# Patient Record
Sex: FEMALE | Race: WHITE | NOT HISPANIC OR LATINO | URBAN - METROPOLITAN AREA
[De-identification: names, ages, dates, MRNs, and addresses within clinical notes are randomized per-mention and may not be internally consistent; named-entity substitution may affect disease eponyms.]

---

## 2022-09-12 ENCOUNTER — HOSPITAL ENCOUNTER (EMERGENCY)
Facility: OTHER | Age: 47
Discharge: HOME | End: 2022-09-13
Attending: EMERGENCY MEDICINE
Payer: MEDICAID

## 2022-09-12 DIAGNOSIS — Z59.00 HOMELESSNESS: ICD-10-CM

## 2022-09-12 DIAGNOSIS — S83.92XA SPRAIN OF LEFT KNEE, UNSPECIFIED LIGAMENT, INITIAL ENCOUNTER: ICD-10-CM

## 2022-09-12 DIAGNOSIS — L03.90 CELLULITIS, UNSPECIFIED CELLULITIS SITE: Primary | ICD-10-CM

## 2022-09-12 PROCEDURE — 99284 EMERGENCY DEPT VISIT MOD MDM: CPT | Mod: 25

## 2022-09-12 SDOH — SOCIAL DETERMINANTS OF HEALTH (SDOH): HOMELESSNESS UNSPECIFIED: Z59.00

## 2022-09-13 VITALS
HEART RATE: 100 BPM | RESPIRATION RATE: 16 BRPM | HEIGHT: 62 IN | TEMPERATURE: 98 F | BODY MASS INDEX: 25.76 KG/M2 | SYSTOLIC BLOOD PRESSURE: 140 MMHG | OXYGEN SATURATION: 97 % | WEIGHT: 140 LBS | DIASTOLIC BLOOD PRESSURE: 97 MMHG

## 2022-09-13 PROCEDURE — 29505 APPLICATION LONG LEG SPLINT: CPT | Mod: LT

## 2022-09-13 PROCEDURE — 25000003 PHARM REV CODE 250: Performed by: EMERGENCY MEDICINE

## 2022-09-13 PROCEDURE — 99283 EMERGENCY DEPT VISIT LOW MDM: CPT | Mod: 25

## 2022-09-13 RX ORDER — ACETAMINOPHEN 500 MG
1000 TABLET ORAL
Status: COMPLETED | OUTPATIENT
Start: 2022-09-13 | End: 2022-09-13

## 2022-09-13 RX ORDER — IBUPROFEN 600 MG/1
600 TABLET ORAL
Status: COMPLETED | OUTPATIENT
Start: 2022-09-13 | End: 2022-09-13

## 2022-09-13 RX ORDER — IBUPROFEN 600 MG/1
600 TABLET ORAL EVERY 6 HOURS PRN
Qty: 20 TABLET | Refills: 0 | Status: SHIPPED | OUTPATIENT
Start: 2022-09-13

## 2022-09-13 RX ORDER — DOXYCYCLINE 100 MG/1
100 CAPSULE ORAL 2 TIMES DAILY
Qty: 20 CAPSULE | Refills: 0 | Status: SHIPPED | OUTPATIENT
Start: 2022-09-13 | End: 2022-09-23

## 2022-09-13 RX ORDER — DOXYCYCLINE HYCLATE 100 MG
100 TABLET ORAL
Status: COMPLETED | OUTPATIENT
Start: 2022-09-13 | End: 2022-09-13

## 2022-09-13 RX ADMIN — IBUPROFEN 600 MG: 600 TABLET ORAL at 12:09

## 2022-09-13 RX ADMIN — ACETAMINOPHEN 1000 MG: 500 TABLET, FILM COATED ORAL at 12:09

## 2022-09-13 RX ADMIN — DOXYCYCLINE HYCLATE 100 MG: 100 TABLET, COATED ORAL at 12:09

## 2022-09-13 NOTE — DISCHARGE INSTRUCTIONS
Mrs. Sloan,    Thank you for letting me care for you today! It was nice meeting you, and I hope you feel better soon.   If you would like access to your chart and what was done today please utilize the [facility].   Please come back to [facility] for all of your future medical needs.    Our goal in the emergency department is to always give you outstanding care and exceptional service. You may receive a survey by mail or e-mail in the next week regarding your experience in our ED. We would greatly appreciate you completing and returning the survey. Your feedback provides us with a way to recognize our staff who give very good care and it helps us learn how to improve when your experience was below our aspiration of excellence.     Sincerely,    Daniel Hurt MD  Board Certified Emergency Physician
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
## 2022-09-14 ENCOUNTER — HOSPITAL ENCOUNTER (EMERGENCY)
Facility: OTHER | Age: 47
Discharge: HOME | End: 2022-09-14
Attending: EMERGENCY MEDICINE
Payer: MEDICAID

## 2022-09-14 VITALS
OXYGEN SATURATION: 98 % | TEMPERATURE: 99 F | HEART RATE: 103 BPM | BODY MASS INDEX: 25.76 KG/M2 | WEIGHT: 140 LBS | RESPIRATION RATE: 16 BRPM | HEIGHT: 62 IN | SYSTOLIC BLOOD PRESSURE: 134 MMHG | DIASTOLIC BLOOD PRESSURE: 92 MMHG

## 2022-09-14 DIAGNOSIS — M25.569 KNEE PAIN: ICD-10-CM

## 2022-09-14 DIAGNOSIS — S82.112A CLOSED DISPLACED FRACTURE OF SPINE OF LEFT TIBIA, INITIAL ENCOUNTER: Primary | ICD-10-CM

## 2022-09-14 PROCEDURE — 29505 APPLICATION LONG LEG SPLINT: CPT | Mod: LT

## 2022-09-14 PROCEDURE — 25000003 PHARM REV CODE 250: Performed by: EMERGENCY MEDICINE

## 2022-09-14 PROCEDURE — 99283 EMERGENCY DEPT VISIT LOW MDM: CPT | Mod: 25

## 2022-09-14 RX ORDER — ACETAMINOPHEN 500 MG
1000 TABLET ORAL
Status: COMPLETED | OUTPATIENT
Start: 2022-09-14 | End: 2022-09-14

## 2022-09-14 RX ADMIN — ACETAMINOPHEN 1000 MG: 500 TABLET, FILM COATED ORAL at 12:09

## 2022-09-14 NOTE — ED TRIAGE NOTES
Pt arrived to ED with c/o left knee pain since yesterday and worsen today. Pt reports being seen in the ED yesterday. Pt AAOx4, NAD noted.
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
## 2022-09-16 ENCOUNTER — HOSPITAL ENCOUNTER (EMERGENCY)
Facility: OTHER | Age: 47
Discharge: HOME | End: 2022-09-16
Attending: EMERGENCY MEDICINE
Payer: MEDICAID

## 2022-09-16 VITALS
RESPIRATION RATE: 16 BRPM | OXYGEN SATURATION: 98 % | HEIGHT: 62 IN | BODY MASS INDEX: 25.76 KG/M2 | TEMPERATURE: 98 F | WEIGHT: 140 LBS | HEART RATE: 85 BPM | SYSTOLIC BLOOD PRESSURE: 113 MMHG | DIASTOLIC BLOOD PRESSURE: 76 MMHG

## 2022-09-16 DIAGNOSIS — Z51.89 VISIT FOR WOUND CHECK: Primary | ICD-10-CM

## 2022-09-16 DIAGNOSIS — L98.9 SKIN LESION: ICD-10-CM

## 2022-09-16 LAB
ANION GAP SERPL CALC-SCNC: 10 MMOL/L (ref 8–16)
BASOPHILS # BLD AUTO: 0.09 K/UL (ref 0–0.2)
BASOPHILS NFR BLD: 0.9 % (ref 0–1.9)
BUN SERPL-MCNC: 8 MG/DL (ref 6–20)
CALCIUM SERPL-MCNC: 9.4 MG/DL (ref 8.7–10.5)
CHLORIDE SERPL-SCNC: 103 MMOL/L (ref 95–110)
CO2 SERPL-SCNC: 27 MMOL/L (ref 23–29)
CREAT SERPL-MCNC: 1 MG/DL (ref 0.5–1.4)
DIFFERENTIAL METHOD: ABNORMAL
EOSINOPHIL # BLD AUTO: 0.1 K/UL (ref 0–0.5)
EOSINOPHIL NFR BLD: 1.1 % (ref 0–8)
ERYTHROCYTE [DISTWIDTH] IN BLOOD BY AUTOMATED COUNT: 14.2 % (ref 11.5–14.5)
EST. GFR  (NO RACE VARIABLE): >60 ML/MIN/1.73 M^2
GLUCOSE SERPL-MCNC: 94 MG/DL (ref 70–110)
HCT VFR BLD AUTO: 44.2 % (ref 37–48.5)
HGB BLD-MCNC: 14.3 G/DL (ref 12–16)
IMM GRANULOCYTES # BLD AUTO: 0.08 K/UL (ref 0–0.04)
IMM GRANULOCYTES NFR BLD AUTO: 0.8 % (ref 0–0.5)
LYMPHOCYTES # BLD AUTO: 3.1 K/UL (ref 1–4.8)
LYMPHOCYTES NFR BLD: 30.7 % (ref 18–48)
MCH RBC QN AUTO: 29.2 PG (ref 27–31)
MCHC RBC AUTO-ENTMCNC: 32.4 G/DL (ref 32–36)
MCV RBC AUTO: 90 FL (ref 82–98)
MONOCYTES # BLD AUTO: 0.5 K/UL (ref 0.3–1)
MONOCYTES NFR BLD: 4.8 % (ref 4–15)
NEUTROPHILS # BLD AUTO: 6.3 K/UL (ref 1.8–7.7)
NEUTROPHILS NFR BLD: 61.7 % (ref 38–73)
NRBC BLD-RTO: 0 /100 WBC
PLATELET # BLD AUTO: 395 K/UL (ref 150–450)
PMV BLD AUTO: 10.2 FL (ref 9.2–12.9)
POTASSIUM SERPL-SCNC: 4.1 MMOL/L (ref 3.5–5.1)
RBC # BLD AUTO: 4.9 M/UL (ref 4–5.4)
SODIUM SERPL-SCNC: 140 MMOL/L (ref 136–145)
WBC # BLD AUTO: 10.18 K/UL (ref 3.9–12.7)

## 2022-09-16 PROCEDURE — 63600175 PHARM REV CODE 636 W HCPCS: Performed by: PHYSICIAN ASSISTANT

## 2022-09-16 PROCEDURE — 96375 TX/PRO/DX INJ NEW DRUG ADDON: CPT

## 2022-09-16 PROCEDURE — 80048 BASIC METABOLIC PNL TOTAL CA: CPT | Performed by: PHYSICIAN ASSISTANT

## 2022-09-16 PROCEDURE — 99284 EMERGENCY DEPT VISIT MOD MDM: CPT | Mod: 25

## 2022-09-16 PROCEDURE — 87593 ORTHOPOXVIRUS AMP PRB EACH: CPT | Performed by: PHYSICIAN ASSISTANT

## 2022-09-16 PROCEDURE — 96374 THER/PROPH/DIAG INJ IV PUSH: CPT

## 2022-09-16 PROCEDURE — 85025 COMPLETE CBC W/AUTO DIFF WBC: CPT | Performed by: PHYSICIAN ASSISTANT

## 2022-09-16 PROCEDURE — 25000003 PHARM REV CODE 250: Performed by: PHYSICIAN ASSISTANT

## 2022-09-16 RX ORDER — CLINDAMYCIN PHOSPHATE 600 MG/50ML
600 INJECTION, SOLUTION INTRAVENOUS
Status: COMPLETED | OUTPATIENT
Start: 2022-09-16 | End: 2022-09-16

## 2022-09-16 RX ORDER — KETOROLAC TROMETHAMINE 30 MG/ML
15 INJECTION, SOLUTION INTRAMUSCULAR; INTRAVENOUS
Status: COMPLETED | OUTPATIENT
Start: 2022-09-16 | End: 2022-09-16

## 2022-09-16 RX ADMIN — KETOROLAC TROMETHAMINE 15 MG: 30 INJECTION, SOLUTION INTRAMUSCULAR; INTRAVENOUS at 03:09

## 2022-09-16 RX ADMIN — CLINDAMYCIN PHOSPHATE 600 MG: 600 INJECTION, SOLUTION INTRAVENOUS at 03:09

## 2022-09-16 NOTE — DISCHARGE INSTRUCTIONS
[facility] Health may have results sooner than us for monkeypox   [phone]    Call us in one week to get results

## 2022-09-16 NOTE — PLAN OF CARE
Call placed to [facility] scheduling in attempt to schedule appt for pt needing follow up with wound care. Per scheduling dept; unable to schedule appt with [facility] due to type of insurance, pt also has outside referral. Call placed to [facility] @ [phone].  clinic closed for the day. Unable to schedule appt. Shawn CALLES notified. Pt to call [facility] @ [phone] to schedule  appt for follow up.

## 2022-09-16 NOTE — ED PROVIDER NOTES
"     Source of History:  Patient     Chief complaint:  knee pain and rash (Pt c.o left knee pain onset 5 days ago.  Pt denies recent injury.  Pt also c.o abscess all over on arms and legs 1 week ago. Pt has multiple sores on arms and legs. Pt denies iv drug use.  AAO x 3 nadn skin w.vic )      HPI:  Mari Sloan is a 47 y.o. female who is presenting to emergency department with worsening skin lesions.  Patient has had painful lesions for the past week.  She was seen here in the ED on 09/12 and was prescribed doxycycline.  She states she is sleeping outside the [place] and wakes up each day with a new lesion.  She states that they spontaneously drain pus.  She states that she sometimes squeezes at these.  She reports subjective fever.  She states worse  has similar lesions.  She is also complaining of left knee pain is worse with bearing weight.  She has been evaluated for this twice this week and was given a knee immobilizer.  She is not wearing today because she states it was too bulky to walk  with.   This is the extent to the patients complaints today here in the emergency department.    ROS: As per HPI and below:  General: + subjective fever  Eyes: No visual changes.  ENT: No sore throat. No congestion.  Head: No headache.    Respiratory: No shortness of breath.  No cough.  Cardiovascular: No chest pain.  Abdomen: No abdominal pain.  No nausea or vomiting.  Genito-Urinary: No abnormal urination.  Neurologic: No focal weakness.  No numbness.  MSK: + left knee pain  Integument: + skin lesions  Allergy/immunology:  Not immunocompromised.     Review of patient's allergies indicates:  No Known Allergies    PMH:  As per HPI and below:  No past medical history on file.  No past surgical history on file.         Physical Exam:    /87 (BP Location: Left arm, Patient Position: Sitting)   Pulse 89   Temp 97.8 °F (36.6 °C) (Oral)   Resp 18   Ht 5' 2" (1.575 m)   Wt 63.5 kg (140 lb)   SpO2 98%   BMI " 25.61 kg/m²   Nursing note and vital signs reviewed.  Appearance: No acute distress.  Eyes: No conjunctival injection.  ENT: Oropharynx clear.    Chest/ Respiratory: Clear to auscultation bilaterally.  Good air movement.  No wheezes.  No rhonchi. No rales. No accessory muscle use.  Cardiovascular: Regular rate and rhythm.  No murmurs. No gallops. No rubs.  Intact distal pulses.  Abdomen: Soft.  Not distended.  Nontender.  No guarding.  No rebound. Non-peritoneal.  Musculoskeletal:  Left knee without obvious deformity.  Normal range of motion.  Skin:  Multiple, raised circumferential skin lesions to arms and legs with calamine lotion apply.  No vesicles.  Approximate 4 cm area of skin breakdown to right tib-fib area.  No significant surrounding induration or warmth.      Neurologic: Motor intact.  Sensation intact.   Mental Status:  Alert and oriented x 3.  Appropriate, conversant.   Labs that have been ordered have been independently reviewed and interpreted by myself.    I decided to obtain the patient's medical records.    MDM/ Differential Dx:    47 y.o. female presenting to the emergency department with skin lesions are not improving despite taking doxycycline.  Patient is afebrile, nontoxic appearing hemodynamically stable.  Patient has poor healing wound to right tib-fib area but no significant surrounding cellulitis.  I do not think at this time patient meets criteria for failed outpatient cellulitis but will obtain blood work and give IV antibiotics here in the ED and try to arrange wound care follow-up    ED Course as of 09/16/22 1742   Fri Sep 16, 2022   1739 Blood work grossly unremarkable.  Obtain monkey pox swab.  Patient given precautions.  Attempted to arrange wound care follow-up but office is closed for the day.  She may have access to a phone on Monday and will be able to call.  Advised to continue taking doxycycline, strict return precautions to the ED [AG]      ED Course User Index  [AG] Niraj  RICHARD Escobar PA-C           Diagnostic Impression:    1. Visit for wound check    2. Skin lesion                   Niraj Escobar PA-C  09/16/22 0953
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
## 2022-09-20 LAB — NONVAR ORTHPX DNA SPEC QL NAA+PROBE: NORMAL

## 2024-02-26 ENCOUNTER — HOSPITAL ENCOUNTER (EMERGENCY)
Facility: OTHER | Age: 49
Discharge: HOME | End: 2024-02-26
Attending: EMERGENCY MEDICINE
Payer: MEDICAID

## 2024-02-26 VITALS
OXYGEN SATURATION: 98 % | DIASTOLIC BLOOD PRESSURE: 90 MMHG | WEIGHT: 160 LBS | RESPIRATION RATE: 18 BRPM | HEART RATE: 86 BPM | SYSTOLIC BLOOD PRESSURE: 159 MMHG | HEIGHT: 62 IN | TEMPERATURE: 99 F | BODY MASS INDEX: 29.44 KG/M2

## 2024-02-26 DIAGNOSIS — I10 HYPERTENSION, UNSPECIFIED TYPE: ICD-10-CM

## 2024-02-26 DIAGNOSIS — R73.9 HYPERGLYCEMIA: ICD-10-CM

## 2024-02-26 DIAGNOSIS — N30.00 ACUTE CYSTITIS WITHOUT HEMATURIA: Primary | ICD-10-CM

## 2024-02-26 LAB
ALBUMIN SERPL BCP-MCNC: 3.5 G/DL (ref 3.5–5.2)
ALP SERPL-CCNC: 151 U/L (ref 55–135)
ALT SERPL W/O P-5'-P-CCNC: 54 U/L (ref 10–44)
ANION GAP SERPL CALC-SCNC: 19 MMOL/L (ref 8–16)
AST SERPL-CCNC: 71 U/L (ref 10–40)
B-OH-BUTYR BLD STRIP-SCNC: 0.3 MMOL/L (ref 0–0.5)
BACTERIA #/AREA URNS HPF: ABNORMAL /HPF
BASOPHILS # BLD AUTO: 0.06 K/UL (ref 0–0.2)
BASOPHILS NFR BLD: 0.6 % (ref 0–1.9)
BILIRUB SERPL-MCNC: 1.4 MG/DL (ref 0.1–1)
BILIRUB UR QL STRIP: NEGATIVE
BUN SERPL-MCNC: 3 MG/DL (ref 6–20)
CALCIUM SERPL-MCNC: 10.2 MG/DL (ref 8.7–10.5)
CHLORIDE SERPL-SCNC: 88 MMOL/L (ref 95–110)
CLARITY UR: CLEAR
CO2 SERPL-SCNC: 25 MMOL/L (ref 23–29)
COLOR UR: YELLOW
CREAT SERPL-MCNC: 0.9 MG/DL (ref 0.5–1.4)
DIFFERENTIAL METHOD BLD: ABNORMAL
EOSINOPHIL # BLD AUTO: 0 K/UL (ref 0–0.5)
EOSINOPHIL NFR BLD: 0.4 % (ref 0–8)
ERYTHROCYTE [DISTWIDTH] IN BLOOD BY AUTOMATED COUNT: 13.2 % (ref 11.5–14.5)
EST. GFR  (NO RACE VARIABLE): >60 ML/MIN/1.73 M^2
GLUCOSE SERPL-MCNC: 456 MG/DL (ref 70–110)
GLUCOSE UR QL STRIP: ABNORMAL
HCT VFR BLD AUTO: 43.3 % (ref 37–48.5)
HGB BLD-MCNC: 14.9 G/DL (ref 12–16)
HGB UR QL STRIP: NEGATIVE
IMM GRANULOCYTES # BLD AUTO: 0.04 K/UL (ref 0–0.04)
IMM GRANULOCYTES NFR BLD AUTO: 0.4 % (ref 0–0.5)
KETONES UR QL STRIP: ABNORMAL
LEUKOCYTE ESTERASE UR QL STRIP: ABNORMAL
LIPASE SERPL-CCNC: 5 U/L (ref 4–60)
LYMPHOCYTES # BLD AUTO: 1.7 K/UL (ref 1–4.8)
LYMPHOCYTES NFR BLD: 17.7 % (ref 18–48)
MCH RBC QN AUTO: 29.2 PG (ref 27–31)
MCHC RBC AUTO-ENTMCNC: 34.4 G/DL (ref 32–36)
MCV RBC AUTO: 85 FL (ref 82–98)
MICROSCOPIC COMMENT: ABNORMAL
MONOCYTES # BLD AUTO: 0.6 K/UL (ref 0.3–1)
MONOCYTES NFR BLD: 6.5 % (ref 4–15)
NEUTROPHILS # BLD AUTO: 7.3 K/UL (ref 1.8–7.7)
NEUTROPHILS NFR BLD: 74.4 % (ref 38–73)
NITRITE UR QL STRIP: NEGATIVE
NRBC BLD-RTO: 0 /100 WBC
PH UR STRIP: 8 [PH] (ref 5–8)
PLATELET # BLD AUTO: 235 K/UL (ref 150–450)
PLATELET BLD QL SMEAR: ABNORMAL
PMV BLD AUTO: 10.6 FL (ref 9.2–12.9)
POCT GLUCOSE: 302 MG/DL (ref 70–110)
POCT GLUCOSE: 367 MG/DL (ref 70–110)
POCT GLUCOSE: 425 MG/DL (ref 70–110)
POTASSIUM SERPL-SCNC: 4 MMOL/L (ref 3.5–5.1)
PROT SERPL-MCNC: 8.7 G/DL (ref 6–8.4)
PROT UR QL STRIP: NEGATIVE
RBC # BLD AUTO: 5.11 M/UL (ref 4–5.4)
RBC #/AREA URNS HPF: 34 /HPF (ref 0–4)
SODIUM SERPL-SCNC: 132 MMOL/L (ref 136–145)
SP GR UR STRIP: >1.03 (ref 1–1.03)
SQUAMOUS #/AREA URNS HPF: 2 /HPF
URN SPEC COLLECT METH UR: ABNORMAL
UROBILINOGEN UR STRIP-ACNC: ABNORMAL EU/DL
WBC # BLD AUTO: 9.75 K/UL (ref 3.9–12.7)
WBC #/AREA URNS HPF: 52 /HPF (ref 0–5)
WBC CLUMPS URNS QL MICRO: ABNORMAL
YEAST URNS QL MICRO: ABNORMAL

## 2024-02-26 PROCEDURE — 80053 COMPREHEN METABOLIC PANEL: CPT | Performed by: PHYSICIAN ASSISTANT

## 2024-02-26 PROCEDURE — 96365 THER/PROPH/DIAG IV INF INIT: CPT

## 2024-02-26 PROCEDURE — 25000003 PHARM REV CODE 250

## 2024-02-26 PROCEDURE — 81000 URINALYSIS NONAUTO W/SCOPE: CPT | Performed by: PHYSICIAN ASSISTANT

## 2024-02-26 PROCEDURE — 99284 EMERGENCY DEPT VISIT MOD MDM: CPT | Mod: 25

## 2024-02-26 PROCEDURE — 87186 SC STD MICRODIL/AGAR DIL: CPT | Performed by: PHYSICIAN ASSISTANT

## 2024-02-26 PROCEDURE — 96372 THER/PROPH/DIAG INJ SC/IM: CPT

## 2024-02-26 PROCEDURE — 96361 HYDRATE IV INFUSION ADD-ON: CPT

## 2024-02-26 PROCEDURE — 85025 COMPLETE CBC W/AUTO DIFF WBC: CPT | Performed by: PHYSICIAN ASSISTANT

## 2024-02-26 PROCEDURE — 63600175 PHARM REV CODE 636 W HCPCS

## 2024-02-26 PROCEDURE — 82962 GLUCOSE BLOOD TEST: CPT

## 2024-02-26 PROCEDURE — 82010 KETONE BODYS QUAN: CPT

## 2024-02-26 PROCEDURE — 87088 URINE BACTERIA CULTURE: CPT | Performed by: PHYSICIAN ASSISTANT

## 2024-02-26 PROCEDURE — 87086 URINE CULTURE/COLONY COUNT: CPT | Performed by: PHYSICIAN ASSISTANT

## 2024-02-26 PROCEDURE — 96375 TX/PRO/DX INJ NEW DRUG ADDON: CPT

## 2024-02-26 PROCEDURE — 87077 CULTURE AEROBIC IDENTIFY: CPT | Performed by: PHYSICIAN ASSISTANT

## 2024-02-26 PROCEDURE — 83690 ASSAY OF LIPASE: CPT | Performed by: PHYSICIAN ASSISTANT

## 2024-02-26 RX ORDER — FAMOTIDINE 10 MG/ML
20 INJECTION INTRAVENOUS
Status: COMPLETED | OUTPATIENT
Start: 2024-02-26 | End: 2024-02-26

## 2024-02-26 RX ORDER — ONDANSETRON 4 MG/1
4 TABLET, ORALLY DISINTEGRATING ORAL EVERY 6 HOURS PRN
Qty: 20 TABLET | Refills: 0 | Status: SHIPPED | OUTPATIENT
Start: 2024-02-26 | End: 2024-03-02

## 2024-02-26 RX ORDER — FAMOTIDINE 20 MG/1
20 TABLET, FILM COATED ORAL 2 TIMES DAILY PRN
Qty: 14 TABLET | Refills: 0 | Status: SHIPPED | OUTPATIENT
Start: 2024-02-26 | End: 2024-03-04

## 2024-02-26 RX ORDER — CLONIDINE HYDROCHLORIDE 0.1 MG/1
0.2 TABLET ORAL
Status: COMPLETED | OUTPATIENT
Start: 2024-02-26 | End: 2024-02-26

## 2024-02-26 RX ORDER — CLONIDINE HYDROCHLORIDE 0.1 MG/1
0.2 TABLET ORAL 3 TIMES DAILY
Qty: 180 TABLET | Refills: 0 | Status: SHIPPED | OUTPATIENT
Start: 2024-02-26 | End: 2024-03-27

## 2024-02-26 RX ORDER — ONDANSETRON HYDROCHLORIDE 2 MG/ML
8 INJECTION, SOLUTION INTRAVENOUS
Status: COMPLETED | OUTPATIENT
Start: 2024-02-26 | End: 2024-02-26

## 2024-02-26 RX ORDER — INSULIN ASPART 100 [IU]/ML
8 INJECTION, SOLUTION INTRAVENOUS; SUBCUTANEOUS
Status: COMPLETED | OUTPATIENT
Start: 2024-02-26 | End: 2024-02-26

## 2024-02-26 RX ORDER — CEFDINIR 300 MG/1
300 CAPSULE ORAL 2 TIMES DAILY
Qty: 14 CAPSULE | Refills: 0 | Status: SHIPPED | OUTPATIENT
Start: 2024-02-26 | End: 2024-03-04

## 2024-02-26 RX ADMIN — INSULIN ASPART 8 UNITS: 100 INJECTION, SOLUTION INTRAVENOUS; SUBCUTANEOUS at 09:02

## 2024-02-26 RX ADMIN — SODIUM CHLORIDE 500 ML: 9 INJECTION, SOLUTION INTRAVENOUS at 10:02

## 2024-02-26 RX ADMIN — CEFTRIAXONE SODIUM 1 G: 1 INJECTION, POWDER, FOR SOLUTION INTRAMUSCULAR; INTRAVENOUS at 08:02

## 2024-02-26 RX ADMIN — SODIUM CHLORIDE 1000 ML: 9 INJECTION, SOLUTION INTRAVENOUS at 08:02

## 2024-02-26 RX ADMIN — ONDANSETRON 8 MG: 2 INJECTION INTRAMUSCULAR; INTRAVENOUS at 08:02

## 2024-02-26 RX ADMIN — FAMOTIDINE 20 MG: 10 INJECTION, SOLUTION INTRAVENOUS at 09:02

## 2024-02-26 RX ADMIN — CLONIDINE HYDROCHLORIDE 0.2 MG: 0.1 TABLET ORAL at 09:02

## 2024-02-27 NOTE — FIRST PROVIDER EVALUATION
Emergency Department TeleTriage Encounter Note      CHIEF COMPLAINT    Chief Complaint   Patient presents with    Vomiting     Reports n/v onset last night. Hx of DM and HTN.       VITAL SIGNS   Initial Vitals   BP Pulse Resp Temp SpO2   02/26/24 1944 02/26/24 1944 02/26/24 1944 02/26/24 1946 02/26/24 1944   (!) 192/111 107 20 98.1 °F (36.7 °C) 100 %      MAP       --                   ALLERGIES    Review of patient's allergies indicates:  No Known Allergies    PROVIDER TRIAGE NOTE  Patient presents with complaint of  Nausea and vomiting for 2 days.  Reports no urinary symptoms.  Reports constipation.      Phy:   Constitutional: well nourished, well developed, appearing stated age, NAD        Initial orders will be placed and care will be transferred to an alternate provider when patient is roomed for a full evaluation. Any additional orders and the final disposition will be determined by that provider.        ORDERS  Labs Reviewed   POCT GLUCOSE - Abnormal; Notable for the following components:       Result Value    POCT Glucose 367 (*)     All other components within normal limits       ED Orders (720h ago, onward)      Start Ordered     Status Ordering Provider    02/26/24 1943 02/26/24 1943  POCT glucose  Once         Final result EMERGENCY, DEPT PHYSICIAN              Virtual Visit Note: The provider triage portion of this emergency department evaluation and documentation was performed via [employer], a HIPAA-compliant telemedicine application, in concert with a tele-presenter in the room. A face to face patient evaluation with one of my colleagues will occur once the patient is placed in an emergency department room.      DISCLAIMER: This note was prepared with [employer] voice recognition transcription software. Garbled syntax, mangled pronouns, and other bizarre constructions may be attributed to that software system.

## 2024-02-27 NOTE — DISCHARGE INSTRUCTIONS
Please take Clonidine for Hypertension as prescribed. Please take Glipizide and Metformin for Diabetes as prescribed. Follow up with your primary care doctor if you feel like your medications are not controlling your blood pressure or glucose. Limit sodium and sugar intake. Make sure you are staying hydrated with water. Take Zofran as needed for nausea and vomiting. Take Pepcid for burning abdominal pain.    Start and complete Cefdinir for UTI. Make sure to drink plenty of water. Do not consume a lot of sweets.

## 2024-02-27 NOTE — ED TRIAGE NOTES
"PT arrived with c/o n/v since midnight.  Pt reports mild abd discomfort only "from dry heaving."  Pt endorses constipation, states she was able to have a BM 3 days ago after taking a laxative, but feel like she needs to have another BM.    Pt reports hx of diabetes, blood sugar 367 in triage, pt states she has not been able to keep her diabetes pills down.  Pt hypertensive in triage, has been out of clonidine for 2 days.  Pt answering questions appropriately, speaking in complete sentences, respirations even and unlabored.  Aao x 4.    "

## 2024-02-27 NOTE — ED PROVIDER NOTES
Encounter Date: 2024       History     Chief Complaint   Patient presents with    Vomiting     Reports n/v onset last night. Hx of DM and HTN.     Mari Sloan is a 48 y.o. female with history of HTN and T2DM presenting to the emergency department for evaluation of nausea and multiple episodes of non bloody vomiting that began last night. Patient reports her upper abdomen feels sore from the dry heaving she has experienced. She reports constipation and states her last bowel movement was 3 days ago. Notes that she had to use an enema in order to have the bowel movement. Denies any blood in her stool. She denies fever, chills, cough, cold symptoms, diarrhea, urinary symptoms, vaginal bleeding or vaginal discharge. Patient is also requesting refill of clonidine which she takes for HTN. States that she has been out of it for the past two days. She is on glipizide and Metformin for DM but states that she does not take it regularly because she does not like to take it on an empty stomach. Notes that she and her  are both homeless and it is hard to get consistent meals.       The history is provided by the patient ( at bedside).     Review of patient's allergies indicates:  No Known Allergies  Past Medical History:   Diagnosis Date    Diabetes mellitus     Hypertension      Past Surgical History:   Procedure Laterality Date     SECTION       History reviewed. No pertinent family history.     Review of Systems   Constitutional:  Negative for chills and fever.   HENT:  Negative for congestion, rhinorrhea and sore throat.    Respiratory:  Negative for cough and shortness of breath.    Cardiovascular:  Negative for chest pain.   Gastrointestinal:  Positive for abdominal pain, constipation, nausea and vomiting. Negative for blood in stool and diarrhea.   Genitourinary:  Negative for dysuria, frequency, urgency, vaginal bleeding and vaginal discharge.   Musculoskeletal:  Negative for back pain.    Skin:  Negative for rash.   Neurological:  Negative for dizziness and headaches.   Psychiatric/Behavioral:  Negative for confusion.        Physical Exam     Initial Vitals   BP Pulse Resp Temp SpO2   02/26/24 1944 02/26/24 1944 02/26/24 1944 02/26/24 1946 02/26/24 1944   (!) 192/111 107 20 98.1 °F (36.7 °C) 100 %      MAP       --                Physical Exam    Nursing note and vitals reviewed.  Constitutional: She appears well-developed and well-nourished. No distress.   Disheveled appearance.    HENT:   Head: Normocephalic and atraumatic.   Nose: Nose normal.   Dry mucous membranes.    Eyes: Conjunctivae and EOM are normal.   Neck: Neck supple.   Normal range of motion.  Cardiovascular:  Normal rate, regular rhythm, normal heart sounds and intact distal pulses.           Pulmonary/Chest: Breath sounds normal. No respiratory distress. She has no wheezes. She has no rhonchi. She has no rales.   Abdominal: Abdomen is soft. Bowel sounds are normal. She exhibits no distension and no mass. There is no abdominal tenderness. There is no rebound and no guarding.   Musculoskeletal:         General: Normal range of motion.      Cervical back: Normal range of motion and neck supple.      Comments: No CVA ttp.      Neurological: She is alert and oriented to person, place, and time. She has normal strength.   Skin: Skin is warm and dry.   Psychiatric: She has a normal mood and affect. Her behavior is normal. Judgment and thought content normal.         ED Course   Procedures  Labs Reviewed   CBC W/ AUTO DIFFERENTIAL - Abnormal; Notable for the following components:       Result Value    Gran % 74.4 (*)     Lymph % 17.7 (*)     Platelet Estimate Clumped (*)     All other components within normal limits   COMPREHENSIVE METABOLIC PANEL - Abnormal; Notable for the following components:    Sodium 132 (*)     Chloride 88 (*)     Glucose 456 (*)     BUN 3 (*)     Total Protein 8.7 (*)     Total Bilirubin 1.4 (*)     Alkaline  Phosphatase 151 (*)     AST 71 (*)     ALT 54 (*)     Anion Gap 19 (*)     All other components within normal limits    Narrative:     Glucose critical result(s) called and verbal readback obtained from   Sisi Reed RN by JESSICA 02/26/2024 21:12   URINALYSIS, REFLEX TO URINE CULTURE - Abnormal; Notable for the following components:    Specific Gravity, UA >1.030 (*)     Glucose, UA 4+ (*)     Ketones, UA 1+ (*)     Urobilinogen, UA 4.0-6.0 (*)     Leukocytes, UA 3+ (*)     All other components within normal limits    Narrative:     Specimen Source->Urine   URINALYSIS MICROSCOPIC - Abnormal; Notable for the following components:    RBC, UA 34 (*)     WBC, UA 52 (*)     WBC Clumps, UA Occasional (*)     Bacteria Moderate (*)     Yeast, UA Occasional (*)     All other components within normal limits    Narrative:     Specimen Source->Urine   POCT GLUCOSE - Abnormal; Notable for the following components:    POCT Glucose 367 (*)     All other components within normal limits   POCT GLUCOSE - Abnormal; Notable for the following components:    POCT Glucose 425 (*)     All other components within normal limits   POCT GLUCOSE - Abnormal; Notable for the following components:    POCT Glucose 302 (*)     All other components within normal limits   CULTURE, URINE   LIPASE   BETA - HYDROXYBUTYRATE, SERUM          Imaging Results    None          Medications   sodium chloride 0.9% bolus 1,000 mL 1,000 mL (0 mLs Intravenous Stopped 2/26/24 2043)   ondansetron injection 8 mg (8 mg Intravenous Given 2/26/24 2043)   cefTRIAXone (Rocephin) 1 g in dextrose 5 % in water (D5W) 100 mL IVPB (MB+) (0 g Intravenous Stopped 2/26/24 2119)   cloNIDine tablet 0.2 mg (0.2 mg Oral Given 2/26/24 2130)   insulin aspart U-100 pen 8 Units (8 Units Subcutaneous Given 2/26/24 2137)   famotidine (PF) injection 20 mg (20 mg Intravenous Given 2/26/24 2144)   sodium chloride 0.9% bolus 500 mL 500 mL (0 mLs Intravenous Stopped 2/26/24 2306)     Medical  Decision Making  Risk  Prescription drug management.                          Medical Decision Making:   Initial Assessment:   Urgent evaluation of 47 yo female with history of HTN and T2DM presenting with nausea, multiple episode of non bloody emesis and epigastric soreness that began after episodes of emesis. Also experiencing constipation but notes her last bowel movement was 3 days ago. She has been out of her clonidine for HTN for the past 2 days. Does not consistently take her DM medications because she does not like taking them on an empty stomach. States that she is homeless and is difficult to consistently obtain meals. On exam, she is well appearing and non toxic. Significantly hypertensive but otherwise hemodynamically stable. Initial Accu Chek is 367. Afebrile in the ED. Dry mucous membranes. No focal abdominal or pelvic ttp. No CVA ttp. Plan for labs.   Clinical Tests:   Lab Tests: Ordered and Reviewed  ED Management:  On review of labs, no leukocytosis. H&H is stable. Mild hyponatremia at 132. Patient currently receiving IV fluids. Serum glucose 456. Total protein elevated at 8.7.  T bili is 1.4. ALP elevated at 151. Mild transaminitis. She does have history of trasaminitis. Does admit to consuming alcohol. Lipase within normal limits. Beta hydroxy is 0.1. Patient not in DKA. 3+ leukocytes and no nitrites on UA. There is also high specific gravity, 4+ glucose and 1+ ketones. 52 WBC and moderate amount of bacteria on microscopy consistent with UTI. Patient was given 1 g of Rocephin IV in the ED. After receiving a liter and half of fluids and 8 units of Novolog, Accu Chek is 302. Blood pressure improved to 159/90 after given home dose of clonidine. She denies any symptoms associated with hypertensive urgency or emergency at this time. Patient was given new prescription for clonidine. Discharged home with prescription for cefdinir for UTI, zofran for nausea and pepcid for epigastric burning. Advised her  to increase water intake. Patient verbalized understanding and agreement with this plan of care. Given specific return precautions. All questions and concerns addressed. Advised to follow up with PCP as needed. She is stable for discharge.              Clinical Impression:  Final diagnoses:  [R73.9] Hyperglycemia  [I10] Hypertension, unspecified type  [N30.00] Acute cystitis without hematuria (Primary)          ED Disposition Condition    Discharge           ED Prescriptions       Medication Sig Dispense Start Date End Date Auth. Provider    cloNIDine (CATAPRES) 0.1 MG tablet Take 2 tablets (0.2 mg total) by mouth 3 (three) times daily. 180 tablet 2/26/2024 3/27/2024 Brandon Maldonado PA-C    cefdinir (OMNICEF) 300 MG capsule Take 1 capsule (300 mg total) by mouth 2 (two) times daily. for 7 days 14 capsule 2/26/2024 3/4/2024 Brandon Maldonado PA-C    ondansetron (ZOFRAN-ODT) 4 MG TbDL Take 1 tablet (4 mg total) by mouth every 6 (six) hours as needed (for nausea). 20 tablet 2/26/2024 3/2/2024 Brandon Maldonado PA-C    famotidine (PEPCID) 20 MG tablet Take 1 tablet (20 mg total) by mouth 2 (two) times daily as needed for Heartburn. 14 tablet 2/26/2024 3/4/2024 Brandon Maldonado PA-C          Follow-up Information    None          Brandon Maldonado PA-C  02/27/24 [id number]

## 2024-02-29 LAB — BACTERIA UR CULT: ABNORMAL

## 2024-09-16 ENCOUNTER — HOSPITAL ENCOUNTER (INPATIENT)
Facility: HOSPITAL | Age: 49
LOS: 32 days | Discharge: OTHER INSTITUTION | DRG: 853 | End: 2024-10-18
Attending: EMERGENCY MEDICINE | Admitting: STUDENT IN AN ORGANIZED HEALTH CARE EDUCATION/TRAINING PROGRAM
Payer: MEDICAID

## 2024-09-16 DIAGNOSIS — E87.6 HYPOKALEMIA: ICD-10-CM

## 2024-09-16 DIAGNOSIS — B95.61 STAPHYLOCOCCUS AUREUS BACTEREMIA: ICD-10-CM

## 2024-09-16 DIAGNOSIS — Z45.2 ENCOUNTER FOR CENTRAL LINE PLACEMENT: ICD-10-CM

## 2024-09-16 DIAGNOSIS — R78.81 STAPHYLOCOCCUS AUREUS BACTEREMIA: ICD-10-CM

## 2024-09-16 DIAGNOSIS — M46.20 PARASPINAL ABSCESS: ICD-10-CM

## 2024-09-16 DIAGNOSIS — R78.81 BACTEREMIA: ICD-10-CM

## 2024-09-16 DIAGNOSIS — Z91.89 AT RISK FOR PROLONGED QT INTERVAL SYNDROME: ICD-10-CM

## 2024-09-16 DIAGNOSIS — A41.9 SEPSIS: ICD-10-CM

## 2024-09-16 DIAGNOSIS — I38 ENDOCARDITIS: ICD-10-CM

## 2024-09-16 DIAGNOSIS — G06.2 EPIDURAL ABSCESS: ICD-10-CM

## 2024-09-16 DIAGNOSIS — M86.9 OSTEOMYELITIS OF MULTIPLE SITES, UNSPECIFIED TYPE: Primary | ICD-10-CM

## 2024-09-16 DIAGNOSIS — R18.8 RETROPERITONEAL FLUID COLLECTION: ICD-10-CM

## 2024-09-16 DIAGNOSIS — R07.9 CHEST PAIN: ICD-10-CM

## 2024-09-16 DIAGNOSIS — R73.9 HYPERGLYCEMIA: ICD-10-CM

## 2024-09-16 LAB
ABO + RH BLD: NORMAL
ALBUMIN SERPL BCP-MCNC: 1.5 G/DL (ref 3.5–5.2)
ALP SERPL-CCNC: 184 U/L (ref 55–135)
ALT SERPL W/O P-5'-P-CCNC: 25 U/L (ref 10–44)
ANION GAP SERPL CALC-SCNC: 10 MMOL/L (ref 8–16)
ANION GAP SERPL CALC-SCNC: 16 MMOL/L (ref 8–16)
AST SERPL-CCNC: 24 U/L (ref 10–40)
B-OH-BUTYR BLD STRIP-SCNC: 0.3 MMOL/L (ref 0–0.5)
BACTERIA #/AREA URNS HPF: ABNORMAL /HPF
BASOPHILS # BLD AUTO: 0.04 K/UL (ref 0–0.2)
BASOPHILS # BLD AUTO: ABNORMAL K/UL (ref 0–0.2)
BASOPHILS NFR BLD: 0 % (ref 0–1.9)
BASOPHILS NFR BLD: 0 % (ref 0–1.9)
BASOPHILS NFR BLD: 0.3 % (ref 0–1.9)
BILIRUB SERPL-MCNC: 1.4 MG/DL (ref 0.1–1)
BILIRUB UR QL STRIP: NEGATIVE
BLD GP AB SCN CELLS X3 SERPL QL: NORMAL
BLD PROD TYP BPU: NORMAL
BLOOD UNIT EXPIRATION DATE: NORMAL
BLOOD UNIT TYPE CODE: 5100
BLOOD UNIT TYPE: NORMAL
BUN SERPL-MCNC: 5 MG/DL (ref 6–20)
BUN SERPL-MCNC: 7 MG/DL (ref 6–20)
CALCIUM SERPL-MCNC: 6.6 MG/DL (ref 8.7–10.5)
CALCIUM SERPL-MCNC: 7.7 MG/DL (ref 8.7–10.5)
CHLORIDE SERPL-SCNC: 91 MMOL/L (ref 95–110)
CHLORIDE SERPL-SCNC: 98 MMOL/L (ref 95–110)
CK SERPL-CCNC: 119 U/L (ref 20–180)
CLARITY UR: ABNORMAL
CO2 SERPL-SCNC: 27 MMOL/L (ref 23–29)
CO2 SERPL-SCNC: 29 MMOL/L (ref 23–29)
CODING SYSTEM: NORMAL
COLOR UR: YELLOW
CREAT SERPL-MCNC: 0.7 MG/DL (ref 0.5–1.4)
CREAT SERPL-MCNC: 0.8 MG/DL (ref 0.5–1.4)
CROSSMATCH INTERPRETATION: NORMAL
DIFFERENTIAL METHOD BLD: ABNORMAL
DISPENSE STATUS: NORMAL
EOSINOPHIL # BLD AUTO: 0 K/UL (ref 0–0.5)
EOSINOPHIL # BLD AUTO: ABNORMAL K/UL (ref 0–0.5)
EOSINOPHIL NFR BLD: 0 % (ref 0–8)
EOSINOPHIL NFR BLD: 0.2 % (ref 0–8)
EOSINOPHIL NFR BLD: 2 % (ref 0–8)
ERYTHROCYTE [DISTWIDTH] IN BLOOD BY AUTOMATED COUNT: 16.6 % (ref 11.5–14.5)
ERYTHROCYTE [DISTWIDTH] IN BLOOD BY AUTOMATED COUNT: 17.4 % (ref 11.5–14.5)
ERYTHROCYTE [DISTWIDTH] IN BLOOD BY AUTOMATED COUNT: 17.5 % (ref 11.5–14.5)
EST. GFR  (NO RACE VARIABLE): >60 ML/MIN/1.73 M^2
EST. GFR  (NO RACE VARIABLE): >60 ML/MIN/1.73 M^2
GLUCOSE SERPL-MCNC: 397 MG/DL (ref 70–110)
GLUCOSE SERPL-MCNC: 544 MG/DL (ref 70–110)
GLUCOSE UR QL STRIP: ABNORMAL
HCO3 UR-SCNC: 36 MMOL/L (ref 24–28)
HCT VFR BLD AUTO: 17.7 % (ref 37–48.5)
HCT VFR BLD AUTO: 18.2 % (ref 37–48.5)
HCT VFR BLD AUTO: 18.4 % (ref 37–48.5)
HCT VFR BLD CALC: 22 %PCV (ref 36–54)
HCV AB SERPL QL IA: POSITIVE
HGB BLD-MCNC: 5.2 G/DL (ref 12–16)
HGB BLD-MCNC: 5.7 G/DL (ref 12–16)
HGB BLD-MCNC: 5.7 G/DL (ref 12–16)
HGB BLD-MCNC: 8 G/DL
HGB UR QL STRIP: ABNORMAL
HIV 1+2 AB+HIV1 P24 AG SERPL QL IA: NEGATIVE
HYPOCHROMIA BLD QL SMEAR: ABNORMAL
HYPOCHROMIA BLD QL SMEAR: ABNORMAL
IMM GRANULOCYTES # BLD AUTO: 0.36 K/UL (ref 0–0.04)
IMM GRANULOCYTES # BLD AUTO: ABNORMAL K/UL (ref 0–0.04)
IMM GRANULOCYTES # BLD AUTO: ABNORMAL K/UL (ref 0–0.04)
IMM GRANULOCYTES NFR BLD AUTO: 3 % (ref 0–0.5)
IMM GRANULOCYTES NFR BLD AUTO: ABNORMAL % (ref 0–0.5)
IMM GRANULOCYTES NFR BLD AUTO: ABNORMAL % (ref 0–0.5)
KETONES UR QL STRIP: ABNORMAL
LACTATE SERPL-SCNC: 1.3 MMOL/L (ref 0.5–2.2)
LDH SERPL L TO P-CCNC: 1.38 MMOL/L (ref 0.5–2.2)
LEUKOCYTE ESTERASE UR QL STRIP: NEGATIVE
LYMPHOCYTES # BLD AUTO: 1.3 K/UL (ref 1–4.8)
LYMPHOCYTES # BLD AUTO: ABNORMAL K/UL (ref 1–4.8)
LYMPHOCYTES NFR BLD: 11.2 % (ref 18–48)
LYMPHOCYTES NFR BLD: 14 % (ref 18–48)
LYMPHOCYTES NFR BLD: 16 % (ref 18–48)
MAGNESIUM SERPL-MCNC: 1.2 MG/DL (ref 1.6–2.6)
MCH RBC QN AUTO: 22.7 PG (ref 27–31)
MCH RBC QN AUTO: 23.3 PG (ref 27–31)
MCH RBC QN AUTO: 24.2 PG (ref 27–31)
MCHC RBC AUTO-ENTMCNC: 29.4 G/DL (ref 32–36)
MCHC RBC AUTO-ENTMCNC: 31 G/DL (ref 32–36)
MCHC RBC AUTO-ENTMCNC: 31.3 G/DL (ref 32–36)
MCV RBC AUTO: 75 FL (ref 82–98)
MCV RBC AUTO: 77 FL (ref 82–98)
MCV RBC AUTO: 77 FL (ref 82–98)
METAMYELOCYTES NFR BLD MANUAL: 1 %
MICROSCOPIC COMMENT: ABNORMAL
MONOCYTES # BLD AUTO: 0.4 K/UL (ref 0.3–1)
MONOCYTES # BLD AUTO: ABNORMAL K/UL (ref 0.3–1)
MONOCYTES NFR BLD: 2 % (ref 4–15)
MONOCYTES NFR BLD: 3 % (ref 4–15)
MONOCYTES NFR BLD: 3.5 % (ref 4–15)
MYELOCYTES NFR BLD MANUAL: 1 %
NEUTROPHILS # BLD AUTO: 9.7 K/UL (ref 1.8–7.7)
NEUTROPHILS # BLD AUTO: ABNORMAL K/UL (ref 1.8–7.7)
NEUTROPHILS NFR BLD: 73 % (ref 38–73)
NEUTROPHILS NFR BLD: 75 % (ref 38–73)
NEUTROPHILS NFR BLD: 81.8 % (ref 38–73)
NEUTS BAND NFR BLD MANUAL: 6 %
NEUTS BAND NFR BLD MANUAL: 7 %
NITRITE UR QL STRIP: POSITIVE
NRBC BLD-RTO: 1 /100 WBC
NUM UNITS TRANS PACKED RBC: NORMAL
PCO2 BLDA: 31.5 MMHG (ref 35–45)
PH SMN: 7.67 [PH] (ref 7.35–7.45)
PH UR STRIP: 7 [PH] (ref 5–8)
PLATELET # BLD AUTO: 124 K/UL (ref 150–450)
PLATELET # BLD AUTO: 131 K/UL (ref 150–450)
PLATELET # BLD AUTO: 132 K/UL (ref 150–450)
PLATELET BLD QL SMEAR: ABNORMAL
PMV BLD AUTO: 10.3 FL (ref 9.2–12.9)
PMV BLD AUTO: 9.8 FL (ref 9.2–12.9)
PMV BLD AUTO: 9.9 FL (ref 9.2–12.9)
PO2 BLDA: 97 MMHG (ref 40–60)
POC BE: 15 MMOL/L
POC SATURATED O2: 99 % (ref 95–100)
POC TCO2: 37 MMOL/L (ref 24–29)
POCT GLUCOSE: 475 MG/DL (ref 70–110)
POCT GLUCOSE: >500 MG/DL (ref 70–110)
POTASSIUM SERPL-SCNC: 2.2 MMOL/L (ref 3.5–5.1)
POTASSIUM SERPL-SCNC: 2.5 MMOL/L (ref 3.5–5.1)
PROT SERPL-MCNC: 6.5 G/DL (ref 6–8.4)
PROT UR QL STRIP: NEGATIVE
RBC # BLD AUTO: 2.29 M/UL (ref 4–5.4)
RBC # BLD AUTO: 2.36 M/UL (ref 4–5.4)
RBC # BLD AUTO: 2.45 M/UL (ref 4–5.4)
RBC #/AREA URNS HPF: 7 /HPF (ref 0–4)
SAMPLE: ABNORMAL
SAMPLE: NORMAL
SODIUM SERPL-SCNC: 134 MMOL/L (ref 136–145)
SODIUM SERPL-SCNC: 137 MMOL/L (ref 136–145)
SP GR UR STRIP: 1.02 (ref 1–1.03)
SPECIMEN OUTDATE: NORMAL
SQUAMOUS #/AREA URNS HPF: 15 /HPF
TOXIC GRANULES BLD QL SMEAR: PRESENT
URN SPEC COLLECT METH UR: ABNORMAL
UROBILINOGEN UR STRIP-ACNC: ABNORMAL EU/DL
WBC # BLD AUTO: 10.84 K/UL (ref 3.9–12.7)
WBC # BLD AUTO: 11.88 K/UL (ref 3.9–12.7)
WBC # BLD AUTO: 9.81 K/UL (ref 3.9–12.7)
WBC #/AREA URNS HPF: 6 /HPF (ref 0–5)
WBC CLUMPS URNS QL MICRO: ABNORMAL
YEAST URNS QL MICRO: ABNORMAL

## 2024-09-16 PROCEDURE — 96376 TX/PRO/DX INJ SAME DRUG ADON: CPT

## 2024-09-16 PROCEDURE — 85027 COMPLETE CBC AUTOMATED: CPT | Performed by: EMERGENCY MEDICINE

## 2024-09-16 PROCEDURE — 96360 HYDRATION IV INFUSION INIT: CPT | Mod: 59

## 2024-09-16 PROCEDURE — 87389 HIV-1 AG W/HIV-1&-2 AB AG IA: CPT | Performed by: EMERGENCY MEDICINE

## 2024-09-16 PROCEDURE — 36556 INSERT NON-TUNNEL CV CATH: CPT

## 2024-09-16 PROCEDURE — 82010 KETONE BODYS QUAN: CPT | Performed by: EMERGENCY MEDICINE

## 2024-09-16 PROCEDURE — 86920 COMPATIBILITY TEST SPIN: CPT | Performed by: EMERGENCY MEDICINE

## 2024-09-16 PROCEDURE — 93010 ELECTROCARDIOGRAM REPORT: CPT | Mod: ,,, | Performed by: INTERNAL MEDICINE

## 2024-09-16 PROCEDURE — 86985 SPLIT BLOOD OR PRODUCTS: CPT | Performed by: EMERGENCY MEDICINE

## 2024-09-16 PROCEDURE — 87150 DNA/RNA AMPLIFIED PROBE: CPT | Mod: 59 | Performed by: EMERGENCY MEDICINE

## 2024-09-16 PROCEDURE — 80053 COMPREHEN METABOLIC PANEL: CPT | Performed by: EMERGENCY MEDICINE

## 2024-09-16 PROCEDURE — 87040 BLOOD CULTURE FOR BACTERIA: CPT | Mod: 59 | Performed by: EMERGENCY MEDICINE

## 2024-09-16 PROCEDURE — 96368 THER/DIAG CONCURRENT INF: CPT

## 2024-09-16 PROCEDURE — P9016 RBC LEUKOCYTES REDUCED: HCPCS | Performed by: EMERGENCY MEDICINE

## 2024-09-16 PROCEDURE — 30233N1 TRANSFUSION OF NONAUTOLOGOUS RED BLOOD CELLS INTO PERIPHERAL VEIN, PERCUTANEOUS APPROACH: ICD-10-PCS | Performed by: STUDENT IN AN ORGANIZED HEALTH CARE EDUCATION/TRAINING PROGRAM

## 2024-09-16 PROCEDURE — 25500020 PHARM REV CODE 255: Performed by: EMERGENCY MEDICINE

## 2024-09-16 PROCEDURE — 20000000 HC ICU ROOM

## 2024-09-16 PROCEDURE — 25000003 PHARM REV CODE 250: Performed by: EMERGENCY MEDICINE

## 2024-09-16 PROCEDURE — 87077 CULTURE AEROBIC IDENTIFY: CPT | Performed by: EMERGENCY MEDICINE

## 2024-09-16 PROCEDURE — 82803 BLOOD GASES ANY COMBINATION: CPT

## 2024-09-16 PROCEDURE — 83605 ASSAY OF LACTIC ACID: CPT | Performed by: EMERGENCY MEDICINE

## 2024-09-16 PROCEDURE — 86901 BLOOD TYPING SEROLOGIC RH(D): CPT | Performed by: EMERGENCY MEDICINE

## 2024-09-16 PROCEDURE — 36415 COLL VENOUS BLD VENIPUNCTURE: CPT | Performed by: EMERGENCY MEDICINE

## 2024-09-16 PROCEDURE — 86900 BLOOD TYPING SEROLOGIC ABO: CPT | Performed by: EMERGENCY MEDICINE

## 2024-09-16 PROCEDURE — 83605 ASSAY OF LACTIC ACID: CPT

## 2024-09-16 PROCEDURE — 63600175 PHARM REV CODE 636 W HCPCS: Performed by: EMERGENCY MEDICINE

## 2024-09-16 PROCEDURE — 82962 GLUCOSE BLOOD TEST: CPT

## 2024-09-16 PROCEDURE — 85007 BL SMEAR W/DIFF WBC COUNT: CPT | Mod: 91 | Performed by: EMERGENCY MEDICINE

## 2024-09-16 PROCEDURE — P9011 BLOOD SPLIT UNIT: HCPCS | Performed by: EMERGENCY MEDICINE

## 2024-09-16 PROCEDURE — 96375 TX/PRO/DX INJ NEW DRUG ADDON: CPT

## 2024-09-16 PROCEDURE — 86803 HEPATITIS C AB TEST: CPT | Performed by: EMERGENCY MEDICINE

## 2024-09-16 PROCEDURE — 87186 SC STD MICRODIL/AGAR DIL: CPT | Performed by: EMERGENCY MEDICINE

## 2024-09-16 PROCEDURE — 99285 EMERGENCY DEPT VISIT HI MDM: CPT | Mod: 25

## 2024-09-16 PROCEDURE — 85025 COMPLETE CBC W/AUTO DIFF WBC: CPT | Performed by: EMERGENCY MEDICINE

## 2024-09-16 PROCEDURE — 82550 ASSAY OF CK (CPK): CPT | Performed by: EMERGENCY MEDICINE

## 2024-09-16 PROCEDURE — 80048 BASIC METABOLIC PNL TOTAL CA: CPT | Mod: XB | Performed by: EMERGENCY MEDICINE

## 2024-09-16 PROCEDURE — 86850 RBC ANTIBODY SCREEN: CPT | Performed by: EMERGENCY MEDICINE

## 2024-09-16 PROCEDURE — 81000 URINALYSIS NONAUTO W/SCOPE: CPT | Performed by: EMERGENCY MEDICINE

## 2024-09-16 PROCEDURE — 83735 ASSAY OF MAGNESIUM: CPT | Performed by: EMERGENCY MEDICINE

## 2024-09-16 PROCEDURE — 96365 THER/PROPH/DIAG IV INF INIT: CPT | Mod: 59

## 2024-09-16 PROCEDURE — 96367 TX/PROPH/DG ADDL SEQ IV INF: CPT

## 2024-09-16 PROCEDURE — 96361 HYDRATE IV INFUSION ADD-ON: CPT

## 2024-09-16 PROCEDURE — 96366 THER/PROPH/DIAG IV INF ADDON: CPT

## 2024-09-16 PROCEDURE — 93005 ELECTROCARDIOGRAM TRACING: CPT

## 2024-09-16 PROCEDURE — 99900035 HC TECH TIME PER 15 MIN (STAT)

## 2024-09-16 RX ORDER — HYDROCODONE BITARTRATE AND ACETAMINOPHEN 500; 5 MG/1; MG/1
TABLET ORAL
Status: DISCONTINUED | OUTPATIENT
Start: 2024-09-16 | End: 2024-09-17

## 2024-09-16 RX ORDER — MAGNESIUM SULFATE HEPTAHYDRATE 40 MG/ML
2 INJECTION, SOLUTION INTRAVENOUS
Status: COMPLETED | OUTPATIENT
Start: 2024-09-16 | End: 2024-09-16

## 2024-09-16 RX ORDER — HYDROMORPHONE HYDROCHLORIDE 1 MG/ML
1 INJECTION, SOLUTION INTRAMUSCULAR; INTRAVENOUS; SUBCUTANEOUS EVERY 4 HOURS PRN
Status: DISCONTINUED | OUTPATIENT
Start: 2024-09-17 | End: 2024-09-17

## 2024-09-16 RX ORDER — ALUMINUM HYDROXIDE, MAGNESIUM HYDROXIDE, AND SIMETHICONE 1200; 120; 1200 MG/30ML; MG/30ML; MG/30ML
30 SUSPENSION ORAL 4 TIMES DAILY PRN
Status: DISCONTINUED | OUTPATIENT
Start: 2024-09-16 | End: 2024-09-17

## 2024-09-16 RX ORDER — POLYETHYLENE GLYCOL 3350 17 G/17G
17 POWDER, FOR SOLUTION ORAL 2 TIMES DAILY PRN
Status: DISCONTINUED | OUTPATIENT
Start: 2024-09-16 | End: 2024-09-21

## 2024-09-16 RX ORDER — HYDROMORPHONE HYDROCHLORIDE 1 MG/ML
0.5 INJECTION, SOLUTION INTRAMUSCULAR; INTRAVENOUS; SUBCUTANEOUS EVERY 4 HOURS PRN
Status: DISCONTINUED | OUTPATIENT
Start: 2024-09-17 | End: 2024-09-17

## 2024-09-16 RX ORDER — CALCIUM GLUCONATE 20 MG/ML
1 INJECTION, SOLUTION INTRAVENOUS
Status: COMPLETED | OUTPATIENT
Start: 2024-09-16 | End: 2024-09-16

## 2024-09-16 RX ORDER — LIDOCAINE HYDROCHLORIDE 10 MG/ML
5 INJECTION, SOLUTION INFILTRATION; PERINEURAL
Status: COMPLETED | OUTPATIENT
Start: 2024-09-16 | End: 2024-09-16

## 2024-09-16 RX ORDER — METFORMIN HYDROCHLORIDE 500 MG/1
500 TABLET ORAL 2 TIMES DAILY WITH MEALS
Status: ON HOLD | COMMUNITY
Start: 2024-07-01 | End: 2024-10-11 | Stop reason: HOSPADM

## 2024-09-16 RX ORDER — SODIUM CHLORIDE 9 MG/ML
1000 INJECTION, SOLUTION INTRAVENOUS
Status: COMPLETED | OUTPATIENT
Start: 2024-09-16 | End: 2024-09-16

## 2024-09-16 RX ORDER — MORPHINE SULFATE 4 MG/ML
4 INJECTION, SOLUTION INTRAMUSCULAR; INTRAVENOUS
Status: COMPLETED | OUTPATIENT
Start: 2024-09-16 | End: 2024-09-16

## 2024-09-16 RX ORDER — ONDANSETRON 4 MG/1
4 TABLET, ORALLY DISINTEGRATING ORAL
Status: COMPLETED | OUTPATIENT
Start: 2024-09-16 | End: 2024-09-16

## 2024-09-16 RX ORDER — HYDROXYZINE PAMOATE 25 MG/1
25 CAPSULE ORAL 3 TIMES DAILY
COMMUNITY

## 2024-09-16 RX ORDER — SODIUM CHLORIDE 0.9 % (FLUSH) 0.9 %
10 SYRINGE (ML) INJECTION EVERY 12 HOURS PRN
Status: DISCONTINUED | OUTPATIENT
Start: 2024-09-16 | End: 2024-10-18 | Stop reason: HOSPADM

## 2024-09-16 RX ORDER — MORPHINE SULFATE 2 MG/ML
6 INJECTION, SOLUTION INTRAMUSCULAR; INTRAVENOUS
Status: COMPLETED | OUTPATIENT
Start: 2024-09-16 | End: 2024-09-16

## 2024-09-16 RX ORDER — GABAPENTIN 300 MG/1
300 CAPSULE ORAL 3 TIMES DAILY
COMMUNITY

## 2024-09-16 RX ORDER — ONDANSETRON HYDROCHLORIDE 2 MG/ML
4 INJECTION, SOLUTION INTRAVENOUS EVERY 6 HOURS PRN
Status: DISCONTINUED | OUTPATIENT
Start: 2024-09-16 | End: 2024-09-22

## 2024-09-16 RX ORDER — ACETAMINOPHEN 325 MG/1
650 TABLET ORAL EVERY 4 HOURS PRN
Status: DISCONTINUED | OUTPATIENT
Start: 2024-09-16 | End: 2024-09-18

## 2024-09-16 RX ORDER — IPRATROPIUM BROMIDE AND ALBUTEROL SULFATE 2.5; .5 MG/3ML; MG/3ML
3 SOLUTION RESPIRATORY (INHALATION) EVERY 4 HOURS PRN
Status: DISCONTINUED | OUTPATIENT
Start: 2024-09-16 | End: 2024-10-18 | Stop reason: HOSPADM

## 2024-09-16 RX ORDER — HYDROMORPHONE HYDROCHLORIDE 1 MG/ML
1 INJECTION, SOLUTION INTRAMUSCULAR; INTRAVENOUS; SUBCUTANEOUS
Status: COMPLETED | OUTPATIENT
Start: 2024-09-16 | End: 2024-09-16

## 2024-09-16 RX ORDER — ONDANSETRON HYDROCHLORIDE 2 MG/ML
4 INJECTION, SOLUTION INTRAVENOUS
Status: COMPLETED | OUTPATIENT
Start: 2024-09-16 | End: 2024-09-16

## 2024-09-16 RX ORDER — GLUCAGON 1 MG
1 KIT INJECTION
Status: DISCONTINUED | OUTPATIENT
Start: 2024-09-16 | End: 2024-09-23

## 2024-09-16 RX ORDER — SIMETHICONE 80 MG
1 TABLET,CHEWABLE ORAL 4 TIMES DAILY PRN
Status: DISCONTINUED | OUTPATIENT
Start: 2024-09-16 | End: 2024-09-17

## 2024-09-16 RX ORDER — POTASSIUM CHLORIDE 7.45 MG/ML
10 INJECTION INTRAVENOUS
Status: COMPLETED | OUTPATIENT
Start: 2024-09-16 | End: 2024-09-16

## 2024-09-16 RX ORDER — METHADONE HYDROCHLORIDE 10 MG/1
70 TABLET ORAL DAILY
Status: ON HOLD | COMMUNITY
End: 2024-10-11

## 2024-09-16 RX ORDER — SODIUM CHLORIDE 9 MG/ML
INJECTION, SOLUTION INTRAVENOUS CONTINUOUS
Status: ACTIVE | OUTPATIENT
Start: 2024-09-17 | End: 2024-09-17

## 2024-09-16 RX ORDER — CLINDAMYCIN PHOSPHATE 600 MG/50ML
600 INJECTION, SOLUTION INTRAVENOUS
Status: DISCONTINUED | OUTPATIENT
Start: 2024-09-16 | End: 2024-09-17

## 2024-09-16 RX ORDER — TALC
6 POWDER (GRAM) TOPICAL NIGHTLY PRN
Status: DISCONTINUED | OUTPATIENT
Start: 2024-09-16 | End: 2024-10-18 | Stop reason: HOSPADM

## 2024-09-16 RX ORDER — NALOXONE HCL 0.4 MG/ML
0.4 VIAL (ML) INJECTION
Status: DISCONTINUED | OUTPATIENT
Start: 2024-09-16 | End: 2024-10-18 | Stop reason: HOSPADM

## 2024-09-16 RX ORDER — INSULIN ASPART 100 [IU]/ML
0-10 INJECTION, SOLUTION INTRAVENOUS; SUBCUTANEOUS
Status: DISCONTINUED | OUTPATIENT
Start: 2024-09-16 | End: 2024-09-17

## 2024-09-16 RX ADMIN — PIPERACILLIN SODIUM AND TAZOBACTAM SODIUM 4.5 G: 4; .5 INJECTION, POWDER, LYOPHILIZED, FOR SOLUTION INTRAVENOUS at 11:09

## 2024-09-16 RX ADMIN — CALCIUM GLUCONATE 1 G: 20 INJECTION, SOLUTION INTRAVENOUS at 10:09

## 2024-09-16 RX ADMIN — VANCOMYCIN HYDROCHLORIDE 1250 MG: 1.25 INJECTION, POWDER, LYOPHILIZED, FOR SOLUTION INTRAVENOUS at 12:09

## 2024-09-16 RX ADMIN — SODIUM CHLORIDE 1000 ML: 0.9 INJECTION, SOLUTION INTRAVENOUS at 09:09

## 2024-09-16 RX ADMIN — SODIUM CHLORIDE: 9 INJECTION, SOLUTION INTRAVENOUS at 11:09

## 2024-09-16 RX ADMIN — SODIUM CHLORIDE: 0.9 INJECTION, SOLUTION INTRAVENOUS at 05:09

## 2024-09-16 RX ADMIN — LIDOCAINE HYDROCHLORIDE 5 ML: 10 INJECTION, SOLUTION INFILTRATION; PERINEURAL at 01:09

## 2024-09-16 RX ADMIN — HYDROMORPHONE HYDROCHLORIDE 1 MG: 1 INJECTION, SOLUTION INTRAMUSCULAR; INTRAVENOUS; SUBCUTANEOUS at 08:09

## 2024-09-16 RX ADMIN — MORPHINE SULFATE 6 MG: 2 INJECTION, SOLUTION INTRAMUSCULAR; INTRAVENOUS at 02:09

## 2024-09-16 RX ADMIN — SODIUM CHLORIDE 1000 ML: 0.9 INJECTION, SOLUTION INTRAVENOUS at 05:09

## 2024-09-16 RX ADMIN — IOHEXOL 75 ML: 350 INJECTION, SOLUTION INTRAVENOUS at 04:09

## 2024-09-16 RX ADMIN — MAGNESIUM SULFATE HEPTAHYDRATE 2 G: 40 INJECTION, SOLUTION INTRAVENOUS at 03:09

## 2024-09-16 RX ADMIN — HYDROMORPHONE HYDROCHLORIDE 1 MG: 1 INJECTION, SOLUTION INTRAMUSCULAR; INTRAVENOUS; SUBCUTANEOUS at 04:09

## 2024-09-16 RX ADMIN — POTASSIUM BICARBONATE 50 MEQ: 978 TABLET, EFFERVESCENT ORAL at 11:09

## 2024-09-16 RX ADMIN — HYDROMORPHONE HYDROCHLORIDE 1 MG: 1 INJECTION, SOLUTION INTRAMUSCULAR; INTRAVENOUS; SUBCUTANEOUS at 07:09

## 2024-09-16 RX ADMIN — PIPERACILLIN SODIUM AND TAZOBACTAM SODIUM 4.5 G: 4; .5 INJECTION, POWDER, LYOPHILIZED, FOR SOLUTION INTRAVENOUS at 10:09

## 2024-09-16 RX ADMIN — HYDROMORPHONE HYDROCHLORIDE 1 MG: 1 INJECTION, SOLUTION INTRAMUSCULAR; INTRAVENOUS; SUBCUTANEOUS at 10:09

## 2024-09-16 RX ADMIN — ONDANSETRON 4 MG: 2 INJECTION INTRAMUSCULAR; INTRAVENOUS at 02:09

## 2024-09-16 RX ADMIN — POTASSIUM CHLORIDE 10 MEQ: 7.46 INJECTION, SOLUTION INTRAVENOUS at 10:09

## 2024-09-16 RX ADMIN — ONDANSETRON 4 MG: 4 TABLET, ORALLY DISINTEGRATING ORAL at 12:09

## 2024-09-16 RX ADMIN — POTASSIUM CHLORIDE 10 MEQ: 7.46 INJECTION, SOLUTION INTRAVENOUS at 01:09

## 2024-09-16 RX ADMIN — SODIUM CHLORIDE 1000 ML: 9 INJECTION, SOLUTION INTRAVENOUS at 09:09

## 2024-09-16 RX ADMIN — MORPHINE SULFATE 4 MG: 4 INJECTION, SOLUTION INTRAMUSCULAR; INTRAVENOUS at 12:09

## 2024-09-16 RX ADMIN — ONDANSETRON 4 MG: 2 INJECTION INTRAMUSCULAR; INTRAVENOUS at 10:09

## 2024-09-16 RX ADMIN — CLINDAMYCIN PHOSPHATE 600 MG: 600 INJECTION, SOLUTION INTRAVENOUS at 07:09

## 2024-09-16 NOTE — ED TRIAGE NOTES
Pt reporting N/V x 5 days with elevated CBG reading. Hx of DM. Pt also endorsing trip and fall x 1 week ago and reporting bilateral hip pain, worse on R side with ambulation difficulty. She reports vomiting after taking her daily medications as well, as her methadone. Pt has hx of drug abuse. Scabs and open wounds noted generalized to BUE.

## 2024-09-16 NOTE — PROVIDER TRANSFER
(Physician in Lead of Transfers)  Outside Transfer Acceptance Note / Regional Referral Center      Upon patient arrival, please contact Hospital Medicine on call.    Referring facility: [facility] [place] [facility] ([facility])   Referring provider: MADELYN ALEXANDRE  Accepting facility: [facility]  Accepting provider: LUNA DANIELS  Admitting provider: BEV CHATTERJEE  Reason for transfer:  Neurosurgery availability  Transfer diagnosis: anemia/SI joint and L5-S1 osteomelitis  Transfer specialty requested: Neurosurgery/IR/Hospital Medicine  Transfer specialty notified: Yes  Transfer level: NUMBER 1-5: 2  Bed type requested: ICU  Isolation status: No active isolations   Admission class or status: IP- Inpatient      Narrative     49-year-old female with a history of diabetes, hypertension, and substance use presented to the [facility] Emergency Department on September 16 with nausea and vomiting and a mechanical fall 5 days prior.  She reports pain worse in her back and on her sides radiating down both legs.  She noted muscle spasm in her back and legs.  She had no head trauma or loss of consciousness.  With the persistent vomiting she has been unable to keep oral intake down.  She had no fever chills and no cough or wheezing.  She had no focal motor deficits noted, and she has no alteration in mentation.  She reported no hematemesis and no melena or hematochezia.  Labs were concerning for hypokalemia, hypomagnesemia, severe anemia, metabolic alkalosis, and hyperglycemia.  Imaging studies of her lumbar spine and pelvis were concerning for osteomyelitis/septic arthritis of the left SI joint and L5-S1 along with an abnormal fluid collection extending from T11 through the left iliacus muscle concerning for abscess.  In the emergency department she is receiving IV fluid, Zofran, Zosyn, vancomycin, potassium, magnesium, pain medication, and nausea medication.  Case discussed with Neurosurgery and Interventional  Radiology.  Plan would be for transfer to Hospital Medicine at [facility] for IR and Neurosurgery evaluation.  She will need continued treatment of SHELBIE, anemia, and hyperglycemia.  ED repositioned the central line.  Repeat hemoglobin was 5.2.  Packed red blood cells are ordered for transfusion.  Subsequent imaging with contrasted CT showed extensive soft tissue edema and subcutaneous gas throughout the left flank. ED will contact General Surgery at [facility] for input on left flank subcutaneous gas. Clindamycin has been added to his regimen.    Sodium 134, potassium 2.5, chloride 91, CO2 27, BUN 7, creatinine 0.8, glucose 544, albumin 1.5, bilirubin 1.4, AST 24, ALT 25, magnesium 1.2, white blood cells 9.81, hemoglobin 5.7, hematocrit 18.4, platelets 132, beta hydroxybutyrate 0.3  -urinalysis with 4+ glucose, trace ketone, 2+ blood, positive nitrite, 7 RBC, 6 WBC, moderate bacteria, rare yeast, 15 squamous epithelial cells  -pH 7.666, pCO2 31.5, pO2 97 (noted to be venous)  -repeat glucose 475  -blood cultures collected    Repeat CBC with white blood cells 10.84, hemoglobin 5.2, hematocrit 17.7, platelets 131    CT chest, abdomen, and pelvis with IV contrast showed extensive soft tissue edema with subcutaneous gas throughout the left flank concerning for soft tissue infection.  No organized fluid collection in the soft tissues.  Small psoas abscesses and small fluid collections in the left flank likely indicating abscess formation in this region.  Evidence of septic arthritis in the left sacroiliac joint, L5-S1 disc space, and pubic symphysis with extension of the soft tissue infection into the left iliacus and left adductor musculature.  While the left retroperitoneal fluid collections do appear organized, percutaneous drainage is not advised given the extensive soft tissue infection superficial to these collections.  In addition, there is extensive evidence of soft tissue infection that does not appear  organized or percutaneously drainable.    CT lumbar spine and pelvis had findings most concerning for infectious process.  There were findings concerning for osteomyelitis and septic arthritis in the left SI joint.  Findings concerning for osteomyelitis/diskitis L5-S1.  Possible osteomyelitis and septic joint at the pubic symphysis.  Abnormal fluid collection on the left extending from T11 through the left iliacus muscle along and within the left iliopsoas muscle most concerning for abscess.  Multiple additional small abscesses suspected in the pelvis.  Multifocal collections of air in the fluid in the subcutaneous tissues of the left flank, incompletely imaged.    Chest x-ray noted a loop in the central venous catheter.  Tip currently at the level of the brachiocephalic vein.  Lungs are fairly clear.    Repeat chest x-ray after repositioning central line noted catheter tip in the right atrium.  Loop has been reduced.    CT chest, abdomen, and pelvis with IV contrast    Objective     Vitals: Temp: 99.4 °F (37.4 °C) (09/16/24 1732)  Pulse: 94 (09/16/24 1732)  Resp: 18 (09/16/24 1732)  BP: (!) 142/79 (09/16/24 1732)  SpO2: 99 % (09/16/24 1732)  Recent Labs: CBC:   Recent Labs   Lab 09/16/24  0934 09/16/24  0954 09/16/24  1539   WBC 9.81  --  10.84   HGB 5.7*  --  5.2*   HCT 18.4* 22* 17.7*   *  --  131*     CMP:   Recent Labs   Lab 09/16/24  0934   *   K 2.5*   CL 91*   CO2 27   *   BUN 7   CREATININE 0.8   CALCIUM 7.7*   PROT 6.5   ALBUMIN 1.5*   BILITOT 1.4*   ALKPHOS 184*   AST 24   ALT 25   ANIONGAP 16         Instructions    Admit to Hospital Medicine      RICHARD Graves MD  Hospital Medicine Staff  Cell: [phone]

## 2024-09-16 NOTE — PROVIDER PROGRESS NOTES - EMERGENCY DEPT.
Encounter Date: 9/16/2024    ED Physician Progress Notes          Assumed care of this patient from previous emergency provider Dr. Thomas    This 50 yo woman presented with pain and being down for a long time and right back and leg pain.    She has a critically low HH  She has sub q gas.  Discussed with Dr. Hill neurosurgery.  Discussed with Dr. Ely courtney physician.  Discussed with On call  physician from [facility].  Added blood transfusion and obtained consent.   Added clindamycin for GAS organism.    Will plan for admission to ICU at [facility] for surgical care, spine care and IR.      Critical Care    Date/Time: 9/16/2024 3:00 PM    Performed by: Daniel Hurt MD  Authorized by: Daniel Hurt MD  Direct patient critical care time: 25 minutes  Additional history critical care time: 10 minutes  Ordering / reviewing critical care time: 5 minutes  Documentation critical care time: 5 minutes  Consulting other physicians critical care time: 5 minutes  Total critical care time (exclusive of procedural time) : 50 minutes  Critical care time was exclusive of separately billable procedures and treating other patients and teaching time.  Critical care was necessary to treat or prevent imminent or life-threatening deterioration of the following conditions: sepsis and metabolic crisis.  Critical care was time spent personally by me on the following activities: blood draw for specimens, development of treatment plan with patient or surrogate, discussions with consultants, discussions with primary provider, interpretation of cardiac output measurements, evaluation of patient's response to treatment, examination of patient, obtaining history from patient or surrogate, ordering and performing treatments and interventions, ordering and review of laboratory studies, ordering and review of radiographic studies, pulse oximetry, re-evaluation of patient's condition and review of old charts.

## 2024-09-16 NOTE — ED PROVIDER NOTES
"Encounter Date: 2024    SCRIBE #1 NOTE: I, Bhavya Ross, am scribing for, and in the presence of,  Rubén Thomas MD. I have scribed the following portions of the note - Other sections scribed: HPI, ROS, PE.       History     Chief Complaint   Patient presents with    Emesis     Nausea and vomiting x5 days. CBG "HIGH" per EMS    Fall     Fall 1.5 wk ago, c/o BL hip pain. Pt also reporting she has not bene able to take her medications since falling besides her methadone.     Time seen by provider: 8:39 AM    This is a 49 y.o. female with PMHx of diabetes, hypertension, and substance abuse who presents with complaint of vomiting and recent mechanical fall 5 days ago. Patient reports that she tripped and fell 5 days ago, and was unable to get up after the fall. She states the pain is worse in her low back and her sides and radiates down into both her legs. She reports experiencing muscle spasms in her lower back and legs. She states she did not hit her head, or lose consciousness. She notes that she has chronic back pain, but this pain is much worse than her usual back pain. She states that shortly after the fall is experiencing vomiting and has not been able to keep any food or liquid down since. She reports vomiting after taking her daily medications as well as possibly her methadone. She denies any fever, chills, cough or wheezing.     This is the extent of the patient's complaints at this time.       The history is provided by the patient.     Review of patient's allergies indicates:  No Known Allergies  Past Medical History:   Diagnosis Date    Diabetes mellitus     Hypertension      Past Surgical History:   Procedure Laterality Date     SECTION       No family history on file.     Review of Systems   Constitutional:  Negative for fever.   HENT:  Negative for congestion.    Eyes:  Negative for redness.   Respiratory:  Negative for cough, shortness of breath and wheezing.    Cardiovascular:  " Negative for chest pain.   Gastrointestinal:  Positive for nausea and vomiting. Negative for abdominal pain.   Genitourinary:  Negative for dysuria.   Musculoskeletal:  Positive for back pain. Negative for neck pain and neck stiffness.   Skin:  Negative for rash.   Neurological:  Negative for headaches.   Psychiatric/Behavioral:  Negative for confusion.        Physical Exam     Initial Vitals   BP Pulse Resp Temp SpO2   09/16/24 0755 09/16/24 0755 09/16/24 0755 09/16/24 0757 09/16/24 0755   138/81 84 20 99.1 °F (37.3 °C) 100 %      MAP       --                Physical Exam    Constitutional: She is not diaphoretic. She appears distressed.   Disheveled.    HENT:   Head: Normocephalic and atraumatic.   Dry mucus membranes.   Eyes: Conjunctivae are normal.   Neck: Neck supple.   Cardiovascular:  Normal rate, regular rhythm, S1 normal, S2 normal, normal heart sounds and intact distal pulses.           No murmur heard.  Pulmonary/Chest: Breath sounds normal. No respiratory distress. She has no wheezes. She has no rhonchi. She has no rales.   Abdominal: Abdomen is soft. There is no abdominal tenderness. There is no rebound and no guarding.   Musculoskeletal:         General: No edema.      Cervical back: Neck supple.      Comments: Diffuse left lumnar spine tenderness with no focal tenderness to the legs or hips.  ROM intact but pain limited     Neurological: She is alert and oriented to person, place, and time. She has normal strength.   Skin: Skin is warm and dry.   Multiple scabs over extremities without surrounding erythema         ED Course   Central Line    Date/Time: 9/16/2024 3:19 PM    Performed by: Rubén Thomas MD  Authorized by: Rubén Thomas MD    Location procedure was performed:  [facility] EMERGENCY DEPARTMENT  Consent Done ?:  Yes  Time out complete?: Verified correct patient, procedure, equipment, staff, and site/side    Indications:  Med administration  Anesthesia:  Local infiltration  Local  anesthetic:  Lidocaine 1% without epinephrine  Anesthetic total (ml):  4  Preparation:  Skin prepped with ChloraPrep  Skin prep agent dried: Skin prep agent completely dried prior to procedure    Sterile barriers: All five maximal sterile barriers used - gloves, gown, cap, mask and large sterile sheet    Hand hygiene: Hand hygiene performed immediately prior to central venous catheter insertion    Location:  Right internal jugular  Catheter type:  Triple lumen  Catheter size:  7 Fr  Ultrasound guidance: Yes    Vessel Caliber:  Large   patent  Comprressibility:  Normal  Needle advanced into vessel with real time ultrasound guidance.    Guidewire confirmed in vessel.    Steril sheath on probe.    Sterile gel used.  Manometry: No    Number of attempts:  1  Securement:  Line sutured, chlorhexidine patch, sterile dressing applied and blood return through all ports  Complications: No    XRay:  Placement verified by x-ray  Adverse Events:  NoneTermination Site: brachiocephalic    Labs Reviewed   CBC W/ AUTO DIFFERENTIAL - Abnormal       Result Value    WBC 9.81      RBC 2.45 (*)     Hemoglobin 5.7 (*)     Hematocrit 18.4 (*)     MCV 75 (*)     MCH 23.3 (*)     MCHC 31.0 (*)     RDW 17.5 (*)     Platelets 132 (*)     MPV 10.3      Immature Granulocytes CANCELED      Immature Grans (Abs) CANCELED      nRBC 1 (*)     Gran % 75.0 (*)     Lymph % 14.0 (*)     Mono % 2.0 (*)     Eosinophil % 0.0      Basophil % 0.0      Bands 7.0      Metamyelocytes 1.0      Myelocytes 1.0      Platelet Estimate Decreased (*)     Hypo Moderate      Toxic Granulation Present      Differential Method Manual      Narrative:     Hgb, Hct critical result(s) called and verbal readback obtained from   Yesica Carrillo RN  by [facility] 09/16/2024 09:52   COMPREHENSIVE METABOLIC PANEL - Abnormal    Sodium 134 (*)     Potassium 2.5 (*)     Chloride 91 (*)     CO2 27      Glucose 544 (*)     BUN 7      Creatinine 0.8      Calcium 7.7 (*)     Total Protein 6.5       Albumin 1.5 (*)     Total Bilirubin 1.4 (*)     Alkaline Phosphatase 184 (*)     AST 24      ALT 25      eGFR >60      Anion Gap 16      Narrative:     Glucose and Potassium critical result(s) called and verbal readback   obtained from Niraj Chandra RN by [facility] 09/16/2024 10:47   MAGNESIUM - Abnormal    Magnesium 1.2 (*)    URINALYSIS, REFLEX TO URINE CULTURE - Abnormal    Specimen UA Urine, Clean Catch      Color, UA Yellow      Appearance, UA Hazy (*)     pH, UA 7.0      Specific Gravity, UA 1.020      Protein, UA Negative      Glucose, UA 4+ (*)     Ketones, UA Trace (*)     Bilirubin (UA) Negative      Occult Blood UA 2+ (*)     Nitrite, UA Positive (*)     Urobilinogen, UA 4.0-6.0 (*)     Leukocytes, UA Negative      Narrative:     Specimen Source->Urine   URINALYSIS MICROSCOPIC - Abnormal    RBC, UA 7 (*)     WBC, UA 6 (*)     WBC Clumps, UA Rare      Bacteria Moderate (*)     Yeast, UA Rare (*)     Squam Epithel, UA 15      Microscopic Comment SEE COMMENT      Narrative:     Specimen Source->Urine   POCT GLUCOSE - Abnormal    POCT Glucose >500 (*)    ISTAT PROCEDURE - Abnormal    POC PH 7.666 (*)     POC PCO2 31.5 (*)     POC PO2 97 (*)     POC HCO3 36.0 (*)     POC BE 15 (*)     POC SATURATED O2 99      POC TCO2 37 (*)     POC Hematocrit 22 (*)     POC HEMOGLOBIN 8      Sample VENOUS     POCT GLUCOSE - Abnormal    POCT Glucose 475 (*)    CULTURE, BLOOD   CULTURE, BLOOD   BETA - HYDROXYBUTYRATE, SERUM    Beta-Hydroxybutyrate 0.3     CK   CK         CBC W/ AUTO DIFFERENTIAL   TYPE & SCREEN    Group & Rh O POS      Indirect Sudeep NEG      Specimen Outdate 09/19/2024 23:59     POCT GLUCOSE MONITORING CONTINUOUS   PREPARE RBC SOFT    UNIT NUMBER [id number]      Product Code [id number]      DISPENSE STATUS CROSSMATCHED      CODING SYSTEM VJIA710      Unit Blood Type Code 5100      Unit Blood Type O POS      Unit Expiration [id number]      CROSSMATCH INTERPRETATION Compatible            Imaging Results                X-Ray Chest AP Portable (Final result)  Result time 09/16/24 13:46:02      Final result by Mariana Calderon MD (09/16/24 13:46:02)                   Impression:      Loop in the central venous catheter.  Recommend repositioning prior to use.  Tip current located at the level of the brachiocephalic vein.    Lungs are fairly clear when accounting for rotation of the patient.    This report was flagged in Epic as abnormal.      Electronically signed by: Mariana Calderon  Date:    09/16/2024  Time:    13:46               Narrative:    EXAMINATION:  XR CHEST AP PORTABLE    CLINICAL HISTORY:  Encounter for adjustment and management of vascular access device    TECHNIQUE:  Single frontal view of the chest was performed.    COMPARISON:  None    FINDINGS:  Lungs are well expanded.  No acute consolidation, pleural effusion, or pneumothorax.    Cardiac silhouette is normal in size.    Placement of a right central venous catheter with the tip along the brachiocephalic vein.  Catheter is looped approximately 5.5 cm proximal to the tip.                                        CT Pelvis Without Contrast (Final result)  Result time 09/16/24 09:35:58      Final result by Mariana Calderon MD (09/16/24 09:35:58)                   Impression:      Overall, findings most concerning for an infectious process.    Findings most concerning for osteomyelitis and septic arthritis left SI joint.  Findings concerning for osteomyelitis/discitis L5-S1.  Possible osteomyelitis and septic joint at the pubic symphysis.    Abnormal fluid collection on the left extending from T11 through the left iliacus muscle, along and within the left ileo psoas muscle, most concerning for abscess.  Multiple additional small abscesses suspected in the pelvis as above.  Multifocal collections of air in fluid in the subcutaneous tissues of the left flank, incompletely imaged.  Abscesses favored.    This report was flagged in Epic as  abnormal.      Electronically signed by: Mariana Yumiko  Date:    09/16/2024  Time:    09:35               Narrative:    EXAMINATION:  CT LUMBAR SPINE WITHOUT CONTRAST; CT PELVIS WITHOUT CONTRAST    CLINICAL HISTORY:  Low back pain, trauma;; Pelvic trauma;    TECHNIQUE:  Low-dose axial, sagittal and coronal reformations are obtained through the lumbar spine.  Contrast was not administered.    By separate acquisition, thin-section multislice axial CT images obtained through the pelvis without the use of intravenous contrast.  Coronal and sagittal reformats obtained.    COMPARISON:  None.    FINDINGS:  Alignment: Normal.    Vertebrae: No fracture.    Discs: Disc space narrowing L5-S1.  Patchy osteolytic change slight widening of the disc space on the left.    Sacroiliac joints: Abnormal osteolytic change and asymmetric widening of the left sacroiliac joint as compared to the right.  Osteolytic change involves the iliac side of the joint more so than the sacral side.    Degenerative findings:    T12-L1: No spinal canal stenosis or neural foraminal narrowing.    L1-L2: No spinal canal stenosis or neural foraminal narrowing.    L2-L3: No spinal canal stenosis or neural foraminal narrowing.    L3-L4: Posterior circumferential disc bulge contributes to mild canal and moderate right neural foraminal narrowing..    L4-L5: Posterior circumferential disc bulge and facet arthropathy resulting in mild-to-moderate canal narrowing.    L5-S1: Posterior circumferential disc bulge contributing to mild canal narrowing.  Moderate right and mild left neural foraminal narrowing.    Paraspinal muscles & soft tissues: Abnormal collection of fluid and scattered air bubbles in the retroperitoneal soft tissues on the left beginning at T11 and extending through the left iliacus muscle.  Collection located within and along the left iliopsoas muscle.  Largest transaxial dimension estimated at 6.3 x 3.7 at the level of L3.    Small collection of  fluid and subcutaneous emphysema intimately associated with the pubic symphysis estimated to measure 2.5 x 2.2 cm.  Osteolytic change at the pubic symphysis.  Small collections of fluid and subcutaneous emphysema along the right and left abductor musculature.    Within the subcutaneous tissues of the left flank, scattered collections of fluid and subcutaneous emphysema, incompletely imaged.    High attenuation material within the colon has the appearance of oral contrast.  Bowel loops are nondilated.  Urinary bladder is unremarkable.  No significant free fluid in the pelvis.                                        CT Lumbar Spine Without Contrast (Final result)  Result time 09/16/24 09:35:58      Final result by Mariana Calderon MD (09/16/24 09:35:58)                   Impression:      Overall, findings most concerning for an infectious process.    Findings most concerning for osteomyelitis and septic arthritis left SI joint.  Findings concerning for osteomyelitis/discitis L5-S1.  Possible osteomyelitis and septic joint at the pubic symphysis.    Abnormal fluid collection on the left extending from T11 through the left iliacus muscle, along and within the left ileo psoas muscle, most concerning for abscess.  Multiple additional small abscesses suspected in the pelvis as above.  Multifocal collections of air in fluid in the subcutaneous tissues of the left flank, incompletely imaged.  Abscesses favored.    This report was flagged in Epic as abnormal.      Electronically signed by: Mariana Calderon  Date:    09/16/2024  Time:    09:35               Narrative:    EXAMINATION:  CT LUMBAR SPINE WITHOUT CONTRAST; CT PELVIS WITHOUT CONTRAST    CLINICAL HISTORY:  Low back pain, trauma;; Pelvic trauma;    TECHNIQUE:  Low-dose axial, sagittal and coronal reformations are obtained through the lumbar spine.  Contrast was not administered.    By separate acquisition, thin-section multislice axial CT images obtained through the  pelvis without the use of intravenous contrast.  Coronal and sagittal reformats obtained.    COMPARISON:  None.    FINDINGS:  Alignment: Normal.    Vertebrae: No fracture.    Discs: Disc space narrowing L5-S1.  Patchy osteolytic change slight widening of the disc space on the left.    Sacroiliac joints: Abnormal osteolytic change and asymmetric widening of the left sacroiliac joint as compared to the right.  Osteolytic change involves the iliac side of the joint more so than the sacral side.    Degenerative findings:    T12-L1: No spinal canal stenosis or neural foraminal narrowing.    L1-L2: No spinal canal stenosis or neural foraminal narrowing.    L2-L3: No spinal canal stenosis or neural foraminal narrowing.    L3-L4: Posterior circumferential disc bulge contributes to mild canal and moderate right neural foraminal narrowing..    L4-L5: Posterior circumferential disc bulge and facet arthropathy resulting in mild-to-moderate canal narrowing.    L5-S1: Posterior circumferential disc bulge contributing to mild canal narrowing.  Moderate right and mild left neural foraminal narrowing.    Paraspinal muscles & soft tissues: Abnormal collection of fluid and scattered air bubbles in the retroperitoneal soft tissues on the left beginning at T11 and extending through the left iliacus muscle.  Collection located within and along the left iliopsoas muscle.  Largest transaxial dimension estimated at 6.3 x 3.7 at the level of L3.    Small collection of fluid and subcutaneous emphysema intimately associated with the pubic symphysis estimated to measure 2.5 x 2.2 cm.  Osteolytic change at the pubic symphysis.  Small collections of fluid and subcutaneous emphysema along the right and left abductor musculature.    Within the subcutaneous tissues of the left flank, scattered collections of fluid and subcutaneous emphysema, incompletely imaged.    High attenuation material within the colon has the appearance of oral contrast.   Bowel loops are nondilated.  Urinary bladder is unremarkable.  No significant free fluid in the pelvis.                                       Medications   0.9%  NaCl infusion (for blood administration) (has no administration in time range)   magnesium sulfate 2g in water 50mL IVPB (premix) (has no administration in time range)   sodium chloride 0.9% bolus 1,000 mL 1,000 mL (0 mLs Intravenous Stopped 9/16/24 1058)   ondansetron injection 4 mg (4 mg Intravenous Given 9/16/24 1026)   vancomycin 1,250 mg in D5W 250 mL IVPB (admixture device) (0 mg Intravenous Stopped 9/16/24 1420)   piperacillin-tazobactam (ZOSYN) 4.5 g in D5W 100 mL IVPB (MB+) (0 g Intravenous Stopped 9/16/24 1240)   potassium chloride 10 mEq in 100 mL IVPB ( Intravenous Restarted 9/16/24 1435)   potassium bicarbonate disintegrating tablet 50 mEq (50 mEq Oral Given 9/16/24 1159)   morphine injection 4 mg (4 mg Intravenous Given 9/16/24 1249)   LIDOcaine HCL 10 mg/ml (1%) injection 5 mL (5 mLs Infiltration Given 9/16/24 1345)   ondansetron disintegrating tablet 4 mg (4 mg Oral Given 9/16/24 1257)   ondansetron injection 4 mg (4 mg Intravenous Given 9/16/24 1416)   morphine injection 6 mg (6 mg Intravenous Given 9/16/24 1442)     Medical Decision Making      49-year-old female with history of DM, HTN, substance abuse brought by EMS for evaluation after fall about a week ago with hyperglycemia.  Patient is somewhat poor historian, states that she had a trip and fall 1 week ago and has been unable to get up since then due to low back pain.  She states that she has been vomiting all attempted p.o. intake since then, including her daily medications.  She also states that she was on methadone and thinks that she has been able to hold this down in the past few days.  She does have history of chronic low back pain this pain is more severe, radiating down her legs, with associated leg cramps.  She denies any head trauma or LOC, no recent illness or other new  complaints.  On arrival patient is disheveled and mild distress due to pain, with diffuse L-spine tenderness but no focal hip or leg tenderness.  She was able to move both legs with normal sensation, no focal neuro deficits to suggest spinal cord compression.  No sign of head injury.  She also has some multiple superficial scabs over extremities with no sign of superimposed cellulitis.  Fingerstick on arrival greater than 500.  Differential diagnosis includes lumbar compression fracture, exacerbation of chronic low back pain, hyperglycemia, DKA, SHELBIE.   I extensively reviewed patient's chart but her LA  does not include methadone, only Suboxone last year and gabapentin now.      Initial labs concerning for hemoglobin 5.7, with previous normal baseline.  Patient denies any blood in stool or heavy vaginal bleeding.  CMP with glucose 544 with no DKA or SHELBIE, but severe hypokalemia with potassium 2.5, and Mag low as well 1.2.  CT lumbar and pelvis done to evaluate for any traumatic injuries, but it shows concern for osteomyelitis and septic arthritis of left SI joint and possibly pubic symphysis, and associated osteomyelitis and diskitis of L5-S1.  CT also concerning for fluid collection from left T11 to iliacus muscle, along the left psoas muscle concerning for abscess, with possible multiple small additional pelvic abscesses.  Patient denies any recent IVDU, states that the last intravenous use was years ago.   Patient treated with broad-spectrum antibiotics IV vanc and Zosyn and blood cultures sent.  After IVF alone, glucose improved to 475, will hold any insulin until potassium supplementation and Mag completed.  Patient is still complaining of persistent low back and leg pain, treated with morphine.  She had poor IV access and needs transfusion, IV antibiotics, and fluids.  She was agreeable to central line but unable to position her leg adequately, so right IJ done without complication.  Initial x-ray shows  catheter looping in IJ, so it was repositioned without complication.  Since patient needs spine specialist as well as IR for possible abscess drainage and biopsies, long-term IV antibiotics, transfer center contacted and discussed with Dr. Hill, neurosurgery, who states that patient does not need any immediate surgery but he recommends sending patient to [facility] [place] where he can follow up patient while IV antibiotics and IR procedures done.    [facility] accepting providers requesting CT chest/abdomen/pelvis with contrast for further evaluation, and surgery evaluation due to concern for extensive infection or early necrotizing fasciitis.  Patient signed out to Dr. Hurt pending this and eventual transfer.      Amount and/or Complexity of Data Reviewed  External Data Reviewed: notes.  Labs: ordered. Decision-making details documented in ED Course.  Radiology: ordered.    Risk  Prescription drug management.            Scribe Attestation:   Scribe #1: I performed the above scribed service and the documentation accurately describes the services I performed. I attest to the accuracy of the note.    Attending Attestation:         Attending Critical Care:   Critical Care Times:   Direct Patient Care (initial evaluation, reassessments, and time considering the case)................................................................21 minutes.   Additional History from reviewing old medical records or taking additional history from the family, EMS, PCP, etc.......................6 minutes.   Ordering, Reviewing, and Interpreting Diagnostic Studies...............................................................................................................5 minutes.   Documentation..................................................................................................................................................................................5 minutes.   Consultation with other Physicians.  .................................................................................................................................................4 minutes.   ==============================================================  Total Critical Care Time - exclusive of procedural time: 41 minutes.  ==============================================================  Critical Care Condition: life-threatening   Critical Care Comments: Osteomyelitis, severe hyperglycemia, emergent IV antibiotics                 I, Dr. Rubén Thomas, personally performed the services described in this documentation. All medical record entries made by the scribe were at my direction and in my presence.  I have reviewed the chart and agree that the record reflects my personal performance and is accurate and complete. Rubén Thomas MD.                    Clinical Impression:  Final diagnoses:  [Z45.2] Encounter for central line placement  [M86.9] Osteomyelitis of multiple sites, unspecified type (Primary)  [R73.9] Hyperglycemia          ED Disposition Condition    Transfer to Another Facility Rubén Canada MD  09/16/24 [id number]

## 2024-09-17 PROBLEM — E11.65 UNCONTROLLED TYPE 2 DIABETES MELLITUS WITH HYPERGLYCEMIA: Status: ACTIVE | Noted: 2024-09-17

## 2024-09-17 PROBLEM — R73.9 HYPERGLYCEMIA: Status: ACTIVE | Noted: 2024-09-17

## 2024-09-17 PROBLEM — B95.61 STAPHYLOCOCCUS AUREUS BACTEREMIA: Status: ACTIVE | Noted: 2024-09-17

## 2024-09-17 PROBLEM — F19.91 HISTORY OF DRUG USE: Status: ACTIVE | Noted: 2024-09-17

## 2024-09-17 PROBLEM — A41.9 SEPSIS: Status: ACTIVE | Noted: 2024-09-17

## 2024-09-17 PROBLEM — M46.20 PARASPINAL ABSCESS: Status: ACTIVE | Noted: 2024-09-17

## 2024-09-17 PROBLEM — M86.8X0 OTHER OSTEOMYELITIS, MULTIPLE SITES: Status: ACTIVE | Noted: 2024-09-17

## 2024-09-17 PROBLEM — B19.20 HEPATITIS C: Status: ACTIVE | Noted: 2024-09-17

## 2024-09-17 PROBLEM — R78.81 STAPHYLOCOCCUS AUREUS BACTEREMIA: Status: ACTIVE | Noted: 2024-09-17

## 2024-09-17 PROBLEM — F17.200 SMOKER: Status: ACTIVE | Noted: 2024-09-17

## 2024-09-17 PROBLEM — D69.6 THROMBOCYTOPENIA: Status: ACTIVE | Noted: 2024-09-17

## 2024-09-17 PROBLEM — K68.12 PSOAS MUSCLE ABSCESS: Status: ACTIVE | Noted: 2024-09-17

## 2024-09-17 PROBLEM — A41.9 SEVERE SEPSIS: Status: ACTIVE | Noted: 2024-09-17

## 2024-09-17 PROBLEM — D64.89 OTHER SPECIFIED ANEMIAS: Status: ACTIVE | Noted: 2024-09-17

## 2024-09-17 PROBLEM — E87.6 HYPOKALEMIA: Status: ACTIVE | Noted: 2024-09-17

## 2024-09-17 PROBLEM — R65.20 SEVERE SEPSIS: Status: ACTIVE | Noted: 2024-09-17

## 2024-09-17 PROBLEM — D64.9 ANEMIA, UNSPECIFIED: Status: ACTIVE | Noted: 2024-09-17

## 2024-09-17 LAB
ABO + RH BLD: NORMAL
ABO + RH BLD: NORMAL
ALBUMIN SERPL BCP-MCNC: 1.3 G/DL (ref 3.5–5.2)
ALBUMIN SERPL BCP-MCNC: 1.3 G/DL (ref 3.5–5.2)
ALP SERPL-CCNC: 135 U/L (ref 55–135)
ALP SERPL-CCNC: 156 U/L (ref 55–135)
ALT SERPL W/O P-5'-P-CCNC: 19 U/L (ref 10–44)
ALT SERPL W/O P-5'-P-CCNC: 21 U/L (ref 10–44)
AMPHET+METHAMPHET UR QL: NEGATIVE
ANION GAP SERPL CALC-SCNC: 10 MMOL/L (ref 8–16)
ANION GAP SERPL CALC-SCNC: 12 MMOL/L (ref 8–16)
ANION GAP SERPL CALC-SCNC: 8 MMOL/L (ref 8–16)
ANION GAP SERPL CALC-SCNC: 8 MMOL/L (ref 8–16)
ANISOCYTOSIS BLD QL SMEAR: SLIGHT
APICAL FOUR CHAMBER EJECTION FRACTION: 67 %
APICAL TWO CHAMBER EJECTION FRACTION: 72 %
APTT PPP: 35.6 SEC (ref 21–32)
ASCENDING AORTA: 3.09 CM
AST SERPL-CCNC: 11 U/L (ref 10–40)
AST SERPL-CCNC: 16 U/L (ref 10–40)
AV INDEX (PROSTH): 0.75
AV MEAN GRADIENT: 6 MMHG
AV PEAK GRADIENT: 11 MMHG
AV VALVE AREA BY VELOCITY RATIO: 2.77 CM²
AV VALVE AREA: 2.46 CM²
AV VELOCITY RATIO: 0.85
BARBITURATES UR QL SCN>200 NG/ML: NEGATIVE
BASO STIPL BLD QL SMEAR: ABNORMAL
BASOPHILS # BLD AUTO: 0.07 K/UL (ref 0–0.2)
BASOPHILS # BLD AUTO: ABNORMAL K/UL (ref 0–0.2)
BASOPHILS NFR BLD: 0 % (ref 0–1.9)
BASOPHILS NFR BLD: 0.5 % (ref 0–1.9)
BENZODIAZ UR QL SCN>200 NG/ML: NEGATIVE
BILIRUB SERPL-MCNC: 1.3 MG/DL (ref 0.1–1)
BILIRUB SERPL-MCNC: 1.6 MG/DL (ref 0.1–1)
BLD GP AB SCN CELLS X3 SERPL QL: NORMAL
BLD GP AB SCN CELLS X3 SERPL QL: NORMAL
BLD PROD TYP BPU: NORMAL
BLOOD UNIT EXPIRATION DATE: NORMAL
BLOOD UNIT TYPE CODE: 5100
BLOOD UNIT TYPE: NORMAL
BSA FOR ECHO PROCEDURE: 1.69 M2
BUN SERPL-MCNC: 4 MG/DL (ref 6–20)
BUN SERPL-MCNC: 5 MG/DL (ref 6–20)
BZE UR QL SCN: NEGATIVE
CALCIUM SERPL-MCNC: 6.3 MG/DL (ref 8.7–10.5)
CALCIUM SERPL-MCNC: 6.6 MG/DL (ref 8.7–10.5)
CALCIUM SERPL-MCNC: 6.7 MG/DL (ref 8.7–10.5)
CALCIUM SERPL-MCNC: 6.8 MG/DL (ref 8.7–10.5)
CANNABINOIDS UR QL SCN: NEGATIVE
CHLORIDE SERPL-SCNC: 100 MMOL/L (ref 95–110)
CHLORIDE SERPL-SCNC: 100 MMOL/L (ref 95–110)
CHLORIDE SERPL-SCNC: 101 MMOL/L (ref 95–110)
CHLORIDE SERPL-SCNC: 103 MMOL/L (ref 95–110)
CO2 SERPL-SCNC: 26 MMOL/L (ref 23–29)
CO2 SERPL-SCNC: 27 MMOL/L (ref 23–29)
CO2 SERPL-SCNC: 27 MMOL/L (ref 23–29)
CO2 SERPL-SCNC: 28 MMOL/L (ref 23–29)
CODING SYSTEM: NORMAL
CREAT SERPL-MCNC: 0.6 MG/DL (ref 0.5–1.4)
CREAT SERPL-MCNC: 0.6 MG/DL (ref 0.5–1.4)
CREAT SERPL-MCNC: 0.7 MG/DL (ref 0.5–1.4)
CREAT SERPL-MCNC: 0.7 MG/DL (ref 0.5–1.4)
CREAT UR-MCNC: 36.4 MG/DL (ref 15–325)
CROSSMATCH INTERPRETATION: NORMAL
CRP SERPL-MCNC: 179.6 MG/L (ref 0–8.2)
CV ECHO LV RWT: 0.42 CM
DACRYOCYTES BLD QL SMEAR: ABNORMAL
DIFFERENTIAL METHOD BLD: ABNORMAL
DISPENSE STATUS: NORMAL
DOP CALC AO PEAK VEL: 1.63 M/S
DOP CALC AO VTI: 30.5 CM
DOP CALC LVOT AREA: 3.3 CM2
DOP CALC LVOT DIAMETER: 2.04 CM
DOP CALC LVOT PEAK VEL: 1.38 M/S
DOP CALC LVOT STROKE VOLUME: 75.14 CM3
DOP CALCLVOT PEAK VEL VTI: 23 CM
E WAVE DECELERATION TIME: 126.45 MSEC
E/A RATIO: 1.42
E/E' RATIO: 9.25 M/S
ECHO LV POSTERIOR WALL: 0.99 CM (ref 0.6–1.1)
EOSINOPHIL # BLD AUTO: 0.1 K/UL (ref 0–0.5)
EOSINOPHIL # BLD AUTO: ABNORMAL K/UL (ref 0–0.5)
EOSINOPHIL NFR BLD: 0 % (ref 0–8)
EOSINOPHIL NFR BLD: 1 % (ref 0–8)
ERYTHROCYTE [DISTWIDTH] IN BLOOD BY AUTOMATED COUNT: 15.7 % (ref 11.5–14.5)
ERYTHROCYTE [DISTWIDTH] IN BLOOD BY AUTOMATED COUNT: 15.9 % (ref 11.5–14.5)
ERYTHROCYTE [DISTWIDTH] IN BLOOD BY AUTOMATED COUNT: 15.9 % (ref 11.5–14.5)
ERYTHROCYTE [DISTWIDTH] IN BLOOD BY AUTOMATED COUNT: 16.2 % (ref 11.5–14.5)
ERYTHROCYTE [SEDIMENTATION RATE] IN BLOOD BY PHOTOMETRIC METHOD: 26 MM/HR (ref 0–36)
EST. GFR  (NO RACE VARIABLE): >60 ML/MIN/1.73 M^2
ESTIMATED AVG GLUCOSE: 180 MG/DL (ref 68–131)
FERRITIN SERPL-MCNC: 2077 NG/ML (ref 20–300)
FRACTIONAL SHORTENING: 36 % (ref 28–44)
GIANT PLATELETS BLD QL SMEAR: PRESENT
GLUCOSE SERPL-MCNC: 240 MG/DL (ref 70–110)
GLUCOSE SERPL-MCNC: 335 MG/DL (ref 70–110)
GLUCOSE SERPL-MCNC: 401 MG/DL (ref 70–110)
GLUCOSE SERPL-MCNC: 420 MG/DL (ref 70–110)
HAV IGM SERPL QL IA: ABNORMAL
HBA1C MFR BLD: 7.9 % (ref 4–5.6)
HBV CORE IGM SERPL QL IA: ABNORMAL
HBV SURFACE AG SERPL QL IA: ABNORMAL
HCT VFR BLD AUTO: 21.7 % (ref 37–48.5)
HCT VFR BLD AUTO: 21.7 % (ref 37–48.5)
HCT VFR BLD AUTO: 22.2 % (ref 37–48.5)
HCT VFR BLD AUTO: 22.3 % (ref 37–48.5)
HCV AB SERPL QL IA: REACTIVE
HGB BLD-MCNC: 6.9 G/DL (ref 12–16)
HGB BLD-MCNC: 7.1 G/DL (ref 12–16)
HGB BLD-MCNC: 7.1 G/DL (ref 12–16)
HGB BLD-MCNC: 7.2 G/DL (ref 12–16)
HIV 1+2 AB+HIV1 P24 AG SERPL QL IA: NORMAL
HYPOCHROMIA BLD QL SMEAR: ABNORMAL
IMM GRANULOCYTES # BLD AUTO: 0.57 K/UL (ref 0–0.04)
IMM GRANULOCYTES # BLD AUTO: ABNORMAL K/UL (ref 0–0.04)
IMM GRANULOCYTES NFR BLD AUTO: 3.9 % (ref 0–0.5)
IMM GRANULOCYTES NFR BLD AUTO: ABNORMAL % (ref 0–0.5)
INR PPP: 1.9 (ref 0.8–1.2)
INTERVENTRICULAR SEPTUM: 1.08 CM (ref 0.6–1.1)
IRON SERPL-MCNC: 68 UG/DL (ref 30–160)
IVC DIAMETER: 1.29 CM
LA MAJOR: 4.07 CM
LA MINOR: 4.51 CM
LA WIDTH: 4 CM
LACTATE SERPL-SCNC: 1.4 MMOL/L (ref 0.5–2.2)
LACTATE SERPL-SCNC: 1.7 MMOL/L (ref 0.5–2.2)
LDH SERPL L TO P-CCNC: 474 U/L (ref 110–260)
LEFT ATRIUM SIZE: 4.2 CM
LEFT ATRIUM VOLUME INDEX: 36.8 ML/M2
LEFT ATRIUM VOLUME: 61.1 CM3
LEFT INTERNAL DIMENSION IN SYSTOLE: 3.04 CM (ref 2.1–4)
LEFT VENTRICLE DIASTOLIC VOLUME INDEX: 62.8 ML/M2
LEFT VENTRICLE DIASTOLIC VOLUME: 104.25 ML
LEFT VENTRICLE END DIASTOLIC VOLUME APICAL 2 CHAMBER: 111.29 ML
LEFT VENTRICLE END DIASTOLIC VOLUME APICAL 4 CHAMBER: 93.39 ML
LEFT VENTRICLE MASS INDEX: 105 G/M2
LEFT VENTRICLE SYSTOLIC VOLUME INDEX: 21.8 ML/M2
LEFT VENTRICLE SYSTOLIC VOLUME: 36.23 ML
LEFT VENTRICULAR INTERNAL DIMENSION IN DIASTOLE: 4.74 CM (ref 3.5–6)
LEFT VENTRICULAR MASS: 174.76 G
LV LATERAL E/E' RATIO: 7.93 M/S
LV SEPTAL E/E' RATIO: 11.1 M/S
LVED V (TEICH): 104.25 ML
LVES V (TEICH): 36.23 ML
LVOT MG: 3.65 MMHG
LVOT MV: 0.9 CM/S
LYMPHOCYTES # BLD AUTO: 2.1 K/UL (ref 1–4.8)
LYMPHOCYTES # BLD AUTO: ABNORMAL K/UL (ref 1–4.8)
LYMPHOCYTES NFR BLD: 12 % (ref 18–48)
LYMPHOCYTES NFR BLD: 13 % (ref 18–48)
LYMPHOCYTES NFR BLD: 14 % (ref 18–48)
LYMPHOCYTES NFR BLD: 15 % (ref 18–48)
MAGNESIUM SERPL-MCNC: 1.5 MG/DL (ref 1.6–2.6)
MAGNESIUM SERPL-MCNC: 1.7 MG/DL (ref 1.6–2.6)
MAGNESIUM SERPL-MCNC: 1.8 MG/DL (ref 1.6–2.6)
MAGNESIUM SERPL-MCNC: 1.9 MG/DL (ref 1.6–2.6)
MCH RBC QN AUTO: 24.6 PG (ref 27–31)
MCH RBC QN AUTO: 25 PG (ref 27–31)
MCH RBC QN AUTO: 25.4 PG (ref 27–31)
MCH RBC QN AUTO: 25.9 PG (ref 27–31)
MCHC RBC AUTO-ENTMCNC: 30.9 G/DL (ref 32–36)
MCHC RBC AUTO-ENTMCNC: 32 G/DL (ref 32–36)
MCHC RBC AUTO-ENTMCNC: 32.7 G/DL (ref 32–36)
MCHC RBC AUTO-ENTMCNC: 33.2 G/DL (ref 32–36)
MCV RBC AUTO: 78 FL (ref 82–98)
MCV RBC AUTO: 79 FL (ref 82–98)
METHADONE UR QL SCN>300 NG/ML: ABNORMAL
MONOCYTES # BLD AUTO: 0.4 K/UL (ref 0.3–1)
MONOCYTES # BLD AUTO: ABNORMAL K/UL (ref 0.3–1)
MONOCYTES NFR BLD: 2 % (ref 4–15)
MONOCYTES NFR BLD: 2.5 % (ref 4–15)
MONOCYTES NFR BLD: 3 % (ref 4–15)
MONOCYTES NFR BLD: 3 % (ref 4–15)
MRSA ID BY PCR: NEGATIVE
MV PEAK A VEL: 0.78 M/S
MV PEAK E VEL: 1.11 M/S
MV STENOSIS PRESSURE HALF TIME: 36.67 MS
MV VALVE AREA P 1/2 METHOD: 6 CM2
MYELOCYTES NFR BLD MANUAL: 1 %
NEUTROPHILS # BLD AUTO: 11.5 K/UL (ref 1.8–7.7)
NEUTROPHILS # BLD AUTO: ABNORMAL K/UL (ref 1.8–7.7)
NEUTROPHILS NFR BLD: 59 % (ref 38–73)
NEUTROPHILS NFR BLD: 62 % (ref 38–73)
NEUTROPHILS NFR BLD: 69 % (ref 38–73)
NEUTROPHILS NFR BLD: 78.1 % (ref 38–73)
NEUTS BAND NFR BLD MANUAL: 14 %
NEUTS BAND NFR BLD MANUAL: 23 %
NEUTS BAND NFR BLD MANUAL: 24 %
NRBC BLD-RTO: 0 /100 WBC
NRBC BLD-RTO: 1 /100 WBC
NUM UNITS TRANS PACKED RBC: NORMAL
NUM UNITS TRANS PACKED RBC: NORMAL
OHS CV RV/LV RATIO: 0.75 CM
OHS LV EJECTION FRACTION SIMPSONS BIPLANE MOD: 69 %
OHS QRS DURATION: 96 MS
OHS QTC CALCULATION: 438 MS
OPIATES UR QL SCN: ABNORMAL
OVALOCYTES BLD QL SMEAR: ABNORMAL
PATH REV BLD -IMP: NORMAL
PCP UR QL SCN>25 NG/ML: NEGATIVE
PHOSPHATE SERPL-MCNC: 1.1 MG/DL (ref 2.7–4.5)
PHOSPHATE SERPL-MCNC: 1.7 MG/DL (ref 2.7–4.5)
PHOSPHATE SERPL-MCNC: 2.2 MG/DL (ref 2.7–4.5)
PISA TR MAX VEL: 2.98 M/S
PLATELET # BLD AUTO: 115 K/UL (ref 150–450)
PLATELET # BLD AUTO: 116 K/UL (ref 150–450)
PLATELET # BLD AUTO: 119 K/UL (ref 150–450)
PLATELET # BLD AUTO: 143 K/UL (ref 150–450)
PLATELET BLD QL SMEAR: ABNORMAL
PMV BLD AUTO: 10.4 FL (ref 9.2–12.9)
PMV BLD AUTO: 9.3 FL (ref 9.2–12.9)
PMV BLD AUTO: 9.4 FL (ref 9.2–12.9)
PMV BLD AUTO: 9.9 FL (ref 9.2–12.9)
POCT GLUCOSE: 263 MG/DL (ref 70–110)
POCT GLUCOSE: 339 MG/DL (ref 70–110)
POCT GLUCOSE: 360 MG/DL (ref 70–110)
POCT GLUCOSE: 483 MG/DL (ref 70–110)
POCT GLUCOSE: >500 MG/DL (ref 70–110)
POIKILOCYTOSIS BLD QL SMEAR: SLIGHT
POLYCHROMASIA BLD QL SMEAR: ABNORMAL
POLYCHROMASIA BLD QL SMEAR: ABNORMAL
POTASSIUM SERPL-SCNC: 2.2 MMOL/L (ref 3.5–5.1)
POTASSIUM SERPL-SCNC: 2.7 MMOL/L (ref 3.5–5.1)
POTASSIUM SERPL-SCNC: 3.2 MMOL/L (ref 3.5–5.1)
POTASSIUM SERPL-SCNC: 3.2 MMOL/L (ref 3.5–5.1)
PROCALCITONIN SERPL IA-MCNC: 0.48 NG/ML
PROCALCITONIN SERPL IA-MCNC: 0.48 NG/ML
PROT SERPL-MCNC: 5.2 G/DL (ref 6–8.4)
PROT SERPL-MCNC: 5.2 G/DL (ref 6–8.4)
PROTHROMBIN TIME: 20.2 SEC (ref 9–12.5)
PV PEAK GRADIENT: 5 MMHG
PV PEAK VELOCITY: 1.15 M/S
RA MAJOR: 3.22 CM
RA PRESSURE ESTIMATED: 3 MMHG
RA WIDTH: 2.7 CM
RBC # BLD AUTO: 2.78 M/UL (ref 4–5.4)
RBC # BLD AUTO: 2.8 M/UL (ref 4–5.4)
RBC # BLD AUTO: 2.81 M/UL (ref 4–5.4)
RBC # BLD AUTO: 2.84 M/UL (ref 4–5.4)
RETICS/RBC NFR AUTO: 2 % (ref 0.5–2.5)
RIGHT VENTRICLE DIASTOLIC BASEL DIMENSION: 3.6 CM
RIGHT VENTRICULAR END-DIASTOLIC DIMENSION: 3.56 CM
RV TB RVSP: 6 MMHG
RV TISSUE DOPPLER FREE WALL SYSTOLIC VELOCITY 1 (APICAL 4 CHAMBER VIEW): 19.6 CM/S
SATURATED IRON: 45 % (ref 20–50)
SINUS: 3.4 CM
SODIUM SERPL-SCNC: 136 MMOL/L (ref 136–145)
SODIUM SERPL-SCNC: 137 MMOL/L (ref 136–145)
SODIUM SERPL-SCNC: 138 MMOL/L (ref 136–145)
SODIUM SERPL-SCNC: 139 MMOL/L (ref 136–145)
SPECIMEN OUTDATE: NORMAL
SPECIMEN OUTDATE: NORMAL
SPHEROCYTES BLD QL SMEAR: ABNORMAL
STAPH AUREUS ID BY PCR: POSITIVE
STJ: 2.79 CM
TDI LATERAL: 0.14 M/S
TDI SEPTAL: 0.1 M/S
TDI: 0.12 M/S
TOTAL IRON BINDING CAPACITY: 152 UG/DL (ref 250–450)
TOXIC GRANULES BLD QL SMEAR: PRESENT
TOXIC GRANULES BLD QL SMEAR: PRESENT
TOXICOLOGY INFORMATION: ABNORMAL
TR MAX PG: 36 MMHG
TRANS ERYTHROCYTES VOL PATIENT: NORMAL ML
TRANSFERRIN SERPL-MCNC: 103 MG/DL (ref 200–375)
TRICUSPID ANNULAR PLANE SYSTOLIC EXCURSION: 2.05 CM
TV REST PULMONARY ARTERY PRESSURE: 39 MMHG
WBC # BLD AUTO: 13.51 K/UL (ref 3.9–12.7)
WBC # BLD AUTO: 14.09 K/UL (ref 3.9–12.7)
WBC # BLD AUTO: 14.4 K/UL (ref 3.9–12.7)
WBC # BLD AUTO: 14.69 K/UL (ref 3.9–12.7)
Z-SCORE OF LEFT VENTRICULAR DIMENSION IN END DIASTOLE: 0.19
Z-SCORE OF LEFT VENTRICULAR DIMENSION IN END SYSTOLE: 0.42

## 2024-09-17 PROCEDURE — 63600175 PHARM REV CODE 636 W HCPCS: Performed by: INTERNAL MEDICINE

## 2024-09-17 PROCEDURE — 80074 ACUTE HEPATITIS PANEL: CPT | Performed by: INTERNAL MEDICINE

## 2024-09-17 PROCEDURE — 25000003 PHARM REV CODE 250: Performed by: STUDENT IN AN ORGANIZED HEALTH CARE EDUCATION/TRAINING PROGRAM

## 2024-09-17 PROCEDURE — 99900035 HC TECH TIME PER 15 MIN (STAT)

## 2024-09-17 PROCEDURE — 63600175 PHARM REV CODE 636 W HCPCS: Performed by: STUDENT IN AN ORGANIZED HEALTH CARE EDUCATION/TRAINING PROGRAM

## 2024-09-17 PROCEDURE — 85060 BLOOD SMEAR INTERPRETATION: CPT | Mod: ,,, | Performed by: PATHOLOGY

## 2024-09-17 PROCEDURE — 83735 ASSAY OF MAGNESIUM: CPT | Mod: 91 | Performed by: STUDENT IN AN ORGANIZED HEALTH CARE EDUCATION/TRAINING PROGRAM

## 2024-09-17 PROCEDURE — 82607 VITAMIN B-12: CPT | Performed by: STUDENT IN AN ORGANIZED HEALTH CARE EDUCATION/TRAINING PROGRAM

## 2024-09-17 PROCEDURE — 85027 COMPLETE CBC AUTOMATED: CPT | Performed by: INTERNAL MEDICINE

## 2024-09-17 PROCEDURE — 85007 BL SMEAR W/DIFF WBC COUNT: CPT | Mod: 91 | Performed by: INTERNAL MEDICINE

## 2024-09-17 PROCEDURE — 99223 1ST HOSP IP/OBS HIGH 75: CPT | Mod: ,,, | Performed by: PHYSICIAN ASSISTANT

## 2024-09-17 PROCEDURE — 85730 THROMBOPLASTIN TIME PARTIAL: CPT | Performed by: INTERNAL MEDICINE

## 2024-09-17 PROCEDURE — 87521 HEPATITIS C PROBE&RVRS TRNSC: CPT | Performed by: STUDENT IN AN ORGANIZED HEALTH CARE EDUCATION/TRAINING PROGRAM

## 2024-09-17 PROCEDURE — 86920 COMPATIBILITY TEST SPIN: CPT | Performed by: STUDENT IN AN ORGANIZED HEALTH CARE EDUCATION/TRAINING PROGRAM

## 2024-09-17 PROCEDURE — 86901 BLOOD TYPING SEROLOGIC RH(D): CPT | Mod: 91 | Performed by: STUDENT IN AN ORGANIZED HEALTH CARE EDUCATION/TRAINING PROGRAM

## 2024-09-17 PROCEDURE — 27000221 HC OXYGEN, UP TO 24 HOURS

## 2024-09-17 PROCEDURE — 25000003 PHARM REV CODE 250: Performed by: INTERNAL MEDICINE

## 2024-09-17 PROCEDURE — 83010 ASSAY OF HAPTOGLOBIN QUANT: CPT | Performed by: STUDENT IN AN ORGANIZED HEALTH CARE EDUCATION/TRAINING PROGRAM

## 2024-09-17 PROCEDURE — 85025 COMPLETE CBC W/AUTO DIFF WBC: CPT | Performed by: STUDENT IN AN ORGANIZED HEALTH CARE EDUCATION/TRAINING PROGRAM

## 2024-09-17 PROCEDURE — 84466 ASSAY OF TRANSFERRIN: CPT | Performed by: STUDENT IN AN ORGANIZED HEALTH CARE EDUCATION/TRAINING PROGRAM

## 2024-09-17 PROCEDURE — 80048 BASIC METABOLIC PNL TOTAL CA: CPT | Mod: 91,XB | Performed by: STUDENT IN AN ORGANIZED HEALTH CARE EDUCATION/TRAINING PROGRAM

## 2024-09-17 PROCEDURE — 83615 LACTATE (LD) (LDH) ENZYME: CPT | Performed by: STUDENT IN AN ORGANIZED HEALTH CARE EDUCATION/TRAINING PROGRAM

## 2024-09-17 PROCEDURE — 82728 ASSAY OF FERRITIN: CPT | Performed by: STUDENT IN AN ORGANIZED HEALTH CARE EDUCATION/TRAINING PROGRAM

## 2024-09-17 PROCEDURE — 80053 COMPREHEN METABOLIC PANEL: CPT | Mod: 91 | Performed by: STUDENT IN AN ORGANIZED HEALTH CARE EDUCATION/TRAINING PROGRAM

## 2024-09-17 PROCEDURE — 99291 CRITICAL CARE FIRST HOUR: CPT | Mod: ,,, | Performed by: INTERNAL MEDICINE

## 2024-09-17 PROCEDURE — 83735 ASSAY OF MAGNESIUM: CPT | Mod: 91 | Performed by: INTERNAL MEDICINE

## 2024-09-17 PROCEDURE — A9585 GADOBUTROL INJECTION: HCPCS | Performed by: STUDENT IN AN ORGANIZED HEALTH CARE EDUCATION/TRAINING PROGRAM

## 2024-09-17 PROCEDURE — 20000000 HC ICU ROOM

## 2024-09-17 PROCEDURE — 85045 AUTOMATED RETICULOCYTE COUNT: CPT | Performed by: STUDENT IN AN ORGANIZED HEALTH CARE EDUCATION/TRAINING PROGRAM

## 2024-09-17 PROCEDURE — 86850 RBC ANTIBODY SCREEN: CPT | Mod: 91 | Performed by: STUDENT IN AN ORGANIZED HEALTH CARE EDUCATION/TRAINING PROGRAM

## 2024-09-17 PROCEDURE — 94761 N-INVAS EAR/PLS OXIMETRY MLT: CPT

## 2024-09-17 PROCEDURE — 85007 BL SMEAR W/DIFF WBC COUNT: CPT | Mod: 91 | Performed by: STUDENT IN AN ORGANIZED HEALTH CARE EDUCATION/TRAINING PROGRAM

## 2024-09-17 PROCEDURE — 84100 ASSAY OF PHOSPHORUS: CPT | Mod: 91 | Performed by: INTERNAL MEDICINE

## 2024-09-17 PROCEDURE — 25500020 PHARM REV CODE 255: Performed by: STUDENT IN AN ORGANIZED HEALTH CARE EDUCATION/TRAINING PROGRAM

## 2024-09-17 PROCEDURE — 83540 ASSAY OF IRON: CPT | Performed by: STUDENT IN AN ORGANIZED HEALTH CARE EDUCATION/TRAINING PROGRAM

## 2024-09-17 PROCEDURE — 84100 ASSAY OF PHOSPHORUS: CPT | Performed by: INTERNAL MEDICINE

## 2024-09-17 PROCEDURE — 80048 BASIC METABOLIC PNL TOTAL CA: CPT | Performed by: INTERNAL MEDICINE

## 2024-09-17 PROCEDURE — 83605 ASSAY OF LACTIC ACID: CPT | Performed by: INTERNAL MEDICINE

## 2024-09-17 PROCEDURE — 87389 HIV-1 AG W/HIV-1&-2 AB AG IA: CPT | Performed by: INTERNAL MEDICINE

## 2024-09-17 PROCEDURE — 84145 PROCALCITONIN (PCT): CPT | Performed by: INTERNAL MEDICINE

## 2024-09-17 PROCEDURE — 80053 COMPREHEN METABOLIC PANEL: CPT | Performed by: INTERNAL MEDICINE

## 2024-09-17 PROCEDURE — 83036 HEMOGLOBIN GLYCOSYLATED A1C: CPT | Performed by: INTERNAL MEDICINE

## 2024-09-17 PROCEDURE — 63600175 PHARM REV CODE 636 W HCPCS: Mod: JZ,JG | Performed by: STUDENT IN AN ORGANIZED HEALTH CARE EDUCATION/TRAINING PROGRAM

## 2024-09-17 PROCEDURE — 83735 ASSAY OF MAGNESIUM: CPT | Performed by: INTERNAL MEDICINE

## 2024-09-17 PROCEDURE — 80307 DRUG TEST PRSMV CHEM ANLYZR: CPT | Performed by: STUDENT IN AN ORGANIZED HEALTH CARE EDUCATION/TRAINING PROGRAM

## 2024-09-17 PROCEDURE — 85652 RBC SED RATE AUTOMATED: CPT | Performed by: INTERNAL MEDICINE

## 2024-09-17 PROCEDURE — 85610 PROTHROMBIN TIME: CPT | Performed by: INTERNAL MEDICINE

## 2024-09-17 PROCEDURE — 86140 C-REACTIVE PROTEIN: CPT | Performed by: INTERNAL MEDICINE

## 2024-09-17 PROCEDURE — 99223 1ST HOSP IP/OBS HIGH 75: CPT | Mod: ,,, | Performed by: SURGERY

## 2024-09-17 PROCEDURE — 83605 ASSAY OF LACTIC ACID: CPT | Mod: 91 | Performed by: INTERNAL MEDICINE

## 2024-09-17 PROCEDURE — 84100 ASSAY OF PHOSPHORUS: CPT | Mod: 91 | Performed by: STUDENT IN AN ORGANIZED HEALTH CARE EDUCATION/TRAINING PROGRAM

## 2024-09-17 PROCEDURE — 63600175 PHARM REV CODE 636 W HCPCS: Mod: JZ,JG | Performed by: EMERGENCY MEDICINE

## 2024-09-17 PROCEDURE — 82746 ASSAY OF FOLIC ACID SERUM: CPT | Performed by: STUDENT IN AN ORGANIZED HEALTH CARE EDUCATION/TRAINING PROGRAM

## 2024-09-17 PROCEDURE — 84145 PROCALCITONIN (PCT): CPT | Mod: 91 | Performed by: INTERNAL MEDICINE

## 2024-09-17 PROCEDURE — 86900 BLOOD TYPING SEROLOGIC ABO: CPT | Performed by: STUDENT IN AN ORGANIZED HEALTH CARE EDUCATION/TRAINING PROGRAM

## 2024-09-17 PROCEDURE — 85027 COMPLETE CBC AUTOMATED: CPT | Mod: 91 | Performed by: STUDENT IN AN ORGANIZED HEALTH CARE EDUCATION/TRAINING PROGRAM

## 2024-09-17 RX ORDER — INSULIN GLARGINE 100 [IU]/ML
15 INJECTION, SOLUTION SUBCUTANEOUS 2 TIMES DAILY
Status: DISCONTINUED | OUTPATIENT
Start: 2024-09-18 | End: 2024-09-20

## 2024-09-17 RX ORDER — POTASSIUM CHLORIDE 29.8 MG/ML
40 INJECTION INTRAVENOUS ONCE
Status: COMPLETED | OUTPATIENT
Start: 2024-09-17 | End: 2024-09-17

## 2024-09-17 RX ORDER — INSULIN GLARGINE 100 [IU]/ML
15 INJECTION, SOLUTION SUBCUTANEOUS DAILY
Status: DISCONTINUED | OUTPATIENT
Start: 2024-09-17 | End: 2024-09-17

## 2024-09-17 RX ORDER — OXYCODONE HYDROCHLORIDE 10 MG/1
10 TABLET ORAL EVERY 4 HOURS PRN
Status: DISCONTINUED | OUTPATIENT
Start: 2024-09-17 | End: 2024-09-21

## 2024-09-17 RX ORDER — CLINDAMYCIN PHOSPHATE 600 MG/50ML
600 INJECTION, SOLUTION INTRAVENOUS
Status: DISCONTINUED | OUTPATIENT
Start: 2024-09-17 | End: 2024-09-20

## 2024-09-17 RX ORDER — MAGNESIUM SULFATE HEPTAHYDRATE 40 MG/ML
2 INJECTION, SOLUTION INTRAVENOUS ONCE
Status: COMPLETED | OUTPATIENT
Start: 2024-09-17 | End: 2024-09-17

## 2024-09-17 RX ORDER — HYDROCODONE BITARTRATE AND ACETAMINOPHEN 500; 5 MG/1; MG/1
TABLET ORAL
Status: DISCONTINUED | OUTPATIENT
Start: 2024-09-17 | End: 2024-09-17

## 2024-09-17 RX ORDER — CLINDAMYCIN PHOSPHATE 150 MG/ML
600 INJECTION, SOLUTION INTRAVENOUS
Status: DISCONTINUED | OUTPATIENT
Start: 2024-09-17 | End: 2024-09-17

## 2024-09-17 RX ORDER — GLUCAGON 1 MG
1 KIT INJECTION
Status: DISCONTINUED | OUTPATIENT
Start: 2024-09-17 | End: 2024-09-17

## 2024-09-17 RX ORDER — HYDROMORPHONE HYDROCHLORIDE 1 MG/ML
1 INJECTION, SOLUTION INTRAMUSCULAR; INTRAVENOUS; SUBCUTANEOUS EVERY 4 HOURS PRN
Status: DISCONTINUED | OUTPATIENT
Start: 2024-09-17 | End: 2024-09-17

## 2024-09-17 RX ORDER — MUPIROCIN 20 MG/G
OINTMENT TOPICAL 2 TIMES DAILY
Status: DISCONTINUED | OUTPATIENT
Start: 2024-09-17 | End: 2024-09-17

## 2024-09-17 RX ORDER — INSULIN GLARGINE 100 [IU]/ML
15 INJECTION, SOLUTION SUBCUTANEOUS 2 TIMES DAILY
Status: DISCONTINUED | OUTPATIENT
Start: 2024-09-17 | End: 2024-09-17

## 2024-09-17 RX ORDER — HYDROMORPHONE HYDROCHLORIDE 1 MG/ML
2 INJECTION, SOLUTION INTRAMUSCULAR; INTRAVENOUS; SUBCUTANEOUS EVERY 6 HOURS PRN
Status: DISCONTINUED | OUTPATIENT
Start: 2024-09-17 | End: 2024-09-17

## 2024-09-17 RX ORDER — MORPHINE SULFATE 4 MG/ML
2 INJECTION, SOLUTION INTRAMUSCULAR; INTRAVENOUS ONCE
Status: COMPLETED | OUTPATIENT
Start: 2024-09-17 | End: 2024-09-17

## 2024-09-17 RX ORDER — LOPERAMIDE HYDROCHLORIDE 2 MG/1
2 CAPSULE ORAL 4 TIMES DAILY PRN
Status: DISCONTINUED | OUTPATIENT
Start: 2024-09-17 | End: 2024-09-23

## 2024-09-17 RX ORDER — LORAZEPAM 2 MG/ML
2 INJECTION INTRAMUSCULAR
Status: DISCONTINUED | OUTPATIENT
Start: 2024-09-17 | End: 2024-09-17

## 2024-09-17 RX ORDER — PANTOPRAZOLE SODIUM 40 MG/1
40 TABLET, DELAYED RELEASE ORAL DAILY
Status: DISCONTINUED | OUTPATIENT
Start: 2024-09-18 | End: 2024-09-20

## 2024-09-17 RX ORDER — PANTOPRAZOLE SODIUM 40 MG/10ML
40 INJECTION, POWDER, LYOPHILIZED, FOR SOLUTION INTRAVENOUS ONCE
Status: COMPLETED | OUTPATIENT
Start: 2024-09-17 | End: 2024-09-17

## 2024-09-17 RX ORDER — LIDOCAINE 50 MG/G
1 PATCH TOPICAL
Status: DISCONTINUED | OUTPATIENT
Start: 2024-09-17 | End: 2024-10-02

## 2024-09-17 RX ORDER — DIPHENHYDRAMINE HYDROCHLORIDE 50 MG/ML
25 INJECTION INTRAMUSCULAR; INTRAVENOUS ONCE
Status: COMPLETED | OUTPATIENT
Start: 2024-09-17 | End: 2024-09-17

## 2024-09-17 RX ORDER — DICYCLOMINE HYDROCHLORIDE 10 MG/ML
10 INJECTION INTRAMUSCULAR EVERY 6 HOURS PRN
Status: DISCONTINUED | OUTPATIENT
Start: 2024-09-17 | End: 2024-09-17

## 2024-09-17 RX ORDER — HYDROXYZINE PAMOATE 25 MG/1
50 CAPSULE ORAL EVERY 6 HOURS PRN
Status: DISCONTINUED | OUTPATIENT
Start: 2024-09-17 | End: 2024-10-18 | Stop reason: HOSPADM

## 2024-09-17 RX ORDER — POTASSIUM CHLORIDE 20 MEQ/1
40 TABLET, EXTENDED RELEASE ORAL ONCE
Status: COMPLETED | OUTPATIENT
Start: 2024-09-17 | End: 2024-09-17

## 2024-09-17 RX ORDER — GADOBUTROL 604.72 MG/ML
6.5 INJECTION INTRAVENOUS
Status: COMPLETED | OUTPATIENT
Start: 2024-09-17 | End: 2024-09-17

## 2024-09-17 RX ORDER — HYDROMORPHONE HYDROCHLORIDE 1 MG/ML
1 INJECTION, SOLUTION INTRAMUSCULAR; INTRAVENOUS; SUBCUTANEOUS EVERY 4 HOURS PRN
Status: DISCONTINUED | OUTPATIENT
Start: 2024-09-17 | End: 2024-09-18

## 2024-09-17 RX ORDER — POTASSIUM CHLORIDE 14.9 MG/ML
20 INJECTION INTRAVENOUS
Status: COMPLETED | OUTPATIENT
Start: 2024-09-17 | End: 2024-09-17

## 2024-09-17 RX ORDER — INSULIN ASPART 100 [IU]/ML
0-10 INJECTION, SOLUTION INTRAVENOUS; SUBCUTANEOUS EVERY 6 HOURS PRN
Status: DISCONTINUED | OUTPATIENT
Start: 2024-09-17 | End: 2024-09-23

## 2024-09-17 RX ADMIN — HYDROMORPHONE HYDROCHLORIDE 2 MG: 1 INJECTION, SOLUTION INTRAMUSCULAR; INTRAVENOUS; SUBCUTANEOUS at 05:09

## 2024-09-17 RX ADMIN — POTASSIUM CHLORIDE 20 MEQ: 14.9 INJECTION, SOLUTION INTRAVENOUS at 02:09

## 2024-09-17 RX ADMIN — DOXYCYCLINE 100 MG: 100 INJECTION, POWDER, LYOPHILIZED, FOR SOLUTION INTRAVENOUS at 11:09

## 2024-09-17 RX ADMIN — PANTOPRAZOLE SODIUM 40 MG: 40 INJECTION, POWDER, FOR SOLUTION INTRAVENOUS at 03:09

## 2024-09-17 RX ADMIN — MELATONIN TAB 3 MG 6 MG: 3 TAB at 01:09

## 2024-09-17 RX ADMIN — GADOBUTROL 6.5 ML: 604.72 INJECTION INTRAVENOUS at 02:09

## 2024-09-17 RX ADMIN — HYDROMORPHONE HYDROCHLORIDE 1 MG: 1 INJECTION, SOLUTION INTRAMUSCULAR; INTRAVENOUS; SUBCUTANEOUS at 01:09

## 2024-09-17 RX ADMIN — POTASSIUM CHLORIDE 40 MEQ: 29.8 INJECTION, SOLUTION INTRAVENOUS at 11:09

## 2024-09-17 RX ADMIN — MUPIROCIN: 20 OINTMENT TOPICAL at 11:09

## 2024-09-17 RX ADMIN — MAGNESIUM SULFATE HEPTAHYDRATE 2 G: 40 INJECTION, SOLUTION INTRAVENOUS at 03:09

## 2024-09-17 RX ADMIN — POTASSIUM CHLORIDE 20 MEQ: 14.9 INJECTION, SOLUTION INTRAVENOUS at 04:09

## 2024-09-17 RX ADMIN — SODIUM CHLORIDE 500 ML: 9 INJECTION, SOLUTION INTRAVENOUS at 01:09

## 2024-09-17 RX ADMIN — INSULIN GLARGINE 15 UNITS: 100 INJECTION, SOLUTION SUBCUTANEOUS at 08:09

## 2024-09-17 RX ADMIN — CLINDAMYCIN PHOSPHATE 600 MG: 600 INJECTION, SOLUTION INTRAVENOUS at 12:09

## 2024-09-17 RX ADMIN — POTASSIUM PHOSPHATE, MONOBASIC AND POTASSIUM PHOSPHATE, DIBASIC 20 MMOL: 224; 236 INJECTION, SOLUTION, CONCENTRATE INTRAVENOUS at 03:09

## 2024-09-17 RX ADMIN — HYDROXYZINE PAMOATE 50 MG: 25 CAPSULE ORAL at 01:09

## 2024-09-17 RX ADMIN — LIDOCAINE 1 PATCH: 700 PATCH TOPICAL at 01:09

## 2024-09-17 RX ADMIN — SODIUM CHLORIDE: 9 INJECTION, SOLUTION INTRAVENOUS at 05:09

## 2024-09-17 RX ADMIN — INSULIN ASPART 8 UNITS: 100 INJECTION, SOLUTION INTRAVENOUS; SUBCUTANEOUS at 05:09

## 2024-09-17 RX ADMIN — INSULIN ASPART 10 UNITS: 100 INJECTION, SOLUTION INTRAVENOUS; SUBCUTANEOUS at 11:09

## 2024-09-17 RX ADMIN — CLINDAMYCIN IN 5 PERCENT DEXTROSE 600 MG: 12 INJECTION, SOLUTION INTRAVENOUS at 01:09

## 2024-09-17 RX ADMIN — PROMETHAZINE HYDROCHLORIDE 12.5 MG: 25 INJECTION INTRAMUSCULAR; INTRAVENOUS at 02:09

## 2024-09-17 RX ADMIN — MAGNESIUM SULFATE HEPTAHYDRATE 2 G: 40 INJECTION, SOLUTION INTRAVENOUS at 11:09

## 2024-09-17 RX ADMIN — ONDANSETRON 4 MG: 2 INJECTION INTRAMUSCULAR; INTRAVENOUS at 01:09

## 2024-09-17 RX ADMIN — POTASSIUM BICARBONATE 50 MEQ: 977.5 TABLET, EFFERVESCENT ORAL at 02:09

## 2024-09-17 RX ADMIN — VANCOMYCIN HYDROCHLORIDE 1000 MG: 1 INJECTION, POWDER, LYOPHILIZED, FOR SOLUTION INTRAVENOUS at 01:09

## 2024-09-17 RX ADMIN — HYDROMORPHONE HYDROCHLORIDE 1 MG: 1 INJECTION, SOLUTION INTRAMUSCULAR; INTRAVENOUS; SUBCUTANEOUS at 12:09

## 2024-09-17 RX ADMIN — MORPHINE SULFATE 2 MG: 4 INJECTION, SOLUTION INTRAMUSCULAR; INTRAVENOUS at 01:09

## 2024-09-17 RX ADMIN — ONDANSETRON 4 MG: 2 INJECTION INTRAMUSCULAR; INTRAVENOUS at 11:09

## 2024-09-17 RX ADMIN — DIPHENHYDRAMINE HYDROCHLORIDE 25 MG: 50 INJECTION INTRAMUSCULAR; INTRAVENOUS at 01:09

## 2024-09-17 RX ADMIN — ONDANSETRON 4 MG: 2 INJECTION INTRAMUSCULAR; INTRAVENOUS at 05:09

## 2024-09-17 RX ADMIN — HYDROMORPHONE HYDROCHLORIDE 2 MG: 1 INJECTION, SOLUTION INTRAMUSCULAR; INTRAVENOUS; SUBCUTANEOUS at 12:09

## 2024-09-17 RX ADMIN — SODIUM CHLORIDE: 9 INJECTION, SOLUTION INTRAVENOUS at 06:09

## 2024-09-17 RX ADMIN — OXYCODONE 10 MG: 5 TABLET ORAL at 07:09

## 2024-09-17 RX ADMIN — MEROPENEM 2 G: 1 INJECTION INTRAVENOUS at 12:09

## 2024-09-17 RX ADMIN — OXYCODONE 10 MG: 5 TABLET ORAL at 11:09

## 2024-09-17 RX ADMIN — POTASSIUM CHLORIDE 40 MEQ: 1500 TABLET, EXTENDED RELEASE ORAL at 11:09

## 2024-09-17 RX ADMIN — INSULIN ASPART 10 UNITS: 100 INJECTION, SOLUTION INTRAVENOUS; SUBCUTANEOUS at 06:09

## 2024-09-17 RX ADMIN — VANCOMYCIN HYDROCHLORIDE 1000 MG: 1 INJECTION, POWDER, LYOPHILIZED, FOR SOLUTION INTRAVENOUS at 02:09

## 2024-09-17 RX ADMIN — DOXYCYCLINE 100 MG: 100 INJECTION, POWDER, LYOPHILIZED, FOR SOLUTION INTRAVENOUS at 01:09

## 2024-09-17 RX ADMIN — INSULIN GLARGINE 15 UNITS: 100 INJECTION, SOLUTION SUBCUTANEOUS at 06:09

## 2024-09-17 RX ADMIN — MEROPENEM 2 G: 1 INJECTION INTRAVENOUS at 07:09

## 2024-09-17 NOTE — NURSING
[facility], [facility]  Nurses Note -- 4 Eyes      9/17/2024       Skin assessed on: Q Shift      [x] No Pressure Injuries Present    [x]Prevention Measures Documented    [] Yes LDA  for Pressure Injury Previously documented     [] Yes New Pressure Injury Discovered   [] LDA for New Pressure Injury Added      Attending RN:  Olamide Emerson RN     Second RN: FATIMAH Gunn

## 2024-09-17 NOTE — PLAN OF CARE
Pt remains in ICU alert and oriented. Pt in RA, NSR in the monitor. NS @100ml/hr infusing as a continuous infusion. Pt complained of pain, PRN med's administered. Potassium, Magnesium replaced. Peurick in place with fair output. POC reviewed with patient, verbalizes understanding. No falls, injures and skin breakdown during this shift.  Planned to get transferred to [facility] [place], awaiting bed. Transfer center called with an update of unavailability of bed for today.  Problem: Skin Injury Risk Increased  Goal: Skin Health and Integrity  Outcome: Progressing     Problem: Adult Inpatient Plan of Care  Goal: Plan of Care Review  Outcome: Progressing  Goal: Patient-Specific Goal (Individualized)  Outcome: Progressing  Goal: Absence of Hospital-Acquired Illness or Injury  Outcome: Progressing  Goal: Optimal Comfort and Wellbeing  Outcome: Progressing  Goal: Readiness for Transition of Care  Outcome: Progressing     Problem: Infection  Goal: Absence of Infection Signs and Symptoms  Outcome: Progressing     Problem: Diabetes Comorbidity  Goal: Blood Glucose Level Within Targeted Range  Outcome: Progressing     Problem: Sepsis/Septic Shock  Goal: Optimal Coping  Outcome: Progressing  Goal: Absence of Bleeding  Outcome: Progressing  Goal: Blood Glucose Level Within Targeted Range  Outcome: Progressing  Goal: Absence of Infection Signs and Symptoms  Outcome: Progressing  Goal: Optimal Nutrition Intake  Outcome: Progressing

## 2024-09-17 NOTE — NURSING
[facility] [place]  Nurses Note -- 4 Eyes      9/17/2024       Skin assessed on: Admit      [x] No Pressure Injuries Present    [x]Prevention Measures Documented    [] Yes LDA  for Pressure Injury Previously documented     [] Yes New Pressure Injury Discovered   [] LDA for New Pressure Injury Added      Attending RN:  Velia Vazquez RN     Second RN:  FATIMAH Mar

## 2024-09-17 NOTE — PLAN OF CARE
Problem: Skin Injury Risk Increased  Goal: Skin Health and Integrity  Outcome: Progressing     Problem: Adult Inpatient Plan of Care  Goal: Plan of Care Review  Outcome: Progressing  Goal: Patient-Specific Goal (Individualized)  Outcome: Progressing  Goal: Absence of Hospital-Acquired Illness or Injury  Outcome: Progressing  Goal: Optimal Comfort and Wellbeing  Outcome: Progressing  Goal: Readiness for Transition of Care  Outcome: Progressing     Problem: Infection  Goal: Absence of Infection Signs and Symptoms  Outcome: Progressing     Problem: Diabetes Comorbidity  Goal: Blood Glucose Level Within Targeted Range  Outcome: Progressing      Hourly Rounding

## 2024-09-17 NOTE — SUBJECTIVE & OBJECTIVE
Interval History: Patient reports pain in back and BLE    Review of Systems   Constitutional: Negative.    Respiratory: Negative.     Cardiovascular: Negative.    Gastrointestinal: Negative.    Genitourinary: Negative.    Musculoskeletal:  Positive for arthralgias, back pain and myalgias.   Skin:  Positive for wound.   Neurological: Negative.      Objective:     Vital Signs (Most Recent):  Temp: 98.6 °F (37 °C) (09/17/24 1101)  Pulse: 91 (09/17/24 1101)  Resp: 20 (09/17/24 1204)  BP: (!) 150/99 (09/17/24 1101)  SpO2: 99 % (09/17/24 1101) Vital Signs (24h Range):  Temp:  [98.5 °F (36.9 °C)-99.6 °F (37.6 °C)] 98.6 °F (37 °C)  Pulse:  [] 91  Resp:  [16-29] 20  SpO2:  [91 %-100 %] 99 %  BP: (140-171)/() 150/99     Weight: 65.2 kg (143 lb 11.8 oz)  Body mass index is 26.29 kg/m².    Intake/Output Summary (Last 24 hours) at 9/17/2024 1228  Last data filed at 9/17/2024 1101  Gross per 24 hour   Intake 6854.83 ml   Output 500 ml   Net 6354.83 ml         Physical Exam  Constitutional:       General: She is not in acute distress.     Appearance: She is normal weight.   Cardiovascular:      Rate and Rhythm: Tachycardia present.      Pulses: Normal pulses.   Pulmonary:      Effort: No respiratory distress.      Breath sounds: Normal breath sounds. No wheezing.   Abdominal:      General: Bowel sounds are normal. There is no distension.      Palpations: Abdomen is soft.      Tenderness: There is abdominal tenderness.   Musculoskeletal:         General: Tenderness (L groin) present.      Right lower leg: No edema.      Left lower leg: No edema (Palpable BLE DP and PT pulses).   Skin:     General: Skin is warm.      Findings: Lesion (multiple scattered areas of 0.5cm wounds on BLE with scab no drainage) present.   Neurological:      Mental Status: She is alert and oriented to person, place, and time. Mental status is at baseline.   Psychiatric:         Mood and Affect: Mood normal.             Significant Labs: All  pertinent labs within the past 24 hours have been reviewed.    Significant Imaging: I have reviewed all pertinent imaging results/findings within the past 24 hours.

## 2024-09-17 NOTE — PROGRESS NOTES
Pharmacokinetic Initial Assessment: IV Vancomycin    Assessment/Plan:    Initiate intravenous vancomycin with loading dose of 1250 mg once followed by a maintenance dose of vancomycin 1000 mg IV every 12 hours  Desired empiric serum trough concentration is 15 to 20 mcg/mL  Draw vancomycin trough level 60 min prior to fourth dose on 9/18/24 at approximately 0000  Pharmacy will continue to follow and monitor vancomycin.      Please contact pharmacy at extension [phone] with any questions regarding this assessment.     Thank you for the consult,   Roscoe Araujo       Patient brief summary:  Mari Sloan is a 49 y.o. female initiated on antimicrobial therapy with IV Vancomycin for treatment of suspected skin & soft tissue infection    Drug Allergies:   Review of patient's allergies indicates:  No Known Allergies    Actual Body Weight:   65.2 kg    Renal Function:   Estimated Creatinine Clearance: 84.2 mL/min (based on SCr of 0.7 mg/dL).,     Dialysis Method (if applicable):  N/A    CBC (last 72 hours):  Recent Labs   Lab Result Units 09/16/24  0934 09/16/24  1539 09/16/24  2105   WBC K/uL 9.81 10.84 11.88   Hemoglobin g/dL 5.7* 5.2* 5.7*   Hematocrit % 18.4* 17.7* 18.2*   Platelets K/uL 132* 131* 124*   Gran % % 75.0* 73.0 81.8*   Lymph % % 14.0* 16.0* 11.2*   Mono % % 2.0* 3.0* 3.5*   Eosinophil % % 0.0 2.0 0.2   Basophil % % 0.0 0.0 0.3   Differential Method  Manual Manual Automated       Metabolic Panel (last 72 hours):  Recent Labs   Lab Result Units 09/16/24  0934 09/16/24  1010 09/16/24  2105   Sodium mmol/L 134*  --  137   Potassium mmol/L 2.5*  --  2.2*   Chloride mmol/L 91*  --  98   CO2 mmol/L 27  --  29   Glucose mg/dL 544*  --  397*   Glucose, UA   --  4+*  --    BUN mg/dL 7  --  5*   Creatinine mg/dL 0.8  --  0.7   Albumin g/dL 1.5*  --   --    Total Bilirubin mg/dL 1.4*  --   --    Alkaline Phosphatase U/L 184*  --   --    AST U/L 24  --   --    ALT U/L 25  --   --    Magnesium mg/dL 1.2*  --   --   "      Drug levels (last 3 results):  No results for input(s): "VANCOMYCINRA", "VANCORANDOM", "VANCOMYCINPE", "VANCOPEAK", "VANCOMYCINTR", "VANCOTROUGH" in the last 72 hours.    Microbiologic Results:  Microbiology Results (last 7 days)       Procedure Component Value Units Date/Time    Blood culture #1 **CANNOT BE ORDERED STAT** [[id number]] Collected: 09/16/24 1054    Order Status: Completed Specimen: Blood from Peripheral, Antecubital, Right Updated: 09/16/24 2145     Blood Culture, Routine No Growth to date    Blood Culture #2 **CANNOT BE ORDERED STAT** [[id number]] Collected: 09/16/24 1145    Order Status: Sent Specimen: Blood from Peripheral, Wrist, Right Updated: 09/16/24 1805    Blood culture #2 **CANNOT BE ORDERED STAT** [[id number]]     Order Status: Canceled Specimen: Blood             "

## 2024-09-17 NOTE — ASSESSMENT & PLAN NOTE
The likely etiology of thrombocytopenia is sepsis. The patients 3 most recent labs are listed below.  Recent Labs     09/16/24  2105 09/17/24  0017 09/17/24  0800   * 116* 115*     Plan  - Will transfuse if platelet count is <50k (if undergoing surgical procedure or have active bleeding).

## 2024-09-17 NOTE — CONSULTS
Patient transferred from OSH for multiple issues, including L spine/pelvis osteomyelitis/septic arthritis with concern for left iliacus muscle abscess and left flank/retroperitoneal necrosis.     Interventional Radiology consulted for assistance. Imaging reviewed by Dr. Montenegro and case discussed with Dr. Jaeger (General Surgery).     IR could potentially attempt aspiration vs drain placement of left retroperitoneal fluid collections, however this would not provide sufficient source control.     In speaking with General Surgery, patient likely needs multidisciplinary surgical approach for source control, which cannot be provided here at .     Patient is awaiting transfer to [facility]. If transfer is delayed, and potential aspiration vs drain placement is desired, please reach out to IR. Otherwise, will defer best approach for source control to ACS at [facility].      Thank you for this consult. Please contact via Epic secure chat with questions.     Promise Wadsworth NP  Interventional Radiology

## 2024-09-17 NOTE — ASSESSMENT & PLAN NOTE
D/w Ortho and read the transfer discussion. Will re-consult IR here for further assistance and at least sampling of the fluid.

## 2024-09-17 NOTE — SUBJECTIVE & OBJECTIVE
"Past Medical History:   Diagnosis Date    Diabetes mellitus     Hypertension     Unspecified viral hepatitis C without hepatic coma        Past Surgical History:   Procedure Laterality Date     SECTION         Review of patient's allergies indicates:  No Known Allergies    Medications:  Medications Prior to Admission   Medication Sig    CLONIDINE HCL ORAL Take by mouth.    gabapentin (NEURONTIN) 300 MG capsule Take 300 mg by mouth 3 (three) times daily.    GLIPIZIDE ORAL Take by mouth.    hydrOXYzine pamoate (VISTARIL) 25 MG Cap Take 25 mg by mouth 3 (three) times daily.    metFORMIN (GLUCOPHAGE) 500 MG tablet Take 500 mg by mouth 2 (two) times daily with meals.    famotidine (PEPCID) 20 MG tablet Take 1 tablet (20 mg total) by mouth 2 (two) times daily as needed for Heartburn.    ibuprofen (ADVIL,MOTRIN) 600 MG tablet Take 1 tablet (600 mg total) by mouth every 6 (six) hours as needed for Pain.    methadone (DOLOPHINE) 10 MG tablet Take 70 mg by mouth every 6 (six) hours as needed for Pain.     Antibiotics (From admission, onward)      Start     Stop Route Frequency Ordered    24 1400  clindamycin in D5W 600 mg/50 mL IVPB 600 mg         -- IV Every 8 hours (non-standard times) 24 1319    24 1045  mupirocin 2 % ointment         24 0859 Nasl 2 times daily 24 0931    24 0100  vancomycin (VANCOCIN) 1,000 mg in D5W 250 mL IVPB (admixture device)         -- IV Every 12 hours (non-standard times) 24 0010    24 2347  vancomycin - pharmacy to dose  (vancomycin IVPB (PEDS and ADULTS))        Placed in "And" Linked Group    -- IV pharmacy to manage frequency 24 2348          Antifungals (From admission, onward)      None          Antivirals (From admission, onward)      None               There is no immunization history on file for this patient.    Family History    None       Social History     Socioeconomic History    Marital status:    Tobacco Use    " Smoking status: Every Day     Current packs/day: 0.50     Types: Cigarettes    Smokeless tobacco: Never   Substance and Sexual Activity    Alcohol use: Not Currently    Drug use: Not Currently     Review of Systems   Constitutional:  Positive for chills, fatigue and fever.   HENT:  Negative for dental problem.    Respiratory:  Negative for cough and choking.    Cardiovascular:  Negative for chest pain and leg swelling.   Gastrointestinal:  Positive for abdominal pain, nausea and vomiting.   Musculoskeletal:  Positive for arthralgias, back pain, gait problem and myalgias.   Skin:  Positive for wound.   All other systems reviewed and are negative.    Objective:     Vital Signs (Most Recent):  Temp: 98.4 °F (36.9 °C) (09/17/24 1501)  Pulse: 93 (09/17/24 1600)  Resp: 20 (09/17/24 1600)  BP: (!) 153/97 (09/17/24 1501)  SpO2: 95 % (09/17/24 1600) Vital Signs (24h Range):  Temp:  [98.4 °F (36.9 °C)-99.6 °F (37.6 °C)] 98.4 °F (36.9 °C)  Pulse:  [] 93  Resp:  [16-29] 20  SpO2:  [91 %-100 %] 95 %  BP: (140-171)/() 153/97     Weight: 65.2 kg (143 lb 11.8 oz)  Body mass index is 26.29 kg/m².    Estimated Creatinine Clearance: 84.2 mL/min (based on SCr of 0.7 mg/dL).     Physical Exam  Vitals reviewed.   Constitutional:       General: She is not in acute distress.     Appearance: She is well-developed. She is ill-appearing.   HENT:      Head: Normocephalic and atraumatic.   Eyes:      Conjunctiva/sclera: Conjunctivae normal.      Pupils: Pupils are equal, round, and reactive to light.   Cardiovascular:      Rate and Rhythm: Normal rate and regular rhythm.      Heart sounds: Normal heart sounds.   Pulmonary:      Effort: Pulmonary effort is normal. No respiratory distress.      Breath sounds: Normal breath sounds.   Abdominal:      General: Bowel sounds are normal. There is no distension.      Palpations: Abdomen is soft.   Musculoskeletal:         General: Normal range of motion.      Cervical back: Normal range of  motion and neck supple.      Comments: Ttp on spine   Skin:     General: Skin is warm and dry.      Comments: Numerous circular skin ulcerations on arms and legs without purulence.   Neurological:      General: No focal deficit present.      Mental Status: She is alert and oriented to person, place, and time.      Cranial Nerves: No cranial nerve deficit.   Psychiatric:         Mood and Affect: Mood normal.         Behavior: Behavior normal.          Significant Labs: Blood Culture:   Recent Labs   Lab 09/16/24  1054 09/16/24  1145   LABBLOO Gram stain aer bottle: Gram positive cocci in clusters resembling Staph  Gram stain felix bottle: Gram positive cocci in clusters resembling Staph  Results called to and read back by: FATIMAH Mcnulty. 09/17/2024  03:43 Gram stain felix bottle: Gram positive cocci in clusters resembling Staph  Results called to and read back by: FATIMAH Mcnulty. 09/17/2024  03:48  Gram stain aer bottle: Gram positive cocci in clusters resembling Staph  Positive results previously called 09/17/2024  06:05     All pertinent labs within the past 24 hours have been reviewed.    Significant Imaging: I have reviewed all pertinent imaging results/findings within the past 24 hours.

## 2024-09-17 NOTE — SUBJECTIVE & OBJECTIVE
Past Medical History:   Diagnosis Date    Diabetes mellitus     Hypertension     Unspecified viral hepatitis C without hepatic coma        Past Surgical History:   Procedure Laterality Date     SECTION         Review of patient's allergies indicates:  No Known Allergies    No current facility-administered medications on file prior to encounter.     Current Outpatient Medications on File Prior to Encounter   Medication Sig    CLONIDINE HCL ORAL Take by mouth.    gabapentin (NEURONTIN) 300 MG capsule Take 300 mg by mouth 3 (three) times daily.    GLIPIZIDE ORAL Take by mouth.    hydrOXYzine pamoate (VISTARIL) 25 MG Cap Take 25 mg by mouth 3 (three) times daily.    metFORMIN (GLUCOPHAGE) 500 MG tablet Take 500 mg by mouth 2 (two) times daily with meals.    famotidine (PEPCID) 20 MG tablet Take 1 tablet (20 mg total) by mouth 2 (two) times daily as needed for Heartburn.    ibuprofen (ADVIL,MOTRIN) 600 MG tablet Take 1 tablet (600 mg total) by mouth every 6 (six) hours as needed for Pain.    methadone (DOLOPHINE) 10 MG tablet Take 70 mg by mouth every 6 (six) hours as needed for Pain.     Family History    None       Tobacco Use    Smoking status: Every Day     Current packs/day: 0.50     Types: Cigarettes    Smokeless tobacco: Never   Substance and Sexual Activity    Alcohol use: Not Currently    Drug use: Not Currently    Sexual activity: Not on file     Review of Systems   Constitutional:  Positive for activity change (due to back pain/hip pain). Negative for appetite change, chills, diaphoresis, fatigue and fever.   HENT:  Negative for congestion, rhinorrhea and sore throat.    Eyes:  Negative for visual disturbance.   Respiratory:  Negative for cough, chest tightness, shortness of breath, wheezing and stridor.    Cardiovascular:  Negative for chest pain, palpitations and leg swelling.   Gastrointestinal:  Negative for blood in stool, constipation, diarrhea, nausea and vomiting.   Endocrine: Negative for  polyuria.   Genitourinary:  Negative for dysuria.   Musculoskeletal:  Positive for arthralgias, back pain, gait problem and myalgias. Negative for joint swelling.   Skin:  Positive for wound.        Multiple open sores in different stages of healing on skin. (Pt. Denies skin popping or IVDU)    Neurological:  Negative for dizziness, tremors, seizures, syncope, light-headedness and headaches.   Psychiatric/Behavioral:  The patient is nervous/anxious.      Objective:     Vital Signs (Most Recent):  Temp: 98.5 °F (36.9 °C) (09/16/24 2345)  Pulse: 97 (09/17/24 0245)  Resp: (!) 23 (09/17/24 0245)  BP: (!) 156/95 (09/17/24 0245)  SpO2: (!) 94 % (09/17/24 0245) Vital Signs (24h Range):  Temp:  [98.5 °F (36.9 °C)-99.6 °F (37.6 °C)] 98.5 °F (36.9 °C)  Pulse:  [] 97  Resp:  [16-29] 23  SpO2:  [92 %-100 %] 94 %  BP: (138-171)/(64-99) 156/95     Weight: 65.2 kg (143 lb 11.8 oz)  Body mass index is 26.29 kg/m².     Physical Exam  Vitals and nursing note reviewed.   Constitutional:       General: She is not in acute distress.     Appearance: She is not ill-appearing, toxic-appearing or diaphoretic.   HENT:      Head: Normocephalic and atraumatic.      Nose: Nose normal. No congestion or rhinorrhea.      Mouth/Throat:      Mouth: Mucous membranes are dry.      Pharynx: Oropharynx is clear. No oropharyngeal exudate or posterior oropharyngeal erythema.   Eyes:      General: No scleral icterus.     Extraocular Movements: Extraocular movements intact.      Conjunctiva/sclera: Conjunctivae normal.      Pupils: Pupils are equal, round, and reactive to light.   Neck:      Vascular: No carotid bruit.   Cardiovascular:      Rate and Rhythm: Normal rate and regular rhythm.      Pulses: Normal pulses.      Heart sounds: No murmur heard.     No friction rub. No gallop.   Pulmonary:      Effort: Pulmonary effort is normal. No respiratory distress.      Breath sounds: Normal breath sounds. No wheezing, rhonchi or rales.   Abdominal:       General: Bowel sounds are normal. There is no distension.      Palpations: Abdomen is soft.      Tenderness: There is no abdominal tenderness. There is no guarding or rebound.   Musculoskeletal:         General: No tenderness or deformity.      Cervical back: Normal range of motion and neck supple.      Right lower leg: No edema.      Left lower leg: No edema.      Comments: Decreased ROM lower extremities due to pain in the back/hip    Skin:     General: Skin is dry.      Capillary Refill: Capillary refill takes 2 to 3 seconds.      Coloration: Skin is pale.      Findings: Lesion (muliple open wounds in various stages of healing, pt. denies skin popping or IVDU) present.   Neurological:      Mental Status: She is alert and oriented to person, place, and time.      Cranial Nerves: No cranial nerve deficit.      Motor: Weakness (due to pain in her back) present.      Coordination: Coordination normal.   Psychiatric:         Mood and Affect: Mood normal.         Behavior: Behavior normal.              CRANIAL NERVES     CN III, IV, VI   Pupils are equal, round, and reactive to light.       Recent Results (from the past 24 hour(s))   POCT glucose    Collection Time: 09/16/24  8:15 AM   Result Value Ref Range    POCT Glucose >500 (HH) 70 - 110 mg/dL   CBC auto differential    Collection Time: 09/16/24  9:34 AM   Result Value Ref Range    WBC 9.81 3.90 - 12.70 K/uL    RBC 2.45 (L) 4.00 - 5.40 M/uL    Hemoglobin 5.7 (LL) 12.0 - 16.0 g/dL    Hematocrit 18.4 (LL) 37.0 - 48.5 %    MCV 75 (L) 82 - 98 fL    MCH 23.3 (L) 27.0 - 31.0 pg    MCHC 31.0 (L) 32.0 - 36.0 g/dL    RDW 17.5 (H) 11.5 - 14.5 %    Platelets 132 (L) 150 - 450 K/uL    MPV 10.3 9.2 - 12.9 fL    Immature Granulocytes CANCELED 0.0 - 0.5 %    Immature Grans (Abs) CANCELED 0.00 - 0.04 K/uL    nRBC 1 (A) 0 /100 WBC    Gran % 75.0 (H) 38.0 - 73.0 %    Lymph % 14.0 (L) 18.0 - 48.0 %    Mono % 2.0 (L) 4.0 - 15.0 %    Eosinophil % 0.0 0.0 - 8.0 %    Basophil % 0.0 0.0  - 1.9 %    Bands 7.0 %    Metamyelocytes 1.0 %    Myelocytes 1.0 %    Platelet Estimate Decreased (A)     Hypo Moderate     Toxic Granulation Present     Differential Method Manual    Comprehensive metabolic panel    Collection Time: 09/16/24  9:34 AM   Result Value Ref Range    Sodium 134 (L) 136 - 145 mmol/L    Potassium 2.5 (LL) 3.5 - 5.1 mmol/L    Chloride 91 (L) 95 - 110 mmol/L    CO2 27 23 - 29 mmol/L    Glucose 544 (HH) 70 - 110 mg/dL    BUN 7 6 - 20 mg/dL    Creatinine 0.8 0.5 - 1.4 mg/dL    Calcium 7.7 (L) 8.7 - 10.5 mg/dL    Total Protein 6.5 6.0 - 8.4 g/dL    Albumin 1.5 (L) 3.5 - 5.2 g/dL    Total Bilirubin 1.4 (H) 0.1 - 1.0 mg/dL    Alkaline Phosphatase 184 (H) 55 - 135 U/L    AST 24 10 - 40 U/L    ALT 25 10 - 44 U/L    eGFR >60 >60 mL/min/1.73 m^2    Anion Gap 16 8 - 16 mmol/L   Magnesium    Collection Time: 09/16/24  9:34 AM   Result Value Ref Range    Magnesium 1.2 (L) 1.6 - 2.6 mg/dL   Beta-Hydroxybutyrate, Serum    Collection Time: 09/16/24  9:34 AM   Result Value Ref Range    Beta-Hydroxybutyrate 0.3 0.0 - 0.5 mmol/L   CK    Collection Time: 09/16/24  9:34 AM   Result Value Ref Range     20 - 180 U/L   ISTAT PROCEDURE    Collection Time: 09/16/24  9:54 AM   Result Value Ref Range    POC PH 7.666 (HH) 7.35 - 7.45    POC PCO2 31.5 (L) 35 - 45 mmHg    POC PO2 97 (HH) 40 - 60 mmHg    POC HCO3 36.0 (H) 24 - 28 mmol/L    POC BE 15 (H) -2 to 2 mmol/L    POC SATURATED O2 99 95 - 100 %    POC TCO2 37 (H) 24 - 29 mmol/L    POC Hematocrit 22 (L) 36 - 54 %PCV    POC HEMOGLOBIN 8 g/dL    Sample VENOUS    Urinalysis, Reflex to Urine Culture Urine, Clean Catch    Collection Time: 09/16/24 10:10 AM    Specimen: Urine, Clean Catch   Result Value Ref Range    Specimen UA Urine, Clean Catch     Color, UA Yellow Yellow, Straw, Arabella    Appearance, UA Hazy (A) Clear    pH, UA 7.0 5.0 - 8.0    Specific Gravity, UA 1.020 1.005 - 1.030    Protein, UA Negative Negative    Glucose, UA 4+ (A) Negative    Ketones, UA  Trace (A) Negative    Bilirubin (UA) Negative Negative    Occult Blood UA 2+ (A) Negative    Nitrite, UA Positive (A) Negative    Urobilinogen, UA 4.0-6.0 (A) <2.0 EU/dL    Leukocytes, UA Negative Negative   Urinalysis Microscopic    Collection Time: 09/16/24 10:10 AM   Result Value Ref Range    RBC, UA 7 (H) 0 - 4 /hpf    WBC, UA 6 (H) 0 - 5 /hpf    WBC Clumps, UA Rare None-Rare    Bacteria Moderate (A) None-Occ /hpf    Yeast, UA Rare (A) None    Squam Epithel, UA 15 /hpf    Microscopic Comment SEE COMMENT    Blood culture #1 **CANNOT BE ORDERED STAT**    Collection Time: 09/16/24 10:54 AM    Specimen: Peripheral, Antecubital, Right; Blood   Result Value Ref Range    Blood Culture, Routine No Growth to date    Blood Culture #2 **CANNOT BE ORDERED STAT**    Collection Time: 09/16/24 11:45 AM    Specimen: Peripheral, Wrist, Right; Blood   Result Value Ref Range    Blood Culture, Routine No Growth to date    Type & Screen    Collection Time: 09/16/24 11:56 AM   Result Value Ref Range    Group & Rh O POS     Indirect Sudeep NEG     Specimen Outdate 09/19/2024 23:59    Prepare RBC 1 Unit    Collection Time: 09/16/24 11:56 AM   Result Value Ref Range    UNIT NUMBER [id number]     Product Code [id number]     DISPENSE STATUS TRANSFUSED     CODING SYSTEM [id number]     Unit Blood Type Code 5100     Unit Blood Type O POS     Unit Expiration [id number]     CROSSMATCH INTERPRETATION Compatible    Prepare RBC 2 Units; anemia    Collection Time: 09/16/24 11:56 AM   Result Value Ref Range    UNIT NUMBER [id number]     Product Code I5655D18     DISPENSE STATUS TRANSFUSED     CODING SYSTEM DSGP519     Unit Blood Type Code 5100     Unit Blood Type O POS     Unit Expiration [id number]     CROSSMATCH INTERPRETATION Compatible     UNIT NUMBER [id number]     Product Code T2918B16     DISPENSE STATUS CROSSMATCHED     CODING SYSTEM [id number]     Unit Blood Type Code 5100     Unit Blood Type O POS     Unit Expiration [id number]      CROSSMATCH INTERPRETATION Compatible    POCT glucose    Collection Time: 09/16/24  2:45 PM   Result Value Ref Range    POCT Glucose 475 (HH) 70 - 110 mg/dL   CBC auto differential    Collection Time: 09/16/24  3:39 PM   Result Value Ref Range    WBC 10.84 3.90 - 12.70 K/uL    RBC 2.29 (L) 4.00 - 5.40 M/uL    Hemoglobin 5.2 (LL) 12.0 - 16.0 g/dL    Hematocrit 17.7 (LL) 37.0 - 48.5 %    MCV 77 (L) 82 - 98 fL    MCH 22.7 (L) 27.0 - 31.0 pg    MCHC 29.4 (L) 32.0 - 36.0 g/dL    RDW 17.4 (H) 11.5 - 14.5 %    Platelets 131 (L) 150 - 450 K/uL    MPV 9.8 9.2 - 12.9 fL    Immature Granulocytes Test Not Performed 0.0 - 0.5 %    Gran # (ANC) Test Not Performed 1.8 - 7.7 K/uL    Immature Grans (Abs) Test Not Performed 0.00 - 0.04 K/uL    Lymph # Test Not Performed 1.0 - 4.8 K/uL    Mono # Test Not Performed 0.3 - 1.0 K/uL    Eos # Test Not Performed 0.0 - 0.5 K/uL    Baso # Test Not Performed 0.00 - 0.20 K/uL    nRBC 1 (A) 0 /100 WBC    Gran % 73.0 38.0 - 73.0 %    Lymph % 16.0 (L) 18.0 - 48.0 %    Mono % 3.0 (L) 4.0 - 15.0 %    Eosinophil % 2.0 0.0 - 8.0 %    Basophil % 0.0 0.0 - 1.9 %    Bands 6.0 %    Platelet Estimate Decreased (A)     Differential Method Manual    ISTAT Lactate    Collection Time: 09/16/24  7:12 PM   Result Value Ref Range    POC Lactate 1.38 0.5 - 2.2 mmol/L    Sample VENOUS    Lactic acid, plasma #2    Collection Time: 09/16/24  8:42 PM   Result Value Ref Range    Lactate (Lactic Acid) 1.3 0.5 - 2.2 mmol/L   CBC auto differential    Collection Time: 09/16/24  9:05 PM   Result Value Ref Range    WBC 11.88 3.90 - 12.70 K/uL    RBC 2.36 (L) 4.00 - 5.40 M/uL    Hemoglobin 5.7 (LL) 12.0 - 16.0 g/dL    Hematocrit 18.2 (LL) 37.0 - 48.5 %    MCV 77 (L) 82 - 98 fL    MCH 24.2 (L) 27.0 - 31.0 pg    MCHC 31.3 (L) 32.0 - 36.0 g/dL    RDW 16.6 (H) 11.5 - 14.5 %    Platelets 124 (L) 150 - 450 K/uL    MPV 9.9 9.2 - 12.9 fL    Immature Granulocytes 3.0 (H) 0.0 - 0.5 %    Gran # (ANC) 9.7 (H) 1.8 - 7.7 K/uL    Immature  Grans (Abs) 0.36 (H) 0.00 - 0.04 K/uL    Lymph # 1.3 1.0 - 4.8 K/uL    Mono # 0.4 0.3 - 1.0 K/uL    Eos # 0.0 0.0 - 0.5 K/uL    Baso # 0.04 0.00 - 0.20 K/uL    nRBC 1 (A) 0 /100 WBC    Gran % 81.8 (H) 38.0 - 73.0 %    Lymph % 11.2 (L) 18.0 - 48.0 %    Mono % 3.5 (L) 4.0 - 15.0 %    Eosinophil % 0.2 0.0 - 8.0 %    Basophil % 0.3 0.0 - 1.9 %    Platelet Estimate Decreased (A)     Hypo Moderate     Differential Method Automated    Basic metabolic panel    Collection Time: 09/16/24  9:05 PM   Result Value Ref Range    Sodium 137 136 - 145 mmol/L    Potassium 2.2 (LL) 3.5 - 5.1 mmol/L    Chloride 98 95 - 110 mmol/L    CO2 29 23 - 29 mmol/L    Glucose 397 (H) 70 - 110 mg/dL    BUN 5 (L) 6 - 20 mg/dL    Creatinine 0.7 0.5 - 1.4 mg/dL    Calcium 6.6 (LL) 8.7 - 10.5 mg/dL    Anion Gap 10 8 - 16 mmol/L    eGFR >60 >60 mL/min/1.73 m^2   HIV 1/2 Ag/Ab (4th Gen)    Collection Time: 09/16/24  9:05 PM   Result Value Ref Range    HIV 1/2 Ag/Ab Negative Negative   Hepatitis C Antibody    Collection Time: 09/16/24  9:05 PM   Result Value Ref Range    Hepatitis C Ab Positive (A) Negative   Magnesium    Collection Time: 09/17/24 12:17 AM   Result Value Ref Range    Magnesium 1.5 (L) 1.6 - 2.6 mg/dL   Phosphorus    Collection Time: 09/17/24 12:17 AM   Result Value Ref Range    Phosphorus 1.7 (L) 2.7 - 4.5 mg/dL   Protime-INR    Collection Time: 09/17/24 12:17 AM   Result Value Ref Range    Prothrombin Time 20.2 (H) 9.0 - 12.5 sec    INR 1.9 (H) 0.8 - 1.2   APTT    Collection Time: 09/17/24 12:17 AM   Result Value Ref Range    aPTT 35.6 (H) 21.0 - 32.0 sec   Lactic acid, plasma    Collection Time: 09/17/24 12:17 AM   Result Value Ref Range    Lactate (Lactic Acid) 1.4 0.5 - 2.2 mmol/L   Procalcitonin    Collection Time: 09/17/24 12:17 AM   Result Value Ref Range    Procalcitonin 0.48 (H) <0.25 ng/mL   Sedimentation rate    Collection Time: 09/17/24 12:17 AM   Result Value Ref Range    Sed Rate 26 0 - 36 mm/Hr   C-reactive protein     Collection Time: 09/17/24 12:17 AM   Result Value Ref Range    .6 (H) 0.0 - 8.2 mg/L   CBC auto differential    Collection Time: 09/17/24 12:17 AM   Result Value Ref Range    WBC 14.09 (H) 3.90 - 12.70 K/uL    RBC 2.78 (L) 4.00 - 5.40 M/uL    Hemoglobin 7.2 (L) 12.0 - 16.0 g/dL    Hematocrit 21.7 (L) 37.0 - 48.5 %    MCV 78 (L) 82 - 98 fL    MCH 25.9 (L) 27.0 - 31.0 pg    MCHC 33.2 32.0 - 36.0 g/dL    RDW 15.7 (H) 11.5 - 14.5 %    Platelets 116 (L) 150 - 450 K/uL    MPV 9.4 9.2 - 12.9 fL    Immature Granulocytes CANCELED 0.0 - 0.5 %    Immature Grans (Abs) CANCELED 0.00 - 0.04 K/uL    Lymph # CANCELED 1.0 - 4.8 K/uL    Mono # CANCELED 0.3 - 1.0 K/uL    Eos # CANCELED 0.0 - 0.5 K/uL    Baso # CANCELED 0.00 - 0.20 K/uL    nRBC 1 (A) 0 /100 WBC    Gran % 59.0 38.0 - 73.0 %    Lymph % 15.0 (L) 18.0 - 48.0 %    Mono % 2.0 (L) 4.0 - 15.0 %    Eosinophil % 0.0 0.0 - 8.0 %    Basophil % 0.0 0.0 - 1.9 %    Bands 24.0 %    Platelet Estimate Decreased (A)     Aniso Slight     Hypo Moderate     Toxic Granulation Present     Differential Method Manual    Basic metabolic panel    Collection Time: 09/17/24 12:17 AM   Result Value Ref Range    Sodium 139 136 - 145 mmol/L    Potassium 2.2 (LL) 3.5 - 5.1 mmol/L    Chloride 100 95 - 110 mmol/L    CO2 27 23 - 29 mmol/L    Glucose 401 (H) 70 - 110 mg/dL    BUN 5 (L) 6 - 20 mg/dL    Creatinine 0.7 0.5 - 1.4 mg/dL    Calcium 6.8 (LL) 8.7 - 10.5 mg/dL    Anion Gap 12 8 - 16 mmol/L    eGFR >60 >60 mL/min/1.73 m^2   POCT glucose    Collection Time: 09/17/24  1:00 AM   Result Value Ref Range    POCT Glucose >500 () 70 - 110 mg/dL       Microbiology Results (last 7 days)       Procedure Component Value Units Date/Time    Blood Culture #2 **CANNOT BE ORDERED STAT** [[id number]] Collected: 09/16/24 1145    Order Status: Completed Specimen: Blood from Peripheral, Wrist, Right Updated: 09/17/24 0115     Blood Culture, Routine No Growth to date    Blood culture #1 **CANNOT BE ORDERED  STAT** [[id number]] Collected: 09/16/24 1054    Order Status: Completed Specimen: Blood from Peripheral, Antecubital, Right Updated: 09/16/24 9406     Blood Culture, Routine No Growth to date    Blood culture #2 **CANNOT BE ORDERED STAT** [[id number]]     Order Status: Canceled Specimen: Blood             Imaging Results              CT Chest Abdomen Pelvis With IV Contrast (XPD) NO Oral Contrast (Final result)  Result time 09/16/24 17:02:12      Final result by Hamzah Acosta MD (09/16/24 17:02:12)                   Impression:      Extensive soft tissue edema with subcutaneous gas throughout the left flank which is concerning for soft tissue infection in this region.  There is no organized fluid collection in the soft tissues.  There are small psoas abscesses and small fluid collections in the left flank likely indicating abscess formations in this region.  In addition, there is evidence of septic arthritis of the left sacroiliac joint, L5-S1 disc space, and pubic symphysis with extension of the soft tissue infection into the left iliacus and left adductors musculature.    While the left retroperitoneal fluid collections do appear organized, percutaneous drainage is not advised given the extensive soft tissue infection superficial to these collections.  In addition, there is extensive evidence of soft tissue infection that does not appear organized or percutaneously drainable.      Electronically signed by: Hamzah Acosta MD  Date:    09/16/2024  Time:    17:02               Narrative:    EXAMINATION:  CT CHEST ABDOMEN PELVIS WITH IV CONTRAST (XPD)    CLINICAL HISTORY:  Sepsis;    TECHNIQUE:  Low dose axial images, sagittal and coronal reformations were obtained from the thoracic inlet to the pubic symphysis following the IV administration of 75 mL of Omnipaque 350 .  No oral contrast was administered.    COMPARISON:  None    FINDINGS:  Limited evaluation of structures at the base of the neck show no  concerning masses or lymph nodes.    The heart is normal in size.  No pericardial effusion.  No mediastinal or hilar adenopathy.    Lungs show bilateral airspace consolidation predominantly in the lower lobes, but present throughout all lobes of the lung.  No solid mass.  No effusion or pneumothorax.  The airways are patent without endoluminal lesion.    The liver is slightly enlarged.  Spleen is enlarged as well.  Small ill-defined hypodensity in the posterior aspect of the right hepatic lobe measuring 1.1 cm.  This is of uncertain significance.  No biliary ductal dilatation.  Gallbladder is without significant wall thickening.    Pancreas and adrenal glands show no focal abnormality.    Bilateral kidneys are normal in size and position.  No hydronephrosis or nephrolithiasis.  Urinary bladder is well distended.  No focal wall thickening.  The uterus is grossly unremarkable.    Gastrointestinal tract shows no wall thickening or obstruction.  There is hyperdense material in the colon which may be due to a prior exam.  No free gas in the abdomen.  No pathologic lymphadenopathy.    Within the soft tissues of the left flank, there is large region of ill-defined edema with scattered foci of gas there is fluid collection in the anterior aspect of the left psoas measuring roughly 3.1 x 2.1 cm in axial diameter.  Additional fluid posterolateral to the left psoas tracks from the distal thoracic spine to the pelvis.  A small amount of fluid within the left iliacus musculature as well.  Destruction of portions of the left iliac bone an erosion of the left sacroiliac joint is again noted.  Edema and sclerosis at L5-S1.  Similar appearance of the pubic symphysis as well with scattered ill-defined fluid and soft tissue and gas in the adductus musculature in the left pelvis.                                       X-Ray Chest AP Portable (Final result)  Result time 09/16/24 15:39:53      Final result by Mariana Calderon MD  (09/16/24 15:39:53)                   Impression:      Please see above.      Electronically signed by: Mariana Calderon  Date:    09/16/2024  Time:    15:39               Narrative:    EXAMINATION:  XR CHEST AP PORTABLE    CLINICAL HISTORY:  Encounter for adjustment and management of vascular access device    TECHNIQUE:  Single frontal view of the chest was performed.    COMPARISON:  09/16/2024    FINDINGS:  Since the prior study, loop in the central venous catheter has been reduced.  Catheter tip in the right atrium and may be retracted for better positioning.    Streaky right basilar opacities may reflect subsegmental atelectasis.                                        X-Ray Chest AP Portable (Final result)  Result time 09/16/24 13:46:02      Final result by Mariana Calderon MD (09/16/24 13:46:02)                   Impression:      Loop in the central venous catheter.  Recommend repositioning prior to use.  Tip current located at the level of the brachiocephalic vein.    Lungs are fairly clear when accounting for rotation of the patient.    This report was flagged in [employer] as abnormal.      Electronically signed by: Mariana Calderon  Date:    09/16/2024  Time:    13:46               Narrative:    EXAMINATION:  XR CHEST AP PORTABLE    CLINICAL HISTORY:  Encounter for adjustment and management of vascular access device    TECHNIQUE:  Single frontal view of the chest was performed.    COMPARISON:  None    FINDINGS:  Lungs are well expanded.  No acute consolidation, pleural effusion, or pneumothorax.    Cardiac silhouette is normal in size.    Placement of a right central venous catheter with the tip along the brachiocephalic vein.  Catheter is looped approximately 5.5 cm proximal to the tip.                                        CT Pelvis Without Contrast (Final result)  Result time 09/16/24 09:35:58      Final result by Mariana Calderon MD (09/16/24 09:35:58)                   Impression:      Overall, findings  most concerning for an infectious process.    Findings most concerning for osteomyelitis and septic arthritis left SI joint.  Findings concerning for osteomyelitis/discitis L5-S1.  Possible osteomyelitis and septic joint at the pubic symphysis.    Abnormal fluid collection on the left extending from T11 through the left iliacus muscle, along and within the left ileo psoas muscle, most concerning for abscess.  Multiple additional small abscesses suspected in the pelvis as above.  Multifocal collections of air in fluid in the subcutaneous tissues of the left flank, incompletely imaged.  Abscesses favored.    This report was flagged in [employer] as abnormal.      Electronically signed by: Mariana Calderon  Date:    09/16/2024  Time:    09:35               Narrative:    EXAMINATION:  CT LUMBAR SPINE WITHOUT CONTRAST; CT PELVIS WITHOUT CONTRAST    CLINICAL HISTORY:  Low back pain, trauma;; Pelvic trauma;    TECHNIQUE:  Low-dose axial, sagittal and coronal reformations are obtained through the lumbar spine.  Contrast was not administered.    By separate acquisition, thin-section multislice axial CT images obtained through the pelvis without the use of intravenous contrast.  Coronal and sagittal reformats obtained.    COMPARISON:  None.    FINDINGS:  Alignment: Normal.    Vertebrae: No fracture.    Discs: Disc space narrowing L5-S1.  Patchy osteolytic change slight widening of the disc space on the left.    Sacroiliac joints: Abnormal osteolytic change and asymmetric widening of the left sacroiliac joint as compared to the right.  Osteolytic change involves the iliac side of the joint more so than the sacral side.    Degenerative findings:    T12-L1: No spinal canal stenosis or neural foraminal narrowing.    L1-L2: No spinal canal stenosis or neural foraminal narrowing.    L2-L3: No spinal canal stenosis or neural foraminal narrowing.    L3-L4: Posterior circumferential disc bulge contributes to mild canal and moderate right  neural foraminal narrowing..    L4-L5: Posterior circumferential disc bulge and facet arthropathy resulting in mild-to-moderate canal narrowing.    L5-S1: Posterior circumferential disc bulge contributing to mild canal narrowing.  Moderate right and mild left neural foraminal narrowing.    Paraspinal muscles & soft tissues: Abnormal collection of fluid and scattered air bubbles in the retroperitoneal soft tissues on the left beginning at T11 and extending through the left iliacus muscle.  Collection located within and along the left iliopsoas muscle.  Largest transaxial dimension estimated at 6.3 x 3.7 at the level of L3.    Small collection of fluid and subcutaneous emphysema intimately associated with the pubic symphysis estimated to measure 2.5 x 2.2 cm.  Osteolytic change at the pubic symphysis.  Small collections of fluid and subcutaneous emphysema along the right and left abductor musculature.    Within the subcutaneous tissues of the left flank, scattered collections of fluid and subcutaneous emphysema, incompletely imaged.    High attenuation material within the colon has the appearance of oral contrast.  Bowel loops are nondilated.  Urinary bladder is unremarkable.  No significant free fluid in the pelvis.                                        CT Lumbar Spine Without Contrast (Final result)  Result time 09/16/24 09:35:58      Final result by Mariana Calderon MD (09/16/24 09:35:58)                   Impression:      Overall, findings most concerning for an infectious process.    Findings most concerning for osteomyelitis and septic arthritis left SI joint.  Findings concerning for osteomyelitis/discitis L5-S1.  Possible osteomyelitis and septic joint at the pubic symphysis.    Abnormal fluid collection on the left extending from T11 through the left iliacus muscle, along and within the left ileo psoas muscle, most concerning for abscess.  Multiple additional small abscesses suspected in the pelvis as  above.  Multifocal collections of air in fluid in the subcutaneous tissues of the left flank, incompletely imaged.  Abscesses favored.    This report was flagged in Epic as abnormal.      Electronically signed by: Mariana Calderon  Date:    09/16/2024  Time:    09:35               Narrative:    EXAMINATION:  CT LUMBAR SPINE WITHOUT CONTRAST; CT PELVIS WITHOUT CONTRAST    CLINICAL HISTORY:  Low back pain, trauma;; Pelvic trauma;    TECHNIQUE:  Low-dose axial, sagittal and coronal reformations are obtained through the lumbar spine.  Contrast was not administered.    By separate acquisition, thin-section multislice axial CT images obtained through the pelvis without the use of intravenous contrast.  Coronal and sagittal reformats obtained.    COMPARISON:  None.    FINDINGS:  Alignment: Normal.    Vertebrae: No fracture.    Discs: Disc space narrowing L5-S1.  Patchy osteolytic change slight widening of the disc space on the left.    Sacroiliac joints: Abnormal osteolytic change and asymmetric widening of the left sacroiliac joint as compared to the right.  Osteolytic change involves the iliac side of the joint more so than the sacral side.    Degenerative findings:    T12-L1: No spinal canal stenosis or neural foraminal narrowing.    L1-L2: No spinal canal stenosis or neural foraminal narrowing.    L2-L3: No spinal canal stenosis or neural foraminal narrowing.    L3-L4: Posterior circumferential disc bulge contributes to mild canal and moderate right neural foraminal narrowing..    L4-L5: Posterior circumferential disc bulge and facet arthropathy resulting in mild-to-moderate canal narrowing.    L5-S1: Posterior circumferential disc bulge contributing to mild canal narrowing.  Moderate right and mild left neural foraminal narrowing.    Paraspinal muscles & soft tissues: Abnormal collection of fluid and scattered air bubbles in the retroperitoneal soft tissues on the left beginning at T11 and extending through the left  iliacus muscle.  Collection located within and along the left iliopsoas muscle.  Largest transaxial dimension estimated at 6.3 x 3.7 at the level of L3.    Small collection of fluid and subcutaneous emphysema intimately associated with the pubic symphysis estimated to measure 2.5 x 2.2 cm.  Osteolytic change at the pubic symphysis.  Small collections of fluid and subcutaneous emphysema along the right and left abductor musculature.    Within the subcutaneous tissues of the left flank, scattered collections of fluid and subcutaneous emphysema, incompletely imaged.    High attenuation material within the colon has the appearance of oral contrast.  Bowel loops are nondilated.  Urinary bladder is unremarkable.  No significant free fluid in the pelvis.

## 2024-09-17 NOTE — CONSULTS
Neurosurgery consult received for lumbar osteomyelitis. She is now pending transfer to [facility] for a higher level of care.     Recommend a lumbar MRI w/wo contrast to complete lumbar osteomyelitis workup. If transfer is likely to occur soon, please schedule at [facility]. If transfer is delayed, please complete here at [facility].     Neurosurgical intervention is not likely indicated based on current imaging. Further neurosurgical recommendations pending MRI and full consult by [facility] team.     We will continue to monitor transfer status and see the patient if it is delayed.     Please call with any questions, concerns, or changes in the patient's neurologic status.       Lea Fernandez PA-C  [facility]  Department of Neurosurgery  [phone]

## 2024-09-17 NOTE — EICU
Brief Note     49 yr old female   diabetes, hypertension, and substance use     Spinal Abscess   Anemia   Hypokalemia      Recently transferred from other hospital   STAT labs send by bedside   May need prbc and kcl   Has double lumen IJ central line   HD stable   Complaining of pain     Patient critically ill   AV equipment used, discussed with bedside

## 2024-09-17 NOTE — HPI
By Dr. Alberta Reese MD    49 y.o.  woman with h/o homelessness (recently was able to obtain living arrangements but states [place] was living [place] the bridge), NIDDM type 2, Essential hypertension, and substance use (denies any IVDU in he past or any illicit drug use recenly, denies ETOH abuse/overuse) presents to [facility] for further evaluation of her back pain.  She apparently presented to the [facility] Emergency Department on September 16 with nausea and vomiting and a mechanical fall 5 days prior. She reported pain worse in her back and on her sides radiating down both legs. She noted muscle spasm in her back and legs. She had no head trauma or loss of consciousness.  W/u in the [facility] ED noted significant hypokalemia, hypomagnesemia, severe anemia, metabolic alkalosis, and hyperglycemia. Vitals signs stable with no sepsis at this time noted. SBP>90, HR normal,afebrile.     Imaging studies of her lumbar spine and pelvis were concerning for osteomyelitis/septic arthritis of the left SI joint and L5-S1 along with an abnormal fluid collection extending from T11 through the left iliacus muscle concerning for abscess. Blood cultures sent.  In the emergency department she is receiving IV fluid, Zofran, Zosyn, vancomycin, potassium, magnesium, pain medication, and nausea medication.   She also received 2 units of PRBC for an H/H of 5.7/18.4,     Case discussed with Neurosurgery and Interventional Radiology. Plan would be for transfer to Hospital Medicine at [facility] for IR evaluation of the fluid collection and potential sampling of the joint osteomyelitis.  I reviewed the discussions made with the multiple sub specialists regarding transfer of this patient to [facility]: IR/General surgery/Neurosurgery/ER/Internal Med.     Neurosurgery contacted by the  did not feel surgical intervention was needed at this time but would follow along and requested [facility] for  "further management and IR backup. IR on call apparently read the CT and at the time felt "while the left retroperitoneal fluid collections do appear organized, percutaneous drainage is not advised given the extensive soft tissue infection superficial to these collections.  In addition, there is extensive evidence of soft tissue infection that does not appear organized or percutaneously drainable."     General surgery was consulted to review the case and felt that there was no immediate surgical intervention at this time to be had. I spoke with the on surgeon contacted regarding the location of the fluid collections and inquired if perhaps orthopedic surgery should be consulted to review given the involvement of bony pelvis.      I spoke orthopedic surgery who will see the patient but recommended re-consulting IR here and Neurosurgery given the diskitis/OM L5-S1. (Please see his consult note in the chart)     Repeat labs here again noted for persistently low mag, K, phos.  H/H stable with improved H/H. Pain control improved with Dilaudid.  I consulted ID for further assistance with ABX adjustments and changed her to Meropenem and doxy as well as continued Vanc. Echo ordred for am.     CT lumbar spine and pelvis had findings most concerning for infectious process. There were findings concerning for osteomyelitis and septic arthritis in the left SI joint. Findings concerning for osteomyelitis/diskitis L5-S1. Possible osteomyelitis and septic joint at the pubic symphysis. Abnormal fluid collection on the left extending from T11 through the left iliacus muscle along and within the left iliopsoas muscle most concerning for abscess. Multiple additional small abscesses suspected in the pelvis. Multifocal collections of air in the fluid in the subcutaneous tissues of the left flank, incompletely imaged.    Chest x-ray noted a loop in the central venous catheter. Tip currently at the level of the brachiocephalic vein. Lungs " are fairly clear.

## 2024-09-17 NOTE — HOSPITAL COURSE
Ms. Sloan was transferred from [facility] ED to [facility] ICU on 09/16/2024.  She presented to [facility] ED  for back pain, nausea/vomiting.  CT L-spine revealed osteomyelitis/diskitis L5-S1 along with abnormal fluid collection on left extending from T11 through left iliacus muscle and left iliopsoas muscle concerning for abscess.  Multifocal collections of air in fluid in subcutaneous tissues of left flank.  Patient was initiated on empiric vancomycin and meropenem.  Blood cultures with staph aureus.  Obtain echo.  Unable to repeat blood cultures given shortage of cx.  Neurosurgery recommended to obtain MRI L-spine, pending.  General surgery evaluated the patient and recommended urgent transfer to [facility] for surgical evaluation/source control given extent of necrosis.     Ms. Sloan was transferred to [facility] and admitted to the MICU 9/18 with concern for paraspinal abscess. Infectious workup was ordered. Blood cultures were positive for MSSA bacteremia. Neurosurgery, General Surgery and IR were consulted to evaluate the patient's paraspinal abscess. Neurosurgery performed a L3-L4 laminectomy for epidural abscess evacuation on 9/19/24 and the cultures grew MSSA. TTE showed normal EF of 60-65% with diastolic dysfunction, could not exclude mitral vegetation vs redundant chordae. ID was consulted and recommended Oxacillin and repeat blood cultures. With her electrolyte derangement, and a background of appetitive loss in the past 2 months, there was concern for refeeding syndrome. On 9/21, IR took the patient for abscess drain placement and aspiration of SI joint. SI joint couldn't be aspirated but retroperitoneal abscess was drained of 100cc of purulent fluid. She was successfully extubated 9/22 and was stepped down to Hospital Medicine on 9/24/24. Her drain was removed on 09/27.  Repeat CT abdomen/pelvis showed an ill-defined subcutaneous edema and soft tissue inflammatory change in the surgical bed and  subcutaneous soft tissues without discrete organized fluid collection, stable size of 3.6 cm fluid collection along the left posterior pararenal space which appears to communicate with the with the aforementioned collection and fascial thickening.  IR placed drain for retroperitoneal fluid collection on 9/30.  Cultures returned with Staph aureus.  IR suggested to monitor drain output, and consider repeat imaging when output decreases to less than 10 cc in 24 hours to evaluate for possible drainage catheter removal. Repeat CT was ordered on 10/3 as she endorsed worsening pain on her left flank, and minimal drain output which could be removed.  It returned with multiple new and enlargening abscesses.  Discussed with ID, who said to continue Oxacillin.  NSGY, Gen Surg, Ortho and IR were consulted for further recommendations.  MRI complete spine and pelvis were ordered to help guide management.  Gen Surg took her to the OR for an I&D of a left hip abscess on 10/7, where 300cc of purulent material within the subcutaneous space along the left hip was drained and sent for culture.  ID reconsulted.  NeuroIR considering spinal abscess drainage.    10/10-  lumbar abscess aspiration done per IR 10/9. Pt on oxacillin and vanc. ID following. Need f/I on IR  aspiration. Lumbar abscess aspiration completed today.    SNF auth has been approved and the auth expires at midnight 10/11/24.   10/11- need final ID recs. New cultures form 10/10 still in progress. On vanc and oxacillin. oxacillin 2g q4h, duration 8 weeks, tentative end date 11/18.   Continue vancomycin, goal trough 10-15, pharmacy to dose, tentative end date 11/4/24. Pt has capacity. She suggest that she might leave AMA.   10/12- SNF unable to do both antibiotics. Pt here until completion or we find another place. Yesterday she was declining therapies.  VSS.  Lab tomorrow, q 72 hours. K low- will replete. Last mag 1.9.   10/15- pt is refusing therapies. Eating. Reports  getting up with her .  On vanc until 11/4 and oxacillin until 11/18.  Bmp, mag to f/u on hypokalemia. Repleting.   10/16- on isolated increase in BP. Reporting pain. Alb 1.4, K 3.8. She is being discharged from therapy for lack of cooperation.  Reporting abd pain, exam is unchanged. Need to monitor for new symptoms. She also was accepted to LTAC. She is med ready for LTAC.   10/17- having significant nausea, providing additional PRNs. Is now amenable to LTAC and tentatively planning for DC tomorrow  10/18- nausea much improved with SL ondansetron. Discussed with pharmacy, plan for vanco 750mg daily until at least Monday at which time another vanco trough can be checked at LTAC. Wound care orders updated. Patient ready to leave. Does not need follow up per general surgery regarding left hip, just dressing changed. Placed referral to ID for follow up in two weeks. Of note she remains on her baseline 70mg methadone daily in addition to PRN oxycodone and is doing well

## 2024-09-17 NOTE — ASSESSMENT & PLAN NOTE
ID/NS on board. On Vanc and Merem  Prelim Blood cx with staph   Unable to obtain repeat blood cultures given blood cultures shortage  Follow up on echocardiogram   Ordered MRI L-spine per in NS recommendations.

## 2024-09-17 NOTE — ASSESSMENT & PLAN NOTE
50y/o F with hx of being un-housed, T2DM, HTN and drug use transferred for spinal osteomyelitis, septic arthritis and Lt flank/ retroperitoneal abscess.    Imaging studies of her lumbar spine and pelvis were concerning for osteomyelitis/septic arthritis of the left SI joint and L5-S1 along with an abnormal fluid collection extending from T11 through the left iliacus muscle concerning for abscess as well as at psoas. She was started on empiric vanc and zosyn. Case was discussed with neurosurgery who did not feel surgical intervention was warranted and recommended IR drainage. IR recommended against drainage given extensive superficial infection. MRI today with  complex fluid collection. left SI joint to the left iliac fossa.     BCx growing GPC in clusters    Recommendations:  Continue vancomycin. Pharm to dose for goal trough 15-20.  Follow-up susceptibilities of GPC in blood   Would discontinue meropenem  Agree with clindamycin given visualized gas/ necrotizing infection  Awaiting transfer to [facility]  Appreciate input from numerous consultants

## 2024-09-17 NOTE — ASSESSMENT & PLAN NOTE
F/u on echo to r/o vegetations. Pt. States no longer using drugs but will place PRN medications for any withdrawal symptoms.

## 2024-09-17 NOTE — PROVIDER TRANSFER
(Physician in Lead of Transfers)  Outside Transfer Acceptance Note / Regional Referral Center      Upon patient arrival, please contact Critical Care Medicine on call.    Referring facility: [facility]   Referring provider: ELMO HEARN  Accepting facility: [facility]  Accepting provider: LUNA DANIELS  Admitting provider: JOSE MANUEL CARRILLO  Reason for transfer:  Higher level of care  Transfer diagnosis: staph bacteremia  Transfer specialty requested: Critical Care Medicine  Transfer specialty notified: Yes  Transfer level: NUMBER 1-5: 2  Bed type requested: ICU  Isolation status: No active isolations   Admission class or status: IP- Inpatient    Narrative     49-year-old female with a history of diabetes, hypertension, and substance use presented to the [facility] Emergency Department on September 16 with nausea and vomiting and a mechanical fall 5 days prior. She reports pain worse in her back and on her sides radiating down both legs. She noted muscle spasm in her back and legs. She had no head trauma or loss of consciousness. With the persistent vomiting she has been unable to keep oral intake down. She had no fever or chills and no cough or wheezing. She had no focal motor deficits noted, and she had no alteration in mentation. She reported no hematemesis and no melena or hematochezia. Labs were concerning for hypokalemia, hypomagnesemia, severe anemia, metabolic alkalosis, and hyperglycemia. Imaging studies of her lumbar spine and pelvis were concerning for osteomyelitis/septic arthritis of the left SI joint and L5-S1 along with an abnormal fluid collection extending from T11 through the left iliacus muscle concerning for abscess. In the emergency department she received IV fluid, Zofran, Zosyn, vancomycin, potassium, magnesium, pain medication, and nausea medication. Case discussed with Neurosurgery and Interventional Radiology. Plan would be for transfer to  Hospital Medicine at [facility] for IR and Neurosurgery evaluation. She will need continued treatment of SHELBIE, anemia, and hyperglycemia. ED repositioned the central line. Repeat hemoglobin was 5.2. Packed red blood cells were ordered for transfusion. Subsequent imaging with contrasted CT showed extensive soft tissue edema and subcutaneous gas throughout the left flank. Clindamycin was added to her regimen.  Patient was initially transferred to Hospital Medicine at [facility] in ICU status for Neurosurgery and Interventional Radiology along with General Surgery availability.  She was seen by Orthopedic Surgery at [facility], and they felt there was not an orthopedic intervention indicated at present.  She was evaluated by General Surgery at [facility], and they felt patient needed multispecialty care for treatment of apparent necrosis of the left flank.  General Surgery at [facility] spoke with General Surgery at [facility].  They will evaluate her once transferred.  Hospital Medicine at [facility] noted patient remains hemodynamically stable in the ICU.  She is awake and alert. She was noted to have multiple skin wounds on her extremities in various stages of healing. She had no obvious wounds/skin breakdown over her back/flank. She has persistent significant pain over her abdomen and hips.  She is still having frequent neurologic checks.  She is currently on meropenem, doxycycline, and vancomycin.  She continues with electrolyte replacement.  Blood cultures from September 16 are growing Gram-positive cocci (rapid ID positive for staph aureus but negative for MRSA).    September 17:  Sodium 136, potassium 2.7, chloride 100, CO2 28, BUN 5, creatinine 0.7, glucose 420, calcium 6.3, albumin 1.3, total bilirubin 1.6, AST 11, ALT 19, calcium 6.3, magnesium 1.7, phosphorus 2.2, procalcitonin 0.48, white blood cells 14.4, hemoglobin 7.1, hematocrit 21.7, platelets 115, lactic  acid 1.7 INR 1, .6     September 16: Sodium 134, potassium 2.5, chloride 91, CO2 27, BUN 7, creatinine 0.8, glucose 544, albumin 1.5, bilirubin 1.4, AST 24, ALT 25, magnesium 1.2, white blood cells 9.81, hemoglobin 5.7, hematocrit 18.4, platelets 132, beta hydroxybutyrate 0.3, HCV antibody positive, HIV negative  -urinalysis with 4+ glucose, trace ketone, 2+ blood, positive nitrite, 7 RBC, 6 WBC, moderate bacteria, rare yeast, 15 squamous epithelial cells  -pH 7.666, pCO2 31.5, pO2 97 (noted to be venous)  -repeat glucose 475  -blood cultures are growing Gram-positive cocci in clusters with rapid ID positive for staph aureus PCR/negative for methicillin resistance by PCR  -Repeat CBC with white blood cells 10.84, hemoglobin 5.2, hematocrit 17.7, platelets 131  -CT chest, abdomen, and pelvis with IV contrast showed extensive soft tissue edema with subcutaneous gas throughout the left flank concerning for soft tissue infection. No organized fluid collection in the soft tissues. Small psoas abscesses and small fluid collections in the left flank likely indicating abscess formation in this region. Evidence of septic arthritis in the left sacroiliac joint, L5-S1 disc space, and pubic symphysis with extension of the soft tissue infection into the left iliacus and left adductor musculature. While the left retroperitoneal fluid collections do appear organized, percutaneous drainage is not advised given the extensive soft tissue infection superficial to these collections. In addition, there is extensive evidence of soft tissue infection that does not appear organized or percutaneously drainable.    -CT lumbar spine and pelvis had findings most concerning for infectious process. There were findings concerning for osteomyelitis and septic arthritis in the left SI joint. Findings concerning for osteomyelitis/diskitis L5-S1. Possible osteomyelitis and septic joint at the pubic symphysis. Abnormal fluid collection on the  left extending from T11 through the left iliacus muscle along and within the left iliopsoas muscle most concerning for abscess. Multiple additional small abscesses suspected in the pelvis. Multifocal collections of air in the fluid in the subcutaneous tissues of the left flank, incompletely imaged.  -Chest x-ray noted a loop in the central venous catheter. Tip currently at the level of the brachiocephalic vein. Lungs are fairly clear.  -Repeat chest x-ray after repositioning central line noted catheter tip in the right atrium. Loop has been reduced.    Objective     Vitals: Temp: 98.5 °F (36.9 °C) (09/17/24 0701)  Pulse: 93 (09/17/24 0900)  Resp: 18 (09/17/24 0900)  BP: (!) 156/98 (09/17/24 0900)  SpO2: 99 % (09/17/24 0900)  Recent Labs: ABGs:   Recent Labs   Lab 09/16/24 0954   PH 7.666*   PCO2 31.5*   HCO3 36.0*   POCSATURATED 99   BE 15*   PO2 97*     CBC:   Recent Labs   Lab 09/16/24 2105 09/17/24  0017 09/17/24  0800   WBC 11.88 14.09* 14.40*   HGB 5.7* 7.2* 7.1*   HCT 18.2* 21.7* 21.7*   * 116* 115*     CMP:   Recent Labs   Lab 09/16/24 0934 09/16/24 2105 09/17/24  0017 09/17/24  0800   * 137 139 136   K 2.5* 2.2* 2.2* 2.7*   CL 91* 98 100 100   CO2 27 29 27 28   * 397* 401* 420*   BUN 7 5* 5* 5*   CREATININE 0.8 0.7 0.7 0.7   CALCIUM 7.7* 6.6* 6.8* 6.3*   PROT 6.5  --   --  5.2*   ALBUMIN 1.5*  --   --  1.3*   BILITOT 1.4*  --   --  1.6*   ALKPHOS 184*  --   --  135   AST 24  --   --  11   ALT 25  --   --  19   ANIONGAP 16 10 12 8     Lactic Acid:   Recent Labs   Lab 09/16/24 2042 09/17/24  0017 09/17/24  0800   LACTATE 1.3 1.4 1.7         Instructions    Admit to Critical Care Medicine  Consult General Surgery      RICHARD Graves MD  Hospital Medicine Staff  Cell: [phone]

## 2024-09-17 NOTE — ASSESSMENT & PLAN NOTE
F/u on echo to r/o vegetations. Pt. States no longer using drugs. HETAL prn   UDS pending  On methadone at home, hold for now on opiates for pain control

## 2024-09-17 NOTE — PROGRESS NOTES
[facility] Intensive Care  [facility] Medicine  Progress Note    Patient Name: Mari Sloan  MRN: [id number]  Patient Class: IP- Inpatient   Admission Date: 9/16/2024  Length of Stay: 1 days  Attending Physician: Hina Thompson,*  Primary Care Provider: Liliana, Primary Doctor        Subjective:     Principal Problem:Paraspinal abscess        HPI:  49 y.o.  woman with h/o homelessness (recently was able to obtain living arrangements but states she was living [place] the bridge), NIDDM type 2, Essential hypertension, and substance use (denies any IVDU in he past or any illicit drug use recenly, denies ETOH abuse/overuse) presents to [facility] for further evaluation of her back pain.  She apparently presented to the [facility] Emergency Department on September 16 with nausea and vomiting and a mechanical fall 5 days prior. She reported pain worse in her back and on her sides radiating down both legs. She noted muscle spasm in her back and legs. She had no head trauma or loss of consciousness.  W/u in the [facility] ED noted significant hypokalemia, hypomagnesemia, severe anemia, metabolic alkalosis, and hyperglycemia. Vitals signs stable with no sepsis at this time noted. SBP>90, HR normal,afebrile.     Imaging studies of her lumbar spine and pelvis were concerning for osteomyelitis/septic arthritis of the left SI joint and L5-S1 along with an abnormal fluid collection extending from T11 through the left iliacus muscle concerning for abscess. Blood cultures sent.  In the emergency department she is receiving IV fluid, Zofran, Zosyn, vancomycin, potassium, magnesium, pain medication, and nausea medication.   She also received 2 units of PRBC for an H/H of 5.7/18.4,     Case discussed with Neurosurgery and Interventional Radiology. Plan would be for transfer to Hospital Medicine at [facility] for IR evaluation of the fluid collection and potential sampling of the joint osteomyelitis.  I  "reviewed the discussions made with the multiple sub specialists regarding transfer of this patient to [facility]: IR/General surgery/Neurosurgery/ER/Internal Med.     Neurosurgery contacted by the  did not feel surgical intervention was needed at this time but would follow along and requested [facility] for further management and IR backup. IR on call apparently read the CT and at the time felt "while the left retroperitoneal fluid collections do appear organized, percutaneous drainage is not advised given the extensive soft tissue infection superficial to these collections.  In addition, there is extensive evidence of soft tissue infection that does not appear organized or percutaneously drainable."     General surgery was consulted to review the case and felt that there was no immediate surgical intervention at this time to be had. I spoke with the on surgeon contacted regarding the location of the fluid collections and inquired if perhaps orthopedic surgery should be consulted to review given the involvement of bony pelvis.      I spoke orthopedic surgery who will see the patient but recommended re-consulting IR here and Neurosurgery given the diskitis/OM L5-S1. (Please see his consult note in the chart)     Repeat labs here again noted for persistently low mag, K, phos.  H/H stable with improved H/H. Pain control improved with Dilaudid.  I consulted ID for further assistance with ABX adjustments and changed her to Meropenem and doxy as well as continued Vanc. Echo ordred for am.     CT lumbar spine and pelvis had findings most concerning for infectious process. There were findings concerning for osteomyelitis and septic arthritis in the left SI joint. Findings concerning for osteomyelitis/diskitis L5-S1. Possible osteomyelitis and septic joint at the pubic symphysis. Abnormal fluid collection on the left extending from T11 through the left iliacus muscle along and within the left iliopsoas " muscle most concerning for abscess. Multiple additional small abscesses suspected in the pelvis. Multifocal collections of air in the fluid in the subcutaneous tissues of the left flank, incompletely imaged.    Chest x-ray noted a loop in the central venous catheter. Tip currently at the level of the brachiocephalic vein. Lungs are fairly clear.        Overview/Hospital Course:  49 y.o. female, with history of homelessness (recently was able to obtain living arrangements but states she was living [place] bridge), NIDDM type 2, Essential hypertension, and substance use (denies IVDU) who was transferred from [facility] ED to [facility] ICU on 09/16/2024.  She presented to [facility] ED  for back pain, nausea/vomiting.  CT L-spine revealed osteomyelitis/diskitis L5-S1 along with abnormal fluid collection on left extending from T11 through left iliacus muscle and left iliopsoas muscle concerning for abscess.  Multifocal collections of air in fluid in subcutaneous tissues of left flank.  Patient was initiated on empiric vancomycin and meropenem and doxycycline. Switched to Vanc and clindamycin per ID recs.  Blood cultures with staph aureus.  Obtain echo.  Unable to repeat blood cultures given shortage of cx.  Neurosurgery recommended to obtain MRI L-spine, pending.  General surgery evaluated the patient and recommended urgent transfer to [facility] for surgical evaluation/source control given extent of necrosis.  Transfer process initiated.  Pending bed assignment at [facility] ICU    Interval History: Patient reports pain in back and BLE    Review of Systems   Constitutional: Negative.    Respiratory: Negative.     Cardiovascular: Negative.    Gastrointestinal: Negative.    Genitourinary: Negative.    Musculoskeletal:  Positive for arthralgias, back pain and myalgias.   Skin:  Positive for wound.   Neurological: Negative.      Objective:     Vital Signs (Most Recent):  Temp: 98.6 °F (37 °C) (09/17/24  1101)  Pulse: 91 (09/17/24 1101)  Resp: 20 (09/17/24 1204)  BP: (!) 150/99 (09/17/24 1101)  SpO2: 99 % (09/17/24 1101) Vital Signs (24h Range):  Temp:  [98.5 °F (36.9 °C)-99.6 °F (37.6 °C)] 98.6 °F (37 °C)  Pulse:  [] 91  Resp:  [16-29] 20  SpO2:  [91 %-100 %] 99 %  BP: (140-171)/() 150/99     Weight: 65.2 kg (143 lb 11.8 oz)  Body mass index is 26.29 kg/m².    Intake/Output Summary (Last 24 hours) at 9/17/2024 1228  Last data filed at 9/17/2024 1101  Gross per 24 hour   Intake 6854.83 ml   Output 500 ml   Net 6354.83 ml         Physical Exam  Constitutional:       General: She is not in acute distress.     Appearance: She is normal weight. R IJ CVC in place  Cardiovascular:      Rate and Rhythm: Tachycardia present.      Pulses: Normal pulses.   Pulmonary:      Effort: No respiratory distress.      Breath sounds: Normal breath sounds. No wheezing.   Abdominal:      General: Bowel sounds are normal. There is no distension.      Palpations: Abdomen is soft.      Tenderness: There is abdominal tenderness.   Musculoskeletal:         General: Tenderness (L groin) present.      Right lower leg: No edema.      Left lower leg: No edema (Palpable BLE DP and PT pulses).   Skin:     General: Skin is warm.      Findings: Lesion (multiple scattered areas of 0.5cm wounds on BLE with scab no drainage) present.   Neurological:      Mental Status: She is alert and oriented to person, place, and time. Mental status is at baseline.   Psychiatric:         Mood and Affect: Mood normal.             Significant Labs: All pertinent labs within the past 24 hours have been reviewed.    Significant Imaging: I have reviewed all pertinent imaging results/findings within the past 24 hours.      Assessment/Plan:      * Paraspinal abscess    Noted to have extensive fluid collection on left extending from T11 through left iliacus muscle and within the left iliopsoas muscle.  Multifocal collections of air including subcutaneous tissues  "on the left flank noted.  On empiric vancomycin/clindamycin.  ID/neurosurgery consulted.  Pending MRI L-spine.  Pending transfer to [facility] for surgical evaluation  Denied IVDU  UDS pending       Psoas muscle abscess/soft tissue necrosis  left flank extensive soft tissue necrosis noted.  General surgery consulted  Recommended urgent transfer to [facility] for further management.  Transfer process initiated.  Pending bed assignment at [facility]      Other osteomyelitis, multiple sites  ID/NS on board. On Vanc and Merem  Prelim Blood cx with staph   Unable to obtain repeat blood cultures given blood cultures shortage  Follow up on echocardiogram   Ordered MRI L-spine per in NS recommendations.      Thrombocytopenia  The likely etiology of thrombocytopenia is sepsis. The patients 3 most recent labs are listed below.  Recent Labs     09/16/24  2105 09/17/24  0017 09/17/24  0800   * 116* 115*     Plan  - Will transfuse if platelet count is <50k (if undergoing surgical procedure or have active bleeding).        Severe sepsis  This patient does have evidence of infective focus  My overall impression is sepsis.  Source: Skin and Soft Tissue (location paraspinal)  Antibiotics given-   Antibiotics (72h ago, onward)      Start     Stop Route Frequency Ordered    09/17/24 1045  mupirocin 2 % ointment         09/22/24 0859 Nasl 2 times daily 09/17/24 0931    09/17/24 0100  vancomycin (VANCOCIN) 1,000 mg in D5W 250 mL IVPB (admixture device)         -- IV Every 12 hours (non-standard times) 09/17/24 0010    09/17/24 0000  meropenem (MERREM) 2 g in 0.9% NaCl 100 mL IVPB         -- IV Every 8 hours (non-standard times) 09/16/24 2348    09/17/24 0000  doxycycline 100 mg in D5W 100 mL IVPB (MB+)         -- IV Every 12 hours (non-standard times) 09/16/24 2348    09/16/24 2347  vancomycin - pharmacy to dose  (vancomycin IVPB (PEDS and ADULTS))        Placed in "And" Linked Group    -- IV pharmacy to " manage frequency 09/16/24 2348          Latest lactate reviewed-  Recent Labs   Lab 09/16/24  1912 09/16/24  2042 09/17/24  0800   LACTATE  --    < > 1.7   POCLAC 1.38  --   --     < > = values in this interval not displayed.     Organ dysfunction indicated by Thrombocytopenia     Fluid challenge Not needed - patient is not hypotensive      Post- resuscitation assessment No - Post resuscitation assessment not needed       Will Not start Pressors- Levophed for MAP of 65  Source control achieved by: Abx    Hepatitis C    Hepatitis C antibody positive.  Obtain HCV RNA    Anemia, unspecified  S/p 2u PRBC transfusion on admit . NO signs of acute GI bleed on exam at this time. Pt h/o nausea and will start PPI daily. Denies any hematemesis or hemoptysis.   Obtain iron B12/folate/peripheral smear and LDH/hapto/retic  No evidence of active bleed Stool occult blood pending  No hematemesis/hematochezia./melena    Uncontrolled type 2 diabetes mellitus with hyperglycemia  Patient's FSGs are uncontrolled due to hyperglycemia on current medication regimen.  Last A1c reviewed-   Lab Results   Component Value Date    HGBA1C 7.9 (H) 09/17/2024     Most recent fingerstick glucose reviewed-   Recent Labs   Lab 09/16/24  1445 09/17/24  0100 09/17/24  0600 09/17/24  1122   POCTGLUCOSE 475* >500* 483* 360*       Current correctional scale  Medium  Maintain anti-hyperglycemic dose as follows-   Antihyperglycemics (From admission, onward)      Start     Stop Route Frequency Ordered    09/17/24 2100  insulin glargine U-100 (Lantus) pen 15 Units         -- SubQ 2 times daily 09/17/24 0930    09/17/24 1330  insulin regular injection 5 Units 0.05 mL         -- IV Once 09/17/24 1222    09/17/24 0209  insulin aspart U-100 pen 0-10 Units         -- SubQ Every 6 hours PRN 09/17/24 0109          Hold Oral hypoglycemics while patient is in the hospital.  On basal bolus regimen.    Smoker  PRN nicotine patch    History of drug use  F/u on echo to r/o  vegetations. Pt. States no longer using drugs. CIWA prn   UDS pending  On methadone at home, hold for now on opiates for pain control    Hypokalemia  Monitor on tele. Replace mg, phos, and K. F/u on repeat labs.               VTE Risk Mitigation (From admission, onward)           Ordered     IP VTE LOW RISK PATIENT  Once         09/16/24 2347     Place sequential compression device  Until discontinued         09/16/24 2347     Place CEFERINO hose  Until discontinued         09/16/24 2347                    Discharge Planning   LUH: 9/19/2024     Code Status: Full Code   Is the patient medically ready for discharge?:     Reason for patient still in hospital (select all that apply): Patient trending condition and Treatment               Critical care time spent on the evaluation and treatment of severe organ dysfunction, review of pertinent labs and imaging studies, discussions with consulting providers and discussions with patient/family: 50 minutes.      Hina Thompson MD  Department of Hospital Medicine   [facility] - Intensive Care

## 2024-09-17 NOTE — CONSULTS
[facility] - Intensive Care  General Surgery  Consult Note    Inpatient consult to General Surgery  Consult performed by: Presley Jaeger MD  Consult ordered by: Hina Thompson MD        Subjective:     Reason for consult: flank necrosis and infection    History of Present Illness: 49 yr old homeless woman w DM2 HTN who came in with back pain to [facility], was transferred to [facility] for NSGY and IR availability.  She apparently had a fall and was down in the recent days, now has concern for multiple issues (Imaging studies of her lumbar spine and pelvis were concerning for osteomyelitis/septic arthritis of the left SI joint and L5-S1 along with an abnormal fluid collection extending from T11 through the left iliacus muscle concerning for abscess. ) and on my assessment, massive amount of necrosis of muscle on left flank all the way to the retroperitoneum.  On my assessment she has severe hip pain, bilaterally, very painful to move. And also has left flank pain and back pain.  D/w IR intially who was consulted and suggested drainage/aspiration would be insufficient for her overall care and felt that she would need further debridement.    On review, overall she is critically ill and I anticipate will need multiple extensive operations from a multitude of medical and surgical services (ortho/nsgy/IR/surgery) and would be better served at a higher level of care.    She denies fever.      No current facility-administered medications on file prior to encounter.     Current Outpatient Medications on File Prior to Encounter   Medication Sig    CLONIDINE HCL ORAL Take by mouth.    gabapentin (NEURONTIN) 300 MG capsule Take 300 mg by mouth 3 (three) times daily.    GLIPIZIDE ORAL Take by mouth.    hydrOXYzine pamoate (VISTARIL) 25 MG Cap Take 25 mg by mouth 3 (three) times daily.    metFORMIN (GLUCOPHAGE) 500 MG tablet Take 500 mg by mouth 2 (two) times daily with meals.    famotidine (PEPCID) 20 MG tablet Take 1 tablet  (20 mg total) by mouth 2 (two) times daily as needed for Heartburn.    ibuprofen (ADVIL,MOTRIN) 600 MG tablet Take 1 tablet (600 mg total) by mouth every 6 (six) hours as needed for Pain.    methadone (DOLOPHINE) 10 MG tablet Take 70 mg by mouth every 6 (six) hours as needed for Pain.       Review of patient's allergies indicates:  No Known Allergies    Past Medical History:   Diagnosis Date    Diabetes mellitus     Hypertension     Unspecified viral hepatitis C without hepatic coma      Past Surgical History:   Procedure Laterality Date     SECTION       Family History    None       Tobacco Use    Smoking status: Every Day     Current packs/day: 0.50     Types: Cigarettes    Smokeless tobacco: Never   Substance and Sexual Activity    Alcohol use: Not Currently    Drug use: Not Currently    Sexual activity: Not on file     Review of Systems   Constitutional:  Positive for fatigue.   HENT: Negative.     Eyes: Negative.    Respiratory:  Negative for cough, chest tightness and shortness of breath.    Cardiovascular: Negative.    Gastrointestinal:  Positive for abdominal pain, nausea and vomiting.   Endocrine: Negative for cold intolerance and heat intolerance.   Genitourinary: Negative.    Musculoskeletal:  Positive for arthralgias, back pain and myalgias.   Skin: Negative.    Neurological:  Negative for dizziness, syncope and light-headedness.   Psychiatric/Behavioral:  Negative for agitation, confusion and hallucinations.      Objective:     Vital Signs (Most Recent):  Temp: 98.5 °F (36.9 °C) (24 0701)  Pulse: 93 (24 0900)  Resp: 18 (24 0900)  BP: (!) 156/98 (24 0900)  SpO2: 99 % (24 0900) Vital Signs (24h Range):  Temp:  [98.5 °F (36.9 °C)-99.6 °F (37.6 °C)] 98.5 °F (36.9 °C)  Pulse:  [] 93  Resp:  [16-29] 18  SpO2:  [91 %-100 %] 99 %  BP: (140-171)/() 156/98     Weight: 65.2 kg (143 lb 11.8 oz)  Body mass index is 26.29 kg/m².      Intake/Output Summary (Last 24  hours) at 9/17/2024 1010  Last data filed at 9/17/2024 0900  Gross per 24 hour   Intake 6653.1 ml   Output 500 ml   Net 6153.1 ml       Physical Exam  Constitutional:       Appearance: She is well-developed. She is ill-appearing. She is not diaphoretic.   HENT:      Head: Normocephalic and atraumatic.   Eyes:      Conjunctiva/sclera: Conjunctivae normal.      Pupils: Pupils are equal, round, and reactive to light.   Cardiovascular:      Rate and Rhythm: Normal rate and regular rhythm.      Pulses: Normal pulses.      Heart sounds: Normal heart sounds.   Pulmonary:      Effort: Pulmonary effort is normal.      Breath sounds: Normal breath sounds.   Abdominal:      General: Bowel sounds are normal.      Palpations: Abdomen is soft.      Tenderness: There is abdominal tenderness.   Musculoskeletal:      Cervical back: Normal range of motion and neck supple.      Comments: Painful hip flexion/rotation     Skin:     General: Skin is warm and dry.      Findings: No rash.   Neurological:      Mental Status: She is alert and oriented to person, place, and time.      Cranial Nerves: No cranial nerve deficit.   Psychiatric:         Behavior: Behavior normal.         Significant Labs:  CBC:   Recent Labs   Lab 09/17/24  0800   WBC 14.40*   RBC 2.80*   HGB 7.1*   HCT 21.7*   *   MCV 78*   MCH 25.4*   MCHC 32.7     CMP:   Recent Labs   Lab 09/17/24  0800   *   CALCIUM 6.3*   ALBUMIN 1.3*   PROT 5.2*      K 2.7*   CO2 28      BUN 5*   CREATININE 0.7   ALKPHOS 135   ALT 19   AST 11   BILITOT 1.6*     Lactic Acid:   Recent Labs   Lab 09/17/24  0800   LACTATE 1.7       Significant Diagnostics:  CT: I have reviewed all pertinent results/findings within the past 24 hours. impression  Addenda     Mentioned in the body of the report, but not specifically stated in the impression, there are bilateral pulmonary airspace opacities which is concerning for multifocal pneumonia.        Electronically signed  by:Hamzah Acosta MD  Date:                                            09/17/2024  Time:                                           09:01  Signed by Hamzah Acosta MD on 9/17/2024 09:03  Narrative & Impression  EXAMINATION:  CT CHEST ABDOMEN PELVIS WITH IV CONTRAST (XPD)     CLINICAL HISTORY:  Sepsis;     TECHNIQUE:  Low dose axial images, sagittal and coronal reformations were obtained from the thoracic inlet to the pubic symphysis following the IV administration of 75 mL of Omnipaque 350 .  No oral contrast was administered.     COMPARISON:  None     FINDINGS:  Limited evaluation of structures at the base of the neck show no concerning masses or lymph nodes.     The heart is normal in size.  No pericardial effusion.  No mediastinal or hilar adenopathy.     Lungs show bilateral airspace consolidation predominantly in the lower lobes, but present throughout all lobes of the lung.  No solid mass.  No effusion or pneumothorax.  The airways are patent without endoluminal lesion.     The liver is slightly enlarged.  Spleen is enlarged as well.  Small ill-defined hypodensity in the posterior aspect of the right hepatic lobe measuring 1.1 cm.  This is of uncertain significance.  No biliary ductal dilatation.  Gallbladder is without significant wall thickening.     Pancreas and adrenal glands show no focal abnormality.     Bilateral kidneys are normal in size and position.  No hydronephrosis or nephrolithiasis.  Urinary bladder is well distended.  No focal wall thickening.  The uterus is grossly unremarkable.     Gastrointestinal tract shows no wall thickening or obstruction.  There is hyperdense material in the colon which may be due to a prior exam.  No free gas in the abdomen.  No pathologic lymphadenopathy.     Within the soft tissues of the left flank, there is large region of ill-defined edema with scattered foci of gas there is fluid collection in the anterior aspect of the left psoas measuring roughly 3.1 x  2.1 cm in axial diameter.  Additional fluid posterolateral to the left psoas tracks from the distal thoracic spine to the pelvis.  A small amount of fluid within the left iliacus musculature as well.  Destruction of portions of the left iliac bone an erosion of the left sacroiliac joint is again noted.  Edema and sclerosis at L5-S1.  Similar appearance of the pubic symphysis as well with scattered ill-defined fluid and soft tissue and gas in the adductus musculature in the left pelvis.     Impression:     Extensive soft tissue edema with subcutaneous gas throughout the left flank which is concerning for soft tissue infection in this region.  There is no organized fluid collection in the soft tissues.  There are small psoas abscesses and small fluid collections in the left flank likely indicating abscess formations in this region.  In addition, there is evidence of septic arthritis of the left sacroiliac joint, L5-S1 disc space, and pubic symphysis with extension of the soft tissue infection into the left iliacus and left adductors musculature.     While the left retroperitoneal fluid collections do appear organized, percutaneous drainage is not advised given the extensive soft tissue infection superficial to these collections.  In addition, there is extensive evidence of soft tissue infection that does not appear organized or percutaneously drainable.      Assessment/Plan:     Active Diagnoses:    Diagnosis Date Noted POA    Other osteomyelitis, multiple sites [M86.8X0] 09/17/2024 Yes    Psoas muscle abscess [K68.12] 09/17/2024 Yes    Hypokalemia [E87.6] 09/17/2024 Yes    History of drug use [F19.91] 09/17/2024 Yes    Smoker [F17.200] 09/17/2024 Yes    Uncontrolled type 2 diabetes mellitus with hyperglycemia [E11.65] 09/17/2024 Yes    Anemia, unspecified [D64.9] 09/17/2024 Yes      Problems Resolved During this Admission:     49 yr old w multiple issues, psoas abscess, osteo?, retroperitoneal abscess, left flank  extensive soft tissue necrosis related to fall/being found down.  With severe lab abnormalities and distortions including anemia, hypokalemia    Recommend urgent transfer to academic center have discussed w Dr Lee at [facility] and [facility] is facilitating transfer, will likely need multiple team approach and surgery for infection control    Thank you for your consult.     Presley Jaeger MD  General Surgery  [facility] - Intensive Care

## 2024-09-17 NOTE — PROGRESS NOTES
Neurosurgery consult.    Subjective:  She reports back pain that became severe about six days ago. She strongly requests that I not move her during the exam due to excruciating pain in her back. The pain does travel into her groin.    Objective:  Patient evaluated bedside this afternoon. She has full strength in her upper and lower extremities. No Hoffmans or clonus. Sensation intact to touch throughout upper and lower extremities.    Plan:  MRI lumbar spine w/wo contrast reviewed today. Though radiology makes no mention of epidural abscess, there is an asymmetric collection in the upper lumbar spine, as well as nerve clumping in the lower lumbar spine, Both are suspicious for epidural abscess in the setting of known diffuse staph infection.   -I will ask our neuroradiology team to review the MRI and make an addendum if indicated.     At the present time, patient is intact on exam and would be a high risk surgical candidate due to uncontrolled diabetes. Infection throughout her abdomen appears to be the more pressing issue.     Neurosurgery will continue to follow and monitor the epidural abscess, if confirmed by radiology.     Case discussed with Dr. Miles

## 2024-09-17 NOTE — ASSESSMENT & PLAN NOTE
S/p 2u PRBC transfusion on admit . NO signs of acute GI bleed on exam at this time. Pt h/o nausea and will start PPI daily. Denies any hematemesis or hemoptysis.   Obtain iron B12/folate/peripheral smear and LDH/hapto/retic  No evidence of active bleed Stool occult blood pending  No hematemesis/hematochezia./melena

## 2024-09-17 NOTE — ASSESSMENT & PLAN NOTE
"This patient does have evidence of infective focus  My overall impression is sepsis.  Source: Skin and Soft Tissue (location paraspinal)  Antibiotics given-   Antibiotics (72h ago, onward)      Start     Stop Route Frequency Ordered    09/17/24 1045  mupirocin 2 % ointment         09/22/24 0859 Nasl 2 times daily 09/17/24 0931    09/17/24 0100  vancomycin (VANCOCIN) 1,000 mg in D5W 250 mL IVPB (admixture device)         -- IV Every 12 hours (non-standard times) 09/17/24 0010    09/17/24 0000  meropenem (MERREM) 2 g in 0.9% NaCl 100 mL IVPB         -- IV Every 8 hours (non-standard times) 09/16/24 2348    09/17/24 0000  doxycycline 100 mg in D5W 100 mL IVPB (MB+)         -- IV Every 12 hours (non-standard times) 09/16/24 2348    09/16/24 2347  vancomycin - pharmacy to dose  (vancomycin IVPB (PEDS and ADULTS))        Placed in "And" Linked Group    -- IV pharmacy to manage frequency 09/16/24 2348          Latest lactate reviewed-  Recent Labs   Lab 09/16/24  1912 09/16/24  2042 09/17/24  0800   LACTATE  --    < > 1.7   POCLAC 1.38  --   --     < > = values in this interval not displayed.     Organ dysfunction indicated by Thrombocytopenia     Fluid challenge Not needed - patient is not hypotensive      Post- resuscitation assessment No - Post resuscitation assessment not needed       Will Not start Pressors- Levophed for MAP of 65  Source control achieved by: Abx  "

## 2024-09-17 NOTE — ASSESSMENT & PLAN NOTE
Pt. Got 2 units PRBC at [facility]. Unable to check Iron studies at this time. F/u on repeat labs. Type and screen done. NO signs of acute GI bleed on exam at this time. Pt h/o nausea and will start PPI daily. Denies any hematemesis or hemoptysis.

## 2024-09-17 NOTE — H&P
"[facility]  [facility]  History & Physical    Patient Name: Mari Sloan  MRN: [id number]  Patient Class: IP- Inpatient  Admission Date: 9/16/2024  Attending Physician: Dr. Elaine Reese   Primary Care Provider: Liliana, Primary Doctor         Patient information was obtained from patient, past medical records, ER records, and [facility]/ program  .     Subjective:     Principal Problem: N/V and bilateral Hip Pain.     Chief Complaint:   Chief Complaint   Patient presents with    Emesis     Nausea and vomiting x5 days. CBG "HIGH" per EMS    Fall     Fall 1.5 wk ago, c/o BL hip pain. Pt also reporting she has not bene able to take her medications since falling besides her methadone.        HPI: 49 y.o.  woman with h/o homelessness (recently was able to obtain living arrangements but states [place]), NIDDM type 2, Essential hypertension, and substance use (denies any IVDU in he past or any illicit drug use recenly, denies ETOH abuse/overuse) presents to [facility] for further evaluation of her back pain.  She apparently presented to the [facility] Department on September 16 with nausea and vomiting and a mechanical fall 5 days prior. She reported pain worse in her back and on her sides radiating down both legs. She noted muscle spasm in her back and legs. She had no head trauma or loss of consciousness.  W/u in the [facility] ED noted significant hypokalemia, hypomagnesemia, severe anemia, metabolic alkalosis, and hyperglycemia. Vitals signs stable with no sepsis at this time noted. SBP>90, HR normal,afebrile.     Imaging studies of her lumbar spine and pelvis were concerning for osteomyelitis/septic arthritis of the left SI joint and L5-S1 along with an abnormal fluid collection extending from T11 through the left iliacus muscle concerning for abscess. Blood cultures sent.  In the emergency department she is receiving IV " "fluid, Zofran, Zosyn, vancomycin, potassium, magnesium, pain medication, and nausea medication.   She also received 2 units of PRBC for an H/H of 5.7/18.4,     Case discussed with Neurosurgery and Interventional Radiology. Plan would be for transfer to Hospital Medicine at [facility] for IR evaluation of the fluid collection and potential sampling of the joint osteomyelitis.  I reviewed the discussions made with the multiple sub specialists regarding transfer of this patient to [facility]: IR/General surgery/Neurosurgery/ER/Internal Med.     Neurosurgery contacted by the  did not feel surgical intervention was needed at this time but would follow along and requested [facility] for further management and IR backup. IR on call apparently read the CT and at the time felt "while the left retroperitoneal fluid collections do appear organized, percutaneous drainage is not advised given the extensive soft tissue infection superficial to these collections.  In addition, there is extensive evidence of soft tissue infection that does not appear organized or percutaneously drainable."     General surgery was consulted to review the case and felt that there was no immediate surgical intervention at this time to be had. I spoke with the on surgeon contacted regarding the location of the fluid collections and inquired if perhaps orthopedic surgery should be consulted to review given the involvement of bony pelvis.      I spoke orthopedic surgery who will see the patient but recommended re-consulting IR here and Neurosurgery given the diskitis/OM L5-S1. (Please see his consult note in the chart)     Repeat labs here again noted for persistently low mag, K, phos.  H/H stable with improved H/H. Pain control improved with Dilaudid.  I consulted ID for further assistance with ABX adjustments and changed her to Meropenem and doxy as well as continued Vanc. Echo ordred for am.     CT lumbar spine and pelvis had " findings most concerning for infectious process. There were findings concerning for osteomyelitis and septic arthritis in the left SI joint. Findings concerning for osteomyelitis/diskitis L5-S1. Possible osteomyelitis and septic joint at the pubic symphysis. Abnormal fluid collection on the left extending from T11 through the left iliacus muscle along and within the left iliopsoas muscle most concerning for abscess. Multiple additional small abscesses suspected in the pelvis. Multifocal collections of air in the fluid in the subcutaneous tissues of the left flank, incompletely imaged.    Chest x-ray noted a loop in the central venous catheter. Tip currently at the level of the brachiocephalic vein. Lungs are fairly clear.        Past Medical History:   Diagnosis Date    Diabetes mellitus     Hypertension     Unspecified viral hepatitis C without hepatic coma        Past Surgical History:   Procedure Laterality Date     SECTION         Review of patient's allergies indicates:  No Known Allergies    No current facility-administered medications on file prior to encounter.     Current Outpatient Medications on File Prior to Encounter   Medication Sig    CLONIDINE HCL ORAL Take by mouth.    gabapentin (NEURONTIN) 300 MG capsule Take 300 mg by mouth 3 (three) times daily.    GLIPIZIDE ORAL Take by mouth.    hydrOXYzine pamoate (VISTARIL) 25 MG Cap Take 25 mg by mouth 3 (three) times daily.    metFORMIN (GLUCOPHAGE) 500 MG tablet Take 500 mg by mouth 2 (two) times daily with meals.    famotidine (PEPCID) 20 MG tablet Take 1 tablet (20 mg total) by mouth 2 (two) times daily as needed for Heartburn.    ibuprofen (ADVIL,MOTRIN) 600 MG tablet Take 1 tablet (600 mg total) by mouth every 6 (six) hours as needed for Pain.    methadone (DOLOPHINE) 10 MG tablet Take 70 mg by mouth every 6 (six) hours as needed for Pain.     Family History    None       Tobacco Use    Smoking status: Every Day     Current packs/day: 0.50      Types: Cigarettes    Smokeless tobacco: Never   Substance and Sexual Activity    Alcohol use: Not Currently    Drug use: Not Currently    Sexual activity: Not on file     Review of Systems   Constitutional:  Positive for activity change (due to back pain/hip pain). Negative for appetite change, chills, diaphoresis, fatigue and fever.   HENT:  Negative for congestion, rhinorrhea and sore throat.    Eyes:  Negative for visual disturbance.   Respiratory:  Negative for cough, chest tightness, shortness of breath, wheezing and stridor.    Cardiovascular:  Negative for chest pain, palpitations and leg swelling.   Gastrointestinal:  Negative for blood in stool, constipation, diarrhea, nausea and vomiting.   Endocrine: Negative for polyuria.   Genitourinary:  Negative for dysuria.   Musculoskeletal:  Positive for arthralgias, back pain, gait problem and myalgias. Negative for joint swelling.   Skin:  Positive for wound.        Multiple open sores in different stages of healing on skin. (Pt. Denies skin popping or IVDU)    Neurological:  Negative for dizziness, tremors, seizures, syncope, light-headedness and headaches.   Psychiatric/Behavioral:  The patient is nervous/anxious.      Objective:     Vital Signs (Most Recent):  Temp: 98.5 °F (36.9 °C) (09/16/24 2345)  Pulse: 97 (09/17/24 0245)  Resp: (!) 23 (09/17/24 0245)  BP: (!) 156/95 (09/17/24 0245)  SpO2: (!) 94 % (09/17/24 0245) Vital Signs (24h Range):  Temp:  [98.5 °F (36.9 °C)-99.6 °F (37.6 °C)] 98.5 °F (36.9 °C)  Pulse:  [] 97  Resp:  [16-29] 23  SpO2:  [92 %-100 %] 94 %  BP: (138-171)/(64-99) 156/95     Weight: 65.2 kg (143 lb 11.8 oz)  Body mass index is 26.29 kg/m².     Physical Exam  Vitals and nursing note reviewed.   Constitutional:       General: She is not in acute distress.     Appearance: She is not ill-appearing, toxic-appearing or diaphoretic.   HENT:      Head: Normocephalic and atraumatic.      Nose: Nose normal. No congestion or rhinorrhea.       Mouth/Throat:      Mouth: Mucous membranes are dry.      Pharynx: Oropharynx is clear. No oropharyngeal exudate or posterior oropharyngeal erythema.   Eyes:      General: No scleral icterus.     Extraocular Movements: Extraocular movements intact.      Conjunctiva/sclera: Conjunctivae normal.      Pupils: Pupils are equal, round, and reactive to light.   Neck:      Vascular: No carotid bruit.   Cardiovascular:      Rate and Rhythm: Normal rate and regular rhythm.      Pulses: Normal pulses.      Heart sounds: No murmur heard.     No friction rub. No gallop.   Pulmonary:      Effort: Pulmonary effort is normal. No respiratory distress.      Breath sounds: Normal breath sounds. No wheezing, rhonchi or rales.   Abdominal:      General: Bowel sounds are normal. There is no distension.      Palpations: Abdomen is soft.      Tenderness: There is no abdominal tenderness. There is no guarding or rebound.   Musculoskeletal:         General: No tenderness or deformity.      Cervical back: Normal range of motion and neck supple.      Right lower leg: No edema.      Left lower leg: No edema.      Comments: Decreased ROM lower extremities due to pain in the back/hip    Skin:     General: Skin is dry.      Capillary Refill: Capillary refill takes 2 to 3 seconds.      Coloration: Skin is pale.      Findings: Lesion (muliple open wounds in various stages of healing, pt. denies skin popping or IVDU) present.   Neurological:      Mental Status: She is alert and oriented to person, place, and time.      Cranial Nerves: No cranial nerve deficit.      Motor: Weakness (due to pain in her back) present.      Coordination: Coordination normal.   Psychiatric:         Mood and Affect: Mood normal.         Behavior: Behavior normal.              CRANIAL NERVES     CN III, IV, VI   Pupils are equal, round, and reactive to light.       Recent Results (from the past 24 hour(s))   POCT glucose    Collection Time: 09/16/24  8:15 AM   Result Value  Ref Range    POCT Glucose >500 (HH) 70 - 110 mg/dL   CBC auto differential    Collection Time: 09/16/24  9:34 AM   Result Value Ref Range    WBC 9.81 3.90 - 12.70 K/uL    RBC 2.45 (L) 4.00 - 5.40 M/uL    Hemoglobin 5.7 (LL) 12.0 - 16.0 g/dL    Hematocrit 18.4 (LL) 37.0 - 48.5 %    MCV 75 (L) 82 - 98 fL    MCH 23.3 (L) 27.0 - 31.0 pg    MCHC 31.0 (L) 32.0 - 36.0 g/dL    RDW 17.5 (H) 11.5 - 14.5 %    Platelets 132 (L) 150 - 450 K/uL    MPV 10.3 9.2 - 12.9 fL    Immature Granulocytes CANCELED 0.0 - 0.5 %    Immature Grans (Abs) CANCELED 0.00 - 0.04 K/uL    nRBC 1 (A) 0 /100 WBC    Gran % 75.0 (H) 38.0 - 73.0 %    Lymph % 14.0 (L) 18.0 - 48.0 %    Mono % 2.0 (L) 4.0 - 15.0 %    Eosinophil % 0.0 0.0 - 8.0 %    Basophil % 0.0 0.0 - 1.9 %    Bands 7.0 %    Metamyelocytes 1.0 %    Myelocytes 1.0 %    Platelet Estimate Decreased (A)     Hypo Moderate     Toxic Granulation Present     Differential Method Manual    Comprehensive metabolic panel    Collection Time: 09/16/24  9:34 AM   Result Value Ref Range    Sodium 134 (L) 136 - 145 mmol/L    Potassium 2.5 (LL) 3.5 - 5.1 mmol/L    Chloride 91 (L) 95 - 110 mmol/L    CO2 27 23 - 29 mmol/L    Glucose 544 (HH) 70 - 110 mg/dL    BUN 7 6 - 20 mg/dL    Creatinine 0.8 0.5 - 1.4 mg/dL    Calcium 7.7 (L) 8.7 - 10.5 mg/dL    Total Protein 6.5 6.0 - 8.4 g/dL    Albumin 1.5 (L) 3.5 - 5.2 g/dL    Total Bilirubin 1.4 (H) 0.1 - 1.0 mg/dL    Alkaline Phosphatase 184 (H) 55 - 135 U/L    AST 24 10 - 40 U/L    ALT 25 10 - 44 U/L    eGFR >60 >60 mL/min/1.73 m^2    Anion Gap 16 8 - 16 mmol/L   Magnesium    Collection Time: 09/16/24  9:34 AM   Result Value Ref Range    Magnesium 1.2 (L) 1.6 - 2.6 mg/dL   Beta-Hydroxybutyrate, Serum    Collection Time: 09/16/24  9:34 AM   Result Value Ref Range    Beta-Hydroxybutyrate 0.3 0.0 - 0.5 mmol/L   CK    Collection Time: 09/16/24  9:34 AM   Result Value Ref Range     20 - 180 U/L   ISTAT PROCEDURE    Collection Time: 09/16/24  9:54 AM   Result Value  Ref Range    POC PH 7.666 (HH) 7.35 - 7.45    POC PCO2 31.5 (L) 35 - 45 mmHg    POC PO2 97 (HH) 40 - 60 mmHg    POC HCO3 36.0 (H) 24 - 28 mmol/L    POC BE 15 (H) -2 to 2 mmol/L    POC SATURATED O2 99 95 - 100 %    POC TCO2 37 (H) 24 - 29 mmol/L    POC Hematocrit 22 (L) 36 - 54 %PCV    POC HEMOGLOBIN 8 g/dL    Sample VENOUS    Urinalysis, Reflex to Urine Culture Urine, Clean Catch    Collection Time: 09/16/24 10:10 AM    Specimen: Urine, Clean Catch   Result Value Ref Range    Specimen UA Urine, Clean Catch     Color, UA Yellow Yellow, Straw, Arabella    Appearance, UA Hazy (A) Clear    pH, UA 7.0 5.0 - 8.0    Specific Gravity, UA 1.020 1.005 - 1.030    Protein, UA Negative Negative    Glucose, UA 4+ (A) Negative    Ketones, UA Trace (A) Negative    Bilirubin (UA) Negative Negative    Occult Blood UA 2+ (A) Negative    Nitrite, UA Positive (A) Negative    Urobilinogen, UA 4.0-6.0 (A) <2.0 EU/dL    Leukocytes, UA Negative Negative   Urinalysis Microscopic    Collection Time: 09/16/24 10:10 AM   Result Value Ref Range    RBC, UA 7 (H) 0 - 4 /hpf    WBC, UA 6 (H) 0 - 5 /hpf    WBC Clumps, UA Rare None-Rare    Bacteria Moderate (A) None-Occ /hpf    Yeast, UA Rare (A) None    Squam Epithel, UA 15 /hpf    Microscopic Comment SEE COMMENT    Blood culture #1 **CANNOT BE ORDERED STAT**    Collection Time: 09/16/24 10:54 AM    Specimen: Peripheral, Antecubital, Right; Blood   Result Value Ref Range    Blood Culture, Routine No Growth to date    Blood Culture #2 **CANNOT BE ORDERED STAT**    Collection Time: 09/16/24 11:45 AM    Specimen: Peripheral, Wrist, Right; Blood   Result Value Ref Range    Blood Culture, Routine No Growth to date    Type & Screen    Collection Time: 09/16/24 11:56 AM   Result Value Ref Range    Group & Rh O POS     Indirect Sudeep NEG     Specimen Outdate 09/19/2024 23:59    Prepare RBC 1 Unit    Collection Time: 09/16/24 11:56 AM   Result Value Ref Range    UNIT NUMBER [id number]     Product Code  [id number]     DISPENSE STATUS TRANSFUSED     CODING SYSTEM [id number]     Unit Blood Type Code 5100     Unit Blood Type O POS     Unit Expiration [id number]     CROSSMATCH INTERPRETATION Compatible    Prepare RBC 2 Units; anemia    Collection Time: 09/16/24 11:56 AM   Result Value Ref Range    UNIT NUMBER [id number]     Product Code [id number]     DISPENSE STATUS TRANSFUSED     CODING SYSTEM [id number]     Unit Blood Type Code 5100     Unit Blood Type O POS     Unit Expiration [id number]     CROSSMATCH INTERPRETATION Compatible     UNIT NUMBER [id number]     Product Code [id number]     DISPENSE STATUS CROSSMATCHED     CODING SYSTEM [id number]     Unit Blood Type Code 5100     Unit Blood Type O POS     Unit Expiration [id number]     CROSSMATCH INTERPRETATION Compatible    POCT glucose    Collection Time: 09/16/24  2:45 PM   Result Value Ref Range    POCT Glucose 475 (HH) 70 - 110 mg/dL   CBC auto differential    Collection Time: 09/16/24  3:39 PM   Result Value Ref Range    WBC 10.84 3.90 - 12.70 K/uL    RBC 2.29 (L) 4.00 - 5.40 M/uL    Hemoglobin 5.2 (LL) 12.0 - 16.0 g/dL    Hematocrit 17.7 (LL) 37.0 - 48.5 %    MCV 77 (L) 82 - 98 fL    MCH 22.7 (L) 27.0 - 31.0 pg    MCHC 29.4 (L) 32.0 - 36.0 g/dL    RDW 17.4 (H) 11.5 - 14.5 %    Platelets 131 (L) 150 - 450 K/uL    MPV 9.8 9.2 - 12.9 fL    Immature Granulocytes Test Not Performed 0.0 - 0.5 %    Gran # (ANC) Test Not Performed 1.8 - 7.7 K/uL    Immature Grans (Abs) Test Not Performed 0.00 - 0.04 K/uL    Lymph # Test Not Performed 1.0 - 4.8 K/uL    Mono # Test Not Performed 0.3 - 1.0 K/uL    Eos # Test Not Performed 0.0 - 0.5 K/uL    Baso # Test Not Performed 0.00 - 0.20 K/uL    nRBC 1 (A) 0 /100 WBC    Gran % 73.0 38.0 - 73.0 %    Lymph % 16.0 (L) 18.0 - 48.0 %    Mono % 3.0 (L) 4.0 - 15.0 %    Eosinophil % 2.0 0.0 - 8.0 %    Basophil % 0.0 0.0 - 1.9 %    Bands 6.0 %    Platelet Estimate Decreased (A)     Differential Method Manual    ISTAT Lactate    Collection Time:  09/16/24  7:12 PM   Result Value Ref Range    POC Lactate 1.38 0.5 - 2.2 mmol/L    Sample VENOUS    Lactic acid, plasma #2    Collection Time: 09/16/24  8:42 PM   Result Value Ref Range    Lactate (Lactic Acid) 1.3 0.5 - 2.2 mmol/L   CBC auto differential    Collection Time: 09/16/24  9:05 PM   Result Value Ref Range    WBC 11.88 3.90 - 12.70 K/uL    RBC 2.36 (L) 4.00 - 5.40 M/uL    Hemoglobin 5.7 (LL) 12.0 - 16.0 g/dL    Hematocrit 18.2 (LL) 37.0 - 48.5 %    MCV 77 (L) 82 - 98 fL    MCH 24.2 (L) 27.0 - 31.0 pg    MCHC 31.3 (L) 32.0 - 36.0 g/dL    RDW 16.6 (H) 11.5 - 14.5 %    Platelets 124 (L) 150 - 450 K/uL    MPV 9.9 9.2 - 12.9 fL    Immature Granulocytes 3.0 (H) 0.0 - 0.5 %    Gran # (ANC) 9.7 (H) 1.8 - 7.7 K/uL    Immature Grans (Abs) 0.36 (H) 0.00 - 0.04 K/uL    Lymph # 1.3 1.0 - 4.8 K/uL    Mono # 0.4 0.3 - 1.0 K/uL    Eos # 0.0 0.0 - 0.5 K/uL    Baso # 0.04 0.00 - 0.20 K/uL    nRBC 1 (A) 0 /100 WBC    Gran % 81.8 (H) 38.0 - 73.0 %    Lymph % 11.2 (L) 18.0 - 48.0 %    Mono % 3.5 (L) 4.0 - 15.0 %    Eosinophil % 0.2 0.0 - 8.0 %    Basophil % 0.3 0.0 - 1.9 %    Platelet Estimate Decreased (A)     Hypo Moderate     Differential Method Automated    Basic metabolic panel    Collection Time: 09/16/24  9:05 PM   Result Value Ref Range    Sodium 137 136 - 145 mmol/L    Potassium 2.2 (LL) 3.5 - 5.1 mmol/L    Chloride 98 95 - 110 mmol/L    CO2 29 23 - 29 mmol/L    Glucose 397 (H) 70 - 110 mg/dL    BUN 5 (L) 6 - 20 mg/dL    Creatinine 0.7 0.5 - 1.4 mg/dL    Calcium 6.6 (LL) 8.7 - 10.5 mg/dL    Anion Gap 10 8 - 16 mmol/L    eGFR >60 >60 mL/min/1.73 m^2   HIV 1/2 Ag/Ab (4th Gen)    Collection Time: 09/16/24  9:05 PM   Result Value Ref Range    HIV 1/2 Ag/Ab Negative Negative   Hepatitis C Antibody    Collection Time: 09/16/24  9:05 PM   Result Value Ref Range    Hepatitis C Ab Positive (A) Negative   Magnesium    Collection Time: 09/17/24 12:17 AM   Result Value Ref Range    Magnesium 1.5 (L) 1.6 - 2.6 mg/dL   Phosphorus     Collection Time: 09/17/24 12:17 AM   Result Value Ref Range    Phosphorus 1.7 (L) 2.7 - 4.5 mg/dL   Protime-INR    Collection Time: 09/17/24 12:17 AM   Result Value Ref Range    Prothrombin Time 20.2 (H) 9.0 - 12.5 sec    INR 1.9 (H) 0.8 - 1.2   APTT    Collection Time: 09/17/24 12:17 AM   Result Value Ref Range    aPTT 35.6 (H) 21.0 - 32.0 sec   Lactic acid, plasma    Collection Time: 09/17/24 12:17 AM   Result Value Ref Range    Lactate (Lactic Acid) 1.4 0.5 - 2.2 mmol/L   Procalcitonin    Collection Time: 09/17/24 12:17 AM   Result Value Ref Range    Procalcitonin 0.48 (H) <0.25 ng/mL   Sedimentation rate    Collection Time: 09/17/24 12:17 AM   Result Value Ref Range    Sed Rate 26 0 - 36 mm/Hr   C-reactive protein    Collection Time: 09/17/24 12:17 AM   Result Value Ref Range    .6 (H) 0.0 - 8.2 mg/L   CBC auto differential    Collection Time: 09/17/24 12:17 AM   Result Value Ref Range    WBC 14.09 (H) 3.90 - 12.70 K/uL    RBC 2.78 (L) 4.00 - 5.40 M/uL    Hemoglobin 7.2 (L) 12.0 - 16.0 g/dL    Hematocrit 21.7 (L) 37.0 - 48.5 %    MCV 78 (L) 82 - 98 fL    MCH 25.9 (L) 27.0 - 31.0 pg    MCHC 33.2 32.0 - 36.0 g/dL    RDW 15.7 (H) 11.5 - 14.5 %    Platelets 116 (L) 150 - 450 K/uL    MPV 9.4 9.2 - 12.9 fL    Immature Granulocytes CANCELED 0.0 - 0.5 %    Immature Grans (Abs) CANCELED 0.00 - 0.04 K/uL    Lymph # CANCELED 1.0 - 4.8 K/uL    Mono # CANCELED 0.3 - 1.0 K/uL    Eos # CANCELED 0.0 - 0.5 K/uL    Baso # CANCELED 0.00 - 0.20 K/uL    nRBC 1 (A) 0 /100 WBC    Gran % 59.0 38.0 - 73.0 %    Lymph % 15.0 (L) 18.0 - 48.0 %    Mono % 2.0 (L) 4.0 - 15.0 %    Eosinophil % 0.0 0.0 - 8.0 %    Basophil % 0.0 0.0 - 1.9 %    Bands 24.0 %    Platelet Estimate Decreased (A)     Aniso Slight     Hypo Moderate     Toxic Granulation Present     Differential Method Manual    Basic metabolic panel    Collection Time: 09/17/24 12:17 AM   Result Value Ref Range    Sodium 139 136 - 145 mmol/L    Potassium 2.2 (LL) 3.5 - 5.1  mmol/L    Chloride 100 95 - 110 mmol/L    CO2 27 23 - 29 mmol/L    Glucose 401 (H) 70 - 110 mg/dL    BUN 5 (L) 6 - 20 mg/dL    Creatinine 0.7 0.5 - 1.4 mg/dL    Calcium 6.8 (LL) 8.7 - 10.5 mg/dL    Anion Gap 12 8 - 16 mmol/L    eGFR >60 >60 mL/min/1.73 m^2   POCT glucose    Collection Time: 09/17/24  1:00 AM   Result Value Ref Range    POCT Glucose >500 (HH) 70 - 110 mg/dL       Microbiology Results (last 7 days)       Procedure Component Value Units Date/Time    Blood Culture #2 **CANNOT BE ORDERED STAT** [[id number]] Collected: 09/16/24 1145    Order Status: Completed Specimen: Blood from Peripheral, Wrist, Right Updated: 09/17/24 0115     Blood Culture, Routine No Growth to date    Blood culture #1 **CANNOT BE ORDERED STAT** [[id number]] Collected: 09/16/24 1054    Order Status: Completed Specimen: Blood from Peripheral, Antecubital, Right Updated: 09/16/24 2145     Blood Culture, Routine No Growth to date    Blood culture #2 **CANNOT BE ORDERED STAT** [[id number]]     Order Status: Canceled Specimen: Blood             Imaging Results              CT Chest Abdomen Pelvis With IV Contrast (XPD) NO Oral Contrast (Final result)  Result time 09/16/24 17:02:12      Final result by Hamzah Acosta MD (09/16/24 17:02:12)                   Impression:      Extensive soft tissue edema with subcutaneous gas throughout the left flank which is concerning for soft tissue infection in this region.  There is no organized fluid collection in the soft tissues.  There are small psoas abscesses and small fluid collections in the left flank likely indicating abscess formations in this region.  In addition, there is evidence of septic arthritis of the left sacroiliac joint, L5-S1 disc space, and pubic symphysis with extension of the soft tissue infection into the left iliacus and left adductors musculature.    While the left retroperitoneal fluid collections do appear organized, percutaneous drainage is not advised given the  extensive soft tissue infection superficial to these collections.  In addition, there is extensive evidence of soft tissue infection that does not appear organized or percutaneously drainable.      Electronically signed by: Hamzah Acosta MD  Date:    09/16/2024  Time:    17:02               Narrative:    EXAMINATION:  CT CHEST ABDOMEN PELVIS WITH IV CONTRAST (XPD)    CLINICAL HISTORY:  Sepsis;    TECHNIQUE:  Low dose axial images, sagittal and coronal reformations were obtained from the thoracic inlet to the pubic symphysis following the IV administration of 75 mL of Omnipaque 350 .  No oral contrast was administered.    COMPARISON:  None    FINDINGS:  Limited evaluation of structures at the base of the neck show no concerning masses or lymph nodes.    The heart is normal in size.  No pericardial effusion.  No mediastinal or hilar adenopathy.    Lungs show bilateral airspace consolidation predominantly in the lower lobes, but present throughout all lobes of the lung.  No solid mass.  No effusion or pneumothorax.  The airways are patent without endoluminal lesion.    The liver is slightly enlarged.  Spleen is enlarged as well.  Small ill-defined hypodensity in the posterior aspect of the right hepatic lobe measuring 1.1 cm.  This is of uncertain significance.  No biliary ductal dilatation.  Gallbladder is without significant wall thickening.    Pancreas and adrenal glands show no focal abnormality.    Bilateral kidneys are normal in size and position.  No hydronephrosis or nephrolithiasis.  Urinary bladder is well distended.  No focal wall thickening.  The uterus is grossly unremarkable.    Gastrointestinal tract shows no wall thickening or obstruction.  There is hyperdense material in the colon which may be due to a prior exam.  No free gas in the abdomen.  No pathologic lymphadenopathy.    Within the soft tissues of the left flank, there is large region of ill-defined edema with scattered foci of gas there is  fluid collection in the anterior aspect of the left psoas measuring roughly 3.1 x 2.1 cm in axial diameter.  Additional fluid posterolateral to the left psoas tracks from the distal thoracic spine to the pelvis.  A small amount of fluid within the left iliacus musculature as well.  Destruction of portions of the left iliac bone an erosion of the left sacroiliac joint is again noted.  Edema and sclerosis at L5-S1.  Similar appearance of the pubic symphysis as well with scattered ill-defined fluid and soft tissue and gas in the adductus musculature in the left pelvis.                                       X-Ray Chest AP Portable (Final result)  Result time 09/16/24 15:39:53      Final result by Mariana Calderon MD (09/16/24 15:39:53)                   Impression:      Please see above.      Electronically signed by: Mariana Calderon  Date:    09/16/2024  Time:    15:39               Narrative:    EXAMINATION:  XR CHEST AP PORTABLE    CLINICAL HISTORY:  Encounter for adjustment and management of vascular access device    TECHNIQUE:  Single frontal view of the chest was performed.    COMPARISON:  09/16/2024    FINDINGS:  Since the prior study, loop in the central venous catheter has been reduced.  Catheter tip in the right atrium and may be retracted for better positioning.    Streaky right basilar opacities may reflect subsegmental atelectasis.                                        X-Ray Chest AP Portable (Final result)  Result time 09/16/24 13:46:02      Final result by Mariana Calderon MD (09/16/24 13:46:02)                   Impression:      Loop in the central venous catheter.  Recommend repositioning prior to use.  Tip current located at the level of the brachiocephalic vein.    Lungs are fairly clear when accounting for rotation of the patient.    This report was flagged in Epic as abnormal.      Electronically signed by: Mariana Calderon  Date:    09/16/2024  Time:    13:46               Narrative:     EXAMINATION:  XR CHEST AP PORTABLE    CLINICAL HISTORY:  Encounter for adjustment and management of vascular access device    TECHNIQUE:  Single frontal view of the chest was performed.    COMPARISON:  None    FINDINGS:  Lungs are well expanded.  No acute consolidation, pleural effusion, or pneumothorax.    Cardiac silhouette is normal in size.    Placement of a right central venous catheter with the tip along the brachiocephalic vein.  Catheter is looped approximately 5.5 cm proximal to the tip.                                        CT Pelvis Without Contrast (Final result)  Result time 09/16/24 09:35:58      Final result by Mariana Calderon MD (09/16/24 09:35:58)                   Impression:      Overall, findings most concerning for an infectious process.    Findings most concerning for osteomyelitis and septic arthritis left SI joint.  Findings concerning for osteomyelitis/discitis L5-S1.  Possible osteomyelitis and septic joint at the pubic symphysis.    Abnormal fluid collection on the left extending from T11 through the left iliacus muscle, along and within the left ileo psoas muscle, most concerning for abscess.  Multiple additional small abscesses suspected in the pelvis as above.  Multifocal collections of air in fluid in the subcutaneous tissues of the left flank, incompletely imaged.  Abscesses favored.    This report was flagged in Epic as abnormal.      Electronically signed by: Mariana Calderon  Date:    09/16/2024  Time:    09:35               Narrative:    EXAMINATION:  CT LUMBAR SPINE WITHOUT CONTRAST; CT PELVIS WITHOUT CONTRAST    CLINICAL HISTORY:  Low back pain, trauma;; Pelvic trauma;    TECHNIQUE:  Low-dose axial, sagittal and coronal reformations are obtained through the lumbar spine.  Contrast was not administered.    By separate acquisition, thin-section multislice axial CT images obtained through the pelvis without the use of intravenous contrast.  Coronal and sagittal reformats  obtained.    COMPARISON:  None.    FINDINGS:  Alignment: Normal.    Vertebrae: No fracture.    Discs: Disc space narrowing L5-S1.  Patchy osteolytic change slight widening of the disc space on the left.    Sacroiliac joints: Abnormal osteolytic change and asymmetric widening of the left sacroiliac joint as compared to the right.  Osteolytic change involves the iliac side of the joint more so than the sacral side.    Degenerative findings:    T12-L1: No spinal canal stenosis or neural foraminal narrowing.    L1-L2: No spinal canal stenosis or neural foraminal narrowing.    L2-L3: No spinal canal stenosis or neural foraminal narrowing.    L3-L4: Posterior circumferential disc bulge contributes to mild canal and moderate right neural foraminal narrowing..    L4-L5: Posterior circumferential disc bulge and facet arthropathy resulting in mild-to-moderate canal narrowing.    L5-S1: Posterior circumferential disc bulge contributing to mild canal narrowing.  Moderate right and mild left neural foraminal narrowing.    Paraspinal muscles & soft tissues: Abnormal collection of fluid and scattered air bubbles in the retroperitoneal soft tissues on the left beginning at T11 and extending through the left iliacus muscle.  Collection located within and along the left iliopsoas muscle.  Largest transaxial dimension estimated at 6.3 x 3.7 at the level of L3.    Small collection of fluid and subcutaneous emphysema intimately associated with the pubic symphysis estimated to measure 2.5 x 2.2 cm.  Osteolytic change at the pubic symphysis.  Small collections of fluid and subcutaneous emphysema along the right and left abductor musculature.    Within the subcutaneous tissues of the left flank, scattered collections of fluid and subcutaneous emphysema, incompletely imaged.    High attenuation material within the colon has the appearance of oral contrast.  Bowel loops are nondilated.  Urinary bladder is unremarkable.  No significant free  fluid in the pelvis.                                        CT Lumbar Spine Without Contrast (Final result)  Result time 09/16/24 09:35:58      Final result by Mariana Calderon MD (09/16/24 09:35:58)                   Impression:      Overall, findings most concerning for an infectious process.    Findings most concerning for osteomyelitis and septic arthritis left SI joint.  Findings concerning for osteomyelitis/discitis L5-S1.  Possible osteomyelitis and septic joint at the pubic symphysis.    Abnormal fluid collection on the left extending from T11 through the left iliacus muscle, along and within the left ileo psoas muscle, most concerning for abscess.  Multiple additional small abscesses suspected in the pelvis as above.  Multifocal collections of air in fluid in the subcutaneous tissues of the left flank, incompletely imaged.  Abscesses favored.    This report was flagged in Epic as abnormal.      Electronically signed by: Mariana Calderon  Date:    09/16/2024  Time:    09:35               Narrative:    EXAMINATION:  CT LUMBAR SPINE WITHOUT CONTRAST; CT PELVIS WITHOUT CONTRAST    CLINICAL HISTORY:  Low back pain, trauma;; Pelvic trauma;    TECHNIQUE:  Low-dose axial, sagittal and coronal reformations are obtained through the lumbar spine.  Contrast was not administered.    By separate acquisition, thin-section multislice axial CT images obtained through the pelvis without the use of intravenous contrast.  Coronal and sagittal reformats obtained.    COMPARISON:  None.    FINDINGS:  Alignment: Normal.    Vertebrae: No fracture.    Discs: Disc space narrowing L5-S1.  Patchy osteolytic change slight widening of the disc space on the left.    Sacroiliac joints: Abnormal osteolytic change and asymmetric widening of the left sacroiliac joint as compared to the right.  Osteolytic change involves the iliac side of the joint more so than the sacral side.    Degenerative findings:    T12-L1: No spinal canal stenosis or  neural foraminal narrowing.    L1-L2: No spinal canal stenosis or neural foraminal narrowing.    L2-L3: No spinal canal stenosis or neural foraminal narrowing.    L3-L4: Posterior circumferential disc bulge contributes to mild canal and moderate right neural foraminal narrowing..    L4-L5: Posterior circumferential disc bulge and facet arthropathy resulting in mild-to-moderate canal narrowing.    L5-S1: Posterior circumferential disc bulge contributing to mild canal narrowing.  Moderate right and mild left neural foraminal narrowing.    Paraspinal muscles & soft tissues: Abnormal collection of fluid and scattered air bubbles in the retroperitoneal soft tissues on the left beginning at T11 and extending through the left iliacus muscle.  Collection located within and along the left iliopsoas muscle.  Largest transaxial dimension estimated at 6.3 x 3.7 at the level of L3.    Small collection of fluid and subcutaneous emphysema intimately associated with the pubic symphysis estimated to measure 2.5 x 2.2 cm.  Osteolytic change at the pubic symphysis.  Small collections of fluid and subcutaneous emphysema along the right and left abductor musculature.    Within the subcutaneous tissues of the left flank, scattered collections of fluid and subcutaneous emphysema, incompletely imaged.    High attenuation material within the colon has the appearance of oral contrast.  Bowel loops are nondilated.  Urinary bladder is unremarkable.  No significant free fluid in the pelvis.                                        Assessment/Plan:     Other osteomyelitis, multiple sites  IV abx adjusted and consulted ID. F/u with their recs and NS consult.       Psoas muscle abscess  D/w Ortho and read the transfer discussion. Will re-consult IR here for further assistance and at least sampling of the fluid.       Uncontrolled type 2 diabetes mellitus with hyperglycemia  Patient's FSGs are uncontrolled due to hyperglycemia on current medication  regimen.  Last A1c reviewed-   Lab Results   Component Value Date    HGBA1C 12.6 (H) 06/02/2023     Most recent fingerstick glucose reviewed-   Recent Labs   Lab 09/16/24  0815 09/16/24  1445 09/17/24  0100   POCTGLUCOSE >500* 475* >500*     Current correctional scale  Medium  Maintain anti-hyperglycemic dose as follows-   Antihyperglycemics (From admission, onward)      Start     Stop Route Frequency Ordered    09/17/24 0209  insulin aspart U-100 pen 0-10 Units         -- SubQ Every 6 hours PRN 09/17/24 0109    09/16/24 2347  insulin aspart U-100 pen 0-10 Units         -- SubQ Before meals & nightly PRN 09/16/24 2347          Hold Oral hypoglycemics while patient is in the hospital.    History of drug use  F/u on echo to r/o vegetations. Pt. States no longer using drugs but will place PRN medications for any withdrawal symptoms.       Hypokalemia  Monitor on tele. Replace mg, phos, and K again. F/u on repeat labs.     Anemia, unspecified  Pt. Got 2 units PRBC at [facility]. Unable to check Iron studies at this time. F/u on repeat labs. Type and screen done. NO signs of acute GI bleed on exam at this time. Pt h/o nausea and will start PPI daily. Denies any hematemesis or hemoptysis.     Smoker  PRN nicotine patich ordered and vistaril per pt. Request for anxiety         VTE Risk Mitigation (From admission, onward)           Ordered     IP VTE LOW RISK PATIENT  Once         09/16/24 2347     Place sequential compression device  Until discontinued         09/16/24 2347     Place CEFERINO hose  Until discontinued         09/16/24 2347                  Critical care time spent on the evaluation and treatment of severe organ dysfunction, review of pertinent labs and imaging studies, discussions with consulting providers and discussions with patient/family: 72 minutes.       CODE STATUS: FULL CODE       Pharmacokinetic Initial Assessment: IV Vancomycin    Assessment/Plan:    Initiate intravenous vancomycin with loading dose of  "1250 mg once followed by a maintenance dose of vancomycin 1000 mg IV every 12 hours  Desired empiric serum trough concentration is 15 to 20 mcg/mL  Draw vancomycin trough level 60 min prior to fourth dose on 9/18/24 at approximately 0000  Pharmacy will continue to follow and monitor vancomycin.      Please contact pharmacy at extension [phone] with any questions regarding this assessment.     Thank you for the consult,   Roscoe Araujo       Patient brief summary:  Mari Sloan is a 49 y.o. female initiated on antimicrobial therapy with IV Vancomycin for treatment of suspected skin & soft tissue infection    Drug Allergies:   Review of patient's allergies indicates:  No Known Allergies    Actual Body Weight:   65.2 kg    Renal Function:   Estimated Creatinine Clearance: 84.2 mL/min (based on SCr of 0.7 mg/dL).,     Dialysis Method (if applicable):  N/A    CBC (last 72 hours):  Recent Labs   Lab Result Units 09/16/24  0934 09/16/24  1539 09/16/24  2105   WBC K/uL 9.81 10.84 11.88   Hemoglobin g/dL 5.7* 5.2* 5.7*   Hematocrit % 18.4* 17.7* 18.2*   Platelets K/uL 132* 131* 124*   Gran % % 75.0* 73.0 81.8*   Lymph % % 14.0* 16.0* 11.2*   Mono % % 2.0* 3.0* 3.5*   Eosinophil % % 0.0 2.0 0.2   Basophil % % 0.0 0.0 0.3   Differential Method  Manual Manual Automated       Metabolic Panel (last 72 hours):  Recent Labs   Lab Result Units 09/16/24  0934 09/16/24  1010 09/16/24  2105   Sodium mmol/L 134*  --  137   Potassium mmol/L 2.5*  --  2.2*   Chloride mmol/L 91*  --  98   CO2 mmol/L 27  --  29   Glucose mg/dL 544*  --  397*   Glucose, UA   --  4+*  --    BUN mg/dL 7  --  5*   Creatinine mg/dL 0.8  --  0.7   Albumin g/dL 1.5*  --   --    Total Bilirubin mg/dL 1.4*  --   --    Alkaline Phosphatase U/L 184*  --   --    AST U/L 24  --   --    ALT U/L 25  --   --    Magnesium mg/dL 1.2*  --   --        Drug levels (last 3 results):  No results for input(s): "VANCOMYCINRA", "VANCORANDOM", "VANCOMYCINPE", "VANCOPEAK", " ""VANCOMYCINTR", "VANCOTROUGH" in the last 72 hours.    Microbiologic Results:  Microbiology Results (last 7 days)       Procedure Component Value Units Date/Time    Blood culture #1 **CANNOT BE ORDERED STAT** [[id number]] Collected: 09/16/24 1054    Order Status: Completed Specimen: Blood from Peripheral, Antecubital, Right Updated: 09/16/24 2145     Blood Culture, Routine No Growth to date    Blood Culture #2 **CANNOT BE ORDERED STAT** [[id number]] Collected: 09/16/24 1145    Order Status: Sent Specimen: Blood from Peripheral, Wrist, Right Updated: 09/16/24 1805    Blood culture #2 **CANNOT BE ORDERED STAT** [[id number]]     Order Status: Canceled Specimen: Blood               Elaine Reese MD  Department of [facility] Medicine  [facility] - Intensive Care          "

## 2024-09-17 NOTE — ASSESSMENT & PLAN NOTE
left flank extensive soft tissue necrosis noted.  General surgery consulted  Recommended urgent transfer to [facility] for further management.  Transfer process initiated.  Pending bed assignment at [facility]

## 2024-09-17 NOTE — ASSESSMENT & PLAN NOTE
Noted to have extensive fluid collection on left extending from T11 through left iliacus muscle and within the left iliopsoas muscle.  Multifocal collections of air including subcutaneous tissues on the left flank noted.  On empiric vancomycin/meropenem.  ID/neurosurgery consulted.  Pending MRI L-spine.  Pending transfer to [facility] for surgical evaluation  Denied IVDU  UDS pending

## 2024-09-17 NOTE — CONSULTS
C.C. left flank pain and pelvic pain    HPI: Mari Cabral49 y.o. complaining of left flank pain and pelvic pain.  She reports worsening pain over the last few weeks.  She does report a fall.  What I can gather from the history the patient was transferred from the [facility] [place] operated by [facility] after a telemedicine evaluation for possible interventional radiology and Neurosurgery consult here at [facility].  I am not sure if she has been evaluated by any other surgeons so far.  She does have swelling to left flank area and some mild erythema.  It appears on the imaging she has some abscess formation which may be arising from the spine as well as from the SI joint.  It does appear she has some erosions over the left SI joint.  Does not appear she has any involvement to the left hip joint.  I do feel that she would likely benefit from intervention Radiology to try to drain the abscesses and treating this with antibiotics.  I am not sure if any specific debridement would be appropriate however again it does appear that she was transferred for a neurosurgery evaluation, and from what I can tell from the secure chat thread that general surgery was also involved in the transfer.      ROS:   Pertinent positives:  Left flank pain, pelvic pain       PMH:   Past Medical History:   Diagnosis Date    Diabetes mellitus     Hypertension     Unspecified viral hepatitis C without hepatic coma        PSH:   Past Surgical History:   Procedure Laterality Date     SECTION         Social Hx:   Social History     Occupational History    Not on file   Tobacco Use    Smoking status: Every Day     Current packs/day: 0.50     Types: Cigarettes    Smokeless tobacco: Never   Substance and Sexual Activity    Alcohol use: Not Currently    Drug use: Not Currently    Sexual activity: Not on file       Medications:    No current facility-administered medications on file prior to encounter.     Current Outpatient Medications on File Prior  "to Encounter   Medication Sig Dispense Refill    CLONIDINE HCL ORAL Take by mouth.      gabapentin (NEURONTIN) 300 MG capsule Take 300 mg by mouth 3 (three) times daily.      GLIPIZIDE ORAL Take by mouth.      hydrOXYzine pamoate (VISTARIL) 25 MG Cap Take 25 mg by mouth 3 (three) times daily.      metFORMIN (GLUCOPHAGE) 500 MG tablet Take 500 mg by mouth 2 (two) times daily with meals.      famotidine (PEPCID) 20 MG tablet Take 1 tablet (20 mg total) by mouth 2 (two) times daily as needed for Heartburn. 14 tablet 0    ibuprofen (ADVIL,MOTRIN) 600 MG tablet Take 1 tablet (600 mg total) by mouth every 6 (six) hours as needed for Pain. 20 tablet 0    methadone (DOLOPHINE) 10 MG tablet Take 70 mg by mouth every 6 (six) hours as needed for Pain.           PE:         Vitals:    09/17/24 0030   BP: (!) 166/95   Pulse: 95   Resp: 18   Temp:        Estimated body mass index is 26.29 kg/m² as calculated from the following:    Height as of this encounter: 5' 2" (1.575 m).    Weight as of this encounter: 65.2 kg (143 lb 11.8 oz).     General WDWN, NAD     Extremity:  Examination of the left flank and pelvis, there is some mild erythema around the left flank area superior to the hip and buttocks.  There is some swelling in this location and possible some fluctuance.  She does not have any significant severe pain with range of motion of the actual hip joint.  There is some effusion to left knee however it is nonpainful with range of motion and she states this is somewhat chronic with arthritis.  She does have pain on palpation over the left hip at the SI joint.    Labs:    Lab Results   Component Value Date    WBC 14.09 (H) 09/17/2024    HGB 7.2 (L) 09/17/2024    HCT 21.7 (L) 09/17/2024    MCV 78 (L) 09/17/2024     (L) 09/17/2024           BMP  Lab Results   Component Value Date     09/16/2024    K 2.2 (LL) 09/16/2024    CL 98 09/16/2024    CO2 29 09/16/2024    BUN 5 (L) 09/16/2024    CREATININE 0.7 09/16/2024    " CALCIUM 6.6 (LL) 09/16/2024    ANIONGAP 10 09/16/2024    EGFRNORACEVR >60 09/16/2024       Lab Results   Component Value Date    INR 1.9 (H) 09/17/2024    INR 0.9 06/02/2023       Lab Results   Component Value Date    SEDRATE 26 09/17/2024       Lab Results   Component Value Date    CRP 0.5 10/05/2022       Radiography:  Film    Interpretation    It appears on the CT scan she likely has some iliopsoas abscess the may be arising from the spine as well as the SI joint on the left side.    A/P  49 y.o.female with left-sided flank pain with iliopsoas abscess likely arising from the SI joint as well as the spine.    I would recommend evaluation by Interventional Radiology for an aspiration and possible drain placement of the iliopsoas abscess as well as continue treatment with antibiotics.  Again it appears that the patient was transferred from [facility] to [facility] for evaluation by Neurosurgery as well as General surgery and Interventional Radiology.  I do not foresee any orthopedic surgical intervention at this point.      John Castro MD

## 2024-09-17 NOTE — ASSESSMENT & PLAN NOTE
Patient's FSGs are uncontrolled due to hyperglycemia on current medication regimen.  Last A1c reviewed-   Lab Results   Component Value Date    HGBA1C 7.9 (H) 09/17/2024     Most recent fingerstick glucose reviewed-   Recent Labs   Lab 09/16/24  1445 09/17/24  0100 09/17/24  0600 09/17/24  1122   POCTGLUCOSE 475* >500* 483* 360*       Current correctional scale  Medium  Maintain anti-hyperglycemic dose as follows-   Antihyperglycemics (From admission, onward)      Start     Stop Route Frequency Ordered    09/17/24 2100  insulin glargine U-100 (Lantus) pen 15 Units         -- SubQ 2 times daily 09/17/24 0930    09/17/24 1330  insulin regular injection 5 Units 0.05 mL         -- IV Once 09/17/24 1222    09/17/24 0209  insulin aspart U-100 pen 0-10 Units         -- SubQ Every 6 hours PRN 09/17/24 0109          Hold Oral hypoglycemics while patient is in the hospital.  On basal bolus regimen.

## 2024-09-17 NOTE — HPI
"Ms. 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 Patient underwent IR drain placement to a left retroperitoneal fluid collection on 9/30/24 with positive cultures for MSSA, and I&D of L hip abscess with general surgery on 10/7 with rare GPCs on gram stain. Infectious disease now re-consulted for "Endocarditis and bacteremia - Recent OR I&D, abx recs".   "

## 2024-09-17 NOTE — CONSULTS
[facility] - Intensive Care  Infectious Disease  Consult Note    Patient Name: Mari Sloan  MRN: [id number]  Admission Date: 9/16/2024  Hospital Length of Stay: 1 days  Attending Physician: Hina Thompson,*  Primary Care Provider: No, Primary Doctor     Isolation Status: No active isolations    Patient information was obtained from patient, past medical records, and ER records.      Inpatient consult to Infectious Diseases  Consult performed by: Dunia Summers MD  Consult ordered by: Elaine Reese MD        Assessment/Plan:     ID  Other osteomyelitis, multiple sites  50y/o F with hx of being un-housed, T2DM, HTN and drug use transferred for spinal osteomyelitis, septic arthritis and Lt flank/ retroperitoneal abscess.    Imaging studies of her lumbar spine and pelvis were concerning for osteomyelitis/septic arthritis of the left SI joint and L5-S1 along with an abnormal fluid collection extending from T11 through the left iliacus muscle concerning for abscess as well as at psoas. She was started on empiric vanc and zosyn. Case was discussed with neurosurgery who did not feel surgical intervention was warranted and recommended IR drainage. IR recommended against drainage given extensive superficial infection. MRI today with  complex fluid collection. left SI joint to the left iliac fossa.     BCx growing GPC in clusters. 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     Recommendations:  -Continue vancomycin. Pharm to dose for goal trough 15-20.  -Follow-up ID and susceptibilities of GPC in blood   -Would discontinue meropenem given blood cx now with gpc  -Agree with clindamycin given visualized gas/ necrotizing infection  [place] [facility] [place]reciate input from numerous consultants  -if no plan for transfer rec'd IR aspiration of abscess with cultures    GI  Hepatitis C  Order Hep C quant    Psoas muscle abscess  See above    MV endocarditis   Plan as above      A/p discussed with ICU  "team.   Thank you for your consult. I will follow-up with patient. Please contact us if you have any additional questions.    Dunia Summers MD  Infectious Disease  [facility] [place]    Subjective:     Principal Problem: Paraspinal abscess    HPI: Ms. Sloan is a 50y/o F with hx of being un-housed, T2DM, HTN and substance abuse who presented with worsening back pain and was admitted for spinal osteomyelitis, septic arthritis and L psoas/iliacus muscle abscess, transferred to [facility] 9/16 for IR and neurosurgery eval.    Per chart, she experienced a mechanical fall 5 days ago with subsequent muscle spasms to legs and back. Denies current drug use.   Imaging studies of her lumbar spine and pelvis were concerning for osteomyelitis/septic arthritis of the left SI joint and L5-S1 along with an abnormal fluid collection extending from T11 through the left iliacus muscle concerning for abscess as well as at psoas. She was started on empiric vanc and zosyn. Case was discussed with neurosurgery who did not feel surgical intervention was warranted and recommended IR drainage. IR recommended against drainage given extensive superficial infection.    On arrival antibiotics were transitioned to doxy, vanc and meropenem. BCx grew GPC. MRI revealed "Complex fluid collection possibly extending from the left SI joint to the left iliac fossa displacing the left psoas muscle medially. This is similar to the study 1 day prior could represent infectious left sacroiliitis/osteomyelitis and adjacent abscess. The collection extends superiorly to approximately T12 level at the left posterior retroperitoneum, posterior to the left kidney. Enhancement noted to much of the sacrum including right of midline again could represent osteomyelitis."     ID consulted for: multiple fluidcollections concerning for abcess as well as multiple sites concerning for OM.     Past Medical History:   Diagnosis Date    Diabetes mellitus  " "   Hypertension     Unspecified viral hepatitis C without hepatic coma        Past Surgical History:   Procedure Laterality Date     SECTION         Review of patient's allergies indicates:  No Known Allergies    Medications:  Medications Prior to Admission   Medication Sig    CLONIDINE HCL ORAL Take by mouth.    gabapentin (NEURONTIN) 300 MG capsule Take 300 mg by mouth 3 (three) times daily.    GLIPIZIDE ORAL Take by mouth.    hydrOXYzine pamoate (VISTARIL) 25 MG Cap Take 25 mg by mouth 3 (three) times daily.    metFORMIN (GLUCOPHAGE) 500 MG tablet Take 500 mg by mouth 2 (two) times daily with meals.    famotidine (PEPCID) 20 MG tablet Take 1 tablet (20 mg total) by mouth 2 (two) times daily as needed for Heartburn.    ibuprofen (ADVIL,MOTRIN) 600 MG tablet Take 1 tablet (600 mg total) by mouth every 6 (six) hours as needed for Pain.    methadone (DOLOPHINE) 10 MG tablet Take 70 mg by mouth every 6 (six) hours as needed for Pain.     Antibiotics (From admission, onward)      Start     Stop Route Frequency Ordered    24 1400  clindamycin in D5W 600 mg/50 mL IVPB 600 mg         -- IV Every 8 hours (non-standard times) 24 1319    24 1045  mupirocin 2 % ointment         24 0859 Nasl 2 times daily 24 0931    24 0100  vancomycin (VANCOCIN) 1,000 mg in D5W 250 mL IVPB (admixture device)         -- IV Every 12 hours (non-standard times) 24 0010    24 2347  vancomycin - pharmacy to dose  (vancomycin IVPB (PEDS and ADULTS))        Placed in "And" Linked Group    -- IV pharmacy to manage frequency 24 2348          Antifungals (From admission, onward)      None          Antivirals (From admission, onward)      None               There is no immunization history on file for this patient.    Family History    None       Social History     Socioeconomic History    Marital status:    Tobacco Use    Smoking status: Every Day     Current packs/day: 0.50     Types: " Cigarettes    Smokeless tobacco: Never   Substance and Sexual Activity    Alcohol use: Not Currently    Drug use: Not Currently     Review of Systems   Constitutional:  Positive for chills, fatigue and fever.   HENT:  Negative for dental problem.    Respiratory:  Negative for cough and choking.    Cardiovascular:  Negative for chest pain and leg swelling.   Gastrointestinal:  Positive for abdominal pain, nausea and vomiting.   Musculoskeletal:  Positive for arthralgias, back pain, gait problem and myalgias.   Skin:  Positive for wound.   All other systems reviewed and are negative.    Objective:     Vital Signs (Most Recent):  Temp: 98.4 °F (36.9 °C) (09/17/24 1501)  Pulse: 93 (09/17/24 1600)  Resp: 20 (09/17/24 1600)  BP: (!) 153/97 (09/17/24 1501)  SpO2: 95 % (09/17/24 1600) Vital Signs (24h Range):  Temp:  [98.4 °F (36.9 °C)-99.6 °F (37.6 °C)] 98.4 °F (36.9 °C)  Pulse:  [] 93  Resp:  [16-29] 20  SpO2:  [91 %-100 %] 95 %  BP: (140-171)/() 153/97     Weight: 65.2 kg (143 lb 11.8 oz)  Body mass index is 26.29 kg/m².    Estimated Creatinine Clearance: 84.2 mL/min (based on SCr of 0.7 mg/dL).     Physical Exam  Vitals reviewed.   Constitutional:       General: She is not in acute distress.     Appearance: She is well-developed. She is ill-appearing.   HENT:      Head: Normocephalic and atraumatic.   Eyes:      Conjunctiva/sclera: Conjunctivae normal.      Pupils: Pupils are equal, round, and reactive to light.   Cardiovascular:      Rate and Rhythm: Normal rate and regular rhythm.      Heart sounds: Normal heart sounds.   Pulmonary:      Effort: Pulmonary effort is normal. No respiratory distress.      Breath sounds: Normal breath sounds.   Abdominal:      General: Bowel sounds are normal. There is no distension.      Palpations: Abdomen is soft.   Musculoskeletal:         General: Normal range of motion.      Cervical back: Normal range of motion and neck supple.      Comments: Ttp on spine   Skin:      General: Skin is warm and dry.      Comments: Numerous circular skin ulcerations on arms and legs without purulence.   Neurological:      General: No focal deficit present.      Mental Status: She is alert and oriented to person, place, and time.      Cranial Nerves: No cranial nerve deficit.   Psychiatric:         Mood and Affect: Mood normal.         Behavior: Behavior normal.          Significant Labs: Blood Culture:   Recent Labs   Lab 09/16/24  1054 09/16/24  1145   LABBLOO Gram stain aer bottle: Gram positive cocci in clusters resembling Staph  Gram stain felix bottle: Gram positive cocci in clusters resembling Staph  Results called to and read back by: FATIMAH Mcnulty. 09/17/2024  03:43 Gram stain felix bottle: Gram positive cocci in clusters resembling Staph  Results called to and read back by: FATIMAH Mcnulty. 09/17/2024  03:48  Gram stain aer bottle: Gram positive cocci in clusters resembling Staph  Positive results previously called 09/17/2024  06:05     All pertinent labs within the past 24 hours have been reviewed.    Significant Imaging: I have reviewed all pertinent imaging results/findings within the past 24 hours.      Critical care time spent on the evaluation and treatment of severe organ dysfunction, review of pertinent labs and imaging studies, discussions with consulting providers and discussions with patient/family: 40 minutes.

## 2024-09-17 NOTE — ASSESSMENT & PLAN NOTE
Patient's FSGs are uncontrolled due to hyperglycemia on current medication regimen.  Last A1c reviewed-   Lab Results   Component Value Date    HGBA1C 12.6 (H) 06/02/2023     Most recent fingerstick glucose reviewed-   Recent Labs   Lab 09/16/24  0815 09/16/24  1445 09/17/24  0100   POCTGLUCOSE >500* 475* >500*     Current correctional scale  Medium  Maintain anti-hyperglycemic dose as follows-   Antihyperglycemics (From admission, onward)      Start     Stop Route Frequency Ordered    09/17/24 0209  insulin aspart U-100 pen 0-10 Units         -- SubQ Every 6 hours PRN 09/17/24 0109    09/16/24 2347  insulin aspart U-100 pen 0-10 Units         -- SubQ Before meals & nightly PRN 09/16/24 2347          Hold Oral hypoglycemics while patient is in the hospital.

## 2024-09-18 ENCOUNTER — ANESTHESIA (OUTPATIENT)
Dept: ENDOSCOPY | Facility: HOSPITAL | Age: 49
End: 2024-09-18
Payer: MEDICAID

## 2024-09-18 ENCOUNTER — ANESTHESIA EVENT (OUTPATIENT)
Dept: ENDOSCOPY | Facility: HOSPITAL | Age: 49
End: 2024-09-18
Payer: MEDICAID

## 2024-09-18 PROBLEM — E87.8 REFEEDING SYNDROME: Status: ACTIVE | Noted: 2024-09-18

## 2024-09-18 PROBLEM — I38 ENDOCARDITIS: Status: ACTIVE | Noted: 2024-09-18

## 2024-09-18 LAB
ALBUMIN SERPL BCP-MCNC: 1.2 G/DL (ref 3.5–5.2)
ALLENS TEST: ABNORMAL
ALP SERPL-CCNC: 146 U/L (ref 55–135)
ALT SERPL W/O P-5'-P-CCNC: 23 U/L (ref 10–44)
ANION GAP SERPL CALC-SCNC: 10 MMOL/L (ref 8–16)
ANION GAP SERPL CALC-SCNC: 7 MMOL/L (ref 8–16)
ANION GAP SERPL CALC-SCNC: 8 MMOL/L (ref 8–16)
ANISOCYTOSIS BLD QL SMEAR: SLIGHT
APTT PPP: 29.2 SEC (ref 21–32)
AST SERPL-CCNC: 13 U/L (ref 10–40)
BASO STIPL BLD QL SMEAR: ABNORMAL
BASOPHILS # BLD AUTO: 0.09 K/UL (ref 0–0.2)
BASOPHILS # BLD AUTO: 0.11 K/UL (ref 0–0.2)
BASOPHILS NFR BLD: 0.6 % (ref 0–1.9)
BASOPHILS NFR BLD: 0.8 % (ref 0–1.9)
BILIRUB SERPL-MCNC: 1.2 MG/DL (ref 0.1–1)
BLD PROD TYP BPU: NORMAL
BLOOD UNIT EXPIRATION DATE: NORMAL
BLOOD UNIT TYPE CODE: 5100
BLOOD UNIT TYPE: NORMAL
BUN SERPL-MCNC: 4 MG/DL (ref 6–20)
BUN SERPL-MCNC: 4 MG/DL (ref 6–20)
BUN SERPL-MCNC: 6 MG/DL (ref 6–20)
CA-I BLDV-SCNC: 0.98 MMOL/L (ref 1.06–1.42)
CALCIUM SERPL-MCNC: 6.4 MG/DL (ref 8.7–10.5)
CALCIUM SERPL-MCNC: 6.8 MG/DL (ref 8.7–10.5)
CALCIUM SERPL-MCNC: 6.8 MG/DL (ref 8.7–10.5)
CHLORIDE SERPL-SCNC: 100 MMOL/L (ref 95–110)
CHLORIDE SERPL-SCNC: 101 MMOL/L (ref 95–110)
CHLORIDE SERPL-SCNC: 101 MMOL/L (ref 95–110)
CK SERPL-CCNC: 31 U/L (ref 20–180)
CO2 SERPL-SCNC: 24 MMOL/L (ref 23–29)
CO2 SERPL-SCNC: 25 MMOL/L (ref 23–29)
CO2 SERPL-SCNC: 26 MMOL/L (ref 23–29)
CODING SYSTEM: NORMAL
CREAT SERPL-MCNC: 0.5 MG/DL (ref 0.5–1.4)
CREAT SERPL-MCNC: 0.5 MG/DL (ref 0.5–1.4)
CREAT SERPL-MCNC: 0.6 MG/DL (ref 0.5–1.4)
CROSSMATCH INTERPRETATION: NORMAL
D DIMER PPP IA.FEU-MCNC: 3.81 MG/L FEU
DACRYOCYTES BLD QL SMEAR: ABNORMAL
DELSYS: ABNORMAL
DIFFERENTIAL METHOD BLD: ABNORMAL
DIFFERENTIAL METHOD BLD: ABNORMAL
DISPENSE STATUS: NORMAL
EOSINOPHIL # BLD AUTO: 0.2 K/UL (ref 0–0.5)
EOSINOPHIL # BLD AUTO: 0.2 K/UL (ref 0–0.5)
EOSINOPHIL NFR BLD: 1.2 % (ref 0–8)
EOSINOPHIL NFR BLD: 1.2 % (ref 0–8)
ERYTHROCYTE [DISTWIDTH] IN BLOOD BY AUTOMATED COUNT: 16.4 % (ref 11.5–14.5)
ERYTHROCYTE [DISTWIDTH] IN BLOOD BY AUTOMATED COUNT: 17.2 % (ref 11.5–14.5)
ERYTHROCYTE [SEDIMENTATION RATE] IN BLOOD BY WESTERGREN METHOD: 18 MM/H
EST. GFR  (NO RACE VARIABLE): >60 ML/MIN/1.73 M^2
FIBRINOGEN PPP-MCNC: 299 MG/DL (ref 182–400)
FIBRINOGEN PPP-MCNC: 305 MG/DL (ref 182–400)
FIO2: 45
FOLATE SERPL-MCNC: <2.2 NG/ML (ref 4–24)
FOLATE SERPL-MCNC: <2.2 NG/ML (ref 4–24)
GLUCOSE SERPL-MCNC: 146 MG/DL (ref 70–110)
GLUCOSE SERPL-MCNC: 159 MG/DL (ref 70–110)
GLUCOSE SERPL-MCNC: 173 MG/DL (ref 70–110)
HAPTOGLOB SERPL-MCNC: 233 MG/DL (ref 30–250)
HAPTOGLOB SERPL-MCNC: 314 MG/DL (ref 30–250)
HCO3 UR-SCNC: 29.2 MMOL/L (ref 24–28)
HCT VFR BLD AUTO: 21.4 % (ref 37–48.5)
HCT VFR BLD AUTO: 23.1 % (ref 37–48.5)
HCT VFR BLD CALC: 18 %PCV (ref 36–54)
HCV RNA SERPL QL NAA+PROBE: NOT DETECTED
HCV RNA SPEC NAA+PROBE-ACNC: NOT DETECTED IU/ML
HGB BLD-MCNC: 6.7 G/DL (ref 12–16)
HGB BLD-MCNC: 7.5 G/DL (ref 12–16)
HYPOCHROMIA BLD QL SMEAR: ABNORMAL
HYPOCHROMIA BLD QL SMEAR: ABNORMAL
IMM GRANULOCYTES # BLD AUTO: 0.41 K/UL (ref 0–0.04)
IMM GRANULOCYTES # BLD AUTO: 0.44 K/UL (ref 0–0.04)
IMM GRANULOCYTES NFR BLD AUTO: 2.7 % (ref 0–0.5)
IMM GRANULOCYTES NFR BLD AUTO: 3.1 % (ref 0–0.5)
INR PPP: 1.6 (ref 0.8–1.2)
LDH SERPL L TO P-CCNC: 538 U/L (ref 110–260)
LYMPHOCYTES # BLD AUTO: 2.5 K/UL (ref 1–4.8)
LYMPHOCYTES # BLD AUTO: 2.5 K/UL (ref 1–4.8)
LYMPHOCYTES NFR BLD: 16.4 % (ref 18–48)
LYMPHOCYTES NFR BLD: 17.6 % (ref 18–48)
MAGNESIUM SERPL-MCNC: 1.5 MG/DL (ref 1.6–2.6)
MCH RBC QN AUTO: 24.4 PG (ref 27–31)
MCH RBC QN AUTO: 25.6 PG (ref 27–31)
MCHC RBC AUTO-ENTMCNC: 31.3 G/DL (ref 32–36)
MCHC RBC AUTO-ENTMCNC: 32.5 G/DL (ref 32–36)
MCV RBC AUTO: 78 FL (ref 82–98)
MCV RBC AUTO: 79 FL (ref 82–98)
MODE: ABNORMAL
MONOCYTES # BLD AUTO: 0.4 K/UL (ref 0.3–1)
MONOCYTES # BLD AUTO: 0.4 K/UL (ref 0.3–1)
MONOCYTES NFR BLD: 2.5 % (ref 4–15)
MONOCYTES NFR BLD: 2.6 % (ref 4–15)
NEUTROPHILS # BLD AUTO: 10.6 K/UL (ref 1.8–7.7)
NEUTROPHILS # BLD AUTO: 11.4 K/UL (ref 1.8–7.7)
NEUTROPHILS NFR BLD: 74.8 % (ref 38–73)
NEUTROPHILS NFR BLD: 76.5 % (ref 38–73)
NRBC BLD-RTO: 1 /100 WBC
NRBC BLD-RTO: 1 /100 WBC
OVALOCYTES BLD QL SMEAR: ABNORMAL
OVALOCYTES BLD QL SMEAR: ABNORMAL
PATH REV BLD -IMP: NORMAL
PCO2 BLDA: 42.5 MMHG (ref 35–45)
PEEP: 5
PH SMN: 7.45 [PH] (ref 7.35–7.45)
PHOSPHATE SERPL-MCNC: 1.1 MG/DL (ref 2.7–4.5)
PHOSPHATE SERPL-MCNC: 2.5 MG/DL (ref 2.7–4.5)
PLATELET # BLD AUTO: 112 K/UL (ref 150–450)
PLATELET # BLD AUTO: 128 K/UL (ref 150–450)
PLATELET BLD QL SMEAR: ABNORMAL
PMV BLD AUTO: 10 FL (ref 9.2–12.9)
PMV BLD AUTO: 9.5 FL (ref 9.2–12.9)
PO2 BLDA: 29 MMHG (ref 40–60)
POC BE: 5 MMOL/L
POC IONIZED CALCIUM: 0.97 MMOL/L (ref 1.06–1.42)
POC SATURATED O2: 57 % (ref 95–100)
POC TCO2: 30 MMOL/L (ref 24–29)
POCT GLUCOSE: 170 MG/DL (ref 70–110)
POCT GLUCOSE: 181 MG/DL (ref 70–110)
POCT GLUCOSE: 189 MG/DL (ref 70–110)
POCT GLUCOSE: 202 MG/DL (ref 70–110)
POCT GLUCOSE: 207 MG/DL (ref 70–110)
POIKILOCYTOSIS BLD QL SMEAR: SLIGHT
POIKILOCYTOSIS BLD QL SMEAR: SLIGHT
POLYCHROMASIA BLD QL SMEAR: ABNORMAL
POLYCHROMASIA BLD QL SMEAR: ABNORMAL
POTASSIUM BLD-SCNC: 4 MMOL/L (ref 3.5–5.1)
POTASSIUM SERPL-SCNC: 2.6 MMOL/L (ref 3.5–5.1)
POTASSIUM SERPL-SCNC: 3.3 MMOL/L (ref 3.5–5.1)
POTASSIUM SERPL-SCNC: 3.5 MMOL/L (ref 3.5–5.1)
PROT SERPL-MCNC: 4.8 G/DL (ref 6–8.4)
PROTHROMBIN TIME: 16.9 SEC (ref 9–12.5)
RBC # BLD AUTO: 2.75 M/UL (ref 4–5.4)
RBC # BLD AUTO: 2.93 M/UL (ref 4–5.4)
SAMPLE: ABNORMAL
SITE: ABNORMAL
SODIUM BLD-SCNC: 135 MMOL/L (ref 136–145)
SODIUM SERPL-SCNC: 134 MMOL/L (ref 136–145)
SPHEROCYTES BLD QL SMEAR: ABNORMAL
SPHEROCYTES BLD QL SMEAR: ABNORMAL
TOXIC GRANULES BLD QL SMEAR: PRESENT
TRANS ERYTHROCYTES VOL PATIENT: NORMAL ML
VANCOMYCIN TROUGH SERPL-MCNC: 14.4 UG/ML (ref 10–22)
VIT B12 SERPL-MCNC: 1716 PG/ML (ref 210–950)
VIT B12 SERPL-MCNC: 1976 PG/ML (ref 210–950)
VT: 450
WBC # BLD AUTO: 14.1 K/UL (ref 3.9–12.7)
WBC # BLD AUTO: 14.96 K/UL (ref 3.9–12.7)

## 2024-09-18 PROCEDURE — 84100 ASSAY OF PHOSPHORUS: CPT | Performed by: STUDENT IN AN ORGANIZED HEALTH CARE EDUCATION/TRAINING PROGRAM

## 2024-09-18 PROCEDURE — 99900035 HC TECH TIME PER 15 MIN (STAT)

## 2024-09-18 PROCEDURE — 85025 COMPLETE CBC W/AUTO DIFF WBC: CPT | Mod: 91

## 2024-09-18 PROCEDURE — 25000003 PHARM REV CODE 250: Performed by: STUDENT IN AN ORGANIZED HEALTH CARE EDUCATION/TRAINING PROGRAM

## 2024-09-18 PROCEDURE — 25000003 PHARM REV CODE 250

## 2024-09-18 PROCEDURE — 25000003 PHARM REV CODE 250: Performed by: NURSE ANESTHETIST, CERTIFIED REGISTERED

## 2024-09-18 PROCEDURE — 82746 ASSAY OF FOLIC ACID SERUM: CPT

## 2024-09-18 PROCEDURE — 86920 COMPATIBILITY TEST SPIN: CPT

## 2024-09-18 PROCEDURE — 84100 ASSAY OF PHOSPHORUS: CPT | Mod: 91

## 2024-09-18 PROCEDURE — 85610 PROTHROMBIN TIME: CPT

## 2024-09-18 PROCEDURE — 99223 1ST HOSP IP/OBS HIGH 75: CPT | Mod: ,,, | Performed by: INTERNAL MEDICINE

## 2024-09-18 PROCEDURE — 20000000 HC ICU ROOM

## 2024-09-18 PROCEDURE — 94761 N-INVAS EAR/PLS OXIMETRY MLT: CPT

## 2024-09-18 PROCEDURE — A9585 GADOBUTROL INJECTION: HCPCS | Performed by: STUDENT IN AN ORGANIZED HEALTH CARE EDUCATION/TRAINING PROGRAM

## 2024-09-18 PROCEDURE — 63600175 PHARM REV CODE 636 W HCPCS: Performed by: STUDENT IN AN ORGANIZED HEALTH CARE EDUCATION/TRAINING PROGRAM

## 2024-09-18 PROCEDURE — 36430 TRANSFUSION BLD/BLD COMPNT: CPT

## 2024-09-18 PROCEDURE — 37000009 HC ANESTHESIA EA ADD 15 MINS

## 2024-09-18 PROCEDURE — 85379 FIBRIN DEGRADATION QUANT: CPT

## 2024-09-18 PROCEDURE — 63600175 PHARM REV CODE 636 W HCPCS

## 2024-09-18 PROCEDURE — 99291 CRITICAL CARE FIRST HOUR: CPT | Mod: ,,, | Performed by: STUDENT IN AN ORGANIZED HEALTH CARE EDUCATION/TRAINING PROGRAM

## 2024-09-18 PROCEDURE — 87040 BLOOD CULTURE FOR BACTERIA: CPT | Mod: 59 | Performed by: STUDENT IN AN ORGANIZED HEALTH CARE EDUCATION/TRAINING PROGRAM

## 2024-09-18 PROCEDURE — 27100171 HC OXYGEN HIGH FLOW UP TO 24 HOURS

## 2024-09-18 PROCEDURE — 25500020 PHARM REV CODE 255: Performed by: STUDENT IN AN ORGANIZED HEALTH CARE EDUCATION/TRAINING PROGRAM

## 2024-09-18 PROCEDURE — 37000008 HC ANESTHESIA 1ST 15 MINUTES

## 2024-09-18 PROCEDURE — 80202 ASSAY OF VANCOMYCIN: CPT | Performed by: STUDENT IN AN ORGANIZED HEALTH CARE EDUCATION/TRAINING PROGRAM

## 2024-09-18 PROCEDURE — P9021 RED BLOOD CELLS UNIT: HCPCS

## 2024-09-18 PROCEDURE — 83735 ASSAY OF MAGNESIUM: CPT | Performed by: STUDENT IN AN ORGANIZED HEALTH CARE EDUCATION/TRAINING PROGRAM

## 2024-09-18 PROCEDURE — 94002 VENT MGMT INPAT INIT DAY: CPT

## 2024-09-18 PROCEDURE — 85384 FIBRINOGEN ACTIVITY: CPT | Mod: 91

## 2024-09-18 PROCEDURE — 85025 COMPLETE CBC W/AUTO DIFF WBC: CPT | Performed by: STUDENT IN AN ORGANIZED HEALTH CARE EDUCATION/TRAINING PROGRAM

## 2024-09-18 PROCEDURE — 87522 HEPATITIS C REVRS TRNSCRPJ: CPT

## 2024-09-18 PROCEDURE — 85730 THROMBOPLASTIN TIME PARTIAL: CPT

## 2024-09-18 PROCEDURE — 84425 ASSAY OF VITAMIN B-1: CPT

## 2024-09-18 PROCEDURE — 80053 COMPREHEN METABOLIC PANEL: CPT | Performed by: STUDENT IN AN ORGANIZED HEALTH CARE EDUCATION/TRAINING PROGRAM

## 2024-09-18 PROCEDURE — 80048 BASIC METABOLIC PNL TOTAL CA: CPT | Mod: 91,XB | Performed by: STUDENT IN AN ORGANIZED HEALTH CARE EDUCATION/TRAINING PROGRAM

## 2024-09-18 PROCEDURE — 63600175 PHARM REV CODE 636 W HCPCS: Mod: JZ,JG | Performed by: STUDENT IN AN ORGANIZED HEALTH CARE EDUCATION/TRAINING PROGRAM

## 2024-09-18 PROCEDURE — 82607 VITAMIN B-12: CPT

## 2024-09-18 PROCEDURE — 83615 LACTATE (LD) (LDH) ENZYME: CPT

## 2024-09-18 PROCEDURE — 82550 ASSAY OF CK (CPK): CPT | Performed by: STUDENT IN AN ORGANIZED HEALTH CARE EDUCATION/TRAINING PROGRAM

## 2024-09-18 PROCEDURE — 63600175 PHARM REV CODE 636 W HCPCS: Performed by: NURSE ANESTHETIST, CERTIFIED REGISTERED

## 2024-09-18 PROCEDURE — 5A1955Z RESPIRATORY VENTILATION, GREATER THAN 96 CONSECUTIVE HOURS: ICD-10-PCS | Performed by: STUDENT IN AN ORGANIZED HEALTH CARE EDUCATION/TRAINING PROGRAM

## 2024-09-18 PROCEDURE — 85384 FIBRINOGEN ACTIVITY: CPT

## 2024-09-18 PROCEDURE — 82330 ASSAY OF CALCIUM: CPT | Performed by: STUDENT IN AN ORGANIZED HEALTH CARE EDUCATION/TRAINING PROGRAM

## 2024-09-18 PROCEDURE — 83010 ASSAY OF HAPTOGLOBIN QUANT: CPT

## 2024-09-18 PROCEDURE — 80048 BASIC METABOLIC PNL TOTAL CA: CPT | Mod: XB | Performed by: STUDENT IN AN ORGANIZED HEALTH CARE EDUCATION/TRAINING PROGRAM

## 2024-09-18 RX ORDER — POTASSIUM CHLORIDE 7.45 MG/ML
10 INJECTION INTRAVENOUS
Status: DISCONTINUED | OUTPATIENT
Start: 2024-09-18 | End: 2024-09-18

## 2024-09-18 RX ORDER — GLIPIZIDE 5 MG/1
5 TABLET ORAL 2 TIMES DAILY
Status: ON HOLD | COMMUNITY
End: 2024-10-11 | Stop reason: HOSPADM

## 2024-09-18 RX ORDER — THIAMINE HCL 100 MG
100 TABLET ORAL DAILY
Status: DISCONTINUED | OUTPATIENT
Start: 2024-09-18 | End: 2024-09-19

## 2024-09-18 RX ORDER — GABAPENTIN 300 MG/1
300 CAPSULE ORAL 3 TIMES DAILY
Status: DISCONTINUED | OUTPATIENT
Start: 2024-09-18 | End: 2024-09-19

## 2024-09-18 RX ORDER — POTASSIUM CHLORIDE 29.8 MG/ML
40 INJECTION INTRAVENOUS ONCE
Status: COMPLETED | OUTPATIENT
Start: 2024-09-18 | End: 2024-09-18

## 2024-09-18 RX ORDER — IBUPROFEN 200 MG
16 TABLET ORAL
Status: CANCELLED | OUTPATIENT
Start: 2024-09-18

## 2024-09-18 RX ORDER — CLONIDINE HYDROCHLORIDE 0.1 MG/1
0.2 TABLET ORAL 3 TIMES DAILY
Status: DISCONTINUED | OUTPATIENT
Start: 2024-09-18 | End: 2024-09-19

## 2024-09-18 RX ORDER — LIDOCAINE HYDROCHLORIDE 20 MG/ML
INJECTION INTRAVENOUS
Status: DISCONTINUED | OUTPATIENT
Start: 2024-09-18 | End: 2024-09-18

## 2024-09-18 RX ORDER — HYDROCODONE BITARTRATE AND ACETAMINOPHEN 500; 5 MG/1; MG/1
TABLET ORAL
Status: DISCONTINUED | OUTPATIENT
Start: 2024-09-18 | End: 2024-09-21

## 2024-09-18 RX ORDER — MIDAZOLAM HYDROCHLORIDE 1 MG/ML
INJECTION INTRAMUSCULAR; INTRAVENOUS
Status: DISCONTINUED | OUTPATIENT
Start: 2024-09-18 | End: 2024-09-18

## 2024-09-18 RX ORDER — GADOBUTROL 604.72 MG/ML
6 INJECTION INTRAVENOUS
Status: COMPLETED | OUTPATIENT
Start: 2024-09-18 | End: 2024-09-18

## 2024-09-18 RX ORDER — PHENYLEPHRINE HYDROCHLORIDE 10 MG/ML
INJECTION INTRAVENOUS
Status: DISCONTINUED | OUTPATIENT
Start: 2024-09-18 | End: 2024-09-18

## 2024-09-18 RX ORDER — ACETAMINOPHEN 500 MG
1000 TABLET ORAL EVERY 6 HOURS PRN
Status: DISCONTINUED | OUTPATIENT
Start: 2024-09-18 | End: 2024-09-21

## 2024-09-18 RX ORDER — IBUPROFEN 200 MG
24 TABLET ORAL
Status: CANCELLED | OUTPATIENT
Start: 2024-09-18

## 2024-09-18 RX ORDER — METHADONE HYDROCHLORIDE 10 MG/1
70 TABLET ORAL DAILY
Status: DISCONTINUED | OUTPATIENT
Start: 2024-09-18 | End: 2024-09-19

## 2024-09-18 RX ORDER — CLONIDINE HYDROCHLORIDE 0.2 MG/1
0.2 TABLET ORAL 3 TIMES DAILY
Status: ON HOLD | COMMUNITY
End: 2024-10-11 | Stop reason: HOSPADM

## 2024-09-18 RX ORDER — SUCCINYLCHOLINE CHLORIDE 20 MG/ML
INJECTION INTRAMUSCULAR; INTRAVENOUS
Status: DISCONTINUED | OUTPATIENT
Start: 2024-09-18 | End: 2024-09-18

## 2024-09-18 RX ORDER — PROPOFOL 10 MG/ML
VIAL (ML) INTRAVENOUS
Status: DISCONTINUED | OUTPATIENT
Start: 2024-09-18 | End: 2024-09-18

## 2024-09-18 RX ORDER — MAGNESIUM SULFATE HEPTAHYDRATE 40 MG/ML
2 INJECTION, SOLUTION INTRAVENOUS ONCE
Status: COMPLETED | OUTPATIENT
Start: 2024-09-18 | End: 2024-09-18

## 2024-09-18 RX ORDER — ROCURONIUM BROMIDE 10 MG/ML
INJECTION, SOLUTION INTRAVENOUS
Status: DISCONTINUED | OUTPATIENT
Start: 2024-09-18 | End: 2024-09-18

## 2024-09-18 RX ORDER — FOLIC ACID 1 MG/1
1 TABLET ORAL DAILY
Status: DISCONTINUED | OUTPATIENT
Start: 2024-09-18 | End: 2024-09-19

## 2024-09-18 RX ORDER — PROPOFOL 10 MG/ML
0-50 INJECTION, EMULSION INTRAVENOUS CONTINUOUS
Status: DISCONTINUED | OUTPATIENT
Start: 2024-09-18 | End: 2024-09-23

## 2024-09-18 RX ORDER — CALCIUM GLUCONATE 20 MG/ML
1 INJECTION, SOLUTION INTRAVENOUS ONCE
Status: COMPLETED | OUTPATIENT
Start: 2024-09-18 | End: 2024-09-18

## 2024-09-18 RX ORDER — HYDROXYZINE PAMOATE 25 MG/1
25 CAPSULE ORAL NIGHTLY
Status: DISCONTINUED | OUTPATIENT
Start: 2024-09-18 | End: 2024-09-19

## 2024-09-18 RX ORDER — OXYCODONE HYDROCHLORIDE 10 MG/1
10 TABLET ORAL ONCE
Status: COMPLETED | OUTPATIENT
Start: 2024-09-18 | End: 2024-09-18

## 2024-09-18 RX ORDER — FENTANYL CITRATE 50 UG/ML
INJECTION, SOLUTION INTRAMUSCULAR; INTRAVENOUS
Status: DISCONTINUED | OUTPATIENT
Start: 2024-09-18 | End: 2024-09-18

## 2024-09-18 RX ADMIN — VANCOMYCIN HYDROCHLORIDE 1000 MG: 1 INJECTION, POWDER, LYOPHILIZED, FOR SOLUTION INTRAVENOUS at 02:09

## 2024-09-18 RX ADMIN — INSULIN GLARGINE 15 UNITS: 100 INJECTION, SOLUTION SUBCUTANEOUS at 09:09

## 2024-09-18 RX ADMIN — LIDOCAINE HYDROCHLORIDE 80 MG: 20 INJECTION INTRAVENOUS at 08:09

## 2024-09-18 RX ADMIN — INSULIN ASPART 2 UNITS: 100 INJECTION, SOLUTION INTRAVENOUS; SUBCUTANEOUS at 09:09

## 2024-09-18 RX ADMIN — CLONIDINE HYDROCHLORIDE 0.2 MG: 0.1 TABLET ORAL at 08:09

## 2024-09-18 RX ADMIN — POTASSIUM CHLORIDE 40 MEQ: 29.8 INJECTION, SOLUTION INTRAVENOUS at 08:09

## 2024-09-18 RX ADMIN — GADOBUTROL 6 ML: 604.72 INJECTION INTRAVENOUS at 09:09

## 2024-09-18 RX ADMIN — PHENYLEPHRINE HYDROCHLORIDE 100 MCG: 10 INJECTION INTRAVENOUS at 08:09

## 2024-09-18 RX ADMIN — MAGNESIUM SULFATE HEPTAHYDRATE 2 G: 40 INJECTION, SOLUTION INTRAVENOUS at 05:09

## 2024-09-18 RX ADMIN — OXYCODONE 10 MG: 5 TABLET ORAL at 04:09

## 2024-09-18 RX ADMIN — HYDROXYZINE PAMOATE 25 MG: 25 CAPSULE ORAL at 02:09

## 2024-09-18 RX ADMIN — FOLIC ACID 1 MG: 1 TABLET ORAL at 10:09

## 2024-09-18 RX ADMIN — PIPERACILLIN SODIUM AND TAZOBACTAM SODIUM 4.5 G: 4; .5 INJECTION, POWDER, FOR SOLUTION INTRAVENOUS at 08:09

## 2024-09-18 RX ADMIN — POTASSIUM BICARBONATE 50 MEQ: 978 TABLET, EFFERVESCENT ORAL at 05:09

## 2024-09-18 RX ADMIN — CLINDAMYCIN IN 5 PERCENT DEXTROSE 600 MG: 12 INJECTION, SOLUTION INTRAVENOUS at 10:09

## 2024-09-18 RX ADMIN — POTASSIUM CHLORIDE 10 MEQ: 7.46 INJECTION, SOLUTION INTRAVENOUS at 07:09

## 2024-09-18 RX ADMIN — SUCCINYLCHOLINE 140 MG: 20 INJECTION, SOLUTION INTRAMUSCULAR; INTRAVENOUS at 08:09

## 2024-09-18 RX ADMIN — SODIUM PHOSPHATE, MONOBASIC, MONOHYDRATE AND SODIUM PHOSPHATE, DIBASIC, ANHYDROUS 30 MMOL: 142; 276 INJECTION, SOLUTION INTRAVENOUS at 06:09

## 2024-09-18 RX ADMIN — POTASSIUM CHLORIDE 10 MEQ: 7.46 INJECTION, SOLUTION INTRAVENOUS at 06:09

## 2024-09-18 RX ADMIN — INSULIN ASPART 2 UNITS: 100 INJECTION, SOLUTION INTRAVENOUS; SUBCUTANEOUS at 12:09

## 2024-09-18 RX ADMIN — PHENYLEPHRINE HYDROCHLORIDE 100 MCG: 10 INJECTION INTRAVENOUS at 09:09

## 2024-09-18 RX ADMIN — CALCIUM GLUCONATE 1 G: 20 INJECTION, SOLUTION INTRAVENOUS at 06:09

## 2024-09-18 RX ADMIN — POTASSIUM PHOSPHATE, MONOBASIC AND POTASSIUM PHOSPHATE, DIBASIC 30 MMOL: 224; 236 INJECTION, SOLUTION, CONCENTRATE INTRAVENOUS at 05:09

## 2024-09-18 RX ADMIN — PROPOFOL 20 MCG/KG/MIN: 10 INJECTION, EMULSION INTRAVENOUS at 09:09

## 2024-09-18 RX ADMIN — LIDOCAINE 1 PATCH: 700 PATCH TOPICAL at 02:09

## 2024-09-18 RX ADMIN — PROPOFOL 120 MG: 10 INJECTION, EMULSION INTRAVENOUS at 08:09

## 2024-09-18 RX ADMIN — INSULIN ASPART 2 UNITS: 100 INJECTION, SOLUTION INTRAVENOUS; SUBCUTANEOUS at 04:09

## 2024-09-18 RX ADMIN — Medication 100 MG: at 10:09

## 2024-09-18 RX ADMIN — OXYCODONE HYDROCHLORIDE 10 MG: 10 TABLET ORAL at 07:09

## 2024-09-18 RX ADMIN — PIPERACILLIN SODIUM AND TAZOBACTAM SODIUM 4.5 G: 4; .5 INJECTION, POWDER, FOR SOLUTION INTRAVENOUS at 01:09

## 2024-09-18 RX ADMIN — CLINDAMYCIN IN 5 PERCENT DEXTROSE 600 MG: 12 INJECTION, SOLUTION INTRAVENOUS at 12:09

## 2024-09-18 RX ADMIN — SODIUM CHLORIDE: 0.9 INJECTION, SOLUTION INTRAVENOUS at 08:09

## 2024-09-18 RX ADMIN — GABAPENTIN 300 MG: 300 CAPSULE ORAL at 02:09

## 2024-09-18 RX ADMIN — PANTOPRAZOLE SODIUM 40 MG: 40 TABLET, DELAYED RELEASE ORAL at 08:09

## 2024-09-18 RX ADMIN — INSULIN GLARGINE 15 UNITS: 100 INJECTION, SOLUTION SUBCUTANEOUS at 11:09

## 2024-09-18 RX ADMIN — ROCURONIUM BROMIDE 5 MG: 10 INJECTION, SOLUTION INTRAVENOUS at 08:09

## 2024-09-18 RX ADMIN — GABAPENTIN 300 MG: 300 CAPSULE ORAL at 08:09

## 2024-09-18 RX ADMIN — CLINDAMYCIN IN 5 PERCENT DEXTROSE 600 MG: 12 INJECTION, SOLUTION INTRAVENOUS at 06:09

## 2024-09-18 RX ADMIN — METHADONE HYDROCHLORIDE 70 MG: 10 TABLET ORAL at 09:09

## 2024-09-18 RX ADMIN — MIDAZOLAM HYDROCHLORIDE 2 MG: 2 INJECTION, SOLUTION INTRAMUSCULAR; INTRAVENOUS at 08:09

## 2024-09-18 RX ADMIN — POTASSIUM CHLORIDE 10 MEQ: 7.46 INJECTION, SOLUTION INTRAVENOUS at 05:09

## 2024-09-18 RX ADMIN — ROCURONIUM BROMIDE 45 MG: 10 INJECTION, SOLUTION INTRAVENOUS at 08:09

## 2024-09-18 RX ADMIN — CLONIDINE HYDROCHLORIDE 0.2 MG: 0.1 TABLET ORAL at 02:09

## 2024-09-18 RX ADMIN — FENTANYL CITRATE 100 MCG: 50 INJECTION, SOLUTION INTRAMUSCULAR; INTRAVENOUS at 08:09

## 2024-09-18 RX ADMIN — OXACILLIN 12 G: 2 INJECTION, POWDER, FOR SOLUTION INTRAMUSCULAR; INTRAVENOUS at 02:09

## 2024-09-18 NOTE — ASSESSMENT & PLAN NOTE
Patient has had reduced appetite for the past several months.  Labs today show low-sodium, no potassium, phosphorus, magnesium, and calcium (uncorrected) with an elevated LDH and Ferritin. Being managed as a potential Refeeding syndrome:     PLAN:  - IV Thiamine,   - Monitor daily weights  - Monitor I/O  - Continuous cardiac monitoring  - Diet and Nutrition consult  - CBC BID; trend and replete  - CMP BID; trend and replete.

## 2024-09-18 NOTE — PROGRESS NOTES
[facility] - Medical ICU  Critical Care Medicine  Progress Note    Patient Name: Mari Sloan  MRN: [id number]  Admission Date: 9/16/2024  Hospital Length of Stay: 2 days  Code Status: Full Code  Attending Provider: Bentley Connell MD  Primary Care Provider: Liliana, Primary Doctor   Principal Problem: Paraspinal abscess    Subjective:     HPI:  Miss Sloan is a pleasant 49-year-old female with a medical history of diabetes, hypertension, and substance use disorder  transferred from OS for specialized evaluation following imaging studies concerning for osteomyelitis or septic arthritis of the left sacroiliac joint, as well as an abnormal fluid collection extending from T11 through the iliacus muscle, raising suspicion for an abscess. The patient reports a mechanical fall six days ago.     Laboratory results from the OSH revealed significant findings, including hypokalemia, hypomagnesemia, severe anemia, metabolic alkalosis, and hyperglycemia.     Labs today CBC leukocytosis (WBC 13.5), hgb 6.9 following transfusion with one unit of packed red blood cells,  platelets at 119. Iron studies revealed a total iron-binding capacity (TIBC) of 152, transferrin of 103, and elevated ferritin at 2000, suggestive of anemia of chronic disease or inflammation. CMP potassium 3.2.  Liver function  unremarkable. Her CRP was markedly elevated at 179.6, indicating significant inflammation or infection. Lactate dehydrogenase was 474, while CPK was within normal limits. Her hemoglobin A1c was elevated at 7.9, reflecting poor glycemic control.    A urine drug screen was positive for presumptive methadone (follows in methano clinic) and opioids. The infectious workup thus far positive staph aureus by PCR and blood cultures growing Gram-positive cocci resembling staph.  MRSA PCR testing returning negative.  Additionally, the patients urinalysis was notable for a hazy appearance, with trace ketones, glucose, positive nitrites, and moderate  bacteriuria, raising suspicion for a urinary tract infection.     Hospital/ICU Course:  Patient was admitted to the MICU with a diagnosis of Paraspinal abscess. Had  Hb of 6.7 and was transfused with 1 unit of blood with a post-transfusion Hb of 7.5. The LDH was high, Phosphorus, Potassium, Sodium, Mg were low. General Surgery requested that Neurosurgery and IR review patient. Neurosurgery wants the IR team to review and drain the abscess and do not think that a neurosurgical intervention is indicated at this time. They do not agree that there is an Epidural abscess. Patient is being closely followed up with DIC labs, CMP for electrolytes. We will be repleting accordingly. RUQ USS was requested. The MARICHUY that was ordered as a result of Staff bacteremia was declined by the Cardiology team. With her electrolyte derangement, and a background of appetitive loss in the past 2 months,  a strong suspicion for Refeeding syndrome is appropriate.    No current facility-administered medications on file prior to encounter.     Current Outpatient Medications on File Prior to Encounter   Medication Sig    gabapentin (NEURONTIN) 300 MG capsule Take 300 mg by mouth 3 (three) times daily.    hydrOXYzine pamoate (VISTARIL) 25 MG Cap Take 25 mg by mouth 3 (three) times daily.    metFORMIN (GLUCOPHAGE) 500 MG tablet Take 500 mg by mouth 2 (two) times daily with meals.    methadone (DOLOPHINE) 10 MG tablet Take 70 mg by mouth Daily.    [DISCONTINUED] CLONIDINE HCL ORAL Take by mouth.    [DISCONTINUED] GLIPIZIDE ORAL Take by mouth.    cloNIDine (CATAPRES) 0.2 MG tablet Take 0.2 mg by mouth 3 (three) times daily.    glipiZIDE (GLUCOTROL) 5 MG tablet Take 5 mg by mouth 2 (two) times daily.    [DISCONTINUED] famotidine (PEPCID) 20 MG tablet Take 1 tablet (20 mg total) by mouth 2 (two) times daily as needed for Heartburn.    [DISCONTINUED] ibuprofen (ADVIL,MOTRIN) 600 MG tablet Take 1 tablet (600 mg total) by mouth every 6 (six) hours as  needed for Pain.       Review of patient's allergies indicates:  No Known Allergies    Past Medical History:   Diagnosis Date    Diabetes mellitus     Hypertension     Unspecified viral hepatitis C without hepatic coma      Past Surgical History:   Procedure Laterality Date     SECTION       Family History    None       Tobacco Use    Smoking status: Every Day     Current packs/day: 0.50     Types: Cigarettes    Smokeless tobacco: Never   Substance and Sexual Activity    Alcohol use: Not Currently    Drug use: Not Currently    Sexual activity: Not on file     Review of Systems   Constitutional:  Positive for activity change, appetite change and fatigue. Negative for fever.   HENT:  Negative for dental problem and trouble swallowing.    Respiratory:  Negative for apnea, chest tightness, shortness of breath and wheezing.    Cardiovascular:  Positive for leg swelling (worse on te  left LE). Negative for chest pain.   Gastrointestinal:  Positive for abdominal distention. Negative for abdominal pain, nausea and vomiting.   Genitourinary:  Positive for flank pain (left flank pain). Negative for hematuria and pelvic pain.   Musculoskeletal:  Positive for arthralgias and back pain. Negative for neck pain and neck stiffness.   Skin:  Positive for color change.   Neurological:  Positive for weakness. Negative for speech difficulty, numbness and headaches.   Psychiatric/Behavioral:  Negative for agitation and behavioral problems.      Objective:     Vital Signs (Most Recent):  Temp: 99.1 °F (37.3 °C) (24 0701)  Pulse: 98 (24 1700)  Resp: (!) 23 (24 1700)  BP: (!) 140/95 (24 1700)  SpO2: (!) 92 % (24 1700) Vital Signs (24h Range):  Temp:  [98.3 °F (36.8 °C)-99.6 °F (37.6 °C)] 99.1 °F (37.3 °C)  Pulse:  [] 98  Resp:  [18-41] 23  SpO2:  [89 %-99 %] 92 %  BP: (122-180)/() 140/95     Weight: 65.2 kg (143 lb 11.8 oz)  Body mass index is 26.29 kg/m².     Physical Exam  Constitutional:        Appearance: She is ill-appearing. She is not toxic-appearing.      Comments: In pain, specifically right hip   HENT:      Head: Normocephalic.      Right Ear: External ear normal.      Left Ear: External ear normal.   Cardiovascular:      Rate and Rhythm: Normal rate and regular rhythm.   Pulmonary:      Effort: Pulmonary effort is normal. No respiratory distress.      Breath sounds: Rales present.   Chest:      Chest wall: No tenderness.   Abdominal:      General: There is distension.      Palpations: There is no mass.      Tenderness: There is no abdominal tenderness.      Comments: Left flank indurated, no fluctuation, minimally tender  Minimally tender abdominal exam   Musculoskeletal:      Cervical back: No rigidity.      Comments: Pain with flexion of left hip   Skin:     Coloration: Skin is pale. Skin is not jaundiced.      Findings: Bruising (left arm) present.      Comments: Multiple acute and chronic , circular ulcers in upper and lower extremities   Neurological:      General: No focal deficit present.      Mental Status: She is alert. Mental status is at baseline.      Sensory: No sensory deficit.   Psychiatric:         Mood and Affect: Mood normal.         Behavior: Behavior normal.         Thought Content: Thought content normal.            I have reviewed all pertinent lab results within the past 24 hours.  CBC:   Recent Labs   Lab 09/18/24  1217   WBC 14.96*   RBC 2.93*   HGB 7.5*   HCT 23.1*   *   MCV 79*   MCH 25.6*   MCHC 32.5     CMP:   Recent Labs   Lab 09/18/24  0305 09/18/24  0910 09/18/24  1428   *   < > 146*   CALCIUM 6.4*   < > 6.8*   ALBUMIN 1.2*  --   --    PROT 4.8*  --   --    *   < > 134*   K 2.6*   < > 3.3*   CO2 24   < > 26      < > 101   BUN 4*   < > 6   CREATININE 0.6   < > 0.5   ALKPHOS 146*  --   --    ALT 23  --   --    AST 13  --   --    BILITOT 1.2*  --   --     < > = values in this interval not displayed.       ABG  Recent Labs   Lab  09/16/24  0954   PH 7.666*   PO2 97*   PCO2 31.5*   HCO3 36.0*   BE 15*     Assessment/Plan:     Psychiatric  History of drug use  Patient with history of drug misuse disorder.   Reports following with methadone clinic although leans her pain is not well controlled, has not gone clinic since last Friday 9/13  -urine drug screen positive for presumptive methadone and opiates  -patient reports snorting methamphetamine and fentanyl regularly  -we will resume methadone while inpatient  -consider addiction psych consult    Patient reports being on 70 mg methadone q.6, American society of addiction Medicine recommends continuation of methadone maintenance therapy without interruption and use of short-acting opioids like oxycodone for acute pain.  -consider higher doses of oral oxycodone given patient's acute pain needs and known high opioid tolerance i.e. 20-40 mg of oxycodone q.4 to q.6  9/18:  Methadone 70 mg Daily ordered    Cardiac/Vascular  Endocarditis  9/18:  Probable endocarditis- possible vegetation vs normal variant noted on mitral valve noted on TTE. Will treat as presumptive endocarditis.  Stop vancomycin and zosyn  Start oxacillin (concern for epidural abscess)  Continue clindamycin for now (report of gas in L flank)  Follow up IR / neurosurgery / general surgery recs   Obtain MARICHUY- Cardiology recommends to treat as recommended by ID (as presumptive IE, considering the risk of MARICHUY to the patient)            Not a candidate for home IV antibiotics due to active substance abuse.    Renal/  Hypokalemia  Replace PRN    ID  * Paraspinal abscess  Case was discussed with neurosurgery who did not feel surgical intervention was warranted and recommended IR drainage. IR recommended against drainage given extensive superficial infection. MRI today with  complex fluid collection.     See Osteomyelitis multiple sites    Sepsis  This patient does have evidence of infective focus  My overall impression is sepsis.  Source:  "Skin and Soft Tissue (location lefts psoas, SI joint, paraspinal abcess)  Antibiotics given-   Antibiotics (72h ago, onward)      Start     Stop Route Frequency Ordered    09/18/24 0200  vancomycin (VANCOCIN) 1,000 mg in D5W 250 mL IVPB (admixture device)         -- IV Every 12 hours (non-standard times) 09/17/24 2248    09/18/24 0130  piperacillin-tazobactam (ZOSYN) 4.5 g in D5W 100 mL IVPB (MB+)         -- IV Every 8 hours (non-standard times) 09/18/24 0005    09/17/24 1400  clindamycin in D5W 600 mg/50 mL IVPB 600 mg         -- IV Every 8 hours (non-standard times) 09/17/24 1319    09/16/24 2347  vancomycin - pharmacy to dose  (vancomycin IVPB (PEDS and ADULTS))        Placed in "And" Linked Group    -- IV pharmacy to manage frequency 09/16/24 2348          Latest lactate reviewed-  Recent Labs   Lab 09/16/24  1912 09/16/24  2042 09/17/24  0800   LACTATE  --    < > 1.7   POCLAC 1.38  --   --     < > = values in this interval not displayed.     Organ dysfunction indicated by Thrombocytopenia     Fluid challenge Not needed - patient is not hypotensive      Post- resuscitation assessment No - Post resuscitation assessment not needed       Will Not start Pressors- Levophed for MAP of 65      Other osteomyelitis, multiple sites  Per ID in OSH   48y/o F with hx of being un-housed, T2DM, HTN and drug use transferred for spinal osteomyelitis, septic arthritis and Lt flank/ retroperitoneal abscess.     Imaging studies of her lumbar spine and pelvis were concerning for osteomyelitis/septic arthritis of the left SI joint and L5-S1 along with an abnormal fluid collection extending from T11 through the left iliacus muscle concerning for abscess as well as at psoas.     Started on empiric vanc and zosyn. Case was discussed with neurosurgery who did not feel surgical intervention was warranted and recommended IR drainage. IR recommended against drainage given extensive superficial infection. MRI today with  complex fluid " collection. left SI joint to the left iliac fossa.      BCx growing GPC in clusters. 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      Recommendations:  -Continue vancomycin. Pharm to dose for goal trough 15-20.  -Follow-up ID and susceptibilities of GPC in blood   -Would discontinue meropenem given blood cx now with gpc  -Agree with clindamycin given visualized gas/ necrotizing infection       - rec'd IR aspiration of abscess with cultures      -consulting ID while at [facility]    On admission antibiotic regimen of vancomycin, clindamycin, piperacillin tazobactam.    9/18:  ID discontinued Zosyna and Vanc.  Continue Clindamycin  Start Oxacillin  Neurosurgery consulted: Appreciate recommendations  Orthopedic surgery consult sent; Appreciate recommendations                  Oncology  Anemia, unspecified  Patient with anemia upon lab review with a hemoglobin 6.9 post transfusion; likely multifactorial in the setting of chronic disease, deficiency and acute blood loss (RP bleed?)  -CBC showing microcytic anemia given MCV of 79 with high ferritin of 2000 suggesting anemia of chronic disease can not rule out acute blood loss in the setting of recent fall and RP abscess    Plan:  Monitor CBC and transfuse if hemoglobin less than 7  -consider additional iron studies  -monitor for signs of bleeding    9/18:  Hb of 6.7=> Received 1 unit of blood=> 7.5 post-transfusion  DIC Labs: (d-dimer, Fibrinogen)  RUQ USS    Endocrine  Refeeding syndrome  Patient has had reduced appetite for the past several months.  Labs today show low-sodium, no potassium, phosphorus, magnesium, and calcium (uncorrected) with an elevated LDH and Ferritin. Being managed as a potential Refeeding syndrome:     PLAN:  - IV Thiamine,   - Monitor daily weights  - Monitor I/O  - Continuous cardiac monitoring  - Diet and Nutrition consult sent; Appreciate recommendation  - CBC BID; trend and replete  - CMP BID; trend and replete.    Hyperglycemia  Patient with a history of  diabetes ,most recent A1c 7.9.  Reports taking metformin 500 mg b.i.d. and glipizide b.i.d.    -we will manage with moderate insulin sliding scale while inpatient    Uncontrolled type 2 diabetes mellitus with hyperglycemia  Holding oral home medications while inpatient  -low dose insulin sliding scale    GI  Hepatitis C  Patient with a normal hep C antibody    -per ID recs hep C quant order (pending) 9/18: HepC Not detected.      Psoas muscle abscess  -Consulting Gen surgery         Other  Smoker  -Nicotine patch if patient amenable       Critical Care Daily Checklist:    A: Awake: RASS Goal/Actual Goal: RASS Goal: 0-->alert and calm  Actual:     B: Spontaneous Breathing Trial Performed?     C: SAT & SBT Coordinated?  N/A                   D: Delirium: CAM-ICU Overall CAM-ICU: Negative   E: Early Mobility Performed? No   F: Feeding Goal:    Status:     Current Diet Order   Procedures    Diet NPO Except for: Medication, Sips with Medication     Order Specific Question:   Except for:     Answer:   Medication     Order Specific Question:   Except for:     Answer:   Sips with Medication      AS: Analgesia/Sedation Oxycodone and Lidocaine patch   T: Thromboembolic Prophylaxis None   H: HOB > 300 Yes   U: Stress Ulcer Prophylaxis (if needed) Protonix   G: Glucose Control Sliding scale   B: Bowel Function     I: Indwelling Catheter (Lines & López) Necessity PIV and Trialysis Catheter   D: De-escalation of Antimicrobials/Pharmacotherapies Oxacillin and Clindamycin    Plan for the day/ETD Monitor CBC, CMP, and replete as needed    Code Status:  Family/Goals of Care: Full Code         Critical secondary to Patient has a condition that poses threat to life and bodily function: Bacteremia, Osteomyelitis and Refeeding syndrome     Critical care was time spent personally by me on the following activities: development of treatment plan with patient or surrogate and bedside caregivers, discussions with consultants, evaluation of  patient's response to treatment, examination of patient, ordering and performing treatments and interventions, ordering and review of laboratory studies, ordering and review of radiographic studies, pulse oximetry, re-evaluation of patient's condition. This critical care time did not overlap with that of any other provider or involve time for any procedures.     Adelita Barnes MD  Critical Care Medicine  [facility] - [facility]

## 2024-09-18 NOTE — ASSESSMENT & PLAN NOTE
Case was discussed with neurosurgery who did not feel surgical intervention was warranted and recommended IR drainage. IR recommended against drainage given extensive superficial infection. MRI today with  complex fluid collection.     See Osteomyelitis multiple sites

## 2024-09-18 NOTE — SUBJECTIVE & OBJECTIVE
Past Medical History:   Diagnosis Date    Diabetes mellitus     Hypertension     Unspecified viral hepatitis C without hepatic coma        Past Surgical History:   Procedure Laterality Date     SECTION         Review of patient's allergies indicates:  No Known Allergies    Medications:  Medications Prior to Admission   Medication Sig    gabapentin (NEURONTIN) 300 MG capsule Take 300 mg by mouth 3 (three) times daily.    hydrOXYzine pamoate (VISTARIL) 25 MG Cap Take 25 mg by mouth 3 (three) times daily.    metFORMIN (GLUCOPHAGE) 500 MG tablet Take 500 mg by mouth 2 (two) times daily with meals.    methadone (DOLOPHINE) 10 MG tablet Take 70 mg by mouth Daily.    cloNIDine (CATAPRES) 0.2 MG tablet Take 0.2 mg by mouth 3 (three) times daily.    glipiZIDE (GLUCOTROL) 5 MG tablet Take 5 mg by mouth 2 (two) times daily.     Antibiotics (From admission, onward)      Start     Stop Route Frequency Ordered    24 0130  piperacillin-tazobactam (ZOSYN) 4.5 g in D5W 100 mL IVPB (MB+)         -- IV Every 8 hours (non-standard times) 24 0005    24 1400  clindamycin in D5W 600 mg/50 mL IVPB 600 mg         -- IV Every 8 hours (non-standard times) 24 1319          Antifungals (From admission, onward)      None          Antivirals (From admission, onward)      None               There is no immunization history on file for this patient.    Family History    None       Social History     Socioeconomic History    Marital status:    Tobacco Use    Smoking status: Every Day     Current packs/day: 0.50     Types: Cigarettes    Smokeless tobacco: Never   Substance and Sexual Activity    Alcohol use: Not Currently    Drug use: Not Currently     Social Determinants of Health     Financial Resource Strain: Low Risk  (2024)    Overall Financial Resource Strain (CARDIA)     Difficulty of Paying Living Expenses: Not very hard   Food Insecurity: No Food Insecurity (2024)    Hunger Vital Sign      Worried About Running Out of Food in the Last Year: Never true     Ran Out of Food in the Last Year: Never true   Transportation Needs: Unmet Transportation Needs (9/18/2024)    TRANSPORTATION NEEDS     Transportation : Yes, it has kept me from medical appointments or from getting my medications.   Physical Activity: Patient Declined (9/18/2024)    Exercise Vital Sign     Days of Exercise per Week: Patient declined     Minutes of Exercise per Session: Patient declined   Stress: Stress Concern Present (9/18/2024)    [employer] - Occupational Stress Questionnaire     Feeling of Stress : Rather much   Housing Stability: Low Risk  (9/18/2024)    Housing Stability Vital Sign     Unable to Pay for Housing in the Last Year: No     Homeless in the Last Year: No     Review of Systems   Constitutional:  Negative for chills and fever.   Respiratory:  Negative for cough and shortness of breath.    Cardiovascular:  Negative for chest pain.   Gastrointestinal:  Positive for abdominal pain. Negative for constipation, diarrhea, nausea and vomiting.   Genitourinary:  Positive for flank pain.   Musculoskeletal:  Positive for arthralgias. Negative for myalgias.   Skin:  Positive for wound. Negative for rash.   Neurological:  Positive for weakness. Negative for headaches.     Objective:     Vital Signs (Most Recent):  Temp: 99.1 °F (37.3 °C) (09/18/24 0701)  Pulse: 99 (09/18/24 0900)  Resp: (!) 24 (09/18/24 0916)  BP: (!) 154/78 (09/18/24 0900)  SpO2: 96 % (09/18/24 0900) Vital Signs (24h Range):  Temp:  [98.3 °F (36.8 °C)-99.6 °F (37.6 °C)] 99.1 °F (37.3 °C)  Pulse:  [] 99  Resp:  [17-41] 24  SpO2:  [89 %-99 %] 96 %  BP: (124-180)/() 154/78     Weight: 65.2 kg (143 lb 11.8 oz)  Body mass index is 26.29 kg/m².    Estimated Creatinine Clearance: 117.9 mL/min (based on SCr of 0.5 mg/dL).     Physical Exam  Vitals reviewed.   Constitutional:       General: She is not in acute distress.      Appearance: Normal appearance. She is not ill-appearing.   HENT:      Head: Normocephalic and atraumatic.   Eyes:      Extraocular Movements: Extraocular movements intact.      Conjunctiva/sclera: Conjunctivae normal.   Cardiovascular:      Rate and Rhythm: Normal rate and regular rhythm.      Heart sounds: No murmur heard.  Pulmonary:      Effort: Pulmonary effort is normal. No respiratory distress.      Breath sounds: Normal breath sounds. No wheezing.   Abdominal:      General: Abdomen is flat. Bowel sounds are normal.      Palpations: Abdomen is soft.      Tenderness: There is no abdominal tenderness.   Musculoskeletal:      Cervical back: Normal range of motion.      Comments: L flank erythematous with 1 lesion  Multiple scabbed blisters on JESSICA UE and LE   Skin:     General: Skin is warm and dry.   Neurological:      General: No focal deficit present.      Mental Status: She is alert and oriented to person, place, and time.   Psychiatric:         Mood and Affect: Mood normal.         Behavior: Behavior normal.         Thought Content: Thought content normal.          Significant Labs: All pertinent labs within the past 24 hours have been reviewed.  Recent Lab Results  (Last 5 results in the past 24 hours)        09/18/24  1212   09/18/24  1052   09/18/24  0910   09/18/24  0859   09/18/24  0305        Albumin         1.2       ALP         146       ALT         23       Anion Gap     8     10       Aniso         Slight       PTT   29.2  Comment: Refer to local heparin nomogram for intensity/dose specific   therapeutic   range.               AST         13       Baso #         0.11       Basophilic Stippling         Occasional       Basophil %         0.8       BILIRUBIN TOTAL         1.2  Comment: For infants and newborns, interpretation of results should be based  on gestational age, weight and in agreement with clinical  observations.    Premature Infant recommended reference ranges:  Up to 24  hours.............<8.0 mg/dL  Up to 48 hours............<12.0 mg/dL  3-5 days..................<15.0 mg/dL  6-29 days.................<15.0 mg/dL         BUN     4     4       Calcium     6.8  Comment: *Critical value notification by LAF with confirmation of receipt to   MALICK POSADA RN at  Date 9/18/24 Time 10:06AM       6.4  Comment: *Critical value notification by pod with confirmation of receipt to   Maddie Lomeli RN at  Date 9/18/24 Time 4:19am         Chloride     101     100       CO2     25     24       CPK         31       Creatinine     0.5     0.6       Differential Method         Automated       eGFR     >60.0     >60.0       Eos #         0.2       Eos %         1.2       Fibrinogen   305             Folate   <2.2             Glucose     159     173       Gran # (ANC)         10.6       Gran %         74.8       Haptoglobin   233             Hematocrit         21.4       Hemoglobin         6.7       Hypo         Occasional       Immature Grans (Abs)         0.44  Comment: Mild elevation in immature granulocytes is non specific and   can be seen in a variety of conditions including stress response,   acute inflammation, trauma and pregnancy. Correlation with other   laboratory and clinical findings is essential.         Immature Granulocytes         3.1       INR   1.6  Comment: Coumadin Therapy:  2.0 - 3.0 for INR for all indicators except mechanical heart valves  and antiphospholipid syndromes which should use 2.5 - 3.5.               Lactate Dehydrogenase   538  Comment: Results are increased in hemolyzed samples.             Lymph #         2.5       Lymph %         17.6       Magnesium          1.5       MCH         24.4       MCHC         31.3       MCV         78       Mono #         0.4       Mono %         2.5       MPV         10.0       nRBC         1       Ovalocytes         Occasional       Phosphorus Level         1.1       Platelet Estimate         Decreased       Platelet Count          128       POCT Glucose 181       189         Poikilocytosis         Slight       Poly         Occasional       Potassium     3.5     2.6  Comment: *Critical value notification by pod with confirmation of receipt to   Maddie Lomeli RN at  Date 9/18/24 Time 4:19am         PROTEIN TOTAL         4.8       PT   16.9             RBC         2.75       RDW         16.4       Sodium     134     134       Spherocytes         Occasional       Teardrop Cells         Occasional       Toxic Granulation         Present       Vitamin B12   1716             WBC         14.10                              Significant Imaging: I have reviewed all pertinent imaging results/findings within the past 24 hours.

## 2024-09-18 NOTE — CONSULTS
Inpatient Radiology Pre-procedure H&P    History of Present Illness:  Mari Sloan is a 49 y.o. female with history of substance abuse who presents for multifocal staph aureus infection. IR consulted for drainage of retroperitoneal fluid collection & aspiration of L SI joint.   Admission H&P reviewed.  Past Medical History:   Diagnosis Date    Diabetes mellitus     Hypertension     Unspecified viral hepatitis C without hepatic coma      Past Surgical History:   Procedure Laterality Date     SECTION         Review of Systems:   As documented in primary team H&P    Home Meds:   Prior to Admission medications    Medication Sig Start Date End Date Taking? Authorizing Provider   gabapentin (NEURONTIN) 300 MG capsule Take 300 mg by mouth 3 (three) times daily.   Yes Provider, Historical   hydrOXYzine pamoate (VISTARIL) 25 MG Cap Take 25 mg by mouth 3 (three) times daily.   Yes Provider, Historical   metFORMIN (GLUCOPHAGE) 500 MG tablet Take 500 mg by mouth 2 (two) times daily with meals. 24 Yes Provider, Historical   methadone (DOLOPHINE) 10 MG tablet Take 70 mg by mouth Daily.   Yes Provider, Historical   CLONIDINE HCL ORAL Take by mouth.  24 Yes Provider, Historical   GLIPIZIDE ORAL Take by mouth.  24 Yes Provider, Historical   cloNIDine (CATAPRES) 0.2 MG tablet Take 0.2 mg by mouth 3 (three) times daily.    Provider, Historical   glipiZIDE (GLUCOTROL) 5 MG tablet Take 5 mg by mouth 2 (two) times daily.    Provider, Historical   famotidine (PEPCID) 20 MG tablet Take 1 tablet (20 mg total) by mouth 2 (two) times daily as needed for Heartburn. 24  Brandon Maldonado PA-C   ibuprofen (ADVIL,MOTRIN) 600 MG tablet Take 1 tablet (600 mg total) by mouth every 6 (six) hours as needed for Pain. 22  Daniel Hurt MD     Scheduled Meds:    clindamycin IV (PEDS and ADULTS)  600 mg Intravenous Q8H    cloNIDine  0.2 mg Oral TID    folic acid  1 mg Oral Daily    gabapentin   300 mg Oral TID    hydrOXYzine pamoate  25 mg Oral QHS    insulin glargine U-100  15 Units Subcutaneous BID    LIDOcaine  1 patch Transdermal Q24H    methadone  70 mg Oral Daily    oxacillin 12 g in  mL CONTINUOUS INFUSION  12 g Intravenous Q24H    pantoprazole  40 mg Oral Daily    thiamine  100 mg Oral Daily     Continuous Infusions:   PRN Meds:  Current Facility-Administered Medications:     0.9%  NaCl infusion (for blood administration), , Intravenous, Q24H PRN    acetaminophen, 1,000 mg, Oral, Q6H PRN    albuterol-ipratropium, 3 mL, Nebulization, Q4H PRN    dextrose 10%, 12.5 g, Intravenous, PRN    dextrose 10%, 12.5 g, Intravenous, PRN    glucagon (human recombinant), 1 mg, Intramuscular, PRN    hydrOXYzine pamoate, 50 mg, Oral, Q6H PRN    insulin aspart U-100, 0-10 Units, Subcutaneous, Q6H PRN    loperamide, 2 mg, Oral, QID PRN    melatonin, 6 mg, Oral, Nightly PRN    naloxone, 0.4 mg, Intravenous, PRN    ondansetron, 4 mg, Intravenous, Q6H PRN    oxyCODONE, 10 mg, Oral, Q4H PRN    polyethylene glycol, 17 g, Oral, BID PRN    sodium chloride 0.9%, 10 mL, Intravenous, Q12H PRN  Anticoagulants/Antiplatelets: no anticoagulation    Allergies: Review of patient's allergies indicates:  No Known Allergies  Sedation Hx: have not been any systemic reactions    Labs:  Recent Labs   Lab 09/18/24  1052   INR 1.6*       Recent Labs   Lab 09/18/24  1217   WBC 14.96*   HGB 7.5*   HCT 23.1*   MCV 79*   *      Recent Labs   Lab 09/18/24  0305 09/18/24  0910   * 159*   * 134*   K 2.6* 3.5    101   CO2 24 25   BUN 4* 4*   CREATININE 0.6 0.5   CALCIUM 6.4* 6.8*   MG 1.5*  --    ALT 23  --    AST 13  --    ALBUMIN 1.2*  --    BILITOT 1.2*  --          Vitals:  Temp: 99.1 °F (37.3 °C) (09/18/24 0701)  Pulse: 98 (09/18/24 1300)  Resp: (!) 33 (09/18/24 1300)  BP: (!) 122/94 (09/18/24 1300)  SpO2: (!) 92 % (09/18/24 1300)     Physical Exam:  ASA: 3  Mallampati: 3    General: no acute distress  Mental  Status: alert and oriented to person, place and time  HEENT: normocephalic, atraumatic  Chest: unlabored breathing  Heart: regular heart rate  Abdomen: nondistended  Extremity: moves all extremities. Innumerable scabs all over extremities.     Plan: Drainage of retroperitoneal abscess & aspiration of L SI joint  Sedation Plan: moderate      Nacho Kim MD MPH  Radiology PGY-3

## 2024-09-18 NOTE — ASSESSMENT & PLAN NOTE
49F PMH DM, HTN, hep c, opioid abuse, presenting after down for unknown period of time and inability to ambulate secondary to pain with imaging demonstrating L SI osteo with associated abscesses extending to retroperitoneum without involvement of thecal sac. Denies true weakness, states pain is limiting her ability to move. Pain in hips and low back. On clinda/zosyn, blood culture 9/16 with staph aureus. Denies bowel or bladder incontinence, complaining of constipation about 6 days. Denies numbness    Imaging:  CT Lsp / pelvis 9/16: fluid collection T11 on L through left psoas, felt abscess  CT CAP 9/16: extensive edema with subq gas through left flank, small psoas abscess and fluid collections left flank, septic arthritis of L SI joint  MRI Lsp w/wo 9/17: L SI complex fluid collection felt to represent osteo with adjacent abscess, extends to approximately T12 level    Plan:  Admitted medicine; q4h nc/vs  Labs / diagnostics reviewed  Preponderance of care per primary  Clinda/Zosyn for Staph aureus blood culture 9/16 per primary  No acute neurosurgical intervention  Recommend MRI C/Tsp w/wo to assess for any further infection  Trend inflammatory markers per primary   Recommend IR consult for aspiration fluid collections / biopsy joint  Please notify for questions/concerns/neurologic decline

## 2024-09-18 NOTE — ASSESSMENT & PLAN NOTE
"This patient does have evidence of infective focus  My overall impression is sepsis.  Source: Skin and Soft Tissue (location lefts psoas, SI joint, paraspinal abcess)  Antibiotics given-   Antibiotics (72h ago, onward)      Start     Stop Route Frequency Ordered    09/18/24 0200  vancomycin (VANCOCIN) 1,000 mg in D5W 250 mL IVPB (admixture device)         -- IV Every 12 hours (non-standard times) 09/17/24 2248    09/18/24 0130  piperacillin-tazobactam (ZOSYN) 4.5 g in D5W 100 mL IVPB (MB+)         -- IV Every 8 hours (non-standard times) 09/18/24 0005    09/17/24 1400  clindamycin in D5W 600 mg/50 mL IVPB 600 mg         -- IV Every 8 hours (non-standard times) 09/17/24 1319    09/16/24 2347  vancomycin - pharmacy to dose  (vancomycin IVPB (PEDS and ADULTS))        Placed in "And" Linked Group    -- IV pharmacy to manage frequency 09/16/24 2348          Latest lactate reviewed-  Recent Labs   Lab 09/16/24  1912 09/16/24  2042 09/17/24  0800   LACTATE  --    < > 1.7   POCLAC 1.38  --   --     < > = values in this interval not displayed.     Organ dysfunction indicated by Thrombocytopenia     Fluid challenge Not needed - patient is not hypotensive      Post- resuscitation assessment No - Post resuscitation assessment not needed       Will Not start Pressors- Levophed for MAP of 65    "

## 2024-09-18 NOTE — ASSESSMENT & PLAN NOTE
Patient with anemia upon lab review with a hemoglobin 6.9 post transfusion; likely multifactorial in the setting of chronic disease, deficiency and acute blood loss (RP bleed?)  -CBC showing microcytic anemia given MCV of 79 with high ferritin of 2000 suggesting anemia of chronic disease can not rule out acute blood loss in the setting of recent fall and RP abscess    Plan:  Monitor CBC and transfuse if hemoglobin less than 7  -consider additional iron studies  -monitor for signs of bleeding

## 2024-09-18 NOTE — HPI
49F PMH DM, HTN, hep c, opioid abuse, homelessness presenting after down for unknown period of time and inability to ambulate secondary to pain with imaging demonstrating L SI osteo with associated abscesses extending to retroperitoneum without involvement of thecal sac. Denies true weakness, states pain is limiting her ability to move. Pain in hips and low back. On clinda/zosyn, blood culture 9/16 with staph aureus. Denies bowel or bladder incontinence, complaining of constipation about 6 days. Denies numbness. S/p L3-5 laminectomy for evacuation of epidural abscess. Prolonged hospital course involving IR left retroperitoneal abscess drain placement 9/21 with removal 9/27. Now s/p left retroperitoneal abscess drain placement by IR 9/30. Repeat CT abdomen/pelvis yesterday showed multiple new and enlargening abscesses. ID recommending continue oxacillin and reconsult surgical services for further source control. NSGY consulted for evaluation.

## 2024-09-18 NOTE — SUBJECTIVE & OBJECTIVE
Past Medical History:   Diagnosis Date    Diabetes mellitus     Hypertension     Unspecified viral hepatitis C without hepatic coma        Past Surgical History:   Procedure Laterality Date     SECTION         Review of patient's allergies indicates:  No Known Allergies    Family History    None       Tobacco Use    Smoking status: Every Day     Current packs/day: 0.50     Types: Cigarettes    Smokeless tobacco: Never   Substance and Sexual Activity    Alcohol use: Not Currently    Drug use: Not Currently    Sexual activity: Not on file      Review of Systems   Constitutional:  Positive for appetite change and fatigue. Negative for diaphoresis and fever.   HENT:  Positive for dental problem. Negative for trouble swallowing.    Respiratory:  Negative for shortness of breath and wheezing.    Cardiovascular:  Negative for chest pain and leg swelling.   Gastrointestinal:  Positive for nausea and vomiting. Negative for abdominal distention and abdominal pain.   Genitourinary:  Positive for flank pain (left flank pain). Negative for pelvic pain.   Musculoskeletal:  Positive for arthralgias (right hip pain). Negative for neck pain and neck stiffness.   Skin:  Positive for color change.   Neurological:  Negative for speech difficulty and weakness.   Psychiatric/Behavioral:  Negative for agitation, behavioral problems and confusion.      Objective:     Vital Signs (Most Recent):  Temp: 98.3 °F (36.8 °C) (24)  Pulse: 102 (24)  Resp: (!) 29 (24)  BP: (!) 180/108 (24)  SpO2: 99 % (24) Vital Signs (24h Range):  Temp:  [98.3 °F (36.8 °C)-98.6 °F (37 °C)] 98.3 °F (36.8 °C)  Pulse:  [] 102  Resp:  [16-29] 29  SpO2:  [91 %-100 %] 99 %  BP: (150-180)/() 180/108   Weight: 65.2 kg (143 lb 11.8 oz)  Body mass index is 26.29 kg/m².      Intake/Output Summary (Last 24 hours) at 2024  Last data filed at 2024 1901  Gross per 24 hour   Intake 7776.08 ml    Output 500 ml   Net 7276.08 ml          Physical Exam  Constitutional:       Appearance: She is not ill-appearing or toxic-appearing.      Comments: In pain, specifically right hip   HENT:      Right Ear: External ear normal.      Left Ear: External ear normal.      Nose: No congestion or rhinorrhea.   Eyes:      General: No scleral icterus.  Cardiovascular:      Rate and Rhythm: Regular rhythm. Tachycardia present.      Heart sounds: No murmur heard.     No gallop.   Pulmonary:      Breath sounds: No wheezing or rales.   Abdominal:      General: There is distension.      Palpations: There is no mass.      Tenderness: There is no abdominal tenderness. There is guarding. There is no rebound.      Hernia: No hernia is present.      Comments: Left flank indurated to palpation, no fluctuation, no warmth, equivocal erythema vrs normal changes of bruising given recent fall, non tender.   Musculoskeletal:         General: No signs of injury.      Right lower leg: No edema.      Left lower leg: Edema present.   Skin:     Coloration: Skin is not jaundiced.      Findings: Bruising (left arm) and lesion present. No rash.      Comments: Multiple acute and chronic , circular ulcers in upper and lower extremities   Neurological:      General: No focal deficit present.      Mental Status: She is alert.      Sensory: No sensory deficit.   Psychiatric:         Mood and Affect: Mood normal.         Behavior: Behavior normal.            Vents:     Lines/Drains/Airways       Central Venous Catheter Line  Duration             Percutaneous Central Line - Triple Lumen  09/16/24 1700 Internal Jugular Right 1 day              Drain  Duration             Female External Urinary Catheter w/ Suction 09/16/24 2345 <1 day                  Significant Labs:    CBC/Anemia Profile:  Recent Labs   Lab 09/17/24  0800 09/17/24  1326 09/17/24  2227   WBC 14.40* 13.51* 14.69*   HGB 7.1* 6.9* 7.1*   HCT 21.7* 22.3* 22.2*   * 119* 143*   MCV 78*  79* 78*   RDW 15.9* 15.9* 16.2*   IRON  --  68  --    FERRITIN  --  2,077*  --    RETIC  --  2.0  --         Chemistries:  Recent Labs   Lab 09/16/24  0934 09/16/24  2105 09/17/24  0017 09/17/24  0800 09/17/24  1605   *   < > 139 136 138   K 2.5*   < > 2.2* 2.7* 3.2*   CL 91*   < > 100 100 101   CO2 27   < > 27 28 27   BUN 7   < > 5* 5* 5*   CREATININE 0.8   < > 0.7 0.7 0.6   CALCIUM 7.7*   < > 6.8* 6.3* 6.6*   ALBUMIN 1.5*  --   --  1.3*  --    PROT 6.5  --   --  5.2*  --    BILITOT 1.4*  --   --  1.6*  --    ALKPHOS 184*  --   --  135  --    ALT 25  --   --  19  --    AST 24  --   --  11  --    MG 1.2*  --  1.5* 1.7 1.9   PHOS  --   --  1.7* 2.2*  --     < > = values in this interval not displayed.       Recent Lab Results  (Last 5 results in the past 24 hours)        09/18/24  0019   09/17/24  2227   09/17/24  2054   09/17/24  1819   09/17/24  1726        Unit Blood Type Code       5100  [P]         Unit Expiration       [id number]  [P]         Unit Blood Type       O POS  [P]         Albumin   1.3             ALP   156             ALT   21             Anion Gap   8             Aniso   Slight             AST   16             Baso #   0.07             Basophilic Stippling   Occasional             Basophil %   0.5             BILIRUBIN TOTAL   1.3  Comment: For infants and newborns, interpretation of results should be based  on gestational age, weight and in agreement with clinical  observations.    Premature Infant recommended reference ranges:  Up to 24 hours.............<8.0 mg/dL  Up to 48 hours............<12.0 mg/dL  3-5 days..................<15.0 mg/dL  6-29 days.................<15.0 mg/dL               BUN   4             Calcium   6.7  Comment: *Critical value notification by MICHAEL with confirmation of receipt to   PADMA COLON RN at  Date 9/17/24 Time 11:17PM               Chloride   103             CO2   26             CODING SYSTEM       [id number]  [P]         Creatinine   0.6              Crossmatch Interpretation       Compatible  [P]         Differential Method   Automated             DISPENSE STATUS       CROSSMATCHED  [P]         eGFR   >60.0             Eos #   0.1             Eos %   1.0             Giant Platelets   Present             Glucose   240             Gran # (ANC)   11.5             Gran %   78.1             Group & Rh   O POS     O POS         Hematocrit   22.2             Hemoglobin   7.1             Hypo   Occasional             Immature Grans (Abs)   0.57  Comment: Mild elevation in immature granulocytes is non specific and   can be seen in a variety of conditions including stress response,   acute inflammation, trauma and pregnancy. Correlation with other   laboratory and clinical findings is essential.               Immature Granulocytes   3.9             INDIRECT BUCK   NEG     NEG         Lymph #   2.1             Lymph %   14.0             Magnesium    1.8             MCH   25.0             MCHC   32.0             MCV   78             Mono #   0.4             Mono %   2.5             MPV   10.4             nRBC   1             Ovalocytes   Occasional             Phosphorus Level   1.1             Platelet Estimate   Decreased             Platelet Count   143             POCT Glucose 207     263     339       Poikilocytosis   Slight             Poly   Occasional             Potassium   3.2             Product Code       [id number]  [P]         PROTEIN TOTAL   5.2             RBC   2.84             RDW   16.2             Sodium   137             Specimen Outdate   09/20/2024 23:59     09/20/2024 23:59         Spherocytes   Occasional             Teardrop Cells   Occasional             Toxic Granulation   Present             UNIT NUMBER       [id number]  [P]         WBC   14.69                                     [P] - Preliminary Result               Significant Imaging: I have reviewed all pertinent imaging results/findings within the past 24 hours.

## 2024-09-18 NOTE — ASSESSMENT & PLAN NOTE
Patient with a normal hep C antibody    -per ID recs hep C quant order (pending) 9/18: HepC Not detected.

## 2024-09-18 NOTE — NURSING
[facility] [place]  Nurses Note -- 4 Eyes      9/17/2024       Skin assessed on: Q Shift      [x] No Pressure Injuries Present    [x]Prevention Measures Documented    [] Yes LDA  for Pressure Injury Previously documented     [] Yes New Pressure Injury Discovered   [] LDA for New Pressure Injury Added      Attending RN:  Velia Vazquez RN     Second RN:  FATIMAH Quiñonez

## 2024-09-18 NOTE — PLAN OF CARE
MICU DAILY GOALS     Family/Goals of care/Code Status   Code Status: Full Code    24H Vital Sign Range  Temp:  [98.3 °F (36.8 °C)-99.6 °F (37.6 °C)]   Pulse:  []   Resp:  [17-41]   BP: (124-180)/()   SpO2:  [90 %-100 %]      Shift Events (include procedures and significant events)   Pt admitted to MICU via transfer from [facility]. VSS. Oxy PRN given x1 for generalized/back pain. Blood cx obtained and sent. Type & Screen and consents obtained for blood administration. 1 unit PRBC given per team orders. Electrolytes replenished per team orders.     AWAKE RASS: Goal - RASS Goal: 0-->alert and calm  Actual - RASS (Zavala Agitation-Sedation Scale): alert and calm    Restraint necessity: Not necessary   BREATHE SBT: Not intubated    Coordinate A & B, analgesics/sedatives Pain: managed   SAT: Not intubated   Delirium CAM-ICU: Overall CAM-ICU: Negative   Early(intubated/ Progressive (non-intubated) Mobility MOVE Screen (INTUBATED ONLY): Not intubated    Activity: Activity Management: Arm raise - L1   Feeding/Nutrition Diet order: Diet/Nutrition Received: NPO, sips of water,     Thrombus DVT prophylaxis: VTE Required Core Measure: Pharmacological prophylaxis initiated/maintained   HOB Elevation Head of Bed (HOB) Positioning: HOB at 30 degrees   Ulcer Prophylaxis GI: yes   Glucose control managed Glycemic Management: blood glucose monitored   Skin Skin assessed during: Admit    Sacrum intact/not altered? Yes  Heels intact/not altered? No  Surgical wound? No    CHECK ONE!   (no altered skin or altered skin) and sub boxes:  [] No Altered Skin Integrity Present    []Prevention Measures Documented    [x] Altered Skin Integrity Present or Discovered   [x] LDA present in EPIC, daily doc completed              [] LDA added if not in EPIC (describe wound).                    When describing wound, do not stage, use descriptive words only.    [] Wound Image Taken (required on admit,                    transfer/discharge and every Tuesday)    Wound Care Consulted? No    4 EYES:  Attending Nurse (1st set of eyes): FATIMAH Perkins    Second RN/Staff Member (2nd set of eyes): FATIMAH Mckeon   Bowel Function no issues    Indwelling Catheter Necessity      Percutaneous Central Line - Triple Lumen  09/16/24 1700 Internal Jugular Right-Line Necessity Review: Frequent Blood Draws, Hemodynamic instability, Poor venous access     De-escalation Antibiotics Yes        VS and assessment per flow sheet, patient progressing towards goals as tolerated, plan of care reviewed with family, all concerns addressed, will continue to monitor.

## 2024-09-18 NOTE — NURSING
Patient picked up by Jose.Transported with cardiac monitor, pulse ox and on room air with brief in place with GTT of NS. Patient in no visible distress.  allowed to accompany patient in transport to [facility].

## 2024-09-18 NOTE — ASSESSMENT & PLAN NOTE
Mari Sloan is a 49 y.o. lady with multiple fluid collections and concern for osteomyelitis at her SI joint    - recommend IR consult for draining these fluid collections  - now that MRI is completed, would discuss with Neurosurgery here per note from the other hospital  - Abx per primary team  - Would not recommend general surgery intervention for her psoas abscess

## 2024-09-18 NOTE — HPI
Miss Sloan is a pleasant 49-year-old female with a medical history of diabetes, hypertension, and substance use disorder  transferred from [facility] for specialized evaluation following imaging studies concerning for osteomyelitis or septic arthritis of the left sacroiliac joint, as well as an abnormal fluid collection extending from T11 through the iliacus muscle, raising suspicion for an abscess. The patient reports a mechanical fall six days ago.     Laboratory results from the OSH revealed significant findings, including hypokalemia, hypomagnesemia, severe anemia, metabolic alkalosis, and hyperglycemia.     Labs today CBC leukocytosis (WBC 13.5), hgb 6.9 following transfusion with one unit of packed red blood cells,  platelets at 119. Iron studies revealed a total iron-binding capacity (TIBC) of 152, transferrin of 103, and elevated ferritin at 2000, suggestive of anemia of chronic disease or inflammation. CMP potassium 3.2.  Liver function  unremarkable. Her CRP was markedly elevated at 179.6, indicating significant inflammation or infection. Lactate dehydrogenase was 474, while CPK was within normal limits. Her hemoglobin A1c was elevated at 7.9, reflecting poor glycemic control.    A urine drug screen was positive for presumptive methadone (follows in methano clinic) and opioids. The infectious workup thus far positive staph aureus by PCR and blood cultures growing Gram-positive cocci resembling staph.  MRSA PCR testing returning negative.  Additionally, the patients urinalysis was notable for a hazy appearance, with trace ketones, glucose, positive nitrites, and moderate bacteriuria, raising suspicion for a urinary tract infection.

## 2024-09-18 NOTE — SUBJECTIVE & OBJECTIVE
No current facility-administered medications on file prior to encounter.     Current Outpatient Medications on File Prior to Encounter   Medication Sig    CLONIDINE HCL ORAL Take by mouth.    gabapentin (NEURONTIN) 300 MG capsule Take 300 mg by mouth 3 (three) times daily.    GLIPIZIDE ORAL Take by mouth.    hydrOXYzine pamoate (VISTARIL) 25 MG Cap Take 25 mg by mouth 3 (three) times daily.    metFORMIN (GLUCOPHAGE) 500 MG tablet Take 500 mg by mouth 2 (two) times daily with meals.    methadone (DOLOPHINE) 10 MG tablet Take 70 mg by mouth every 6 (six) hours as needed for Pain.    famotidine (PEPCID) 20 MG tablet Take 1 tablet (20 mg total) by mouth 2 (two) times daily as needed for Heartburn.    ibuprofen (ADVIL,MOTRIN) 600 MG tablet Take 1 tablet (600 mg total) by mouth every 6 (six) hours as needed for Pain.       Review of patient's allergies indicates:  No Known Allergies    Past Medical History:   Diagnosis Date    Diabetes mellitus     Hypertension     Unspecified viral hepatitis C without hepatic coma      Past Surgical History:   Procedure Laterality Date     SECTION       Family History    None       Tobacco Use    Smoking status: Every Day     Current packs/day: 0.50     Types: Cigarettes    Smokeless tobacco: Never   Substance and Sexual Activity    Alcohol use: Not Currently    Drug use: Not Currently    Sexual activity: Not on file     Review of Systems   Constitutional:  Positive for fatigue. Negative for fever.   HENT:  Negative for dental problem and trouble swallowing.    Respiratory:  Negative for shortness of breath and wheezing.    Cardiovascular:  Negative for chest pain and leg swelling.   Gastrointestinal:  Negative for abdominal distention and abdominal pain.   Genitourinary:  Positive for flank pain (left flank pain). Negative for pelvic pain.   Musculoskeletal:  Positive for arthralgias (right hip pain). Negative for neck stiffness.   Skin:  Positive for color change.    Neurological:  Negative for speech difficulty and weakness.   Psychiatric/Behavioral:  Negative for agitation and behavioral problems.      Objective:     Vital Signs (Most Recent):  Temp: 98.6 °F (37 °C) (09/18/24 0556)  Pulse: 107 (09/18/24 0600)  Resp: (!) 25 (09/18/24 0600)  BP: (!) 146/83 (09/18/24 0600)  SpO2: (!) 94 % (09/18/24 0600) Vital Signs (24h Range):  Temp:  [98.3 °F (36.8 °C)-99.6 °F (37.6 °C)] 98.6 °F (37 °C)  Pulse:  [] 107  Resp:  [17-41] 25  SpO2:  [90 %-100 %] 94 %  BP: (124-180)/() 146/83     Weight: 65.2 kg (143 lb 11.8 oz)  Body mass index is 26.29 kg/m².     Physical Exam  Constitutional:       Appearance: She is ill-appearing. She is not toxic-appearing.      Comments: In pain, specifically right hip   Cardiovascular:      Rate and Rhythm: Normal rate and regular rhythm.   Pulmonary:      Effort: Pulmonary effort is normal. No respiratory distress.   Abdominal:      General: There is no distension.      Tenderness: There is no abdominal tenderness.      Comments: Left flank indurated, no fluctuation, minimally tender  Minimally tender abdominal exam   Musculoskeletal:      Comments: Pain with flexion of left hip   Skin:     Coloration: Skin is not jaundiced.      Findings: Bruising (left arm) present.      Comments: Multiple acute and chronic , circular ulcers in upper and lower extremities   Neurological:      General: No focal deficit present.      Mental Status: She is alert.      Sensory: No sensory deficit.   Psychiatric:         Mood and Affect: Mood normal.         Behavior: Behavior normal.            I have reviewed all pertinent lab results within the past 24 hours.  CBC:   Recent Labs   Lab 09/18/24  0305   WBC 14.10*   RBC 2.75*   HGB 6.7*   HCT 21.4*   *   MCV 78*   MCH 24.4*   MCHC 31.3*     CMP:   Recent Labs   Lab 09/18/24  0305   *   CALCIUM 6.4*   ALBUMIN 1.2*   PROT 4.8*   *   K 2.6*   CO2 24      BUN 4*   CREATININE 0.6   ALKPHOS  146*   ALT 23   AST 13   BILITOT 1.2*       Significant Diagnostics:  I have reviewed all pertinent imaging results/findings within the past 24 hours.

## 2024-09-18 NOTE — CLINICAL REVIEW
IP Sepsis Screen (most recent)       Sepsis Screen (IP) - 09/17/24 1934       Is the patient's history or complaint suggestive of a possible infection? Yes  -LW    Are there at least two of the following signs and symptoms present? Yes  -LW    Sepsis signs/symptoms - Tachycardia Tachycardia     >90  -LW    Sepsis signs/symptoms - Tachypnea Tachypnea     >20  -LW    Sepsis signs/symptoms - WBC WBC < 4,000 or WBC > 12,000  -LW    Are any of the following organ dysfunction criteria present and not considered to be due to a chronic condition? Yes  -LW    Organ Dysfunction Criteria INR > 1.5 or aPTT > 60  -LW    Initiate Sepsis Protocol No  -LW    Reason sepsis not considered Pt. receiving appropriate management  -LW              User Key  (r) = Recorded By, (t) = Taken By, (c) = Cosigned By      Initials Name    Wendy Tony, RN                    Her/She

## 2024-09-18 NOTE — ASSESSMENT & PLAN NOTE
Per ID in OSH   50y/o F with hx of being un-housed, T2DM, HTN and drug use transferred for spinal osteomyelitis, septic arthritis and Lt flank/ retroperitoneal abscess.     Imaging studies of her lumbar spine and pelvis were concerning for osteomyelitis/septic arthritis of the left SI joint and L5-S1 along with an abnormal fluid collection extending from T11 through the left iliacus muscle concerning for abscess as well as at psoas.     Started on empiric vanc and zosyn. Case was discussed with neurosurgery who did not feel surgical intervention was warranted and recommended IR drainage. IR recommended against drainage given extensive superficial infection. MRI today with  complex fluid collection. left SI joint to the left iliac fossa.      BCx growing GPC in clusters. 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      Recommendations:  -Continue vancomycin. Pharm to dose for goal trough 15-20.  -Follow-up ID and susceptibilities of GPC in blood   -Would discontinue meropenem given blood cx now with gpc  -Agree with clindamycin given visualized gas/ necrotizing infection       - rec'd IR aspiration of abscess with cultures      -consulting ID while at [facility]    On admission antibiotic regimen of vancomycin, clindamycin, piperacillin tazobactam.

## 2024-09-18 NOTE — CONSULTS
[facility] - Medical ICU  Neurosurgery  Consult Note    Inpatient consult to Neurosurgery  Consult performed by: Humble Toure MD  Consult ordered by: Adelita Barnes MD        Subjective:     Chief Complaint/Reason for Admission: found down    History of Present Illness: 49F PMH DM, HTN, hep c, opioid abuse, presenting after down for unknown period of time and inability to ambulate secondary to pain with imaging demonstrating L SI osteo with associated abscesses extending to retroperitoneum without involvement of thecal sac. Denies true weakness, states pain is limiting her ability to move. Pain in hips and low back. On clinda/zosyn, blood culture  with staph aureus. Denies bowel or bladder incontinence, complaining of constipation about 6 days. Denies numbness    Medications Prior to Admission   Medication Sig Dispense Refill Last Dose    gabapentin (NEURONTIN) 300 MG capsule Take 300 mg by mouth 3 (three) times daily.   2024    hydrOXYzine pamoate (VISTARIL) 25 MG Cap Take 25 mg by mouth 3 (three) times daily.   2024    metFORMIN (GLUCOPHAGE) 500 MG tablet Take 500 mg by mouth 2 (two) times daily with meals.   2024    methadone (DOLOPHINE) 10 MG tablet Take 70 mg by mouth Daily.   Past Week    cloNIDine (CATAPRES) 0.2 MG tablet Take 0.2 mg by mouth 3 (three) times daily.       glipiZIDE (GLUCOTROL) 5 MG tablet Take 5 mg by mouth 2 (two) times daily.          Review of patient's allergies indicates:  No Known Allergies    Past Medical History:   Diagnosis Date    Diabetes mellitus     Hypertension     Unspecified viral hepatitis C without hepatic coma      Past Surgical History:   Procedure Laterality Date     SECTION       Family History    None       Tobacco Use    Smoking status: Every Day     Current packs/day: 0.50     Types: Cigarettes    Smokeless tobacco: Never   Substance and Sexual Activity    Alcohol use: Not Currently    Drug use: Not Currently    Sexual activity: Not on  file     Review of Systems  Constitutional:  Positive for appetite change and fatigue. Negative for diaphoresis and fever.   HENT:  Positive for dental problem. Negative for trouble swallowing.    Respiratory:  Negative for shortness of breath and wheezing.    Cardiovascular:  Negative for chest pain and leg swelling.   Gastrointestinal:  Positive for nausea and vomiting. Negative for abdominal distention and abdominal pain.   Genitourinary:  Positive for flank pain (left flank pain). Negative for pelvic pain.   Musculoskeletal:  Positive for arthralgias (right hip pain). Negative for neck pain and neck stiffness.   Skin:  Positive for color change.   Neurological:  Negative for speech difficulty and weakness.   Psychiatric/Behavioral:  Negative for agitation, behavioral problems and confusion.    Objective:     Weight: 65.2 kg (143 lb 11.8 oz)  Body mass index is 26.29 kg/m².  Vital Signs (Most Recent):  Temp: 99.1 °F (37.3 °C) (09/18/24 0701)  Pulse: 99 (09/18/24 0900)  Resp: (!) 24 (09/18/24 0916)  BP: (!) 154/78 (09/18/24 0900)  SpO2: 96 % (09/18/24 0900) Vital Signs (24h Range):  Temp:  [98.3 °F (36.8 °C)-99.6 °F (37.6 °C)] 99.1 °F (37.3 °C)  Pulse:  [] 99  Resp:  [17-41] 24  SpO2:  [89 %-99 %] 96 %  BP: (124-180)/() 154/78     Date 09/18/24 0700 - 09/19/24 0659   Shift 2319-4443 0788-4120 9452-8918 24 Hour Total   INTAKE   I.V.(mL/kg) 37.9(0.6)   37.9(0.6)   IV Piggyback 252   252   Shift Total(mL/kg) 289.9(4.4)   289.9(4.4)   OUTPUT   Urine(mL/kg/hr) 400   400   Shift Total(mL/kg) 400(6.1)   400(6.1)   Weight (kg) 65.2 65.2 65.2 65.2                       Female External Urinary Catheter w/ Suction 09/16/24 5201 (Active)   Skin no redness;no breakdown 09/18/24 0701   Tolerance no signs/symptoms of discomfort 09/18/24 0701   Suction Continuous suction at 60 mmHg 09/18/24 0701   Date of last wick change 09/17/24 09/17/24 2300   Time of last wick change 2328 09/17/24 2300   Output (mL) 400 mL  09/18/24 0701          Physical Exam     GENERAL: resting comfortably  HEENT: NCAT, PERRL, mucous membranes moist  NECK: supple, trachea midline  CV: normal capillary refill  PULM: aerating well, symmetric expansion, no distress  ABD: soft, NT, ND  EXT: no c/c/e     NEURO:     GCS 15 E4V5M6  AAO x 3  CN II-XII grossly intact  Fc x 4 antigravity  SILT    Strength  BUE FS  LLE 4+/5 with passive assistance to 30 degrees (pain limited), KE 4/5.   RLE 4+/5 with same level of assistance, KE 4+/5.   5/5 DF/PF/EHL bilateral       Neurosurgery Physical Exam    Significant Labs:  Recent Labs   Lab 09/17/24  1605 09/17/24  2227 09/18/24  0305 09/18/24  0910   * 240* 173* 159*    137 134* 134*   K 3.2* 3.2* 2.6* 3.5    103 100 101   CO2 27 26 24 25   BUN 5* 4* 4* 4*   CREATININE 0.6 0.6 0.6 0.5   CALCIUM 6.6* 6.7* 6.4* 6.8*   MG 1.9 1.8 1.5*  --      Recent Labs   Lab 09/17/24  1326 09/17/24 2227 09/18/24  0305   WBC 13.51* 14.69* 14.10*   HGB 6.9* 7.1* 6.7*   HCT 22.3* 22.2* 21.4*   * 143* 128*     Recent Labs   Lab 09/17/24  0017   INR 1.9*   APTT 35.6*     Microbiology Results (last 7 days)       Procedure Component Value Units Date/Time    Blood culture [[id number]] Collected: 09/18/24 0045    Order Status: Completed Specimen: Blood from Peripheral, Antecubital, Left Updated: 09/18/24 0745     Blood Culture, Routine No Growth to date    Blood culture [[id number]] Collected: 09/18/24 0045    Order Status: Completed Specimen: Blood from Peripheral, Antecubital, Left Updated: 09/18/24 0745     Blood Culture, Routine No Growth to date    Blood Culture #2 **CANNOT BE ORDERED STAT** [[id number]]  (Abnormal) Collected: 09/16/24 1145    Order Status: Completed Specimen: Blood from Peripheral, Wrist, Right Updated: 09/18/24 0743     Blood Culture, Routine Gram stain felix bottle: Gram positive cocci in clusters resembling Staph      Results called to and read back by: FATIMAH Mcnulty. 09/17/2024  03:48      Gram  stain aer bottle: Gram positive cocci in clusters resembling Staph      Positive results previously called 09/17/2024  06:05      STAPHYLOCOCCUS AUREUS  ID consult required at [facility].  For susceptibility see order #[id number]      Blood culture #1 **CANNOT BE ORDERED STAT** [[id number]]  (Abnormal) Collected: 09/16/24 1054    Order Status: Completed Specimen: Blood from Peripheral, Antecubital, Right Updated: 09/18/24 0741     Blood Culture, Routine Gram stain aer bottle: Gram positive cocci in clusters resembling Staph      Gram stain felix bottle: Gram positive cocci in clusters resembling Staph      Results called to and read back by: FATIMAH Mcnulty. 09/17/2024  03:43      STAPHYLOCOCCUS AUREUS  Susceptibility pending  ID consult required at [facility].      MRSA/SA Rapid ID by PCR from Blood culture [[id number]]  (Abnormal) Collected: 09/16/24 1054    Order Status: Completed Updated: 09/17/24 0455     Staph aureus ID by PCR Positive     Methicillin Resistant ID by PCR Negative    Blood culture #2 **CANNOT BE ORDERED STAT** [[id number]]     Order Status: Canceled Specimen: Blood           All pertinent labs from the last 24 hours have been reviewed.    Significant Diagnostics:  I have reviewed all pertinent imaging results/findings within the past 24 hours.  I have reviewed and interpreted all pertinent imaging results/findings within the past 24 hours.  MRI Lumbar Spine W WO Cont    Result Date: 9/17/2024  1. Complex fluid collection possibly extending from the left SI joint to the left iliac fossa displacing the left psoas muscle medially. This is similar to the study 1 day prior could represent infectious left sacroiliitis/osteomyelitis and adjacent abscess. The collection extends superiorly to approximately T12 level at the left posterior retroperitoneum, posterior to the left kidney. Enhancement noted to much of the sacrum including right of  midline again could represent osteomyelitis. Follow-up recommended. Recommend surgical consultation and follow-up. 2. Multilevel degenerative changes most prominent at L5-S1.  See above comments. 3. This report was flagged in Epic as abnormal. Electronically signed by: Edgard Martinez Date:    09/17/2024 Time:    15:53     Assessment/Plan:     Other osteomyelitis, multiple sites  49F PMH DM, HTN, hep c, opioid abuse, presenting after down for unknown period of time and inability to ambulate secondary to pain with imaging demonstrating L SI osteo with associated abscesses extending to retroperitoneum without involvement of thecal sac. Denies true weakness, states pain is limiting her ability to move. Pain in hips and low back. On clinda/zosyn, blood culture 9/16 with staph aureus. Denies bowel or bladder incontinence, complaining of constipation about 6 days. Denies numbness    Imaging:  CT Lsp / pelvis 9/16: fluid collection T11 on L through left psoas, felt abscess  CT CAP 9/16: extensive edema with subq gas through left flank, small psoas abscess and fluid collections left flank, septic arthritis of L SI joint  MRI Lsp w/wo 9/17: L SI complex fluid collection felt to represent osteo with adjacent abscess, extends to approximately T12 level    Plan:  Admitted medicine; q4h nc/vs  Labs / diagnostics reviewed  Preponderance of care per primary  Clinda/Zosyn for Staph aureus blood culture 9/16 per primary  No acute neurosurgical intervention  Recommend MRI C/Tsp w/wo to assess for any further infection  Trend inflammatory markers per primary   Recommend IR consult for aspiration fluid collections / biopsy joint  Recommend orthopedic consultation for SI joint findings  Please notify for questions/concerns/neurologic decline          Thank you for your consult. I will follow-up with patient. Please contact us if you have any additional questions.    Humble Toure MD  Neurosurgery  [facility] - Medical ICU

## 2024-09-18 NOTE — SUBJECTIVE & OBJECTIVE
No current facility-administered medications on file prior to encounter.     Current Outpatient Medications on File Prior to Encounter   Medication Sig    gabapentin (NEURONTIN) 300 MG capsule Take 300 mg by mouth 3 (three) times daily.    hydrOXYzine pamoate (VISTARIL) 25 MG Cap Take 25 mg by mouth 3 (three) times daily.    metFORMIN (GLUCOPHAGE) 500 MG tablet Take 500 mg by mouth 2 (two) times daily with meals.    methadone (DOLOPHINE) 10 MG tablet Take 70 mg by mouth Daily.    [DISCONTINUED] CLONIDINE HCL ORAL Take by mouth.    [DISCONTINUED] GLIPIZIDE ORAL Take by mouth.    cloNIDine (CATAPRES) 0.2 MG tablet Take 0.2 mg by mouth 3 (three) times daily.    glipiZIDE (GLUCOTROL) 5 MG tablet Take 5 mg by mouth 2 (two) times daily.    [DISCONTINUED] famotidine (PEPCID) 20 MG tablet Take 1 tablet (20 mg total) by mouth 2 (two) times daily as needed for Heartburn.    [DISCONTINUED] ibuprofen (ADVIL,MOTRIN) 600 MG tablet Take 1 tablet (600 mg total) by mouth every 6 (six) hours as needed for Pain.       Review of patient's allergies indicates:  No Known Allergies    Past Medical History:   Diagnosis Date    Diabetes mellitus     Hypertension     Unspecified viral hepatitis C without hepatic coma      Past Surgical History:   Procedure Laterality Date     SECTION       Family History    None       Tobacco Use    Smoking status: Every Day     Current packs/day: 0.50     Types: Cigarettes    Smokeless tobacco: Never   Substance and Sexual Activity    Alcohol use: Not Currently    Drug use: Not Currently    Sexual activity: Not on file     Review of Systems   Constitutional:  Positive for activity change, appetite change and fatigue. Negative for fever.   HENT:  Negative for dental problem and trouble swallowing.    Respiratory:  Negative for apnea, chest tightness, shortness of breath and wheezing.    Cardiovascular:  Positive for leg swelling (worse on te  left LE). Negative for chest pain.   Gastrointestinal:   Positive for abdominal distention. Negative for abdominal pain, nausea and vomiting.   Genitourinary:  Positive for flank pain (left flank pain). Negative for hematuria and pelvic pain.   Musculoskeletal:  Positive for arthralgias and back pain. Negative for neck pain and neck stiffness.   Skin:  Positive for color change.   Neurological:  Positive for weakness. Negative for speech difficulty, numbness and headaches.   Psychiatric/Behavioral:  Negative for agitation and behavioral problems.      Objective:     Vital Signs (Most Recent):  Temp: 99.1 °F (37.3 °C) (09/18/24 0701)  Pulse: 98 (09/18/24 1700)  Resp: (!) 23 (09/18/24 1700)  BP: (!) 140/95 (09/18/24 1700)  SpO2: (!) 92 % (09/18/24 1700) Vital Signs (24h Range):  Temp:  [98.3 °F (36.8 °C)-99.6 °F (37.6 °C)] 99.1 °F (37.3 °C)  Pulse:  [] 98  Resp:  [18-41] 23  SpO2:  [89 %-99 %] 92 %  BP: (122-180)/() 140/95     Weight: 65.2 kg (143 lb 11.8 oz)  Body mass index is 26.29 kg/m².     Physical Exam  Constitutional:       Appearance: She is ill-appearing. She is not toxic-appearing.      Comments: In pain, specifically right hip   HENT:      Head: Normocephalic.      Right Ear: External ear normal.      Left Ear: External ear normal.   Cardiovascular:      Rate and Rhythm: Normal rate and regular rhythm.   Pulmonary:      Effort: Pulmonary effort is normal. No respiratory distress.      Breath sounds: Rales present.   Chest:      Chest wall: No tenderness.   Abdominal:      General: There is distension.      Palpations: There is no mass.      Tenderness: There is no abdominal tenderness.      Comments: Left flank indurated, no fluctuation, minimally tender  Minimally tender abdominal exam   Musculoskeletal:      Cervical back: No rigidity.      Comments: Pain with flexion of left hip   Skin:     Coloration: Skin is pale. Skin is not jaundiced.      Findings: Bruising (left arm) present.      Comments: Multiple acute and chronic , circular ulcers in  upper and lower extremities   Neurological:      General: No focal deficit present.      Mental Status: She is alert. Mental status is at baseline.      Sensory: No sensory deficit.   Psychiatric:         Mood and Affect: Mood normal.         Behavior: Behavior normal.         Thought Content: Thought content normal.            I have reviewed all pertinent lab results within the past 24 hours.  CBC:   Recent Labs   Lab 09/18/24  1217   WBC 14.96*   RBC 2.93*   HGB 7.5*   HCT 23.1*   *   MCV 79*   MCH 25.6*   MCHC 32.5     CMP:   Recent Labs   Lab 09/18/24  0305 09/18/24  0910 09/18/24  1428   *   < > 146*   CALCIUM 6.4*   < > 6.8*   ALBUMIN 1.2*  --   --    PROT 4.8*  --   --    *   < > 134*   K 2.6*   < > 3.3*   CO2 24   < > 26      < > 101   BUN 4*   < > 6   CREATININE 0.6   < > 0.5   ALKPHOS 146*  --   --    ALT 23  --   --    AST 13  --   --    BILITOT 1.2*  --   --     < > = values in this interval not displayed.       Significant Diagnostics:  I have reviewed all pertinent imaging results/findings within the past 24 hours.    Physical Exam:    Constitutional:   In pain, specifically right hip     Cardiovascular: Normal rate and regular rhythm.     Abdominal:   Left flank indurated, no fluctuation, minimally tender  Minimally tender abdominal exam     Skin:   Multiple acute and chronic , circular ulcers in upper and lower extremities     Psych/Behavior: She is alert.     Musculoskeletal:      Comments: Pain with flexion of left hip

## 2024-09-18 NOTE — SUBJECTIVE & OBJECTIVE
Medications Prior to Admission   Medication Sig Dispense Refill Last Dose    gabapentin (NEURONTIN) 300 MG capsule Take 300 mg by mouth 3 (three) times daily.   2024    hydrOXYzine pamoate (VISTARIL) 25 MG Cap Take 25 mg by mouth 3 (three) times daily.   2024    metFORMIN (GLUCOPHAGE) 500 MG tablet Take 500 mg by mouth 2 (two) times daily with meals.   2024    methadone (DOLOPHINE) 10 MG tablet Take 70 mg by mouth Daily.   Past Week    cloNIDine (CATAPRES) 0.2 MG tablet Take 0.2 mg by mouth 3 (three) times daily.       glipiZIDE (GLUCOTROL) 5 MG tablet Take 5 mg by mouth 2 (two) times daily.          Review of patient's allergies indicates:  No Known Allergies    Past Medical History:   Diagnosis Date    Diabetes mellitus     Hypertension     Unspecified viral hepatitis C without hepatic coma      Past Surgical History:   Procedure Laterality Date     SECTION       Family History    None       Tobacco Use    Smoking status: Every Day     Current packs/day: 0.50     Types: Cigarettes    Smokeless tobacco: Never   Substance and Sexual Activity    Alcohol use: Not Currently    Drug use: Not Currently    Sexual activity: Not on file     Review of Systems  Constitutional:  Positive for appetite change and fatigue. Negative for diaphoresis and fever.   HENT:  Positive for dental problem. Negative for trouble swallowing.    Respiratory:  Negative for shortness of breath and wheezing.    Cardiovascular:  Negative for chest pain and leg swelling.   Gastrointestinal:  Positive for nausea and vomiting. Negative for abdominal distention and abdominal pain.   Genitourinary:  Positive for flank pain (left flank pain). Negative for pelvic pain.   Musculoskeletal:  Positive for arthralgias (right hip pain). Negative for neck pain and neck stiffness.   Skin:  Positive for color change.   Neurological:  Negative for speech difficulty and weakness.   Psychiatric/Behavioral:  Negative for agitation, behavioral  problems and confusion.    Objective:     Weight: 65.2 kg (143 lb 11.8 oz)  Body mass index is 26.29 kg/m².  Vital Signs (Most Recent):  Temp: 99.1 °F (37.3 °C) (09/18/24 0701)  Pulse: 99 (09/18/24 0900)  Resp: (!) 24 (09/18/24 0916)  BP: (!) 154/78 (09/18/24 0900)  SpO2: 96 % (09/18/24 0900) Vital Signs (24h Range):  Temp:  [98.3 °F (36.8 °C)-99.6 °F (37.6 °C)] 99.1 °F (37.3 °C)  Pulse:  [] 99  Resp:  [17-41] 24  SpO2:  [89 %-99 %] 96 %  BP: (124-180)/() 154/78     Date 09/18/24 0700 - 09/19/24 0659   Shift 6458-8331 2320-1773 3794-4491 24 Hour Total   INTAKE   I.V.(mL/kg) 37.9(0.6)   37.9(0.6)   IV Piggyback 252   252   Shift Total(mL/kg) 289.9(4.4)   289.9(4.4)   OUTPUT   Urine(mL/kg/hr) 400   400   Shift Total(mL/kg) 400(6.1)   400(6.1)   Weight (kg) 65.2 65.2 65.2 65.2                       Female External Urinary Catheter w/ Suction 09/16/24 2345 (Active)   Skin no redness;no breakdown 09/18/24 0701   Tolerance no signs/symptoms of discomfort 09/18/24 0701   Suction Continuous suction at 60 mmHg 09/18/24 0701   Date of last wick change 09/17/24 09/17/24 2300   Time of last wick change 2328 09/17/24 2300   Output (mL) 400 mL 09/18/24 0701          Physical Exam     GENERAL: resting comfortably  HEENT: NCAT, PERRL, mucous membranes moist  NECK: supple, trachea midline  CV: normal capillary refill  PULM: aerating well, symmetric expansion, no distress  ABD: soft, NT, ND  EXT: no c/c/e     NEURO:     GCS 15 E4V5M6  AAO x 3  CN II-XII grossly intact  Fc x 4 antigravity  SILT    Strength  BUE FS  LLE 4+/5 with passive assistance to 30 degrees (pain limited), KE 4/5.   RLE 4+/5 with same level of assistance, KE 4+/5.   5/5 DF/PF/EHL bilateral       Neurosurgery Physical Exam    Significant Labs:  Recent Labs   Lab 09/17/24  1605 09/17/24  2227 09/18/24  0305 09/18/24  0910   * 240* 173* 159*    137 134* 134*   K 3.2* 3.2* 2.6* 3.5    103 100 101   CO2 27 26 24 25   BUN 5* 4* 4* 4*    CREATININE 0.6 0.6 0.6 0.5   CALCIUM 6.6* 6.7* 6.4* 6.8*   MG 1.9 1.8 1.5*  --      Recent Labs   Lab 09/17/24  1326 09/17/24  2227 09/18/24  0305   WBC 13.51* 14.69* 14.10*   HGB 6.9* 7.1* 6.7*   HCT 22.3* 22.2* 21.4*   * 143* 128*     Recent Labs   Lab 09/17/24  0017   INR 1.9*   APTT 35.6*     Microbiology Results (last 7 days)       Procedure Component Value Units Date/Time    Blood culture [[id number]] Collected: 09/18/24 0045    Order Status: Completed Specimen: Blood from Peripheral, Antecubital, Left Updated: 09/18/24 0745     Blood Culture, Routine No Growth to date    Blood culture [[id number]] Collected: 09/18/24 0045    Order Status: Completed Specimen: Blood from Peripheral, Antecubital, Left Updated: 09/18/24 0745     Blood Culture, Routine No Growth to date    Blood Culture #2 **CANNOT BE ORDERED STAT** [[id number]]  (Abnormal) Collected: 09/16/24 1145    Order Status: Completed Specimen: Blood from Peripheral, Wrist, Right Updated: 09/18/24 0743     Blood Culture, Routine Gram stain felix bottle: Gram positive cocci in clusters resembling Staph      Results called to and read back by: FATIMAH Mcnulty. 09/17/2024  03:48      Gram stain aer bottle: Gram positive cocci in clusters resembling Staph      Positive results previously called 09/17/2024  06:05      STAPHYLOCOCCUS AUREUS  ID consult required at [facility],[place] and [facility].  For susceptibility see order #[id number]      Blood culture #1 **CANNOT BE ORDERED STAT** [[id number]]  (Abnormal) Collected: 09/16/24 1054    Order Status: Completed Specimen: Blood from Peripheral, Antecubital, Right Updated: 09/18/24 0741     Blood Culture, Routine Gram stain aer bottle: Gram positive cocci in clusters resembling Staph      Gram stain felix bottle: Gram positive cocci in clusters resembling Staph      Results called to and read back by: FATIMAH Mcnulty. 09/17/2024  03:43      STAPHYLOCOCCUS AUREUS  Susceptibility pending  ID consult required  at [facility],[place] and [facility] locations.      MRSA/SA Rapid ID by PCR from Blood culture [[id number]]  (Abnormal) Collected: 09/16/24 1054    Order Status: Completed Updated: 09/17/24 0455     Staph aureus ID by PCR Positive     Methicillin Resistant ID by PCR Negative    Blood culture #2 **CANNOT BE ORDERED STAT** [[id number]]     Order Status: Canceled Specimen: Blood           All pertinent labs from the last 24 hours have been reviewed.    Significant Diagnostics:  I have reviewed all pertinent imaging results/findings within the past 24 hours.  I have reviewed and interpreted all pertinent imaging results/findings within the past 24 hours.  MRI Lumbar Spine W WO Cont    Result Date: 9/17/2024  1. Complex fluid collection possibly extending from the left SI joint to the left iliac fossa displacing the left psoas muscle medially. This is similar to the study 1 day prior could represent infectious left sacroiliitis/osteomyelitis and adjacent abscess. The collection extends superiorly to approximately T12 level at the left posterior retroperitoneum, posterior to the left kidney. Enhancement noted to much of the sacrum including right of midline again could represent osteomyelitis. Follow-up recommended. Recommend surgical consultation and follow-up. 2. Multilevel degenerative changes most prominent at L5-S1.  See above comments. 3. This report was flagged in Epic as abnormal. Electronically signed by: Edgard Martinez Date:    09/17/2024 Time:    15:53

## 2024-09-18 NOTE — ASSESSMENT & PLAN NOTE
Per ID in OSH   50y/o F with hx of being un-housed, T2DM, HTN and drug use transferred for spinal osteomyelitis, septic arthritis and Lt flank/ retroperitoneal abscess.     Imaging studies of her lumbar spine and pelvis were concerning for osteomyelitis/septic arthritis of the left SI joint and L5-S1 along with an abnormal fluid collection extending from T11 through the left iliacus muscle concerning for abscess as well as at psoas.     Started on empiric vanc and zosyn. Case was discussed with neurosurgery who did not feel surgical intervention was warranted and recommended IR drainage. IR recommended against drainage given extensive superficial infection. MRI today with  complex fluid collection. left SI joint to the left iliac fossa.      BCx growing GPC in clusters. 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      Recommendations:  -Continue vancomycin. Pharm to dose for goal trough 15-20.  -Follow-up ID and susceptibilities of GPC in blood   -Would discontinue meropenem given blood cx now with gpc  -Agree with clindamycin given visualized gas/ necrotizing infection       - rec'd IR aspiration of abscess with cultures      -consulting ID while at [facility]    On admission antibiotic regimen of vancomycin, clindamycin, piperacillin tazobactam.    9/18:  ID discontinued Zosyna and Vanc.  Continue Clindamycin  Start Oxacillin

## 2024-09-18 NOTE — ASSESSMENT & PLAN NOTE
Patient with anemia upon lab review with a hemoglobin 6.9 post transfusion; likely multifactorial in the setting of chronic disease, deficiency and acute blood loss (RP bleed?)  -CBC showing microcytic anemia given MCV of 79 with high ferritin of 2000 suggesting anemia of chronic disease can not rule out acute blood loss in the setting of recent fall and RP abscess    Plan:  Monitor CBC and transfuse if hemoglobin less than 7  -consider additional iron studies  -monitor for signs of bleeding    9/18:  Hb of 6.7=> Received 1 unit of blood=> 7.5 post-transfusion  DIC Labs: (d-dimer, Fibrinogen)

## 2024-09-18 NOTE — ASSESSMENT & PLAN NOTE
49F initially presented to [facility] with back pain, found to have spinal OM, L SI joint septic arthritis, and L psoas/iliacus muscle abscess, possible epidural abscess, possible MV endocarditis, transferred to [facility] on 9/16 for IR and neurosurgery eval, [place] [facility]. MSSA on 9/16 BCX on BCID, repeat sent today 9/18.     No hardware/devices in place. Does admit to snorting fentanyl. Does have multiple skin blisters on JESSICA UE and LE that she admits to picking at. L flank is erythematous with 1 small lesion.     Recommendations  Stop vancomycin and zosyn  Start oxacillin (concern for epidural abscess)  Continue clindamycin for now (report of gas in L flank)  Follow up IR / neurosurgery / general surgery recs   Obtain MARICHUY  Follow up 9/18 BCX

## 2024-09-18 NOTE — ASSESSMENT & PLAN NOTE
9/18:  Probable endocarditis- possible vegetation vs normal variant noted on mitral valve noted on TTE. Will treat as presumptive endocarditis.  Stop vancomycin and zosyn  Start oxacillin (concern for epidural abscess)  Continue clindamycin for now (report of gas in L flank)  Follow up IR / neurosurgery / general surgery recs   Obtain MARICHUY- Cardiology recommends to treat as recommended by ID (as presumptive IE, considering the risk of MARICHUY to the patient)            Not a candidate for home IV antibiotics due to active substance abuse.

## 2024-09-18 NOTE — CONSULTS
[facility] ICU  Infectious Disease  Consult Note    Patient Name: Mari Sloan  MRN: [id number]  Admission Date: 9/16/2024  Hospital Length of Stay: 2 days  Attending Physician: Bentley Connell MD  Primary Care Provider: No, Primary Doctor     Isolation Status: No active isolations    Patient information was obtained from patient, spouse/SO, and ER records.      Inpatient consult to Infectious Diseases  Consult performed by: Lashawn Barbosa DO  Consult ordered by: Brendan Chiang MD        Assessment/Plan:     Cardiac/Vascular  Endocarditis  See below    ID  Staphylococcus aureus bacteremia  49F initially presented to [facility] with back pain, found to have spinal OM, L SI joint septic arthritis, and L psoas/iliacus muscle abscess, possible epidural abscess, possible MV endocarditis, transferred to [facility] on 9/16 for IR and neurosurgery eval, now transferred to [facility] [place] for multidisciplinary / general surgery. MSSA on 9/16 BCX on BCID, repeat sent today 9/18.     No hardware/devices in place. Does admit to snorting fentanyl. Does have multiple skin blisters on JESSICA UE and LE that she admits to picking at. L flank is erythematous with 1 small lesion.     Recommendations  Stop vancomycin and zosyn  Start oxacillin (concern for epidural abscess)  Continue clindamycin for now (report of gas in L flank)  Follow up IR / neurosurgery / general surgery recs   Obtain MARICHUY  Follow up 9/18 BCX        Other osteomyelitis, multiple sites  See above    GI  Hepatitis C  Hep C ab positive. Quant pending. Will need outpatient hepatology follow up.     Psoas muscle abscess  See above        Thank you for your consult. I will follow-up with patient. Please contact us if you have any additional questions.    Lashawn Barbosa DO  Infectious Disease  [facility]    Subjective:     Principal Problem: Paraspinal abscess    HPI: Ms. Sloan is a 49F with PMH of DM2, HTN, and substance abuse homelessness,  "initially presented to OSH with back pain, found to have spinal OM, L SI joint septic arthritis, and L psoas/iliacus muscle abscess, transferred to [facility] on  for IR and neurosurgery eval, now transferred to [facility] [place] for multidisciplinary / general surgery for necrosis of L flank.     Imaging of L spine and pelvis were concerning for OM/septic arthritis of the left SI joint and L5-S1 along with an abnormal fluid collection extending from T11 through the L iliacus muscle concerning for abscess as well as at psoas. She was started on empiric vanc and zosyn. Case was discussed with neurosurgery who did not feel surgical intervention was warranted and recommended IR drainage. IR recommended against drainage given extensive superficial infection.    On arrival to  antibiotics were transitioned to doxy, vanc and meropenem. BCx grew GPC in clusters. TTE concerning for MV endocarditis. MRI revealed "Complex fluid collection possibly extending from the left SI joint to the left iliac fossa displacing the left psoas muscle medially. This is similar to the study 1 day prior could represent infectious left sacroiliitis/osteomyelitis and adjacent abscess. The collection extends superiorly to approximately T12 level at the left posterior retroperitoneum, posterior to the left kidney. Enhancement noted to much of the sacrum including right of midline again could represent osteomyelitis."     Infectious disease consulted for "Patient being folled by ID  is osh with gram positive cocci resempblig stap and positive pcr for stap, concerns for si joint osteo and retroperitoneal abscess".     Past Medical History:   Diagnosis Date    Diabetes mellitus     Hypertension     Unspecified viral hepatitis C without hepatic coma        Past Surgical History:   Procedure Laterality Date     SECTION         Review of patient's allergies indicates:  No Known Allergies    Medications:  Medications Prior to Admission   Medication Sig "    gabapentin (NEURONTIN) 300 MG capsule Take 300 mg by mouth 3 (three) times daily.    hydrOXYzine pamoate (VISTARIL) 25 MG Cap Take 25 mg by mouth 3 (three) times daily.    metFORMIN (GLUCOPHAGE) 500 MG tablet Take 500 mg by mouth 2 (two) times daily with meals.    methadone (DOLOPHINE) 10 MG tablet Take 70 mg by mouth Daily.    cloNIDine (CATAPRES) 0.2 MG tablet Take 0.2 mg by mouth 3 (three) times daily.    glipiZIDE (GLUCOTROL) 5 MG tablet Take 5 mg by mouth 2 (two) times daily.     Antibiotics (From admission, onward)      Start     Stop Route Frequency Ordered    09/18/24 0130  piperacillin-tazobactam (ZOSYN) 4.5 g in D5W 100 mL IVPB (MB+)         -- IV Every 8 hours (non-standard times) 09/18/24 0005    09/17/24 1400  clindamycin in D5W 600 mg/50 mL IVPB 600 mg         -- IV Every 8 hours (non-standard times) 09/17/24 1319          Antifungals (From admission, onward)      None          Antivirals (From admission, onward)      None               There is no immunization history on file for this patient.    Family History    None       Social History     Socioeconomic History    Marital status:    Tobacco Use    Smoking status: Every Day     Current packs/day: 0.50     Types: Cigarettes    Smokeless tobacco: Never   Substance and Sexual Activity    Alcohol use: Not Currently    Drug use: Not Currently     Social Determinants of Health     Financial Resource Strain: Low Risk  (9/18/2024)    Overall Financial Resource Strain (CARDIA)     Difficulty of Paying Living Expenses: Not very hard   Food Insecurity: No Food Insecurity (9/18/2024)    Hunger Vital Sign     Worried About Running Out of Food in the Last Year: Never true     Ran Out of Food in the Last Year: Never true   Transportation Needs: Unmet Transportation Needs (9/18/2024)    TRANSPORTATION NEEDS     Transportation : Yes, it has kept me from medical appointments or from getting my medications.   Physical Activity: Patient Declined (9/18/2024)     Exercise Vital Sign     Days of Exercise per Week: Patient declined     Minutes of Exercise per Session: Patient declined   Stress: Stress Concern Present (9/18/2024)    [employer] - Occupational Stress Questionnaire     Feeling of Stress : Rather much   Housing Stability: Low Risk  (9/18/2024)    Housing Stability Vital Sign     Unable to Pay for Housing in the Last Year: No     Homeless in the Last Year: No     Review of Systems   Constitutional:  Negative for chills and fever.   Respiratory:  Negative for cough and shortness of breath.    Cardiovascular:  Negative for chest pain.   Gastrointestinal:  Positive for abdominal pain. Negative for constipation, diarrhea, nausea and vomiting.   Genitourinary:  Positive for flank pain.   Musculoskeletal:  Positive for arthralgias. Negative for myalgias.   Skin:  Positive for wound. Negative for rash.   Neurological:  Positive for weakness. Negative for headaches.     Objective:     Vital Signs (Most Recent):  Temp: 99.1 °F (37.3 °C) (09/18/24 0701)  Pulse: 99 (09/18/24 0900)  Resp: (!) 24 (09/18/24 0916)  BP: (!) 154/78 (09/18/24 0900)  SpO2: 96 % (09/18/24 0900) Vital Signs (24h Range):  Temp:  [98.3 °F (36.8 °C)-99.6 °F (37.6 °C)] 99.1 °F (37.3 °C)  Pulse:  [] 99  Resp:  [17-41] 24  SpO2:  [89 %-99 %] 96 %  BP: (124-180)/() 154/78     Weight: 65.2 kg (143 lb 11.8 oz)  Body mass index is 26.29 kg/m².    Estimated Creatinine Clearance: 117.9 mL/min (based on SCr of 0.5 mg/dL).     Physical Exam  Vitals reviewed.   Constitutional:       General: She is not in acute distress.     Appearance: Normal appearance. She is not ill-appearing.   HENT:      Head: Normocephalic and atraumatic.   Eyes:      Extraocular Movements: Extraocular movements intact.      Conjunctiva/sclera: Conjunctivae normal.   Cardiovascular:      Rate and Rhythm: Normal rate and regular rhythm.      Heart sounds: No murmur heard.  Pulmonary:      Effort:  Pulmonary effort is normal. No respiratory distress.      Breath sounds: Normal breath sounds. No wheezing.   Abdominal:      General: Abdomen is flat. Bowel sounds are normal.      Palpations: Abdomen is soft.      Tenderness: There is no abdominal tenderness.   Musculoskeletal:      Cervical back: Normal range of motion.      Comments: L flank erythematous with 1 lesion  Multiple scabbed blisters on JESSICA UE and LE   Skin:     General: Skin is warm and dry.   Neurological:      General: No focal deficit present.      Mental Status: She is alert and oriented to person, place, and time.   Psychiatric:         Mood and Affect: Mood normal.         Behavior: Behavior normal.         Thought Content: Thought content normal.          Significant Labs: All pertinent labs within the past 24 hours have been reviewed.  Recent Lab Results  (Last 5 results in the past 24 hours)        09/18/24  1212   09/18/24  1052   09/18/24  0910   09/18/24  0859   09/18/24  0305        Albumin         1.2       ALP         146       ALT         23       Anion Gap     8     10       Aniso         Slight       PTT   29.2  Comment: Refer to local heparin nomogram for intensity/dose specific   therapeutic   range.               AST         13       Baso #         0.11       Basophilic Stippling         Occasional       Basophil %         0.8       BILIRUBIN TOTAL         1.2  Comment: For infants and newborns, interpretation of results should be based  on gestational age, weight and in agreement with clinical  observations.    Premature Infant recommended reference ranges:  Up to 24 hours.............<8.0 mg/dL  Up to 48 hours............<12.0 mg/dL  3-5 days..................<15.0 mg/dL  6-29 days.................<15.0 mg/dL         BUN     4     4       Calcium     6.8  Comment: *Critical value notification by [facility] with confirmation of receipt to   MALICK POSADA RN at  Date 9/18/24 Time 10:06AM       6.4  Comment: *Critical value  notification by pod with confirmation of receipt to   Maddie Lomeli RN at  Date 9/18/24 Time 4:19am         Chloride     101     100       CO2     25     24       CPK         31       Creatinine     0.5     0.6       Differential Method         Automated       eGFR     >60.0     >60.0       Eos #         0.2       Eos %         1.2       Fibrinogen   305             Folate   <2.2             Glucose     159     173       Gran # (ANC)         10.6       Gran %         74.8       Haptoglobin   233             Hematocrit         21.4       Hemoglobin         6.7       Hypo         Occasional       Immature Grans (Abs)         0.44  Comment: Mild elevation in immature granulocytes is non specific and   can be seen in a variety of conditions including stress response,   acute inflammation, trauma and pregnancy. Correlation with other   laboratory and clinical findings is essential.         Immature Granulocytes         3.1       INR   1.6  Comment: Coumadin Therapy:  2.0 - 3.0 for INR for all indicators except mechanical heart valves  and antiphospholipid syndromes which should use 2.5 - 3.5.               Lactate Dehydrogenase   538  Comment: Results are increased in hemolyzed samples.             Lymph #         2.5       Lymph %         17.6       Magnesium          1.5       MCH         24.4       MCHC         31.3       MCV         78       Mono #         0.4       Mono %         2.5       MPV         10.0       nRBC         1       Ovalocytes         Occasional       Phosphorus Level         1.1       Platelet Estimate         Decreased       Platelet Count         128       POCT Glucose 181       189         Poikilocytosis         Slight       Poly         Occasional       Potassium     3.5     2.6  Comment: *Critical value notification by pod with confirmation of receipt to   Maddie Lomeli RN at  Date 9/18/24 Time 4:19am         PROTEIN TOTAL         4.8       PT   16.9             RBC         2.75       RDW          16.4       Sodium     134     134       Spherocytes         Occasional       Teardrop Cells         Occasional       Toxic Granulation         Present       Vitamin B12   1716             WBC         14.10                              Significant Imaging: I have reviewed all pertinent imaging results/findings within the past 24 hours.

## 2024-09-18 NOTE — PROGRESS NOTES
Pharmacokinetic Initial Assessment: IV Vancomycin    Assessment/Plan:    Patient transferred from H where she was on vancomycin 1000 mg q12h.   VT resulted as 14.4 mcg/mL before 4th dose.   Will continue same regimen.   Desired empiric serum trough concentration is 15 to 20 mcg/mL  Draw vancomycin trough level 60 min prior to fourth dose on 9/19 at approximately 1300    Pharmacy will continue to follow and monitor vancomycin.      Please contact pharmacy at extension [phone] with any questions regarding this assessment.     Thank you for the consult,   Liliana Dumont       Patient brief summary:  Mari Sloan is a 49 y.o. female initiated on antimicrobial therapy with IV Vancomycin for treatment of suspected bacteremia    Drug Allergies:   Review of patient's allergies indicates:  No Known Allergies    Actual Body Weight:   65.2 kg    Renal Function:   Estimated Creatinine Clearance: 98.2 mL/min (based on SCr of 0.6 mg/dL).,     Dialysis Method (if applicable):  N/A    CBC (last 72 hours):  Recent Labs   Lab Result Units 09/16/24  0934 09/16/24  1539 09/16/24  2105 09/17/24  0017 09/17/24  0800 09/17/24  1326   WBC K/uL 9.81 10.84 11.88 14.09* 14.40* 13.51*   Hemoglobin g/dL 5.7* 5.2* 5.7* 7.2* 7.1* 6.9*   Hemoglobin A1C %  --   --   --   --  7.9*  --    Hematocrit % 18.4* 17.7* 18.2* 21.7* 21.7* 22.3*   Platelets K/uL 132* 131* 124* 116* 115* 119*   Gran % % 75.0* 73.0 81.8* 59.0 62.0 69.0   Lymph % % 14.0* 16.0* 11.2* 15.0* 12.0* 13.0*   Mono % % 2.0* 3.0* 3.5* 2.0* 3.0* 3.0*   Eosinophil % % 0.0 2.0 0.2 0.0 0.0 0.0   Basophil % % 0.0 0.0 0.3 0.0 0.0 0.0   Differential Method  Manual Manual Automated Manual Manual Manual       Metabolic Panel (last 72 hours):  Recent Labs   Lab Result Units 09/16/24  0934 09/16/24  1010 09/16/24  2105 09/17/24  0017 09/17/24  0800 09/17/24  1240 09/17/24  1605   Sodium mmol/L 134*  --  137 139 136  --  138   Potassium mmol/L 2.5*  --  2.2* 2.2* 2.7*  --  3.2*   Chloride  "mmol/L 91*  --  98 100 100  --  101   CO2 mmol/L 27  --  29 27 28  --  27   Glucose mg/dL 544*  --  397* 401* 420*  --  335*   Glucose, UA   --  4+*  --   --   --   --   --    BUN mg/dL 7  --  5* 5* 5*  --  5*   Creatinine mg/dL 0.8  --  0.7 0.7 0.7  --  0.6   Creatinine, Urine mg/dL  --   --   --   --   --  36.4  --    Albumin g/dL 1.5*  --   --   --  1.3*  --   --    Total Bilirubin mg/dL 1.4*  --   --   --  1.6*  --   --    Alkaline Phosphatase U/L 184*  --   --   --  135  --   --    AST U/L 24  --   --   --  11  --   --    ALT U/L 25  --   --   --  19  --   --    Magnesium mg/dL 1.2*  --   --  1.5* 1.7  --  1.9   Phosphorus mg/dL  --   --   --  1.7* 2.2*  --   --        Drug levels (last 3 results):  No results for input(s): "VANCOMYCINRA", "VANCORANDOM", "VANCOMYCINPE", "VANCOPEAK", "VANCOMYCINTR", "VANCOTROUGH" in the last 72 hours.    Microbiologic Results:  Microbiology Results (last 7 days)       Procedure Component Value Units Date/Time    Blood culture [[id number]]     Order Status: No result Specimen: Blood     Blood culture [[id number]]     Order Status: No result Specimen: Blood     Blood Culture #2 **CANNOT BE ORDERED STAT** [[id number]] Collected: 09/16/24 1145    Order Status: Completed Specimen: Blood from Peripheral, Wrist, Right Updated: 09/17/24 0605     Blood Culture, Routine Gram stain felix bottle: Gram positive cocci in clusters resembling Staph      Results called to and read back by: FATIMAH Mcnulty. 09/17/2024  03:48      Gram stain aer bottle: Gram positive cocci in clusters resembling Staph      Positive results previously called 09/17/2024  06:05    MRSA/SA Rapid ID by PCR from Blood culture [[id number]]  (Abnormal) Collected: 09/16/24 1054    Order Status: Completed Updated: 09/17/24 0455     Staph aureus ID by PCR Positive     Methicillin Resistant ID by PCR Negative    Blood culture #1 **CANNOT BE ORDERED STAT** [[id number]] Collected: 09/16/24 1054    Order Status: Completed Specimen: " Blood from Peripheral, Antecubital, Right Updated: 09/17/24 0346     Blood Culture, Routine Gram stain aer bottle: Gram positive cocci in clusters resembling Staph      Gram stain felix bottle: Gram positive cocci in clusters resembling Staph      Results called to and read back by: FATIMAH Mcnulty. 09/17/2024  03:43    Blood culture #2 **CANNOT BE ORDERED STAT** [[id number]]     Order Status: Canceled Specimen: Blood

## 2024-09-18 NOTE — ASSESSMENT & PLAN NOTE
Patient with history of drug misuse disorder.   Reports following with methadone clinic although leans her pain is not well controlled, has not gone clinic since last Friday 9/13  -urine drug screen positive for presumptive methadone and opiates  -patient reports snorting methamphetamine and fentanyl regularly  -we will resume methadone while inpatient  -consider addiction psych consult    Patient reports being on 70 mg methadone q.6, American society of addiction Medicine recommends continuation of methadone maintenance therapy without interruption and use of short-acting opioids like oxycodone for acute pain.  -consider higher doses of oral oxycodone given patient's acute pain needs and known high opioid tolerance i.e. 20-40 mg of oxycodone q.4 to q.6  9/18:  Methadone 70 mg Daily ordered

## 2024-09-18 NOTE — H&P
[facility] - Medical ICU  Critical Care Medicine  History & Physical    Patient Name: Mari Sloan  MRN: [id number]  Admission Date: 9/16/2024  Hospital Length of Stay: 2 days  Code Status: Full Code  Attending Physician: Bentley Connell MD   Primary Care Provider: Liliana, Primary Doctor   Principal Problem: Paraspinal abscess    Subjective:     HPI:  Miss Sloan is a pleasant 49-year-old female with a medical history of diabetes, hypertension, and substance use disorder  transferred from [facility] for specialized evaluation following imaging studies concerning for osteomyelitis or septic arthritis of the left sacroiliac joint, as well as an abnormal fluid collection extending from T11 through the iliacus muscle, raising suspicion for an abscess. The patient reports a mechanical fall six days ago.     Laboratory results from the OSH revealed significant findings, including hypokalemia, hypomagnesemia, severe anemia, metabolic alkalosis, and hyperglycemia.     Labs today CBC leukocytosis (WBC 13.5), hgb 6.9 following transfusion with one unit of packed red blood cells,  platelets at 119. Iron studies revealed a total iron-binding capacity (TIBC) of 152, transferrin of 103, and elevated ferritin at 2000, suggestive of anemia of chronic disease or inflammation. CMP potassium 3.2.  Liver function  unremarkable. Her CRP was markedly elevated at 179.6, indicating significant inflammation or infection. Lactate dehydrogenase was 474, while CPK was within normal limits. Her hemoglobin A1c was elevated at 7.9, reflecting poor glycemic control.    A urine drug screen was positive for presumptive methadone (follows in [facility]ano clinic) and opioids. The infectious workup thus far positive staph aureus by PCR and blood cultures growing Gram-positive cocci resembling staph.  MRSA PCR testing returning negative.  Additionally, the patients urinalysis was notable for a hazy appearance, with trace ketones, glucose, positive nitrites, and  moderate bacteriuria, raising suspicion for a urinary tract infection.     Past Medical History:   Diagnosis Date    Diabetes mellitus      Hypertension      Unspecified viral hepatitis C without hepatic coma                 Past Surgical History:   Procedure Laterality Date     SECTION             Review of patient's allergies indicates:  No Known Allergies     Family History    None               Tobacco Use    Smoking status: Every Day       Current packs/day: 0.50       Types: Cigarettes    Smokeless tobacco: Never   Substance and Sexual Activity    Alcohol use: Not Currently    Drug use: Not Currently    Sexual activity: Not on file      Review of Systems   Constitutional:  Positive for appetite change and fatigue. Negative for diaphoresis and fever.   HENT:  Positive for dental problem. Negative for trouble swallowing.    Respiratory:  Negative for shortness of breath and wheezing.    Cardiovascular:  Negative for chest pain and leg swelling.   Gastrointestinal:  Positive for nausea and vomiting. Negative for abdominal distention and abdominal pain.   Genitourinary:  Positive for flank pain (left flank pain). Negative for pelvic pain.   Musculoskeletal:  Positive for arthralgias (right hip pain). Negative for neck pain and neck stiffness.   Skin:  Positive for color change.   Neurological:  Negative for speech difficulty and weakness.   Psychiatric/Behavioral:  Negative for agitation, behavioral problems and confusion.       Objective:      Vital Signs (Most Recent):  Temp: 98.3 °F (36.8 °C) (24)  Pulse: 102 (24)  Resp: (!) 29 (24)  BP: (!) 180/108 (24)  SpO2: 99 % (24) Vital Signs (24h Range):  Temp:  [98.3 °F (36.8 °C)-98.6 °F (37 °C)] 98.3 °F (36.8 °C)  Pulse:  [] 102  Resp:  [16-29] 29  SpO2:  [91 %-100 %] 99 %  BP: (150-180)/() 180/108   Weight: 65.2 kg (143 lb 11.8 oz)  Body mass index is 26.29 kg/m².        Intake/Output Summary  (Last 24 hours) at 9/17/2024 2305  Last data filed at 9/17/2024 1901      Gross per 24 hour   Intake 7776.08 ml   Output 500 ml   Net 7276.08 ml            Physical Exam  Constitutional:       Appearance: She is not ill-appearing or toxic-appearing.      Comments: In pain, specifically right hip   HENT:      Right Ear: External ear normal.      Left Ear: External ear normal.      Nose: No congestion or rhinorrhea.   Eyes:      General: No scleral icterus.  Cardiovascular:      Rate and Rhythm: Regular rhythm. Tachycardia present.      Heart sounds: No murmur heard.     No gallop.   Pulmonary:      Breath sounds: No wheezing or rales.   Abdominal:      General: There is distension.      Palpations: There is no mass.      Tenderness: There is no abdominal tenderness. There is guarding. There is no rebound.      Hernia: No hernia is present.      Comments: Left flank indurated to palpation, no fluctuation, no warmth, equivocal erythema vrs normal changes of bruising given recent fall, non tender.   Musculoskeletal:         General: No signs of injury.      Right lower leg: No edema.      Left lower leg: Edema present.   Skin:     Coloration: Skin is not jaundiced.      Findings: Bruising (left arm) and lesion present. No rash.      Comments: Multiple acute and chronic , circular ulcers in upper and lower extremities   Neurological:      General: No focal deficit present.      Mental Status: She is alert.      Sensory: No sensory deficit.   Psychiatric:         Mood and Affect: Mood normal.         Behavior: Behavior normal.              Vents:  Lines/Drains/Airways         Central Venous Catheter Line  Duration                Percutaneous Central Line - Triple Lumen  09/16/24 1700 Internal Jugular Right 1 day                  Drain  Duration                Female External Urinary Catheter w/ Suction 09/16/24 2345 <1 day                       Significant Labs:     CBC/Anemia Profile:        Recent Labs   Lab  09/17/24  0800 09/17/24  1326 09/17/24 2227   WBC 14.40* 13.51* 14.69*   HGB 7.1* 6.9* 7.1*   HCT 21.7* 22.3* 22.2*   * 119* 143*   MCV 78* 79* 78*   RDW 15.9* 15.9* 16.2*   IRON  --  68  --    FERRITIN  --  2,077*  --    RETIC  --  2.0  --          Chemistries:          Recent Labs   Lab 09/16/24  0934 09/16/24  2105 09/17/24  0017 09/17/24  0800 09/17/24  1605   *   < > 139 136 138   K 2.5*   < > 2.2* 2.7* 3.2*   CL 91*   < > 100 100 101   CO2 27   < > 27 28 27   BUN 7   < > 5* 5* 5*   CREATININE 0.8   < > 0.7 0.7 0.6   CALCIUM 7.7*   < > 6.8* 6.3* 6.6*   ALBUMIN 1.5*  --   --  1.3*  --    PROT 6.5  --   --  5.2*  --    BILITOT 1.4*  --   --  1.6*  --    ALKPHOS 184*  --   --  135  --    ALT 25  --   --  19  --    AST 24  --   --  11  --    MG 1.2*  --  1.5* 1.7 1.9   PHOS  --   --  1.7* 2.2*  --     < > = values in this interval not displayed.         Recent Lab Results  (Last 5 results in the past 24 hours)          09/18/24  0019   09/17/24 2227 09/17/24 2054 09/17/24  1819 09/17/24  1726         Unit Blood Type Code             5100  [P]              Unit Expiration             [id number]  [P]              Unit Blood Type             O POS  [P]              Albumin     1.3                      ALP     156                      ALT     21                      Anion Gap     8                      Aniso     Slight                      AST     16                      Baso #     0.07                      Basophilic Stippling     Occasional                      Basophil %     0.5                      BILIRUBIN TOTAL     1.3  Comment: For infants and newborns, interpretation of results should be based  on gestational age, weight and in agreement with clinical  observations.     Premature Infant recommended reference ranges:  Up to 24 hours.............<8.0 mg/dL  Up to 48 hours............<12.0 mg/dL  3-5 days..................<15.0 mg/dL  6-29 days.................<15.0 mg/dL                          BUN     4                      Calcium     6.7  Comment: *Critical value notification by [facility] with confirmation of receipt to   PADMA COLON RN at  Date 9/17/24 Time 11:17PM                         Chloride     103                      CO2     26                      CODING SYSTEM             [id number]  [P]              Creatinine     0.6                      Crossmatch Interpretation             Compatible  [P]              Differential Method     Automated                      DISPENSE STATUS             CROSSMATCHED  [P]              eGFR     >60.0                      Eos #     0.1                      Eos %     1.0                      Giant Platelets     Present                      Glucose     240                      Gran # (ANC)     11.5                      Gran %     78.1                      Group & Rh     O POS        O POS              Hematocrit     22.2                      Hemoglobin     7.1                      Hypo     Occasional                      Immature Grans (Abs)     0.57  Comment: Mild elevation in immature granulocytes is non specific and   can be seen in a variety of conditions including stress response,   acute inflammation, trauma and pregnancy. Correlation with other   laboratory and clinical findings is essential.                         Immature Granulocytes     3.9                      INDIRECT BUCK     NEG        NEG              Lymph #     2.1                      Lymph %     14.0                      Magnesium      1.8                      MCH     25.0                      MCHC     32.0                      MCV     78                      Mono #     0.4                      Mono %     2.5                      MPV     10.4                      nRBC     1                      Ovalocytes     Occasional                      Phosphorus Level     1.1                      Platelet Estimate     Decreased                      Platelet Count     143                      POCT  Glucose 207        263        339          Poikilocytosis     Slight                      Poly     Occasional                      Potassium     3.2                      Product Code             [id number]  [P]              PROTEIN TOTAL     5.2                      RBC     2.84                      RDW     16.2                      Sodium     137                      Specimen Outdate     09/20/2024 23:59        09/20/2024 23:59              Spherocytes     Occasional                      Teardrop Cells     Occasional                      Toxic Granulation     Present                      UNIT NUMBER             [id number]  [P]              WBC     14.69                                                                 [P] - Preliminary Result                      Significant Imaging: I have reviewed all pertinent imaging results/findings within the past 24 hours.     No new subjective & objective note has been filed under this hospital service since the last note was generated.    Assessment/Plan:     Psychiatric  History of drug use  Patient with history of drug misuse disorder.   Reports following with methadone clinic although leans her pain is not well controlled, has not gone clinic since last Friday 9/13  -urine drug screen positive for presumptive methadone and opiates  -patient reports snorting methamphetamine and fentanyl regularly  -we will resume methadone while inpatient  -consider addiction psych consult    Patient reports being on 70 mg methadone q.6, American society of addiction Medicine recommends continuation of methadone maintenance therapy without interruption and use of short-acting opioids like oxycodone for acute pain.  -consider higher doses of oral oxycodone given patient's acute pain needs and known high opioid tolerance i.e. 20-40 mg of oxycodone q.4 to q.6    Renal/  Hypokalemia  Replace PRN    ID  * Paraspinal abscess  Case was discussed with neurosurgery who did not feel surgical  "intervention was warranted and recommended IR drainage. IR recommended against drainage given extensive superficial infection. MRI today with  complex fluid collection.     See Osteomyelitis multiple sites    Sepsis  This patient does have evidence of infective focus  My overall impression is sepsis.  Source: Skin and Soft Tissue (location lefts psoas, SI joint, paraspinal abcess)  Antibiotics given-   Antibiotics (72h ago, onward)      Start     Stop Route Frequency Ordered    09/18/24 0200  vancomycin (VANCOCIN) 1,000 mg in D5W 250 mL IVPB (admixture device)         -- IV Every 12 hours (non-standard times) 09/17/24 2248    09/18/24 0130  piperacillin-tazobactam (ZOSYN) 4.5 g in D5W 100 mL IVPB (MB+)         -- IV Every 8 hours (non-standard times) 09/18/24 0005    09/17/24 1400  clindamycin in D5W 600 mg/50 mL IVPB 600 mg         -- IV Every 8 hours (non-standard times) 09/17/24 1319    09/16/24 2347  vancomycin - pharmacy to dose  (vancomycin IVPB (PEDS and ADULTS))        Placed in "And" Linked Group    -- IV pharmacy to manage frequency 09/16/24 2348          Latest lactate reviewed-  Recent Labs   Lab 09/16/24  1912 09/16/24  2042 09/17/24  0800   LACTATE  --    < > 1.7   POCLAC 1.38  --   --     < > = values in this interval not displayed.     Organ dysfunction indicated by Thrombocytopenia     Fluid challenge Not needed - patient is not hypotensive      Post- resuscitation assessment No - Post resuscitation assessment not needed       Will Not start Pressors- Levophed for MAP of 65      Other osteomyelitis, multiple sites  Per ID in OSH   50y/o F with hx of being un-housed, T2DM, HTN and drug use transferred for spinal osteomyelitis, septic arthritis and Lt flank/ retroperitoneal abscess.     Imaging studies of her lumbar spine and pelvis were concerning for osteomyelitis/septic arthritis of the left SI joint and L5-S1 along with an abnormal fluid collection extending from T11 through the left iliacus " muscle concerning for abscess as well as at psoas.     Started on empiric vanc and zosyn. Case was discussed with neurosurgery who did not feel surgical intervention was warranted and recommended IR drainage. IR recommended against drainage given extensive superficial infection. MRI today with  complex fluid collection. left SI joint to the left iliac fossa.      BCx growing GPC in clusters. 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      Recommendations:  -Continue vancomycin. Pharm to dose for goal trough 15-20.  -Follow-up ID and susceptibilities of GPC in blood   -Would discontinue meropenem given blood cx now with gpc  -Agree with clindamycin given visualized gas/ necrotizing infection       - rec'd IR aspiration of abscess with cultures      -consulting ID while at C    On admission antibiotic regimen of vancomycin, clindamycin, piperacillin tazobactam.                  Oncology  Anemia, unspecified  Patient with anemia upon lab review with a hemoglobin 6.9 post transfusion; likely multifactorial in the setting of chronic disease, deficiency and acute blood loss (RP bleed?)  -CBC showing microcytic anemia given MCV of 79 with high ferritin of 2000 suggesting anemia of chronic disease can not rule out acute blood loss in the setting of recent fall and RP abscess    Plan:  Monitor CBC and transfuse if hemoglobin less than 7  -consider additional iron studies  -monitor for signs of bleeding    Endocrine  Hyperglycemia  Patient with a history of diabetes ,most recent A1c 7.9.  Reports taking metformin 500 mg b.i.d. and glipizide b.i.d.    -we will manage with moderate insulin sliding scale while inpatient    Uncontrolled type 2 diabetes mellitus with hyperglycemia  Holding oral home medications while inpatient  -low dose insulin sliding scale    GI  Hepatitis C  Patient with a normal hep C antibody    -per ID recs hep C quant order (pending)      Psoas muscle abscess  -Consulting Gen surgery          Other  Smoker  -Nicotine patch if patient amenable        Critical Care Daily Checklist:    A: Awake: RASS Goal/Actual Goal: RASS Goal: 0-->alert and calm  Actual:     B: Spontaneous Breathing Trial Performed?     C: SAT & SBT Coordinated?  na                      D: Delirium: CAM-ICU Overall CAM-ICU: Negative   E: Early Mobility Performed? Yes   F: Feeding Goal:    Status:     Current Diet Order   Procedures    Diet NPO Except for: Medication, Sips with Medication     Order Specific Question:   Except for:     Answer:   Medication     Order Specific Question:   Except for:     Answer:   Sips with Medication      AS: Analgesia/Sedation Oral oxycodone, we will resume methadone after confirming with the clinic in a.m.   T: Thromboembolic Prophylaxis Thrombocytopenic   H: HOB > 300 Yes   U: Stress Ulcer Prophylaxis (if needed) No   G: Glucose Control Low-dose sliding scale to moderate sliding scale   B: Bowel Function  No bowel movements in the past few days in the setting of poor appetite   I: Indwelling Catheter (Lines & López) Necessity Right IJ, external urinary catheter   D: De-escalation of Antimicrobials/Pharmacotherapies On vancomycin, clindamycin, tryptase so    Plan for the day/ETD Consulting general surgery and Interventional Radiology    Code Status:  Family/Goals of Care: Full Code         Critical secondary to Patient has a condition that poses threat to life and bodily function: Sepsis    Critical care was time spent personally by me on the following activities: development of treatment plan with patient or surrogate and bedside caregivers, discussions with consultants, evaluation of patient's response to treatment, examination of patient, ordering and performing treatments and interventions, ordering and review of laboratory studies, ordering and review of radiographic studies, pulse oximetry, re-evaluation of patient's condition. This critical care time did not overlap with that of any other  provider or involve time for any procedures.     Brendan Chiang MD  Critical Care Medicine  [facility] - Medical ICU

## 2024-09-18 NOTE — ASSESSMENT & PLAN NOTE
Patient with a history of diabetes ,most recent A1c 7.9.  Reports taking metformin 500 mg b.i.d. and glipizide b.i.d.    -we will manage with moderate insulin sliding scale while inpatient

## 2024-09-18 NOTE — ASSESSMENT & PLAN NOTE
Patient with history of drug misuse disorder.   Reports following with methadone clinic although leans her pain is not well controlled, has not gone clinic since last Friday 9/13  -urine drug screen positive for presumptive methadone and opiates  -patient reports snorting methamphetamine and fentanyl regularly  -we will resume methadone while inpatient  -consider addiction psych consult

## 2024-09-18 NOTE — ASSESSMENT & PLAN NOTE
Patient with a history of diabetes ,most recent A1c 7.9.  Reports taking metformin 500 mg b.i.d. and glipizide b.i.d.

## 2024-09-18 NOTE — CONSULTS
[facility] - Medical ICU  General Surgery  Consult Note    Patient Name: Mari Sloan  MRN: [id number]  Code Status: Full Code  Admission Date: 9/16/2024  Hospital Length of Stay: 2 days  Attending Physician: Bentley Connell MD  Primary Care Provider: Liliana Primary Doctor    Patient information was obtained from patient and past medical records.     Inpatient consult to General Surgery  Consult performed by: Low Scruggs MD  Consult ordered by: Sisi Green MD        Subjective:     Principal Problem: Paraspinal abscess    History of Present Illness: Mari Sloan is a 49 y.o. lady with DM, HTN, hep C, and prior fentanyl use (now on methadone) who is admitted to the MICU with concern for osteomyelitis associated with multiple abscesses. She had a fall and as found down for an unknown period of days. She was being treated at an OSH, and imaging was performed which showed concerns for osteomyelitis/septic arthritis of the left SI joint and L5-S1 along with an abnormal fluid collection extending from T11 through the left iliacus muscle concerning for abscess. She denies fevers and chills. Has bilateral hip pain and pain with flexion of her left hip. Labs significant for WBC 13.5, Hgb 6.9. Micro workup suggesting staph. General surgery consulted for evaluation of her psoas abscess    No current facility-administered medications on file prior to encounter.     Current Outpatient Medications on File Prior to Encounter   Medication Sig    CLONIDINE HCL ORAL Take by mouth.    gabapentin (NEURONTIN) 300 MG capsule Take 300 mg by mouth 3 (three) times daily.    GLIPIZIDE ORAL Take by mouth.    hydrOXYzine pamoate (VISTARIL) 25 MG Cap Take 25 mg by mouth 3 (three) times daily.    metFORMIN (GLUCOPHAGE) 500 MG tablet Take 500 mg by mouth 2 (two) times daily with meals.    methadone (DOLOPHINE) 10 MG tablet Take 70 mg by mouth every 6 (six) hours as needed for Pain.    famotidine (PEPCID) 20 MG tablet Take 1 tablet (20 mg  total) by mouth 2 (two) times daily as needed for Heartburn.    ibuprofen (ADVIL,MOTRIN) 600 MG tablet Take 1 tablet (600 mg total) by mouth every 6 (six) hours as needed for Pain.       Review of patient's allergies indicates:  No Known Allergies    Past Medical History:   Diagnosis Date    Diabetes mellitus     Hypertension     Unspecified viral hepatitis C without hepatic coma      Past Surgical History:   Procedure Laterality Date     SECTION       Family History    None       Tobacco Use    Smoking status: Every Day     Current packs/day: 0.50     Types: Cigarettes    Smokeless tobacco: Never   Substance and Sexual Activity    Alcohol use: Not Currently    Drug use: Not Currently    Sexual activity: Not on file     Review of Systems   Constitutional:  Positive for fatigue. Negative for fever.   HENT:  Negative for dental problem and trouble swallowing.    Respiratory:  Negative for shortness of breath and wheezing.    Cardiovascular:  Negative for chest pain and leg swelling.   Gastrointestinal:  Negative for abdominal distention and abdominal pain.   Genitourinary:  Positive for flank pain (left flank pain). Negative for pelvic pain.   Musculoskeletal:  Positive for arthralgias (right hip pain). Negative for neck stiffness.   Skin:  Positive for color change.   Neurological:  Negative for speech difficulty and weakness.   Psychiatric/Behavioral:  Negative for agitation and behavioral problems.      Objective:     Vital Signs (Most Recent):  Temp: 98.6 °F (37 °C) (24 0556)  Pulse: 107 (24 0600)  Resp: (!) 25 (24 0600)  BP: (!) 146/83 (24 0600)  SpO2: (!) 94 % (24 06) Vital Signs (24h Range):  Temp:  [98.3 °F (36.8 °C)-99.6 °F (37.6 °C)] 98.6 °F (37 °C)  Pulse:  [] 107  Resp:  [17-41] 25  SpO2:  [90 %-100 %] 94 %  BP: (124-180)/() 146/83     Weight: 65.2 kg (143 lb 11.8 oz)  Body mass index is 26.29 kg/m².     Physical Exam  Constitutional:       Appearance:  She is ill-appearing. She is not toxic-appearing.      Comments: In pain, specifically right hip   Cardiovascular:      Rate and Rhythm: Normal rate and regular rhythm.   Pulmonary:      Effort: Pulmonary effort is normal. No respiratory distress.   Abdominal:      General: There is no distension.      Tenderness: There is no abdominal tenderness.      Comments: Left flank indurated, no fluctuation, minimally tender  Minimally tender abdominal exam   Musculoskeletal:      Comments: Pain with flexion of left hip   Skin:     Coloration: Skin is not jaundiced.      Findings: Bruising (left arm) present.      Comments: Multiple acute and chronic , circular ulcers in upper and lower extremities   Neurological:      General: No focal deficit present.      Mental Status: She is alert.      Sensory: No sensory deficit.   Psychiatric:         Mood and Affect: Mood normal.         Behavior: Behavior normal.            I have reviewed all pertinent lab results within the past 24 hours.  CBC:   Recent Labs   Lab 09/18/24  0305   WBC 14.10*   RBC 2.75*   HGB 6.7*   HCT 21.4*   *   MCV 78*   MCH 24.4*   MCHC 31.3*     CMP:   Recent Labs   Lab 09/18/24  0305   *   CALCIUM 6.4*   ALBUMIN 1.2*   PROT 4.8*   *   K 2.6*   CO2 24      BUN 4*   CREATININE 0.6   ALKPHOS 146*   ALT 23   AST 13   BILITOT 1.2*       Significant Diagnostics:  I have reviewed all pertinent imaging results/findings within the past 24 hours.    Assessment/Plan:     Other osteomyelitis, multiple sites  Mari Sloan is a 49 y.o. lady with multiple fluid collections and concern for osteomyelitis at her SI joint    - recommend IR consult for draining these fluid collections  - now that MRI is completed, would discuss with Neurosurgery here per note from the other hospital  - Abx per primary team  - Would not recommend general surgery intervention for her psoas abscess      VTE Risk Mitigation (From admission, onward)           Ordered      IP VTE LOW RISK PATIENT  Once         09/16/24 0961                    Thank you for your consult. I will follow-up with patient. Please contact us if you have any additional questions.    Low Scruggs MD  General Surgery  [facility] - Medical ICU

## 2024-09-18 NOTE — HPI
Mari Sloan is a 49 y.o. 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 She has been admitted for 3 weeks and has been on broad spectrum antibiotics, undergone multiple IR drains, and a L3-L4 laminectomy for an epidural abscess. She has been found to have infective endocarditis. Echo on 9/17 does not mention endocarditis, but she is presumed positive and being treated for it. She has been bacteremic with MSSA, now on oxacillin. Her abscess cultures and blood cultures have all been MSSA. She recently developed left flank pain and had a CT that showed pelvic abscesses and a left flank fluid collection. General surgery was consulted to evaluate her left flank fluid collection.

## 2024-09-18 NOTE — HOSPITAL COURSE
Patient was admitted to the MICU 9/18 with concern for paraspinal abscess. Infectious workup was ordered. Blood cultures were positive for MSSA bacteremia. Neurosurgery, general surgery and IR were consulted to evaluate the patient's paraspinal abscess. Neurosurgery performed a L3-L4 laminectomy for epidural abscess evacuation on 9/19/24 and the cultures grew MSSA. MARICHUY showed normal EF of 60-65% with diastolic dysfunction, could not exclude mitral vegetation vs redundant chordae. ID was consulted and recommended oxacillin and repeat blood cultures. With her electrolyte derangement, and a background of appetitive loss in the past 2 months, there was concern for refeeding syndrome. On 9/21, IR took the patient for abscess drain placement and aspiration of SI joint. Pending culture results. SI joint couldn't be aspirated but retroperitoneal  abscess was drained of 100cc of purulent fluid. Successfully extubated 9/22. Blood culture due tomorrow (9/23) at 15:00. Plan to SD to hospital medicine, no longer requiring ICU level care.

## 2024-09-18 NOTE — ASSESSMENT & PLAN NOTE
49F PMH DM, HTN, hep c, opioid abuse, presenting after down for unknown period of time and inability to ambulate secondary to pain with imaging demonstrating L SI osteo with associated abscesses extending to retroperitoneum without involvement of thecal sac. Denies true weakness, states pain is limiting her ability to move. Pain in hips and low back. On clinda/zosyn, blood culture 9/16 with staph aureus. Denies bowel or bladder incontinence, complaining of constipation about 6 days. Denies numbness    Imaging:  CT Lsp / pelvis 9/16: fluid collection T11 on L through left psoas, felt abscess  CT CAP 9/16: extensive edema with subq gas through left flank, small psoas abscess and fluid collections left flank, septic arthritis of L SI joint  MRI Lsp w/wo 9/17: L SI complex fluid collection felt to represent osteo with adjacent abscess, extends to approximately T12 level  MRI Lsp w/wo repeat 9/18: epidural collections T12-L5     Plan:  Admitted MICU; q1h nc/vs  Labs / diagnostics reviewed  Preponderance of care per primary  Clinda/Zosyn for Staph aureus blood culture 9/16 per primary  Plan for L3-L5 laminectomy for epidural abscess evac/washout  Recommend MRI C/Tsp w/wo to assess for any further infection  Trend inflammatory markers per primary   Recommend IR consult for aspiration fluid collections / biopsy joint; Okay for IR procedure this AM, will plan for laminectomy in afternoon  Recommend orthopedic consultation for SI joint findings  Please notify for questions/concerns/neurologic decline

## 2024-09-19 ENCOUNTER — ANESTHESIA EVENT (OUTPATIENT)
Dept: SURGERY | Facility: HOSPITAL | Age: 49
End: 2024-09-19
Payer: MEDICAID

## 2024-09-19 ENCOUNTER — ANESTHESIA (OUTPATIENT)
Dept: SURGERY | Facility: HOSPITAL | Age: 49
End: 2024-09-19
Payer: MEDICAID

## 2024-09-19 PROBLEM — E44.1 MILD MALNUTRITION: Status: ACTIVE | Noted: 2024-09-19

## 2024-09-19 PROBLEM — G06.2 EPIDURAL ABSCESS: Status: ACTIVE | Noted: 2024-09-19

## 2024-09-19 LAB
ALBUMIN SERPL BCP-MCNC: 1 G/DL (ref 3.5–5.2)
ALBUMIN SERPL BCP-MCNC: 1.3 G/DL (ref 3.5–5.2)
ALLENS TEST: ABNORMAL
ALP SERPL-CCNC: 112 U/L (ref 55–135)
ALP SERPL-CCNC: 114 U/L (ref 55–135)
ALT SERPL W/O P-5'-P-CCNC: 18 U/L (ref 10–44)
ALT SERPL W/O P-5'-P-CCNC: 18 U/L (ref 10–44)
ANION GAP SERPL CALC-SCNC: 6 MMOL/L (ref 8–16)
ANION GAP SERPL CALC-SCNC: 6 MMOL/L (ref 8–16)
ANION GAP SERPL CALC-SCNC: 7 MMOL/L (ref 8–16)
ANION GAP SERPL CALC-SCNC: 8 MMOL/L (ref 8–16)
ANISOCYTOSIS BLD QL SMEAR: SLIGHT
AST SERPL-CCNC: 15 U/L (ref 10–40)
AST SERPL-CCNC: 17 U/L (ref 10–40)
B-HCG UR QL: NEGATIVE
BACTERIA BLD CULT: ABNORMAL
BASOPHILS # BLD AUTO: 0.03 K/UL (ref 0–0.2)
BASOPHILS NFR BLD: 0.3 % (ref 0–1.9)
BILIRUB SERPL-MCNC: 1.5 MG/DL (ref 0.1–1)
BILIRUB SERPL-MCNC: 5.1 MG/DL (ref 0.1–1)
BLD PROD TYP BPU: NORMAL
BLOOD UNIT EXPIRATION DATE: NORMAL
BLOOD UNIT TYPE CODE: 5100
BLOOD UNIT TYPE: NORMAL
BUN SERPL-MCNC: 10 MG/DL (ref 6–20)
BUN SERPL-MCNC: 6 MG/DL (ref 6–20)
CALCIUM SERPL-MCNC: 5.5 MG/DL (ref 8.7–10.5)
CALCIUM SERPL-MCNC: 6.3 MG/DL (ref 8.7–10.5)
CALCIUM SERPL-MCNC: 6.3 MG/DL (ref 8.7–10.5)
CALCIUM SERPL-MCNC: 7.4 MG/DL (ref 8.7–10.5)
CHLORIDE SERPL-SCNC: 100 MMOL/L (ref 95–110)
CHLORIDE SERPL-SCNC: 101 MMOL/L (ref 95–110)
CHLORIDE SERPL-SCNC: 102 MMOL/L (ref 95–110)
CHLORIDE SERPL-SCNC: 110 MMOL/L (ref 95–110)
CO2 SERPL-SCNC: 22 MMOL/L (ref 23–29)
CO2 SERPL-SCNC: 26 MMOL/L (ref 23–29)
CO2 SERPL-SCNC: 27 MMOL/L (ref 23–29)
CO2 SERPL-SCNC: 27 MMOL/L (ref 23–29)
CODING SYSTEM: NORMAL
CREAT SERPL-MCNC: 0.4 MG/DL (ref 0.5–1.4)
CREAT SERPL-MCNC: 0.6 MG/DL (ref 0.5–1.4)
CROSSMATCH INTERPRETATION: NORMAL
CTP QC/QA: YES
DELSYS: ABNORMAL
DIFFERENTIAL METHOD BLD: ABNORMAL
DISPENSE STATUS: NORMAL
EOSINOPHIL # BLD AUTO: 0.2 K/UL (ref 0–0.5)
EOSINOPHIL NFR BLD: 1.4 % (ref 0–8)
ERYTHROCYTE [DISTWIDTH] IN BLOOD BY AUTOMATED COUNT: 17.3 % (ref 11.5–14.5)
ERYTHROCYTE [SEDIMENTATION RATE] IN BLOOD BY WESTERGREN METHOD: 18 MM/H
EST. GFR  (NO RACE VARIABLE): >60 ML/MIN/1.73 M^2
FIO2: 45
GLUCOSE SERPL-MCNC: 111 MG/DL (ref 70–110)
GLUCOSE SERPL-MCNC: 114 MG/DL (ref 70–110)
GLUCOSE SERPL-MCNC: 166 MG/DL (ref 70–110)
GLUCOSE SERPL-MCNC: 183 MG/DL (ref 70–110)
GLUCOSE SERPL-MCNC: 99 MG/DL (ref 70–110)
GRAM STN SPEC: NORMAL
HCO3 UR-SCNC: 27.8 MMOL/L (ref 24–28)
HCO3 UR-SCNC: 28.9 MMOL/L (ref 24–28)
HCT VFR BLD AUTO: 19.5 % (ref 37–48.5)
HCT VFR BLD CALC: 27 %PCV (ref 36–54)
HGB BLD-MCNC: 6.4 G/DL (ref 12–16)
IMM GRANULOCYTES # BLD AUTO: 0.2 K/UL (ref 0–0.04)
IMM GRANULOCYTES NFR BLD AUTO: 1.9 % (ref 0–0.5)
INR PPP: 1.4 (ref 0.8–1.2)
LYMPHOCYTES # BLD AUTO: 2 K/UL (ref 1–4.8)
LYMPHOCYTES NFR BLD: 18.7 % (ref 18–48)
MAGNESIUM SERPL-MCNC: 1.5 MG/DL (ref 1.6–2.6)
MAGNESIUM SERPL-MCNC: 1.9 MG/DL (ref 1.6–2.6)
MCH RBC QN AUTO: 26.1 PG (ref 27–31)
MCHC RBC AUTO-ENTMCNC: 32.8 G/DL (ref 32–36)
MCV RBC AUTO: 80 FL (ref 82–98)
MODE: ABNORMAL
MONOCYTES # BLD AUTO: 0.3 K/UL (ref 0.3–1)
MONOCYTES NFR BLD: 2.4 % (ref 4–15)
NEUTROPHILS # BLD AUTO: 7.9 K/UL (ref 1.8–7.7)
NEUTROPHILS NFR BLD: 75.3 % (ref 38–73)
NRBC BLD-RTO: 0 /100 WBC
PCO2 BLDA: 41.7 MMHG (ref 35–45)
PCO2 BLDA: 43.3 MMHG (ref 35–45)
PEEP: 5
PH SMN: 7.42 [PH] (ref 7.35–7.45)
PH SMN: 7.45 [PH] (ref 7.35–7.45)
PHOSPHATE SERPL-MCNC: 3.5 MG/DL (ref 2.7–4.5)
PLATELET # BLD AUTO: 96 K/UL (ref 150–450)
PMV BLD AUTO: 10.1 FL (ref 9.2–12.9)
PO2 BLDA: 33 MMHG (ref 40–60)
PO2 BLDA: 36 MMHG (ref 40–60)
POC BE: 3 MMOL/L
POC BE: 5 MMOL/L
POC IONIZED CALCIUM: 1 MMOL/L (ref 1.06–1.42)
POC SATURATED O2: 65 % (ref 95–100)
POC SATURATED O2: 72 % (ref 95–100)
POC TCO2: 29 MMOL/L (ref 24–29)
POC TCO2: 30 MMOL/L (ref 24–29)
POCT GLUCOSE: 132 MG/DL (ref 70–110)
POCT GLUCOSE: 148 MG/DL (ref 70–110)
POTASSIUM BLD-SCNC: 4.6 MMOL/L (ref 3.5–5.1)
POTASSIUM SERPL-SCNC: 3 MMOL/L (ref 3.5–5.1)
POTASSIUM SERPL-SCNC: 3.7 MMOL/L (ref 3.5–5.1)
POTASSIUM SERPL-SCNC: 3.9 MMOL/L (ref 3.5–5.1)
POTASSIUM SERPL-SCNC: 4.5 MMOL/L (ref 3.5–5.1)
PROT SERPL-MCNC: 4 G/DL (ref 6–8.4)
PROT SERPL-MCNC: 4.7 G/DL (ref 6–8.4)
PROTHROMBIN TIME: 15.4 SEC (ref 9–12.5)
RBC # BLD AUTO: 2.45 M/UL (ref 4–5.4)
SAMPLE: ABNORMAL
SAMPLE: ABNORMAL
SITE: ABNORMAL
SODIUM BLD-SCNC: 135 MMOL/L (ref 136–145)
SODIUM SERPL-SCNC: 134 MMOL/L (ref 136–145)
SODIUM SERPL-SCNC: 140 MMOL/L (ref 136–145)
TOXIC GRANULES BLD QL SMEAR: PRESENT
TRANS ERYTHROCYTES VOL PATIENT: NORMAL ML
TRIGL SERPL-MCNC: 137 MG/DL (ref 30–150)
UNIT NUMBER: NORMAL
VT: 390
WBC # BLD AUTO: 10.44 K/UL (ref 3.9–12.7)

## 2024-09-19 PROCEDURE — 84478 ASSAY OF TRIGLYCERIDES: CPT | Performed by: STUDENT IN AN ORGANIZED HEALTH CARE EDUCATION/TRAINING PROGRAM

## 2024-09-19 PROCEDURE — 84100 ASSAY OF PHOSPHORUS: CPT | Performed by: STUDENT IN AN ORGANIZED HEALTH CARE EDUCATION/TRAINING PROGRAM

## 2024-09-19 PROCEDURE — 63600175 PHARM REV CODE 636 W HCPCS

## 2024-09-19 PROCEDURE — 25000003 PHARM REV CODE 250: Performed by: STUDENT IN AN ORGANIZED HEALTH CARE EDUCATION/TRAINING PROGRAM

## 2024-09-19 PROCEDURE — 85610 PROTHROMBIN TIME: CPT | Performed by: STUDENT IN AN ORGANIZED HEALTH CARE EDUCATION/TRAINING PROGRAM

## 2024-09-19 PROCEDURE — P9021 RED BLOOD CELLS UNIT: HCPCS

## 2024-09-19 PROCEDURE — 63600175 PHARM REV CODE 636 W HCPCS: Performed by: STUDENT IN AN ORGANIZED HEALTH CARE EDUCATION/TRAINING PROGRAM

## 2024-09-19 PROCEDURE — 36000710: Performed by: STUDENT IN AN ORGANIZED HEALTH CARE EDUCATION/TRAINING PROGRAM

## 2024-09-19 PROCEDURE — 27100171 HC OXYGEN HIGH FLOW UP TO 24 HOURS

## 2024-09-19 PROCEDURE — 83735 ASSAY OF MAGNESIUM: CPT | Mod: 91 | Performed by: STUDENT IN AN ORGANIZED HEALTH CARE EDUCATION/TRAINING PROGRAM

## 2024-09-19 PROCEDURE — 009300Z DRAINAGE OF INTRACRANIAL EPIDURAL SPACE WITH DRAINAGE DEVICE, OPEN APPROACH: ICD-10-PCS | Performed by: STUDENT IN AN ORGANIZED HEALTH CARE EDUCATION/TRAINING PROGRAM

## 2024-09-19 PROCEDURE — 37000009 HC ANESTHESIA EA ADD 15 MINS: Performed by: STUDENT IN AN ORGANIZED HEALTH CARE EDUCATION/TRAINING PROGRAM

## 2024-09-19 PROCEDURE — 86920 COMPATIBILITY TEST SPIN: CPT

## 2024-09-19 PROCEDURE — 81025 URINE PREGNANCY TEST: CPT

## 2024-09-19 PROCEDURE — 25000003 PHARM REV CODE 250

## 2024-09-19 PROCEDURE — 87077 CULTURE AEROBIC IDENTIFY: CPT | Mod: 59 | Performed by: STUDENT IN AN ORGANIZED HEALTH CARE EDUCATION/TRAINING PROGRAM

## 2024-09-19 PROCEDURE — 99233 SBSQ HOSP IP/OBS HIGH 50: CPT | Mod: ,,, | Performed by: INTERNAL MEDICINE

## 2024-09-19 PROCEDURE — 80048 BASIC METABOLIC PNL TOTAL CA: CPT | Mod: 91,XB | Performed by: STUDENT IN AN ORGANIZED HEALTH CARE EDUCATION/TRAINING PROGRAM

## 2024-09-19 PROCEDURE — 87206 SMEAR FLUORESCENT/ACID STAI: CPT | Mod: 91 | Performed by: STUDENT IN AN ORGANIZED HEALTH CARE EDUCATION/TRAINING PROGRAM

## 2024-09-19 PROCEDURE — 36430 TRANSFUSION BLD/BLD COMPNT: CPT

## 2024-09-19 PROCEDURE — 01NB0ZZ RELEASE LUMBAR NERVE, OPEN APPROACH: ICD-10-PCS | Performed by: STUDENT IN AN ORGANIZED HEALTH CARE EDUCATION/TRAINING PROGRAM

## 2024-09-19 PROCEDURE — 36000711: Performed by: STUDENT IN AN ORGANIZED HEALTH CARE EDUCATION/TRAINING PROGRAM

## 2024-09-19 PROCEDURE — 87040 BLOOD CULTURE FOR BACTERIA: CPT | Performed by: STUDENT IN AN ORGANIZED HEALTH CARE EDUCATION/TRAINING PROGRAM

## 2024-09-19 PROCEDURE — 87077 CULTURE AEROBIC IDENTIFY: CPT | Performed by: STUDENT IN AN ORGANIZED HEALTH CARE EDUCATION/TRAINING PROGRAM

## 2024-09-19 PROCEDURE — 27201423 OPTIME MED/SURG SUP & DEVICES STERILE SUPPLY: Performed by: STUDENT IN AN ORGANIZED HEALTH CARE EDUCATION/TRAINING PROGRAM

## 2024-09-19 PROCEDURE — 87102 FUNGUS ISOLATION CULTURE: CPT | Performed by: STUDENT IN AN ORGANIZED HEALTH CARE EDUCATION/TRAINING PROGRAM

## 2024-09-19 PROCEDURE — P9035 PLATELET PHERES LEUKOREDUCED: HCPCS

## 2024-09-19 PROCEDURE — 80048 BASIC METABOLIC PNL TOTAL CA: CPT | Mod: XB | Performed by: STUDENT IN AN ORGANIZED HEALTH CARE EDUCATION/TRAINING PROGRAM

## 2024-09-19 PROCEDURE — 99291 CRITICAL CARE FIRST HOUR: CPT | Mod: ,,, | Performed by: STUDENT IN AN ORGANIZED HEALTH CARE EDUCATION/TRAINING PROGRAM

## 2024-09-19 PROCEDURE — C1729 CATH, DRAINAGE: HCPCS | Performed by: STUDENT IN AN ORGANIZED HEALTH CARE EDUCATION/TRAINING PROGRAM

## 2024-09-19 PROCEDURE — 37000008 HC ANESTHESIA 1ST 15 MINUTES: Performed by: STUDENT IN AN ORGANIZED HEALTH CARE EDUCATION/TRAINING PROGRAM

## 2024-09-19 PROCEDURE — 87075 CULTR BACTERIA EXCEPT BLOOD: CPT | Performed by: STUDENT IN AN ORGANIZED HEALTH CARE EDUCATION/TRAINING PROGRAM

## 2024-09-19 PROCEDURE — 87015 SPECIMEN INFECT AGNT CONCNTJ: CPT | Performed by: STUDENT IN AN ORGANIZED HEALTH CARE EDUCATION/TRAINING PROGRAM

## 2024-09-19 PROCEDURE — 94761 N-INVAS EAR/PLS OXIMETRY MLT: CPT | Mod: XB

## 2024-09-19 PROCEDURE — 20000000 HC ICU ROOM

## 2024-09-19 PROCEDURE — 63267 EXCISE INTRSPINL LESION LMBR: CPT | Mod: ,,, | Performed by: STUDENT IN AN ORGANIZED HEALTH CARE EDUCATION/TRAINING PROGRAM

## 2024-09-19 PROCEDURE — 51702 INSERT TEMP BLADDER CATH: CPT

## 2024-09-19 PROCEDURE — 87186 SC STD MICRODIL/AGAR DIL: CPT | Mod: 59 | Performed by: STUDENT IN AN ORGANIZED HEALTH CARE EDUCATION/TRAINING PROGRAM

## 2024-09-19 PROCEDURE — 27200966 HC CLOSED SUCTION SYSTEM

## 2024-09-19 PROCEDURE — 87205 SMEAR GRAM STAIN: CPT | Performed by: STUDENT IN AN ORGANIZED HEALTH CARE EDUCATION/TRAINING PROGRAM

## 2024-09-19 PROCEDURE — 87070 CULTURE OTHR SPECIMN AEROBIC: CPT | Performed by: STUDENT IN AN ORGANIZED HEALTH CARE EDUCATION/TRAINING PROGRAM

## 2024-09-19 PROCEDURE — 00NY0ZZ RELEASE LUMBAR SPINAL CORD, OPEN APPROACH: ICD-10-PCS | Performed by: STUDENT IN AN ORGANIZED HEALTH CARE EDUCATION/TRAINING PROGRAM

## 2024-09-19 PROCEDURE — 87116 MYCOBACTERIA CULTURE: CPT | Performed by: STUDENT IN AN ORGANIZED HEALTH CARE EDUCATION/TRAINING PROGRAM

## 2024-09-19 PROCEDURE — 80053 COMPREHEN METABOLIC PANEL: CPT | Mod: 91 | Performed by: STUDENT IN AN ORGANIZED HEALTH CARE EDUCATION/TRAINING PROGRAM

## 2024-09-19 PROCEDURE — 99900035 HC TECH TIME PER 15 MIN (STAT)

## 2024-09-19 PROCEDURE — 82803 BLOOD GASES ANY COMBINATION: CPT

## 2024-09-19 PROCEDURE — 63600175 PHARM REV CODE 636 W HCPCS: Mod: JZ,JG | Performed by: STUDENT IN AN ORGANIZED HEALTH CARE EDUCATION/TRAINING PROGRAM

## 2024-09-19 PROCEDURE — 94003 VENT MGMT INPAT SUBQ DAY: CPT

## 2024-09-19 PROCEDURE — 99900026 HC AIRWAY MAINTENANCE (STAT)

## 2024-09-19 PROCEDURE — 85025 COMPLETE CBC W/AUTO DIFF WBC: CPT | Performed by: STUDENT IN AN ORGANIZED HEALTH CARE EDUCATION/TRAINING PROGRAM

## 2024-09-19 RX ORDER — HYDROCODONE BITARTRATE AND ACETAMINOPHEN 500; 5 MG/1; MG/1
TABLET ORAL
Status: DISCONTINUED | OUTPATIENT
Start: 2024-09-19 | End: 2024-09-21

## 2024-09-19 RX ORDER — MIDAZOLAM HYDROCHLORIDE 1 MG/ML
INJECTION INTRAMUSCULAR; INTRAVENOUS
Status: DISCONTINUED | OUTPATIENT
Start: 2024-09-19 | End: 2024-09-19

## 2024-09-19 RX ORDER — FENTANYL CITRATE-0.9 % NACL/PF 10 MCG/ML
0-300 PLASTIC BAG, INJECTION (ML) INTRAVENOUS CONTINUOUS
Status: DISCONTINUED | OUTPATIENT
Start: 2024-09-19 | End: 2024-09-23

## 2024-09-19 RX ORDER — POTASSIUM CHLORIDE 7.45 MG/ML
10 INJECTION INTRAVENOUS
Status: DISCONTINUED | OUTPATIENT
Start: 2024-09-19 | End: 2024-09-19

## 2024-09-19 RX ORDER — THIAMINE HCL 100 MG
100 TABLET ORAL DAILY
Status: DISCONTINUED | OUTPATIENT
Start: 2024-09-20 | End: 2024-09-23

## 2024-09-19 RX ORDER — KETAMINE HCL IN 0.9 % NACL 50 MG/5 ML
SYRINGE (ML) INTRAVENOUS
Status: DISCONTINUED | OUTPATIENT
Start: 2024-09-19 | End: 2024-09-19

## 2024-09-19 RX ORDER — ONDANSETRON 2 MG/ML
INJECTION INTRAMUSCULAR; INTRAVENOUS
Status: DISCONTINUED | OUTPATIENT
Start: 2024-09-19 | End: 2024-09-19

## 2024-09-19 RX ORDER — FENTANYL CITRATE 50 UG/ML
50 INJECTION, SOLUTION INTRAMUSCULAR; INTRAVENOUS
Status: ACTIVE | OUTPATIENT
Start: 2024-09-19 | End: 2024-09-19

## 2024-09-19 RX ORDER — MAGNESIUM SULFATE HEPTAHYDRATE 40 MG/ML
2 INJECTION, SOLUTION INTRAVENOUS ONCE
Status: COMPLETED | OUTPATIENT
Start: 2024-09-19 | End: 2024-09-19

## 2024-09-19 RX ORDER — VANCOMYCIN HYDROCHLORIDE 1 G/20ML
INJECTION, POWDER, LYOPHILIZED, FOR SOLUTION INTRAVENOUS
Status: DISCONTINUED | OUTPATIENT
Start: 2024-09-19 | End: 2024-09-19 | Stop reason: HOSPADM

## 2024-09-19 RX ORDER — NOREPINEPHRINE BITARTRATE/D5W 4MG/250ML
0-3 PLASTIC BAG, INJECTION (ML) INTRAVENOUS CONTINUOUS
Status: DISCONTINUED | OUTPATIENT
Start: 2024-09-19 | End: 2024-09-20

## 2024-09-19 RX ORDER — CALCIUM GLUCONATE 20 MG/ML
1 INJECTION, SOLUTION INTRAVENOUS ONCE
Status: COMPLETED | OUTPATIENT
Start: 2024-09-19 | End: 2024-09-19

## 2024-09-19 RX ORDER — FENTANYL CITRATE 50 UG/ML
INJECTION, SOLUTION INTRAMUSCULAR; INTRAVENOUS
Status: DISCONTINUED | OUTPATIENT
Start: 2024-09-19 | End: 2024-09-19

## 2024-09-19 RX ORDER — HYDROMORPHONE HYDROCHLORIDE 1 MG/ML
1 INJECTION, SOLUTION INTRAMUSCULAR; INTRAVENOUS; SUBCUTANEOUS EVERY 6 HOURS PRN
Status: DISCONTINUED | OUTPATIENT
Start: 2024-09-19 | End: 2024-09-19

## 2024-09-19 RX ORDER — POTASSIUM CHLORIDE 7.45 MG/ML
10 INJECTION INTRAVENOUS
Status: DISPENSED | OUTPATIENT
Start: 2024-09-19 | End: 2024-09-19

## 2024-09-19 RX ORDER — SODIUM CHLORIDE 9 MG/ML
INJECTION, SOLUTION INTRAVENOUS CONTINUOUS
Status: ACTIVE | OUTPATIENT
Start: 2024-09-19 | End: 2024-09-20

## 2024-09-19 RX ORDER — FENTANYL CITRATE 50 UG/ML
50 INJECTION, SOLUTION INTRAMUSCULAR; INTRAVENOUS
Status: DISCONTINUED | OUTPATIENT
Start: 2024-09-19 | End: 2024-09-23

## 2024-09-19 RX ORDER — HYDROMORPHONE HYDROCHLORIDE 1 MG/ML
2 INJECTION, SOLUTION INTRAMUSCULAR; INTRAVENOUS; SUBCUTANEOUS EVERY 6 HOURS PRN
Status: DISPENSED | OUTPATIENT
Start: 2024-09-19 | End: 2024-09-20

## 2024-09-19 RX ORDER — CLONIDINE HYDROCHLORIDE 0.1 MG/1
0.2 TABLET ORAL 3 TIMES DAILY
Status: DISCONTINUED | OUTPATIENT
Start: 2024-09-19 | End: 2024-09-23

## 2024-09-19 RX ORDER — FOLIC ACID 1 MG/1
1 TABLET ORAL DAILY
Status: DISCONTINUED | OUTPATIENT
Start: 2024-09-20 | End: 2024-09-23

## 2024-09-19 RX ORDER — GABAPENTIN 300 MG/1
300 CAPSULE ORAL 3 TIMES DAILY
Status: DISCONTINUED | OUTPATIENT
Start: 2024-09-19 | End: 2024-09-23

## 2024-09-19 RX ORDER — METHADONE HYDROCHLORIDE 10 MG/1
70 TABLET ORAL DAILY
Status: DISCONTINUED | OUTPATIENT
Start: 2024-09-20 | End: 2024-09-23

## 2024-09-19 RX ORDER — HYDROXYZINE PAMOATE 25 MG/1
25 CAPSULE ORAL NIGHTLY
Status: DISCONTINUED | OUTPATIENT
Start: 2024-09-19 | End: 2024-09-23

## 2024-09-19 RX ORDER — ROCURONIUM BROMIDE 10 MG/ML
INJECTION, SOLUTION INTRAVENOUS
Status: DISCONTINUED | OUTPATIENT
Start: 2024-09-19 | End: 2024-09-19

## 2024-09-19 RX ADMIN — HYDROMORPHONE HYDROCHLORIDE 2 MG: 1 INJECTION, SOLUTION INTRAMUSCULAR; INTRAVENOUS; SUBCUTANEOUS at 05:09

## 2024-09-19 RX ADMIN — FENTANYL CITRATE 100 MCG: 50 INJECTION, SOLUTION INTRAMUSCULAR; INTRAVENOUS at 04:09

## 2024-09-19 RX ADMIN — PROPOFOL 25 MCG/KG/MIN: 10 INJECTION, EMULSION INTRAVENOUS at 06:09

## 2024-09-19 RX ADMIN — FOLIC ACID 1 MG: 1 TABLET ORAL at 11:09

## 2024-09-19 RX ADMIN — FENTANYL CITRATE 50 MCG: 50 INJECTION, SOLUTION INTRAMUSCULAR; INTRAVENOUS at 10:09

## 2024-09-19 RX ADMIN — PROPOFOL 25 MCG/KG/MIN: 10 INJECTION, EMULSION INTRAVENOUS at 01:09

## 2024-09-19 RX ADMIN — METHADONE HYDROCHLORIDE 70 MG: 10 TABLET ORAL at 11:09

## 2024-09-19 RX ADMIN — INSULIN GLARGINE 15 UNITS: 100 INJECTION, SOLUTION SUBCUTANEOUS at 09:09

## 2024-09-19 RX ADMIN — ROCURONIUM BROMIDE 50 MG: 10 INJECTION, SOLUTION INTRAVENOUS at 02:09

## 2024-09-19 RX ADMIN — FENTANYL CITRATE 100 MCG: 50 INJECTION, SOLUTION INTRAMUSCULAR; INTRAVENOUS at 03:09

## 2024-09-19 RX ADMIN — POTASSIUM CHLORIDE 10 MEQ: 7.46 INJECTION, SOLUTION INTRAVENOUS at 11:09

## 2024-09-19 RX ADMIN — HYDROXYZINE PAMOATE 25 MG: 25 CAPSULE ORAL at 09:09

## 2024-09-19 RX ADMIN — PHYTONADIONE 10 MG: 10 INJECTION, EMULSION INTRAMUSCULAR; INTRAVENOUS; SUBCUTANEOUS at 07:09

## 2024-09-19 RX ADMIN — PROPOFOL 50 MCG/KG/MIN: 10 INJECTION, EMULSION INTRAVENOUS at 08:09

## 2024-09-19 RX ADMIN — SODIUM CHLORIDE, SODIUM GLUCONATE, SODIUM ACETATE, POTASSIUM CHLORIDE, MAGNESIUM CHLORIDE, SODIUM PHOSPHATE, DIBASIC, AND POTASSIUM PHOSPHATE: .53; .5; .37; .037; .03; .012; .00082 INJECTION, SOLUTION INTRAVENOUS at 02:09

## 2024-09-19 RX ADMIN — CALCIUM GLUCONATE 1 G: 20 INJECTION, SOLUTION INTRAVENOUS at 04:09

## 2024-09-19 RX ADMIN — SUGAMMADEX 200 MG: 100 INJECTION, SOLUTION INTRAVENOUS at 04:09

## 2024-09-19 RX ADMIN — CLINDAMYCIN IN 5 PERCENT DEXTROSE 600 MG: 12 INJECTION, SOLUTION INTRAVENOUS at 09:09

## 2024-09-19 RX ADMIN — CLONIDINE HYDROCHLORIDE 0.2 MG: 0.1 TABLET ORAL at 11:09

## 2024-09-19 RX ADMIN — Medication 20 MG: at 02:09

## 2024-09-19 RX ADMIN — FENTANYL CITRATE 50 MCG: 50 INJECTION INTRAMUSCULAR; INTRAVENOUS at 12:09

## 2024-09-19 RX ADMIN — INSULIN GLARGINE 15 UNITS: 100 INJECTION, SOLUTION SUBCUTANEOUS at 11:09

## 2024-09-19 RX ADMIN — Medication 50 MCG/HR: at 02:09

## 2024-09-19 RX ADMIN — Medication 10 MG: at 03:09

## 2024-09-19 RX ADMIN — ONDANSETRON 0.5 G: 2 INJECTION INTRAMUSCULAR; INTRAVENOUS at 03:09

## 2024-09-19 RX ADMIN — GABAPENTIN 300 MG: 300 CAPSULE ORAL at 11:09

## 2024-09-19 RX ADMIN — CLINDAMYCIN IN 5 PERCENT DEXTROSE 600 MG: 12 INJECTION, SOLUTION INTRAVENOUS at 05:09

## 2024-09-19 RX ADMIN — Medication 300 MCG/HR: at 05:09

## 2024-09-19 RX ADMIN — MIDAZOLAM HYDROCHLORIDE 1 MG: 2 INJECTION, SOLUTION INTRAMUSCULAR; INTRAVENOUS at 02:09

## 2024-09-19 RX ADMIN — POTASSIUM CHLORIDE 10 MEQ: 7.46 INJECTION, SOLUTION INTRAVENOUS at 01:09

## 2024-09-19 RX ADMIN — CLINDAMYCIN IN 5 PERCENT DEXTROSE 600 MG: 12 INJECTION, SOLUTION INTRAVENOUS at 02:09

## 2024-09-19 RX ADMIN — GABAPENTIN 300 MG: 300 CAPSULE ORAL at 09:09

## 2024-09-19 RX ADMIN — SODIUM CHLORIDE: 9 INJECTION, SOLUTION INTRAVENOUS at 11:09

## 2024-09-19 RX ADMIN — Medication 100 MG: at 11:09

## 2024-09-19 RX ADMIN — SODIUM CHLORIDE: 9 INJECTION, SOLUTION INTRAVENOUS at 06:09

## 2024-09-19 RX ADMIN — FENTANYL CITRATE 50 MCG: 50 INJECTION, SOLUTION INTRAMUSCULAR; INTRAVENOUS at 05:09

## 2024-09-19 RX ADMIN — ROCURONIUM BROMIDE 20 MG: 10 INJECTION, SOLUTION INTRAVENOUS at 03:09

## 2024-09-19 RX ADMIN — OXACILLIN 12 G: 2 INJECTION, POWDER, FOR SOLUTION INTRAMUSCULAR; INTRAVENOUS at 01:09

## 2024-09-19 RX ADMIN — FENTANYL CITRATE 50 MCG: 50 INJECTION, SOLUTION INTRAMUSCULAR; INTRAVENOUS at 08:09

## 2024-09-19 RX ADMIN — ROCURONIUM BROMIDE 10 MG: 10 INJECTION, SOLUTION INTRAVENOUS at 04:09

## 2024-09-19 RX ADMIN — LIDOCAINE 1 PATCH: 700 PATCH TOPICAL at 02:09

## 2024-09-19 RX ADMIN — MAGNESIUM SULFATE HEPTAHYDRATE 2 G: 40 INJECTION, SOLUTION INTRAVENOUS at 11:09

## 2024-09-19 NOTE — ANESTHESIA PREPROCEDURE EVALUATION
[facility]  Anesthesia Pre-Operative Evaluation     Patient Name: Mari Sloan  YOB: 1975  MRN: [id number]  [facility]: [id number]      Code Status: Full Code   Date of Procedure: 9/19/2024  Anesthesia: General Procedure: Procedure(s) (LRB):  L3-L5 laminectomy with epidural abscess evac / washout (N/A)  Pre-Operative Diagnosis: Osteomyelitis of multiple sites, unspecified type [M86.9]  Proceduralist: Surgeons and Role:     * Sourav Jim, DO - Primary          SUBJECTIVE:     Pre-operative evaluation for Procedure(s) (LRB):  L3-L5 laminectomy with epidural abscess evac / washout (N/A)     09/19/2024    Mari Sloan is a 49 y.o. female with PMH significant for HTN, DM, substance use disorder (heroin, fentanyl) presenting with septic arthritis of the left SI joint. She received 2 units of PRBC for an H/H of 5.7/18.4. She had no focal motor deficits noted, and she has no alteration in mentation.  Advanced imaging was obtained which showed osteomyelitis/septic arthritis of the left SI joint and L5-S1 along with an abnormal fluid collection extending from T11 through the left iliacus muscle concerning for abscess. IR intially was consulted and suggested drainage/aspiration would be insufficient for her overall care and felt that she would need further debridement.  At the time of transfer here, she was being treated with oxacillin, vancomycin, and clindamycin for staph aureus bacteremia as well as her multiple localize infarctions throughout the left flank, spine, and retroperitoneum.    Patient now presents for the above procedure(s).       Anticoagulants   Medication Route Frequency        ________________________________________  Results for orders placed during the hospital encounter of 09/16/24    Echo Saline Bubble? No    Interpretation Summary    Left Ventricle: The left ventricle is normal in size. Normal wall thickness. There  is concentric remodeling. There is normal systolic function with a visually estimated ejection fraction of 60 - 65%. There is normal diastolic function.    Right Ventricle: Normal right ventricular cavity size. Systolic function is normal.    Right Atrium: Catheter present in the right atrium.    Mitral Valve: The mitral valve is structurally normal. Redundant chordal structures noted. There is no stenosis. There is mild regurgitation.    Pulmonary Artery: The estimated pulmonary artery systolic pressure is 39 mmHg.    Cannot entirely exclude mitral vegetation vs redundant chordae.  If high clinical suspicion for endocarditis, would rx as such (not clear MARICHUY would be able to exclude vegetation based on TTE findings).    ________________________________________    Prev airway:   Vent Mode: A/C  Oxygen Concentration (%):  [45] 45  Resp Rate Total:  [18 br/min-27 br/min] 21 br/min  Vt Set:  [390 mL-450 mL] 390 mL  PEEP/CPAP:  [5 cmH20] 5 cmH20  Mean Airway Pressure:  [8.8 tcJ84-44 cmH20] 11 cmH20         LDA:   Percutaneous Central Line - Triple Lumen  09/16/24 1700 Internal Jugular Right (Active)   Line Necessity Review Frequent Blood Draws;Hemodynamic instability;Poor venous access 09/17/24 1901   Verification by X-ray Yes 09/17/24 1901   Site Assessment No redness;No swelling;No warmth 09/19/24 0301   Line Securement Device Secured with sutures 09/19/24 0301   Dressing Type CHG impregnated dressing/sponge;Central line dressing 09/19/24 0301   Dressing Status Clean;Dry;Intact 09/19/24 0301   Dressing Intervention Integrity maintained 09/19/24 0301   Date on Dressing 09/17/24 09/19/24 0301   Dressing Due to be Changed 09/24/24 09/19/24 0301   Distal Patency/Care infusing 09/19/24 0301   Medial 1 Patency/Care infusing 09/19/24 0301   Proximal 1 Patency/Care infusing 09/19/24 0301   Waveform Not being transduced 09/17/24 1901   Number of days: 2            Peripheral IV - Single Lumen 09/19/24 1110 18 G 1 3/4 in No  Anterior;Right Upper Arm (Active)   Number of days: 0            NG/OG Tube 09/19/24 0515 orogastric 14 Fr. Center mouth (Active)   Intake (mL) 0 mL 09/19/24 0901   Water Bolus (mL) 0 mL 09/19/24 0901   Number of days: 0       Female External Urinary Catheter w/ Suction 09/16/24 2345 (Active)   Skin no redness;no breakdown 09/19/24 0301   Tolerance no signs/symptoms of discomfort 09/19/24 0301   Suction Continuous suction at 70 mmHg 09/19/24 0301   Date of last wick change 09/18/24 09/18/24 2101   Time of last wick change 2101 09/18/24 2101   Output (mL) 300 mL 09/19/24 0945   Number of days: 2       Drips:    fentanyl  0-250 mcg/hr Intravenous Continuous 12.5 mL/hr at 09/19/24 0601 125 mcg/hr at 09/19/24 0601    NORepinephrine bitartrate-D5W  0-3 mcg/kg/min Intravenous Continuous   Held at 09/19/24 0145    propofoL  0-50 mcg/kg/min Intravenous Continuous 9.8 mL/hr at 09/19/24 0611 25 mcg/kg/min at 09/19/24 0611       Patient Active Problem List   Diagnosis    Other osteomyelitis, multiple sites    Psoas muscle abscess    Hypokalemia    History of drug use    Smoker    Uncontrolled type 2 diabetes mellitus with hyperglycemia    Anemia, unspecified    Paraspinal abscess    Hepatitis C    Severe sepsis    Thrombocytopenia    Hyperglycemia    Sepsis    Staphylococcus aureus bacteremia    Endocarditis    Refeeding syndrome    Epidural abscess    Mild malnutrition       Review of patient's allergies indicates:  No Known Allergies    Current Inpatient Medications:    clindamycin IV (PEDS and ADULTS)  600 mg Intravenous Q8H    cloNIDine  0.2 mg Per OG tube TID    [START ON 9/20/2024] folic acid  1 mg Per OG tube Daily    gabapentin  300 mg Per OG tube TID    hydrOXYzine pamoate  25 mg Per OG tube QHS    insulin glargine U-100  15 Units Subcutaneous BID    LIDOcaine  1 patch Transdermal Q24H    [START ON 9/20/2024] methadone  70 mg Per OG tube Daily    oxacillin 12 g in  mL CONTINUOUS INFUSION  12 g Intravenous Q24H     pantoprazole  40 mg Oral Daily    potassium chloride  10 mEq Intravenous Q1H    [START ON 2024] thiamine  100 mg Per OG tube Daily       No current facility-administered medications on file prior to encounter.     Current Outpatient Medications on File Prior to Encounter   Medication Sig Dispense Refill    gabapentin (NEURONTIN) 300 MG capsule Take 300 mg by mouth 3 (three) times daily.      hydrOXYzine pamoate (VISTARIL) 25 MG Cap Take 25 mg by mouth 3 (three) times daily.      metFORMIN (GLUCOPHAGE) 500 MG tablet Take 500 mg by mouth 2 (two) times daily with meals.      methadone (DOLOPHINE) 10 MG tablet Take 70 mg by mouth Daily.      cloNIDine (CATAPRES) 0.2 MG tablet Take 0.2 mg by mouth 3 (three) times daily.      glipiZIDE (GLUCOTROL) 5 MG tablet Take 5 mg by mouth 2 (two) times daily.         Past Surgical History:   Procedure Laterality Date     SECTION      MAGNETIC RESONANCE IMAGING N/A 2024    Procedure: MRI (Magnetic Resonance Imagine);  Surgeon: Dolores Storey;  Location: [facility];  Service: Anesthesiology;  Laterality: N/A;  conscious sedaation MRI lumbar spine w/ and without contrast       Social History:  Tobacco Use: High Risk (2024)    Patient History     Smoking Tobacco Use: Every Day     Smokeless Tobacco Use: Never     Passive Exposure: Not on file       Alcohol Use: Patient Declined (2024)    AUDIT-C     Frequency of Alcohol Consumption: Patient declined     Average Number of Drinks: Patient declined     Frequency of Binge Drinking: Patient declined       OBJECTIVE:     Vital Signs Range:  BMI Readings from Last 1 Encounters:   24 26.29 kg/m²       Temp:  [36.7 °C (98 °F)-36.9 °C (98.5 °F)]   Pulse:  [83-98]   Resp:  [13-30]   BP: ()/(55-79)   SpO2:  [90 %-100 %]        Significant Labs:        Component Value Date/Time    WBC 10.44 2024    HGB 6.4 (L) 2024    HCT 19.5 (LL) 2024    HCT 18 (LL) 2024 2318    PLT 96  (L) 09/19/2024 0319     09/19/2024 0900    K 3.0 (L) 09/19/2024 0900     09/19/2024 0900    CO2 22 (L) 09/19/2024 0900     (H) 09/19/2024 0900    BUN 6 09/19/2024 0900    CREATININE 0.4 (L) 09/19/2024 0900    MG 1.5 (L) 09/19/2024 0319    PHOS 3.5 09/19/2024 0319    CALCIUM 5.5 (LL) 09/19/2024 0900    ALBUMIN 1.0 (L) 09/19/2024 0319    PROT 4.0 (L) 09/19/2024 0319    ALKPHOS 114 09/19/2024 0319    BILITOT 1.5 (H) 09/19/2024 0319    AST 15 09/19/2024 0319    ALT 18 09/19/2024 0319    INR 1.4 (H) 09/19/2024 0319    HGBA1C 7.9 (H) 09/17/2024 0800        Please see Results Review for additional labs.     Diagnostic Studies: No relevant studies.    EKG:   Results for orders placed or performed during the hospital encounter of 09/16/24   EKG 12-lead    Collection Time: 09/16/24  4:42 PM   Result Value Ref Range    QRS Duration 96 ms    OHS QTC Calculation 438 ms    Narrative    Test Reason : E87.6,    Vent. Rate : 100 BPM     Atrial Rate : 100 BPM     P-R Int : 138 ms          QRS Dur : 096 ms      QT Int : 340 ms       P-R-T Axes : 060 062 095 degrees     QTc Int : 438 ms    Normal sinus rhythm  Nonspecific ST and T wave abnormality  Abnormal ECG    Confirmed by Ching Vines MD ([id number]) on 9/17/2024 7:20:39 AM    Referred By: [email or web address]   SELF           Confirmed By:Ching Vines MD       ECHO:  See subjective, if available.      ASSESSMENT/PLAN:           Pre-op Assessment    I have reviewed the Patient Summary Reports.     I have reviewed the Nursing Notes. I have reviewed the NPO Status.   I have reviewed the Medications.     Review of Systems  Anesthesia Hx:  No problems with previous Anesthesia   History of prior surgery of interest to airway management or planning:  Previous anesthesia: General        Denies Family Hx of Anesthesia complications.    Denies Personal Hx of Anesthesia complications.                    Cardiovascular:     Hypertension                                  Hypertension          Hepatic/GI:      Liver Disease, Hepatitis        Liver Disease, Hepatitis        Neurological:    Neuromuscular Disease,                                 Neuromuscular Disease   Endocrine:  Diabetes    Diabetes                          Physical Exam  General: Well nourished, Cooperative, Alert and Oriented    Airway:  Mallampati: unable to assess   Mouth Opening: Normal  TM Distance: Normal  Tongue: Normal  Pre-Existing Airway: Oral Endotracheal tube    Dental:  Intact    Chest/Lungs:  Clear to auscultation, Normal Respiratory Rate    Heart:  Rate: Normal  Rhythm: Regular Rhythm  Sounds: Normal        Anesthesia Plan  Type of Anesthesia, risks & benefits discussed:    Anesthesia Type: Gen ETT  Intra-op Monitoring Plan: Standard ASA Monitors  Post Op Pain Control Plan: multimodal analgesia and IV/PO Opioids PRN  Induction:  IV  Airway Plan: Direct and Video, Post-Induction  Informed Consent: Informed consent signed with the Patient and all parties understand the risks and agree with anesthesia plan.  All questions answered.   ASA Score: 4  Day of Surgery Review of History & Physical: H&P Update referred to the surgeon/provider.I have interviewed and examined the patient. I have reviewed the patient's H&P dated:     Ready For Surgery From Anesthesia Perspective.     .

## 2024-09-19 NOTE — TRANSFER OF CARE
"Anesthesia Transfer of Care Note    Patient: Mari Sloan    Procedure(s) Performed: Procedure(s) (LRB):  L3-L5 laminectomy with epidural abscess evac (N/A)  WASHOUT, WOUND, SPINE    Patient location: ICU    Anesthesia Type: general    Transport from OR: Transported from OR intubated on 100% O2 by AMBU with adequate controlled ventilation. Continuous ECG monitoring in transport. Continuous SpO2 monitoring in transport    Post pain: adequate analgesia    Post assessment: no apparent anesthetic complications and tolerated procedure well    Post vital signs: stable    Level of consciousness: sedated    Nausea/Vomiting: no nausea/vomiting    Complications: none    Transfer of care protocol was followed      Last vitals: Visit Vitals  BP 90/60   Pulse 78   Temp 37 °C (98.6 °F) (Axillary)   Resp (!) 21   Ht 5' 2" (1.575 m)   Wt 65.2 kg (143 lb 11.8 oz)   LMP  (LMP Unknown)   SpO2 100%   Breastfeeding No   BMI 26.29 kg/m²     "

## 2024-09-19 NOTE — SUBJECTIVE & OBJECTIVE
Past Medical History:   Diagnosis Date    Diabetes mellitus     Hypertension     Unspecified viral hepatitis C without hepatic coma        Past Surgical History:   Procedure Laterality Date     SECTION      MAGNETIC RESONANCE IMAGING N/A 2024    Procedure: MRI (Magnetic Resonance Imagine);  Surgeon: SurgeonDolores;  Location: [facility];  Service: Anesthesiology;  Laterality: N/A;  conscious sedaation MRI lumbar spine w/ and without contrast       Review of patient's allergies indicates:  No Known Allergies    Current Facility-Administered Medications   Medication    0.9%  NaCl infusion (for blood administration)    0.9%  NaCl infusion (for blood administration)    0.9%  NaCl infusion (for blood administration)    0.9%  NaCl infusion (for blood administration)    acetaminophen tablet 1,000 mg    albuterol-ipratropium 2.5 mg-0.5 mg/3 mL nebulizer solution 3 mL    clindamycin in D5W 600 mg/50 mL IVPB 600 mg    cloNIDine tablet 0.2 mg    dextrose 10% bolus 125 mL 125 mL    dextrose 10% bolus 125 mL 125 mL    fentaNYL 2500 mcg in 0.9% sodium chloride 250 mL infusion premix    fentaNYL 50 mcg/mL injection 50 mcg    folic acid tablet 1 mg    gabapentin capsule 300 mg    glucagon (human recombinant) injection 1 mg    hydrOXYzine pamoate capsule 25 mg    hydrOXYzine pamoate capsule 50 mg    insulin aspart U-100 pen 0-10 Units    insulin glargine U-100 (Lantus) pen 15 Units    LIDOcaine 5 % patch 1 patch    loperamide capsule 2 mg    melatonin tablet 6 mg    methadone tablet 70 mg    naloxone 0.4 mg/mL injection 0.4 mg    NORepinephrine 4 mg in dextrose 5% 250 mL infusion (premix)    ondansetron injection 4 mg    oxacillin 12 g in  mL CONTINUOUS INFUSION    oxyCODONE immediate release tablet Tab 10 mg    pantoprazole EC tablet 40 mg    phytonadione vitamin k (AQUA-MEPHYTON) 10 mg in D5W 50 mL IVPB    polyethylene glycol packet 17 g    propofol (DIPRIVAN) 10 mg/mL infusion    sodium chloride 0.9% flush 10 mL  "   thiamine tablet 100 mg     Family History    None       Tobacco Use    Smoking status: Every Day     Current packs/day: 0.50     Types: Cigarettes    Smokeless tobacco: Never   Substance and Sexual Activity    Alcohol use: Not Currently    Drug use: Not Currently    Sexual activity: Not on file     ROS  I was unable to obtain a full 10 point review of systems as the patient was intubated and sedated on my initial assessment.    Objective:     Vital Signs (Most Recent):  Temp: 98.2 °F (36.8 °C) (09/19/24 0754)  Pulse: 98 (09/19/24 0754)  Resp: (!) 22 (09/19/24 0754)  BP: 108/65 (09/19/24 0745)  SpO2: (!) 93 % (09/19/24 0754) Vital Signs (24h Range):  Temp:  [98 °F (36.7 °C)-98.9 °F (37.2 °C)] 98.2 °F (36.8 °C)  Pulse:  [] 98  Resp:  [13-33] 22  SpO2:  [90 %-100 %] 93 %  BP: ()/(50-96) 108/65     Weight: 65.2 kg (143 lb 11.8 oz)  Height: 5' 2" (157.5 cm)  Body mass index is 26.29 kg/m².      Intake/Output Summary (Last 24 hours) at 9/19/2024 0825  Last data filed at 9/19/2024 0601  Gross per 24 hour   Intake 2015.28 ml   Output 800 ml   Net 1215.28 ml        Ortho/SPM Exam  General:  Ill-appearing  Neuro:  Intubated and sedated on propofol  Head: normocephalic, atraumatic.  Mouth:  ET tube in place  CV: extremities warm and well perfused  Pulm: breathing comfortably, equal chest rise bilat on ventilator  Skin: clean, dry, intact (any exceptions noted in below musculoskeletal exam)    MSK:    RUE:  - No ecchymosis, erythema, or signs of cellulitis  - she does not wince or withdrawal with palpation of the shoulder, arm, elbow, or forearm  - AROM and PROM of the shoulder, elbow, wrist, and hand intact, unable to gauge pain as the patient is intubated and sedated  - Withdrawals her hand to pinch  - Squeezes my hand to command  - Compartments soft  - Radial artery palpated   - Capillary Refill <3s    LUE:  - Ecchymosis in the antecubital fossa  - she does not wince or withdrawal with palpation of the " shoulder, arm, elbow, or forearm  - AROM and PROM of the shoulder, elbow, wrist, and hand intact, unable to gauge pain as the patient is intubated and sedated  - Withdrawals her hand to pinch  - Squeezes my hand to command  - Compartments soft  - Radial artery palpated   - Capillary Refill <3s    RLE:  - Cracks to the plantar surface of the feet  - 1+ pitting edema distal to the knee  - Winces and withdraws with palpation throughout the lower extremity  - AROM and PROM of the hip, knee, ankle, and foot intact, unable to gauge pain as the patient was intubated and sedated  - negative Babinski  - Compartments soft and compressible with pitting edema  - DP palpated    LLE:  - Severe erythema and induration that begins laterally over the hip at the level of the greater trochanter and wrapped posteriorly over her gluteal muscles and into the lower part of the left flank.  This region is significantly tender to palpation without open wounds, skin breakdown, or weeping/drainage.  - 1+ pitting edema distal to the knee  - Winces and withdraws with palpation throughout the lower extremity  - AROM and PROM of the hip, knee, ankle, and foot intact, unable to gauge pain as the patient was intubated and sedated  - negative Babinski  - Compartments soft and compressible with pitting edema  - DP palpated    Spine/pelvis/axial body:  No tenderness to palpation of cervical, thoracic spine  Mild-to-moderate TTP of the lumbar spine     Significant Labs:   Recent Lab Results  (Last 5 results in the past 24 hours)        09/19/24  0319   09/19/24  0026   09/18/24  2318   09/18/24  2302   09/18/24  1641        Albumin 1.0                              Allens Test     N/A           ALT 18               Anion Gap 7   6             Aniso Slight               AST 15               Baso # 0.03               Basophil % 0.3               BILIRUBIN TOTAL 1.5  Comment: For infants and newborns, interpretation of results should be based  on  gestational age, weight and in agreement with clinical  observations.    Premature Infant recommended reference ranges:  Up to 24 hours.............<8.0 mg/dL  Up to 48 hours............<12.0 mg/dL  3-5 days..................<15.0 mg/dL  6-29 days.................<15.0 mg/dL                 Site     Other           BUN 6   6             Calcium 6.3  Comment: *Critical value notification by GAS with confirmation of receipt to   MAGALY GUTHRIE RN at  Date 09/19/24 Time 4:19 AM     6.3  Comment: *Critical value notification by adr with confirmation of receipt to   MAGALY GUTHRIE RN at  Date 9/19/24 Time 1:42AM               Chloride 100   101             CO2 27   27             Creatinine 0.6   0.6             DelSys     Adult Vent           Differential Method Automated               eGFR >60.0   >60.0             Eos # 0.2               Eos % 1.4               FiO2     45           Glucose 166   183             Gran # (ANC) 7.9               Gran % 75.3               Hematocrit 19.5  Comment: JCT  critical result(s) called and verbal readback obtained from    LULI GUTHRIE RN by [facility] 09/19/2024 03:52                 Hemoglobin 6.4               Immature Grans (Abs) 0.20  Comment: Mild elevation in immature granulocytes is non specific and   can be seen in a variety of conditions including stress response,   acute inflammation, trauma and pregnancy. Correlation with other   laboratory and clinical findings is essential.                 Immature Granulocytes 1.9               INR 1.4  Comment: Coumadin Therapy:  2.0 - 3.0 for INR for all indicators except mechanical heart valves  and antiphospholipid syndromes which should use 2.5 - 3.5.                 Lymph # 2.0               Lymph % 18.7               Magnesium  1.5               MCH 26.1               MCHC 32.8               MCV 80               Mode     AC/PRVC           Mono # 0.3               Mono % 2.4               MPV 10.1               nRBC 0                PEEP     5           Phosphorus Level 3.5               Platelet Count 96               POC BE     5           POC HCO3     29.2           POC Hematocrit     18           POC Ionized Calcium     0.97           POC PCO2     42.5           POC PH     7.445           POC PO2     29           POC Potassium     4.0           POC SATURATED O2     57           POC Sodium     135           POC TCO2     30           POCT Glucose       202   170       Potassium 3.7   3.9             PROTEIN TOTAL 4.0               PT 15.4               Rate     18           RBC 2.45               RDW 17.3               Sample     VENOUS           Sodium 134   134             Toxic Granulation Present               Triglycerides 137  Comment: The National Cholesterol Education Program (NCEP) has set the  following guidelines (reference values) for triglycerides:  Normal......................<150 mg/dL  Borderline High.............150-199 mg/dL  High........................200-499 mg/dL                 Vt     450           WBC 10.44                                    All pertinent labs within the past 24 hours have been reviewed.    Significant Imaging: MRI: I have reviewed all pertinent results/findings and my personal findings are:  MRI of the lumbar spine demonstrates what appears to be osteomyelitis and septic arthritis of the left SI joint.  There is a epidural abscess throughout the lumbar spine that appears to terminate around the T11 level with adjacent fluid collection within the iliacus and psoas.

## 2024-09-19 NOTE — SUBJECTIVE & OBJECTIVE
Interval History: intubated yesterday for MRI with sedation and remains intubated today since she will be going for IR procedure and neurosurgery    Review of Systems   Unable to perform ROS: Intubated     Objective:     Vital Signs (Most Recent):  Temp: 98.2 °F (36.8 °C) (09/19/24 1109)  Pulse: 92 (09/19/24 1109)  Resp: (!) 21 (09/19/24 1123)  BP: 98/66 (09/19/24 1109)  SpO2: 98 % (09/19/24 1109) Vital Signs (24h Range):  Temp:  [98 °F (36.7 °C)-98.9 °F (37.2 °C)] 98.2 °F (36.8 °C)  Pulse:  [] 92  Resp:  [13-33] 21  SpO2:  [90 %-100 %] 98 %  BP: ()/(50-96) 98/66     Weight: 65.2 kg (143 lb 11.8 oz)  Body mass index is 26.29 kg/m².    Estimated Creatinine Clearance: 147.4 mL/min (A) (based on SCr of 0.4 mg/dL (L)).     Physical Exam  Vitals reviewed.   Constitutional:       General: She is not in acute distress.     Appearance: Normal appearance.   HENT:      Head: Normocephalic and atraumatic.   Cardiovascular:      Rate and Rhythm: Normal rate and regular rhythm.      Heart sounds: No murmur heard.  Pulmonary:      Effort: No respiratory distress.      Breath sounds: Normal breath sounds.   Abdominal:      General: Abdomen is flat. Bowel sounds are normal.      Palpations: Abdomen is soft.   Skin:     General: Skin is warm and dry.          Significant Labs: All pertinent labs within the past 24 hours have been reviewed.  Recent Lab Results  (Last 5 results in the past 24 hours)        09/19/24  1125   09/19/24  0944   09/19/24  0900   09/19/24  0319   09/19/24  0026        Albumin       1.0         ALP       114         ALT       18         Anion Gap     8   7   6       Aniso       Slight         AST       15         Baso #       0.03         Basophil %       0.3         BILIRUBIN TOTAL       1.5  Comment: For infants and newborns, interpretation of results should be based  on gestational age, weight and in agreement with clinical  observations.    Premature Infant recommended reference ranges:  Up to  24 hours.............<8.0 mg/dL  Up to 48 hours............<12.0 mg/dL  3-5 days..................<15.0 mg/dL  6-29 days.................<15.0 mg/dL           BUN     6   6   6       Calcium     5.5  Comment: *Critical value notification by _fb_ with confirmation of receipt to  _jojo burgerrn__ at  Date_9/19___Time_10:11___     6.3  Comment: *Critical value notification by GAS with confirmation of receipt to   MAGALY GUTHRIE RN at  Date 09/19/24 Time 4:19 AM     6.3  Comment: *Critical value notification by adr with confirmation of receipt to   MAGALY GUTHRIE RN at  Date 9/19/24 Time 1:42AM         Chloride     110   100   101       CO2     22   27   27       Creatinine     0.4   0.6   0.6       Differential Method       Automated         eGFR     >60.0   >60.0   >60.0       Eos #       0.2         Eos %       1.4         Glucose     111   166   183       Gran # (ANC)       7.9         Gran %       75.3         Hematocrit       19.5  Comment: JCT  critical result(s) called and verbal readback obtained from    LULI GUTHRIE RN by [facility] 09/19/2024 03:52           Hemoglobin       6.4         Immature Grans (Abs)       0.20  Comment: Mild elevation in immature granulocytes is non specific and   can be seen in a variety of conditions including stress response,   acute inflammation, trauma and pregnancy. Correlation with other   laboratory and clinical findings is essential.           Immature Granulocytes       1.9         INR       1.4  Comment: Coumadin Therapy:  2.0 - 3.0 for INR for all indicators except mechanical heart valves  and antiphospholipid syndromes which should use 2.5 - 3.5.           Lymph #       2.0         Lymph %       18.7         Magnesium        1.5         MCH       26.1         MCHC       32.8         MCV       80         Mono #       0.3         Mono %       2.4         MPV       10.1         nRBC       0         Phosphorus Level       3.5         Platelet Count       96         POCT Glucose  148               Potassium     3.0   3.7   3.9       hCG Qualitative, Urine   Negative             PROTEIN TOTAL       4.0         PT       15.4          Acceptable   Yes             RBC       2.45         RDW       17.3         Sodium     140   134   134       Toxic Granulation       Present         Triglycerides       137  Comment: The National Cholesterol Education Program (NCEP) has set the  following guidelines (reference values) for triglycerides:  Normal......................<150 mg/dL  Borderline High.............150-199 mg/dL  High........................200-499 mg/dL           WBC       10.44                                Significant Imaging: I have reviewed all pertinent imaging results/findings within the past 24 hours.

## 2024-09-19 NOTE — CONSULTS
"[facility] - Medical ICU  Orthopedics  Consult Note    Patient Name: Mari Sloan  MRN: [id number]  Admission Date: 9/16/2024  Hospital Length of Stay: 3 days  Attending Provider: Bentley Connell MD  Primary Care Provider: Liliana, Primary Doctor    Inpatient consult to Orthopedics  Consult performed by: KAYLYN Wallace MD  Consult ordered by: Sisi Green MD        Subjective:     Principal Problem:Paraspinal abscess    Chief Complaint:   Chief Complaint   Patient presents with    Emesis     Nausea and vomiting x5 days. CBG "HIGH" per EMS    Fall     Fall 1.5 wk ago, c/o BL hip pain. Pt also reporting she has not bene able to take her medications since falling besides her methadone.        HPI: Mari Sloan is a 49 y.o. female with PMH significant for HTN, DM, substance use disorder (heroin, fentanyl) presenting with septic arthritis of the left SI joint.  Patient originally presented to [facility] on September 16 with nausea and vomiting and a mechanical fall 5 days prior.  At that time she was complaining of bilateral hip pain, flank pain, and muscle spasm in her back and legs.  She also had been unable to keep oral intake down.  While in the ED at [facility], patient was noted significant hypokalemia, hypomagnesemia, severe anemia, metabolic alkalosis, and hyperglycemia.  She also received 2 units of PRBC for an H/H of 5.7/18.4. She had no focal motor deficits noted, and she has no alteration in mentation.  Advanced imaging was obtained which showed osteomyelitis/septic arthritis of the left SI joint and L5-S1 along with an abnormal fluid collection extending from T11 through the left iliacus muscle concerning for abscess.  Apparently, the and multiple distal aortic decision was made to transfer the patient to [facility] for further evaluation by Orthopedics, General surgery, and Neurosurgery. IR intially was consulted and suggested drainage/aspiration would be insufficient " for her overall care and felt that she would need further debridement.  It appears that after her transferred to [facility], multiple services did not feel that urgent or emergent surgical intervention was in the best interest of the patient given her status at that time and multiple comorbidities.  As such, the patient was eventually transferred to [facility] for further evaluation by a multitude of services.  At the time of transfer here, she was being treated with oxacillin, vancomycin, and clindamycin for staph aureus bacteremia as well as her multiple localize infarctions throughout the left flank, spine, and retroperitoneum.    Ambulates without assistance@ baseline   Tobacco Use: pack a day for atleast 15 years   Denies IVDU but endorses intranasal fentanyl and heroin   Lives with  in [place]   Anticoagulation:  none  Immunosuppressive medications: none  Occupation: unemployed   No hx of MI, CVA, DVT/PE  No hx of cancer, chemotherapy, or radiation      Past Medical History:   Diagnosis Date    Diabetes mellitus     Hypertension     Unspecified viral hepatitis C without hepatic coma        Past Surgical History:   Procedure Laterality Date     SECTION      MAGNETIC RESONANCE IMAGING N/A 2024    Procedure: MRI (Magnetic Resonance Imagine);  Surgeon: Dolores Storey;  Location: [facility];  Service: Anesthesiology;  Laterality: N/A;  conscious sedaation MRI lumbar spine w/ and without contrast       Review of patient's allergies indicates:  No Known Allergies    Current Facility-Administered Medications   Medication    0.9%  NaCl infusion (for blood administration)    0.9%  NaCl infusion (for blood administration)    0.9%  NaCl infusion (for blood administration)    0.9%  NaCl infusion (for blood administration)    acetaminophen tablet 1,000 mg    albuterol-ipratropium 2.5 mg-0.5 mg/3 mL nebulizer solution 3 mL    clindamycin in D5W 600 mg/50 mL IVPB 600 mg    cloNIDine tablet 0.2 mg     "dextrose 10% bolus 125 mL 125 mL    dextrose 10% bolus 125 mL 125 mL    fentaNYL 2500 mcg in 0.9% sodium chloride 250 mL infusion premix    fentaNYL 50 mcg/mL injection 50 mcg    folic acid tablet 1 mg    gabapentin capsule 300 mg    glucagon (human recombinant) injection 1 mg    hydrOXYzine pamoate capsule 25 mg    hydrOXYzine pamoate capsule 50 mg    insulin aspart U-100 pen 0-10 Units    insulin glargine U-100 (Lantus) pen 15 Units    LIDOcaine 5 % patch 1 patch    loperamide capsule 2 mg    melatonin tablet 6 mg    methadone tablet 70 mg    naloxone 0.4 mg/mL injection 0.4 mg    NORepinephrine 4 mg in dextrose 5% 250 mL infusion (premix)    ondansetron injection 4 mg    oxacillin 12 g in  mL CONTINUOUS INFUSION    oxyCODONE immediate release tablet Tab 10 mg    pantoprazole EC tablet 40 mg    phytonadione vitamin k (AQUA-MEPHYTON) 10 mg in D5W 50 mL IVPB    polyethylene glycol packet 17 g    propofol (DIPRIVAN) 10 mg/mL infusion    sodium chloride 0.9% flush 10 mL    thiamine tablet 100 mg     Family History    None       Tobacco Use    Smoking status: Every Day     Current packs/day: 0.50     Types: Cigarettes    Smokeless tobacco: Never   Substance and Sexual Activity    Alcohol use: Not Currently    Drug use: Not Currently    Sexual activity: Not on file     ROS  I was unable to obtain a full 10 point review of systems as the patient was intubated and sedated on my initial assessment.    Objective:     Vital Signs (Most Recent):  Temp: 98.2 °F (36.8 °C) (09/19/24 0754)  Pulse: 98 (09/19/24 0754)  Resp: (!) 22 (09/19/24 0754)  BP: 108/65 (09/19/24 0745)  SpO2: (!) 93 % (09/19/24 0754) Vital Signs (24h Range):  Temp:  [98 °F (36.7 °C)-98.9 °F (37.2 °C)] 98.2 °F (36.8 °C)  Pulse:  [] 98  Resp:  [13-33] 22  SpO2:  [90 %-100 %] 93 %  BP: ()/(50-96) 108/65     Weight: 65.2 kg (143 lb 11.8 oz)  Height: 5' 2" (157.5 cm)  Body mass index is 26.29 kg/m².      Intake/Output Summary (Last 24 hours) at " 9/19/2024 0825  Last data filed at 9/19/2024 0601  Gross per 24 hour   Intake 2015.28 ml   Output 800 ml   Net 1215.28 ml        Ortho/SPM Exam  General:  Ill-appearing  Neuro:  Intubated and sedated on propofol  Head: normocephalic, atraumatic.  Mouth:  ET tube in place  CV: extremities warm and well perfused  Pulm: breathing comfortably, equal chest rise bilat on ventilator  Skin: clean, dry, intact (any exceptions noted in below musculoskeletal exam)    MSK:    RUE:  - No ecchymosis, erythema, or signs of cellulitis  - she does not wince or withdrawal with palpation of the shoulder, arm, elbow, or forearm  - AROM and PROM of the shoulder, elbow, wrist, and hand intact, unable to gauge pain as the patient is intubated and sedated  - Withdrawals her hand to pinch  - Squeezes my hand to command  - Compartments soft  - Radial artery palpated   - Capillary Refill <3s    LUE:  - Ecchymosis in the antecubital fossa  - she does not wince or withdrawal with palpation of the shoulder, arm, elbow, or forearm  - AROM and PROM of the shoulder, elbow, wrist, and hand intact, unable to gauge pain as the patient is intubated and sedated  - Withdrawals her hand to pinch  - Squeezes my hand to command  - Compartments soft  - Radial artery palpated   - Capillary Refill <3s    RLE:  - Cracks to the plantar surface of the feet  - 1+ pitting edema distal to the knee  - Winces and withdraws with palpation throughout the lower extremity  - AROM and PROM of the hip, knee, ankle, and foot intact, unable to gauge pain as the patient was intubated and sedated  - negative Babinski  - Compartments soft and compressible with pitting edema  - DP palpated    LLE:  - Severe erythema and induration that begins laterally over the hip at the level of the greater trochanter and wrapped posteriorly over her gluteal muscles and into the lower part of the left flank.  This region is significantly tender to palpation without open wounds, skin breakdown,  or weeping/drainage.  - 1+ pitting edema distal to the knee  - Winces and withdraws with palpation throughout the lower extremity  - AROM and PROM of the hip, knee, ankle, and foot intact, unable to gauge pain as the patient was intubated and sedated  - negative Babinski  - Compartments soft and compressible with pitting edema  - DP palpated    Spine/pelvis/axial body:  No tenderness to palpation of cervical, thoracic spine  Mild-to-moderate TTP of the lumbar spine     Significant Labs:   Recent Lab Results  (Last 5 results in the past 24 hours)        09/19/24  0319   09/19/24  0026   09/18/24  2318   09/18/24  2302   09/18/24  1641        Albumin 1.0                              Allens Test     N/A           ALT 18               Anion Gap 7   6             Aniso Slight               AST 15               Baso # 0.03               Basophil % 0.3               BILIRUBIN TOTAL 1.5  Comment: For infants and newborns, interpretation of results should be based  on gestational age, weight and in agreement with clinical  observations.    Premature Infant recommended reference ranges:  Up to 24 hours.............<8.0 mg/dL  Up to 48 hours............<12.0 mg/dL  3-5 days..................<15.0 mg/dL  6-29 days.................<15.0 mg/dL                 Site     Other           BUN 6   6             Calcium 6.3  Comment: *Critical value notification by GAS with confirmation of receipt to   MAGALY GUTHRIE RN at  Date 09/19/24 Time 4:19 AM     6.3  Comment: *Critical value notification by adr with confirmation of receipt to   MAGALY GUTHRIE RN at  Date 9/19/24 Time 1:42AM               Chloride 100   101             CO2 27   27             Creatinine 0.6   0.6             DelSys     Adult Vent           Differential Method Automated               eGFR >60.0   >60.0             Eos # 0.2               Eos % 1.4               FiO2     45           Glucose 166   183             Gran # (ANC) 7.9               Gran %  75.3               Hematocrit 19.5  Comment: [facility]  critical result(s) called and verbal readback obtained from    LULI GUTHRIE RN by [facility] 09/19/2024 03:52                 Hemoglobin 6.4               Immature Grans (Abs) 0.20  Comment: Mild elevation in immature granulocytes is non specific and   can be seen in a variety of conditions including stress response,   acute inflammation, trauma and pregnancy. Correlation with other   laboratory and clinical findings is essential.                 Immature Granulocytes 1.9               INR 1.4  Comment: Coumadin Therapy:  2.0 - 3.0 for INR for all indicators except mechanical heart valves  and antiphospholipid syndromes which should use 2.5 - 3.5.                 Lymph # 2.0               Lymph % 18.7               Magnesium  1.5               MCH 26.1               MCHC 32.8               MCV 80               Mode     AC/PRVC           Mono # 0.3               Mono % 2.4               MPV 10.1               nRBC 0               PEEP     5           Phosphorus Level 3.5               Platelet Count 96               POC BE     5           POC HCO3     29.2           POC Hematocrit     18           POC Ionized Calcium     0.97           POC PCO2     42.5           POC PH     7.445           POC PO2     29           POC Potassium     4.0           POC SATURATED O2     57           POC Sodium     135           POC TCO2     30           POCT Glucose       202   170       Potassium 3.7   3.9             PROTEIN TOTAL 4.0               PT 15.4               Rate     18           RBC 2.45               RDW 17.3               Sample     VENOUS           Sodium 134   134             Toxic Granulation Present               Triglycerides 137  Comment: The National Cholesterol Education Program (NCEP) has set the  following guidelines (reference values) for triglycerides:  Normal......................<150 mg/dL  Borderline High.............150-199  mg/dL  High........................200-499 mg/dL                 Vt     450           WBC 10.44                                    All pertinent labs within the past 24 hours have been reviewed.    Significant Imaging: MRI: I have reviewed all pertinent results/findings and my personal findings are:  MRI of the lumbar spine demonstrates what appears to be osteomyelitis and septic arthritis of the left SI joint.  There is a epidural abscess throughout the lumbar spine that appears to terminate around the T11 level with adjacent fluid collection within the iliacus and psoas.  Assessment/Plan:     Other osteomyelitis, multiple sites  Mari Sloan is a 49 y.o. female with PMH significant for HTN, DM, substance use disorder (heroin, fentanyl) presenting with sepsis in the setting of epidural and paraspinal abscesses as well as potential for left SI joint septic arthritis.  Repeat MRI 09/18/2024 performed with sedation of the assistance of anesthesia and demonstrates an epidural abscess throughout the lumbar spine with the involvement of the adjacent iliopsoas musculature as well as a hyperintense fluid collection coming from left SI joint. On my initial exam, the patient is intubated and sedated on propofol.  She has been afebrile over the past 24 hours.  She is going with Interventional Radiology today for drain placement to the paraspinal abscesses and aspiration of the left SI joint.  She was also been consented by Neurosurgery for L3-5 laminectomy which they plan to do this afternoon following IR aspiration of the left SI joint and drain placement.       Recommend obtaining fluid sample with the assistance of Interventional Radiology in order to direct cultures.  Continue IV antibiotics      RHINA Wallace MD  Orthopedics  [facility] - Medical ICU

## 2024-09-19 NOTE — PLAN OF CARE
Recommendations     If/when initiated - TF recs: Impact Peptide 1.5 @ 10 ml/hr. Provides 720 kcals, 45 g PRO, and 370 ml fluid - 50% of estimated needs.    As labs stabilize, advance to 40 ml/hr. Provides 1440 kcals, 90 g PRO, and 739 ml fluid.     Advance to diabetic diet when medically appropriate.    RD to monitor and follow up.     Goals: Meet % EEN/EPN by RD follow up.  Nutrition Goal Status: new  Communication of RD Recs: other (comment) (POC

## 2024-09-19 NOTE — PROGRESS NOTES
[facility] - Medical ICU  Neurosurgery  Progress Note    Subjective:     History of Present Illness: 49F PMH DM, HTN, hep c, opioid abuse, presenting after down for unknown period of time and inability to ambulate secondary to pain with imaging demonstrating L SI osteo with associated abscesses extending to retroperitoneum without involvement of thecal sac. Denies true weakness, states pain is limiting her ability to move. Pain in hips and low back. On clinda/zosyn, blood culture 9/16 with staph aureus. Denies bowel or bladder incontinence, complaining of constipation about 6 days. Denies numbness    Post-Op Info:  Procedure(s) (LRB):  MRI (Magnetic Resonance Imagine) (N/A)   1 Day Post-Op   9/19: MRI obtained overnight with anesthesia, remained intubated for OR today for L3-L5 laminectomy with washout. Original MRI unable to fully appreciate extent of epidural collection, readily apparent on repeat MRI. pRBC ordered for Hgb <7, plt ordered for plt <100k, vitamin K ordered for INR 1.4. Will consent family today, amenable to intervention when briefly discussed yesterday morning    Medications Prior to Admission   Medication Sig Dispense Refill Last Dose    gabapentin (NEURONTIN) 300 MG capsule Take 300 mg by mouth 3 (three) times daily.   9/17/2024    hydrOXYzine pamoate (VISTARIL) 25 MG Cap Take 25 mg by mouth 3 (three) times daily.   9/17/2024    metFORMIN (GLUCOPHAGE) 500 MG tablet Take 500 mg by mouth 2 (two) times daily with meals.   9/17/2024    methadone (DOLOPHINE) 10 MG tablet Take 70 mg by mouth Daily.   Past Week    cloNIDine (CATAPRES) 0.2 MG tablet Take 0.2 mg by mouth 3 (three) times daily.       glipiZIDE (GLUCOTROL) 5 MG tablet Take 5 mg by mouth 2 (two) times daily.          Review of patient's allergies indicates:  No Known Allergies    Past Medical History:   Diagnosis Date    Diabetes mellitus     Hypertension     Unspecified viral hepatitis C without hepatic coma      Past Surgical History:    Procedure Laterality Date     SECTION      MAGNETIC RESONANCE IMAGING N/A 2024    Procedure: MRI (Magnetic Resonance Imagine);  Surgeon: SurgeonDolores;  Location: [facility];  Service: Anesthesiology;  Laterality: N/A;  conscious sedaation MRI lumbar spine w/ and without contrast     Family History    None       Tobacco Use    Smoking status: Every Day     Current packs/day: 0.50     Types: Cigarettes    Smokeless tobacco: Never   Substance and Sexual Activity    Alcohol use: Not Currently    Drug use: Not Currently    Sexual activity: Not on file     Review of Systems    Objective:     Weight: 65.2 kg (143 lb 11.8 oz)  Body mass index is 26.29 kg/m².  Vital Signs (Most Recent):  Temp: 98.5 °F (36.9 °C) (24 0455)  Pulse: 83 (24)  Resp: 20 (24)  BP: 100/70 (24)  SpO2: 99 % (24) Vital Signs (24h Range):  Temp:  [98 °F (36.7 °C)-98.9 °F (37.2 °C)] 98.5 °F (36.9 °C)  Pulse:  [] 83  Resp:  [13-33] 20  SpO2:  [89 %-100 %] 99 %  BP: ()/(50-96) 100/70                  Vent Mode: A/C  Oxygen Concentration (%):  [45] 45  Resp Rate Total:  [18 br/min-21 br/min] 19 br/min  Vt Set:  [450 mL] 450 mL  PEEP/CPAP:  [5 cmH20] 5 cmH20  Mean Airway Pressure:  [8.8 wyC96-36 cmH20] 11 cmH20        Female External Urinary Catheter w/ Suction 24 (Active)   Skin no redness;no breakdown 24   Tolerance no signs/symptoms of discomfort 24   Suction Continuous suction at 60 mmHg 24   Date of last wick change 24   Time of last wick change 24   Output (mL) 400 mL 24          Physical Exam    NEURO:     E2VTM5  Intubated, sedated 125 fent 25 prop  Bsi, PERRL  Spont/Loc x4 AG      Neurosurgery Physical Exam    Significant Labs:  Recent Labs   Lab 24  2227 24  0305 24  0910 24  1428 24  0026 24  0319   * 173*   < > 146* 183* 166*    134*    < > 134* 134* 134*   K 3.2* 2.6*   < > 3.3* 3.9 3.7    100   < > 101 101 100   CO2 26 24   < > 26 27 27   BUN 4* 4*   < > 6 6 6   CREATININE 0.6 0.6   < > 0.5 0.6 0.6   CALCIUM 6.7* 6.4*   < > 6.8* 6.3* 6.3*   MG 1.8 1.5*  --   --   --  1.5*    < > = values in this interval not displayed.     Recent Labs   Lab 09/18/24  0305 09/18/24  1217 09/18/24  2318 09/19/24  0319   WBC 14.10* 14.96*  --  10.44   HGB 6.7* 7.5*  --  6.4*   HCT 21.4* 23.1* 18* 19.5*   * 112*  --  96*     Recent Labs   Lab 09/18/24  1052 09/19/24  0319   INR 1.6* 1.4*   APTT 29.2  --      Microbiology Results (last 7 days)       Procedure Component Value Units Date/Time    Blood culture [[id number]] Collected: 09/18/24 0045    Order Status: Completed Specimen: Blood from Peripheral, Antecubital, Left Updated: 09/19/24 0141     Blood Culture, Routine Gram stain aer bottle: Gram positive cocci in clusters resembling Staph      Results called to and read back by: Chema 09/19/2024  01:39    Blood culture [[id number]] Collected: 09/18/24 0045    Order Status: Completed Specimen: Blood from Peripheral, Antecubital, Left Updated: 09/18/24 2018     Blood Culture, Routine Gram stain aer bottle: Gram positive cocci in clusters resembling Staph      Results called to and read back by:Chema Oconnor RN 09/18/2024  20:18    Blood Culture #2 **CANNOT BE ORDERED STAT** [[id number]]  (Abnormal) Collected: 09/16/24 1145    Order Status: Completed Specimen: Blood from Peripheral, Wrist, Right Updated: 09/18/24 0743     Blood Culture, Routine Gram stain felix bottle: Gram positive cocci in clusters resembling Staph      Results called to and read back by: FATIMAH Mcnulty. 09/17/2024  03:48      Gram stain aer bottle: Gram positive cocci in clusters resembling Staph      Positive results previously called 09/17/2024  06:05      STAPHYLOCOCCUS AUREUS  ID consult required at [facility].[place] and [place].  For susceptibility see order #[id number]       Blood culture #1 **CANNOT BE ORDERED STAT** [[id number]]  (Abnormal) Collected: 09/16/24 1054    Order Status: Completed Specimen: Blood from Peripheral, Antecubital, Right Updated: 09/18/24 0741     Blood Culture, Routine Gram stain aer bottle: Gram positive cocci in clusters resembling Staph      Gram stain felix bottle: Gram positive cocci in clusters resembling Staph      Results called to and read back by: FATIMAH Mcnulty. 09/17/2024  03:43      STAPHYLOCOCCUS AUREUS  Susceptibility pending  ID consult required at [facility] [facility] [place] locations.      MRSA/SA Rapid ID by PCR from Blood culture [[id number]]  (Abnormal) Collected: 09/16/24 1054    Order Status: Completed Updated: 09/17/24 0455     Staph aureus ID by PCR Positive     Methicillin Resistant ID by PCR Negative    Blood culture #2 **CANNOT BE ORDERED STAT** [[id number]]     Order Status: Canceled Specimen: Blood           All pertinent labs from the last 24 hours have been reviewed.    Significant Diagnostics:  I have reviewed all pertinent imaging results/findings within the past 24 hours.  I have reviewed and interpreted all pertinent imaging results/findings within the past 24 hours.  MRI Lumbar Spine W WO Cont    Result Date: 9/17/2024  1. Complex fluid collection possibly extending from the left SI joint to the left iliac fossa displacing the left psoas muscle medially. This is similar to the study 1 day prior could represent infectious left sacroiliitis/osteomyelitis and adjacent abscess. The collection extends superiorly to approximately T12 level at the left posterior retroperitoneum, posterior to the left kidney. Enhancement noted to much of the sacrum including right of midline again could represent osteomyelitis. Follow-up recommended. Recommend surgical consultation and follow-up. 2. Multilevel degenerative changes most prominent at L5-S1.  See above comments. 3. This report was flagged in Epic as abnormal. Electronically signed  by: Edgard Martinez Date:    09/17/2024 Time:    15:53   X-Ray Chest AP Portable    Result Date: 9/19/2024  ET tube placed with tip 1.8 cm above the laura. Right IJ catheter tip remains overlying the right atrium, similar to prior. Bilateral perihilar infiltrates/edema. Electronically signed by: Tod Moran MD Date:    09/19/2024 Time:    01:28    MRI Lumbar Spine W WO Cont    Result Date: 9/18/2024  1. Complex multi lobular fluid collection extending from the left SI joint to the left iliac fossa and paravertebral region displacing the left psoas muscle medially.  This measures approximately 8.4 x 8.5 cm x 22.5 cm in craniocaudad dimension.  This is similar to the prior studies could represent infectious left sacroiliitis/osteomyelitis and adjacent abscess. The collection extends superiorly to approximately T12 level at the left posterior retroperitoneum, posterior to the left kidney. Enhancement noted to much of the sacrum including right of midline again could represent osteomyelitis. Follow-up is recommended.  Edema and complex collection extends posteriorly at the lateral margin of the field of view incompletely visualized to the subcutaneous tissues posteriorly at approximately L2-3 level adjacent to the left posterosuperior gluteal musculature. There is mild displacement and mass effect on the posterolateral margin of the paraspinal musculature best seen on axial 22 of series 20 and series 19. Recommend surgical consultation and follow-up. 2.  Small epidural collections are noted adjacent to the thecal sac from approximately T12 to L5 suggesting epidural abscess.  Follow-up recommended.  See above comments. 2. Multilevel degenerative changes most prominent at L5-S1. See above comments. 3. This report was flagged in Epic as abnormal. Electronically signed by: Edgard Martinez Date:    09/18/2024 Time:    23:09    US Abdomen Complete    Result Date: 9/18/2024  Biliary sludge.  No evidence of cholecystitis or  biliary obstruction. Hepatosplenomegaly. Electronically signed by: Ravi Trujillo Date:    09/18/2024 Time:    15:17     Assessment/Plan:     Other osteomyelitis, multiple sites  49F PMH DM, HTN, hep c, opioid abuse, presenting after down for unknown period of time and inability to ambulate secondary to pain with imaging demonstrating L SI osteo with associated abscesses extending to retroperitoneum without involvement of thecal sac. Denies true weakness, states pain is limiting her ability to move. Pain in hips and low back. On clinda/zosyn, blood culture 9/16 with staph aureus. Denies bowel or bladder incontinence, complaining of constipation about 6 days. Denies numbness    Imaging:  CT Lsp / pelvis 9/16: fluid collection T11 on L through left psoas, felt abscess  CT CAP 9/16: extensive edema with subq gas through left flank, small psoas abscess and fluid collections left flank, septic arthritis of L SI joint  MRI Lsp w/wo 9/17: L SI complex fluid collection felt to represent osteo with adjacent abscess, extends to approximately T12 level  MRI Lsp w/wo repeat 9/18: epidural collections T12-L5     Plan:  Admitted MICU; q1h nc/vs  Labs / diagnostics reviewed  Preponderance of care per primary  Clinda/Zosyn for Staph aureus blood culture 9/16 per primary  Plan for L3-L5 laminectomy for epidural abscess evac/washout  Recommend MRI C/Tsp w/wo to assess for any further infection  Trend inflammatory markers per primary   Recommend IR consult for aspiration fluid collections / biopsy joint; Okay for IR procedure this AM, will plan for laminectomy in afternoon  Recommend orthopedic consultation for SI joint findings  Please notify for questions/concerns/neurologic decline          Humble Toure MD  Neurosurgery  [facility] - Medical ICU

## 2024-09-19 NOTE — BRIEF OP NOTE
[facility] - Surgery ([facility])  Brief Operative Note    SUMMARY     Surgery Date: 9/19/2024     Surgeons and Role:     * Sourav Jim,  - Primary     * Santy Pal MD - Resident - Assisting        Pre-op Diagnosis:  Osteomyelitis of multiple sites, unspecified type [M86.9]    Post-op Diagnosis:  Post-Op Diagnosis Codes:     * Osteomyelitis of multiple sites, unspecified type [M86.9]    Procedure(s) (LRB):  L3-L5 laminectomy with epidural abscess evac (N/A)  WASHOUT, WOUND, SPINE    Anesthesia: General    Implants:  * No implants in log *    Operative Findings: L3 and L4 laminectomy for evacuation of epidural abscess and decompression. Eliu pus was encountered in the muscular layer and also the epidural layer, cultures sent.     Estimated Blood Loss: * No values recorded between 9/19/2024  2:59 PM and 9/19/2024  4:21 PM *    Estimated Blood Loss has been documented.         Specimens:   Specimen (24h ago, onward)      None            [id number]

## 2024-09-19 NOTE — ASSESSMENT & PLAN NOTE
Patient with anemia upon lab review with a hemoglobin 6.9 post transfusion; likely multifactorial in the setting of chronic disease, deficiency and acute blood loss (RP bleed?)  -CBC showing microcytic anemia given MCV of 79 with high ferritin of 2000 suggesting anemia of chronic disease can not rule out acute blood loss in the setting of recent fall and RP abscess    Plan:  Monitor CBC and transfuse if hemoglobin less than 7  -consider additional iron studies  -monitor for signs of bleeding    9/18:  Hb of 6.7=> Received 1 unit of blood=> 7.5 post-transfusion  DIC Labs: (d-dimer, Fibrinogen)    9/19:  Hb= 6.4, Given I unit of Packed cells and 1 unit of Platelets

## 2024-09-19 NOTE — NURSING
Transferred to the operating room by operating room nurse and anesthesia team. Patient transported with monitor and oxygenated ambu-bag.

## 2024-09-19 NOTE — ANESTHESIA PROCEDURE NOTES
Intubation    Date/Time: 9/18/2024 8:49 PM    Performed by: Jermaine Walker  Authorized by: Génesis Clarke MD    Intubation:     Induction:  Intravenous    Intubated:  Postinduction    Mask Ventilation:  Not attempted    Attempts:  1    Attempted By:  Student    Method of Intubation:  Video laryngoscopy    Blade:  Umaña 3    Laryngeal View Grade: Grade I - full view of cords      Difficult Airway Encountered?: No      Complications:  None    Airway Device:  Oral endotracheal tube    Airway Device Size:  7.0    Style/Cuff Inflation:  Cuffed (inflated to minimal occlusive pressure)    Tube secured:  22    Secured at:  The lips    Placement Verified By:  Capnometry    Complicating Factors:  None    Findings Post-Intubation:  BS equal bilateral and atraumatic/condition of teeth unchanged

## 2024-09-19 NOTE — CONSULTS
[facility] - Medical ICU  Adult Nutrition  Consult Note    SUMMARY     Recommendations     If/when initiated - TF recs: Impact Peptide 1.5 @ 10 ml/hr. Provides 720 kcals, 45 g PRO, and 370 ml fluid - 50% of estimated needs.    As labs stabilize, advance to 40 ml/hr. Provides 1440 kcals, 90 g PRO, and 739 ml fluid.     Advance to diabetic diet when medically appropriate.    RD to monitor and follow up.     Goals: Meet % EEN/EPN by RD follow up.  Nutrition Goal Status: new  Communication of RD Recs: other (comment) (POC)    Assessment and Plan    Endocrine  Mild malnutrition  Malnutrition Type:  Context: acute illness or injury  Level: mild    Related to (etiology):   Not consuming sufficient nutrients    Signs and Symptoms (as evidenced by):   Vomiting, decreased PO intake     Malnutrition Characteristic Summary:  Energy Intake (Malnutrition): less than or equal to 50% for greater than or equal to 5 days      Interventions/Recommendations (treatment strategy):  Collab of nutrition care w/ other providers  EN    Nutrition Diagnosis Status:   New         Malnutrition Assessment  Malnutrition Context: acute illness or injury  Malnutrition Level: mild          Energy Intake (Malnutrition): less than or equal to 50% for greater than or equal to 5 days                         Reason for Assessment    Reason For Assessment: consult  Diagnosis: other (see comments) (Paraspinal abscess)  Relevant Medical History: DM, HTN  Interdisciplinary Rounds: did not attend  General Information Comments: Consulted due to pt with possible refeeding syndrome. Pt currently intubated/sedated. TARUN nutrition hx at this time. Per chart review pt experiencing nausea/vomiting for the past 1.5 weeks, unable to keep food and liquids down. Noted pt with 17 lb wt loss since 2/24. NFPE to be performed at f/u if warranted. Propofol providing 517 kcals.   Nutrition Discharge Planning: Diabetic diet    Nutrition Risk Screen    Nutrition Risk  "Screen: tube feeding or parenteral nutrition    Nutrition/Diet History    Factors Affecting Nutritional Intake: NPO, on mechanical ventilation, nausea/vomiting, constipation    Anthropometrics    Temp: 98.2 °F (36.8 °C)  Height Method: Stated  Height: 5' 2" (157.5 cm)  Height (inches): 62 in  Weight Method: Bed Scale  Weight: 65.2 kg (143 lb 11.8 oz)  Weight (lb): 143.74 lb  Ideal Body Weight (IBW), Female: 110 lb  % Ideal Body Weight, Female (lb): 130.67 %  BMI (Calculated): 26.3  BMI Grade: 25 - 29.9 - overweight       Lab/Procedures/Meds    Pertinent Labs Reviewed: reviewed  Pertinent Labs Comments: Potassium 3.3, Creatinine 0.4, Glucose 111, Calcium 5.5, Magnesium 1.5, Albumin 1.0, A1C 7.9  Pertinent Medications Reviewed: reviewed  Pertinent Medications Comments: Folic acid, insulin, magnesium sulfate, pantoprazole, Vit K, potassium chloride, thiamine, fentanyl, propofol        Estimated/Assessed Needs    Weight Used For Calorie Calculations: 65.2 kg (143 lb 11.8 oz)  Energy Calorie Requirements (kcal): 1458  Energy Need Method: [facility]  Protein Requirements:  (1.5-1.7 g/kg ABW)  Weight Used For Protein Calculations: 65.2 kg (143 lb 11.8 oz)        RDA Method (mL): 1458  CHO Requirement: 182      Nutrition Prescription Ordered    Current Diet Order: NPO    Evaluation of Received Nutrient/Fluid Intake    I/O: + 9,049.9 net I/O  Comments: LBM 9/3  % Intake of Estimated Energy Needs: 0 - 25 %  % Meal Intake: NPO    Nutrition Risk    Level of Risk/Frequency of Follow-up:  (1-2x/week)       Monitor and Evaluation    Food and Nutrient Intake: enteral nutrition intake  Food and Nutrient Adminstration: enteral and parenteral nutrition administration  Anthropometric Measurements: body mass index, weight change, weight  Biochemical Data, Medical Tests and Procedures: electrolyte and renal panel, gastrointestinal profile, inflammatory profile, glucose/endocrine profile, lipid profile  Nutrition-Focused Physical " Findings: overall appearance, skin       Nutrition Follow-Up    RD Follow-up?: Yes    Forrest Feliciano, Registration Eligible, Provisional LDN'

## 2024-09-19 NOTE — ASSESSMENT & PLAN NOTE
Per ID in OSH   48y/o F with hx of being un-housed, T2DM, HTN and drug use transferred for spinal osteomyelitis, septic arthritis and Lt flank/ retroperitoneal abscess.     Imaging studies of her lumbar spine and pelvis were concerning for osteomyelitis/septic arthritis of the left SI joint and L5-S1 along with an abnormal fluid collection extending from T11 through the left iliacus muscle concerning for abscess as well as at psoas.     Result Date: 9/17/2024  1. Complex fluid collection possibly extending from the left SI joint to the left iliac fossa displacing the left psoas muscle medially. This is similar to the study 1 day prior could represent infectious left sacroiliitis/osteomyelitis and adjacent abscess. The collection extends superiorly to approximately T12 level at the left posterior retroperitoneum, posterior to the left kidney. Enhancement noted to much of the sacrum including right of midline again could represent osteomyelitis. Follow-up recommended. Recommend surgical consultation and follow-up. 2. Multilevel degenerative changes most prominent at L5-S1.  See above comments. 3. This report was flagged in Epic as abnormal. Electronically signed by:  Edgard Martinez Date:                                               09/17/2024 Time:                                            15:53   X-Ray Chest AP Portable     Result Date: 9/19/2024  ET tube placed with tip 1.8 cm above the laura. Right IJ catheter tip remains overlying the right atrium, similar to prior. Bilateral perihilar infiltrates/edema. Electronically signed by:       Tod Moran MD Date:                                          09/19/2024 Time:                                            01:28     MRI Lumbar Spine W WO Cont     Result Date: 9/18/2024  1. Complex multi lobular fluid collection extending from the left SI joint to the left iliac fossa and paravertebral region displacing the left psoas muscle medially.  This measures  approximately 8.4 x 8.5 cm x 22.5 cm in craniocaudad dimension.  This is similar to the prior studies could represent infectious left sacroiliitis/osteomyelitis and adjacent abscess. The collection extends superiorly to approximately T12 level at the left posterior retroperitoneum, posterior to the left kidney. Enhancement noted to much of the sacrum including right of midline again could represent osteomyelitis. Follow-up is recommended.  Edema and complex collection extends posteriorly at the lateral margin of the field of view incompletely visualized to the subcutaneous tissues posteriorly at approximately L2-3 level adjacent to the left posterosuperior gluteal musculature. There is mild displacement and mass effect on the posterolateral margin of the paraspinal musculature best seen on axial 22 of series 20 and series 19. Recommend surgical consultation and follow-up. 2.  Small epidural collections are noted adjacent to the thecal sac from approximately T12 to L5 suggesting epidural abscess.  Follow-up recommended.  See above comments. 2. Multilevel degenerative changes most prominent at L5-S1. See above comments. 3. This report was flagged in Epic as abnormal.     Started on empiric vanc and zosyn. Case was discussed with neurosurgery who did not feel surgical intervention was warranted and recommended IR drainage. IR recommended against drainage given extensive superficial infection. MRI today with  complex fluid collection. left SI joint to the left iliac fossa.      BCx growing GPC in clusters. 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      Recommendations:  -Continue vancomycin. Pharm to dose for goal trough 15-20.  -Follow-up ID and susceptibilities of GPC in blood   -Would discontinue meropenem given blood cx now with gpc  -Agree with clindamycin given visualized gas/ necrotizing infection       - rec'd IR aspiration of abscess with cultures      -consulting ID while at [facility]    On admission antibiotic regimen of  vancomycin, clindamycin, piperacillin tazobactam.    9/18:  ID discontinued Zosyn and Vanc.  Continue Clindamycin  Start Oxacillin    9/19:    Orthopedic surgery:Recommend obtaining fluid sample with the assistance of Interventional Radiology in order to direct cultures.     Neurosurgery: Patient had a L3-L5 Laminectomy this afternoon.                 No

## 2024-09-19 NOTE — ANESTHESIA POSTPROCEDURE EVALUATION
Anesthesia Post Evaluation    Patient: Mari Sloan    Procedure(s) Performed: Procedure(s) (LRB):  MRI (Magnetic Resonance Imagine) (N/A)    Final Anesthesia Type: general      Patient location during evaluation: ICU  Patient participation: No - Unable to Participate, Sedation  Level of consciousness: sedated  Post-procedure vital signs: reviewed and stable  Pain management: adequate  Airway patency: patent    PONV status at discharge: No PONV  Anesthetic complications: no      Cardiovascular status: hemodynamically stable  Respiratory status: intubated and ventilator                Vitals Value Taken Time   /73 09/18/24 2216   Temp 36.8 °C (98.3 °F) 09/18/24 1500   Pulse 92 09/18/24 2222   Resp 18 09/18/24 2222   SpO2 100 % 09/18/24 2222   Vitals shown include unfiled device data.      No case tracking events are documented in the log.      Pain/Jesus Score: Pain Rating Prior to Med Admin: 8 (9/18/2024  7:57 PM)  Pain Rating Post Med Admin: 7 (9/17/2024  8:49 PM)

## 2024-09-19 NOTE — CARE UPDATE
Patient received back from procedure and placed on ventilator with settings documented. O2 increased to 60% and PEEP increased to 7 due to sats 88-89%. Dr. Connell is aware, will continue to monitor.

## 2024-09-19 NOTE — TRANSFER OF CARE
"Anesthesia Transfer of Care Note    Patient: Mari Sloan    Procedure(s) Performed: Procedure(s) (LRB):  MRI (Magnetic Resonance Imagine) (N/A)    Patient location: ICU    Anesthesia Type: general    Transport from OR: Transported from OR intubated on 100% O2 by AMBU with adequate controlled ventilation    Post pain: adequate analgesia    Post assessment: no apparent anesthetic complications    Post vital signs: stable    Level of consciousness: awake    Nausea/Vomiting: no nausea/vomiting    Complications: none    Transfer of care protocol was followed      Last vitals: Visit Vitals  BP 92/54 (BP Location: Left arm)   Pulse 80   Temp 36.8 °C (98.3 °F) (Oral)   Resp (!) 22   Ht 5' 2" (1.575 m)   Wt 65.2 kg (143 lb 11.8 oz)   LMP  (LMP Unknown)   SpO2 100   Breastfeeding No   BMI 26.29 kg/m²     "

## 2024-09-19 NOTE — OP NOTE
DATE OF SURGERY: 9/19/2024      PREOPERATIVE DIAGNOSIS:  1. Lumbar epidural abscess L3-5  2. Severe lumbar stenosis L3-5  3. Septic left SI joint  4. MSSA bacteremia  5. Substance abuse    POSTOPERATIVE DIAGNOSIS:  Same.    PROCEDURE PERFORMED:  1. Decompression of thecal sac and neuroforamina via laminectomy and bilateral foraminotomies, L3-5 for evacuation of epidural abscess  2. Use of intraoperative fluoroscopy  3. Use of intraoperative neuromonitoring with free-run EMG and SSEPs    SURGEON: Sourav Jim DO    ASSISTANT: Santy Pal MD    ANESTHESIA: GETA    ESTIMATED BLOOD LOSS: 200mL    COMPLICATIONS: None    DRAINS: 1 deep HV, 1 superficial HV    SPECIMENS SENT: Subfascial pus, epidural pus    INDICATIONS:    49F PMH DM, HTN, hep c, opioid abuse, presenting after down for unknown period of time and inability to ambulate secondary to pain with imaging demonstrating L SI osteo with associated abscesses extending to retroperitoneum without involvement of thecal sac. Denies true weakness, states pain is limiting her ability to move. Pain in hips and low back. On clinda/zosyn, blood culture 9/16 with staph aureus. Denies bowel or bladder incontinence, complaining of constipation about 6 days. Denies numbness     Imaging showed diffuse infection including a large left psoas abscess, left SI joint osteomyelitis, and lumbar epidural abscess which was most compressive from L3-5.  She was offered a L3-5 decompression for evacuation of the epidural abscess, decompression of the neural elements and for source control.     Risks, benefits, alternatives, indications, and methods were reviewed in detail and the patient wished to proceed. All questions were answered. No guarantees about the results of the procedure were made.    OPERATIVE NOTE:    The patient was brought into the operating room, where she was intubated and placed under general anesthesia without difficulty. All lines were placed. The patient was  placed prone onto a Antwan 4 post with appropriate padding of all pressure points. AP and lateral xrays were used to localize to the region of interest. The lumbar spine was marked, prepped and draped in the usual sterile fashion. Ten ml's of Lidocaine with Epinephrine were injected into the skin. A timeout was performed prior to the procedure.    A midline linear incision was made with the 10 blade. A Wietlaner retractor was used to widen the incision. Supra- and subfascial dissection was carried out in the midline with the Bovie electrocautery.  We encountered copious pus adjacent to the right L2/3 facet which was sent for culture.  Subperiosteal dissection was carried out with the Bovie and Zuniga elevators to expose the posterior elements from L3 to L5. AP xrays confirmed levels. A laminectomy was performed at these levels with the combination of the Le[place]ell rongeur, high speed drill, Kerrison punches, and curettes.  Once the ligamentum was opened copious pus emerged under pressure.  We swabbed this and sent this for culture.  Bilateral foraminotomies were performed from L3/4 to L4/5 with Kerrison punches and curettes. A [place] probe was used to confirm adequate central and neuroforaminal decompression at each level from lateral recess to lateral recess and from rostral most to caudal most pedicles.  We then inserted a red rubber catheter rostrally under the L2 lamina and caudally under the L5 lamina and irrigated the epidural space with betadine and vanc infused saline until the irrigation ran clear.  Surgiflo was then used for hemostasis.    The wound was then copiously irrigated with a dilute Betadyne solution and normal saline.  Hemostasis was achieved with bipolar electrocautery. Two grams of Vancomycin powder was placed into the wound. A subfascial Hemovac drain was placed and tunneled through a separate incision, where it was secured with a Vicryl suture. A watertight fascial closure was achieved with  interrupted 0 Vicryl sutures and a running Stratafix.  The suprafascial layer was then undermined with bovie to create a tension free closure and to assist with wound healing.  A superficial HV drain was left in this space and tunneled out of the wound where it was secured with vicryl suture. The soft tissues were closed in layers. The skin was closed with 4/0 running monocryl.  Preneo tape covered with dermabond was then placed over the incision.    The patient appeared to tolerate the procedure well from a hemodynamic and neuromonitoring standpoint. I was present for all critical portions of the case, and at the end of the case all counts were correct. She was repositioned supine onto the hospital bed, where she was extubated and allowed to emerge from anesthesia without difficulty. She was sent to the ICU in stable condition for recovery.

## 2024-09-19 NOTE — HPI
Mari Sloan is a 49 y.o. female with PMH significant for HTN, DM, substance use disorder (heroin, fentanyl) presenting with septic arthritis of the left SI joint.  Patient originally presented to [facility] on September 16 with nausea and vomiting and a mechanical fall 5 days prior.  At that time she was complaining of bilateral hip pain, flank pain, and muscle spasm in her back and legs.  She also had been unable to keep oral intake down.  While in the ED at [facility], patient was noted significant hypokalemia, hypomagnesemia, severe anemia, metabolic alkalosis, and hyperglycemia.  She also received 2 units of PRBC for an H/H of 5.7/18.4. She had no focal motor deficits noted, and she has no alteration in mentation.  Advanced imaging was obtained which showed osteomyelitis/septic arthritis of the left SI joint and L5-S1 along with an abnormal fluid collection extending from T11 through the left iliacus muscle concerning for abscess.  Apparently, the and multiple distal aortic decision was made to transfer the patient to [facility] for further evaluation by Orthopedics, General surgery, and Neurosurgery. IR intially was consulted and suggested drainage/aspiration would be insufficient for her overall care and felt that she would need further debridement.  It appears that after her transferred to [facility], multiple services did not feel that urgent or emergent surgical intervention was in the best interest of the patient given her status at that time and multiple comorbidities.  As such, the patient was eventually transferred to [facility] for further evaluation by a multitude of services.  At the time of transfer here, she was being treated with oxacillin, vancomycin, and clindamycin for staph aureus bacteremia as well as her multiple localize infarctions throughout the left flank, spine, and retroperitoneum.    Ambulates without assistance@ baseline   Tobacco Use: pack a  day for atleast 15 years   Denies IVDU but endorses intranasal fentanyl and heroin   Lives with  in [place]   Anticoagulation:  none  Immunosuppressive medications: none  Occupation: unemployed   No hx of MI, CVA, DVT/PE  No hx of cancer, chemotherapy, or radiation

## 2024-09-19 NOTE — ASSESSMENT & PLAN NOTE
Mari Sloan is a 49 y.o. female with PMH significant for HTN, DM, substance use disorder (heroin, fentanyl) presenting with sepsis in the setting of epidural and paraspinal abscesses as well as potential for left SI joint septic arthritis.  Repeat MRI 09/18/2024 performed with sedation of the assistance of anesthesia and demonstrates an epidural abscess throughout the lumbar spine with the involvement of the adjacent iliopsoas musculature as well as a hyperintense fluid collection coming from left SI joint. On my initial exam, the patient is intubated and sedated on propofol.  She has been afebrile over the past 24 hours.  She is going with Interventional Radiology today for drain placement to the paraspinal abscesses and aspiration of the left SI joint.  She was also been consented by Neurosurgery for L3-5 laminectomy which they plan to do this afternoon following IR aspiration of the left SI joint and drain placement.       Recommend obtaining fluid sample with the assistance of Interventional Radiology in order to direct cultures.  Continue IV antibiotics

## 2024-09-19 NOTE — SUBJECTIVE & OBJECTIVE
No current facility-administered medications on file prior to encounter.     Current Outpatient Medications on File Prior to Encounter   Medication Sig    gabapentin (NEURONTIN) 300 MG capsule Take 300 mg by mouth 3 (three) times daily.    hydrOXYzine pamoate (VISTARIL) 25 MG Cap Take 25 mg by mouth 3 (three) times daily.    metFORMIN (GLUCOPHAGE) 500 MG tablet Take 500 mg by mouth 2 (two) times daily with meals.    methadone (DOLOPHINE) 10 MG tablet Take 70 mg by mouth Daily.    cloNIDine (CATAPRES) 0.2 MG tablet Take 0.2 mg by mouth 3 (three) times daily.    glipiZIDE (GLUCOTROL) 5 MG tablet Take 5 mg by mouth 2 (two) times daily.       Review of patient's allergies indicates:  No Known Allergies    Past Medical History:   Diagnosis Date    Diabetes mellitus     Hypertension     Unspecified viral hepatitis C without hepatic coma      Past Surgical History:   Procedure Laterality Date     SECTION      MAGNETIC RESONANCE IMAGING N/A 2024    Procedure: MRI (Magnetic Resonance Imagine);  Surgeon: SurgeonDolores;  Location: [facility];  Service: Anesthesiology;  Laterality: N/A;  conscious sedaation MRI lumbar spine w/ and without contrast     Family History    None       Tobacco Use    Smoking status: Every Day     Current packs/day: 0.50     Types: Cigarettes    Smokeless tobacco: Never   Substance and Sexual Activity    Alcohol use: Not Currently    Drug use: Not Currently    Sexual activity: Not on file     Review of Systems   Constitutional:  Positive for activity change, appetite change and fatigue. Negative for fever.   HENT:  Negative for dental problem and trouble swallowing.    Respiratory:  Negative for apnea, chest tightness, shortness of breath and wheezing.    Cardiovascular:  Positive for leg swelling (worse on te  left LE). Negative for chest pain.   Gastrointestinal:  Positive for abdominal distention. Negative for abdominal pain, nausea and vomiting.   Genitourinary:  Positive for flank  pain (left flank pain). Negative for hematuria and pelvic pain.   Musculoskeletal:  Positive for arthralgias and back pain. Negative for neck pain and neck stiffness.   Skin:  Positive for color change.   Neurological:  Positive for weakness. Negative for speech difficulty, numbness and headaches.   Psychiatric/Behavioral:  Negative for agitation and behavioral problems.      Objective:     Vital Signs (Most Recent):  Temp: 98.6 °F (37 °C) (09/19/24 1401)  Pulse: 78 (09/19/24 1401)  Resp: (!) 21 (09/19/24 1401)  BP: 90/60 (09/19/24 1401)  SpO2: 100 % (09/19/24 1401) Vital Signs (24h Range):  Temp:  [98 °F (36.7 °C)-98.9 °F (37.2 °C)] 98.6 °F (37 °C)  Pulse:  [] 78  Resp:  [13-30] 21  SpO2:  [90 %-100 %] 100 %  BP: ()/(50-95) 90/60     Weight: 65.2 kg (143 lb 11.8 oz)  Body mass index is 26.29 kg/m².     Physical Exam  Constitutional:       Appearance: She is ill-appearing. She is not toxic-appearing.      Comments: In pain, specifically right hip   HENT:      Head: Normocephalic.      Right Ear: External ear normal.      Left Ear: External ear normal.   Cardiovascular:      Rate and Rhythm: Normal rate and regular rhythm.   Pulmonary:      Effort: Pulmonary effort is normal. No respiratory distress.      Breath sounds: Rales present.   Chest:      Chest wall: No tenderness.   Abdominal:      General: There is distension.      Palpations: There is no mass.      Tenderness: There is no abdominal tenderness.      Comments: Left flank indurated, no fluctuation, minimally tender  Minimally tender abdominal exam   Musculoskeletal:      Cervical back: No rigidity.      Comments: Pain with flexion of left hip   Skin:     Coloration: Skin is pale. Skin is not jaundiced.      Findings: Bruising (left arm) present.      Comments: Multiple acute and chronic , circular ulcers in upper and lower extremities   Neurological:      General: No focal deficit present.      Mental Status: She is alert. Mental status is at  baseline.      Sensory: No sensory deficit.   Psychiatric:         Mood and Affect: Mood normal.         Behavior: Behavior normal.         Thought Content: Thought content normal.            I have reviewed all pertinent lab results within the past 24 hours.  CBC:   Recent Labs   Lab 09/19/24  0319   WBC 10.44   RBC 2.45*   HGB 6.4*   HCT 19.5*   PLT 96*   MCV 80*   MCH 26.1*   MCHC 32.8     CMP:   Recent Labs   Lab 09/19/24 0319 09/19/24  0900   * 111*   CALCIUM 6.3* 5.5*   ALBUMIN 1.0*  --    PROT 4.0*  --    * 140   K 3.7 3.0*   CO2 27 22*    110   BUN 6 6   CREATININE 0.6 0.4*   ALKPHOS 114  --    ALT 18  --    AST 15  --    BILITOT 1.5*  --        Significant Diagnostics:  I have reviewed all pertinent imaging results/findings within the past 24 hours.    Physical Exam:    Constitutional:   In pain, specifically right hip     Cardiovascular: Normal rate and regular rhythm.     Abdominal:   Left flank indurated, no fluctuation, minimally tender  Minimally tender abdominal exam     Skin:   Multiple acute and chronic , circular ulcers in upper and lower extremities     Psych/Behavior: She is alert.     Musculoskeletal:      Comments: Pain with flexion of left hip

## 2024-09-19 NOTE — PROGRESS NOTES
[facility]  Critical Care Medicine  Progress Note    Patient Name: Mari Sloan  MRN: [id number]  Admission Date: 9/16/2024  Hospital Length of Stay: 3 days  Code Status: Full Code  Attending Provider: Bentley Connell MD  Primary Care Provider: Liliana, Primary Doctor   Principal Problem: Paraspinal abscess    Subjective:     HPI:  Miss Sloan is a pleasant 49-year-old female with a medical history of diabetes, hypertension, and substance use disorder  transferred from OS for specialized evaluation following imaging studies concerning for osteomyelitis or septic arthritis of the left sacroiliac joint, as well as an abnormal fluid collection extending from T11 through the iliacus muscle, raising suspicion for an abscess. The patient reports a mechanical fall six days ago.     Laboratory results from the OSH revealed significant findings, including hypokalemia, hypomagnesemia, severe anemia, metabolic alkalosis, and hyperglycemia.     Labs today CBC leukocytosis (WBC 13.5), hgb 6.9 following transfusion with one unit of packed red blood cells,  platelets at 119. Iron studies revealed a total iron-binding capacity (TIBC) of 152, transferrin of 103, and elevated ferritin at 2000, suggestive of anemia of chronic disease or inflammation. CMP potassium 3.2.  Liver function  unremarkable. Her CRP was markedly elevated at 179.6, indicating significant inflammation or infection. Lactate dehydrogenase was 474, while CPK was within normal limits. Her hemoglobin A1c was elevated at 7.9, reflecting poor glycemic control.    A urine drug screen was positive for presumptive methadone (follows in [facility]ano clinic) and opioids. The infectious workup thus far positive staph aureus by PCR and blood cultures growing Gram-positive cocci resembling staph.  MRSA PCR testing returning negative.  Additionally, the patients urinalysis was notable for a hazy appearance, with trace ketones, glucose, positive nitrites, and moderate  bacteriuria, raising suspicion for a urinary tract infection.     Hospital/ICU Course:  Patient was admitted to the MICU with a diagnosis of Paraspinal abscess. Had  Hb of 6.7 and was transfused with 1 unit of blood with a post-transfusion Hb of 7.5. The LDH was high, Phosphorus, Potassium, Sodium, Mg were low. General Surgery requested that Neurosurgery and IR review patient. Neurosurgery wants the IR team to review and drain the abscess and do not think that a neurosurgical intervention is indicated at this time. They do not agree that there is an Epidural abscess. Patient is being closely followed up with DIC labs, CMP for electrolytes. We will be repleting accordingly. RUQ USS was requested. The MARICHUY that was ordered as a result of Staff bacteremia was declined by the Cardiology team. With her electrolyte derangement, and a background of appetitive loss in the past 2 months,  a strong suspicion for Refeeding syndrome is appropriate. Patient was seen by Neurosurgery and is scheduled for surgery (L3-L5 Laminectomy) on 9/19.     No current facility-administered medications on file prior to encounter.     Current Outpatient Medications on File Prior to Encounter   Medication Sig    gabapentin (NEURONTIN) 300 MG capsule Take 300 mg by mouth 3 (three) times daily.    hydrOXYzine pamoate (VISTARIL) 25 MG Cap Take 25 mg by mouth 3 (three) times daily.    metFORMIN (GLUCOPHAGE) 500 MG tablet Take 500 mg by mouth 2 (two) times daily with meals.    methadone (DOLOPHINE) 10 MG tablet Take 70 mg by mouth Daily.    cloNIDine (CATAPRES) 0.2 MG tablet Take 0.2 mg by mouth 3 (three) times daily.    glipiZIDE (GLUCOTROL) 5 MG tablet Take 5 mg by mouth 2 (two) times daily.       Review of patient's allergies indicates:  No Known Allergies    Past Medical History:   Diagnosis Date    Diabetes mellitus     Hypertension     Unspecified viral hepatitis C without hepatic coma      Past Surgical History:   Procedure Laterality Date      SECTION      MAGNETIC RESONANCE IMAGING N/A 2024    Procedure: MRI (Magnetic Resonance Imagine);  Surgeon: SurgeonDolores;  Location: [facility];  Service: Anesthesiology;  Laterality: N/A;  conscious sedaation MRI lumbar spine w/ and without contrast     Family History    None       Tobacco Use    Smoking status: Every Day     Current packs/day: 0.50     Types: Cigarettes    Smokeless tobacco: Never   Substance and Sexual Activity    Alcohol use: Not Currently    Drug use: Not Currently    Sexual activity: Not on file     Review of Systems   Constitutional:  Positive for activity change, appetite change and fatigue. Negative for fever.   HENT:  Negative for dental problem and trouble swallowing.    Respiratory:  Negative for apnea, chest tightness, shortness of breath and wheezing.    Cardiovascular:  Positive for leg swelling (worse on te  left LE). Negative for chest pain.   Gastrointestinal:  Positive for abdominal distention. Negative for abdominal pain, nausea and vomiting.   Genitourinary:  Positive for flank pain (left flank pain). Negative for hematuria and pelvic pain.   Musculoskeletal:  Positive for arthralgias and back pain. Negative for neck pain and neck stiffness.   Skin:  Positive for color change.   Neurological:  Positive for weakness. Negative for speech difficulty, numbness and headaches.   Psychiatric/Behavioral:  Negative for agitation and behavioral problems.      Objective:     Vital Signs (Most Recent):  Temp: 98.6 °F (37 °C) (24 1401)  Pulse: 78 (24 1401)  Resp: (!) 21 (24 1401)  BP: 90/60 (24 1401)  SpO2: 100 % (24 1401) Vital Signs (24h Range):  Temp:  [98 °F (36.7 °C)-98.9 °F (37.2 °C)] 98.6 °F (37 °C)  Pulse:  [] 78  Resp:  [13-30] 21  SpO2:  [90 %-100 %] 100 %  BP: ()/(50-95) 90/60     Weight: 65.2 kg (143 lb 11.8 oz)  Body mass index is 26.29 kg/m².     Physical Exam  Constitutional:       Appearance: She is ill-appearing. She is  not toxic-appearing.      Comments: In pain, specifically right hip   HENT:      Head: Normocephalic.      Right Ear: External ear normal.      Left Ear: External ear normal.   Cardiovascular:      Rate and Rhythm: Normal rate and regular rhythm.   Pulmonary:      Effort: Pulmonary effort is normal. No respiratory distress.      Breath sounds: Rales present.   Chest:      Chest wall: No tenderness.   Abdominal:      General: There is distension.      Palpations: There is no mass.      Tenderness: There is no abdominal tenderness.      Comments: Left flank indurated, no fluctuation, minimally tender  Minimally tender abdominal exam   Musculoskeletal:      Cervical back: No rigidity.      Comments: Pain with flexion of left hip   Skin:     Coloration: Skin is pale. Skin is not jaundiced.      Findings: Bruising (left arm) present.      Comments: Multiple acute and chronic , circular ulcers in upper and lower extremities   Neurological:      General: No focal deficit present.      Mental Status: She is alert. Mental status is at baseline.      Sensory: No sensory deficit.   Psychiatric:         Mood and Affect: Mood normal.         Behavior: Behavior normal.         Thought Content: Thought content normal.            I have reviewed all pertinent lab results within the past 24 hours.  CBC:   Recent Labs   Lab 09/19/24  0319   WBC 10.44   RBC 2.45*   HGB 6.4*   HCT 19.5*   PLT 96*   MCV 80*   MCH 26.1*   MCHC 32.8     CMP:   Recent Labs   Lab 09/19/24  0319 09/19/24  0900   * 111*   CALCIUM 6.3* 5.5*   ALBUMIN 1.0*  --    PROT 4.0*  --    * 140   K 3.7 3.0*   CO2 27 22*    110   BUN 6 6   CREATININE 0.6 0.4*   ALKPHOS 114  --    ALT 18  --    AST 15  --    BILITOT 1.5*  --        Significant Diagnostics:  I have reviewed all pertinent imaging results/findings within the past 24 hours.    Physical Exam:    Constitutional:   In pain, specifically right hip     Cardiovascular: Normal rate and regular  rhythm.     Abdominal:   Left flank indurated, no fluctuation, minimally tender  Minimally tender abdominal exam     Skin:   Multiple acute and chronic , circular ulcers in upper and lower extremities     Psych/Behavior: She is alert.     Musculoskeletal:      Comments: Pain with flexion of left hip       ABG  Recent Labs   Lab 09/19/24  1509   PH 7.416   PO2 33*   PCO2 43.3   HCO3 27.8   BE 3*     Assessment/Plan:     Psychiatric  History of drug use  Patient with history of drug misuse disorder.   Reports following with methadone clinic although leans her pain is not well controlled, has not gone clinic since last Friday 9/13  -urine drug screen positive for presumptive methadone and opiates  -patient reports snorting methamphetamine and fentanyl regularly  -we will resume methadone while inpatient  -consider addiction psych consult    Patient reports being on 70 mg methadone q.6, American society of addiction Medicine recommends continuation of methadone maintenance therapy without interruption and use of short-acting opioids like oxycodone for acute pain.  -consider higher doses of oral oxycodone given patient's acute pain needs and known high opioid tolerance i.e. 20-40 mg of oxycodone q.4 to q.6  9/18:  Methadone 70 mg Daily ordered    Cardiac/Vascular  Endocarditis  9/18:  Probable endocarditis- possible vegetation vs normal variant noted on mitral valve noted on TTE. Will treat as presumptive endocarditis.  Stop vancomycin and zosyn  Start oxacillin (concern for epidural abscess)  Continue clindamycin for now (report of gas in L flank)  Follow up IR / neurosurgery / general surgery recs   Obtain MARICHUY- Cardiology recommends to treat as recommended by ID (as presumptive IE, considering the risk of MARICHUY to the patient)            Not a candidate for home IV antibiotics due to active substance abuse.    Renal/  Hypokalemia  Replace PRN    ID  * Paraspinal abscess  Case was discussed with neurosurgery who did not  "feel surgical intervention was warranted and recommended IR drainage. IR recommended against drainage given extensive superficial infection. MRI today with  complex fluid collection.     See Osteomyelitis multiple sites    Sepsis  This patient does have evidence of infective focus  My overall impression is sepsis.  Source: Skin and Soft Tissue (location lefts psoas, SI joint, paraspinal abcess)  Antibiotics given-   Antibiotics (72h ago, onward)      Start     Stop Route Frequency Ordered    09/18/24 0200  vancomycin (VANCOCIN) 1,000 mg in D5W 250 mL IVPB (admixture device)         -- IV Every 12 hours (non-standard times) 09/17/24 2248    09/18/24 0130  piperacillin-tazobactam (ZOSYN) 4.5 g in D5W 100 mL IVPB (MB+)         -- IV Every 8 hours (non-standard times) 09/18/24 0005    09/17/24 1400  clindamycin in D5W 600 mg/50 mL IVPB 600 mg         -- IV Every 8 hours (non-standard times) 09/17/24 1319    09/16/24 2347  vancomycin - pharmacy to dose  (vancomycin IVPB (PEDS and ADULTS))        Placed in "And" Linked Group    -- IV pharmacy to manage frequency 09/16/24 2348          Latest lactate reviewed-  Recent Labs   Lab 09/16/24  1912 09/16/24  2042 09/17/24  0800   LACTATE  --    < > 1.7   POCLAC 1.38  --   --     < > = values in this interval not displayed.     Organ dysfunction indicated by Thrombocytopenia     Fluid challenge Not needed - patient is not hypotensive      Post- resuscitation assessment No - Post resuscitation assessment not needed       Will Not start Pressors- Levophed for MAP of 65      Other osteomyelitis, multiple sites  Per ID in OSH   48y/o F with hx of being un-housed, T2DM, HTN and drug use transferred for spinal osteomyelitis, septic arthritis and Lt flank/ retroperitoneal abscess.     Imaging studies of her lumbar spine and pelvis were concerning for osteomyelitis/septic arthritis of the left SI joint and L5-S1 along with an abnormal fluid collection extending from T11 through the left " iliacus muscle concerning for abscess as well as at psoas.     Result Date: 9/17/2024  1. Complex fluid collection possibly extending from the left SI joint to the left iliac fossa displacing the left psoas muscle medially. This is similar to the study 1 day prior could represent infectious left sacroiliitis/osteomyelitis and adjacent abscess. The collection extends superiorly to approximately T12 level at the left posterior retroperitoneum, posterior to the left kidney. Enhancement noted to much of the sacrum including right of midline again could represent osteomyelitis. Follow-up recommended. Recommend surgical consultation and follow-up. 2. Multilevel degenerative changes most prominent at L5-S1.  See above comments. 3. This report was flagged in Epic as abnormal. Electronically signed by:  Edgard Martinez Date:                                               09/17/2024 Time:                                            15:53   X-Ray Chest AP Portable     Result Date: 9/19/2024  ET tube placed with tip 1.8 cm above the laura. Right IJ catheter tip remains overlying the right atrium, similar to prior. Bilateral perihilar infiltrates/edema. Electronically signed by:       Tod Moran MD Date:                                          09/19/2024 Time:                                            01:28     MRI Lumbar Spine W WO Cont     Result Date: 9/18/2024  1. Complex multi lobular fluid collection extending from the left SI joint to the left iliac fossa and paravertebral region displacing the left psoas muscle medially.  This measures approximately 8.4 x 8.5 cm x 22.5 cm in craniocaudad dimension.  This is similar to the prior studies could represent infectious left sacroiliitis/osteomyelitis and adjacent abscess. The collection extends superiorly to approximately T12 level at the left posterior retroperitoneum, posterior to the left kidney. Enhancement noted to much of the sacrum including right of midline again  could represent osteomyelitis. Follow-up is recommended.  Edema and complex collection extends posteriorly at the lateral margin of the field of view incompletely visualized to the subcutaneous tissues posteriorly at approximately L2-3 level adjacent to the left posterosuperior gluteal musculature. There is mild displacement and mass effect on the posterolateral margin of the paraspinal musculature best seen on axial 22 of series 20 and series 19. Recommend surgical consultation and follow-up. 2.  Small epidural collections are noted adjacent to the thecal sac from approximately T12 to L5 suggesting epidural abscess.  Follow-up recommended.  See above comments. 2. Multilevel degenerative changes most prominent at L5-S1. See above comments. 3. This report was flagged in Epic as abnormal.     Started on empiric vanc and zosyn. Case was discussed with neurosurgery who did not feel surgical intervention was warranted and recommended IR drainage. IR recommended against drainage given extensive superficial infection. MRI today with  complex fluid collection. left SI joint to the left iliac fossa.      BCx growing GPC in clusters. 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      Recommendations:  -Continue vancomycin. Pharm to dose for goal trough 15-20.  -Follow-up ID and susceptibilities of GPC in blood   -Would discontinue meropenem given blood cx now with gpc  -Agree with clindamycin given visualized gas/ necrotizing infection       - rec'd IR aspiration of abscess with cultures      -consulting ID while at [facility]    On admission antibiotic regimen of vancomycin, clindamycin, piperacillin tazobactam.    9/18:  ID discontinued Zosyn and Vanc.  Continue Clindamycin  Start Oxacillin    9/19:    Orthopedic surgery:Recommend obtaining fluid sample with the assistance of Interventional Radiology in order to direct cultures.     Neurosurgery: Patient had a L3-L5 Laminectomy this afternoon.                  Oncology  Anemia,  unspecified  Patient with anemia upon lab review with a hemoglobin 6.9 post transfusion; likely multifactorial in the setting of chronic disease, deficiency and acute blood loss (RP bleed?)  -CBC showing microcytic anemia given MCV of 79 with high ferritin of 2000 suggesting anemia of chronic disease can not rule out acute blood loss in the setting of recent fall and RP abscess    Plan:  Monitor CBC and transfuse if hemoglobin less than 7  -consider additional iron studies  -monitor for signs of bleeding    9/18:  Hb of 6.7=> Received 1 unit of blood=> 7.5 post-transfusion  DIC Labs: (d-dimer, Fibrinogen)    9/19:  Hb= 6.4, Given I unit of Packed cells and 1 unit of Platelets    Endocrine  Refeeding syndrome  Patient has had reduced appetite for the past several months.  Labs today show low-sodium, no potassium, phosphorus, magnesium, and calcium (uncorrected) with an elevated LDH and Ferritin. Being managed as a potential Refeeding syndrome:     PLAN:  - IV Thiamine,   - Monitor daily weights  - Monitor I/O  - Continuous cardiac monitoring  - Diet and Nutrition consult: Appreciate recommendations: Recommended to start diabetic diet when medically appropriate  - CBC BID; trend and replete  - CMP BID; trend and replete.    Hyperglycemia  Patient with a history of diabetes ,most recent A1c 7.9.  Reports taking metformin 500 mg b.i.d. and glipizide b.i.d.    -we will manage with moderate insulin sliding scale while inpatient    Uncontrolled type 2 diabetes mellitus with hyperglycemia  Holding oral home medications while inpatient  -low dose insulin sliding scale    GI  Hepatitis C  Patient with a normal hep C antibody    -per ID recs hep C quant order (pending) 9/18: HepC Not detected.      Psoas muscle abscess  -Consulting Gen surgery         Other  Smoker  -Nicotine patch if patient amenable       Critical Care Daily Checklist:    A: Awake: RASS Goal/Actual Goal: RASS Goal: -1-->drowsy  Actual:     B: Spontaneous  Breathing Trial Performed?     C: SAT & SBT Coordinated?                 D: Delirium: CAM-ICU Overall CAM-ICU: Negative   E: Early Mobility Performed? No   F: Feeding Goal: Goals: Meet % EEN/EPN by RD follow up.  Status: Nutrition Goal Status: new   Current Diet Order   Procedures    Diet NPO Except for: Medication, Sips with Medication     Order Specific Question:   Except for:     Answer:   Medication     Order Specific Question:   Except for:     Answer:   Sips with Medication      AS: Analgesia/Sedation Hydoxymorphone, Fentanyl, Lidocaine patch   T: Thromboembolic Prophylaxis    H: HOB > 300 Yes   U: Stress Ulcer Prophylaxis (if needed) PPI   G: Glucose Control    B: Bowel Function     I: Indwelling Catheter (Lines & López) Necessity Trialysis, López, NG/OG tube, Closed/Suction drain x2, Wound/Incision Lumbar spine drain   D: De-escalation of Antimicrobials/Pharmacotherapies Oxacillin, Clindamyciin    Plan for the day/ETD Monitor Vital signs and F/U CBC and BMP    Code Status:  Family/Goals of Care: Full Code         Critical secondary to Patient has a condition that poses threat to life and bodily function: Epidural abscess     Critical care was time spent personally by me on the following activities: development of treatment plan with patient or surrogate and bedside caregivers, discussions with consultants, evaluation of patient's response to treatment, examination of patient, ordering and performing treatments and interventions, ordering and review of laboratory studies, ordering and review of radiographic studies, pulse oximetry, re-evaluation of patient's condition. This critical care time did not overlap with that of any other provider or involve time for any procedures.     Adelita Barnes MD  Critical Care Medicine  [facility]

## 2024-09-19 NOTE — ANESTHESIA PREPROCEDURE EVALUATION
[facility]  Anesthesia Pre-Operative Evaluation   09/18/2024        Mari Sloan, 1975  [id number]  Procedure(s) (LRB):  MRI (Magnetic Resonance Imagine) (N/A)    Subjective    Mari Sloan is a 49 y.o. 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    Patient now presents for above procedure(s).     Level of Care: ICU  Hemodynamics: No vasopressor or inotropic support.  Respiratory Status: Room air  IV Access: PIV 20G R AC, RIJ TLC   NPO: Last ate over a week ago, drank water 7pm      Prev Airway: None documented.    LDA:   Percutaneous Central Line - Triple Lumen  09/16/24 1700 Internal Jugular Right (Active)   Line Necessity Review Frequent Blood Draws;Hemodynamic instability;Poor venous access 09/17/24 1901   Verification by X-ray Yes 09/17/24 1901   Site Assessment No drainage;No redness;No swelling;No warmth 09/18/24 1100   Line Securement Device Secured with sutures 09/18/24 1100   Dressing Type CHG impregnated dressing/sponge 09/18/24 1100   Dressing Status Clean;Dry;Intact 09/18/24 1100   Dressing Intervention Integrity maintained 09/18/24 1100   Date on Dressing 09/17/24 09/18/24 1100   Dressing Due to be Changed 09/24/24 09/18/24 1100   Distal Patency/Care flushed w/o difficulty;blood return present;infusing 09/18/24 1100   Medial 1 Patency/Care flushed w/o difficulty;blood return present;infusing 09/18/24 1100   Proximal 1 Patency/Care flushed w/o difficulty;blood return present;normal saline locked 09/18/24 1100   Waveform Not being transduced 09/17/24 1901   Number of days: 2            Peripheral IV - Single Lumen 09/18/24 0600 20 G Right Antecubital (Active)   Site Assessment Clean;Dry;Intact 09/18/24 1100   Extremity Assessment Distal to IV No abnormal discoloration;No redness;No swelling;No warmth 09/18/24 1100   Line Status Infusing  09/18/24 1100   Dressing Status Clean;Dry;Intact 09/18/24 1100   Dressing Intervention Integrity maintained 09/18/24 1100   Dressing Change Due 09/22/24 09/18/24 1100   Site Change Due 09/22/24 09/18/24 1100   Reason Not Rotated Not due 09/18/24 1100   Number of days: 0       Female External Urinary Catheter w/ Suction 09/16/24 2345 (Active)   Skin no redness;no breakdown 09/18/24 1100   Tolerance no signs/symptoms of discomfort 09/18/24 1100   Suction Continuous suction at 60 mmHg 09/18/24 1100   Date of last wick change 09/18/24 09/18/24 1100   Time of last wick change 1100 09/18/24 1100   Output (mL) 300 mL 09/18/24 1800   Number of days: 1       Drips: None documented.\      Patient Active Problem List   Diagnosis    Other osteomyelitis, multiple sites    Psoas muscle abscess    Hypokalemia    History of drug use    Smoker    Uncontrolled type 2 diabetes mellitus with hyperglycemia    Anemia, unspecified    Paraspinal abscess    Hepatitis C    Severe sepsis    Thrombocytopenia    Hyperglycemia    Sepsis    Staphylococcus aureus bacteremia    Endocarditis    Refeeding syndrome       Review of patient's allergies indicates:  No Known Allergies    Current Inpatient Medications:    clindamycin IV (PEDS and ADULTS)  600 mg Intravenous Q8H    cloNIDine  0.2 mg Oral TID    folic acid  1 mg Oral Daily    gabapentin  300 mg Oral TID    hydrOXYzine pamoate  25 mg Oral QHS    insulin glargine U-100  15 Units Subcutaneous BID    LIDOcaine  1 patch Transdermal Q24H    methadone  70 mg Oral Daily    oxacillin 12 g in  mL CONTINUOUS INFUSION  12 g Intravenous Q24H    pantoprazole  40 mg Oral Daily    potassium phosphate IVPB  30 mmol Intravenous Once    thiamine  100 mg Oral Daily       No current facility-administered medications on file prior to encounter.     Current Outpatient Medications on File Prior to Encounter   Medication Sig Dispense Refill    gabapentin (NEURONTIN) 300 MG capsule Take 300 mg by mouth 3  (three) times daily.      hydrOXYzine pamoate (VISTARIL) 25 MG Cap Take 25 mg by mouth 3 (three) times daily.      metFORMIN (GLUCOPHAGE) 500 MG tablet Take 500 mg by mouth 2 (two) times daily with meals.      methadone (DOLOPHINE) 10 MG tablet Take 70 mg by mouth Daily.      cloNIDine (CATAPRES) 0.2 MG tablet Take 0.2 mg by mouth 3 (three) times daily.      glipiZIDE (GLUCOTROL) 5 MG tablet Take 5 mg by mouth 2 (two) times daily.         Past Surgical History:   Procedure Laterality Date     SECTION         Social History:  Tobacco Use: High Risk (2024)    Patient History     Smoking Tobacco Use: Every Day     Smokeless Tobacco Use: Never     Passive Exposure: Not on file       Alcohol Use: Patient Declined (2024)    AUDIT-C     Frequency of Alcohol Consumption: Patient declined     Average Number of Drinks: Patient declined     Frequency of Binge Drinking: Patient declined       Objective    Vital Signs Range:  BMI Readings from Last 1 Encounters:   24 26.29 kg/m²       Temp:  [36.8 °C (98.3 °F)]   Pulse:  []   Resp:  [18-33]   BP: ()/(71-96)   SpO2:  [89 %-97 %]        Significant Labs:        Component Value Date/Time    WBC 14.96 (H) 2024 1217    HGB 7.5 (L) 2024 1217    HCT 23.1 (L) 2024 1217    HCT 22 (L) 2024 0954     (L) 2024 1217     (L) 2024 1428    K 3.3 (L) 2024 1428     2024 1428    CO2 26 2024 1428     (H) 2024 1428    BUN 6 2024 1428    CREATININE 0.5 2024 1428    MG 1.5 (L) 2024 0305    PHOS 2.5 (L) 2024 1428    CALCIUM 6.8 (LL) 2024 1428    ALBUMIN 1.2 (L) 2024 0305    PROT 4.8 (L) 2024 0305    ALKPHOS 146 (H) 2024 0305    BILITOT 1.2 (H) 2024 0305    AST 13 2024 0305    ALT 23 2024 0305    INR 1.6 (H) 2024 1052    HGBA1C 7.9 (H) 2024 0800        Please see Results Review for additional labs.      Diagnostic Studies: All relevant studies, reviewed.      EKG:   Results for orders placed or performed during the hospital encounter of 09/16/24   EKG 12-lead    Collection Time: 09/16/24  4:42 PM   Result Value Ref Range    QRS Duration 96 ms    [facility] QTC Calculation 438 ms    Narrative    Test Reason : E87.6,    Vent. Rate : 100 BPM     Atrial Rate : 100 BPM     P-R Int : 138 ms          QRS Dur : 096 ms      QT Int : 340 ms       P-R-T Axes : 060 062 095 degrees     QTc Int : 438 ms    Normal sinus rhythm  Nonspecific ST and T wave abnormality  Abnormal ECG    Confirmed by Ching Vines MD ([id number]) on 9/17/2024 7:20:39 AM    Referred By: [email or web address]   SELF           Confirmed By:Ching Vines MD       ECHO:  Results for orders placed during the hospital encounter of 09/16/24    Echo Saline Bubble? No    Interpretation Summary    Left Ventricle: The left ventricle is normal in size. Normal wall thickness. There is concentric remodeling. There is normal systolic function with a visually estimated ejection fraction of 60 - 65%. There is normal diastolic function.    Right Ventricle: Normal right ventricular cavity size. Systolic function is normal.    Right Atrium: Catheter present in the right atrium.    Mitral Valve: The mitral valve is structurally normal. Redundant chordal structures noted. There is no stenosis. There is mild regurgitation.    Pulmonary Artery: The estimated pulmonary artery systolic pressure is 39 mmHg.    Cannot entirely exclude mitral vegetation vs redundant chordae.  If high clinical suspicion for endocarditis, would rx as such (not clear MARICHUY would be able to exclude vegetation based on TTE findings).            Pre-op Assessment    I have reviewed the Patient Summary Reports.     I have reviewed the Nursing Notes. I have reviewed the NPO Status.   I have reviewed the Medications.     Review of Systems  Cardiovascular:     Hypertension                                  Hypertension          Hepatic/GI:      Liver Disease, Hepatitis        Liver Disease, Hepatitis        Neurological:    Neuromuscular Disease,                                 Neuromuscular Disease   Endocrine:  Diabetes    Diabetes                          Physical Exam  General: Well nourished, Cooperative, Alert and Oriented    Airway:  Mallampati: III / II  Mouth Opening: Small, but > 3cm  TM Distance: Normal  Tongue: Normal  Neck ROM: Normal ROM    Dental:  Periodontal disease, Edentulous  Missing all upper teeth, poor dentition with multiple missing teeth on bottom teeth      Anesthesia Plan  Type of Anesthesia, risks & benefits discussed:    Anesthesia Type: Gen ETT  Intra-op Monitoring Plan: Standard ASA Monitors  Post Op Pain Control Plan: multimodal analgesia and IV/PO Opioids PRN  Induction:  rapid sequence  Airway Plan: Video, Post-Induction  Informed Consent: Informed consent signed with the Patient and all parties understand the risks and agree with anesthesia plan.  All questions answered.   ASA Score: 3  Day of Surgery Review of History & Physical: H&P Update referred to the surgeon/provider.    Ready For Surgery From Anesthesia Perspective.     .

## 2024-09-19 NOTE — PLAN OF CARE
MICU DAILY GOALS     Family/Goals of care/Code Status   Code Status: Full Code    24H Vital Sign Range  Temp:  [98 °F (36.7 °C)-98.9 °F (37.2 °C)]   Pulse:  []   Resp:  [13-33]   BP: ()/(50-96)   SpO2:  [89 %-100 %]      Shift Events (include procedures and significant events)   At the beginning of the shift, anesthesia took her down for MRI and intubated her. She remains intubated on prop and fent; levo never started. It took some time to balance out BP and sedation but our goal was reached and the Levo was never started. One unit rbc and calcium gluc given this morning. Ortho came by and wanted to see how IR goes and will continue to follow. Neurosurgery also rounded at the end of the shift with plans for intervention at some point today. Orders placed to further correct her hematology labs.    AWAKE RASS: Goal - RASS Goal: 0-->alert and calm  Actual - RASS (Zavala Agitation-Sedation Scale): drowsy    Restraint necessity: Treatment interference   BREATHE SBT: NA    Coordinate A & B, analgesics/sedatives Pain: managed   SAT: NA   Delirium CAM-ICU: Overall CAM-ICU: Negative   Early(intubated/ Progressive (non-intubated) Mobility MOVE Screen (INTUBATED ONLY): Not attempted    Activity: Activity Management: Rolling - L1   Feeding/Nutrition Diet order: Diet/Nutrition Received: NPO, sips of water,     Thrombus DVT prophylaxis: VTE Required Core Measure: Pharmacological prophylaxis initiated/maintained   HOB Elevation Head of Bed (HOB) Positioning: HOB at 30 degrees   Ulcer Prophylaxis GI: yes   Glucose control managed Glycemic Management: blood glucose monitored   Skin Skin assessed during: Q Shift Change    Sacrum intact/not altered? Yes  Heels intact/not altered? Yes  Surgical wound? No    CHECK ONE!   (no altered skin or altered skin) and sub boxes:  [] No Altered Skin Integrity Present    []Prevention Measures Documented    [x] Altered Skin Integrity Present or Discovered   [x] LDA present in EPIC,  daily doc completed              [] LDA added if not in EPIC (describe wound).                    When describing wound, do not stage, use descriptive words only.    [] Wound Image Taken (required on admit,                   transfer/discharge and every Tuesday)    Wound Care Consulted? Yes    4 EYES:  Attending Nurse (1st set of eyes): FATIMAH Bear    Second RN/Staff Member (2nd set of eyes): FATIMAH Villalobos   Bowel Function no issues    Indwelling Catheter Necessity      Percutaneous Central Line - Triple Lumen  09/16/24 1700 Internal Jugular Right-Line Necessity Review: Frequent Blood Draws, Hemodynamic instability, Poor venous access     De-escalation Antibiotics No        VS and assessment per flow sheet, patient progressing towards goals as tolerated, plan of care reviewed with  patient and  , all concerns addressed, will continue to monitor.

## 2024-09-19 NOTE — PLAN OF CARE
MICU DAILY GOALS     Family/Goals of care/Code Status   Code Status: Full Code    24H Vital Sign Range  Temp:  [98.3 °F (36.8 °C)-99.6 °F (37.6 °C)]   Pulse:  []   Resp:  [18-41]   BP: ()/()   SpO2:  [89 %-99 %]      Shift Events (include procedures and significant events)   No acute events throughout shift; Electrolytes replaced throughout day; U/S of abdomen and LLE completed today; I.R.consult also completed; see progress notes; pain controlled; pt NPO; sips of water.Cont' with plan of care.    AWAKE RASS: Goal - RASS Goal: 0-->alert and calm  Actual - RASS (Zavala Agitation-Sedation Scale): alert and calm    Restraint necessity: Not necessary   BREATHE SBT: Not intubated    Coordinate A & B, analgesics/sedatives Pain: managed   SAT: Not intubated   Delirium CAM-ICU: Overall CAM-ICU: Negative   Early(intubated/ Progressive (non-intubated) Mobility MOVE Screen (INTUBATED ONLY): Not intubated    Activity: Activity Management: Arm raise - L1   Feeding/Nutrition Diet order: Diet/Nutrition Received: NPO, sips of water,     Thrombus DVT prophylaxis: VTE Required Core Measure: Pharmacological prophylaxis initiated/maintained   HOB Elevation Head of Bed (HOB) Positioning: HOB elevated   Ulcer Prophylaxis GI: yes   Glucose control managed Glycemic Management: blood glucose monitored   Skin Skin assessed during: Daily Assessment    Sacrum intact/not altered? Yes  Heels intact/not altered? Yes  Surgical wound? No    CHECK ONE!   (no altered skin or altered skin) and sub boxes:  [] No Altered Skin Integrity Present    []Prevention Measures Documented    [x] Altered Skin Integrity Present or Discovered   [x] LDA present in EPIC, daily doc completed              [] LDA added if not in EPIC (describe wound).                    When describing wound, do not stage, use descriptive words only.    [] Wound Image Taken (required on admit,                   transfer/discharge and every Tuesday)    Wound Care  Consulted? Yes    4 EYES:  Attending Nurse (1st set of eyes):     Second RN/Staff Member (2nd set of eyes):    Bowel Function no issues    Indwelling Catheter Necessity      Percutaneous Central Line - Triple Lumen  09/16/24 1700 Internal Jugular Right-Line Necessity Review: Frequent Blood Draws, Hemodynamic instability, Poor venous access  Purewick   De-escalation Antibiotics Yes        VS and assessment per flow sheet, patient progressing towards goals as tolerated, plan of care reviewed with Mari Sloan/, all concerns addressed, will continue to monitor.

## 2024-09-19 NOTE — PROGRESS NOTES
[facility]  Infectious Disease  Progress Note    Patient Name: Mrai Sloan  MRN: [id number]  Admission Date: 9/16/2024  Length of Stay: 3 days  Attending Physician: Bentley Connell MD  Primary Care Provider: No, Primary Doctor    Isolation Status: No active isolations  Assessment/Plan:      ID  Epidural abscess  See above    Staphylococcus aureus bacteremia  49F initially presented to [facility] with back pain, found to have spinal OM, L SI joint septic arthritis, and L psoas/iliacus muscle abscess, T12 - L5 epidural abscess, possible MV endocarditis, transferred to [facility] on 9/16 for IR and neurosurgery eval, now [place] [facility]  No hardware/devices in place. Does admit to snorting fentanyl. Does have multiple skin blisters on JESSICA UE and LE that she admits to picking at.     MSSA on BCX, remains positive. Next BCX should be done 48 hours postop.     Recommendations  Continue oxacillin via continuous infusion  Continue clindamycin for now (report of gas in L flank)  Follow up IR / neurosurgery op reports and cultures  Obtain MARICHUY        Other osteomyelitis, multiple sites  See above    GI  Psoas muscle abscess  See above        Anticipated Disposition: TBD    Thank you for your consult. I will follow-up with patient. Please contact us if you have any additional questions.    Lashawn Barbosa DO  Infectious Disease  [facility]    Subjective:     Principal Problem:Paraspinal abscess    HPI: Ms. Sloan is a 49F with PMH of DM2, HTN, and substance abuse homelessness, initially presented to [facility] with back pain, found to have spinal OM, L SI joint septic arthritis, and L psoas/iliacus muscle abscess, transferred to [facility] on 9/16 for IR and neurosurgery eval, now [place] [facility] [place] / general surgery for necrosis of L flank.     Imaging of L spine and pelvis were concerning for OM/septic arthritis of the left SI joint and L5-S1 along with an abnormal fluid  "collection extending from T11 through the L iliacus muscle concerning for abscess as well as at psoas. She was started on empiric vanc and zosyn. Case was discussed with neurosurgery who did not feel surgical intervention was warranted and recommended IR drainage. IR recommended against drainage given extensive superficial infection.    On arrival to  antibiotics were transitioned to doxy, vanc and meropenem. BCx grew GPC in clusters. TTE concerning for MV endocarditis. MRI revealed "Complex fluid collection possibly extending from the left SI joint to the left iliac fossa displacing the left psoas muscle medially. This is similar to the study 1 day prior could represent infectious left sacroiliitis/osteomyelitis and adjacent abscess. The collection extends superiorly to approximately T12 level at the left posterior retroperitoneum, posterior to the left kidney. Enhancement noted to much of the sacrum including right of midline again could represent osteomyelitis."     Infectious disease consulted for "Patient being folled by ID  is osh with gram positive cocci resempblig stap and positive pcr for stap, concerns for si joint osteo and retroperitoneal abscess".   Interval History: intubated yesterday for MRI with sedation and remains intubated today since she will be going for IR procedure and neurosurgery    Review of Systems   Unable to perform ROS: Intubated     Objective:     Vital Signs (Most Recent):  Temp: 98.2 °F (36.8 °C) (09/19/24 1109)  Pulse: 92 (09/19/24 1109)  Resp: (!) 21 (09/19/24 1123)  BP: 98/66 (09/19/24 1109)  SpO2: 98 % (09/19/24 1109) Vital Signs (24h Range):  Temp:  [98 °F (36.7 °C)-98.9 °F (37.2 °C)] 98.2 °F (36.8 °C)  Pulse:  [] 92  Resp:  [13-33] 21  SpO2:  [90 %-100 %] 98 %  BP: ()/(50-96) 98/66     Weight: 65.2 kg (143 lb 11.8 oz)  Body mass index is 26.29 kg/m².    Estimated Creatinine Clearance: 147.4 mL/min (A) (based on SCr of 0.4 mg/dL (L)).     Physical Exam  Vitals " reviewed.   Constitutional:       General: She is not in acute distress.     Appearance: Normal appearance.   HENT:      Head: Normocephalic and atraumatic.   Cardiovascular:      Rate and Rhythm: Normal rate and regular rhythm.      Heart sounds: No murmur heard.  Pulmonary:      Effort: No respiratory distress.      Breath sounds: Normal breath sounds.   Abdominal:      General: Abdomen is flat. Bowel sounds are normal.      Palpations: Abdomen is soft.   Skin:     General: Skin is warm and dry.          Significant Labs: All pertinent labs within the past 24 hours have been reviewed.  Recent Lab Results  (Last 5 results in the past 24 hours)        09/19/24  1125   09/19/24  0944   09/19/24  0900   09/19/24  0319   09/19/24  0026        Albumin       1.0         ALP       114         ALT       18         Anion Gap     8   7   6       Aniso       Slight         AST       15         Baso #       0.03         Basophil %       0.3         BILIRUBIN TOTAL       1.5  Comment: For infants and newborns, interpretation of results should be based  on gestational age, weight and in agreement with clinical  observations.    Premature Infant recommended reference ranges:  Up to 24 hours.............<8.0 mg/dL  Up to 48 hours............<12.0 mg/dL  3-5 days..................<15.0 mg/dL  6-29 days.................<15.0 mg/dL           BUN     6   6   6       Calcium     5.5  Comment: *Critical value notification by _fb_ with confirmation of receipt to  _jojo burger rn__ at  Date_9/19___Time_10:11___     6.3  Comment: *Critical value notification by GAS with confirmation of receipt to   MAGALY GUTHRIE RN at  Date 09/19/24 Time 4:19 AM     6.3  Comment: *Critical value notification by adr with confirmation of receipt to   MAGALY GUTHRIE RN at  Date 9/19/24 Time 1:42AM         Chloride     110   100   101       CO2     22   27   27       Creatinine     0.4   0.6   0.6       Differential Method       Automated         eGFR      >60.0   >60.0   >60.0       Eos #       0.2         Eos %       1.4         Glucose     111   166   183       Gran # (ANC)       7.9         Gran %       75.3         Hematocrit       19.5  Comment: T  critical result(s) called and verbal readback obtained from    LULI GUTHRIE RN by [facility] 09/19/2024 03:52           Hemoglobin       6.4         Immature Grans (Abs)       0.20  Comment: Mild elevation in immature granulocytes is non specific and   can be seen in a variety of conditions including stress response,   acute inflammation, trauma and pregnancy. Correlation with other   laboratory and clinical findings is essential.           Immature Granulocytes       1.9         INR       1.4  Comment: Coumadin Therapy:  2.0 - 3.0 for INR for all indicators except mechanical heart valves  and antiphospholipid syndromes which should use 2.5 - 3.5.           Lymph #       2.0         Lymph %       18.7         Magnesium        1.5         MCH       26.1         MCHC       32.8         MCV       80         Mono #       0.3         Mono %       2.4         MPV       10.1         nRBC       0         Phosphorus Level       3.5         Platelet Count       96         POCT Glucose 148               Potassium     3.0   3.7   3.9       hCG Qualitative, Urine   Negative             PROTEIN TOTAL       4.0         PT       15.4          Acceptable   Yes             RBC       2.45         RDW       17.3         Sodium     140   134   134       Toxic Granulation       Present         Triglycerides       137  Comment: The National Cholesterol Education Program (NCEP) has set the  following guidelines (reference values) for triglycerides:  Normal......................<150 mg/dL  Borderline High.............150-199 mg/dL  High........................200-499 mg/dL           WBC       10.44                                Significant Imaging: I have reviewed all pertinent imaging results/findings within the past 24 hours.

## 2024-09-19 NOTE — ASSESSMENT & PLAN NOTE
Malnutrition Type:  Context: acute illness or injury  Level: mild    Related to (etiology):   Not consuming sufficient nutrients    Signs and Symptoms (as evidenced by):   Vomiting, decreased PO intake     Malnutrition Characteristic Summary:  Energy Intake (Malnutrition): less than or equal to 50% for greater than or equal to 5 days      Interventions/Recommendations (treatment strategy):  Collab of nutrition care w/ other providers  EN    Nutrition Diagnosis Status:   New

## 2024-09-19 NOTE — SUBJECTIVE & OBJECTIVE
: MRI obtained overnight with anesthesia, remained intubated for OR today for L3-L5 laminectomy with washout. Original MRI unable to fully appreciate extent of epidural collection, readily apparent on repeat MRI. pRBC ordered for Hgb <7, plt ordered for plt <100k, vitamin K ordered for INR 1.4. Will consent family today, amenable to intervention when briefly discussed yesterday morning    Medications Prior to Admission   Medication Sig Dispense Refill Last Dose    gabapentin (NEURONTIN) 300 MG capsule Take 300 mg by mouth 3 (three) times daily.   2024    hydrOXYzine pamoate (VISTARIL) 25 MG Cap Take 25 mg by mouth 3 (three) times daily.   2024    metFORMIN (GLUCOPHAGE) 500 MG tablet Take 500 mg by mouth 2 (two) times daily with meals.   2024    methadone (DOLOPHINE) 10 MG tablet Take 70 mg by mouth Daily.   Past Week    cloNIDine (CATAPRES) 0.2 MG tablet Take 0.2 mg by mouth 3 (three) times daily.       glipiZIDE (GLUCOTROL) 5 MG tablet Take 5 mg by mouth 2 (two) times daily.          Review of patient's allergies indicates:  No Known Allergies    Past Medical History:   Diagnosis Date    Diabetes mellitus     Hypertension     Unspecified viral hepatitis C without hepatic coma      Past Surgical History:   Procedure Laterality Date     SECTION      MAGNETIC RESONANCE IMAGING N/A 2024    Procedure: MRI (Magnetic Resonance Imagine);  Surgeon: Dolores Storey;  Location: [facility];  Service: Anesthesiology;  Laterality: N/A;  conscious sedaation MRI lumbar spine w/ and without contrast     Family History    None       Tobacco Use    Smoking status: Every Day     Current packs/day: 0.50     Types: Cigarettes    Smokeless tobacco: Never   Substance and Sexual Activity    Alcohol use: Not Currently    Drug use: Not Currently    Sexual activity: Not on file     Review of Systems    Objective:     Weight: 65.2 kg (143 lb 11.8 oz)  Body mass index is 26.29 kg/m².  Vital Signs (Most  Recent):  Temp: 98.5 °F (36.9 °C) (09/19/24 0455)  Pulse: 83 (09/19/24 0601)  Resp: 20 (09/19/24 0601)  BP: 100/70 (09/19/24 0601)  SpO2: 99 % (09/19/24 0601) Vital Signs (24h Range):  Temp:  [98 °F (36.7 °C)-98.9 °F (37.2 °C)] 98.5 °F (36.9 °C)  Pulse:  [] 83  Resp:  [13-33] 20  SpO2:  [89 %-100 %] 99 %  BP: ()/(50-96) 100/70                  Vent Mode: A/C  Oxygen Concentration (%):  [45] 45  Resp Rate Total:  [18 br/min-21 br/min] 19 br/min  Vt Set:  [450 mL] 450 mL  PEEP/CPAP:  [5 cmH20] 5 cmH20  Mean Airway Pressure:  [8.8 hfZ01-05 cmH20] 11 cmH20        Female External Urinary Catheter w/ Suction 09/16/24 2345 (Active)   Skin no redness;no breakdown 09/18/24 0701   Tolerance no signs/symptoms of discomfort 09/18/24 0701   Suction Continuous suction at 60 mmHg 09/18/24 0701   Date of last wick change 09/17/24 09/17/24 2300   Time of last wick change 2328 09/17/24 2300   Output (mL) 400 mL 09/18/24 0701          Physical Exam    NEURO:     [id number]  Intubated, sedated 125 fent 25 prop  Bsi, PERRL  Spont/Loc x4 AG      Neurosurgery Physical Exam    Significant Labs:  Recent Labs   Lab 09/17/24  2227 09/18/24  0305 09/18/24  0910 09/18/24  1428 09/19/24  0026 09/19/24  0319   * 173*   < > 146* 183* 166*    134*   < > 134* 134* 134*   K 3.2* 2.6*   < > 3.3* 3.9 3.7    100   < > 101 101 100   CO2 26 24   < > 26 27 27   BUN 4* 4*   < > 6 6 6   CREATININE 0.6 0.6   < > 0.5 0.6 0.6   CALCIUM 6.7* 6.4*   < > 6.8* 6.3* 6.3*   MG 1.8 1.5*  --   --   --  1.5*    < > = values in this interval not displayed.     Recent Labs   Lab 09/18/24  0305 09/18/24  1217 09/18/24  2318 09/19/24  0319   WBC 14.10* 14.96*  --  10.44   HGB 6.7* 7.5*  --  6.4*   HCT 21.4* 23.1* 18* 19.5*   * 112*  --  96*     Recent Labs   Lab 09/18/24  1052 09/19/24  0319   INR 1.6* 1.4*   APTT 29.2  --      Microbiology Results (last 7 days)       Procedure Component Value Units Date/Time    Blood culture [[id number]]  Collected: 09/18/24 0045    Order Status: Completed Specimen: Blood from Peripheral, Antecubital, Left Updated: 09/19/24 0141     Blood Culture, Routine Gram stain aer bottle: Gram positive cocci in clusters resembling Staph      Results called to and read back by: Chema 09/19/2024  01:39    Blood culture [[id number]] Collected: 09/18/24 0045    Order Status: Completed Specimen: Blood from Peripheral, Antecubital, Left Updated: 09/18/24 2018     Blood Culture, Routine Gram stain aer bottle: Gram positive cocci in clusters resembling Staph      Results called to and read back by:Chema Oconnor RN 09/18/2024  20:18    Blood Culture #2 **CANNOT BE ORDERED STAT** [[id number]]  (Abnormal) Collected: 09/16/24 1145    Order Status: Completed Specimen: Blood from Peripheral, Wrist, Right Updated: 09/18/24 0743     Blood Culture, Routine Gram stain felix bottle: Gram positive cocci in clusters resembling Staph      Results called to and read back by: FATIMAH Mcnulty. 09/17/2024  03:48      Gram stain aer bottle: Gram positive cocci in clusters resembling Staph      Positive results previously called 09/17/2024  06:05      STAPHYLOCOCCUS AUREUS  ID consult required at [facility].  For susceptibility see order #[id number]      Blood culture #1 **CANNOT BE ORDERED STAT** [[id number]]  (Abnormal) Collected: 09/16/24 1054    Order Status: Completed Specimen: Blood from Peripheral, Antecubital, Right Updated: 09/18/24 0741     Blood Culture, Routine Gram stain aer bottle: Gram positive cocci in clusters resembling Staph      Gram stain felix bottle: Gram positive cocci in clusters resembling Staph      Results called to and read back by: FATIMAH Mcnulty. 09/17/2024  03:43      STAPHYLOCOCCUS AUREUS  Susceptibility pending  ID consult required at [facility].      MRSA/SA Rapid ID by PCR from Blood culture [[id number]]  (Abnormal) Collected: 09/16/24 1054    Order Status: Completed  Updated: 09/17/24 0455     Staph aureus ID by PCR Positive     Methicillin Resistant ID by PCR Negative    Blood culture #2 **CANNOT BE ORDERED STAT** [[id number]]     Order Status: Canceled Specimen: Blood           All pertinent labs from the last 24 hours have been reviewed.    Significant Diagnostics:  I have reviewed all pertinent imaging results/findings within the past 24 hours.  I have reviewed and interpreted all pertinent imaging results/findings within the past 24 hours.  MRI Lumbar Spine W WO Cont    Result Date: 9/17/2024  1. Complex fluid collection possibly extending from the left SI joint to the left iliac fossa displacing the left psoas muscle medially. This is similar to the study 1 day prior could represent infectious left sacroiliitis/osteomyelitis and adjacent abscess. The collection extends superiorly to approximately T12 level at the left posterior retroperitoneum, posterior to the left kidney. Enhancement noted to much of the sacrum including right of midline again could represent osteomyelitis. Follow-up recommended. Recommend surgical consultation and follow-up. 2. Multilevel degenerative changes most prominent at L5-S1.  See above comments. 3. This report was flagged in Epic as abnormal. Electronically signed by: Edgard Martinez Date:    09/17/2024 Time:    15:53   X-Ray Chest AP Portable    Result Date: 9/19/2024  ET tube placed with tip 1.8 cm above the laura. Right IJ catheter tip remains overlying the right atrium, similar to prior. Bilateral perihilar infiltrates/edema. Electronically signed by: Tod Moran MD Date:    09/19/2024 Time:    01:28    MRI Lumbar Spine W WO Cont    Result Date: 9/18/2024  1. Complex multi lobular fluid collection extending from the left SI joint to the left iliac fossa and paravertebral region displacing the left psoas muscle medially.  This measures approximately 8.4 x 8.5 cm x 22.5 cm in craniocaudad dimension.  This is similar to the prior studies  could represent infectious left sacroiliitis/osteomyelitis and adjacent abscess. The collection extends superiorly to approximately T12 level at the left posterior retroperitoneum, posterior to the left kidney. Enhancement noted to much of the sacrum including right of midline again could represent osteomyelitis. Follow-up is recommended.  Edema and complex collection extends posteriorly at the lateral margin of the field of view incompletely visualized to the subcutaneous tissues posteriorly at approximately L2-3 level adjacent to the left posterosuperior gluteal musculature. There is mild displacement and mass effect on the posterolateral margin of the paraspinal musculature best seen on axial 22 of series 20 and series 19. Recommend surgical consultation and follow-up. 2.  Small epidural collections are noted adjacent to the thecal sac from approximately T12 to L5 suggesting epidural abscess.  Follow-up recommended.  See above comments. 2. Multilevel degenerative changes most prominent at L5-S1. See above comments. 3. This report was flagged in Epic as abnormal. Electronically signed by: Edgrad Martinez Date:    09/18/2024 Time:    23:09    US Abdomen Complete    Result Date: 9/18/2024  Biliary sludge.  No evidence of cholecystitis or biliary obstruction. Hepatosplenomegaly. Electronically signed by: Ravi Trujillo Date:    09/18/2024 Time:    15:17

## 2024-09-19 NOTE — HOSPITAL COURSE
9/19: MRI obtained overnight with anesthesia, remained intubated for OR today for L3-L5 laminectomy with washout. Original MRI unable to fully appreciate extent of epidural collection, readily apparent on repeat MRI. pRBC ordered for Hgb <7, plt ordered for plt <100k, vitamin K ordered for INR 1.4. Will consent family today, amenable to intervention when briefly discussed yesterday morning  9/20: post op lumbar laminectomy. Still intubated and on sedation. Pending IR aspiration of psoas abscess today. Pending MRI C and T spine as well for workup of other infection  9/21: OR cultures growing staph aureus. Patient still intubated. Pending IR aspiration of psoas abscess still. MRI C and T spine completed and did not show any further infection  9/22: remains intubated. On abx. Surgical drains slowing down, will continue for today.   9/23: NAEON. Will remove HV drains today. IR drain to remain s/p psoas abscess. Rec Ortho eval for treatment/drainage of SI joint injection.

## 2024-09-19 NOTE — ASSESSMENT & PLAN NOTE
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  No hardware/devices in place. Does admit to snorting fentanyl. Does have multiple skin blisters on JESSICA UE and LE that she admits to picking at.     MSSA on BCX, remains positive. Next BCX should be done 48 hours postop.     Recommendations  Continue oxacillin via continuous infusion  Continue clindamycin for now (report of gas in L flank)  Follow up IR / neurosurgery op reports and cultures  Obtain MARICHUY

## 2024-09-20 LAB
ACID FAST MOD KINY STN SPEC: NORMAL
ACID FAST MOD KINY STN SPEC: NORMAL
ALBUMIN SERPL BCP-MCNC: 1.2 G/DL (ref 3.5–5.2)
ALP SERPL-CCNC: 98 U/L (ref 55–135)
ALT SERPL W/O P-5'-P-CCNC: 14 U/L (ref 10–44)
ANION GAP SERPL CALC-SCNC: 6 MMOL/L (ref 8–16)
ANION GAP SERPL CALC-SCNC: 7 MMOL/L (ref 8–16)
ANION GAP SERPL CALC-SCNC: 8 MMOL/L (ref 8–16)
ANISOCYTOSIS BLD QL SMEAR: SLIGHT
AST SERPL-CCNC: 15 U/L (ref 10–40)
BACTERIA BLD CULT: ABNORMAL
BASO STIPL BLD QL SMEAR: ABNORMAL
BASOPHILS # BLD AUTO: 0.07 K/UL (ref 0–0.2)
BASOPHILS NFR BLD: 0.7 % (ref 0–1.9)
BILIRUB SERPL-MCNC: 2.6 MG/DL (ref 0.1–1)
BUN SERPL-MCNC: 10 MG/DL (ref 6–20)
BUN SERPL-MCNC: 8 MG/DL (ref 6–20)
BUN SERPL-MCNC: 9 MG/DL (ref 6–20)
CALCIUM SERPL-MCNC: 7 MG/DL (ref 8.7–10.5)
CALCIUM SERPL-MCNC: 7.1 MG/DL (ref 8.7–10.5)
CALCIUM SERPL-MCNC: 7.4 MG/DL (ref 8.7–10.5)
CHLORIDE SERPL-SCNC: 104 MMOL/L (ref 95–110)
CHLORIDE SERPL-SCNC: 105 MMOL/L (ref 95–110)
CHLORIDE SERPL-SCNC: 106 MMOL/L (ref 95–110)
CO2 SERPL-SCNC: 25 MMOL/L (ref 23–29)
CO2 SERPL-SCNC: 26 MMOL/L (ref 23–29)
CO2 SERPL-SCNC: 27 MMOL/L (ref 23–29)
CREAT SERPL-MCNC: 0.6 MG/DL (ref 0.5–1.4)
DACRYOCYTES BLD QL SMEAR: ABNORMAL
DIFFERENTIAL METHOD BLD: ABNORMAL
EOSINOPHIL # BLD AUTO: 0.2 K/UL (ref 0–0.5)
EOSINOPHIL NFR BLD: 2.2 % (ref 0–8)
ERYTHROCYTE [DISTWIDTH] IN BLOOD BY AUTOMATED COUNT: 17.5 % (ref 11.5–14.5)
EST. GFR  (NO RACE VARIABLE): >60 ML/MIN/1.73 M^2
GLUCOSE SERPL-MCNC: 71 MG/DL (ref 70–110)
GLUCOSE SERPL-MCNC: 75 MG/DL (ref 70–110)
GLUCOSE SERPL-MCNC: 84 MG/DL (ref 70–110)
HCT VFR BLD AUTO: 25.8 % (ref 37–48.5)
HCV RNA SERPL QL NAA+PROBE: NOT DETECTED
HGB BLD-MCNC: 8.3 G/DL (ref 12–16)
HYPOCHROMIA BLD QL SMEAR: ABNORMAL
IMM GRANULOCYTES # BLD AUTO: 0.23 K/UL (ref 0–0.04)
IMM GRANULOCYTES NFR BLD AUTO: 2.2 % (ref 0–0.5)
LYMPHOCYTES # BLD AUTO: 2 K/UL (ref 1–4.8)
LYMPHOCYTES NFR BLD: 18.7 % (ref 18–48)
MAGNESIUM SERPL-MCNC: 1.7 MG/DL (ref 1.6–2.6)
MCH RBC QN AUTO: 26.3 PG (ref 27–31)
MCHC RBC AUTO-ENTMCNC: 32.2 G/DL (ref 32–36)
MCV RBC AUTO: 82 FL (ref 82–98)
MONOCYTES # BLD AUTO: 0.3 K/UL (ref 0.3–1)
MONOCYTES NFR BLD: 2.5 % (ref 4–15)
MYCOBACTERIUM SPEC QL CULT: NORMAL
MYCOBACTERIUM SPEC QL CULT: NORMAL
NEUTROPHILS # BLD AUTO: 7.9 K/UL (ref 1.8–7.7)
NEUTROPHILS NFR BLD: 73.7 % (ref 38–73)
NRBC BLD-RTO: 0 /100 WBC
PHOSPHATE SERPL-MCNC: 4 MG/DL (ref 2.7–4.5)
PLATELET # BLD AUTO: 118 K/UL (ref 150–450)
PLATELET BLD QL SMEAR: ABNORMAL
PMV BLD AUTO: 10.8 FL (ref 9.2–12.9)
POCT GLUCOSE: 115 MG/DL (ref 70–110)
POCT GLUCOSE: 71 MG/DL (ref 70–110)
POCT GLUCOSE: 71 MG/DL (ref 70–110)
POCT GLUCOSE: 82 MG/DL (ref 70–110)
POCT GLUCOSE: 85 MG/DL (ref 70–110)
POIKILOCYTOSIS BLD QL SMEAR: SLIGHT
POLYCHROMASIA BLD QL SMEAR: ABNORMAL
POTASSIUM SERPL-SCNC: 3.5 MMOL/L (ref 3.5–5.1)
POTASSIUM SERPL-SCNC: 3.8 MMOL/L (ref 3.5–5.1)
POTASSIUM SERPL-SCNC: 4.2 MMOL/L (ref 3.5–5.1)
PROT SERPL-MCNC: 4.2 G/DL (ref 6–8.4)
RBC # BLD AUTO: 3.15 M/UL (ref 4–5.4)
SODIUM SERPL-SCNC: 136 MMOL/L (ref 136–145)
SODIUM SERPL-SCNC: 138 MMOL/L (ref 136–145)
SODIUM SERPL-SCNC: 140 MMOL/L (ref 136–145)
SPHEROCYTES BLD QL SMEAR: ABNORMAL
WBC # BLD AUTO: 10.69 K/UL (ref 3.9–12.7)

## 2024-09-20 PROCEDURE — 85025 COMPLETE CBC W/AUTO DIFF WBC: CPT | Performed by: STUDENT IN AN ORGANIZED HEALTH CARE EDUCATION/TRAINING PROGRAM

## 2024-09-20 PROCEDURE — 25500020 PHARM REV CODE 255: Performed by: STUDENT IN AN ORGANIZED HEALTH CARE EDUCATION/TRAINING PROGRAM

## 2024-09-20 PROCEDURE — 63600175 PHARM REV CODE 636 W HCPCS: Performed by: STUDENT IN AN ORGANIZED HEALTH CARE EDUCATION/TRAINING PROGRAM

## 2024-09-20 PROCEDURE — 94761 N-INVAS EAR/PLS OXIMETRY MLT: CPT

## 2024-09-20 PROCEDURE — 27100171 HC OXYGEN HIGH FLOW UP TO 24 HOURS

## 2024-09-20 PROCEDURE — 25000003 PHARM REV CODE 250: Performed by: NURSE PRACTITIONER

## 2024-09-20 PROCEDURE — 99291 CRITICAL CARE FIRST HOUR: CPT | Mod: ,,, | Performed by: STUDENT IN AN ORGANIZED HEALTH CARE EDUCATION/TRAINING PROGRAM

## 2024-09-20 PROCEDURE — 84100 ASSAY OF PHOSPHORUS: CPT

## 2024-09-20 PROCEDURE — 94003 VENT MGMT INPAT SUBQ DAY: CPT

## 2024-09-20 PROCEDURE — 80048 BASIC METABOLIC PNL TOTAL CA: CPT | Mod: XB

## 2024-09-20 PROCEDURE — 25000003 PHARM REV CODE 250

## 2024-09-20 PROCEDURE — 63600175 PHARM REV CODE 636 W HCPCS

## 2024-09-20 PROCEDURE — A9585 GADOBUTROL INJECTION: HCPCS | Performed by: STUDENT IN AN ORGANIZED HEALTH CARE EDUCATION/TRAINING PROGRAM

## 2024-09-20 PROCEDURE — 99223 1ST HOSP IP/OBS HIGH 75: CPT | Mod: ,,, | Performed by: ORTHOPAEDIC SURGERY

## 2024-09-20 PROCEDURE — 25000003 PHARM REV CODE 250: Performed by: STUDENT IN AN ORGANIZED HEALTH CARE EDUCATION/TRAINING PROGRAM

## 2024-09-20 PROCEDURE — 83735 ASSAY OF MAGNESIUM: CPT

## 2024-09-20 PROCEDURE — 63600175 PHARM REV CODE 636 W HCPCS: Performed by: NURSE PRACTITIONER

## 2024-09-20 PROCEDURE — 27200966 HC CLOSED SUCTION SYSTEM

## 2024-09-20 PROCEDURE — 80053 COMPREHEN METABOLIC PANEL: CPT | Performed by: STUDENT IN AN ORGANIZED HEALTH CARE EDUCATION/TRAINING PROGRAM

## 2024-09-20 PROCEDURE — 86920 COMPATIBILITY TEST SPIN: CPT | Performed by: STUDENT IN AN ORGANIZED HEALTH CARE EDUCATION/TRAINING PROGRAM

## 2024-09-20 PROCEDURE — 63600175 PHARM REV CODE 636 W HCPCS: Mod: JZ,JG | Performed by: STUDENT IN AN ORGANIZED HEALTH CARE EDUCATION/TRAINING PROGRAM

## 2024-09-20 PROCEDURE — 99900026 HC AIRWAY MAINTENANCE (STAT)

## 2024-09-20 PROCEDURE — 20000000 HC ICU ROOM

## 2024-09-20 PROCEDURE — 99900035 HC TECH TIME PER 15 MIN (STAT)

## 2024-09-20 PROCEDURE — 99233 SBSQ HOSP IP/OBS HIGH 50: CPT | Mod: ,,, | Performed by: INTERNAL MEDICINE

## 2024-09-20 RX ORDER — LORAZEPAM 2 MG/ML
2 INJECTION INTRAMUSCULAR ONCE AS NEEDED
Status: COMPLETED | OUTPATIENT
Start: 2024-09-20 | End: 2024-09-20

## 2024-09-20 RX ORDER — GADOBUTROL 604.72 MG/ML
7 INJECTION INTRAVENOUS
Status: COMPLETED | OUTPATIENT
Start: 2024-09-20 | End: 2024-09-20

## 2024-09-20 RX ORDER — PANTOPRAZOLE SODIUM 40 MG/10ML
40 INJECTION, POWDER, LYOPHILIZED, FOR SOLUTION INTRAVENOUS DAILY
Status: DISCONTINUED | OUTPATIENT
Start: 2024-09-20 | End: 2024-09-23

## 2024-09-20 RX ORDER — MAGNESIUM SULFATE HEPTAHYDRATE 40 MG/ML
2 INJECTION, SOLUTION INTRAVENOUS ONCE
Status: COMPLETED | OUTPATIENT
Start: 2024-09-20 | End: 2024-09-20

## 2024-09-20 RX ORDER — FUROSEMIDE 10 MG/ML
40 INJECTION INTRAMUSCULAR; INTRAVENOUS EVERY 12 HOURS
Status: DISCONTINUED | OUTPATIENT
Start: 2024-09-20 | End: 2024-09-25

## 2024-09-20 RX ORDER — INSULIN GLARGINE 100 [IU]/ML
20 INJECTION, SOLUTION SUBCUTANEOUS NIGHTLY
Status: DISCONTINUED | OUTPATIENT
Start: 2024-09-21 | End: 2024-09-23

## 2024-09-20 RX ADMIN — FUROSEMIDE 40 MG: 10 INJECTION, SOLUTION INTRAVENOUS at 09:09

## 2024-09-20 RX ADMIN — GABAPENTIN 300 MG: 300 CAPSULE ORAL at 08:09

## 2024-09-20 RX ADMIN — FUROSEMIDE 40 MG: 10 INJECTION, SOLUTION INTRAVENOUS at 10:09

## 2024-09-20 RX ADMIN — Medication 225 MCG/HR: at 02:09

## 2024-09-20 RX ADMIN — HYDROXYZINE PAMOATE 25 MG: 25 CAPSULE ORAL at 09:09

## 2024-09-20 RX ADMIN — PANTOPRAZOLE SODIUM 40 MG: 40 INJECTION, POWDER, FOR SOLUTION INTRAVENOUS at 10:09

## 2024-09-20 RX ADMIN — GABAPENTIN 300 MG: 300 CAPSULE ORAL at 02:09

## 2024-09-20 RX ADMIN — PROPOFOL 45 MCG/KG/MIN: 10 INJECTION, EMULSION INTRAVENOUS at 01:09

## 2024-09-20 RX ADMIN — CLINDAMYCIN IN 5 PERCENT DEXTROSE 600 MG: 12 INJECTION, SOLUTION INTRAVENOUS at 09:09

## 2024-09-20 RX ADMIN — DEXTROSE MONOHYDRATE 125 ML: 100 INJECTION, SOLUTION INTRAVENOUS at 06:09

## 2024-09-20 RX ADMIN — POTASSIUM BICARBONATE 50 MEQ: 978 TABLET, EFFERVESCENT ORAL at 06:09

## 2024-09-20 RX ADMIN — FOLIC ACID 1 MG: 1 TABLET ORAL at 08:09

## 2024-09-20 RX ADMIN — PROPOFOL 40 MCG/KG/MIN: 10 INJECTION, EMULSION INTRAVENOUS at 07:09

## 2024-09-20 RX ADMIN — GADOBUTROL 7 ML: 604.72 INJECTION INTRAVENOUS at 12:09

## 2024-09-20 RX ADMIN — Medication 250 MCG/HR: at 01:09

## 2024-09-20 RX ADMIN — PROPOFOL 50 MCG/KG/MIN: 10 INJECTION, EMULSION INTRAVENOUS at 02:09

## 2024-09-20 RX ADMIN — PROPOFOL 50 MCG/KG/MIN: 10 INJECTION, EMULSION INTRAVENOUS at 10:09

## 2024-09-20 RX ADMIN — GABAPENTIN 300 MG: 300 CAPSULE ORAL at 09:09

## 2024-09-20 RX ADMIN — SODIUM CHLORIDE: 9 INJECTION, SOLUTION INTRAVENOUS at 01:09

## 2024-09-20 RX ADMIN — METHADONE HYDROCHLORIDE 70 MG: 10 TABLET ORAL at 08:09

## 2024-09-20 RX ADMIN — LORAZEPAM 2 MG: 2 INJECTION INTRAMUSCULAR; INTRAVENOUS at 11:09

## 2024-09-20 RX ADMIN — MAGNESIUM SULFATE HEPTAHYDRATE 2 G: 40 INJECTION, SOLUTION INTRAVENOUS at 06:09

## 2024-09-20 RX ADMIN — CLINDAMYCIN IN 5 PERCENT DEXTROSE 600 MG: 12 INJECTION, SOLUTION INTRAVENOUS at 02:09

## 2024-09-20 RX ADMIN — Medication 100 MG: at 08:09

## 2024-09-20 RX ADMIN — CLONIDINE HYDROCHLORIDE 0.2 MG: 0.1 TABLET ORAL at 09:09

## 2024-09-20 RX ADMIN — OXACILLIN 12 G: 2 INJECTION, POWDER, FOR SOLUTION INTRAMUSCULAR; INTRAVENOUS at 01:09

## 2024-09-20 NOTE — PROGRESS NOTES
[facility] - Medical ICU  Neurosurgery  Progress Note    Subjective:     History of Present Illness: 49F PMH DM, HTN, hep c, opioid abuse, presenting after down for unknown period of time and inability to ambulate secondary to pain with imaging demonstrating L SI osteo with associated abscesses extending to retroperitoneum without involvement of thecal sac. Denies true weakness, states pain is limiting her ability to move. Pain in hips and low back. On clinda/zosyn, blood culture  with staph aureus. Denies bowel or bladder incontinence, complaining of constipation about 6 days. Denies numbness    Post-Op Info:  Procedure(s) (LRB):  L3-L5 laminectomy with epidural abscess evac (N/A)  WASHOUT, WOUND, SPINE   1 Day Post-Op   Interval history: : post op lumbar laminectomy. Still intubated and on sedation. Pending IR aspiration of psoas abscess today. Pending MRI C and T spine as well for workup of other infection      Medications Prior to Admission   Medication Sig Dispense Refill Last Dose    gabapentin (NEURONTIN) 300 MG capsule Take 300 mg by mouth 3 (three) times daily.   2024    hydrOXYzine pamoate (VISTARIL) 25 MG Cap Take 25 mg by mouth 3 (three) times daily.   2024    metFORMIN (GLUCOPHAGE) 500 MG tablet Take 500 mg by mouth 2 (two) times daily with meals.   2024    methadone (DOLOPHINE) 10 MG tablet Take 70 mg by mouth Daily.   Past Week    cloNIDine (CATAPRES) 0.2 MG tablet Take 0.2 mg by mouth 3 (three) times daily.       glipiZIDE (GLUCOTROL) 5 MG tablet Take 5 mg by mouth 2 (two) times daily.          Review of patient's allergies indicates:  No Known Allergies    Past Medical History:   Diagnosis Date    Diabetes mellitus     Hypertension     Unspecified viral hepatitis C without hepatic coma      Past Surgical History:   Procedure Laterality Date     SECTION      LAMINECTOMY N/A 2024    Procedure: L3-L5 laminectomy with epidural abscess evac;  Surgeon: Sourav Jim  DO KAYLYN;  Location: [facility] OR [facility] FLR;  Service: Neurosurgery;  Laterality: N/A;    MAGNETIC RESONANCE IMAGING N/A 9/18/2024    Procedure: MRI (Magnetic Resonance Imagine);  Surgeon: Kamran Storey;  Location: [facility];  Service: Anesthesiology;  Laterality: N/A;  conscious sedaation MRI lumbar spine w/ and without contrast    WASHOUT, WOUND, SPINE  9/19/2024    Procedure: WASHOUT, WOUND, SPINE;  Surgeon: Sourav Jim DO;  [facility];  Service: Neurosurgery;;     Family History    None       Tobacco Use    Smoking status: Every Day     Current packs/day: 0.50     Types: Cigarettes    Smokeless tobacco: Never   Substance and Sexual Activity    Alcohol use: Not Currently    Drug use: Not Currently    Sexual activity: Not on file     Review of Systems    Objective:     Weight: 65.2 kg (143 lb 11.8 oz)  Body mass index is 26.29 kg/m².  Vital Signs (Most Recent):  Temp: 97.5 °F (36.4 °C) (09/20/24 0300)  Pulse: 93 (09/20/24 0700)  Resp: (!) 28 (09/20/24 0855)  BP: 99/68 (09/20/24 0500)  SpO2: 100 % (09/20/24 0700) Vital Signs (24h Range):  Temp:  [97.5 °F (36.4 °C)-98.6 °F (37 °C)] 97.5 °F (36.4 °C)  Pulse:  [78-96] 93  Resp:  [18-30] 28  SpO2:  [93 %-100 %] 100 %  BP: ()/(58-88) 99/68                  Vent Mode: A/C  Oxygen Concentration (%):  [40-60] 40  Resp Rate Total:  [19 br/min-27 br/min] 23 br/min  Vt Set:  [390 mL] 390 mL  PEEP/CPAP:  [5 cmH20-7 cmH20] 7 cmH20  Mean Airway Pressure:  [11 csG38-55 cmH20] 13 cmH20        Female External Urinary Catheter w/ Suction 09/16/24 7465 (Active)   Skin no redness;no breakdown 09/18/24 0701   Tolerance no signs/symptoms of discomfort 09/18/24 0701   Suction Continuous suction at 60 mmHg 09/18/24 0701   Date of last wick change 09/17/24 09/17/24 2300   Time of last wick change 2328 09/17/24 2300   Output (mL) 400 mL 09/18/24 0701          Physical Exam    NEURO:     E2VTM5  Intubated, sedated on fentanyl and prop  Bsi, PERRL  Spont/Loc x4  AG      Neurosurgery Physical Exam    Significant Labs:  Recent Labs   Lab 09/19/24  0319 09/19/24  0900 09/19/24  1808 09/20/24  0256   * 111* 114* 84   * 140 134* 138   K 3.7 3.0* 4.5 3.5    110 102 106   CO2 27 22* 26 26   BUN 6 6 10 10   CREATININE 0.6 0.4* 0.6 0.6   CALCIUM 6.3* 5.5* 7.4* 7.1*   MG 1.5*  --  1.9 1.7     Recent Labs   Lab 09/18/24  1217 09/18/24  2318 09/19/24  0319 09/19/24  1509 09/20/24  0256   WBC 14.96*  --  10.44  --  10.69   HGB 7.5*  --  6.4*  --  8.3*   HCT 23.1*   < > 19.5* 27* 25.8*   *  --  96*  --  118*    < > = values in this interval not displayed.     Recent Labs   Lab 09/18/24  1052 09/19/24  0319   INR 1.6* 1.4*   APTT 29.2  --      Microbiology Results (last 7 days)       Procedure Component Value Units Date/Time    Blood culture [[id number]]  (Abnormal) Collected: 09/18/24 0045    Order Status: Completed Specimen: Blood from Peripheral, Antecubital, Left Updated: 09/20/24 0835     Blood Culture, Routine Gram stain aer bottle: Gram positive cocci in clusters resembling Staph      Results called to and read back by: Chema 09/19/2024  01:39      STAPHYLOCOCCUS AUREUS  ID consult required at [facility] and [facility] locations.  For susceptibility see order #[id number]      Culture, Anaerobe [[id number]] Collected: 09/19/24 1527    Order Status: Completed Specimen: Abscess from Back Updated: 09/20/24 0637     Anaerobic Culture Culture in progress    Narrative:      Epidural abscess    Gram stain [[id number]] Collected: 09/19/24 1515    Order Status: Completed Specimen: Abscess from Back Updated: 09/19/24 1908     Gram Stain Result Rare WBC's      Few Gram positive cocci    Narrative:      Subfascial abscess    Gram stain [[id number]] Collected: 09/19/24 1527    Order Status: Completed Specimen: Abscess from Back Updated: 09/19/24 0586     Gram Stain Result Rare WBC's      Few Gram positive cocci      Rare Gram positive rods    Narrative:       Epidural abscess    Blood culture [[id number]] Collected: 09/19/24 1033    Order Status: Completed Specimen: Blood from Peripheral, Lower Arm, Left Updated: 09/19/24 1745     Blood Culture, Routine No Growth to date    Aerobic culture [[id number]] Collected: 09/19/24 1527    Order Status: Sent Specimen: Abscess from Back Updated: 09/19/24 1538    AFB Culture & Smear [[id number]] Collected: 09/19/24 1527    Order Status: Sent Specimen: Abscess from Back Updated: 09/19/24 1537    Fungus culture [[id number]] Collected: 09/19/24 1527    Order Status: Sent Specimen: Abscess from Back Updated: 09/19/24 1536    AFB Culture & Smear [[id number]] Collected: 09/19/24 1515    Order Status: Sent Specimen: Abscess from Back Updated: 09/19/24 1529    Fungus culture [[id number]] Collected: 09/19/24 1515    Order Status: Sent Specimen: Abscess from Back Updated: 09/19/24 1525    Culture, Anaerobe [[id number]] Collected: 09/19/24 1515    Order Status: Sent Specimen: Abscess from Back Updated: 09/19/24 1524    Aerobic culture [[id number]] Collected: 09/19/24 1515    Order Status: Sent Specimen: Abscess from Back Updated: 09/19/24 1524    Blood Culture #2 **CANNOT BE ORDERED STAT** [[id number]]  (Abnormal) Collected: 09/16/24 1145    Order Status: Completed Specimen: Blood from Peripheral, Wrist, Right Updated: 09/19/24 1002     Blood Culture, Routine Gram stain felix bottle: Gram positive cocci in clusters resembling Staph      Results called to and read back by: FATIMAH Mcnulty. 09/17/2024  03:48      Gram stain aer bottle: Gram positive cocci in clusters resembling Staph      Positive results previously called 09/17/2024  06:05      STAPHYLOCOCCUS AUREUS  [facility]  For susceptibility see order #[id number]      Blood culture #1 **CANNOT BE ORDERED STAT** [[id number]]  (Abnormal)  (Susceptibility) Collected: 09/16/24 1054    Order Status: Completed Specimen: Blood from Peripheral,  Antecubital, Right Updated: 09/19/24 1001     Blood Culture, Routine Gram stain aer bottle: Gram positive cocci in clusters resembling Staph      Gram stain felix bottle: Gram positive cocci in clusters resembling Staph      Results called to and read back by: FATIMAH Mcnulty. 09/17/2024  03:43      STAPHYLOCOCCUS AUREUS  ID consult required at [facility].      Blood culture [[id number]]     Order Status: Canceled Specimen: Blood     Blood culture [[id number]]  (Abnormal) Collected: 09/18/24 0045    Order Status: Completed Specimen: Blood from Peripheral, Antecubital, Left Updated: 09/19/24 0753     Blood Culture, Routine Gram stain aer bottle: Gram positive cocci in clusters resembling Staph      Results called to and read back by:Chema Oconnor RN 09/18/2024  20:18      STAPHYLOCOCCUS AUREUS  ID consult required at [facility] and [facility] locations.  For susceptibility see order #[id number]      MRSA/SA Rapid ID by PCR from Blood culture [[id number]]  (Abnormal) Collected: 09/16/24 1054    Order Status: Completed Updated: 09/17/24 0455     Staph aureus ID by PCR Positive     Methicillin Resistant ID by PCR Negative    Blood culture #2 **CANNOT BE ORDERED STAT** [[id number]]     Order Status: Canceled Specimen: Blood           All pertinent labs from the last 24 hours have been reviewed.    Significant Diagnostics:  I have reviewed all pertinent imaging results/findings within the past 24 hours.  I have reviewed and interpreted all pertinent imaging results/findings within the past 24 hours.  MRI Lumbar Spine W WO Cont    Result Date: 9/17/2024  1. Complex fluid collection possibly extending from the left SI joint to the left iliac fossa displacing the left psoas muscle medially. This is similar to the study 1 day prior could represent infectious left sacroiliitis/osteomyelitis and adjacent abscess. The collection extends superiorly to approximately T12 level at the left posterior  retroperitoneum, posterior to the left kidney. Enhancement noted to much of the sacrum including right of midline again could represent osteomyelitis. Follow-up recommended. Recommend surgical consultation and follow-up. 2. Multilevel degenerative changes most prominent at L5-S1.  See above comments. 3. This report was flagged in Epic as abnormal. Electronically signed by: Edgard Martinez Date:    09/17/2024 Time:    15:53   X-Ray Chest AP Portable    Result Date: 9/19/2024  ET tube placed with tip 1.8 cm above the laura. Right IJ catheter tip remains overlying the right atrium, similar to prior. Bilateral perihilar infiltrates/edema. Electronically signed by: Tod Moran MD Date:    09/19/2024 Time:    01:28    MRI Lumbar Spine W WO Cont    Result Date: 9/18/2024  1. Complex multi lobular fluid collection extending from the left SI joint to the left iliac fossa and paravertebral region displacing the left psoas muscle medially.  This measures approximately 8.4 x 8.5 cm x 22.5 cm in craniocaudad dimension.  This is similar to the prior studies could represent infectious left sacroiliitis/osteomyelitis and adjacent abscess. The collection extends superiorly to approximately T12 level at the left posterior retroperitoneum, posterior to the left kidney. Enhancement noted to much of the sacrum including right of midline again could represent osteomyelitis. Follow-up is recommended.  Edema and complex collection extends posteriorly at the lateral margin of the field of view incompletely visualized to the subcutaneous tissues posteriorly at approximately L2-3 level adjacent to the left posterosuperior gluteal musculature. There is mild displacement and mass effect on the posterolateral margin of the paraspinal musculature best seen on axial 22 of series 20 and series 19. Recommend surgical consultation and follow-up. 2.  Small epidural collections are noted adjacent to the thecal sac from approximately T12 to L5  suggesting epidural abscess.  Follow-up recommended.  See above comments. 2. Multilevel degenerative changes most prominent at L5-S1. See above comments. 3. This report was flagged in Epic as abnormal. Electronically signed by: Edgard Martinez Date:    09/18/2024 Time:    23:09    US Abdomen Complete    Result Date: 9/18/2024  Biliary sludge.  No evidence of cholecystitis or biliary obstruction. Hepatosplenomegaly. Electronically signed by: Raiv Trujillo Date:    09/18/2024 Time:    15:17     Assessment/Plan:     Other osteomyelitis, multiple sites  49F PMH DM, HTN, hep c, opioid abuse, presenting after down for unknown period of time and inability to ambulate secondary to pain with imaging demonstrating L SI osteo with associated abscesses extending to retroperitoneum without involvement of thecal sac. Denies true weakness, states pain is limiting her ability to move. Pain in hips and low back. On clinda/zosyn, blood culture 9/16 with staph aureus. Denies bowel or bladder incontinence, complaining of constipation about 6 days. Denies numbness    She is now s/p L3-L4 laminectomy for epidural abscess evacuation on 9/19/24 with neurosurgery    Imaging:  CT Lsp / pelvis 9/16: fluid collection T11 on L through left psoas, felt abscess  CT CAP 9/16: extensive edema with subq gas through left flank, small psoas abscess and fluid collections left flank, septic arthritis of L SI joint  MRI Lsp w/wo 9/17: L SI complex fluid collection felt to represent osteo with adjacent abscess, extends to approximately T12 level  MRI Lsp w/wo repeat 9/18: epidural collections T12-L5   MRI C and T spine pending    Plan:  - Admitted MICU; q1h nc/vs  - infectious workup  - Abx for Staph aureus blood culture 9/16 per primary  - OR cultures pending, GPCs on gram stain  - Recommend MRI C/Tsp w/wo to assess for any further infection  - Trend inflammatory markers   - pending IR aspiration for psoas abscess and SI joint  - ortho consulted for SI  joint findings, rec IR aspiration, non operative at this time  - Please notify for questions/concerns/neurologic decline          Santy Pal MD  Neurosurgery  [facility] - Medical ICU

## 2024-09-20 NOTE — SUBJECTIVE & OBJECTIVE
Interval history: : post op lumbar laminectomy. Still intubated and on sedation. Pending IR aspiration of psoas abscess today. Pending MRI C and T spine as well for workup of other infection      Medications Prior to Admission   Medication Sig Dispense Refill Last Dose    gabapentin (NEURONTIN) 300 MG capsule Take 300 mg by mouth 3 (three) times daily.   2024    hydrOXYzine pamoate (VISTARIL) 25 MG Cap Take 25 mg by mouth 3 (three) times daily.   2024    metFORMIN (GLUCOPHAGE) 500 MG tablet Take 500 mg by mouth 2 (two) times daily with meals.   2024    methadone (DOLOPHINE) 10 MG tablet Take 70 mg by mouth Daily.   Past Week    cloNIDine (CATAPRES) 0.2 MG tablet Take 0.2 mg by mouth 3 (three) times daily.       glipiZIDE (GLUCOTROL) 5 MG tablet Take 5 mg by mouth 2 (two) times daily.          Review of patient's allergies indicates:  No Known Allergies    Past Medical History:   Diagnosis Date    Diabetes mellitus     Hypertension     Unspecified viral hepatitis C without hepatic coma      Past Surgical History:   Procedure Laterality Date     SECTION      LAMINECTOMY N/A 2024    Procedure: L3-L5 laminectomy with epidural abscess evac;  Surgeon: Sourav Jim DO;  Location: [address];  Service: Neurosurgery;  Laterality: N/A;    MAGNETIC RESONANCE IMAGING N/A 2024    Procedure: MRI (Magnetic Resonance Imagine);  Surgeon: Dolores Storey;  Location: [facility];  Service: Anesthesiology;  Laterality: N/A;  conscious sedaation MRI lumbar spine w/ and without contrast    WASHOUT, WOUND, SPINE  2024    Procedure: WASHOUT, WOUND, SPINE;  Surgeon: Sourav Jim DO;  Location: [address];  Service: Neurosurgery;;     Family History    None       Tobacco Use    Smoking status: Every Day     Current packs/day: 0.50     Types: Cigarettes    Smokeless tobacco: Never   Substance and Sexual Activity    Alcohol use: Not Currently    Drug use: Not Currently    Sexual  activity: Not on file     Review of Systems    Objective:     Weight: 65.2 kg (143 lb 11.8 oz)  Body mass index is 26.29 kg/m².  Vital Signs (Most Recent):  Temp: 97.5 °F (36.4 °C) (09/20/24 0300)  Pulse: 93 (09/20/24 0700)  Resp: (!) 28 (09/20/24 0855)  BP: 99/68 (09/20/24 0500)  SpO2: 100 % (09/20/24 0700) Vital Signs (24h Range):  Temp:  [97.5 °F (36.4 °C)-98.6 °F (37 °C)] 97.5 °F (36.4 °C)  Pulse:  [78-96] 93  Resp:  [18-30] 28  SpO2:  [93 %-100 %] 100 %  BP: ()/(58-88) 99/68                  Vent Mode: A/C  Oxygen Concentration (%):  [40-60] 40  Resp Rate Total:  [19 br/min-27 br/min] 23 br/min  Vt Set:  [390 mL] 390 mL  PEEP/CPAP:  [5 cmH20-7 cmH20] 7 cmH20  Mean Airway Pressure:  [11 hfM47-07 cmH20] 13 cmH20        Female External Urinary Catheter w/ Suction 09/16/24 2345 (Active)   Skin no redness;no breakdown 09/18/24 0701   Tolerance no signs/symptoms of discomfort 09/18/24 0701   Suction Continuous suction at 60 mmHg 09/18/24 0701   Date of last wick change 09/17/24 09/17/24 2300   Time of last wick change 2328 09/17/24 2300   Output (mL) 400 mL 09/18/24 0701          Physical Exam    NEURO:     E2VTM5  Intubated, sedated on fentanyl and prop  Bsi, PERRL  Spont/Loc x4 AG      Neurosurgery Physical Exam    Significant Labs:  Recent Labs   Lab 09/19/24  0319 09/19/24  0900 09/19/24  1808 09/20/24  0256   * 111* 114* 84   * 140 134* 138   K 3.7 3.0* 4.5 3.5    110 102 106   CO2 27 22* 26 26   BUN 6 6 10 10   CREATININE 0.6 0.4* 0.6 0.6   CALCIUM 6.3* 5.5* 7.4* 7.1*   MG 1.5*  --  1.9 1.7     Recent Labs   Lab 09/18/24  1217 09/18/24  2318 09/19/24  0319 09/19/24  1509 09/20/24  0256   WBC 14.96*  --  10.44  --  10.69   HGB 7.5*  --  6.4*  --  8.3*   HCT 23.1*   < > 19.5* 27* 25.8*   *  --  96*  --  118*    < > = values in this interval not displayed.     Recent Labs   Lab 09/18/24  1052 09/19/24  0319   INR 1.6* 1.4*   APTT 29.2  --      Microbiology Results (last 7 days)        Procedure Component Value Units Date/Time    Blood culture [[id number]]  (Abnormal) Collected: 09/18/24 0045    Order Status: Completed Specimen: Blood from Peripheral, Antecubital, Left Updated: 09/20/24 0835     Blood Culture, Routine Gram stain aer bottle: Gram positive cocci in clusters resembling Staph      Results called to and read back by: Chema 09/19/2024  01:39      STAPHYLOCOCCUS AUREUS  ID consult required at [facility],[place] and [facility].  For susceptibility see order #[id number]      Culture, Anaerobe [[id number]] Collected: 09/19/24 1527    Order Status: Completed Specimen: Abscess from Back Updated: 09/20/24 0637     Anaerobic Culture Culture in progress    Narrative:      Epidural abscess    Gram stain [[id number]] Collected: 09/19/24 1515    Order Status: Completed Specimen: Abscess from Back Updated: 09/19/24 1908     Gram Stain Result Rare WBC's      Few Gram positive cocci    Narrative:      Subfascial abscess    Gram stain [[id number]] Collected: 09/19/24 1527    Order Status: Completed Specimen: Abscess from Back Updated: 09/19/24 1906     Gram Stain Result Rare WBC's      Few Gram positive cocci      Rare Gram positive rods    Narrative:      Epidural abscess    Blood culture [[id number]] Collected: 09/19/24 1033    Order Status: Completed Specimen: Blood from Peripheral, Lower Arm, Left Updated: 09/19/24 1745     Blood Culture, Routine No Growth to date    Aerobic culture [[id number]] Collected: 09/19/24 1527    Order Status: Sent Specimen: Abscess from Back Updated: 09/19/24 1538    AFB Culture & Smear [[id number]] Collected: 09/19/24 1527    Order Status: Sent Specimen: Abscess from Back Updated: 09/19/24 1537    Fungus culture [[id number]] Collected: 09/19/24 1527    Order Status: Sent Specimen: Abscess from Back Updated: 09/19/24 1536    AFB Culture & Smear [[id number]] Collected: 09/19/24 1515    Order Status: Sent Specimen: Abscess from Back Updated: 09/19/24 1529     Fungus culture [[id number]] Collected: 09/19/24 1515    Order Status: Sent Specimen: Abscess from Back Updated: 09/19/24 1525    Culture, Anaerobe [[id number]] Collected: 09/19/24 1515    Order Status: Sent Specimen: Abscess from Back Updated: 09/19/24 1524    Aerobic culture [[id number]] Collected: 09/19/24 1515    Order Status: Sent Specimen: Abscess from Back Updated: 09/19/24 1524    Blood Culture #2 **CANNOT BE ORDERED STAT** [[id number]]  (Abnormal) Collected: 09/16/24 1145    Order Status: Completed Specimen: Blood from Peripheral, Wrist, Right Updated: 09/19/24 1002     Blood Culture, Routine Gram stain felix bottle: Gram positive cocci in clusters resembling Staph      Results called to and read back by: FATIMAH Mcnulty. 09/17/2024  03:48      Gram stain aer bottle: Gram positive cocci in clusters resembling Staph      Positive results previously called 09/17/2024  06:05      STAPHYLOCOCCUS AUREUS  ID consult required at [facility].  For susceptibility see order #[id number]      Blood culture #1 **CANNOT BE ORDERED STAT** [[id number]]  (Abnormal)  (Susceptibility) Collected: 09/16/24 1054    Order Status: Completed Specimen: Blood from Peripheral, Antecubital, Right Updated: 09/19/24 1001     Blood Culture, Routine Gram stain aer bottle: Gram positive cocci in clusters resembling Staph      Gram stain felix bottle: Gram positive cocci in clusters resembling Staph      Results called to and read back by: FATIMAH Mcnulty. 09/17/2024  03:43      STAPHYLOCOCCUS AUREUS  ID consult required at [facility].      Blood culture [[id number]]     Order Status: Canceled Specimen: Blood     Blood culture [[id number]]  (Abnormal) Collected: 09/18/24 0045    Order Status: Completed Specimen: Blood from Peripheral, Antecubital, Left Updated: 09/19/24 0753     Blood Culture, Routine Gram stain aer bottle: Gram positive cocci in clusters resembling Staph      Results called to  and read back by:Chema Oconnor RN 09/18/2024  20:18      STAPHYLOCOCCUS AUREUS  ID consult required at [facility].[place] and [place] locations.  For susceptibility see order #[id number]      MRSA/SA Rapid ID by PCR from Blood culture [[id number]]  (Abnormal) Collected: 09/16/24 1054    Order Status: Completed Updated: 09/17/24 0455     Staph aureus ID by PCR Positive     Methicillin Resistant ID by PCR Negative    Blood culture #2 **CANNOT BE ORDERED STAT** [[id number]]     Order Status: Canceled Specimen: Blood           All pertinent labs from the last 24 hours have been reviewed.    Significant Diagnostics:  I have reviewed all pertinent imaging results/findings within the past 24 hours.  I have reviewed and interpreted all pertinent imaging results/findings within the past 24 hours.  MRI Lumbar Spine W WO Cont    Result Date: 9/17/2024  1. Complex fluid collection possibly extending from the left SI joint to the left iliac fossa displacing the left psoas muscle medially. This is similar to the study 1 day prior could represent infectious left sacroiliitis/osteomyelitis and adjacent abscess. The collection extends superiorly to approximately T12 level at the left posterior retroperitoneum, posterior to the left kidney. Enhancement noted to much of the sacrum including right of midline again could represent osteomyelitis. Follow-up recommended. Recommend surgical consultation and follow-up. 2. Multilevel degenerative changes most prominent at L5-S1.  See above comments. 3. This report was flagged in Epic as abnormal. Electronically signed by: Edgard Martinez Date:    09/17/2024 Time:    15:53   X-Ray Chest AP Portable    Result Date: 9/19/2024  ET tube placed with tip 1.8 cm above the laura. Right IJ catheter tip remains overlying the right atrium, similar to prior. Bilateral perihilar infiltrates/edema. Electronically signed by: Tod Moran MD Date:    09/19/2024 Time:    01:28    MRI Lumbar Spine W WO  Cont    Result Date: 9/18/2024  1. Complex multi lobular fluid collection extending from the left SI joint to the left iliac fossa and paravertebral region displacing the left psoas muscle medially.  This measures approximately 8.4 x 8.5 cm x 22.5 cm in craniocaudad dimension.  This is similar to the prior studies could represent infectious left sacroiliitis/osteomyelitis and adjacent abscess. The collection extends superiorly to approximately T12 level at the left posterior retroperitoneum, posterior to the left kidney. Enhancement noted to much of the sacrum including right of midline again could represent osteomyelitis. Follow-up is recommended.  Edema and complex collection extends posteriorly at the lateral margin of the field of view incompletely visualized to the subcutaneous tissues posteriorly at approximately L2-3 level adjacent to the left posterosuperior gluteal musculature. There is mild displacement and mass effect on the posterolateral margin of the paraspinal musculature best seen on axial 22 of series 20 and series 19. Recommend surgical consultation and follow-up. 2.  Small epidural collections are noted adjacent to the thecal sac from approximately T12 to L5 suggesting epidural abscess.  Follow-up recommended.  See above comments. 2. Multilevel degenerative changes most prominent at L5-S1. See above comments. 3. This report was flagged in Epic as abnormal. Electronically signed by: Edgard Martinez Date:    09/18/2024 Time:    23:09    US Abdomen Complete    Result Date: 9/18/2024  Biliary sludge.  No evidence of cholecystitis or biliary obstruction. Hepatosplenomegaly. Electronically signed by: Ravi Trujillo Date:    09/18/2024 Time:    15:17

## 2024-09-20 NOTE — PLAN OF CARE
MICU DAILY GOALS     Family/Goals of care/Code Status   Code Status: Full Code    24H Vital Sign Range  Temp:  [97.5 °F (36.4 °C)-99.8 °F (37.7 °C)]   Pulse:  []   Resp:  [18-30]   BP: ()/(56-88)   SpO2:  [91 %-100 %]      Shift Events (include procedures and significant events)   MRI obtained. Awaiting IR procedure.    AWAKE RASS: Goal - RASS Goal: -1-->drowsy  Actual - RASS (Zavala Agitation-Sedation Scale): drowsy    Restraint necessity: Removing medical devices   BREATHE SBT: Not attempted    Coordinate A & B, analgesics/sedatives Pain: managed   SAT: Not attempted   Delirium CAM-ICU: Overall CAM-ICU: Negative   Early(intubated/ Progressive (non-intubated) Mobility MOVE Screen (INTUBATED ONLY): Pass    Activity: Activity Management: Rolling - L1   Feeding/Nutrition Diet order: Diet/Nutrition Received: NPO,     Thrombus DVT prophylaxis: VTE Required Core Measure: Pharmacological prophylaxis initiated/maintained   HOB Elevation Head of Bed (HOB) Positioning: HOB at 30 degrees   Ulcer Prophylaxis GI: yes   Glucose control managed Glycemic Management: blood glucose monitored   Skin Skin assessed during: Daily Assessment    Sacrum intact/not altered? Yes  Heels intact/not altered? Yes  Surgical wound? Yes    CHECK ONE!   (no altered skin or altered skin) and sub boxes:  [] No Altered Skin Integrity Present    []Prevention Measures Documented    [x] Altered Skin Integrity Present or Discovered   [x] LDA present in EPIC, daily doc completed              [] LDA added if not in EPIC (describe wound).                    When describing wound, do not stage, use descriptive words only.    [] Wound Image Taken (required on admit,                   transfer/discharge and every Tuesday)    Wound Care Consulted? Yes    4 EYES:  Attending Nurse (1st set of eyes): Mehreen Caba RN/Staff Member (2nd set of eyes): Radha    Bowel Function no issues    Indwelling Catheter Necessity      Urethral Catheter 09/19/24  1800 Silicone 16 Fr.-Reason for Continuing Urinary Catheterization: Urinary retention, Critically ill in ICU and requiring hourly monitoring of intake/output    Percutaneous Central Line - Triple Lumen  09/16/24 1700 Internal Jugular Right-Line Necessity Review: Frequent Blood Draws     De-escalation Antibiotics No        VS and assessment per flow sheet, patient progressing towards goals as tolerated, plan of care reviewed with [unfilled] and family, all concerns addressed, will continue to monitor.

## 2024-09-20 NOTE — PROGRESS NOTES
[facility]  Infectious Disease  Progress Note    Patient Name: Mari Sloan  MRN: [id number]  Admission Date: 9/16/2024  Length of Stay: 4 days  Attending Physician: Bentley Connell MD  Primary Care Provider: No, Primary Doctor    Isolation Status: No active isolations  Assessment/Plan:      Cardiac/Vascular  Endocarditis  See below    ID  Epidural abscess  See below    Staphylococcus aureus bacteremia  49F initially presented to [facility] with back pain, found to have spinal OM, L SI joint septic arthritis, and L psoas/iliacus muscle abscess, T12 - L5 epidural abscess, possible MV endocarditis, transferred to [facility] on 9/16 for IR and neurosurgery eval, now transferred to [facility]. S/p L3-5 laminectomy and epidural abscess washout on 9/18. Pending IR drainage of SI joint today.     No hardware/devices in place. Does admit to snorting fentanyl. Does have multiple skin blisters on JESSICA UE and LE that she admits to picking at.     Recommendations  Continue oxacillin via continuous infusion  Stop clindamycin   Follow up all culture data  Repeat blood cultures on Sunday 9/22 (48h after IR procedure)        Other osteomyelitis, multiple sites  See above    GI  Psoas muscle abscess  See above        Anticipated Disposition: TBD    Thank you for your consult. I will follow-up with patient. Please contact us if you have any additional questions.    Lashawn Barbosa DO  Infectious Disease  [facility]    Subjective:     Principal Problem:Paraspinal abscess    HPI: Ms. Sloan is a 49F with PMH of DM2, HTN, and substance abuse homelessness, initially presented to [facility] with back pain, found to have spinal OM, L SI joint septic arthritis, and L psoas/iliacus muscle abscess, transferred to [facility] on 9/16 for IR and neurosurgery eval, now transferred to [facility] for multidisciplinary / general surgery for necrosis of L flank.     Imaging of L spine and pelvis were concerning for OM/septic arthritis of the  "left SI joint and L5-S1 along with an abnormal fluid collection extending from T11 through the L iliacus muscle concerning for abscess as well as at psoas. She was started on empiric vanc and zosyn. Case was discussed with neurosurgery who did not feel surgical intervention was warranted and recommended IR drainage. IR recommended against drainage given extensive superficial infection.    On arrival to  antibiotics were transitioned to doxy, vanc and meropenem. BCx grew GPC in clusters. TTE concerning for MV endocarditis. MRI revealed "Complex fluid collection possibly extending from the left SI joint to the left iliac fossa displacing the left psoas muscle medially. This is similar to the study 1 day prior could represent infectious left sacroiliitis/osteomyelitis and adjacent abscess. The collection extends superiorly to approximately T12 level at the left posterior retroperitoneum, posterior to the left kidney. Enhancement noted to much of the sacrum including right of midline again could represent osteomyelitis."     Infectious disease consulted for "Patient being folled by ID  is osh with gram positive cocci resempblig stap and positive pcr for stap, concerns for si joint osteo and retroperitoneal abscess".   Interval History: s/p L3-5 laminectomy yesterday, pending IR procedure today, remains intubated    Review of Systems   Unable to perform ROS: Intubated     Objective:     Vital Signs (Most Recent):  Temp: 99.8 °F (37.7 °C) (09/20/24 1305)  Pulse: 97 (09/20/24 1201)  Resp: 18 (09/20/24 1201)  BP: 110/68 (09/20/24 1201)  SpO2: 99 % (09/20/24 1201) Vital Signs (24h Range):  Temp:  [97.5 °F (36.4 °C)-99.8 °F (37.7 °C)] 99.8 °F (37.7 °C)  Pulse:  [] 97  Resp:  [18-30] 18  SpO2:  [91 %-100 %] 99 %  BP: ()/(60-88) 110/68     Weight: 65.2 kg (143 lb 11.8 oz)  Body mass index is 26.29 kg/m².    Estimated Creatinine Clearance: 98.2 mL/min (based on SCr of 0.6 mg/dL).     Physical Exam  Vitals reviewed. "   Constitutional:       General: She is not in acute distress.     Appearance: Normal appearance. She is not ill-appearing.   HENT:      Head: Normocephalic and atraumatic.   Eyes:      Extraocular Movements: Extraocular movements intact.      Conjunctiva/sclera: Conjunctivae normal.   Cardiovascular:      Rate and Rhythm: Normal rate and regular rhythm.      Heart sounds: No murmur heard.  Pulmonary:      Effort: No respiratory distress.   Abdominal:      General: Abdomen is flat. Bowel sounds are normal.      Palpations: Abdomen is soft.   Skin:     General: Skin is warm and dry.      Comments: R hip erythematous and edematous          Significant Labs: All pertinent labs within the past 24 hours have been reviewed.  Recent Lab Results  (Last 5 results in the past 24 hours)        09/20/24  1329   09/20/24  0930   09/20/24  0925   09/20/24  0256   09/19/24  2133        Albumin       1.2         ALP       98         ALT       14         Anaerobe Culture               Anion Gap   7     6         Aniso       Slight         AST       15         Baso #       0.07         Basophilic Stippling       Occasional         Basophil %       0.7         BILIRUBIN TOTAL       2.6  Comment: For infants and newborns, interpretation of results should be based  on gestational age, weight and in agreement with clinical  observations.    Premature Infant recommended reference ranges:  Up to 24 hours.............<8.0 mg/dL  Up to 48 hours............<12.0 mg/dL  3-5 days..................<15.0 mg/dL  6-29 days.................<15.0 mg/dL           BUN   9     10         Calcium   7.4     7.1         Chloride   104     106         CO2   25     26         Creatinine   0.6     0.6         Differential Method       Automated         eGFR   >60.0     >60.0         Eos #       0.2         Eos %       2.2         Glucose   75     84         GRAM STAIN               Gran # (ANC)       7.9         Gran %       73.7         Hematocrit        25.8         Hemoglobin       8.3         Hypo       Occasional         Immature Grans (Abs)       0.23  Comment: Mild elevation in immature granulocytes is non specific and   can be seen in a variety of conditions including stress response,   acute inflammation, trauma and pregnancy. Correlation with other   laboratory and clinical findings is essential.           Immature Granulocytes       2.2         Lymph #       2.0         Lymph %       18.7         Magnesium        1.7         MCH       26.3         MCHC       32.2         MCV       82         Mono #       0.3         Mono %       2.5         MPV       10.8         nRBC       0         Phosphorus Level       4.0         Platelet Estimate       Decreased         Platelet Count       118         POCT Glucose 85     82     132       Poikilocytosis       Slight         Poly       Occasional         Potassium   4.2     3.5         PROTEIN TOTAL       4.2         RBC       3.15         RDW       17.5         Sodium   136     138         Spherocytes       Occasional         Teardrop Cells       Occasional         WBC       10.69                                Significant Imaging: I have reviewed all pertinent imaging results/findings within the past 24 hours.

## 2024-09-20 NOTE — ASSESSMENT & PLAN NOTE
49F PMH DM, HTN, hep c, opioid abuse, presenting after down for unknown period of time and inability to ambulate secondary to pain with imaging demonstrating L SI osteo with associated abscesses extending to retroperitoneum without involvement of thecal sac. Denies true weakness, states pain is limiting her ability to move. Pain in hips and low back. On clinda/zosyn, blood culture 9/16 with staph aureus. Denies bowel or bladder incontinence, complaining of constipation about 6 days. Denies numbness    She is now s/p L3-L4 laminectomy for epidural abscess evacuation on 9/19/24 with neurosurgery    Imaging:  CT Lsp / pelvis 9/16: fluid collection T11 on L through left psoas, felt abscess  CT CAP 9/16: extensive edema with subq gas through left flank, small psoas abscess and fluid collections left flank, septic arthritis of L SI joint  MRI Lsp w/wo 9/17: L SI complex fluid collection felt to represent osteo with adjacent abscess, extends to approximately T12 level  MRI Lsp w/wo repeat 9/18: epidural collections T12-L5   MRI C and T spine pending    Plan:  - Admitted MICU; q1h nc/vs  - infectious workup  - Abx for Staph aureus blood culture 9/16 per primary  - OR cultures pending, GPCs on gram stain  - Recommend MRI C/Tsp w/wo to assess for any further infection  - Trend inflammatory markers   - pending IR aspiration for psoas abscess and SI joint  - ortho consulted for SI joint findings, rec IR aspiration, non operative at this time  - Please notify for questions/concerns/neurologic decline

## 2024-09-20 NOTE — PT/OT/SLP PROGRESS
Physical Therapy      Patient Name:  Mari Sloan   MRN:  [id number]    Patient not seen today secondary to pt remains intubated and sedated. Will follow-up 9/21/24.

## 2024-09-20 NOTE — CONSULTS
[facility] - Medical ICU  Wound Care    Patient Name:  Mari Sloan   MRN:  [id number]  Date: 9/20/2024  Diagnosis: Paraspinal abscess    History:     Past Medical History:   Diagnosis Date    Diabetes mellitus     Hypertension     Unspecified viral hepatitis C without hepatic coma        Social History     Socioeconomic History    Marital status:    Tobacco Use    Smoking status: Every Day     Current packs/day: 0.50     Types: Cigarettes    Smokeless tobacco: Never   Substance and Sexual Activity    Alcohol use: Not Currently    Drug use: Not Currently     Social Determinants of Health     Financial Resource Strain: Low Risk  (9/18/2024)    Overall Financial Resource Strain (CARDIA)     Difficulty of Paying Living Expenses: Not very hard   Food Insecurity: No Food Insecurity (9/18/2024)    Hunger Vital Sign     Worried About Running Out of Food in the Last Year: Never true     Ran Out of Food in the Last Year: Never true   Transportation Needs: Unmet Transportation Needs (9/18/2024)    TRANSPORTATION NEEDS     Transportation : Yes, it has kept me from medical appointments or from getting my medications.   Physical Activity: Patient Declined (9/18/2024)    Exercise Vital Sign     Days of Exercise per Week: Patient declined     Minutes of Exercise per Session: Patient declined   Stress: Stress Concern Present (9/18/2024)    [employer] [place] [employer] - Occupational Stress Questionnaire     Feeling of Stress : Rather much   Housing Stability: Low Risk  (9/18/2024)    Housing Stability Vital Sign     Unable to Pay for Housing in the Last Year: No     Homeless in the Last Year: No       Precautions:     Allergies as of 09/16/2024    (No Known Allergies)       [facility] Assessment Details/Treatment     Patient seen for inpatient wound care consult. Primary RN at bedside and agreeable to inpatient wound care assessment. Upon assessment, patient has diffuse, dried, sores across bilateral upper and lower  extremities, and in groin and perineum. Small sore noted to left labia. No active drainage noted. Due to moist environment, dressing not indicated.         Reviewed chart for this encounter.  See flowsheet for findings.      Recommendations: Cleanse perineum and bilateral groin with sterile normal saline and pat dry. Apply triad BID and PRN if soiled for moisture barrier.     Paint dried sores with chloraprep daily and leaving open to air for antiseptic and drying properties.    No other issues or concerns at this time. Will follow up 9/27/2024 or sooner if needed. Nursing to maintain pressure injury prevention measures.        Discussed POC with patient and primary nurse.  See EMR for orders and patient education.    Bedside nursing to continue care and monitoring.  Bedside to maintain pressure injury prevention interventions.        09/20/24 0845   WOCN Assessment   WOCN Total Time (mins) 30   Visit Date 09/20/24   Visit Time 0845   Consult Type New   WOCN Speciality Wound   Intervention assessed;changed;applied;chart review;coordination of care;orders   Teaching on-going        Wound 09/19/24 1800 Ulceration Left medial Labia   Date First Assessed/Time First Assessed: 09/19/24 1800   Primary Wound Type: Ulceration  Side: Left  Orientation: medial  Location: Labia   Wound Image    Dressing Appearance Open to air   Drainage Amount None   Drainage Characteristics/Odor No odor   Appearance Larned   Care Cleansed with:;Sterile normal saline;Applied:;Skin Barrier  (Triad)     Orders placed.   Brown MONTELONGON, RN, [facility]  09/20/2024

## 2024-09-20 NOTE — ASSESSMENT & PLAN NOTE
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 S/p L3-5 laminectomy and epidural abscess washout on 9/18. Pending IR drainage of SI joint today.     No hardware/devices in place. Does admit to snorting fentanyl. Does have multiple skin blisters on JESSICA UE and LE that she admits to picking at.     Recommendations  Continue oxacillin via continuous infusion  Stop clindamycin   Follow up all culture data  Repeat blood cultures on Sunday 9/22 (48h after IR procedure)

## 2024-09-20 NOTE — SUBJECTIVE & OBJECTIVE
Interval History: s/p L3-5 laminectomy yesterday, pending IR procedure today, remains intubated    Review of Systems   Unable to perform ROS: Intubated     Objective:     Vital Signs (Most Recent):  Temp: 99.8 °F (37.7 °C) (09/20/24 1305)  Pulse: 97 (09/20/24 1201)  Resp: 18 (09/20/24 1201)  BP: 110/68 (09/20/24 1201)  SpO2: 99 % (09/20/24 1201) Vital Signs (24h Range):  Temp:  [97.5 °F (36.4 °C)-99.8 °F (37.7 °C)] 99.8 °F (37.7 °C)  Pulse:  [] 97  Resp:  [18-30] 18  SpO2:  [91 %-100 %] 99 %  BP: ()/(60-88) 110/68     Weight: 65.2 kg (143 lb 11.8 oz)  Body mass index is 26.29 kg/m².    Estimated Creatinine Clearance: 98.2 mL/min (based on SCr of 0.6 mg/dL).     Physical Exam  Vitals reviewed.   Constitutional:       General: She is not in acute distress.     Appearance: Normal appearance. She is not ill-appearing.   HENT:      Head: Normocephalic and atraumatic.   Eyes:      Extraocular Movements: Extraocular movements intact.      Conjunctiva/sclera: Conjunctivae normal.   Cardiovascular:      Rate and Rhythm: Normal rate and regular rhythm.      Heart sounds: No murmur heard.  Pulmonary:      Effort: No respiratory distress.   Abdominal:      General: Abdomen is flat. Bowel sounds are normal.      Palpations: Abdomen is soft.   Skin:     General: Skin is warm and dry.      Comments: R hip erythematous and edematous          Significant Labs: All pertinent labs within the past 24 hours have been reviewed.  Recent Lab Results  (Last 5 results in the past 24 hours)        09/20/24  1329   09/20/24  0930   09/20/24  0925   09/20/24  0256   09/19/24  2133        Albumin       1.2         ALP       98         ALT       14         Anaerobe Culture               Anion Gap   7     6         Aniso       Slight         AST       15         Baso #       0.07         Basophilic Stippling       Occasional         Basophil %       0.7         BILIRUBIN TOTAL       2.6  Comment: For infants and newborns,  interpretation of results should be based  on gestational age, weight and in agreement with clinical  observations.    Premature Infant recommended reference ranges:  Up to 24 hours.............<8.0 mg/dL  Up to 48 hours............<12.0 mg/dL  3-5 days..................<15.0 mg/dL  6-29 days.................<15.0 mg/dL           BUN   9     10         Calcium   7.4     7.1         Chloride   104     106         CO2   25     26         Creatinine   0.6     0.6         Differential Method       Automated         eGFR   >60.0     >60.0         Eos #       0.2         Eos %       2.2         Glucose   75     84         GRAM STAIN               Gran # (ANC)       7.9         Gran %       73.7         Hematocrit       25.8         Hemoglobin       8.3         Hypo       Occasional         Immature Grans (Abs)       0.23  Comment: Mild elevation in immature granulocytes is non specific and   can be seen in a variety of conditions including stress response,   acute inflammation, trauma and pregnancy. Correlation with other   laboratory and clinical findings is essential.           Immature Granulocytes       2.2         Lymph #       2.0         Lymph %       18.7         Magnesium        1.7         MCH       26.3         MCHC       32.2         MCV       82         Mono #       0.3         Mono %       2.5         MPV       10.8         nRBC       0         Phosphorus Level       4.0         Platelet Estimate       Decreased         Platelet Count       118         POCT Glucose 85     82     132       Poikilocytosis       Slight         Poly       Occasional         Potassium   4.2     3.5         PROTEIN TOTAL       4.2         RBC       3.15         RDW       17.5         Sodium   136     138         Spherocytes       Occasional         Teardrop Cells       Occasional         WBC       10.69                                Significant Imaging: I have reviewed all pertinent imaging results/findings within the past 24  hours.

## 2024-09-20 NOTE — PLAN OF CARE
MSSA bacteremia treated as IE complication by cellulitis, psoas abscess and sacroiliac osteo as well as epidural involvement.  Now s/p decompression via laminectomy L3-L5 for evacuation of epidural abscess.  Awaiting IR today for aspiration of psoas abscess and SI joint.  Ortho consulted but awaiting fluid sampling from IR.  Remains intubated with decent FiO2 requirements.  Some degree of diastolic dysfunction on echo with overall net fluid positivity.  Working on diuresis to aid in vent weaning.  MRI C and T-spine per NSGY

## 2024-09-20 NOTE — PLAN OF CARE
MICU DAILY GOALS     Family/Goals of care/Code Status   Code Status: Full Code    24H Vital Sign Range  Temp:  [98 °F (36.7 °C)-98.8 °F (37.1 °C)]   Pulse:  []   Resp:  [13-30]   BP: ()/(50-79)   SpO2:  [90 %-100 %]      Shift Events (include procedures and significant events)   Pt had a L3-L5 laminectomy with irrigation and drainage of abscess. Returned to the MICU agitated and we increased her Fentanyl and Propofol. We reached out to the medical team to increase the maximum rate of her fentanyl to 300mcg/hr. Placed bland catheter.     AWAKE RASS: Goal - RASS Goal: -1-->drowsy  Actual - RASS ([place]ala Agitation-Sedation Scale): very agitated    Restraint necessity: Removing medical devices   BREATHE SBT: Not attempted    Coordinate A & B, analgesics/sedatives Pain: managed   SAT: Not attempted   Delirium CAM-ICU: Overall CAM-ICU: Negative   Early(intubated/ Progressive (non-intubated) Mobility MOVE Screen (INTUBATED ONLY): Not attempted    Activity: Activity Management: Rolling - L1   Feeding/Nutrition Diet order: Diet/Nutrition Received: NPO,     Thrombus DVT prophylaxis: VTE Required Core Measure: Pharmacological prophylaxis initiated/maintained   HOB Elevation Head of Bed (HOB) Positioning: HOB at 30-45 degrees   Ulcer Prophylaxis GI: yes   Glucose control managed Glycemic Management: blood glucose monitored   Skin Skin assessed during: Q Shift Change    Sacrum intact/not altered? Yes  Heels intact/not altered? Yes  Surgical wound? Yes    CHECK ONE!   (no altered skin or altered skin) and sub boxes:  [] No Altered Skin Integrity Present    []Prevention Measures Documented    [] Altered Skin Integrity Present or Discovered   [] LDA present in EPIC, daily doc completed              [x] LDA added if not in EPIC (describe wound).                    When describing wound, do not stage, use descriptive words only.    [x] Wound Image Taken (required on admit,                   transfer/discharge and every  Tuesday)    Wound Care Consulted? Yes    4 EYES: Radha Lemus RN  Attending Nurse (1st set of eyes):   Mehreen Vital RN  Second RN/Staff Member (2nd set of eyes):    Bowel Function no issues    Indwelling Catheter Necessity      Urethral Catheter 09/19/24 1800 Silicone 16 Fr.-Reason for Continuing Urinary Catheterization: Urinary retention    Percutaneous Central Line - Triple Lumen  09/16/24 1700 Internal Jugular Right-Line Necessity Review: Frequent Blood Draws     De-escalation Antibiotics No        VS and assessment per flow sheet, patient progressing towards goals as tolerated, plan of care reviewed with patient and family, all concerns addressed, will continue to monitor.

## 2024-09-20 NOTE — ANESTHESIA POSTPROCEDURE EVALUATION
Anesthesia Post Evaluation    Patient: Mari Sloan    Procedure(s) Performed: Procedure(s) (LRB):  L3-L5 laminectomy with epidural abscess evac (N/A)  WASHOUT, WOUND, SPINE    Final Anesthesia Type: general      Patient location during evaluation: floor  Patient participation: Yes- Able to Participate  Level of consciousness: awake and alert  Post-procedure vital signs: reviewed and stable  Pain management: adequate  Airway patency: patent    PONV status at discharge: No PONV  Anesthetic complications: no      Cardiovascular status: blood pressure returned to baseline and hemodynamically stable  Respiratory status: unassisted and spontaneous ventilation  Hydration status: euvolemic  Follow-up not needed.              Vitals Value Taken Time   /83 09/20/24 0746   Temp 36.4 °C (97.5 °F) 09/20/24 0300   Pulse 100 09/20/24 0755   Resp 23 09/20/24 0755   SpO2 100 % 09/20/24 0755   Vitals shown include unfiled device data.      No case tracking events are documented in the log.      Pain/Jesus Score: Pain Rating Prior to Med Admin: 0 (9/20/2024  2:28 AM)  Pain Rating Post Med Admin: 7 (9/19/2024  5:01 PM)

## 2024-09-21 ENCOUNTER — ANESTHESIA (OUTPATIENT)
Dept: INTERVENTIONAL RADIOLOGY/VASCULAR | Facility: HOSPITAL | Age: 49
End: 2024-09-21
Payer: MEDICAID

## 2024-09-21 PROBLEM — D69.6 THROMBOCYTOPENIA: Status: RESOLVED | Noted: 2024-09-17 | Resolved: 2024-09-21

## 2024-09-21 PROBLEM — R73.9 HYPERGLYCEMIA: Status: RESOLVED | Noted: 2024-09-17 | Resolved: 2024-09-21

## 2024-09-21 PROBLEM — E44.1 MILD MALNUTRITION: Status: RESOLVED | Noted: 2024-09-19 | Resolved: 2024-09-21

## 2024-09-21 PROBLEM — E87.6 HYPOKALEMIA: Status: RESOLVED | Noted: 2024-09-17 | Resolved: 2024-09-21

## 2024-09-21 LAB
ALBUMIN SERPL BCP-MCNC: 1.2 G/DL (ref 3.5–5.2)
ALP SERPL-CCNC: 123 U/L (ref 55–135)
ALT SERPL W/O P-5'-P-CCNC: 15 U/L (ref 10–44)
ANION GAP SERPL CALC-SCNC: 10 MMOL/L (ref 8–16)
ANION GAP SERPL CALC-SCNC: 12 MMOL/L (ref 8–16)
ANION GAP SERPL CALC-SCNC: 9 MMOL/L (ref 8–16)
ANISOCYTOSIS BLD QL SMEAR: SLIGHT
AST SERPL-CCNC: 16 U/L (ref 10–40)
BACTERIA BLD CULT: ABNORMAL
BACTERIA SPEC AEROBE CULT: ABNORMAL
BACTERIA SPEC AEROBE CULT: ABNORMAL
BASO STIPL BLD QL SMEAR: ABNORMAL
BASOPHILS # BLD AUTO: 0.09 K/UL (ref 0–0.2)
BASOPHILS NFR BLD: 0.9 % (ref 0–1.9)
BILIRUB SERPL-MCNC: 2.1 MG/DL (ref 0.1–1)
BLD PROD TYP BPU: NORMAL
BLD PROD TYP BPU: NORMAL
BLOOD UNIT EXPIRATION DATE: NORMAL
BLOOD UNIT EXPIRATION DATE: NORMAL
BLOOD UNIT TYPE CODE: 5100
BLOOD UNIT TYPE CODE: 5100
BLOOD UNIT TYPE: NORMAL
BLOOD UNIT TYPE: NORMAL
BUN SERPL-MCNC: 7 MG/DL (ref 6–20)
BUN SERPL-MCNC: 7 MG/DL (ref 6–20)
BUN SERPL-MCNC: 8 MG/DL (ref 6–20)
BURR CELLS BLD QL SMEAR: ABNORMAL
CALCIUM SERPL-MCNC: 7.1 MG/DL (ref 8.7–10.5)
CALCIUM SERPL-MCNC: 7.2 MG/DL (ref 8.7–10.5)
CALCIUM SERPL-MCNC: 7.3 MG/DL (ref 8.7–10.5)
CHLORIDE SERPL-SCNC: 100 MMOL/L (ref 95–110)
CHLORIDE SERPL-SCNC: 100 MMOL/L (ref 95–110)
CHLORIDE SERPL-SCNC: 101 MMOL/L (ref 95–110)
CO2 SERPL-SCNC: 26 MMOL/L (ref 23–29)
CO2 SERPL-SCNC: 26 MMOL/L (ref 23–29)
CO2 SERPL-SCNC: 28 MMOL/L (ref 23–29)
CODING SYSTEM: NORMAL
CODING SYSTEM: NORMAL
CREAT SERPL-MCNC: 0.6 MG/DL (ref 0.5–1.4)
CROSSMATCH INTERPRETATION: NORMAL
CROSSMATCH INTERPRETATION: NORMAL
DACRYOCYTES BLD QL SMEAR: ABNORMAL
DIFFERENTIAL METHOD BLD: ABNORMAL
DISPENSE STATUS: NORMAL
DISPENSE STATUS: NORMAL
EOSINOPHIL # BLD AUTO: 0.3 K/UL (ref 0–0.5)
EOSINOPHIL NFR BLD: 2.5 % (ref 0–8)
ERYTHROCYTE [DISTWIDTH] IN BLOOD BY AUTOMATED COUNT: 18 % (ref 11.5–14.5)
EST. GFR  (NO RACE VARIABLE): >60 ML/MIN/1.73 M^2
GLUCOSE SERPL-MCNC: 108 MG/DL (ref 70–110)
GLUCOSE SERPL-MCNC: 109 MG/DL (ref 70–110)
GLUCOSE SERPL-MCNC: 98 MG/DL (ref 70–110)
HCT VFR BLD AUTO: 26.7 % (ref 37–48.5)
HGB BLD-MCNC: 8.7 G/DL (ref 12–16)
HYPOCHROMIA BLD QL SMEAR: ABNORMAL
IMM GRANULOCYTES # BLD AUTO: 0.27 K/UL (ref 0–0.04)
IMM GRANULOCYTES NFR BLD AUTO: 2.7 % (ref 0–0.5)
LYMPHOCYTES # BLD AUTO: 1.6 K/UL (ref 1–4.8)
LYMPHOCYTES NFR BLD: 15.8 % (ref 18–48)
MAGNESIUM SERPL-MCNC: 1.5 MG/DL (ref 1.6–2.6)
MCH RBC QN AUTO: 26.7 PG (ref 27–31)
MCHC RBC AUTO-ENTMCNC: 32.6 G/DL (ref 32–36)
MCV RBC AUTO: 82 FL (ref 82–98)
MONOCYTES # BLD AUTO: 0.3 K/UL (ref 0.3–1)
MONOCYTES NFR BLD: 2.9 % (ref 4–15)
NEUTROPHILS # BLD AUTO: 7.6 K/UL (ref 1.8–7.7)
NEUTROPHILS NFR BLD: 75.2 % (ref 38–73)
NRBC BLD-RTO: 0 /100 WBC
NUM UNITS TRANS PACKED RBC: NORMAL
NUM UNITS TRANS PACKED RBC: NORMAL
OVALOCYTES BLD QL SMEAR: ABNORMAL
PHOSPHATE SERPL-MCNC: 5.4 MG/DL (ref 2.7–4.5)
PLATELET # BLD AUTO: 123 K/UL (ref 150–450)
PLATELET BLD QL SMEAR: ABNORMAL
PMV BLD AUTO: 10.3 FL (ref 9.2–12.9)
POCT GLUCOSE: 100 MG/DL (ref 70–110)
POCT GLUCOSE: 108 MG/DL (ref 70–110)
POCT GLUCOSE: 111 MG/DL (ref 70–110)
POCT GLUCOSE: 96 MG/DL (ref 70–110)
POIKILOCYTOSIS BLD QL SMEAR: SLIGHT
POLYCHROMASIA BLD QL SMEAR: ABNORMAL
POTASSIUM SERPL-SCNC: 3.3 MMOL/L (ref 3.5–5.1)
POTASSIUM SERPL-SCNC: 3.4 MMOL/L (ref 3.5–5.1)
POTASSIUM SERPL-SCNC: 4.2 MMOL/L (ref 3.5–5.1)
PROT SERPL-MCNC: 4.9 G/DL (ref 6–8.4)
RBC # BLD AUTO: 3.26 M/UL (ref 4–5.4)
SCHISTOCYTES BLD QL SMEAR: ABNORMAL
SCHISTOCYTES BLD QL SMEAR: PRESENT
SODIUM SERPL-SCNC: 137 MMOL/L (ref 136–145)
SODIUM SERPL-SCNC: 137 MMOL/L (ref 136–145)
SODIUM SERPL-SCNC: 138 MMOL/L (ref 136–145)
SPHEROCYTES BLD QL SMEAR: ABNORMAL
TARGETS BLD QL SMEAR: ABNORMAL
TOXIC GRANULES BLD QL SMEAR: PRESENT
WBC # BLD AUTO: 10.12 K/UL (ref 3.9–12.7)

## 2024-09-21 PROCEDURE — 25000003 PHARM REV CODE 250: Performed by: STUDENT IN AN ORGANIZED HEALTH CARE EDUCATION/TRAINING PROGRAM

## 2024-09-21 PROCEDURE — 84100 ASSAY OF PHOSPHORUS: CPT

## 2024-09-21 PROCEDURE — 63600175 PHARM REV CODE 636 W HCPCS: Performed by: STUDENT IN AN ORGANIZED HEALTH CARE EDUCATION/TRAINING PROGRAM

## 2024-09-21 PROCEDURE — 11000001 HC ACUTE MED/SURG PRIVATE ROOM

## 2024-09-21 PROCEDURE — 87116 MYCOBACTERIA CULTURE: CPT

## 2024-09-21 PROCEDURE — S4991 NICOTINE PATCH NONLEGEND: HCPCS

## 2024-09-21 PROCEDURE — 87102 FUNGUS ISOLATION CULTURE: CPT

## 2024-09-21 PROCEDURE — 94761 N-INVAS EAR/PLS OXIMETRY MLT: CPT

## 2024-09-21 PROCEDURE — 63600175 PHARM REV CODE 636 W HCPCS

## 2024-09-21 PROCEDURE — 99291 CRITICAL CARE FIRST HOUR: CPT | Mod: ,,, | Performed by: STUDENT IN AN ORGANIZED HEALTH CARE EDUCATION/TRAINING PROGRAM

## 2024-09-21 PROCEDURE — 27100171 HC OXYGEN HIGH FLOW UP TO 24 HOURS

## 2024-09-21 PROCEDURE — 85025 COMPLETE CBC W/AUTO DIFF WBC: CPT | Performed by: STUDENT IN AN ORGANIZED HEALTH CARE EDUCATION/TRAINING PROGRAM

## 2024-09-21 PROCEDURE — 87186 SC STD MICRODIL/AGAR DIL: CPT

## 2024-09-21 PROCEDURE — 99900035 HC TECH TIME PER 15 MIN (STAT)

## 2024-09-21 PROCEDURE — 87077 CULTURE AEROBIC IDENTIFY: CPT

## 2024-09-21 PROCEDURE — 87205 SMEAR GRAM STAIN: CPT

## 2024-09-21 PROCEDURE — 80048 BASIC METABOLIC PNL TOTAL CA: CPT | Mod: 91,XB

## 2024-09-21 PROCEDURE — 99233 SBSQ HOSP IP/OBS HIGH 50: CPT | Mod: ,,, | Performed by: INTERNAL MEDICINE

## 2024-09-21 PROCEDURE — 83735 ASSAY OF MAGNESIUM: CPT

## 2024-09-21 PROCEDURE — 87015 SPECIMEN INFECT AGNT CONCNTJ: CPT

## 2024-09-21 PROCEDURE — 80053 COMPREHEN METABOLIC PANEL: CPT | Performed by: STUDENT IN AN ORGANIZED HEALTH CARE EDUCATION/TRAINING PROGRAM

## 2024-09-21 PROCEDURE — 87070 CULTURE OTHR SPECIMN AEROBIC: CPT

## 2024-09-21 PROCEDURE — 25000003 PHARM REV CODE 250

## 2024-09-21 PROCEDURE — 25000003 PHARM REV CODE 250: Performed by: NURSE ANESTHETIST, CERTIFIED REGISTERED

## 2024-09-21 PROCEDURE — 99900026 HC AIRWAY MAINTENANCE (STAT)

## 2024-09-21 PROCEDURE — 94003 VENT MGMT INPAT SUBQ DAY: CPT

## 2024-09-21 PROCEDURE — 87206 SMEAR FLUORESCENT/ACID STAI: CPT

## 2024-09-21 PROCEDURE — 63600175 PHARM REV CODE 636 W HCPCS: Performed by: NURSE ANESTHETIST, CERTIFIED REGISTERED

## 2024-09-21 RX ORDER — IBUPROFEN 200 MG
1 TABLET ORAL DAILY
Status: DISCONTINUED | OUTPATIENT
Start: 2024-09-21 | End: 2024-10-18 | Stop reason: HOSPADM

## 2024-09-21 RX ORDER — MAGNESIUM SULFATE HEPTAHYDRATE 40 MG/ML
2 INJECTION, SOLUTION INTRAVENOUS ONCE
Status: COMPLETED | OUTPATIENT
Start: 2024-09-21 | End: 2024-09-21

## 2024-09-21 RX ORDER — POLYETHYLENE GLYCOL 3350 17 G/17G
17 POWDER, FOR SOLUTION ORAL DAILY
Status: DISCONTINUED | OUTPATIENT
Start: 2024-09-21 | End: 2024-09-22

## 2024-09-21 RX ORDER — AMOXICILLIN 250 MG
1 CAPSULE ORAL DAILY PRN
Status: DISCONTINUED | OUTPATIENT
Start: 2024-09-21 | End: 2024-09-21

## 2024-09-21 RX ORDER — ACETAMINOPHEN 500 MG
1000 TABLET ORAL EVERY 6 HOURS PRN
Status: DISCONTINUED | OUTPATIENT
Start: 2024-09-21 | End: 2024-10-18 | Stop reason: HOSPADM

## 2024-09-21 RX ORDER — AMOXICILLIN 250 MG
1 CAPSULE ORAL DAILY PRN
Status: DISCONTINUED | OUTPATIENT
Start: 2024-09-21 | End: 2024-09-23

## 2024-09-21 RX ORDER — ROCURONIUM BROMIDE 10 MG/ML
INJECTION, SOLUTION INTRAVENOUS
Status: DISCONTINUED | OUTPATIENT
Start: 2024-09-21 | End: 2024-09-21

## 2024-09-21 RX ORDER — PROPOFOL 10 MG/ML
VIAL (ML) INTRAVENOUS
Status: DISCONTINUED | OUTPATIENT
Start: 2024-09-21 | End: 2024-09-21

## 2024-09-21 RX ORDER — FENTANYL CITRATE 50 UG/ML
INJECTION, SOLUTION INTRAMUSCULAR; INTRAVENOUS
Status: DISCONTINUED | OUTPATIENT
Start: 2024-09-21 | End: 2024-09-21

## 2024-09-21 RX ORDER — OXYCODONE HYDROCHLORIDE 10 MG/1
10 TABLET ORAL EVERY 4 HOURS PRN
Status: DISCONTINUED | OUTPATIENT
Start: 2024-09-21 | End: 2024-09-23

## 2024-09-21 RX ADMIN — PROPOFOL 50 MG: 10 INJECTION, EMULSION INTRAVENOUS at 02:09

## 2024-09-21 RX ADMIN — PROPOFOL 15 MCG/KG/MIN: 10 INJECTION, EMULSION INTRAVENOUS at 02:09

## 2024-09-21 RX ADMIN — PANTOPRAZOLE SODIUM 40 MG: 40 INJECTION, POWDER, FOR SOLUTION INTRAVENOUS at 08:09

## 2024-09-21 RX ADMIN — Medication 1 PATCH: at 05:09

## 2024-09-21 RX ADMIN — ROCURONIUM BROMIDE 30 MG: 10 INJECTION INTRAVENOUS at 02:09

## 2024-09-21 RX ADMIN — MAGNESIUM SULFATE HEPTAHYDRATE 2 G: 40 INJECTION, SOLUTION INTRAVENOUS at 05:09

## 2024-09-21 RX ADMIN — SODIUM CHLORIDE, SODIUM GLUCONATE, SODIUM ACETATE, POTASSIUM CHLORIDE, MAGNESIUM CHLORIDE, SODIUM PHOSPHATE, DIBASIC, AND POTASSIUM PHOSPHATE: .53; .5; .37; .037; .03; .012; .00082 INJECTION, SOLUTION INTRAVENOUS at 02:09

## 2024-09-21 RX ADMIN — Medication 100 MG: at 08:09

## 2024-09-21 RX ADMIN — METHADONE HYDROCHLORIDE 70 MG: 10 TABLET ORAL at 07:09

## 2024-09-21 RX ADMIN — PROPOFOL 10 MCG/KG/MIN: 10 INJECTION, EMULSION INTRAVENOUS at 09:09

## 2024-09-21 RX ADMIN — GABAPENTIN 300 MG: 300 CAPSULE ORAL at 08:09

## 2024-09-21 RX ADMIN — Medication 100 MCG/HR: at 05:09

## 2024-09-21 RX ADMIN — FUROSEMIDE 40 MG: 10 INJECTION, SOLUTION INTRAVENOUS at 08:09

## 2024-09-21 RX ADMIN — GABAPENTIN 300 MG: 300 CAPSULE ORAL at 09:09

## 2024-09-21 RX ADMIN — FENTANYL CITRATE 50 MCG: 50 INJECTION, SOLUTION INTRAMUSCULAR; INTRAVENOUS at 02:09

## 2024-09-21 RX ADMIN — OXACILLIN 12 G: 2 INJECTION, POWDER, FOR SOLUTION INTRAMUSCULAR; INTRAVENOUS at 01:09

## 2024-09-21 RX ADMIN — FUROSEMIDE 40 MG: 10 INJECTION, SOLUTION INTRAVENOUS at 09:09

## 2024-09-21 RX ADMIN — GABAPENTIN 300 MG: 300 CAPSULE ORAL at 05:09

## 2024-09-21 RX ADMIN — PROPOFOL 30 MCG/KG/MIN: 10 INJECTION, EMULSION INTRAVENOUS at 11:09

## 2024-09-21 RX ADMIN — FOLIC ACID 1 MG: 1 TABLET ORAL at 08:09

## 2024-09-21 RX ADMIN — POLYETHYLENE GLYCOL 3350 17 G: 17 POWDER, FOR SOLUTION ORAL at 11:09

## 2024-09-21 RX ADMIN — HYDROXYZINE PAMOATE 25 MG: 25 CAPSULE ORAL at 09:09

## 2024-09-21 RX ADMIN — CLONIDINE HYDROCHLORIDE 0.2 MG: 0.1 TABLET ORAL at 05:09

## 2024-09-21 RX ADMIN — SUGAMMADEX 200 MG: 100 INJECTION, SOLUTION INTRAVENOUS at 03:09

## 2024-09-21 RX ADMIN — POTASSIUM BICARBONATE 40 MEQ: 391 TABLET, EFFERVESCENT ORAL at 05:09

## 2024-09-21 RX ADMIN — CLONIDINE HYDROCHLORIDE 0.2 MG: 0.1 TABLET ORAL at 08:09

## 2024-09-21 NOTE — SUBJECTIVE & OBJECTIVE
Interval History: ". Remains intubated.     Review of Systems   Unable to perform ROS: Intubated     Objective:     Vital Signs (Most Recent):  Temp: 98.7 °F (37.1 °C) (09/21/24 0800)  Pulse: 84 (09/21/24 1015)  Resp: 18 (09/21/24 1015)  BP: 107/71 (09/21/24 1015)  SpO2: 95 % (09/21/24 1015) Vital Signs (24h Range):  Temp:  [98.6 °F (37 °C)-99.8 °F (37.7 °C)] 98.7 °F (37.1 °C)  Pulse:  [79-97] 84  Resp:  [15-33] 18  SpO2:  [91 %-99 %] 95 %  BP: ()/(56-81) 107/71     Weight: 65.2 kg (143 lb 11.8 oz)  Body mass index is 26.29 kg/m².    Estimated Creatinine Clearance: 98.2 mL/min (based on SCr of 0.6 mg/dL).     Physical Exam  Vitals and nursing note reviewed.   Constitutional:       Appearance: She is ill-appearing.      Interventions: She is sedated and intubated.   Cardiovascular:      Rate and Rhythm: Normal rate and regular rhythm.      Heart sounds: No murmur heard.  Pulmonary:      Effort: Pulmonary effort is normal. She is intubated.      Breath sounds: Normal breath sounds.   Abdominal:      General: Bowel sounds are decreased. There is no distension.      Palpations: Abdomen is soft.      Tenderness: There is no abdominal tenderness.   Musculoskeletal:         General: Swelling (of the left hip) present.   Skin:     General: Skin is warm and dry.      Findings: Erythema present.   Neurological:      Mental Status: She is lethargic.          Significant Labs: CBC:   Recent Labs   Lab 09/19/24  1509 09/20/24  0256 09/21/24  0234   WBC  --  10.69 10.12   HGB  --  8.3* 8.7*   HCT 27* 25.8* 26.7*   PLT  --  118* 123*     CMP:   Recent Labs   Lab 09/19/24  1808 09/20/24  0256 09/20/24  0930 09/20/24  1759 09/21/24  0234 09/21/24  0817   * 138   < > 140 137 137   K 4.5 3.5   < > 3.8 3.4* 4.2    106   < > 105 100 101   CO2 26 26   < > 27 28 26   * 84   < > 71 108 98   BUN 10 10   < > 8 7 7   CREATININE 0.6 0.6   < > 0.6 0.6 0.6   CALCIUM 7.4* 7.1*   < > 7.0* 7.2* 7.3*   PROT 4.7* 4.2*  --    --  4.9*  --    ALBUMIN 1.3* 1.2*  --   --  1.2*  --    BILITOT 5.1* 2.6*  --   --  2.1*  --    ALKPHOS 112 98  --   --  123  --    AST 17 15  --   --  16  --    ALT 18 14  --   --  15  --    ANIONGAP 6* 6*   < > 8 9 10    < > = values in this interval not displayed.     Microbiology Results (last 7 days)       Procedure Component Value Units Date/Time    Aerobic culture [[id number]]  (Abnormal)  (Susceptibility) Collected: 09/19/24 1527    Order Status: Completed Specimen: Abscess from Back Updated: 09/21/24 1044     Aerobic Bacterial Culture STAPHYLOCOCCUS AUREUS  Moderate      Narrative:      Epidural abscess    Aerobic culture [[id number]]  (Abnormal)  (Susceptibility) Collected: 09/19/24 1515    Order Status: Completed Specimen: Abscess from Back Updated: 09/21/24 1027     Aerobic Bacterial Culture STAPHYLOCOCCUS AUREUS  Moderate      Narrative:      Subfascial abscess    Blood culture [[id number]]  (Abnormal) Collected: 09/18/24 0045    Order Status: Completed Specimen: Blood from Peripheral, Antecubital, Left Updated: 09/21/24 0732     Blood Culture, Routine Gram stain aer bottle: Gram positive cocci in clusters resembling Staph      Results called to and read back by: Chema 09/19/2024  01:39      STAPHYLOCOCCUS AUREUS  ID consult required at [facility].  For susceptibility see order #[id number]      Blood culture [[id number]]  (Abnormal) Collected: 09/19/24 1033    Order Status: Completed Specimen: Blood from Peripheral, Lower Arm, Left Updated: 09/21/24 0703     Blood Culture, Routine Gram stain aer bottle: Gram positive cocci in clusters resembling Staph      Results called to and read back by:Mehreen Vital 09/20/2024  13:40      STAPHYLOCOCCUS AUREUS  Susceptibility pending  ID consult required at [facility].      AFB Culture & Smear [[id number]] Collected: 09/19/24 1527    Order Status: Completed Specimen: Abscess from Back Updated: 09/20/24  2127     AFB Culture & Smear Culture in progress     AFB CULTURE STAIN No acid fast bacilli seen.    Narrative:      Epidural abscess    AFB Culture & Smear [[id number]] Collected: 09/19/24 1515    Order Status: Completed Specimen: Abscess from Back Updated: 09/20/24 2127     AFB Culture & Smear Culture in progress     AFB CULTURE STAIN No acid fast bacilli seen.    Narrative:      Subfascial abscess    Blood culture [[id number]]  (Abnormal) Collected: 09/18/24 0045    Order Status: Completed Specimen: Blood from Peripheral, Antecubital, Left Updated: 09/20/24 0920     Blood Culture, Routine Gram stain aer bottle: Gram positive cocci in clusters resembling Staph      Results called to and read back by:Chema Oconnor RN 09/18/2024  20:18      STAPHYLOCOCCUS AUREUS  [facility] consult [facility]  For susceptibility see order #[id number]      Culture, Anaerobe [[id number]] Collected: 09/19/24 1527    Order Status: Completed Specimen: Abscess from Back Updated: 09/20/24 0637     Anaerobic Culture Culture in progress    Narrative:      Epidural abscess    Gram stain [[id number]] Collected: 09/19/24 1515    Order Status: Completed Specimen: Abscess from Back Updated: 09/19/24 1908     Gram Stain Result Rare WBC's      Few Gram positive cocci    Narrative:      Subfascial abscess    Gram stain [[id number]] Collected: 09/19/24 1527    Order Status: Completed Specimen: Abscess from Back Updated: 09/19/24 1906     Gram Stain Result Rare WBC's      Few Gram positive cocci      Rare Gram positive rods    Narrative:      Epidural abscess    Fungus culture [[id number]] Collected: 09/19/24 1527    Order Status: Sent Specimen: Abscess from Back Updated: 09/19/24 1536    Fungus culture [[id number]] Collected: 09/19/24 1515    Order Status: Sent Specimen: Abscess from Back Updated: 09/19/24 1525    Culture, Anaerobe [[id number]] Collected: 09/19/24 1515    Order Status: Sent Specimen: Abscess from  Back Updated: 09/19/24 1524    Blood Culture #2 **CANNOT BE ORDERED STAT** [[id number]]  (Abnormal) Collected: 09/16/24 1145    Order Status: Completed Specimen: Blood from Peripheral, Wrist, Right Updated: 09/19/24 1002     Blood Culture, Routine Gram stain felix bottle: Gram positive cocci in clusters resembling Staph      Results called to and read back by: FATIMAH Mcnulty. 09/17/2024  03:48      Gram stain aer bottle: Gram positive cocci in clusters resembling Staph      Positive results previously called 09/17/2024  06:05      STAPHYLOCOCCUS AUREUS  ID consult required at [facility].  For susceptibility see order #[id number]      Blood culture #1 **CANNOT BE ORDERED STAT** [[id number]]  (Abnormal)  (Susceptibility) Collected: 09/16/24 1054    Order Status: Completed Specimen: Blood from Peripheral, Antecubital, Right Updated: 09/19/24 1001     Blood Culture, Routine Gram stain aer bottle: Gram positive cocci in clusters resembling Staph      Gram stain felix bottle: Gram positive cocci in clusters resembling Staph      Results called to and read back by: FATIMAH Mcnulty. 09/17/2024  03:43      STAPHYLOCOCCUS AUREUS  ID consult required at [facility] and [facility].      Blood culture [[id number]]     Order Status: Canceled Specimen: Blood     MRSA/SA Rapid ID by PCR from Blood culture [[id number]]  (Abnormal) Collected: 09/16/24 1054    Order Status: Completed Updated: 09/17/24 0455     Staph aureus ID by PCR Positive     Methicillin Resistant ID by PCR Negative    Blood culture #2 **CANNOT BE ORDERED STAT** [[id number]]     Order Status: Canceled Specimen: Blood             Significant Imaging: I have reviewed all pertinent imaging results/findings within the past 24 hours.

## 2024-09-21 NOTE — PLAN OF CARE
Procedure completed. Patient tolerated well; vitals per anesthesia. Site CDI. Patient to be transported back to Rm [id number]. Report given to FATIMAH Bess.

## 2024-09-21 NOTE — PROGRESS NOTES
[facility] - Medical ICU  Critical Care Medicine  Progress Note    Patient Name: Mari Sloan  MRN: [id number]  Admission Date: 9/16/2024  Hospital Length of Stay: 4 days  Code Status: Full Code  Attending Provider: Bentley Connell MD  Primary Care Provider: Liliana, Primary Doctor   Principal Problem: Paraspinal abscess    Subjective:     HPI:  Miss Sloan is a pleasant 49-year-old female with a medical history of diabetes, hypertension, and substance use disorder  transferred from OS for specialized evaluation following imaging studies concerning for osteomyelitis or septic arthritis of the left sacroiliac joint, as well as an abnormal fluid collection extending from T11 through the iliacus muscle, raising suspicion for an abscess. The patient reports a mechanical fall six days ago.     Laboratory results from the OSH revealed significant findings, including hypokalemia, hypomagnesemia, severe anemia, metabolic alkalosis, and hyperglycemia.     Labs today CBC leukocytosis (WBC 13.5), hgb 6.9 following transfusion with one unit of packed red blood cells,  platelets at 119. Iron studies revealed a total iron-binding capacity (TIBC) of 152, transferrin of 103, and elevated ferritin at 2000, suggestive of anemia of chronic disease or inflammation. CMP potassium 3.2.  Liver function  unremarkable. Her CRP was markedly elevated at 179.6, indicating significant inflammation or infection. Lactate dehydrogenase was 474, while CPK was within normal limits. Her hemoglobin A1c was elevated at 7.9, reflecting poor glycemic control.    A urine drug screen was positive for presumptive methadone (follows in methano clinic) and opioids. The infectious workup thus far positive staph aureus by PCR and blood cultures growing Gram-positive cocci resembling staph.  MRSA PCR testing returning negative.  Additionally, the patients urinalysis was notable for a hazy appearance, with trace ketones, glucose, positive nitrites, and moderate  bacteriuria, raising suspicion for a urinary tract infection.     Hospital/ICU Course:  Patient was admitted to the MICU with a diagnosis of Paraspinal abscess. Had  Hb of 6.7 and was transfused with 1 unit of blood with a post-transfusion Hb of 7.5. The LDH was high, Phosphorus, Potassium, Sodium, Mg were low. General Surgery requested that Neurosurgery and IR review patient. Neurosurgery wants the IR team to review and drain the abscess and do not think that a neurosurgical intervention is indicated at this time. They do not agree that there is an Epidural abscess. Patient is being closely followed up with DIC labs, CMP for electrolytes. We will be repleting accordingly. RUQ USS was requested. The MARICHUY that was ordered as a result of Staff bacteremia was declined by the Cardiology team. With her electrolyte derangement, and a background of appetitive loss in the past 2 months,  a strong suspicion for Refeeding syndrome is appropriate. Patient was seen by Neurosurgery and is scheduled for surgery (L3-L5 Laminectomy) on 9/19. 9/20:Patient stable, IR to do draining today. Still remains on vent and sedated.    Interval update:  Had a decompression of L3-L5 and epidural abscess evacuation by NSGY yesterday and recommended repeat CT and MRI. IR to aspirate the SI Joint and the Psoas abscess today. Echo shows diastolic dysfunction, with fluid overload. Patient on 40 mg TID with improving  Patient is still intubated on FiO2 of 50%, Set rate of 18, and PEEP of 7%      No current facility-administered medications on file prior to encounter.     Current Outpatient Medications on File Prior to Encounter   Medication Sig    gabapentin (NEURONTIN) 300 MG capsule Take 300 mg by mouth 3 (three) times daily.    hydrOXYzine pamoate (VISTARIL) 25 MG Cap Take 25 mg by mouth 3 (three) times daily.    metFORMIN (GLUCOPHAGE) 500 MG tablet Take 500 mg by mouth 2 (two) times daily with meals.    methadone (DOLOPHINE) 10 MG tablet Take 70  mg by mouth Daily.    cloNIDine (CATAPRES) 0.2 MG tablet Take 0.2 mg by mouth 3 (three) times daily.    glipiZIDE (GLUCOTROL) 5 MG tablet Take 5 mg by mouth 2 (two) times daily.       Review of patient's allergies indicates:  No Known Allergies    Past Medical History:   Diagnosis Date    Diabetes mellitus     Hypertension     Unspecified viral hepatitis C without hepatic coma      Past Surgical History:   Procedure Laterality Date     SECTION      LAMINECTOMY N/A 2024    Procedure: L3-L5 laminectomy with epidural abscess evac;  Surgeon: Sourav Jim DO;  Location: [facility] OR [address] [place] [address];  Service: Neurosurgery;  Laterality: N/A;    MAGNETIC RESONANCE IMAGING N/A 2024    Procedure: MRI (Magnetic Resonance Imagine);  Surgeon: Kamran Storey;  Location: [facility];  Service: Anesthesiology;  Laterality: N/A;  conscious sedaation MRI lumbar spine w/ and without contrast    WASHOUT, WOUND, SPINE  2024    Procedure: WASHOUT, WOUND, SPINE;  Surgeon: Sourav Jmi DO;  Location: [facility] OR [address] [place] [address];  Service: Neurosurgery;;     Family History    None       Tobacco Use    Smoking status: Every Day     Current packs/day: 0.50     Types: Cigarettes    Smokeless tobacco: Never   Substance and Sexual Activity    Alcohol use: Not Currently    Drug use: Not Currently    Sexual activity: Not on file       Objective:     Vital Signs (Most Recent):  Temp: 98.6 °F (37 °C) (24 1501)  Pulse: 87 (24 1855)  Resp: 18 (24 1855)  BP: 95/65 (24 1801)  SpO2: 97 % (24 1855) Vital Signs (24h Range):  Temp:  [97.5 °F (36.4 °C)-99.8 °F (37.7 °C)] 98.6 °F (37 °C)  Pulse:  [] 87  Resp:  [18-28] 18  SpO2:  [91 %-100 %] 97 %  BP: ()/(56-86) 95/65     Weight: 65.2 kg (143 lb 11.8 oz)  Body mass index is 26.29 kg/m².     Physical Exam  Constitutional:       Appearance: She is ill-appearing. She is not toxic-appearing.      Comments: In pain, specifically right hip   HENT:      Head:  Normocephalic.      Right Ear: External ear normal.      Left Ear: External ear normal.   Cardiovascular:      Rate and Rhythm: Normal rate and regular rhythm.   Pulmonary:      Effort: Pulmonary effort is normal. No respiratory distress.      Breath sounds: Rales present.   Chest:      Chest wall: No tenderness.   Abdominal:      General: There is distension.      Palpations: There is no mass.      Tenderness: There is no abdominal tenderness.      Comments: Left flank indurated, no fluctuation, minimally tender  Minimally tender abdominal exam   Musculoskeletal:      Cervical back: No rigidity.      Comments: Pain with flexion of left hip   Skin:     Coloration: Skin is pale. Skin is not jaundiced.      Findings: Bruising (left arm) present.      Comments: Multiple acute and chronic , circular ulcers in upper and lower extremities   Neurological:      General: No focal deficit present.      Mental Status: She is alert. Mental status is at baseline.      Sensory: No sensory deficit.   Psychiatric:         Mood and Affect: Mood normal.         Behavior: Behavior normal.         Thought Content: Thought content normal.            I have reviewed all pertinent lab results within the past 24 hours.  CBC:   Recent Labs   Lab 09/20/24  0256   WBC 10.69   RBC 3.15*   HGB 8.3*   HCT 25.8*   *   MCV 82   MCH 26.3*   MCHC 32.2     CMP:   Recent Labs   Lab 09/20/24  0256 09/20/24  0930 09/20/24  1759   GLU 84   < > 71   CALCIUM 7.1*   < > 7.0*   ALBUMIN 1.2*  --   --    PROT 4.2*  --   --       < > 140   K 3.5   < > 3.8   CO2 26   < > 27      < > 105   BUN 10   < > 8   CREATININE 0.6   < > 0.6   ALKPHOS 98  --   --    ALT 14  --   --    AST 15  --   --    BILITOT 2.6*  --   --     < > = values in this interval not displayed.       Significant Diagnostics:  I have reviewed all pertinent imaging results/findings within the past 24 hours.    Physical Exam:    Constitutional:   In pain, specifically right hip      Cardiovascular: Normal rate and regular rhythm.     Abdominal:   Left flank indurated, no fluctuation, minimally tender  Minimally tender abdominal exam     Skin:   Multiple acute and chronic , circular ulcers in upper and lower extremities     Psych/Behavior: She is alert.     Musculoskeletal:      Comments: Pain with flexion of left hip       ABG  Recent Labs   Lab 09/19/24  1509   PH 7.416   PO2 33*   PCO2 43.3   HCO3 27.8   BE 3*     Assessment/Plan:     Psychiatric  History of drug use  Patient with history of drug misuse disorder.   Reports following with methadone clinic although leans her pain is not well controlled, has not gone clinic since last Friday 9/13  -urine drug screen positive for presumptive methadone and opiates  -patient reports snorting methamphetamine and fentanyl regularly  -we will resume methadone while inpatient  -consider addiction psych consult    Patient reports being on 70 mg methadone q.6, American society of addiction Medicine recommends continuation of methadone maintenance therapy without interruption and use of short-acting opioids like oxycodone for acute pain.  -consider higher doses of oral oxycodone given patient's acute pain needs and known high opioid tolerance i.e. 20-40 mg of oxycodone q.4 to q.6  9/18:  Methadone 70 mg Daily ordered    Cardiac/Vascular  Endocarditis  9/18:  Probable endocarditis- possible vegetation vs normal variant noted on mitral valve noted on TTE. Will treat as presumptive endocarditis.  Stop vancomycin and zosyn  Start oxacillin (concern for epidural abscess)  Continue clindamycin for now (report of gas in L flank)  Follow up IR / neurosurgery / general surgery recs   Obtain MARICHUY- Cardiology recommends to treat as recommended by ID (as presumptive IE, considering the risk of MARICHUY to the patient)            Not a candidate for home IV antibiotics due to active substance abuse.    Renal/  Hypokalemia  Replace PRN    ID  * Paraspinal abscess  Case  "was discussed with neurosurgery who did not feel surgical intervention was warranted and recommended IR drainage. IR recommended against drainage given extensive superficial infection. MRI today with  complex fluid collection.     See Osteomyelitis multiple sites    Epidural abscess  Neurosurgery urgently co-managing: Appreciate recs    Sepsis  This patient does have evidence of infective focus  My overall impression is sepsis.  Source: Skin and Soft Tissue (location lefts psoas, SI joint, paraspinal abcess)  Antibiotics given-   Antibiotics (72h ago, onward)      Start     Stop Route Frequency Ordered    09/18/24 1345  oxacillin 12 g in  mL CONTINUOUS INFUSION         -- IV Every 24 hours (non-standard times) 09/18/24 1241          Latest lactate reviewed-  No results for input(s): "LACTATE", "POCLAC" in the last 72 hours.    Organ dysfunction indicated by Thrombocytopenia     Fluid challenge Not needed - patient is not hypotensive      Post- resuscitation assessment No - Post resuscitation assessment not needed       Will Not start Pressors- Levophed for MAP of 65      Other osteomyelitis, multiple sites  Per ID in OSH   48y/o F with hx of being un-housed, T2DM, HTN and drug use transferred for spinal osteomyelitis, septic arthritis and Lt flank/ retroperitoneal abscess.     Imaging studies of her lumbar spine and pelvis were concerning for osteomyelitis/septic arthritis of the left SI joint and L5-S1 along with an abnormal fluid collection extending from T11 through the left iliacus muscle concerning for abscess as well as at psoas.     Result Date: 9/17/2024  1. Complex fluid collection possibly extending from the left SI joint to the left iliac fossa displacing the left psoas muscle medially. This is similar to the study 1 day prior could represent infectious left sacroiliitis/osteomyelitis and adjacent abscess. The collection extends superiorly to approximately T12 level at the left posterior " retroperitoneum, posterior to the left kidney. Enhancement noted to much of the sacrum including right of midline again could represent osteomyelitis. Follow-up recommended. Recommend surgical consultation and follow-up. 2. Multilevel degenerative changes most prominent at L5-S1.  See above comments. 3. This report was flagged in Epic as abnormal. Electronically signed by:  Edgard Martinez Date:                                               09/17/2024 Time:                                            15:53   X-Ray Chest AP Portable     Result Date: 9/19/2024  ET tube placed with tip 1.8 cm above the laura. Right IJ catheter tip remains overlying the right atrium, similar to prior. Bilateral perihilar infiltrates/edema. Electronically signed by:       Tod Moran MD Date:                                          09/19/2024 Time:                                            01:28     MRI Lumbar Spine W WO Cont     Result Date: 9/18/2024  1. Complex multi lobular fluid collection extending from the left SI joint to the left iliac fossa and paravertebral region displacing the left psoas muscle medially.  This measures approximately 8.4 x 8.5 cm x 22.5 cm in craniocaudad dimension.  This is similar to the prior studies could represent infectious left sacroiliitis/osteomyelitis and adjacent abscess. The collection extends superiorly to approximately T12 level at the left posterior retroperitoneum, posterior to the left kidney. Enhancement noted to much of the sacrum including right of midline again could represent osteomyelitis. Follow-up is recommended.  Edema and complex collection extends posteriorly at the lateral margin of the field of view incompletely visualized to the subcutaneous tissues posteriorly at approximately L2-3 level adjacent to the left posterosuperior gluteal musculature. There is mild displacement and mass effect on the posterolateral margin of the paraspinal musculature best seen on axial 22 of  series 20 and series 19. Recommend surgical consultation and follow-up. 2.  Small epidural collections are noted adjacent to the thecal sac from approximately T12 to L5 suggesting epidural abscess.  Follow-up recommended.  See above comments. 2. Multilevel degenerative changes most prominent at L5-S1. See above comments. 3. This report was flagged in Epic as abnormal.     Started on empiric vanc and zosyn. Case was discussed with neurosurgery who did not feel surgical intervention was warranted and recommended IR drainage. IR recommended against drainage given extensive superficial infection. MRI today with  complex fluid collection. left SI joint to the left iliac fossa.      BCx growing GPC in clusters. 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      Recommendations:  -Continue vancomycin. Pharm to dose for goal trough 15-20.  -Follow-up ID and susceptibilities of GPC in blood   -Would discontinue meropenem given blood cx now with gpc  -Agree with clindamycin given visualized gas/ necrotizing infection       - rec'd IR aspiration of abscess with cultures      -consulting ID while at [facility]    On admission antibiotic regimen of vancomycin, clindamycin, piperacillin tazobactam.    9/18:  ID discontinued Zosyn and Vanc.  Continue Clindamycin  Start Oxacillin    9/19:    Orthopedic surgery:Recommend obtaining fluid sample with the assistance of Interventional Radiology in order to direct cultures.     Neurosurgery: Patient had a L3-L5 Laminectomy this afternoon. Post-procedure condition is stable.  IR to drain abscess today(9/20)    9/20:      IR  to aspirate Psoas abscess and SI Joint today       Ortho consulted and waiting on IR fluid sample.                  Hematology  Thrombocytopenia  The likely etiology of thrombocytopenia is sepsis. The patients 3 most recent labs are listed below.  Recent Labs     09/18/24  1217 09/19/24  0319 09/20/24  0256   * 96* 118*     Plan  - Will transfuse if platelet count is <50k (if  undergoing surgical procedure or have active bleeding).      Oncology  Anemia, unspecified  Patient with anemia upon lab review with a hemoglobin 6.9 post transfusion; likely multifactorial in the setting of chronic disease, deficiency and acute blood loss (RP bleed?)  -CBC showing microcytic anemia given MCV of 79 with high ferritin of 2000 suggesting anemia of chronic disease can not rule out acute blood loss in the setting of recent fall and RP abscess    Plan:  Monitor CBC and transfuse if hemoglobin less than 7  -consider additional iron studies  -monitor for signs of bleeding    9/18:  Hb of 6.7=> Received 1 unit of blood=> 7.5 post-transfusion  DIC Labs: (d-dimer, Fibrinogen)    9/19:  Hb= 6.4, Given I unit of Packed cells and 1 unit of Platelets    Endocrine  Mild malnutrition  Malnutrition Type:  Context: acute illness or injury  Level: mild    Related to (etiology):   Not consuming sufficient nutrients    Signs and Symptoms (as evidenced by):   Vomiting, decreased PO intake     Malnutrition Characteristic Summary:  Energy Intake (Malnutrition): less than or equal to 50% for greater than or equal to 5 days      Interventions/Recommendations (treatment strategy):  Collab of nutrition care w/ other providers  EN    Nutrition Diagnosis Status:   New    Refeeding syndrome  Patient has had reduced appetite for the past several months.  Labs today show low-sodium, no potassium, phosphorus, magnesium, and calcium (uncorrected) with an elevated LDH and Ferritin. Being managed as a potential Refeeding syndrome:     PLAN:  - IV Thiamine,   - Monitor daily weights  - Monitor I/O  - Continuous cardiac monitoring  - Diet and Nutrition consult  - CBC BID; trend and replete  - CMP BID; trend and replete.    Hyperglycemia  Patient with a history of diabetes ,most recent A1c 7.9.  Reports taking metformin 500 mg b.i.d. and glipizide b.i.d.    -we will manage with moderate insulin sliding scale while  inpatient    Uncontrolled type 2 diabetes mellitus with hyperglycemia  Holding oral home medications while inpatient  -low dose insulin sliding scale    GI  Hepatitis C  Patient with a normal hep C antibody    -per ID recs hep C quant order (pending) 9/18: HepC Not detected.      Psoas muscle abscess  -Gen Surgery consulted; Appreciate recs        Other  Smoker  -Nicotine patch if patient amenable       Critical Care Daily Checklist:    A: Awake: RASS Goal/Actual Goal: RASS Goal: -1-->drowsy  Actual:     B: Spontaneous Breathing Trial Performed?     C: SAT & SBT Coordinated?  No                   D: Delirium: CAM-ICU Overall CAM-ICU: Negative   E: Early Mobility Performed? No   F: Feeding Goal: Goals: Meet % EEN/EPN by RD follow up.  Status: Nutrition Goal Status: new   Current Diet Order   Procedures    Diet NPO Except for: Medication, Sips with Medication     Order Specific Question:   Except for:     Answer:   Medication     Order Specific Question:   Except for:     Answer:   Sips with Medication      AS: Analgesia/Sedation Hydromorphone, Lidocaine patchHydoxymorphone, Fentanyl, Lidocaine patch    T: Thromboembolic Prophylaxis    H: HOB > 300 Yes   U: Stress Ulcer Prophylaxis (if needed) PPI   G: Glucose Control Controlled   B: Bowel Function     I: Indwelling Catheter (Lines & López) Necessity Trialysis, López, NG/OG tube, Closed/Suction drain x2, Wound/Incision Lumbar spine drain    D: De-escalation of Antimicrobials/Pharmacotherapies Oxacillin, Clindamycin    Plan for the day/ETD Monitor Vital signs and F/U CBC and BMP     Code Status:  Family/Goals of Care: Full Code         Critical secondary to Patient has a condition that poses threat to life and bodily function: Epidural abscess     Critical care was time spent personally by me on the following activities: development of treatment plan with patient or surrogate and bedside caregivers, discussions with consultants, evaluation of patient's response  to treatment, examination of patient, ordering and performing treatments and interventions, ordering and review of laboratory studies, ordering and review of radiographic studies, pulse oximetry, re-evaluation of patient's condition. This critical care time did not overlap with that of any other provider or involve time for any procedures.     Adelita Barnes MD  Critical Care Medicine  [facility] - [facility]

## 2024-09-21 NOTE — PROGRESS NOTES
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 S/p L3-5 laminectomy and epidural abscess washout on 9/18. Pending IR drainage of SI joint today.     No hardware/devices in place. Does admit to snorting fentanyl. Does have multiple skin blisters on JESSICA UE and LE that she admits to picking at.     Afebrile. Blood cultures positive from 9/16-9/19.     Recommendations  Continue oxacillin via continuous infusion  Follow up all culture data  Would recommend repeat blood cultures 48 hours after last source control procedure.       Other osteomyelitis, multiple sites  See above    GI  Hepatitis C  Hep C ab positive. Hepatitis C RNA not detected. Possible cleared infection if not previously treated.  Outpatient Hepatology follow up.    Psoas muscle abscess  See above        Anticipated Disposition: per primary    Thank you for your consult. I will follow-up with patient. Please contact us if you have any additional questions.    Miranda Mcwilliams MD  Infectious Disease  [facility]    50 minutes of total time spent on the encounter, which includes face to face time and non-face to face time preparing to see the patient (eg, review of tests), obtaining and/or reviewing separately obtained history, documenting clinical information in the electronic or other health record, independently  "interpreting results (not separately reported) and communicating results to the patient/family/caregiver, or care coordination (not separately reported).     Subjective:     Principal Problem:Paraspinal abscess    HPI: Ms. Sloan is a 49F with PMH of DM2, HTN, and substance abuse homelessness, initially presented to [facility] with back pain, found to have spinal OM, L SI joint septic arthritis, and L psoas/iliacus muscle abscess, transferred to [facility] on 9/16 for IR and neurosurgery eval, now transferred to [facility] [place] [place] for multidisciplinary / general surgery for necrosis of L flank.     Imaging of L spine and pelvis were concerning for OM/septic arthritis of the left SI joint and L5-S1 along with an abnormal fluid collection extending from T11 through the L iliacus muscle concerning for abscess as well as at psoas. She was started on empiric vanc and zosyn. Case was discussed with neurosurgery who did not feel surgical intervention was warranted and recommended IR drainage. IR recommended against drainage given extensive superficial infection.    On arrival to  antibiotics were transitioned to doxy, vanc and meropenem. BCx grew GPC in clusters. TTE concerning for MV endocarditis. MRI revealed "Complex fluid collection possibly extending from the left SI joint to the left iliac fossa displacing the left psoas muscle medially. This is similar to the study 1 day prior could represent infectious left sacroiliitis/osteomyelitis and adjacent abscess. The collection extends superiorly to approximately T12 level at the left posterior retroperitoneum, posterior to the left kidney. Enhancement noted to much of the sacrum including right of midline again could represent osteomyelitis."     Infectious disease consulted for "Patient being folled by ID  is osh with gram positive cocci resempblig stap and positive pcr for stap, concerns for si joint osteo and retroperitoneal abscess".   Interval History: "". Remains intubated.     Review " of Systems   Unable to perform ROS: Intubated     Objective:     Vital Signs (Most Recent):  Temp: 98.7 °F (37.1 °C) (09/21/24 0800)  Pulse: 84 (09/21/24 1015)  Resp: 18 (09/21/24 1015)  BP: 107/71 (09/21/24 1015)  SpO2: 95 % (09/21/24 1015) Vital Signs (24h Range):  Temp:  [98.6 °F (37 °C)-99.8 °F (37.7 °C)] 98.7 °F (37.1 °C)  Pulse:  [79-97] 84  Resp:  [15-33] 18  SpO2:  [91 %-99 %] 95 %  BP: ()/(56-81) 107/71     Weight: 65.2 kg (143 lb 11.8 oz)  Body mass index is 26.29 kg/m².    Estimated Creatinine Clearance: 98.2 mL/min (based on SCr of 0.6 mg/dL).     Physical Exam  Vitals and nursing note reviewed.   Constitutional:       Appearance: She is ill-appearing.      Interventions: She is sedated and intubated.   Cardiovascular:      Rate and Rhythm: Normal rate and regular rhythm.      Heart sounds: No murmur heard.  Pulmonary:      Effort: Pulmonary effort is normal. She is intubated.      Breath sounds: Normal breath sounds.   Abdominal:      General: Bowel sounds are decreased. There is no distension.      Palpations: Abdomen is soft.      Tenderness: There is no abdominal tenderness.   Musculoskeletal:         General: Swelling (of the left hip) present.   Skin:     General: Skin is warm and dry.      Findings: Erythema present.   Neurological:      Mental Status: She is lethargic.          Significant Labs: CBC:   Recent Labs   Lab 09/19/24  1509 09/20/24  0256 09/21/24  0234   WBC  --  10.69 10.12   HGB  --  8.3* 8.7*   HCT 27* 25.8* 26.7*   PLT  --  118* 123*     CMP:   Recent Labs   Lab 09/19/24  1808 09/20/24  0256 09/20/24  0930 09/20/24  1759 09/21/24  0234 09/21/24  0817   * 138   < > 140 137 137   K 4.5 3.5   < > 3.8 3.4* 4.2    106   < > 105 100 101   CO2 26 26   < > 27 28 26   * 84   < > 71 108 98   BUN 10 10   < > 8 7 7   CREATININE 0.6 0.6   < > 0.6 0.6 0.6   CALCIUM 7.4* 7.1*   < > 7.0* 7.2* 7.3*   PROT 4.7* 4.2*  --   --  4.9*  --    ALBUMIN 1.3* 1.2*  --   --  1.2*  --     BILITOT 5.1* 2.6*  --   --  2.1*  --    ALKPHOS 112 98  --   --  123  --    AST 17 15  --   --  16  --    ALT 18 14  --   --  15  --    ANIONGAP 6* 6*   < > 8 9 10    < > = values in this interval not displayed.     Microbiology Results (last 7 days)       Procedure Component Value Units Date/Time    Aerobic culture [[id number]]  (Abnormal)  (Susceptibility) Collected: 09/19/24 1527    Order Status: Completed Specimen: Abscess from Back Updated: 09/21/24 1044     Aerobic Bacterial Culture STAPHYLOCOCCUS AUREUS  Moderate      Narrative:      Epidural abscess    Aerobic culture [[id number]]  (Abnormal)  (Susceptibility) Collected: 09/19/24 1515    Order Status: Completed Specimen: Abscess from Back Updated: 09/21/24 1027     Aerobic Bacterial Culture STAPHYLOCOCCUS AUREUS  Moderate      Narrative:      Subfascial abscess    Blood culture [[id number]]  (Abnormal) Collected: 09/18/24 0045    Order Status: Completed Specimen: Blood from Peripheral, Antecubital, Left Updated: 09/21/24 0732     Blood Culture, Routine Gram stain aer bottle: Gram positive cocci in clusters resembling Staph      Results called to and read back by: Chema 09/19/2024  01:39      STAPHYLOCOCCUS AUREUS  ID consult required at [facility].  For susceptibility see order #[id number]      Blood culture [[id number]]  (Abnormal) Collected: 09/19/24 1033    Order Status: Completed Specimen: Blood from Peripheral, Lower Arm, Left Updated: 09/21/24 0703     Blood Culture, Routine Gram stain aer bottle: Gram positive cocci in clusters resembling Staph      Results called to and read back by:Mehreen Vital 09/20/2024  13:40      STAPHYLOCOCCUS AUREUS  Susceptibility pending  [facility].      AFB Culture & Smear [[id number]] Collected: 09/19/24 1527    Order Status: Completed Specimen: Abscess from Back Updated: 09/20/24 2127     AFB Culture & Smear Culture in progress      AFB CULTURE STAIN No acid fast bacilli seen.    Narrative:      Epidural abscess    AFB Culture & Smear [[id number]] Collected: 09/19/24 1515    Order Status: Completed Specimen: Abscess from Back Updated: 09/20/24 2127     AFB Culture & Smear Culture in progress     AFB CULTURE STAIN No acid fast bacilli seen.    Narrative:      Subfascial abscess    Blood culture [[id number]]  (Abnormal) Collected: 09/18/24 0045    Order Status: Completed Specimen: Blood from Peripheral, Antecubital, Left Updated: 09/20/24 0920     Blood Culture, Routine Gram stain aer bottle: Gram positive cocci in clusters resembling Staph      Results called to and read back by:Chema Oconnor RN 09/18/2024  20:18      STAPHYLOCOCCUS AUREUS  ID consult required at [facility] and [facility].  For susceptibility see order #[id number]      Culture, Anaerobe [[id number]] Collected: 09/19/24 1527    Order Status: Completed Specimen: Abscess from Back Updated: 09/20/24 0637     Anaerobic Culture Culture in progress    Narrative:      Epidural abscess    Gram stain [[id number]] Collected: 09/19/24 1515    Order Status: Completed Specimen: Abscess from Back Updated: 09/19/24 1908     Gram Stain Result Rare WBC's      Few Gram positive cocci    Narrative:      Subfascial abscess    Gram stain [[id number]] Collected: 09/19/24 1527    Order Status: Completed Specimen: Abscess from Back Updated: 09/19/24 1906     Gram Stain Result Rare WBC's      Few Gram positive cocci      Rare Gram positive rods    Narrative:      Epidural abscess    Fungus culture [[id number]] Collected: 09/19/24 1527    Order Status: Sent Specimen: Abscess from Back Updated: 09/19/24 1536    Fungus culture [[id number]] Collected: 09/19/24 1515    Order Status: Sent Specimen: Abscess from Back Updated: 09/19/24 1525    Culture, Anaerobe [[id number]] Collected: 09/19/24 1515    Order Status: Sent Specimen: Abscess from Back Updated: 09/19/24 1524    Blood Culture #2  **CANNOT BE ORDERED STAT** [[id number]]  (Abnormal) Collected: 09/16/24 1145    Order Status: Completed Specimen: Blood from Peripheral, Wrist, Right Updated: 09/19/24 1002     Blood Culture, Routine Gram stain felix bottle: Gram positive cocci in clusters resembling Staph      Results called to and read back by: FATIMAH Mcnulty. 09/17/2024  03:48      Gram stain aer bottle: Gram positive cocci in clusters resembling Staph      Positive results previously called 09/17/2024  06:05      STAPHYLOCOCCUS AUREUS  ID consult required at [facility].  For susceptibility see order #[id number]      Blood culture #1 **CANNOT BE ORDERED STAT** [[id number]]  (Abnormal)  (Susceptibility) Collected: 09/16/24 1054    Order Status: Completed Specimen: Blood from Peripheral, Antecubital, Right Updated: 09/19/24 1001     Blood Culture, Routine Gram stain aer bottle: Gram positive cocci in clusters resembling Staph      Gram stain felix bottle: Gram positive cocci in clusters resembling Staph      Results called to and read back by: FATIMAH Mcnulty. 09/17/2024  03:43      STAPHYLOCOCCUS AUREUS  ID consult required at [facility].      Blood culture [[id number]]     Order Status: Canceled Specimen: Blood     MRSA/SA Rapid ID by PCR from Blood culture [[id number]]  (Abnormal) Collected: 09/16/24 1054    Order Status: Completed Updated: 09/17/24 0455     Staph aureus ID by PCR Positive     Methicillin Resistant ID by PCR Negative    Blood culture #2 **CANNOT BE ORDERED STAT** [[id number]]     Order Status: Canceled Specimen: Blood             Significant Imaging: I have reviewed all pertinent imaging results/findings within the past 24 hours.

## 2024-09-21 NOTE — PROGRESS NOTES
[facility] - [facility] ICU  Neurosurgery  Progress Note    Subjective:     History of Present Illness: 49F PMH DM, HTN, hep c, opioid abuse, presenting after down for unknown period of time and inability to ambulate secondary to pain with imaging demonstrating L SI osteo with associated abscesses extending to retroperitoneum without involvement of thecal sac. Denies true weakness, states pain is limiting her ability to move. Pain in hips and low back. On clinda/zosyn, blood culture  with staph aureus. Denies bowel or bladder incontinence, complaining of constipation about 6 days. Denies numbness    Post-Op Info:  Procedure(s) (LRB):  L3-L5 laminectomy with epidural abscess evac (N/A)  WASHOUT, WOUND, SPINE   2 Days Post-Op   Interval history: : OR cultures growing staph aureus. Patient still intubated. Pending IR aspiration of psoas abscess still. MRI C and T spine completed and did not show any further infection      Medications Prior to Admission   Medication Sig Dispense Refill Last Dose    gabapentin (NEURONTIN) 300 MG capsule Take 300 mg by mouth 3 (three) times daily.   2024    hydrOXYzine pamoate (VISTARIL) 25 MG Cap Take 25 mg by mouth 3 (three) times daily.   2024    metFORMIN (GLUCOPHAGE) 500 MG tablet Take 500 mg by mouth 2 (two) times daily with meals.   2024    methadone (DOLOPHINE) 10 MG tablet Take 70 mg by mouth Daily.   Past Week    cloNIDine (CATAPRES) 0.2 MG tablet Take 0.2 mg by mouth 3 (three) times daily.       glipiZIDE (GLUCOTROL) 5 MG tablet Take 5 mg by mouth 2 (two) times daily.          Review of patient's allergies indicates:  No Known Allergies    Past Medical History:   Diagnosis Date    Diabetes mellitus     Hypertension     Unspecified viral hepatitis C without hepatic coma      Past Surgical History:   Procedure Laterality Date     SECTION      LAMINECTOMY N/A 2024    Procedure: L3-L5 laminectomy with epidural abscess evac;  Surgeon: Sourav Jim  DO KAYLYN;  Location: [facility] OR [facility]LR;  Service: Neurosurgery;  Laterality: N/A;    MAGNETIC RESONANCE IMAGING N/A 9/18/2024    Procedure: MRI (Magnetic Resonance Imagine);  Surgeon: Kamran Storey;  Location: [facility];  Service: Anesthesiology;  Laterality: N/A;  conscious sedaation MRI lumbar spine w/ and without contrast    WASHOUT, WOUND, SPINE  9/19/2024    Procedure: WASHOUT, WOUND, SPINE;  Surgeon: Sourav Jim DO;  [facility];  Service: Neurosurgery;;     Family History    None       Tobacco Use    Smoking status: Every Day     Current packs/day: 0.50     Types: Cigarettes    Smokeless tobacco: Never   Substance and Sexual Activity    Alcohol use: Not Currently    Drug use: Not Currently    Sexual activity: Not on file     Review of Systems    Objective:     Weight: 65.2 kg (143 lb 11.8 oz)  Body mass index is 26.29 kg/m².  Vital Signs (Most Recent):  Temp: 98.2 °F (36.8 °C) (09/21/24 1200)  Pulse: 83 (09/21/24 1200)  Resp: 18 (09/21/24 1200)  BP: (!) 90/58 (09/21/24 1200)  SpO2: 95 % (09/21/24 1200) Vital Signs (24h Range):  Temp:  [98.2 °F (36.8 °C)-99.8 °F (37.7 °C)] 98.2 °F (36.8 °C)  Pulse:  [79-97] 83  Resp:  [15-33] 18  SpO2:  [91 %-99 %] 95 %  BP: ()/(56-81) 90/58     Date 09/21/24 0700 - 09/22/24 0659   Shift 0439-8807 7976-3240 3251-1578 24 Hour Total   INTAKE   Shift Total(mL/kg)       OUTPUT   Urine(mL/kg/hr) 1000   1000   Shift Total(mL/kg) 1000(15.3)   1000(15.3)   Weight (kg) 65.2 65.2 65.2 65.2                Vent Mode: A/C  Oxygen Concentration (%):  [40-50] 40  Resp Rate Total:  [18 br/min-21 br/min] 18 br/min  Vt Set:  [390 mL] 390 mL  PEEP/CPAP:  [7 cmH20-8 cmH20] 8 cmH20  Mean Airway Pressure:  [12 jtR32-16 cmH20] 13 cmH20        Female External Urinary Catheter w/ Suction 09/16/24 0493 (Active)   Skin no redness;no breakdown 09/18/24 0701   Tolerance no signs/symptoms of discomfort 09/18/24 0701   Suction Continuous suction at 60 mmHg 09/18/24 0701   Date of  "last wick change 09/17/24 09/17/24 2300   Time of last wick change 2328 09/17/24 2300   Output (mL) 400 mL 09/18/24 0701          Physical Exam    NEURO:     E2VTM5  Intubated, sedated on fentanyl and prop  Bsi, PERRL  Spont/Loc x4 AG      Neurosurgery Physical Exam    Significant Labs:  Recent Labs   Lab 09/19/24  1808 09/20/24  0256 09/20/24  0930 09/20/24  1759 09/21/24  0234 09/21/24  0817   * 84   < > 71 108 98   * 138   < > 140 137 137   K 4.5 3.5   < > 3.8 3.4* 4.2    106   < > 105 100 101   CO2 26 26   < > 27 28 26   BUN 10 10   < > 8 7 7   CREATININE 0.6 0.6   < > 0.6 0.6 0.6   CALCIUM 7.4* 7.1*   < > 7.0* 7.2* 7.3*   MG 1.9 1.7  --   --  1.5*  --     < > = values in this interval not displayed.     Recent Labs   Lab 09/19/24  1509 09/20/24  0256 09/21/24  0234   WBC  --  10.69 10.12   HGB  --  8.3* 8.7*   HCT 27* 25.8* 26.7*   PLT  --  118* 123*     No results for input(s): "LABPT", "INR", "APTT" in the last 48 hours.    Microbiology Results (last 7 days)       Procedure Component Value Units Date/Time    Culture, Anaerobe [[id number]] Collected: 09/19/24 1515    Order Status: Completed Specimen: Abscess from Back Updated: 09/21/24 1201     Anaerobic Culture Culture in progress    Narrative:      Subfascial abscess    Aerobic culture [[id number]]  (Abnormal)  (Susceptibility) Collected: 09/19/24 1527    Order Status: Completed Specimen: Abscess from Back Updated: 09/21/24 1044     Aerobic Bacterial Culture STAPHYLOCOCCUS AUREUS  Moderate      Narrative:      Epidural abscess    Aerobic culture [[id number]]  (Abnormal)  (Susceptibility) Collected: 09/19/24 1515    Order Status: Completed Specimen: Abscess from Back Updated: 09/21/24 1027     Aerobic Bacterial Culture STAPHYLOCOCCUS AUREUS  Moderate      Narrative:      Subfascial abscess    Blood culture [[id number]]  (Abnormal) Collected: 09/18/24 0045    Order Status: Completed Specimen: Blood from Peripheral, Antecubital, Left Updated: " 09/21/24 0732     Blood Culture, Routine Gram stain aer bottle: Gram positive cocci in clusters resembling Staph      Results called to and read back by: Chema 09/19/2024  01:39      STAPHYLOCOCCUS AUREUS  ID consult required at [facility].  For susceptibility see order #[id number]      Blood culture [[id number]]  (Abnormal) Collected: 09/19/24 1033    Order Status: Completed Specimen: Blood from Peripheral, Lower Arm, Left Updated: 09/21/24 0703     Blood Culture, Routine Gram stain aer bottle: Gram positive cocci in clusters resembling Staph      Results called to and read back by:Mehreen Vital 09/20/2024  13:40      STAPHYLOCOCCUS AUREUS  Susceptibility pending  ID consult required at [facility].      AFB Culture & Smear [[id number]] Collected: 09/19/24 1527    Order Status: Completed Specimen: Abscess from Back Updated: 09/20/24 2127     AFB Culture & Smear Culture in progress     AFB CULTURE STAIN No acid fast bacilli seen.    Narrative:      Epidural abscess    AFB Culture & Smear [[id number]] Collected: 09/19/24 1515    Order Status: Completed Specimen: Abscess from Back Updated: 09/20/24 2127     AFB Culture & Smear Culture in progress     AFB CULTURE STAIN No acid fast bacilli seen.    Narrative:      Subfascial abscess    Blood culture [[id number]]  (Abnormal) Collected: 09/18/24 0045    Order Status: Completed Specimen: Blood from Peripheral, Antecubital, Left Updated: 09/20/24 0920     Blood Culture, Routine Gram stain aer bottle: Gram positive cocci in clusters resembling Staph      Results called to and read back by:Chema Oconnor RN 09/18/2024  20:18      STAPHYLOCOCCUS AUREUS  ID consult required at [facility].  For susceptibility see order #[id number]      Culture, Anaerobe [[id number]] Collected: 09/19/24 1527    Order Status: Completed Specimen: Abscess from Back Updated: 09/20/24 0637     Anaerobic  Culture Culture in progress    Narrative:      Epidural abscess    Gram stain [[id number]] Collected: 09/19/24 1515    Order Status: Completed Specimen: Abscess from Back Updated: 09/19/24 1908     Gram Stain Result Rare WBC's      Few Gram positive cocci    Narrative:      Subfascial abscess    Gram stain [[id number]] Collected: 09/19/24 1527    Order Status: Completed Specimen: Abscess from Back Updated: 09/19/24 1906     Gram Stain Result Rare WBC's      Few Gram positive cocci      Rare Gram positive rods    Narrative:      Epidural abscess    Fungus culture [[id number]] Collected: 09/19/24 1527    Order Status: Sent Specimen: Abscess from Back Updated: 09/19/24 1536    Fungus culture [[id number]] Collected: 09/19/24 1515    Order Status: Sent Specimen: Abscess from Back Updated: 09/19/24 1525    Blood Culture #2 **CANNOT BE ORDERED STAT** [[id number]]  (Abnormal) Collected: 09/16/24 1145    Order Status: Completed Specimen: Blood from Peripheral, Wrist, Right Updated: 09/19/24 1002     Blood Culture, Routine Gram stain felix bottle: Gram positive cocci in clusters resembling Staph      Results called to and read back by: FATIMAH Mcnulty. 09/17/2024  03:48      Gram stain aer bottle: Gram positive cocci in clusters resembling Staph      Positive results previously called 09/17/2024  06:05      STAPHYLOCOCCUS AUREUS  ID consult required at [facility].  For susceptibility see order #[id number]      Blood culture #1 **CANNOT BE ORDERED STAT** [[id number]]  (Abnormal)  (Susceptibility) Collected: 09/16/24 1054    Order Status: Completed Specimen: Blood from Peripheral, Antecubital, Right Updated: 09/19/24 1001     Blood Culture, Routine Gram stain aer bottle: Gram positive cocci in clusters resembling Staph      Gram stain felix bottle: Gram positive cocci in clusters resembling Staph      Results called to and read back by: FATIMAH Mcnulty. 09/17/2024  03:43      STAPHYLOCOCCUS AUREUS  ID consult  required at [facility] and [facility] locations.      Blood culture [[id number]]     Order Status: Canceled Specimen: Blood     MRSA/SA Rapid ID by PCR from Blood culture [[id number]]  (Abnormal) Collected: 09/16/24 1054    Order Status: Completed Updated: 09/17/24 0455     Staph aureus ID by PCR Positive     Methicillin Resistant ID by PCR Negative    Blood culture #2 **CANNOT BE ORDERED STAT** [[id number]]     Order Status: Canceled Specimen: Blood           All pertinent labs from the last 24 hours have been reviewed.    Significant Diagnostics:  I have reviewed all pertinent imaging results/findings within the past 24 hours.  I have reviewed and interpreted all pertinent imaging results/findings within the past 24 hours.  MRI Lumbar Spine W WO Cont    Result Date: 9/17/2024  1. Complex fluid collection possibly extending from the left SI joint to the left iliac fossa displacing the left psoas muscle medially. This is similar to the study 1 day prior could represent infectious left sacroiliitis/osteomyelitis and adjacent abscess. The collection extends superiorly to approximately T12 level at the left posterior retroperitoneum, posterior to the left kidney. Enhancement noted to much of the sacrum including right of midline again could represent osteomyelitis. Follow-up recommended. Recommend surgical consultation and follow-up. 2. Multilevel degenerative changes most prominent at L5-S1.  See above comments. 3. This report was flagged in Epic as abnormal. Electronically signed by: Edgard Martinez Date:    09/17/2024 Time:    15:53   X-Ray Chest AP Portable    Result Date: 9/19/2024  ET tube placed with tip 1.8 cm above the laura. Right IJ catheter tip remains overlying the right atrium, similar to prior. Bilateral perihilar infiltrates/edema. Electronically signed by: Tod Moran MD Date:    09/19/2024 Time:    01:28    MRI Lumbar Spine W WO Cont    Result Date: 9/18/2024  1. Complex multi lobular fluid  collection extending from the left SI joint to the left iliac fossa and paravertebral region displacing the left psoas muscle medially.  This measures approximately 8.4 x 8.5 cm x 22.5 cm in craniocaudad dimension.  This is similar to the prior studies could represent infectious left sacroiliitis/osteomyelitis and adjacent abscess. The collection extends superiorly to approximately T12 level at the left posterior retroperitoneum, posterior to the left kidney. Enhancement noted to much of the sacrum including right of midline again could represent osteomyelitis. Follow-up is recommended.  Edema and complex collection extends posteriorly at the lateral margin of the field of view incompletely visualized to the subcutaneous tissues posteriorly at approximately L2-3 level adjacent to the left posterosuperior gluteal musculature. There is mild displacement and mass effect on the posterolateral margin of the paraspinal musculature best seen on axial 22 of series 20 and series 19. Recommend surgical consultation and follow-up. 2.  Small epidural collections are noted adjacent to the thecal sac from approximately T12 to L5 suggesting epidural abscess.  Follow-up recommended.  See above comments. 2. Multilevel degenerative changes most prominent at L5-S1. See above comments. 3. This report was flagged in Epic as abnormal. Electronically signed by: Edgard Martinez Date:    09/18/2024 Time:    23:09    US Abdomen Complete    Result Date: 9/18/2024  Biliary sludge.  No evidence of cholecystitis or biliary obstruction. Hepatosplenomegaly. Electronically signed by: Ravi Trujillo Date:    09/18/2024 Time:    15:17     Assessment/Plan:     Other osteomyelitis, multiple sites  49F PMH DM, HTN, hep c, opioid abuse, presenting after down for unknown period of time and inability to ambulate secondary to pain with imaging demonstrating L SI osteo with associated abscesses extending to retroperitoneum without involvement of thecal sac.  Denies true weakness, states pain is limiting her ability to move. Pain in hips and low back. On clinda/zosyn, blood culture 9/16 with staph aureus. Denies bowel or bladder incontinence, complaining of constipation about 6 days. Denies numbness    She is now s/p L3-L4 laminectomy for epidural abscess evacuation on 9/19/24 with neurosurgery    Imaging:  CT Lsp / pelvis 9/16: fluid collection T11 on L through left psoas, felt abscess  CT CAP 9/16: extensive edema with subq gas through left flank, small psoas abscess and fluid collections left flank, septic arthritis of L SI joint  MRI Lsp w/wo 9/17: L SI complex fluid collection felt to represent osteo with adjacent abscess, extends to approximately T12 level  MRI Lsp w/wo repeat 9/18: epidural collections T12-L5   MRI C and T spine on 9/20 negative for other signs of infection    Plan:  - Admitted MICU; q1h nc/vs  - infectious workup  - Abx for Staph aureus blood culture 9/16 per primary  - OR cultures growing staph aureus  - Trend inflammatory markers   - pending IR aspiration for psoas abscess and SI joint  - ortho consulted for SI joint findings, rec IR aspiration, non operative at this time  - rest of care per primary team  - Please notify for questions/concerns/neurologic decline          Santy Pal MD  Neurosurgery  [facility] - Medical ICU

## 2024-09-21 NOTE — TRANSFER OF CARE
"Anesthesia Transfer of Care Note    Patient: Mari Sloan    Procedure(s) Performed: * No procedures listed *    Patient location: ICU    Anesthesia Type: general    Transport from OR: Transported from OR intubated on 100% O2  with adequate ventilation controlled by transport ventilator. Continuous ECG monitoring in transport. Continuous SpO2 monitoring in transport    Post pain: adequate analgesia    Post assessment: no apparent anesthetic complications and tolerated procedure well    Post vital signs: stable    Level of consciousness: sedated    Nausea/Vomiting: no nausea/vomiting    Complications: none    Transfer of care protocol was followed      Last vitals: Visit Vitals  BP (!) 87/62   Pulse 80   Temp 36.8 °C (98.2 °F) (Axillary)   Resp 18   Ht 5' 2" (1.575 m)   Wt 65.2 kg (143 lb 11.8 oz)   LMP  (LMP Unknown)   SpO2 97%   Breastfeeding No   BMI 26.29 kg/m²     "

## 2024-09-21 NOTE — PLAN OF CARE
Pt arrived to IR room 173 for abscess drain placement and SI joint aspiration with anesthesia. Pt oriented to unit and staff, Pt safely transferred from stretcher to procedural table. Fall risk reviewed and comfort measures utilized with interventions. Safety strap applied, position pillows to minimize pressure points. Blankets applied. Pt prepped and draped utilizing standard sterile technique. Patient placed on continuous monitoring, as required by sedation policy. Timeouts implemented utilizing standard universal time-out per department and facility policy. CRNA to remain at bedside with continuous monitoring. Pt resting comfortably. Denies pain/discomfort. Will continue to monitor. See anesthesia flow sheets for monitoring, medication administration, and updates. patient verbalizes understanding.

## 2024-09-21 NOTE — ASSESSMENT & PLAN NOTE
Home regimen: metformin BID and glipizide BID    - Holding oral home medications while inpatient  - On MD ALONZO

## 2024-09-21 NOTE — PLAN OF CARE
MICU DAILY GOALS     Family/Goals of care/Code Status   Code Status: Full Code    24H Vital Sign Range  Temp:  [98.4 °F (36.9 °C)-99.8 °F (37.7 °C)]   Pulse:  []   Resp:  [15-33]   BP: ()/(56-86)   SpO2:  [91 %-100 %]      Shift Events (include procedures and significant events)   No acute events throughout shift. Remains sedated (Prop and Fent) and intubated (50%/7), awaiting procedure with IR this morning. VSS over night. Potassium and Magnesium replaced this morning.    AWAKE RASS: Goal - RASS Goal: -1-->drowsy  Actual - RASS ([place]ala Agitation-Sedation Scale): light sedation    Restraint necessity: Removing medical devices   BREATHE SBT: Not attempted    Coordinate A & B, analgesics/sedatives Pain: managed   SAT: Not attempted   Delirium CAM-ICU: Overall CAM-ICU: Positive   Early(intubated/ Progressive (non-intubated) Mobility MOVE Screen (INTUBATED ONLY): NA    Activity: Activity Management: Arm raise - L1, Rolling - L1   Feeding/Nutrition Diet order: Diet/Nutrition Received: NPO,     Thrombus DVT prophylaxis: VTE Required Core Measure: Pharmacological prophylaxis initiated/maintained   HOB Elevation Head of Bed (HOB) Positioning: HOB at 30-45 degrees   Ulcer Prophylaxis GI: yes   Glucose control managed Glycemic Management: blood glucose monitored   Skin Skin assessed during: Daily Assessment    Sacrum intact/not altered? Yes  Heels intact/not altered? Yes  Surgical wound? Yes    CHECK ONE!   (no altered skin or altered skin) and sub boxes:  [] No Altered Skin Integrity Present    [x]Prevention Measures Documented    [x] Altered Skin Integrity Present or Discovered   [x] LDA present in EPIC, daily doc completed              [] LDA added if not in EPIC (describe wound).                    When describing wound, do not stage, use descriptive words only.    [] Wound Image Taken (required on admit,                   transfer/discharge and every Tuesday)    Wound Care Consulted? Yes    4  EYES:  Attending Nurse (1st set of eyes): Ricky Alves RN    Second RN/Staff Member (2nd set of eyes): Dee Hall RN   Bowel Function constipation    Indwelling Catheter Necessity      Urethral Catheter 09/19/24 1800 Silicone 16 Fr.-Reason for Continuing Urinary Catheterization: Urinary retention, Critically ill in ICU and requiring hourly monitoring of intake/output    Percutaneous Central Line - Triple Lumen  09/16/24 1700 Internal Jugular Right-Line Necessity Review: Frequent Blood Draws     De-escalation Antibiotics Yes        VS and assessment per flow sheet, patient progressing towards goals as tolerated, plan of care reviewed with [unfilled] and family, all concerns addressed at this time.    Ricky Alves RN

## 2024-09-21 NOTE — SUBJECTIVE & OBJECTIVE
Interval History/Significant Events: NAEON. Overnight nurse noted increasing abdominal distension but no BM has been reported in several weeks. Started patient on bowel regimen. She was taken with IR for abscess drainage, pending culture results.     Review of Systems   Unable to perform ROS: Intubated     Objective:     Vital Signs (Most Recent):  Temp: 98.2 °F (36.8 °C) (09/21/24 1200)  Pulse: 80 (09/21/24 1415)  Resp: 18 (09/21/24 1415)  BP: (!) 87/62 (09/21/24 1415)  SpO2: 97 % (09/21/24 1415) Vital Signs (24h Range):  Temp:  [98.2 °F (36.8 °C)-99.2 °F (37.3 °C)] 98.2 °F (36.8 °C)  Pulse:  [68-97] 80  Resp:  [15-33] 18  SpO2:  [93 %-99 %] 97 %  BP: ()/(52-81) 87/62     Weight: 65.2 kg (143 lb 11.8 oz)  Body mass index is 26.29 kg/m².     Physical Exam  Constitutional:       Appearance: She is ill-appearing.      Interventions: She is sedated and intubated.      Comments: In pain, specifically right hip   HENT:      Head: Normocephalic and atraumatic.      Right Ear: External ear normal.      Left Ear: External ear normal.   Cardiovascular:      Rate and Rhythm: Normal rate and regular rhythm.   Pulmonary:      Effort: Pulmonary effort is normal. No respiratory distress. She is intubated.      Breath sounds: Rales present.   Chest:      Chest wall: No tenderness.   Abdominal:      General: There is distension.      Palpations: There is no mass.      Tenderness: There is no abdominal tenderness.      Comments: Left flank indurated, no fluctuation, minimally tender  Minimally tender abdominal exam   Musculoskeletal:      Cervical back: No rigidity.      Comments: Pain with flexion of left hip   Skin:     Coloration: Skin is pale.      Findings: Bruising (left arm) present.      Comments: Multiple acute and chronic , circular ulcers in upper and lower extremities   Psychiatric:         Mood and Affect: Mood normal.         Behavior: Behavior normal.         Thought Content: Thought content normal.            I  have reviewed all pertinent lab results within the past 24 hours.  CBC:   Recent Labs   Lab 09/21/24 0234   WBC 10.12   RBC 3.26*   HGB 8.7*   HCT 26.7*   *   MCV 82   MCH 26.7*   MCHC 32.6     CMP:   Recent Labs   Lab 09/21/24 0234 09/21/24  0817    98   CALCIUM 7.2* 7.3*   ALBUMIN 1.2*  --    PROT 4.9*  --     137   K 3.4* 4.2   CO2 28 26    101   BUN 7 7   CREATININE 0.6 0.6   ALKPHOS 123  --    ALT 15  --    AST 16  --    BILITOT 2.1*  --        Significant Diagnostics:  I have reviewed all pertinent imaging results/findings within the past 24 hours.

## 2024-09-21 NOTE — ASSESSMENT & PLAN NOTE
49F PMH DM, HTN, hep c, opioid abuse, presenting after down for unknown period of time and inability to ambulate secondary to pain with imaging demonstrating L SI osteo with associated abscesses extending to retroperitoneum without involvement of thecal sac. Denies true weakness, states pain is limiting her ability to move. Pain in hips and low back. On clinda/zosyn, blood culture 9/16 with staph aureus. Denies bowel or bladder incontinence, complaining of constipation about 6 days. Denies numbness    She is now s/p L3-L4 laminectomy for epidural abscess evacuation on 9/19/24 with neurosurgery    Imaging:  CT Lsp / pelvis 9/16: fluid collection T11 on L through left psoas, felt abscess  CT CAP 9/16: extensive edema with subq gas through left flank, small psoas abscess and fluid collections left flank, septic arthritis of L SI joint  MRI Lsp w/wo 9/17: L SI complex fluid collection felt to represent osteo with adjacent abscess, extends to approximately T12 level  MRI Lsp w/wo repeat 9/18: epidural collections T12-L5   MRI C and T spine on 9/20 negative for other signs of infection    Plan:  - Admitted MICU; q1h nc/vs  - infectious workup  - Abx for Staph aureus blood culture 9/16 per primary  - OR cultures growing staph aureus  - Trend inflammatory markers   - pending IR aspiration for psoas abscess and SI joint  - ortho consulted for SI joint findings, rec IR aspiration, non operative at this time  - rest of care per primary team  - Please notify for questions/concerns/neurologic decline

## 2024-09-21 NOTE — PLAN OF CARE
Problem: Skin Injury Risk Increased  Goal: Skin Health and Integrity  Outcome: Progressing     Problem: Adult Inpatient Plan of Care  Goal: Plan of Care Review  Outcome: Progressing  Goal: Patient-Specific Goal (Individualized)  Outcome: Progressing  Goal: Absence of Hospital-Acquired Illness or Injury  Outcome: Progressing  Goal: Optimal Comfort and Wellbeing  Outcome: Progressing  Goal: Readiness for Transition of Care  Outcome: Progressing     Problem: Infection  Goal: Absence of Infection Signs and Symptoms  Outcome: Progressing     Problem: Diabetes Comorbidity  Goal: Blood Glucose Level Within Targeted Range  Outcome: Progressing     Problem: Sepsis/Septic Shock  Goal: Optimal Coping  Outcome: Progressing  Goal: Absence of Bleeding  Outcome: Progressing  Goal: Blood Glucose Level Within Targeted Range  Outcome: Progressing  Goal: Absence of Infection Signs and Symptoms  Outcome: Progressing  Goal: Optimal Nutrition Intake  Outcome: Progressing     Problem: Fall Injury Risk  Goal: Absence of Fall and Fall-Related Injury  Outcome: Progressing     Problem: Restraint, Nonviolent  Goal: Absence of Harm or Injury  Outcome: Progressing     Problem: Wound  Goal: Optimal Coping  Outcome: Progressing  Goal: Optimal Functional Ability  Outcome: Progressing  Goal: Absence of Infection Signs and Symptoms  Outcome: Progressing  Goal: Improved Oral Intake  Outcome: Progressing  Goal: Optimal Pain Control and Function  Outcome: Progressing  Goal: Skin Health and Integrity  Outcome: Progressing  Goal: Optimal Wound Healing  Outcome: Progressing     Problem: Mechanical Ventilation Invasive  Goal: Effective Communication  Outcome: Progressing  Goal: Optimal Device Function  Outcome: Progressing  Goal: Mechanical Ventilation Liberation  Outcome: Progressing  Goal: Optimal Nutrition Delivery  Outcome: Progressing  Goal: Absence of Device-Related Skin and Tissue Injury  Outcome: Progressing  Goal: Absence of Ventilator-Induced  Lung Injury  Outcome: Progressing

## 2024-09-21 NOTE — ASSESSMENT & PLAN NOTE
Per ID in OSH   50y/o F with hx of being un-housed, T2DM, HTN and drug use transferred for spinal osteomyelitis, septic arthritis and Lt flank/ retroperitoneal abscess.     Imaging studies of her lumbar spine and pelvis were concerning for osteomyelitis/septic arthritis of the left SI joint and L5-S1 along with an abnormal fluid collection extending from T11 through the left iliacus muscle concerning for abscess as well as at psoas.     Result Date: 9/17/2024  1. Complex fluid collection possibly extending from the left SI joint to the left iliac fossa displacing the left psoas muscle medially. This is similar to the study 1 day prior could represent infectious left sacroiliitis/osteomyelitis and adjacent abscess. The collection extends superiorly to approximately T12 level at the left posterior retroperitoneum, posterior to the left kidney. Enhancement noted to much of the sacrum including right of midline again could represent osteomyelitis. Follow-up recommended. Recommend surgical consultation and follow-up. 2. Multilevel degenerative changes most prominent at L5-S1.  See above comments. 3. This report was flagged in Epic as abnormal. Electronically signed by:  Edgard Martinez Date:                                               09/17/2024 Time:                                            15:53   X-Ray Chest AP Portable     Result Date: 9/19/2024  ET tube placed with tip 1.8 cm above the laura. Right IJ catheter tip remains overlying the right atrium, similar to prior. Bilateral perihilar infiltrates/edema. Electronically signed by:       Tod Moran MD Date:                                          09/19/2024 Time:                                            01:28     MRI Lumbar Spine W WO Cont     Result Date: 9/18/2024  1. Complex multi lobular fluid collection extending from the left SI joint to the left iliac fossa and paravertebral region displacing the left psoas muscle medially.  This measures  approximately 8.4 x 8.5 cm x 22.5 cm in craniocaudad dimension.  This is similar to the prior studies could represent infectious left sacroiliitis/osteomyelitis and adjacent abscess. The collection extends superiorly to approximately T12 level at the left posterior retroperitoneum, posterior to the left kidney. Enhancement noted to much of the sacrum including right of midline again could represent osteomyelitis. Follow-up is recommended.  Edema and complex collection extends posteriorly at the lateral margin of the field of view incompletely visualized to the subcutaneous tissues posteriorly at approximately L2-3 level adjacent to the left posterosuperior gluteal musculature. There is mild displacement and mass effect on the posterolateral margin of the paraspinal musculature best seen on axial 22 of series 20 and series 19. Recommend surgical consultation and follow-up. 2.  Small epidural collections are noted adjacent to the thecal sac from approximately T12 to L5 suggesting epidural abscess.  Follow-up recommended.  See above comments. 2. Multilevel degenerative changes most prominent at L5-S1. See above comments. 3. This report was flagged in Epic as abnormal.     Started on empiric vanc and zosyn. Case was discussed with neurosurgery who did not feel surgical intervention was warranted and recommended IR drainage. IR recommended against drainage given extensive superficial infection. MRI today with  complex fluid collection. left SI joint to the left iliac fossa.      BCx growing GPC in clusters. 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      Recommendations:  -Continue vancomycin. Pharm to dose for goal trough 15-20.  -Follow-up ID and susceptibilities of GPC in blood   -Would discontinue meropenem given blood cx now with gpc  -Agree with clindamycin given visualized gas/ necrotizing infection       - rec'd IR aspiration of abscess with cultures      -consulting ID while at [facility]    On admission antibiotic regimen of  vancomycin, clindamycin, piperacillin tazobactam.    9/18:  ID discontinued Zosyn and Vanc.  Continue Clindamycin  Start Oxacillin    9/19:    Orthopedic surgery:Recommend obtaining fluid sample with the assistance of Interventional Radiology in order to direct cultures.     Neurosurgery: Patient had a L3-L5 Laminectomy this afternoon. Post-procedure condition is stable.  IR to drain abscess today(9/20)

## 2024-09-21 NOTE — SUBJECTIVE & OBJECTIVE
No current facility-administered medications on file prior to encounter.     Current Outpatient Medications on File Prior to Encounter   Medication Sig    gabapentin (NEURONTIN) 300 MG capsule Take 300 mg by mouth 3 (three) times daily.    hydrOXYzine pamoate (VISTARIL) 25 MG Cap Take 25 mg by mouth 3 (three) times daily.    metFORMIN (GLUCOPHAGE) 500 MG tablet Take 500 mg by mouth 2 (two) times daily with meals.    methadone (DOLOPHINE) 10 MG tablet Take 70 mg by mouth Daily.    cloNIDine (CATAPRES) 0.2 MG tablet Take 0.2 mg by mouth 3 (three) times daily.    glipiZIDE (GLUCOTROL) 5 MG tablet Take 5 mg by mouth 2 (two) times daily.       Review of patient's allergies indicates:  No Known Allergies    Past Medical History:   Diagnosis Date    Diabetes mellitus     Hypertension     Unspecified viral hepatitis C without hepatic coma      Past Surgical History:   Procedure Laterality Date     SECTION      LAMINECTOMY N/A 2024    Procedure: L3-L5 laminectomy with epidural abscess evac;  Surgeon: Sourav Jim DO;  Location: [facility] OR [address] [place] [address];  Service: Neurosurgery;  Laterality: N/A;    MAGNETIC RESONANCE IMAGING N/A 2024    Procedure: MRI (Magnetic Resonance Imagine);  Surgeon: Kamran Storey;  Location: [facility];  Service: Anesthesiology;  Laterality: N/A;  conscious sedaation MRI lumbar spine w/ and without contrast    WASHOUT, WOUND, SPINE  2024    Procedure: WASHOUT, WOUND, SPINE;  Surgeon: Sourav Jim DO;  Location: [facility] OR [address] [place] [address];  Service: Neurosurgery;;     Family History    None       Tobacco Use    Smoking status: Every Day     Current packs/day: 0.50     Types: Cigarettes    Smokeless tobacco: Never   Substance and Sexual Activity    Alcohol use: Not Currently    Drug use: Not Currently    Sexual activity: Not on file       Objective:     Vital Signs (Most Recent):  Temp: 98.6 °F (37 °C) (24 1501)  Pulse: 87 (24 1855)  Resp: 18 (24 1855)  BP: 95/65  (09/20/24 1801)  SpO2: 97 % (09/20/24 1855) Vital Signs (24h Range):  Temp:  [97.5 °F (36.4 °C)-99.8 °F (37.7 °C)] 98.6 °F (37 °C)  Pulse:  [] 87  Resp:  [18-28] 18  SpO2:  [91 %-100 %] 97 %  BP: ()/(56-86) 95/65     Weight: 65.2 kg (143 lb 11.8 oz)  Body mass index is 26.29 kg/m².     Physical Exam  Constitutional:       Appearance: She is ill-appearing. She is not toxic-appearing.      Comments: In pain, specifically right hip   HENT:      Head: Normocephalic.      Right Ear: External ear normal.      Left Ear: External ear normal.   Cardiovascular:      Rate and Rhythm: Normal rate and regular rhythm.   Pulmonary:      Effort: Pulmonary effort is normal. No respiratory distress.      Breath sounds: Rales present.   Chest:      Chest wall: No tenderness.   Abdominal:      General: There is distension.      Palpations: There is no mass.      Tenderness: There is no abdominal tenderness.      Comments: Left flank indurated, no fluctuation, minimally tender  Minimally tender abdominal exam   Musculoskeletal:      Cervical back: No rigidity.      Comments: Pain with flexion of left hip   Skin:     Coloration: Skin is pale. Skin is not jaundiced.      Findings: Bruising (left arm) present.      Comments: Multiple acute and chronic , circular ulcers in upper and lower extremities   Neurological:      General: No focal deficit present.      Mental Status: She is alert. Mental status is at baseline.      Sensory: No sensory deficit.   Psychiatric:         Mood and Affect: Mood normal.         Behavior: Behavior normal.         Thought Content: Thought content normal.            I have reviewed all pertinent lab results within the past 24 hours.  CBC:   Recent Labs   Lab 09/20/24  0256   WBC 10.69   RBC 3.15*   HGB 8.3*   HCT 25.8*   *   MCV 82   MCH 26.3*   MCHC 32.2     CMP:   Recent Labs   Lab 09/20/24  0256 09/20/24  0930 09/20/24  1759   GLU 84   < > 71   CALCIUM 7.1*   < > 7.0*   ALBUMIN 1.2*  --    --    PROT 4.2*  --   --       < > 140   K 3.5   < > 3.8   CO2 26   < > 27      < > 105   BUN 10   < > 8   CREATININE 0.6   < > 0.6   ALKPHOS 98  --   --    ALT 14  --   --    AST 15  --   --    BILITOT 2.6*  --   --     < > = values in this interval not displayed.       Significant Diagnostics:  I have reviewed all pertinent imaging results/findings within the past 24 hours.    Physical Exam:    Constitutional:   In pain, specifically right hip     Cardiovascular: Normal rate and regular rhythm.     Abdominal:   Left flank indurated, no fluctuation, minimally tender  Minimally tender abdominal exam     Skin:   Multiple acute and chronic , circular ulcers in upper and lower extremities     Psych/Behavior: She is alert.     Musculoskeletal:      Comments: Pain with flexion of left hip

## 2024-09-21 NOTE — ASSESSMENT & PLAN NOTE
49F initially presented to [facility] with back pain, found to have spinal OM, L SI joint septic arthritis, and L psoas/iliacus muscle abscess, T12 - L5 epidural abscess, possible MV endocarditis, transferred to [facility] on 9/16 for IR and neurosurgery eval, now [place] [facility] S/p L3-5 laminectomy and epidural abscess washout on 9/18. Pending IR drainage of SI joint today.     No hardware/devices in place. Does admit to snorting fentanyl. Does have multiple skin blisters on JESSICA UE and LE that she admits to picking at.     Afebrile. Blood cultures positive from 9/16-9/19.     Recommendations  Continue oxacillin via continuous infusion  Follow up all culture data  Would recommend repeat blood cultures 48 hours after last source control procedure.

## 2024-09-21 NOTE — PROGRESS NOTES
[facility] - Medical ICU  Critical Care Medicine  Progress Note    Patient Name: Mari Sloan  MRN: [id number]  Admission Date: 9/16/2024  Hospital Length of Stay: 5 days  Code Status: Full Code  Attending Provider: Bentley Connell MD  Primary Care Provider: Liliana, Primary Doctor   Principal Problem: Paraspinal abscess    Subjective:     HPI:  Miss Sloan is a pleasant 49-year-old female with a medical history of diabetes, hypertension, and substance use disorder  transferred from OS for specialized evaluation following imaging studies concerning for osteomyelitis or septic arthritis of the left sacroiliac joint, as well as an abnormal fluid collection extending from T11 through the iliacus muscle, raising suspicion for an abscess. The patient reports a mechanical fall six days ago.     Laboratory results from the OSH revealed significant findings, including hypokalemia, hypomagnesemia, severe anemia, metabolic alkalosis, and hyperglycemia.     Labs today CBC leukocytosis (WBC 13.5), hgb 6.9 following transfusion with one unit of packed red blood cells,  platelets at 119. Iron studies revealed a total iron-binding capacity (TIBC) of 152, transferrin of 103, and elevated ferritin at 2000, suggestive of anemia of chronic disease or inflammation. CMP potassium 3.2.  Liver function  unremarkable. Her CRP was markedly elevated at 179.6, indicating significant inflammation or infection. Lactate dehydrogenase was 474, while CPK was within normal limits. Her hemoglobin A1c was elevated at 7.9, reflecting poor glycemic control.    A urine drug screen was positive for presumptive methadone (follows in methano clinic) and opioids. The infectious workup thus far positive staph aureus by PCR and blood cultures growing Gram-positive cocci resembling staph.  MRSA PCR testing returning negative.  Additionally, the patients urinalysis was notable for a hazy appearance, with trace ketones, glucose, positive nitrites, and moderate  bacteriuria, raising suspicion for a urinary tract infection.     Hospital/ICU Course:  Patient was admitted to the MICU 9/18 with concern for paraspinal abscess. Infectious workup was ordered. Blood cultures were positive for MSSA bacteremia. Neurosurgery, general surgery and IR were consulted to evaluate the patient's paraspinal abscess. Neurosurgery performed a L3-L4 laminectomy for epidural abscess evacuation on 9/19/24 and the cultures grew MSSA. MARICHUY showed normal EF of 60-65% with diastolic dysfunction, could not exclude mitral vegetation vs redundant chordae. ID was consulted and recommended oxacillin and repeat blood cultures. With her electrolyte derangement, and a background of appetitive loss in the past 2 months, there was concern for refeeding syndrome. On 9/21, IR took the patient for abscess drain placement and aspiration of SI joint. Pending culture results.     Interval History/Significant Events: NAEON. Overnight nurse noted increasing abdominal distension but no BM has been reported in several weeks. Started patient on bowel regimen. She was taken with IR for abscess drainage, pending culture results.     Review of Systems   Unable to perform ROS: Intubated     Objective:     Vital Signs (Most Recent):  Temp: 98.2 °F (36.8 °C) (09/21/24 1200)  Pulse: 80 (09/21/24 1415)  Resp: 18 (09/21/24 1415)  BP: (!) 87/62 (09/21/24 1415)  SpO2: 97 % (09/21/24 1415) Vital Signs (24h Range):  Temp:  [98.2 °F (36.8 °C)-99.2 °F (37.3 °C)] 98.2 °F (36.8 °C)  Pulse:  [68-97] 80  Resp:  [15-33] 18  SpO2:  [93 %-99 %] 97 %  BP: ()/(52-81) 87/62     Weight: 65.2 kg (143 lb 11.8 oz)  Body mass index is 26.29 kg/m².     Physical Exam  Constitutional:       Appearance: She is ill-appearing.      Interventions: She is sedated and intubated.      Comments: In pain, specifically right hip   HENT:      Head: Normocephalic and atraumatic.      Right Ear: External ear normal.      Left Ear: External ear normal.    Cardiovascular:      Rate and Rhythm: Normal rate and regular rhythm.   Pulmonary:      Effort: Pulmonary effort is normal. No respiratory distress. She is intubated.      Breath sounds: Rales present.   Chest:      Chest wall: No tenderness.   Abdominal:      General: There is distension.      Palpations: There is no mass.      Tenderness: There is no abdominal tenderness.      Comments: Left flank indurated, no fluctuation, minimally tender  Minimally tender abdominal exam   Musculoskeletal:      Cervical back: No rigidity.      Comments: Pain with flexion of left hip   Skin:     Coloration: Skin is pale.      Findings: Bruising (left arm) present.      Comments: Multiple acute and chronic , circular ulcers in upper and lower extremities   Psychiatric:         Mood and Affect: Mood normal.         Behavior: Behavior normal.         Thought Content: Thought content normal.            I have reviewed all pertinent lab results within the past 24 hours.  CBC:   Recent Labs   Lab 09/21/24 0234   WBC 10.12   RBC 3.26*   HGB 8.7*   HCT 26.7*   *   MCV 82   MCH 26.7*   MCHC 32.6     CMP:   Recent Labs   Lab 09/21/24 0234 09/21/24  0817    98   CALCIUM 7.2* 7.3*   ALBUMIN 1.2*  --    PROT 4.9*  --     137   K 3.4* 4.2   CO2 28 26    101   BUN 7 7   CREATININE 0.6 0.6   ALKPHOS 123  --    ALT 15  --    AST 16  --    BILITOT 2.1*  --        Significant Diagnostics:  I have reviewed all pertinent imaging results/findings within the past 24 hours.        ABG  Recent Labs   Lab 09/19/24  1509   PH 7.416   PO2 33*   PCO2 43.3   HCO3 27.8   BE 3*     Assessment/Plan:     Psychiatric  History of drug use  Patient with history of drug misuse disorder, following with methadone clinic. Endorses snorting methamphetamine and fentanyl regularly. Home regimen: Methadone 70 mg Q6H    - On methadone 70 QD  - Uptitrate pain regimen as needed given history of chronic opiate use  - Consider addiction psych  "consult    Cardiac/Vascular  Endocarditis  - BCx with MSSA bacteremia  - TTE with possible MV endocarditis  - continue oxacillin     ID  * Paraspinal abscess  See Osteomyelitis multiple sites    Epidural abscess  - s/p L3-L5 laminectomy with epidural abscess evacuation 9/19 by PATRICK    Sepsis  This patient does have evidence of infective focus  My overall impression is sepsis.  Source: Skin and Soft Tissue (location lefts psoas, SI joint, paraspinal abcess)  Antibiotics given-   Antibiotics (72h ago, onward)      Start     Stop Route Frequency Ordered    09/18/24 1345  oxacillin 12 g in  mL CONTINUOUS INFUSION         -- IV Every 24 hours (non-standard times) 09/18/24 1241          Latest lactate reviewed-  No results for input(s): "LACTATE", "POCLAC" in the last 72 hours.    Organ dysfunction indicated by Thrombocytopenia     Fluid challenge Not needed - patient is not hypotensive      Post- resuscitation assessment No - Post resuscitation assessment not needed       Will Not start Pressors- Levophed for MAP of 65      Other osteomyelitis, multiple sites  50y/o F with hx of being un-housed, T2DM, HTN and drug use transferred for spinal osteomyelitis, septic arthritis and Lt flank/ retroperitoneal abscess. Imaging studies of her lumbar spine and pelvis were concerning for osteomyelitis/septic arthritis of the left SI joint and L5-S1 along with an abnormal fluid collection extending from T11 through the left iliacus muscle concerning for abscess as well as at psoas.     - s/p L3-L5 laminectomy with epidural abscess evacuation 9/19  - BCx with MSSA bacteremia  - TTE with possible MV endocarditis  - ID consulted, currently on oxacillin for MSSA  - IR SI joint aspiration and abscess drain placement 9/21, pending cultures      Oncology  Anemia, unspecified  Patient with anemia upon lab review with a hemoglobin 6.9 post transfusion; likely multifactorial in the setting of chronic disease, deficiency and acute blood " loss. Initial CBC showing microcytic anemia with elevated ferritin of 2000     - Daily CBC   - Transfuse Hgb < 7      Endocrine  Refeeding syndrome  Patient has had reduced appetite for the past several months.  Labs today show low-sodium, no potassium, phosphorus, magnesium, and calcium (uncorrected) with an elevated LDH and Ferritin. Being managed as a potential Refeeding syndrome:     PLAN:  - IV Thiamine,   - Monitor daily weights  - Monitor I/O  - Continuous cardiac monitoring  - Diet and Nutrition consult  - CBC BID; trend and replete  - CMP BID; trend and replete.    Uncontrolled type 2 diabetes mellitus with hyperglycemia  Home regimen: metformin BID and glipizide BID    - Holding oral home medications while inpatient  - On MD SSI      GI  Hepatitis C  - Hep C antibody positive  - per ID, hep C quant showed HepC RNA not detected      Psoas muscle abscess  - IR SI joint aspiration and abscess drain placement 9/21, pending cultures         Other  Smoker  - Ordered nicotine patch QD       Critical Care Daily Checklist:    A: Awake: RASS Goal/Actual Goal: RASS Goal: -2-->light sedation  Actual:     B: Spontaneous Breathing Trial Performed?  no   C: SAT & SBT Coordinated?  no                      D: Delirium: CAM-ICU Overall CAM-ICU: Positive   E: Early Mobility Performed? No   F: Feeding Goal: Goals: Meet % EEN/EPN by RD follow up.  Status: Nutrition Goal Status: new   Current Diet Order   Procedures    Diet NPO Except for: Medication, Sips with Medication     Order Specific Question:   Except for:     Answer:   Medication     Order Specific Question:   Except for:     Answer:   Sips with Medication      AS: Analgesia/Sedation Dilaudid, lidocaine, fentanyl   T: Thromboembolic Prophylaxis none   H: HOB > 300 Yes   U: Stress Ulcer Prophylaxis (if needed) protonix   G: Glucose Control controlled   B: Bowel Function     I: Indwelling Catheter (Lines & Bland) Necessity Trialysis, bland, PIV, OG, wound drains    D: De-escalation of Antimicrobials/Pharmacotherapies On oxacillin, discontinued clinda    Plan for the day/ETD Follow up cultures    Code Status:  Family/Goals of Care: Full Code       Critical secondary to Patient has a condition that poses threat to life and bodily function: Sepsis      Critical care was time spent personally by me on the following activities: development of treatment plan with patient or surrogate and bedside caregivers, discussions with consultants, evaluation of patient's response to treatment, examination of patient, ordering and performing treatments and interventions, ordering and review of laboratory studies, ordering and review of radiographic studies, pulse oximetry, re-evaluation of patient's condition. This critical care time did not overlap with that of any other provider or involve time for any procedures.     Melisa Hernández MD  Critical Care Medicine  [facility] - Medical ICU

## 2024-09-21 NOTE — ASSESSMENT & PLAN NOTE
Patient with history of drug misuse disorder, following with methadone clinic. Endorses snorting methamphetamine and fentanyl regularly. Home regimen: Methadone 70 mg Q6H    - On methadone 70 QD  - Uptitrate pain regimen as needed given history of chronic opiate use  - Consider addiction psych consult

## 2024-09-21 NOTE — ASSESSMENT & PLAN NOTE
"This patient does have evidence of infective focus  My overall impression is sepsis.  Source: Skin and Soft Tissue (location lefts psoas, SI joint, paraspinal abcess)  Antibiotics given-   Antibiotics (72h ago, onward)    Start     Stop Route Frequency Ordered    09/18/24 1345  oxacillin 12 g in  mL CONTINUOUS INFUSION         -- IV Every 24 hours (non-standard times) 09/18/24 1241        Latest lactate reviewed-  No results for input(s): "LACTATE", "POCLAC" in the last 72 hours.    Organ dysfunction indicated by Thrombocytopenia     Fluid challenge Not needed - patient is not hypotensive      Post- resuscitation assessment No - Post resuscitation assessment not needed       Will Not start Pressors- Levophed for MAP of 65    "

## 2024-09-21 NOTE — SUBJECTIVE & OBJECTIVE
Interval history: : OR cultures growing staph aureus. Patient still intubated. Pending IR aspiration of psoas abscess still. MRI C and T spine completed and did not show any further infection      Medications Prior to Admission   Medication Sig Dispense Refill Last Dose    gabapentin (NEURONTIN) 300 MG capsule Take 300 mg by mouth 3 (three) times daily.   2024    hydrOXYzine pamoate (VISTARIL) 25 MG Cap Take 25 mg by mouth 3 (three) times daily.   2024    metFORMIN (GLUCOPHAGE) 500 MG tablet Take 500 mg by mouth 2 (two) times daily with meals.   2024    methadone (DOLOPHINE) 10 MG tablet Take 70 mg by mouth Daily.   Past Week    cloNIDine (CATAPRES) 0.2 MG tablet Take 0.2 mg by mouth 3 (three) times daily.       glipiZIDE (GLUCOTROL) 5 MG tablet Take 5 mg by mouth 2 (two) times daily.          Review of patient's allergies indicates:  No Known Allergies    Past Medical History:   Diagnosis Date    Diabetes mellitus     Hypertension     Unspecified viral hepatitis C without hepatic coma      Past Surgical History:   Procedure Laterality Date     SECTION      LAMINECTOMY N/A 2024    Procedure: L3-L5 laminectomy with epidural abscess evac;  Surgeon: Sourav Jim DO;  Location: [address];  Service: Neurosurgery;  Laterality: N/A;    MAGNETIC RESONANCE IMAGING N/A 2024    Procedure: MRI (Magnetic Resonance Imagine);  Surgeon: Dolores Storey;  Location: [facility];  Service: Anesthesiology;  Laterality: N/A;  conscious sedaation MRI lumbar spine w/ and without contrast    WASHOUT, WOUND, SPINE  2024    Procedure: WASHOUT, WOUND, SPINE;  Surgeon: Suorav Jim DO;  Location: [address];  Service: Neurosurgery;;     Family History    None       Tobacco Use    Smoking status: Every Day     Current packs/day: 0.50     Types: Cigarettes    Smokeless tobacco: Never   Substance and Sexual Activity    Alcohol use: Not Currently    Drug use: Not Currently    Sexual  activity: Not on file     Review of Systems    Objective:     Weight: 65.2 kg (143 lb 11.8 oz)  Body mass index is 26.29 kg/m².  Vital Signs (Most Recent):  Temp: 98.2 °F (36.8 °C) (09/21/24 1200)  Pulse: 83 (09/21/24 1200)  Resp: 18 (09/21/24 1200)  BP: (!) 90/58 (09/21/24 1200)  SpO2: 95 % (09/21/24 1200) Vital Signs (24h Range):  Temp:  [98.2 °F (36.8 °C)-99.8 °F (37.7 °C)] 98.2 °F (36.8 °C)  Pulse:  [79-97] 83  Resp:  [15-33] 18  SpO2:  [91 %-99 %] 95 %  BP: ()/(56-81) 90/58     Date 09/21/24 0700 - 09/22/24 0659   Shift 3262-5219 4789-1097 6931-8689 24 Hour Total   INTAKE   Shift Total(mL/kg)       OUTPUT   Urine(mL/kg/hr) 1000   1000   Shift Total(mL/kg) 1000(15.3)   1000(15.3)   Weight (kg) 65.2 65.2 65.2 65.2                Vent Mode: A/C  Oxygen Concentration (%):  [40-50] 40  Resp Rate Total:  [18 br/min-21 br/min] 18 br/min  Vt Set:  [390 mL] 390 mL  PEEP/CPAP:  [7 cmH20-8 cmH20] 8 cmH20  Mean Airway Pressure:  [12 hvN42-53 cmH20] 13 cmH20        Female External Urinary Catheter w/ Suction 09/16/24 2345 (Active)   Skin no redness;no breakdown 09/18/24 0701   Tolerance no signs/symptoms of discomfort 09/18/24 0701   Suction Continuous suction at 60 mmHg 09/18/24 0701   Date of last wick change 09/17/24 09/17/24 2300   Time of last wick change 2328 09/17/24 2300   Output (mL) 400 mL 09/18/24 0701          Physical Exam    NEURO:     E2VTM5  Intubated, sedated on fentanyl and prop  Bsi, PERRL  Spont/Loc x4 AG      Neurosurgery Physical Exam    Significant Labs:  Recent Labs   Lab 09/19/24  1808 09/20/24  0256 09/20/24  0930 09/20/24  1759 09/21/24  0234 09/21/24  0817   * 84   < > 71 108 98   * 138   < > 140 137 137   K 4.5 3.5   < > 3.8 3.4* 4.2    106   < > 105 100 101   CO2 26 26   < > 27 28 26   BUN 10 10   < > 8 7 7   CREATININE 0.6 0.6   < > 0.6 0.6 0.6   CALCIUM 7.4* 7.1*   < > 7.0* 7.2* 7.3*   MG 1.9 1.7  --   --  1.5*  --     < > = values in this interval not displayed.  "    Recent Labs   Lab 09/19/24  1509 09/20/24  0256 09/21/24  0234   WBC  --  10.69 10.12   HGB  --  8.3* 8.7*   HCT 27* 25.8* 26.7*   PLT  --  118* 123*     No results for input(s): "LABPT", "INR", "APTT" in the last 48 hours.    Microbiology Results (last 7 days)       Procedure Component Value Units Date/Time    Culture, Anaerobe [[id number]] Collected: 09/19/24 1515    Order Status: Completed Specimen: Abscess from Back Updated: 09/21/24 1201     Anaerobic Culture Culture in progress    Narrative:      Subfascial abscess    Aerobic culture [[id number]]  (Abnormal)  (Susceptibility) Collected: 09/19/24 1527    Order Status: Completed Specimen: Abscess from Back Updated: 09/21/24 1044     Aerobic Bacterial Culture STAPHYLOCOCCUS AUREUS  Moderate      Narrative:      Epidural abscess    Aerobic culture [[id number]]  (Abnormal)  (Susceptibility) Collected: 09/19/24 1515    Order Status: Completed Specimen: Abscess from Back Updated: 09/21/24 1027     Aerobic Bacterial Culture STAPHYLOCOCCUS AUREUS  Moderate      Narrative:      Subfascial abscess    Blood culture [[id number]]  (Abnormal) Collected: 09/18/24 0045    Order Status: Completed Specimen: Blood from Peripheral, Antecubital, Left Updated: 09/21/24 0732     Blood Culture, Routine Gram stain aer bottle: Gram positive cocci in clusters resembling Staph      Results called to and read back by: Chema 09/19/2024  01:39      STAPHYLOCOCCUS AUREUS  [facility] consult [facility] [place] [facility]  For susceptibility see order #[id number]      Blood culture [[id number]]  (Abnormal) Collected: 09/19/24 1033    Order Status: Completed Specimen: Blood from Peripheral, Lower Arm, Left Updated: 09/21/24 0703     Blood Culture, Routine Gram stain aer bottle: Gram positive cocci in clusters resembling Staph      Results called to and read back by:Mehreen Vital 09/20/2024  13:40      STAPHYLOCOCCUS AUREUS  Susceptibility pending  ID consult required at [facility] [place] [facility] " [facility] and [facility] locations.      AFB Culture & Smear [[id number]] Collected: 09/19/24 1527    Order Status: Completed Specimen: Abscess from Back Updated: 09/20/24 2127     AFB Culture & Smear Culture in progress     AFB CULTURE STAIN No acid fast bacilli seen.    Narrative:      Epidural abscess    AFB Culture & Smear [[id number]] Collected: 09/19/24 1515    Order Status: Completed Specimen: Abscess from Back Updated: 09/20/24 2127     AFB Culture & Smear Culture in progress     AFB CULTURE STAIN No acid fast bacilli seen.    Narrative:      Subfascial abscess    Blood culture [[id number]]  (Abnormal) Collected: 09/18/24 0045    Order Status: Completed Specimen: Blood from Peripheral, Antecubital, Left Updated: 09/20/24 0920     Blood Culture, Routine Gram stain aer bottle: Gram positive cocci in clusters resembling Staph      Results called to and read back by:Chema Oconnor RN 09/18/2024  20:18      STAPHYLOCOCCUS AUREUS  ID consult required at [facility].  For susceptibility see order #[id number]      Culture, Anaerobe [[id number]] Collected: 09/19/24 1527    Order Status: Completed Specimen: Abscess from Back Updated: 09/20/24 0637     Anaerobic Culture Culture in progress    Narrative:      Epidural abscess    Gram stain [[id number]] Collected: 09/19/24 1515    Order Status: Completed Specimen: Abscess from Back Updated: 09/19/24 1908     Gram Stain Result Rare WBC's      Few Gram positive cocci    Narrative:      Subfascial abscess    Gram stain [[id number]] Collected: 09/19/24 1527    Order Status: Completed Specimen: Abscess from Back Updated: 09/19/24 1906     Gram Stain Result Rare WBC's      Few Gram positive cocci      Rare Gram positive rods    Narrative:      Epidural abscess    Fungus culture [[id number]] Collected: 09/19/24 1527    Order Status: Sent Specimen: Abscess from Back Updated: 09/19/24 1536    Fungus culture [[id number]] Collected: 09/19/24 1515     Order Status: Sent Specimen: Abscess from Back Updated: 09/19/24 1525    Blood Culture #2 **CANNOT BE ORDERED STAT** [[id number]]  (Abnormal) Collected: 09/16/24 1145    Order Status: Completed Specimen: Blood from Peripheral, Wrist, Right Updated: 09/19/24 1002     Blood Culture, Routine Gram stain felix bottle: Gram positive cocci in clusters resembling Staph      Results called to and read back by: FATIMAH Mcnulty. 09/17/2024  03:48      Gram stain aer bottle: Gram positive cocci in clusters resembling Staph      Positive results previously called 09/17/2024  06:05      STAPHYLOCOCCUS AUREUS  ID consult required at [facility].  For susceptibility see order #[id number]      Blood culture #1 **CANNOT BE ORDERED STAT** [[id number]]  (Abnormal)  (Susceptibility) Collected: 09/16/24 1054    Order Status: Completed Specimen: Blood from Peripheral, Antecubital, Right Updated: 09/19/24 1001     Blood Culture, Routine Gram stain aer bottle: Gram positive cocci in clusters resembling Staph      Gram stain felix bottle: Gram positive cocci in clusters resembling Staph      Results called to and read back by: FATIMAH Mcnulty. 09/17/2024  03:43      STAPHYLOCOCCUS AUREUS  ID consult required at [facility].      Blood culture [[id number]]     Order Status: Canceled Specimen: Blood     MRSA/SA Rapid ID by PCR from Blood culture [[id number]]  (Abnormal) Collected: 09/16/24 1054    Order Status: Completed Updated: 09/17/24 0455     Staph aureus ID by PCR Positive     Methicillin Resistant ID by PCR Negative    Blood culture #2 **CANNOT BE ORDERED STAT** [[id number]]     Order Status: Canceled Specimen: Blood           All pertinent labs from the last 24 hours have been reviewed.    Significant Diagnostics:  I have reviewed all pertinent imaging results/findings within the past 24 hours.  I have reviewed and interpreted all pertinent imaging results/findings within the past 24  hours.  MRI Lumbar Spine W WO Cont    Result Date: 9/17/2024  1. Complex fluid collection possibly extending from the left SI joint to the left iliac fossa displacing the left psoas muscle medially. This is similar to the study 1 day prior could represent infectious left sacroiliitis/osteomyelitis and adjacent abscess. The collection extends superiorly to approximately T12 level at the left posterior retroperitoneum, posterior to the left kidney. Enhancement noted to much of the sacrum including right of midline again could represent osteomyelitis. Follow-up recommended. Recommend surgical consultation and follow-up. 2. Multilevel degenerative changes most prominent at L5-S1.  See above comments. 3. This report was flagged in Epic as abnormal. Electronically signed by: Edgard Martinez Date:    09/17/2024 Time:    15:53   X-Ray Chest AP Portable    Result Date: 9/19/2024  ET tube placed with tip 1.8 cm above the laura. Right IJ catheter tip remains overlying the right atrium, similar to prior. Bilateral perihilar infiltrates/edema. Electronically signed by: Tod Moran MD Date:    09/19/2024 Time:    01:28    MRI Lumbar Spine W WO Cont    Result Date: 9/18/2024  1. Complex multi lobular fluid collection extending from the left SI joint to the left iliac fossa and paravertebral region displacing the left psoas muscle medially.  This measures approximately 8.4 x 8.5 cm x 22.5 cm in craniocaudad dimension.  This is similar to the prior studies could represent infectious left sacroiliitis/osteomyelitis and adjacent abscess. The collection extends superiorly to approximately T12 level at the left posterior retroperitoneum, posterior to the left kidney. Enhancement noted to much of the sacrum including right of midline again could represent osteomyelitis. Follow-up is recommended.  Edema and complex collection extends posteriorly at the lateral margin of the field of view incompletely visualized to the subcutaneous  tissues posteriorly at approximately L2-3 level adjacent to the left posterosuperior gluteal musculature. There is mild displacement and mass effect on the posterolateral margin of the paraspinal musculature best seen on axial 22 of series 20 and series 19. Recommend surgical consultation and follow-up. 2.  Small epidural collections are noted adjacent to the thecal sac from approximately T12 to L5 suggesting epidural abscess.  Follow-up recommended.  See above comments. 2. Multilevel degenerative changes most prominent at L5-S1. See above comments. 3. This report was flagged in Epic as abnormal. Electronically signed by: Edgard Martinez Date:    09/18/2024 Time:    23:09    US Abdomen Complete    Result Date: 9/18/2024  Biliary sludge.  No evidence of cholecystitis or biliary obstruction. Hepatosplenomegaly. Electronically signed by: Ravi Trujillo Date:    09/18/2024 Time:    15:17

## 2024-09-21 NOTE — PLAN OF CARE
MSSA bacteremia treated as IE complication by cellulitis, psoas abscess and sacroiliac osteo as well as epidural involvement.  Now s/p decompression via laminectomy L3-L5 for evacuation of epidural abscess.  Drain placed in psoas abscess today with IR.  NSGY cultures with staph.  Unable to aspirate SI joint, so will follow-up with ortho for this.  Hoping for SAT/SBT in AM but on decent vent settings.  CXR with bilateral infiltrates.  Diuresing to aid with volume removal and vent liberation.

## 2024-09-21 NOTE — ANESTHESIA PREPROCEDURE EVALUATION
[facility]  Anesthesia Pre-Operative Evaluation    Patient Name: Mari Sloan  YOB: 1975  MRN: [id number]    SUBJECTIVE:     Pre-operative evaluation for IR Abscess Drainage    09/21/2024    Mari Sloan is a 49 y.o. female with HTN, T2DM, and drug use (on methadone) transferred for MSSA bacteremia complicated by spinal osteomyelitis, septic arthritis of the left SI joint and Lt flank/ retroperitoneal abscess. On broad spectrum antibiotics. S/p L3-5 laminectomy and epidural abscess washout on 9/18.     Patient now presents for the above procedure(s).    Functional status/ Lifestyle factors:  - Positive for opioids on UDS. Admits to using methamphetamine and fentanyl    Revised Cardiac Risk Index   1. History of ischemic heart disease   2. History of congestive heart failure   3. History of cerebrovascular disease (stroke or transient ischemic attack)   4. History of diabetes requiring preoperative insulin use   5. Chronic kidney disease (creatinine > 2 mg/dL)   6. Undergoing suprainguinal vascular, intraperitoneal, or intrathoracic surgery     Risk for cardiac death, nonfatal myocardial infarction, and nonfatal cardiac arrest     0 predictors = 3.9%, 1 predictor = 6.0%, 2 predictors = 10.1%, >=3 predictors = >15%    RCRI Calculator Class and Risk percentage:                               2D ECHO:  TTE:  Results for orders placed during the hospital encounter of 09/16/24    Echo Saline Bubble? No    Interpretation Summary    Left Ventricle: The left ventricle is normal in size. Normal wall thickness. There is concentric remodeling. There is normal systolic function with a visually estimated ejection fraction of 60 - 65%. There is normal diastolic function.    Right Ventricle: Normal right ventricular cavity size. Systolic function is normal.    Right Atrium: Catheter present in the right atrium.    Mitral Valve: The mitral valve is structurally normal. Redundant chordal  structures noted. There is no stenosis. There is mild regurgitation.    Pulmonary Artery: The estimated pulmonary artery systolic pressure is 39 mmHg.    Cannot entirely exclude mitral vegetation vs redundant chordae.  If high clinical suspicion for endocarditis, would rx as such (not clear MARICHUY would be able to exclude vegetation based on TTE findings).      MARICHUY:  No results found for this or any previous visit.    LDA: None documented.  Percutaneous Central Line - Triple Lumen  09/16/24 1700 Internal Jugular Right (Active)   Line Necessity Review Frequent Blood Draws 09/20/24 1101   Verification by X-ray Yes 09/20/24 1901   Site Assessment No redness;No swelling;No warmth 09/21/24 1200   Line Securement Device Secured with sutureless device 09/21/24 0800   Dressing Type CHG impregnated dressing/sponge;Central line dressing;Transparent (Tegaderm) 09/21/24 1200   Dressing Status Clean;Dry;Intact 09/21/24 1200   Dressing Intervention Integrity maintained 09/21/24 1200   Date on Dressing 09/17/24 09/20/24 1901   Dressing Due to be Changed 09/24/24 09/21/24 0301   Distal Patency/Care infusing 09/21/24 1200   Medial 1 Patency/Care infusing 09/21/24 1200   Proximal 1 Patency/Care infusing 09/21/24 1200   Waveform Not being transduced 09/21/24 0301   Number of days: 4            Peripheral IV - Single Lumen 09/19/24 1110 18 G 1 3/4 in No Anterior;Right Upper Arm (Active)   Site Assessment Clean;Dry;Intact 09/21/24 1200   Extremity Assessment Distal to IV No abnormal discoloration 09/21/24 1200   Line Status Blood return noted 09/21/24 1200   Dressing Status Dry;Clean;Intact 09/21/24 1200   Dressing Intervention Integrity maintained 09/21/24 1200   Dressing Change Due 09/23/24 09/20/24 1701   Site Change Due 09/23/24 09/20/24 1101   Reason Not Rotated Not due 09/20/24 1701   Number of days: 2            Closed/Suction Drain 09/19/24 1549 Left Back Accordion 10 Fr. (Active)   Site Description Unable to view 09/21/24 1200  "  Dressing Type Transparent (Tegaderm) 09/21/24 1200   Dressing Status Clean;Dry;Intact 09/21/24 1200   Dressing Intervention Integrity maintained 09/21/24 1200   Drainage Serosanguineous 09/21/24 1200   Status Other (Comment) 09/21/24 1200   Output (mL) 5 mL 09/21/24 0640   Number of days: 1            Closed/Suction Drain 09/19/24 1550 Right Back Accordion 10 Fr. (Active)   Site Description Unable to view 09/21/24 1200   Dressing Type CHG impregnated dressing/sponge;Central line dressing;Transparent (Tegaderm) 09/21/24 1200   Dressing Status Dry;Clean;Intact 09/21/24 1200   Dressing Intervention Integrity maintained 09/21/24 1200   Drainage Sanguineous 09/21/24 1200   Status Other (Comment) 09/21/24 1200   Output (mL) 20 mL 09/21/24 0640   Number of days: 1            NG/OG Tube 09/19/24 0515 orogastric 14 Fr. Center mouth (Active)   Placement Check placement verified by x-ray;placement verified by aspirate characteristics 09/21/24 0301   Tolerance no signs/symptoms of discomfort 09/21/24 0301   Securement secured to commercial device 09/21/24 0301   Clamp Status/Tolerance clamped;no emesis 09/21/24 0301   Intake (mL) 0 mL 09/20/24 1801   Water Bolus (mL) 0 mL 09/20/24 1801   Residual Amount (ml) 20 ml 09/20/24 0701   Number of days: 2            Urethral Catheter 09/19/24 1800 Silicone 16 Fr. (Active)   $ López Insertion Bedside Insertion Performed 09/19/24 1845   Site Assessment Clean;Dry;Intact 09/21/24 0301   Collection Container Urimeter 09/21/24 0301   Securement Method secured to top of thigh w/ adhesive device 09/21/24 0301   Catheter Care Performed yes 09/21/24 0301   Reason for Continuing Urinary Catheterization Urinary retention;Critically ill in ICU and requiring hourly monitoring of intake/output 09/20/24 1701   CAUTI Prevention Bundle Securement Device in place with 1" slack;Intact seal between catheter & drainage tubing;Drainage bag/urimeter off the floor;Sheeting clip in use;No dependent loops or " kinks;Drainage bag/urimeter not overfilled (<2/3 full);Drainage bag/urimeter below bladder 09/20/24 1901   Output (mL) 205 mL 09/21/24 1200   Number of days: 1       Prev airway:     Date/Time: 9/18/2024 8:49 PM     Performed by: Jermaine Walker  Authorized by: Génesis Clarke MD    Intubation:     Induction:  Intravenous    Intubated:  Postinduction    Mask Ventilation:  Not attempted    Attempts:  1    Attempted By:  Student    Method of Intubation:  Video laryngoscopy    Blade:  Umaña 3    Laryngeal View Grade: Grade I - full view of cords      Difficult Airway Encountered?: No      Complications:  None    Airway Device:  Oral endotracheal tube    Airway Device Size:  7.0    Style/Cuff Inflation:  Cuffed (inflated to minimal occlusive pressure)    Tube secured:  22    Secured at:  The lips    Placement Verified By:  Capnometry    Complicating Factors:  None    Findings Post-Intubation:  BS equal bilateral and atraumatic/condition of teeth unchanged    Drips: None documented.      Patient Active Problem List   Diagnosis    Other osteomyelitis, multiple sites    Psoas muscle abscess    Hypokalemia    History of drug use    Smoker    Uncontrolled type 2 diabetes mellitus with hyperglycemia    Anemia, unspecified    Paraspinal abscess    Hepatitis C    Severe sepsis    Thrombocytopenia    Hyperglycemia    Sepsis    Staphylococcus aureus bacteremia    Endocarditis    Refeeding syndrome    Epidural abscess    Mild malnutrition       Review of patient's allergies indicates:  No Known Allergies    Current Inpatient Medications:      Antibiotics (From admission, onward)      None            VTE Risk Mitigation (From admission, onward)      None            Current Facility-Administered Medications on File Prior to Visit   Medication Dose Route Frequency Provider Last Rate Last Admin    acetaminophen tablet 1,000 mg  1,000 mg Per OG tube Q6H PRN Bentley Connell MD        albuterol-ipratropium 2.5 mg-0.5 mg/3 mL  nebulizer solution 3 mL  3 mL Nebulization Q4H PRN Sisi Green MD        cloNIDine tablet 0.2 mg  0.2 mg Per OG tube TID Melisa Hernández MD   0.2 mg at 09/21/24 0808    dextrose 10% bolus 125 mL 125 mL  12.5 g Intravenous PRN Sisi Green MD        dextrose 10% bolus 125 mL 125 mL  12.5 g Intravenous PRN TobalEdmond, DO   Stopped at 09/20/24 1858    fentaNYL 2500 mcg in 0.9% sodium chloride 250 mL infusion premix  0-300 mcg/hr Intravenous Continuous Bentley Connell MD 10 mL/hr at 09/21/24 1200 100 mcg/hr at 09/21/24 1200    fentaNYL 50 mcg/mL injection 50 mcg  50 mcg Intravenous Q1H PRN Brendan Chiang MD   50 mcg at 09/19/24 1702    folic acid tablet 1 mg  1 mg Per OG tube Daily Melisa Hernández MD   1 mg at 09/21/24 0808    furosemide injection 40 mg  40 mg Intravenous Q12H Adelita Barnes MD   40 mg at 09/21/24 0809    gabapentin capsule 300 mg  300 mg Per OG tube TID Melisa Hernández MD   300 mg at 09/21/24 0809    glucagon (human recombinant) injection 1 mg  1 mg Intramuscular PRN Sisi Green MD        hydrOXYzine pamoate capsule 25 mg  25 mg Per OG tube QHS Melisa Hernández MD   25 mg at 09/20/24 2116    hydrOXYzine pamoate capsule 50 mg  50 mg Oral Q6H PRN Sisi Green MD   50 mg at 09/17/24 0116    insulin aspart U-100 pen 0-10 Units  0-10 Units Subcutaneous Q6H PRN Sisi Green MD   2 Units at 09/18/24 1642    insulin glargine U-100 (Lantus) pen 20 Units  20 Units Subcutaneous QHS Bentley Connell MD        LIDOcaine 5 % patch 1 patch  1 patch Transdermal Q24H Sisi Green MD   1 patch at 09/19/24 0211    loperamide capsule 2 mg  2 mg Oral QID PRN Sisi Green MD        melatonin tablet 6 mg  6 mg Oral Nightly PRN Sisi Green MD   6 mg at 09/17/24 0116    methadone tablet 70 mg  70 mg Per OG tube Daily Melisa Hernández MD   70 mg at 09/21/24 0758    naloxone 0.4 mg/mL injection 0.4 mg  0.4 mg Intravenous PRN Sisi Green MD        ondansetron injection 4 mg  4 mg  Intravenous Q6H PRN Sisi Green MD   4 mg at 24 2309    oxacillin 12 g in  mL CONTINUOUS INFUSION  12 g Intravenous Q24H Lashawn Barbosa DO 20.8 mL/hr at 24 1200 Rate Verify at 24 1200    oxyCODONE immediate release tablet Tab 10 mg  10 mg Per OG tube Q4H PRN Bentley Connell MD        pantoprazole injection 40 mg  40 mg Intravenous Daily Bentley Connell MD   40 mg at 24 0810    polyethylene glycol packet 17 g  17 g Oral Daily Melisa Hernández MD   17 g at 24 1102    propofol (DIPRIVAN) 10 mg/mL infusion  0-50 mcg/kg/min Intravenous Continuous Brendan Chiang MD 13.7 mL/hr at 24 1200 35 mcg/kg/min at 24 1200    senna-docusate 8.6-50 mg per tablet 1 tablet  1 tablet Per OG tube Daily PRN Bentley Connell MD        sodium chloride 0.9% flush 10 mL  10 mL Intravenous Q12H PRN Sisi Green MD        thiamine tablet 100 mg  100 mg Per OG tube Daily [facility]   100 mg at 24 0808     Current Outpatient Medications on File Prior to Visit   Medication Sig Dispense Refill    cloNIDine (CATAPRES) 0.2 MG tablet Take 0.2 mg by mouth 3 (three) times daily.      gabapentin (NEURONTIN) 300 MG capsule Take 300 mg by mouth 3 (three) times daily.      glipiZIDE (GLUCOTROL) 5 MG tablet Take 5 mg by mouth 2 (two) times daily.      hydrOXYzine pamoate (VISTARIL) 25 MG Cap Take 25 mg by mouth 3 (three) times daily.      metFORMIN (GLUCOPHAGE) 500 MG tablet Take 500 mg by mouth 2 (two) times daily with meals.      methadone (DOLOPHINE) 10 MG tablet Take 70 mg by mouth Daily.         Past Surgical History:   Procedure Laterality Date     SECTION      LAMINECTOMY N/A 2024    Procedure: L3-L5 laminectomy with epidural abscess evac;  Surgeon: Sourav Jim DO;  Location: [facility] OR [address] [place] [address];  Service: Neurosurgery;  Laterality: N/A;    MAGNETIC RESONANCE IMAGING N/A 2024    Procedure: MRI (Magnetic Resonance Imagine);  Surgeon: Dolores Storey;   Location: [facility];  Service: Anesthesiology;  Laterality: N/A;  conscious sedaation MRI lumbar spine w/ and without contrast    WASHOUT, WOUND, SPINE  9/19/2024    Procedure: WASHOUT, WOUND, SPINE;  Surgeon: Sourav Jim DO;  Location: [facility] OR [address] [place] [address];  Service: Neurosurgery;;       Social History     Socioeconomic History    Marital status:    Tobacco Use    Smoking status: Every Day     Current packs/day: 0.50     Types: Cigarettes    Smokeless tobacco: Never   Substance and Sexual Activity    Alcohol use: Not Currently    Drug use: Not Currently     Social Determinants of Health     Financial Resource Strain: Low Risk  (9/18/2024)    Overall Financial Resource Strain (CARDIA)     Difficulty of Paying Living Expenses: Not very hard   Food Insecurity: No Food Insecurity (9/18/2024)    Hunger Vital Sign     Worried About Running Out of Food in the Last Year: Never true     Ran Out of Food in the Last Year: Never true   Transportation Needs: Unmet Transportation Needs (9/18/2024)    TRANSPORTATION NEEDS     Transportation : Yes, it has kept me from medical appointments or from getting my medications.   Physical Activity: Patient Declined (9/18/2024)    Exercise Vital Sign     Days of Exercise per Week: Patient declined     Minutes of Exercise per Session: Patient declined   Stress: Stress Concern Present (9/18/2024)    [employer] - Occupational Stress Questionnaire     Feeling of Stress : Rather much   Housing Stability: Low Risk  (9/18/2024)    Housing Stability Vital Sign     Unable to Pay for Housing in the Last Year: No     Homeless in the Last Year: No       OBJECTIVE:     Vital Signs Range (Last 24H):  Temp:  [36.8 °C (98.2 °F)-37.3 °C (99.2 °F)]   Pulse:  [79-97]   Resp:  [15-33]   BP: ()/(56-81)   SpO2:  [93 %-99 %]       Significant Labs:  Lab Results   Component Value Date    WBC 10.12 09/21/2024    HGB 8.7 (L) 09/21/2024    HCT 26.7 (L) 09/21/2024    PLT  123 (L) 09/21/2024    CHOL 203 (H) 06/02/2023    TRIG 137 09/19/2024    HDL 28 (L) 06/02/2023    ALT 15 09/21/2024    AST 16 09/21/2024     09/21/2024    K 4.2 09/21/2024     09/21/2024    CREATININE 0.6 09/21/2024    BUN 7 09/21/2024    CO2 26 09/21/2024    INR 1.4 (H) 09/19/2024    HGBA1C 7.9 (H) 09/17/2024       Diagnostic Studies: No relevant studies.    EKG:   Results for orders placed or performed during the hospital encounter of 09/16/24   EKG 12-lead    Collection Time: 09/16/24  4:42 PM   Result Value Ref Range    QRS Duration 96 ms    OHS QTC Calculation 438 ms    Narrative    Test Reason : E87.6,    Vent. Rate : 100 BPM     Atrial Rate : 100 BPM     P-R Int : 138 ms          QRS Dur : 096 ms      QT Int : 340 ms       P-R-T Axes : 060 062 095 degrees     QTc Int : 438 ms    Normal sinus rhythm  Nonspecific ST and T wave abnormality  Abnormal ECG    Confirmed by Ching Vines MD ([id number]) on 9/17/2024 7:20:39 AM    Referred By: [email or web address]   SELF           Confirmed By:Ching Vines MD         ASSESSMENT/PLAN:       Pre-op Assessment    I have reviewed the Patient Summary Reports.     I have reviewed the Nursing Notes. I have reviewed the NPO Status.   I have reviewed the Medications.     Review of Systems  Anesthesia Hx:  No problems with previous Anesthesia   History of prior surgery of interest to airway management or planning:  Previous anesthesia: General        Denies Family Hx of Anesthesia complications.    Denies Personal Hx of Anesthesia complications.                    Cardiovascular:     Hypertension                                  Hypertension         Hepatic/GI:      Liver Disease, Hepatitis        Liver Disease, Hepatitis        Neurological:    Neuromuscular Disease,                                 Neuromuscular Disease   Endocrine:  Diabetes    Diabetes                          Physical Exam  General: Intubated, sedated  Airway:  Mallampati: unable to assess   Mouth Opening:  Normal  TM Distance: Normal  Tongue: Normal  Pre-Existing Airway: Oral Endotracheal tube        Anesthesia Plan  Type of Anesthesia, risks & benefits discussed:    Anesthesia Type: Gen ETT  Intra-op Monitoring Plan: Standard ASA Monitors  Post Op Pain Control Plan: multimodal analgesia and IV/PO Opioids PRN  Induction:  IV  Airway Plan: Video and Direct, Post-Induction  ASA Score: 4  Day of Surgery Review of History & Physical: H&P Update referred to the surgeon/provider.  Anesthesia Plan Notes: Chart reviewed, patient examined.  Given that patient is intubated and sedated, Her spouse, Marcelino Sloan, was consented via phone. Paper consent in chart. The anesthetic plan was explained.  Risks, benefits, and alternatives were discussed. Questions were answered and the consent was signed.      Marcelle Carrillo MD      Ready For Surgery From Anesthesia Perspective.     .

## 2024-09-21 NOTE — PROCEDURES
"  Pre Op Diagnosis: retroperitoneal abscess and possible right SI joint infection  Post Op Diagnosis: Same    Procedure: Drain placement and aspiration    Procedure performed by: Angela    Written Informed Consent Obtained: Yes  Specimen Removed: YES 100cc of purelent fluid from the retroperitoneal abscess  Estimated Blood Loss: Minimal    Findings:   Successful placement of 10 Fr drain in left sided retroperitoneal abscess. Aspiration attempted in left SI joint, but no fluid could be obtained. Leave drain in place until less than 10cc of fluid is aspirated for two days.     Patient tolerated procedure well.    Cornel Miller MD ([facility])  Interventional Radiology  [phone]      "

## 2024-09-21 NOTE — ASSESSMENT & PLAN NOTE
48y/o F with hx of being un-housed, T2DM, HTN and drug use transferred for spinal osteomyelitis, septic arthritis and Lt flank/ retroperitoneal abscess. Imaging studies of her lumbar spine and pelvis were concerning for osteomyelitis/septic arthritis of the left SI joint and L5-S1 along with an abnormal fluid collection extending from T11 through the left iliacus muscle concerning for abscess as well as at psoas.     - s/p L3-L5 laminectomy with epidural abscess evacuation 9/19  - BCx with MSSA bacteremia  - TTE with possible MV endocarditis  - ID consulted, currently on oxacillin for MSSA  - IR SI joint aspiration and abscess drain placement 9/21, pending cultures

## 2024-09-21 NOTE — ASSESSMENT & PLAN NOTE
Patient with anemia upon lab review with a hemoglobin 6.9 post transfusion; likely multifactorial in the setting of chronic disease, deficiency and acute blood loss. Initial CBC showing microcytic anemia with elevated ferritin of 2000     - Daily CBC   - Transfuse Hgb < 7

## 2024-09-21 NOTE — PROGRESS NOTES
"No change in patients status.    ASA: 3  MALL: 3    Will proceed with abscess drain placemtn and aspiration of left SI joint.    Cornel Miller MD ([facility])  Interventional Radiology        "

## 2024-09-21 NOTE — ASSESSMENT & PLAN NOTE
The likely etiology of thrombocytopenia is sepsis. The patients 3 most recent labs are listed below.  Recent Labs     09/18/24  1217 09/19/24  0319 09/20/24  0256   * 96* 118*     Plan  - Will transfuse if platelet count is <50k (if undergoing surgical procedure or have active bleeding).

## 2024-09-21 NOTE — ASSESSMENT & PLAN NOTE
Hep C ab positive. Hepatitis C RNA not detected. Possible cleared infection if not previously treated.  Outpatient Hepatology follow up.

## 2024-09-22 LAB
ALBUMIN SERPL BCP-MCNC: 1.1 G/DL (ref 3.5–5.2)
ALP SERPL-CCNC: 117 U/L (ref 55–135)
ALT SERPL W/O P-5'-P-CCNC: 15 U/L (ref 10–44)
ANION GAP SERPL CALC-SCNC: 10 MMOL/L (ref 8–16)
ANION GAP SERPL CALC-SCNC: 10 MMOL/L (ref 8–16)
ANION GAP SERPL CALC-SCNC: 13 MMOL/L (ref 8–16)
ANISOCYTOSIS BLD QL SMEAR: SLIGHT
AST SERPL-CCNC: 13 U/L (ref 10–40)
BACTERIA BLD CULT: ABNORMAL
BASOPHILS # BLD AUTO: 0.1 K/UL (ref 0–0.2)
BASOPHILS NFR BLD: 1.1 % (ref 0–1.9)
BILIRUB SERPL-MCNC: 1.8 MG/DL (ref 0.1–1)
BUN SERPL-MCNC: 7 MG/DL (ref 6–20)
BUN SERPL-MCNC: 7 MG/DL (ref 6–20)
BUN SERPL-MCNC: 8 MG/DL (ref 6–20)
CALCIUM SERPL-MCNC: 7.4 MG/DL (ref 8.7–10.5)
CALCIUM SERPL-MCNC: 7.5 MG/DL (ref 8.7–10.5)
CALCIUM SERPL-MCNC: 7.8 MG/DL (ref 8.7–10.5)
CHLORIDE SERPL-SCNC: 96 MMOL/L (ref 95–110)
CO2 SERPL-SCNC: 29 MMOL/L (ref 23–29)
CO2 SERPL-SCNC: 30 MMOL/L (ref 23–29)
CO2 SERPL-SCNC: 32 MMOL/L (ref 23–29)
CREAT SERPL-MCNC: 0.6 MG/DL (ref 0.5–1.4)
CREAT SERPL-MCNC: 0.6 MG/DL (ref 0.5–1.4)
CREAT SERPL-MCNC: 0.7 MG/DL (ref 0.5–1.4)
DACRYOCYTES BLD QL SMEAR: ABNORMAL
DIFFERENTIAL METHOD BLD: ABNORMAL
EOSINOPHIL # BLD AUTO: 0.3 K/UL (ref 0–0.5)
EOSINOPHIL NFR BLD: 3.5 % (ref 0–8)
ERYTHROCYTE [DISTWIDTH] IN BLOOD BY AUTOMATED COUNT: 17.9 % (ref 11.5–14.5)
EST. GFR  (NO RACE VARIABLE): >60 ML/MIN/1.73 M^2
GLUCOSE SERPL-MCNC: 109 MG/DL (ref 70–110)
GLUCOSE SERPL-MCNC: 134 MG/DL (ref 70–110)
GLUCOSE SERPL-MCNC: 146 MG/DL (ref 70–110)
HCT VFR BLD AUTO: 25.6 % (ref 37–48.5)
HGB BLD-MCNC: 8.2 G/DL (ref 12–16)
HYPOCHROMIA BLD QL SMEAR: ABNORMAL
IMM GRANULOCYTES # BLD AUTO: 0.41 K/UL (ref 0–0.04)
IMM GRANULOCYTES NFR BLD AUTO: 4.5 % (ref 0–0.5)
LYMPHOCYTES # BLD AUTO: 2 K/UL (ref 1–4.8)
LYMPHOCYTES NFR BLD: 21.7 % (ref 18–48)
MAGNESIUM SERPL-MCNC: 1.6 MG/DL (ref 1.6–2.6)
MCH RBC QN AUTO: 25.9 PG (ref 27–31)
MCHC RBC AUTO-ENTMCNC: 32 G/DL (ref 32–36)
MCV RBC AUTO: 81 FL (ref 82–98)
MONOCYTES # BLD AUTO: 0.4 K/UL (ref 0.3–1)
MONOCYTES NFR BLD: 4.8 % (ref 4–15)
NEUTROPHILS # BLD AUTO: 5.9 K/UL (ref 1.8–7.7)
NEUTROPHILS NFR BLD: 64.4 % (ref 38–73)
NRBC BLD-RTO: 0 /100 WBC
OVALOCYTES BLD QL SMEAR: ABNORMAL
PHOSPHATE SERPL-MCNC: 4.4 MG/DL (ref 2.7–4.5)
PLATELET # BLD AUTO: 153 K/UL (ref 150–450)
PMV BLD AUTO: 10.8 FL (ref 9.2–12.9)
POCT GLUCOSE: 120 MG/DL (ref 70–110)
POCT GLUCOSE: 131 MG/DL (ref 70–110)
POIKILOCYTOSIS BLD QL SMEAR: SLIGHT
POLYCHROMASIA BLD QL SMEAR: ABNORMAL
POTASSIUM SERPL-SCNC: 3 MMOL/L (ref 3.5–5.1)
POTASSIUM SERPL-SCNC: 3.6 MMOL/L (ref 3.5–5.1)
POTASSIUM SERPL-SCNC: 4 MMOL/L (ref 3.5–5.1)
PROT SERPL-MCNC: 4.9 G/DL (ref 6–8.4)
RBC # BLD AUTO: 3.17 M/UL (ref 4–5.4)
SODIUM SERPL-SCNC: 136 MMOL/L (ref 136–145)
SODIUM SERPL-SCNC: 138 MMOL/L (ref 136–145)
SODIUM SERPL-SCNC: 138 MMOL/L (ref 136–145)
WBC # BLD AUTO: 9.12 K/UL (ref 3.9–12.7)

## 2024-09-22 PROCEDURE — 63600175 PHARM REV CODE 636 W HCPCS: Performed by: STUDENT IN AN ORGANIZED HEALTH CARE EDUCATION/TRAINING PROGRAM

## 2024-09-22 PROCEDURE — 5A09357 ASSISTANCE WITH RESPIRATORY VENTILATION, LESS THAN 24 CONSECUTIVE HOURS, CONTINUOUS POSITIVE AIRWAY PRESSURE: ICD-10-PCS | Performed by: STUDENT IN AN ORGANIZED HEALTH CARE EDUCATION/TRAINING PROGRAM

## 2024-09-22 PROCEDURE — 94660 CPAP INITIATION&MGMT: CPT | Mod: XB

## 2024-09-22 PROCEDURE — 99900035 HC TECH TIME PER 15 MIN (STAT)

## 2024-09-22 PROCEDURE — 63600175 PHARM REV CODE 636 W HCPCS

## 2024-09-22 PROCEDURE — 25000003 PHARM REV CODE 250

## 2024-09-22 PROCEDURE — 85025 COMPLETE CBC W/AUTO DIFF WBC: CPT | Performed by: STUDENT IN AN ORGANIZED HEALTH CARE EDUCATION/TRAINING PROGRAM

## 2024-09-22 PROCEDURE — 25000003 PHARM REV CODE 250: Performed by: STUDENT IN AN ORGANIZED HEALTH CARE EDUCATION/TRAINING PROGRAM

## 2024-09-22 PROCEDURE — 94761 N-INVAS EAR/PLS OXIMETRY MLT: CPT

## 2024-09-22 PROCEDURE — 80048 BASIC METABOLIC PNL TOTAL CA: CPT | Mod: 91,XB | Performed by: STUDENT IN AN ORGANIZED HEALTH CARE EDUCATION/TRAINING PROGRAM

## 2024-09-22 PROCEDURE — 84100 ASSAY OF PHOSPHORUS: CPT

## 2024-09-22 PROCEDURE — 11000001 HC ACUTE MED/SURG PRIVATE ROOM

## 2024-09-22 PROCEDURE — 27000190 HC CPAP FULL FACE MASK W/VALVE

## 2024-09-22 PROCEDURE — 27100171 HC OXYGEN HIGH FLOW UP TO 24 HOURS

## 2024-09-22 PROCEDURE — 94003 VENT MGMT INPAT SUBQ DAY: CPT

## 2024-09-22 PROCEDURE — S4991 NICOTINE PATCH NONLEGEND: HCPCS

## 2024-09-22 PROCEDURE — 80053 COMPREHEN METABOLIC PANEL: CPT | Performed by: STUDENT IN AN ORGANIZED HEALTH CARE EDUCATION/TRAINING PROGRAM

## 2024-09-22 PROCEDURE — 99900026 HC AIRWAY MAINTENANCE (STAT)

## 2024-09-22 PROCEDURE — 99233 SBSQ HOSP IP/OBS HIGH 50: CPT | Mod: ,,, | Performed by: INTERNAL MEDICINE

## 2024-09-22 PROCEDURE — 83735 ASSAY OF MAGNESIUM: CPT

## 2024-09-22 PROCEDURE — 99291 CRITICAL CARE FIRST HOUR: CPT | Mod: ,,, | Performed by: STUDENT IN AN ORGANIZED HEALTH CARE EDUCATION/TRAINING PROGRAM

## 2024-09-22 RX ORDER — POTASSIUM CHLORIDE 29.8 MG/ML
40 INJECTION INTRAVENOUS ONCE
Status: COMPLETED | OUTPATIENT
Start: 2024-09-22 | End: 2024-09-22

## 2024-09-22 RX ORDER — ONDANSETRON HYDROCHLORIDE 2 MG/ML
8 INJECTION, SOLUTION INTRAVENOUS ONCE AS NEEDED
Status: COMPLETED | OUTPATIENT
Start: 2024-09-22 | End: 2024-09-28

## 2024-09-22 RX ORDER — HEPARIN SODIUM 5000 [USP'U]/ML
5000 INJECTION, SOLUTION INTRAVENOUS; SUBCUTANEOUS EVERY 8 HOURS
Status: DISCONTINUED | OUTPATIENT
Start: 2024-09-22 | End: 2024-09-23

## 2024-09-22 RX ORDER — MAGNESIUM SULFATE HEPTAHYDRATE 40 MG/ML
2 INJECTION, SOLUTION INTRAVENOUS ONCE
Status: COMPLETED | OUTPATIENT
Start: 2024-09-22 | End: 2024-09-22

## 2024-09-22 RX ORDER — POLYETHYLENE GLYCOL 3350 17 G/17G
17 POWDER, FOR SOLUTION ORAL DAILY
Status: DISCONTINUED | OUTPATIENT
Start: 2024-09-23 | End: 2024-09-23

## 2024-09-22 RX ORDER — DEXMEDETOMIDINE HYDROCHLORIDE 4 UG/ML
0-1.4 INJECTION, SOLUTION INTRAVENOUS CONTINUOUS
Status: DISCONTINUED | OUTPATIENT
Start: 2024-09-22 | End: 2024-09-23

## 2024-09-22 RX ORDER — ONDANSETRON HYDROCHLORIDE 2 MG/ML
8 INJECTION, SOLUTION INTRAVENOUS ONCE
Status: COMPLETED | OUTPATIENT
Start: 2024-09-22 | End: 2024-09-22

## 2024-09-22 RX ADMIN — GABAPENTIN 300 MG: 300 CAPSULE ORAL at 08:09

## 2024-09-22 RX ADMIN — MAGNESIUM SULFATE HEPTAHYDRATE 2 G: 40 INJECTION, SOLUTION INTRAVENOUS at 05:09

## 2024-09-22 RX ADMIN — HEPARIN SODIUM 5000 UNITS: 5000 INJECTION INTRAVENOUS; SUBCUTANEOUS at 11:09

## 2024-09-22 RX ADMIN — DEXMEDETOMIDINE HYDROCHLORIDE 0.2 MCG/KG/HR: 4 INJECTION, SOLUTION INTRAVENOUS at 10:09

## 2024-09-22 RX ADMIN — POLYETHYLENE GLYCOL 3350 17 G: 17 POWDER, FOR SOLUTION ORAL at 08:09

## 2024-09-22 RX ADMIN — CLONIDINE HYDROCHLORIDE 0.2 MG: 0.1 TABLET ORAL at 08:09

## 2024-09-22 RX ADMIN — SENNOSIDES AND DOCUSATE SODIUM 1 TABLET: 50; 8.6 TABLET ORAL at 08:09

## 2024-09-22 RX ADMIN — FUROSEMIDE 40 MG: 10 INJECTION, SOLUTION INTRAVENOUS at 10:09

## 2024-09-22 RX ADMIN — METHADONE HYDROCHLORIDE 70 MG: 10 TABLET ORAL at 08:09

## 2024-09-22 RX ADMIN — ONDANSETRON 8 MG: 2 INJECTION INTRAMUSCULAR; INTRAVENOUS at 10:09

## 2024-09-22 RX ADMIN — HYDROXYZINE PAMOATE 50 MG: 25 CAPSULE ORAL at 09:09

## 2024-09-22 RX ADMIN — INSULIN GLARGINE 20 UNITS: 100 INJECTION, SOLUTION SUBCUTANEOUS at 08:09

## 2024-09-22 RX ADMIN — HYDROXYZINE PAMOATE 50 MG: 25 CAPSULE ORAL at 08:09

## 2024-09-22 RX ADMIN — OXACILLIN 12 G: 2 INJECTION, POWDER, FOR SOLUTION INTRAMUSCULAR; INTRAVENOUS at 04:09

## 2024-09-22 RX ADMIN — OXYCODONE HYDROCHLORIDE 10 MG: 10 TABLET ORAL at 08:09

## 2024-09-22 RX ADMIN — MELATONIN TAB 3 MG 6 MG: 3 TAB at 08:09

## 2024-09-22 RX ADMIN — Medication 100 MG: at 08:09

## 2024-09-22 RX ADMIN — POTASSIUM BICARBONATE 40 MEQ: 391 TABLET, EFFERVESCENT ORAL at 05:09

## 2024-09-22 RX ADMIN — POTASSIUM CHLORIDE 40 MEQ: 29.8 INJECTION, SOLUTION INTRAVENOUS at 09:09

## 2024-09-22 RX ADMIN — PANTOPRAZOLE SODIUM 40 MG: 40 INJECTION, POWDER, FOR SOLUTION INTRAVENOUS at 08:09

## 2024-09-22 RX ADMIN — OXYCODONE HYDROCHLORIDE 10 MG: 10 TABLET ORAL at 09:09

## 2024-09-22 RX ADMIN — Medication 1 PATCH: at 08:09

## 2024-09-22 RX ADMIN — FOLIC ACID 1 MG: 1 TABLET ORAL at 08:09

## 2024-09-22 RX ADMIN — FUROSEMIDE 40 MG: 10 INJECTION, SOLUTION INTRAVENOUS at 08:09

## 2024-09-22 NOTE — ASSESSMENT & PLAN NOTE
49F initially presented to [facility] with back pain, found to have spinal OM, L SI joint septic arthritis, and L psoas/iliacus muscle abscess, T12 - L5 epidural abscess, possible MV endocarditis, transferred to [facility] on 9/16 for IR and neurosurgery eval, now transferred to [facility]. S/p L3-5 laminectomy and epidural abscess washout on 9/18, IR drainage of psoas abscess 9/21, pending ortho eval for SI joint aspiration.     No hardware/devices in place. Does admit to snorting fentanyl. Does have multiple skin blisters on JESSICA UE and LE that she admits to picking at.     Afebrile. Blood cultures positive from 9/16-9/19.     Recommendations  Continue oxacillin via continuous infusion  Follow up all culture data  Repeat blood cultures tomorrow 9/23

## 2024-09-22 NOTE — RESPIRATORY THERAPY
Patient extubated per MD order.  Placed patient on Bipap; settings are documented.  Will continue to monitor.

## 2024-09-22 NOTE — SUBJECTIVE & OBJECTIVE
Interval History/Significant Events: NAEON. Patient is post NSGY laminectomy and post-IR abscess drainage. He was extubated this am and was briefly on BiPAP  and is now on 2L NC. Patient is due for a repeat culture tomorrow at 15:00    Review of Systems   Unable to perform ROS: Intubated     Objective:     Vital Signs (Most Recent):  Temp: 98.1 °F (36.7 °C) (09/22/24 1600)  Pulse: 80 (09/22/24 1900)  Resp: (!) 39 (09/22/24 1900)  BP: (!) 143/81 (09/22/24 1900)  SpO2: 96 % (09/22/24 1900) Vital Signs (24h Range):  Temp:  [98.1 °F (36.7 °C)-100.1 °F (37.8 °C)] 98.1 °F (36.7 °C)  Pulse:  [] 80  Resp:  [12-39] 39  SpO2:  [87 %-100 %] 96 %  BP: ()/(52-89) 143/81     Weight: 65.2 kg (143 lb 11.8 oz)  Body mass index is 26.29 kg/m².     Physical Exam  Constitutional:       Appearance: She is ill-appearing.      Interventions: She is sedated. She is not intubated.     Comments: In pain, specifically right hip   HENT:      Head: Normocephalic and atraumatic.      Right Ear: External ear normal.      Left Ear: External ear normal.   Cardiovascular:      Rate and Rhythm: Normal rate and regular rhythm.   Pulmonary:      Effort: Pulmonary effort is normal. No respiratory distress. She is not intubated.      Breath sounds: Rales present.   Chest:      Chest wall: No tenderness.   Abdominal:      General: There is distension.      Palpations: There is no mass.      Tenderness: There is no abdominal tenderness.      Comments: Left flank indurated, no fluctuation, minimally tender  Minimally tender abdominal exam   Musculoskeletal:      Cervical back: No rigidity.      Comments: Pain with flexion of left hip   Skin:     Coloration: Skin is pale.      Findings: Bruising (left arm) present.      Comments: Multiple acute and chronic , circular ulcers in upper and lower extremities   Psychiatric:         Mood and Affect: Mood normal.         Behavior: Behavior normal.         Thought Content: Thought content normal.             I have reviewed all pertinent lab results within the past 24 hours.  CBC:   Recent Labs   Lab 09/22/24  0330   WBC 9.12   RBC 3.17*   HGB 8.2*   HCT 25.6*      MCV 81*   MCH 25.9*   MCHC 32.0     CMP:   Recent Labs   Lab 09/22/24  0330 09/22/24  1252 09/22/24  1658      < > 134*   CALCIUM 7.4*   < > 7.5*   ALBUMIN 1.1*  --   --    PROT 4.9*  --   --       < > 138   K 3.0*   < > 3.6   CO2 29   < > 32*   CL 96   < > 96   BUN 7   < > 7   CREATININE 0.6   < > 0.6   ALKPHOS 117  --   --    ALT 15  --   --    AST 13  --   --    BILITOT 1.8*  --   --     < > = values in this interval not displayed.       Significant Diagnostics:  I have reviewed all pertinent imaging results/findings within the past 24 hours.      Review of Systems

## 2024-09-22 NOTE — SUBJECTIVE & OBJECTIVE
Interval history: : remains intubated. On abx. Surgical drains slowing down, will continue for today.       Medications Prior to Admission   Medication Sig Dispense Refill Last Dose    gabapentin (NEURONTIN) 300 MG capsule Take 300 mg by mouth 3 (three) times daily.   2024    hydrOXYzine pamoate (VISTARIL) 25 MG Cap Take 25 mg by mouth 3 (three) times daily.   2024    metFORMIN (GLUCOPHAGE) 500 MG tablet Take 500 mg by mouth 2 (two) times daily with meals.   2024    methadone (DOLOPHINE) 10 MG tablet Take 70 mg by mouth Daily.   Past Week    cloNIDine (CATAPRES) 0.2 MG tablet Take 0.2 mg by mouth 3 (three) times daily.       glipiZIDE (GLUCOTROL) 5 MG tablet Take 5 mg by mouth 2 (two) times daily.          Review of patient's allergies indicates:  No Known Allergies    Past Medical History:   Diagnosis Date    Diabetes mellitus     Hypertension     Unspecified viral hepatitis C without hepatic coma      Past Surgical History:   Procedure Laterality Date     SECTION      LAMINECTOMY N/A 2024    Procedure: L3-L5 laminectomy with epidural abscess evac;  Surgeon: Sourav Jim DO;  Location: [address];  Service: Neurosurgery;  Laterality: N/A;    MAGNETIC RESONANCE IMAGING N/A 2024    Procedure: MRI (Magnetic Resonance Imagine);  Surgeon: Dolores Storey;  Location: [facility];  Service: Anesthesiology;  Laterality: N/A;  conscious sedaation MRI lumbar spine w/ and without contrast    WASHOUT, WOUND, SPINE  2024    Procedure: WASHOUT, WOUND, SPINE;  Surgeon: Sourav Jim DO;  Location: [address];  Service: Neurosurgery;;     Family History    None       Tobacco Use    Smoking status: Every Day     Current packs/day: 0.50     Types: Cigarettes    Smokeless tobacco: Never   Substance and Sexual Activity    Alcohol use: Not Currently    Drug use: Not Currently    Sexual activity: Not on file     Review of Systems    Objective:     Weight: 65.2 kg (143 lb  "11.8 oz)  Body mass index is 26.29 kg/m².  Vital Signs (Most Recent):  Temp: 100.1 °F (37.8 °C) (09/22/24 0800)  Pulse: 105 (09/22/24 1032)  Resp: (!) 26 (09/22/24 1032)  BP: 128/80 (09/22/24 1032)  SpO2: 100 % (09/22/24 1032) Vital Signs (24h Range):  Temp:  [98.2 °F (36.8 °C)-100.1 °F (37.8 °C)] 100.1 °F (37.8 °C)  Pulse:  [] 105  Resp:  [12-27] 26  SpO2:  [90 %-100 %] 100 %  BP: ()/(51-80) 128/80     Date 09/22/24 0700 - 09/23/24 0659   Shift 5708-4946 2866-0870 6852-8320 24 Hour Total   INTAKE   Shift Total(mL/kg)       OUTPUT   Urine(mL/kg/hr) 155   155   Shift Total(mL/kg) 155(2.4)   155(2.4)   Weight (kg) 65.2 65.2 65.2 65.2                Vent Mode: Spont  Oxygen Concentration (%):  [40-50] 40  Resp Rate Total:  [14 br/min-22 br/min] 21 br/min  Vt Set:  [390 mL] 390 mL  PEEP/CPAP:  [5 cmH20-8 cmH20] 5 cmH20  Pressure Support:  [8 cmH20] 8 cmH20  Mean Airway Pressure:  [7.3 giG73-39 cmH20] 7.3 cmH20        Female External Urinary Catheter w/ Suction 09/16/24 2345 (Active)   Skin no redness;no breakdown 09/18/24 0701   Tolerance no signs/symptoms of discomfort 09/18/24 0701   Suction Continuous suction at 60 mmHg 09/18/24 0701   Date of last wick change 09/17/24 09/17/24 2300   Time of last wick change 2328 09/17/24 2300   Output (mL) 400 mL 09/18/24 0701          Physical Exam    NEURO:     E2VTM5  Intubated, sedated   Bsi, PERRL  Spont/Loc x4 AG      Neurosurgery Physical Exam    Significant Labs:  Recent Labs   Lab 09/21/24  0234 09/21/24  0817 09/21/24  1813 09/22/24  0330    98 109 109    137 138 138   K 3.4* 4.2 3.3* 3.0*    101 100 96   CO2 28 26 26 29   BUN 7 7 8 7   CREATININE 0.6 0.6 0.6 0.6   CALCIUM 7.2* 7.3* 7.1* 7.4*   MG 1.5*  --   --  1.6     Recent Labs   Lab 09/21/24  0234 09/22/24  0330   WBC 10.12 9.12   HGB 8.7* 8.2*   HCT 26.7* 25.6*   * 153     No results for input(s): "LABPT", "INR", "APTT" in the last 48 hours.    Microbiology Results (last 7 days) "       Procedure Component Value Units Date/Time    Blood culture [[id number]]  (Abnormal)  (Susceptibility) Collected: 09/19/24 1033    Order Status: Completed Specimen: Blood from Peripheral, Lower Arm, Left Updated: 09/22/24 0918     Blood Culture, Routine Gram stain aer bottle: Gram positive cocci in clusters resembling Staph      Results called to and read back by:Mehreen Vital 09/20/2024  13:40      STAPHYLOCOCCUS AUREUS  ID consult required at [facility],[place] and [facility] locations.      Culture, Body Fluid (Aerobic) w/ GS [[id number]] Collected: 09/21/24 1502    Order Status: Completed Specimen: Body Fluid from Back Updated: 09/22/24 0608     AEROBIC CULTURE - FLUID No growth     Gram Stain Result Moderate WBC's      Moderate Gram positive cocci    AFB Culture & Smear [[id number]] Collected: 09/21/24 1502    Order Status: Sent Specimen: Body Fluid from Pelvis Updated: 09/21/24 1648    Fungus culture [[id number]] Collected: 09/21/24 1502    Order Status: Sent Specimen: Body Fluid from Pelvis Updated: 09/21/24 1646    Gram stain [[id number]]     Order Status: No result Specimen: Joint Fluid     AFB culture [[id number]]     Order Status: No result Specimen: Joint Fluid     AFB stain [[id number]]     Order Status: No result Specimen: Joint Fluid     Culture, body fluid - Bactec [[id number]]     Order Status: No result Specimen: Joint Fluid     Fungus culture [[id number]]     Order Status: No result Specimen: Joint Fluid     Gram stain [[id number]] Collected: 09/21/24 1502    Order Status: Canceled Specimen: Body Fluid from Pelvis     Culture, Anaerobe [[id number]] Collected: 09/19/24 1515    Order Status: Completed Specimen: Abscess from Back Updated: 09/21/24 1201     Anaerobic Culture Culture in progress    Narrative:      Subfascial abscess    Aerobic culture [[id number]]  (Abnormal)  (Susceptibility) Collected: 09/19/24 1527    Order Status: Completed Specimen: Abscess from Back Updated: 09/21/24 1044      Aerobic Bacterial Culture STAPHYLOCOCCUS AUREUS  Moderate      Narrative:      Epidural abscess    Aerobic culture [[id number]]  (Abnormal)  (Susceptibility) Collected: 09/19/24 1515    Order Status: Completed Specimen: Abscess from Back Updated: 09/21/24 1027     Aerobic Bacterial Culture STAPHYLOCOCCUS AUREUS  Moderate      Narrative:      Subfascial abscess    Blood culture [[id number]]  (Abnormal) Collected: 09/18/24 0045    Order Status: Completed Specimen: Blood from Peripheral, Antecubital, Left Updated: 09/21/24 0732     Blood Culture, Routine Gram stain aer bottle: Gram positive cocci in clusters resembling Staph      Results called to and read back by: Chema 09/19/2024  01:39      STAPHYLOCOCCUS AUREUS  ID consult required at [facility] and [facility].  For susceptibility see order #[id number]      AFB Culture & Smear [[id number]] Collected: 09/19/24 1527    Order Status: Completed Specimen: Abscess from Back Updated: 09/20/24 2127     AFB Culture & Smear Culture in progress     AFB CULTURE STAIN No acid fast bacilli seen.    Narrative:      Epidural abscess    AFB Culture & Smear [[id number]] Collected: 09/19/24 1515    Order Status: Completed Specimen: Abscess from Back Updated: 09/20/24 2127     AFB Culture & Smear Culture in progress     AFB CULTURE STAIN No acid fast bacilli seen.    Narrative:      Subfascial abscess    Blood culture [[id number]]  (Abnormal) Collected: 09/18/24 0045    Order Status: Completed Specimen: Blood from Peripheral, Antecubital, Left Updated: 09/20/24 0920     Blood Culture, Routine Gram stain aer bottle: Gram positive cocci in clusters resembling Staph      Results called to and read back by:Chema Oconnor RN 09/18/2024  20:18      STAPHYLOCOCCUS AUREUS  ID consult required at [facility].  For susceptibility see order #[id number]      Culture, Anaerobe [[id number]] Collected: 09/19/24 1527    Order Status: Completed Specimen:  Abscess from Back Updated: 09/20/24 0637     Anaerobic Culture Culture in progress    Narrative:      Epidural abscess    Gram stain [[id number]] Collected: 09/19/24 1515    Order Status: Completed Specimen: Abscess from Back Updated: 09/19/24 1908     Gram Stain Result Rare WBC's      Few Gram positive cocci    Narrative:      Subfascial abscess    Gram stain [[id number]] Collected: 09/19/24 1527    Order Status: Completed Specimen: Abscess from Back Updated: 09/19/24 1906     Gram Stain Result Rare WBC's      Few Gram positive cocci      Rare Gram positive rods    Narrative:      Epidural abscess    Fungus culture [[id number]] Collected: 09/19/24 1527    Order Status: Sent Specimen: Abscess from Back Updated: 09/19/24 1536    Fungus culture [[id number]] Collected: 09/19/24 1515    Order Status: Sent Specimen: Abscess from Back Updated: 09/19/24 1525    Blood Culture #2 **CANNOT BE ORDERED STAT** [[id number]]  (Abnormal) Collected: 09/16/24 1145    Order Status: Completed Specimen: Blood from Peripheral, Wrist, Right Updated: 09/19/24 1002     Blood Culture, Routine Gram stain felix bottle: Gram positive cocci in clusters resembling Staph      Results called to and read back by: FATIMAH Mcnulty. 09/17/2024  03:48      Gram stain aer bottle: Gram positive cocci in clusters resembling Staph      Positive results previously called 09/17/2024  06:05      STAPHYLOCOCCUS AUREUS  ID consult required at [facility] and [facility].  For susceptibility see order #[id number]      Blood culture #1 **CANNOT BE ORDERED STAT** [[id number]]  (Abnormal)  (Susceptibility) Collected: 09/16/24 1054    Order Status: Completed Specimen: Blood from Peripheral, Antecubital, Right Updated: 09/19/24 1001     Blood Culture, Routine Gram stain aer bottle: Gram positive cocci in clusters resembling Staph      Gram stain felix bottle: Gram positive cocci in clusters resembling Staph      Results called to and read back by: FATIMAH Mcnulty.  09/17/2024  03:43      STAPHYLOCOCCUS AUREUS  ID consult required at [facility],[place] and [facility] locations.      Blood culture [[id number]]     Order Status: Canceled Specimen: Blood     MRSA/SA Rapid ID by PCR from Blood culture [[id number]]  (Abnormal) Collected: 09/16/24 1054    Order Status: Completed Updated: 09/17/24 0455     Staph aureus ID by PCR Positive     Methicillin Resistant ID by PCR Negative    Blood culture #2 **CANNOT BE ORDERED STAT** [[id number]]     Order Status: Canceled Specimen: Blood           All pertinent labs from the last 24 hours have been reviewed.    Significant Diagnostics:  I have reviewed all pertinent imaging results/findings within the past 24 hours.  I have reviewed and interpreted all pertinent imaging results/findings within the past 24 hours.  MRI Lumbar Spine W WO Cont    Result Date: 9/17/2024  1. Complex fluid collection possibly extending from the left SI joint to the left iliac fossa displacing the left psoas muscle medially. This is similar to the study 1 day prior could represent infectious left sacroiliitis/osteomyelitis and adjacent abscess. The collection extends superiorly to approximately T12 level at the left posterior retroperitoneum, posterior to the left kidney. Enhancement noted to much of the sacrum including right of midline again could represent osteomyelitis. Follow-up recommended. Recommend surgical consultation and follow-up. 2. Multilevel degenerative changes most prominent at L5-S1.  See above comments. 3. This report was flagged in Epic as abnormal. Electronically signed by: Edgard Martinez Date:    09/17/2024 Time:    15:53   X-Ray Chest AP Portable    Result Date: 9/19/2024  ET tube placed with tip 1.8 cm above the laura. Right IJ catheter tip remains overlying the right atrium, similar to prior. Bilateral perihilar infiltrates/edema. Electronically signed by: Tod Moran MD Date:    09/19/2024 Time:    01:28    MRI Lumbar Spine W WO  Cont    Result Date: 9/18/2024  1. Complex multi lobular fluid collection extending from the left SI joint to the left iliac fossa and paravertebral region displacing the left psoas muscle medially.  This measures approximately 8.4 x 8.5 cm x 22.5 cm in craniocaudad dimension.  This is similar to the prior studies could represent infectious left sacroiliitis/osteomyelitis and adjacent abscess. The collection extends superiorly to approximately T12 level at the left posterior retroperitoneum, posterior to the left kidney. Enhancement noted to much of the sacrum including right of midline again could represent osteomyelitis. Follow-up is recommended.  Edema and complex collection extends posteriorly at the lateral margin of the field of view incompletely visualized to the subcutaneous tissues posteriorly at approximately L2-3 level adjacent to the left posterosuperior gluteal musculature. There is mild displacement and mass effect on the posterolateral margin of the paraspinal musculature best seen on axial 22 of series 20 and series 19. Recommend surgical consultation and follow-up. 2.  Small epidural collections are noted adjacent to the thecal sac from approximately T12 to L5 suggesting epidural abscess.  Follow-up recommended.  See above comments. 2. Multilevel degenerative changes most prominent at L5-S1. See above comments. 3. This report was flagged in Epic as abnormal. Electronically signed by: Edgard Martinez Date:    09/18/2024 Time:    23:09    US Abdomen Complete    Result Date: 9/18/2024  Biliary sludge.  No evidence of cholecystitis or biliary obstruction. Hepatosplenomegaly. Electronically signed by: Ravi Trujillo Date:    09/18/2024 Time:    15:17

## 2024-09-22 NOTE — SUBJECTIVE & OBJECTIVE
Interval History: extubated today on BIPAP. Pending ortho eval of SI joint    Review of Systems   Unable to perform ROS: Acuity of condition     Objective:     Vital Signs (Most Recent):  Temp: 100.1 °F (37.8 °C) (09/22/24 0800)  Pulse: 105 (09/22/24 1032)  Resp: (!) 26 (09/22/24 1032)  BP: 128/80 (09/22/24 1032)  SpO2: 100 % (09/22/24 1032) Vital Signs (24h Range):  Temp:  [98.2 °F (36.8 °C)-100.1 °F (37.8 °C)] 100.1 °F (37.8 °C)  Pulse:  [] 105  Resp:  [12-27] 26  SpO2:  [90 %-100 %] 100 %  BP: ()/(51-80) 128/80     Weight: 65.2 kg (143 lb 11.8 oz)  Body mass index is 26.29 kg/m².    Estimated Creatinine Clearance: 98.2 mL/min (based on SCr of 0.6 mg/dL).     Physical Exam  Vitals reviewed.   Constitutional:       General: She is not in acute distress.     Appearance: Normal appearance.   HENT:      Head: Normocephalic and atraumatic.   Cardiovascular:      Rate and Rhythm: Normal rate and regular rhythm.      Heart sounds: No murmur heard.  Pulmonary:      Effort: Pulmonary effort is normal. No respiratory distress.      Breath sounds: Normal breath sounds.   Abdominal:      General: Abdomen is flat. Bowel sounds are normal.      Palpations: Abdomen is soft.   Skin:     General: Skin is warm and dry.   Neurological:      Mental Status: She is alert.          Significant Labs: All pertinent labs within the past 24 hours have been reviewed.  Recent Lab Results  (Last 5 results in the past 24 hours)        09/22/24  0330   09/22/24  0024   09/21/24  2128   09/21/24  1813   09/21/24  1502        AEROBIC CULTURE - FLUID         No growth  [P]       Albumin 1.1                              ALT 15               Anion Gap 13       12         Aniso Slight               AST 13               Baso # 0.10               Basophil % 1.1               BILIRUBIN TOTAL 1.8  Comment: For infants and newborns, interpretation of results should be based  on gestational age, weight and in agreement with  clinical  observations.    Premature Infant recommended reference ranges:  Up to 24 hours.............<8.0 mg/dL  Up to 48 hours............<12.0 mg/dL  3-5 days..................<15.0 mg/dL  6-29 days.................<15.0 mg/dL                 BUN 7       8         Calcium 7.4       7.1         Chloride 96       100         CO2 29       26         Creatinine 0.6       0.6         Differential Method Automated               eGFR >60.0       >60.0         Eos # 0.3               Eos % 3.5               Glucose 109       109         GRAM STAIN         Moderate WBC's                Moderate Gram positive cocci       Gran # (ANC) 5.9               Gran % 64.4               Hematocrit 25.6               Hemoglobin 8.2               Hypo Occasional               Immature Grans (Abs) 0.41  Comment: Mild elevation in immature granulocytes is non specific and   can be seen in a variety of conditions including stress response,   acute inflammation, trauma and pregnancy. Correlation with other   laboratory and clinical findings is essential.                 Immature Granulocytes 4.5               Lymph # 2.0               Lymph % 21.7               Magnesium  1.6               MCH 25.9               MCHC 32.0               MCV 81               Mono # 0.4               Mono % 4.8               MPV 10.8               nRBC 0               Ovalocytes Occasional               Phosphorus Level 4.4               Platelet Count 153               POCT Glucose   131   108           Poikilocytosis Slight               Poly Occasional               Potassium 3.0       3.3         PROTEIN TOTAL 4.9               RBC 3.17               RDW 17.9               Sodium 138       138         Teardrop Cells Occasional               WBC 9.12                                       [P] - Preliminary Result               Significant Imaging: I have reviewed all pertinent imaging results/findings within the past 24 hours.

## 2024-09-22 NOTE — ASSESSMENT & PLAN NOTE
49F PMH DM, HTN, hep c, opioid abuse, presenting after down for unknown period of time and inability to ambulate secondary to pain with imaging demonstrating L SI osteo with associated abscesses extending to retroperitoneum without involvement of thecal sac. Denies true weakness, states pain is limiting her ability to move. Pain in hips and low back. On clinda/zosyn, blood culture 9/16 with staph aureus. Denies bowel or bladder incontinence, complaining of constipation about 6 days. Denies numbness    She is now s/p L3-L4 laminectomy for epidural abscess evacuation on 9/19/24 with neurosurgery    Imaging:  CT Lsp / pelvis 9/16: fluid collection T11 on L through left psoas, felt abscess  CT CAP 9/16: extensive edema with subq gas through left flank, small psoas abscess and fluid collections left flank, septic arthritis of L SI joint  MRI Lsp w/wo 9/17: L SI complex fluid collection felt to represent osteo with adjacent abscess, extends to approximately T12 level  MRI Lsp w/wo repeat 9/18: epidural collections T12-L5   MRI C and T spine on 9/20 negative for other signs of infection    Plan:  - Admitted MICU; q1h nc/vs  - infectious workup  - Abx for Staph aureus blood culture 9/16 per primary  - OR cultures growing staph aureus  - psoas abscess s/p drainage and drain placement by IR on 9/21/24  - SI joint tap unsuccessful in drawing any fluid back  - Trend inflammatory markers   - ortho consulted for SI joint findings, non operative at this time  - rest of care per primary team  - Please notify for questions/concerns/neurologic decline

## 2024-09-22 NOTE — PROGRESS NOTES
[facility] - [facility]  Neurosurgery  Progress Note    Subjective:     History of Present Illness: 49F PMH DM, HTN, hep c, opioid abuse, presenting after down for unknown period of time and inability to ambulate secondary to pain with imaging demonstrating L SI osteo with associated abscesses extending to retroperitoneum without involvement of thecal sac. Denies true weakness, states pain is limiting her ability to move. Pain in hips and low back. On clinda/zosyn, blood culture  with staph aureus. Denies bowel or bladder incontinence, complaining of constipation about 6 days. Denies numbness    Post-Op Info:  Procedure(s) (LRB):  L3-L5 laminectomy with epidural abscess evac (N/A)  WASHOUT, WOUND, SPINE   3 Days Post-Op   Interval history: : remains intubated. On abx. Surgical drains slowing down, will continue for today.       Medications Prior to Admission   Medication Sig Dispense Refill Last Dose    gabapentin (NEURONTIN) 300 MG capsule Take 300 mg by mouth 3 (three) times daily.   2024    hydrOXYzine pamoate (VISTARIL) 25 MG Cap Take 25 mg by mouth 3 (three) times daily.   2024    metFORMIN (GLUCOPHAGE) 500 MG tablet Take 500 mg by mouth 2 (two) times daily with meals.   2024    methadone (DOLOPHINE) 10 MG tablet Take 70 mg by mouth Daily.   Past Week    cloNIDine (CATAPRES) 0.2 MG tablet Take 0.2 mg by mouth 3 (three) times daily.       glipiZIDE (GLUCOTROL) 5 MG tablet Take 5 mg by mouth 2 (two) times daily.          Review of patient's allergies indicates:  No Known Allergies    Past Medical History:   Diagnosis Date    Diabetes mellitus     Hypertension     Unspecified viral hepatitis C without hepatic coma      Past Surgical History:   Procedure Laterality Date     SECTION      LAMINECTOMY N/A 2024    Procedure: L3-L5 laminectomy with epidural abscess evac;  Surgeon: Sourav Jim DO;  Location: [facility] OR [address] [place] [address];  Service: Neurosurgery;  Laterality: N/A;    MAGNETIC  RESONANCE IMAGING N/A 9/18/2024    Procedure: MRI (Magnetic Resonance Imagine);  Surgeon: Dolores Storey;  Location: [facility];  Service: Anesthesiology;  Laterality: N/A;  conscious sedaation MRI lumbar spine w/ and without contrast    WASHOUT, WOUND, SPINE  9/19/2024    Procedure: WASHOUT, WOUND, SPINE;  Surgeon: Sourav Jim DO;  Location: [facility] OR [address] [place] [address];  Service: Neurosurgery;;     Family History    None       Tobacco Use    Smoking status: Every Day     Current packs/day: 0.50     Types: Cigarettes    Smokeless tobacco: Never   Substance and Sexual Activity    Alcohol use: Not Currently    Drug use: Not Currently    Sexual activity: Not on file     Review of Systems    Objective:     Weight: 65.2 kg (143 lb 11.8 oz)  Body mass index is 26.29 kg/m².  Vital Signs (Most Recent):  Temp: 100.1 °F (37.8 °C) (09/22/24 0800)  Pulse: 105 (09/22/24 1032)  Resp: (!) 26 (09/22/24 1032)  BP: 128/80 (09/22/24 1032)  SpO2: 100 % (09/22/24 1032) Vital Signs (24h Range):  Temp:  [98.2 °F (36.8 °C)-100.1 °F (37.8 °C)] 100.1 °F (37.8 °C)  Pulse:  [] 105  Resp:  [12-27] 26  SpO2:  [90 %-100 %] 100 %  BP: ()/(51-80) 128/80     Date 09/22/24 0700 - 09/23/24 0659   Shift 2231-0904 2231-2359 1785-6183 24 Hour Total   INTAKE   Shift Total(mL/kg)       OUTPUT   Urine(mL/kg/hr) 155   155   Shift Total(mL/kg) 155(2.4)   155(2.4)   Weight (kg) 65.2 65.2 65.2 65.2                Vent Mode: Spont  Oxygen Concentration (%):  [40-50] 40  Resp Rate Total:  [14 br/min-22 br/min] 21 br/min  Vt Set:  [390 mL] 390 mL  PEEP/CPAP:  [5 cmH20-8 cmH20] 5 cmH20  Pressure Support:  [8 cmH20] 8 cmH20  Mean Airway Pressure:  [7.3 psE36-20 cmH20] 7.3 cmH20        Female External Urinary Catheter w/ Suction 09/16/24 4884 (Active)   Skin no redness;no breakdown 09/18/24 0701   Tolerance no signs/symptoms of discomfort 09/18/24 0701   Suction Continuous suction at 60 mmHg 09/18/24 0701   Date of last wick change 09/17/24 09/17/24 4077  "  Time of last wick change 2328 09/17/24 2300   Output (mL) 400 mL 09/18/24 0701          Physical Exam    NEURO:     E2VTM5  Intubated, sedated   Bsi, PERRL  Spont/Loc x4 AG      Neurosurgery Physical Exam    Significant Labs:  Recent Labs   Lab 09/21/24  0234 09/21/24  0817 09/21/24  1813 09/22/24  0330    98 109 109    137 138 138   K 3.4* 4.2 3.3* 3.0*    101 100 96   CO2 28 26 26 29   BUN 7 7 8 7   CREATININE 0.6 0.6 0.6 0.6   CALCIUM 7.2* 7.3* 7.1* 7.4*   MG 1.5*  --   --  1.6     Recent Labs   Lab 09/21/24  0234 09/22/24  0330   WBC 10.12 9.12   HGB 8.7* 8.2*   HCT 26.7* 25.6*   * 153     No results for input(s): "LABPT", "INR", "APTT" in the last 48 hours.    Microbiology Results (last 7 days)       Procedure Component Value Units Date/Time    Blood culture [[id number]]  (Abnormal)  (Susceptibility) Collected: 09/19/24 1033    Order Status: Completed Specimen: Blood from Peripheral, Lower Arm, Left Updated: 09/22/24 0918     Blood Culture, Routine Gram stain aer bottle: Gram positive cocci in clusters resembling Staph      Results called to and read back by:Mehreen Vital 09/20/2024  13:40      STAPHYLOCOCCUS AUREUS  ID consult required at [facility] and [facility] locations.      Culture, Body Fluid (Aerobic) w/ GS [[id number]] Collected: 09/21/24 1502    Order Status: Completed Specimen: Body Fluid from Back Updated: 09/22/24 0608     AEROBIC CULTURE - FLUID No growth     Gram Stain Result Moderate WBC's      Moderate Gram positive cocci    AFB Culture & Smear [[id number]] Collected: 09/21/24 1502    Order Status: Sent Specimen: Body Fluid from Pelvis Updated: 09/21/24 1648    Fungus culture [[id number]] Collected: 09/21/24 1502    Order Status: Sent Specimen: Body Fluid from Pelvis Updated: 09/21/24 1646    Gram stain [[id number]]     Order Status: No result Specimen: Joint Fluid     AFB culture [[id number]]     Order Status: No result Specimen: Joint Fluid     AFB stain " [[id number]]     Order Status: No result Specimen: Joint Fluid     Culture, body fluid - Bactec [[id number]]     Order Status: No result Specimen: Joint Fluid     Fungus culture [[id number]]     Order Status: No result Specimen: Joint Fluid     Gram stain [[id number]] Collected: 09/21/24 1502    Order Status: Canceled Specimen: Body Fluid from Pelvis     Culture, Anaerobe [[id number]] Collected: 09/19/24 1515    Order Status: Completed Specimen: Abscess from Back Updated: 09/21/24 1201     Anaerobic Culture Culture in progress    Narrative:      Subfascial abscess    Aerobic culture [[id number]]  (Abnormal)  (Susceptibility) Collected: 09/19/24 1527    Order Status: Completed Specimen: Abscess from Back Updated: 09/21/24 1044     Aerobic Bacterial Culture STAPHYLOCOCCUS AUREUS  Moderate      Narrative:      Epidural abscess    Aerobic culture [[id number]]  (Abnormal)  (Susceptibility) Collected: 09/19/24 1515    Order Status: Completed Specimen: Abscess from Back Updated: 09/21/24 1027     Aerobic Bacterial Culture STAPHYLOCOCCUS AUREUS  Moderate      Narrative:      Subfascial abscess    Blood culture [[id number]]  (Abnormal) Collected: 09/18/24 0045    Order Status: Completed Specimen: Blood from Peripheral, Antecubital, Left Updated: 09/21/24 0732     Blood Culture, Routine Gram stain aer bottle: Gram positive cocci in clusters resembling Staph      Results called to and read back by: Chema 09/19/2024  01:39      STAPHYLOCOCCUS AUREUS  ID consult required at [facility] and [facility] locations.  For susceptibility see order #[id number]      AFB Culture & Smear [[id number]] Collected: 09/19/24 1527    Order Status: Completed Specimen: Abscess from Back Updated: 09/20/24 2127     AFB Culture & Smear Culture in progress     AFB CULTURE STAIN No acid fast bacilli seen.    Narrative:      Epidural abscess    AFB Culture & Smear [[id number]] Collected: 09/19/24 1515    Order Status: Completed Specimen: Abscess  from Back Updated: 09/20/24 2127     AFB Culture & Smear Culture in progress     AFB CULTURE STAIN No acid fast bacilli seen.    Narrative:      Subfascial abscess    Blood culture [[id number]]  (Abnormal) Collected: 09/18/24 0045    Order Status: Completed Specimen: Blood from Peripheral, Antecubital, Left Updated: 09/20/24 0920     Blood Culture, Routine Gram stain aer bottle: Gram positive cocci in clusters resembling Staph      Results called to and read back by:Chema Oconnor RN 09/18/2024  20:18      STAPHYLOCOCCUS AUREUS  ID consult required at [facility],[place] and [facility] locations.  For susceptibility see order #[id number]      Culture, Anaerobe [[id number]] Collected: 09/19/24 1527    Order Status: Completed Specimen: Abscess from Back Updated: 09/20/24 0637     Anaerobic Culture Culture in progress    Narrative:      Epidural abscess    Gram stain [[id number]] Collected: 09/19/24 1515    Order Status: Completed Specimen: Abscess from Back Updated: 09/19/24 1908     Gram Stain Result Rare WBC's      Few Gram positive cocci    Narrative:      Subfascial abscess    Gram stain [[id number]] Collected: 09/19/24 1527    Order Status: Completed Specimen: Abscess from Back Updated: 09/19/24 1906     Gram Stain Result Rare WBC's      Few Gram positive cocci      Rare Gram positive rods    Narrative:      Epidural abscess    Fungus culture [[id number]] Collected: 09/19/24 1527    Order Status: Sent Specimen: Abscess from Back Updated: 09/19/24 1536    Fungus culture [[id number]] Collected: 09/19/24 1515    Order Status: Sent Specimen: Abscess from Back Updated: 09/19/24 1525    Blood Culture #2 **CANNOT BE ORDERED STAT** [[id number]]  (Abnormal) Collected: 09/16/24 1145    Order Status: Completed Specimen: Blood from Peripheral, Wrist, Right Updated: 09/19/24 1002     Blood Culture, Routine Gram stain felix bottle: Gram positive cocci in clusters resembling Staph      Results called to and read back by: FATIMAH Mcnulty.  09/17/2024  03:48      Gram stain aer bottle: Gram positive cocci in clusters resembling Staph      Positive results previously called 09/17/2024  06:05      STAPHYLOCOCCUS AUREUS  ID consult required at [facility].  For susceptibility see order #[id number]      Blood culture #1 **CANNOT BE ORDERED STAT** [[id number]]  (Abnormal)  (Susceptibility) Collected: 09/16/24 1054    Order Status: Completed Specimen: Blood from Peripheral, Antecubital, Right Updated: 09/19/24 1001     Blood Culture, Routine Gram stain aer bottle: Gram positive cocci in clusters resembling Staph      Gram stain felix bottle: Gram positive cocci in clusters resembling Staph      Results called to and read back by: FATIMAH Mcnulty. 09/17/2024  03:43      STAPHYLOCOCCUS AUREUS  ID consult required at [facility].      Blood culture [[id number]]     Order Status: Canceled Specimen: Blood     MRSA/SA Rapid ID by PCR from Blood culture [[id number]]  (Abnormal) Collected: 09/16/24 1054    Order Status: Completed Updated: 09/17/24 0455     Staph aureus ID by PCR Positive     Methicillin Resistant ID by PCR Negative    Blood culture #2 **CANNOT BE ORDERED STAT** [[id number]]     Order Status: Canceled Specimen: Blood           All pertinent labs from the last 24 hours have been reviewed.    Significant Diagnostics:  I have reviewed all pertinent imaging results/findings within the past 24 hours.  I have reviewed and interpreted all pertinent imaging results/findings within the past 24 hours.  MRI Lumbar Spine W WO Cont    Result Date: 9/17/2024  1. Complex fluid collection possibly extending from the left SI joint to the left iliac fossa displacing the left psoas muscle medially. This is similar to the study 1 day prior could represent infectious left sacroiliitis/osteomyelitis and adjacent abscess. The collection extends superiorly to approximately T12 level at the left posterior retroperitoneum,  posterior to the left kidney. Enhancement noted to much of the sacrum including right of midline again could represent osteomyelitis. Follow-up recommended. Recommend surgical consultation and follow-up. 2. Multilevel degenerative changes most prominent at L5-S1.  See above comments. 3. This report was flagged in Epic as abnormal. Electronically signed by: Edgard Martinez Date:    09/17/2024 Time:    15:53   X-Ray Chest AP Portable    Result Date: 9/19/2024  ET tube placed with tip 1.8 cm above the laura. Right IJ catheter tip remains overlying the right atrium, similar to prior. Bilateral perihilar infiltrates/edema. Electronically signed by: Tod Moran MD Date:    09/19/2024 Time:    01:28    MRI Lumbar Spine W WO Cont    Result Date: 9/18/2024  1. Complex multi lobular fluid collection extending from the left SI joint to the left iliac fossa and paravertebral region displacing the left psoas muscle medially.  This measures approximately 8.4 x 8.5 cm x 22.5 cm in craniocaudad dimension.  This is similar to the prior studies could represent infectious left sacroiliitis/osteomyelitis and adjacent abscess. The collection extends superiorly to approximately T12 level at the left posterior retroperitoneum, posterior to the left kidney. Enhancement noted to much of the sacrum including right of midline again could represent osteomyelitis. Follow-up is recommended.  Edema and complex collection extends posteriorly at the lateral margin of the field of view incompletely visualized to the subcutaneous tissues posteriorly at approximately L2-3 level adjacent to the left posterosuperior gluteal musculature. There is mild displacement and mass effect on the posterolateral margin of the paraspinal musculature best seen on axial 22 of series 20 and series 19. Recommend surgical consultation and follow-up. 2.  Small epidural collections are noted adjacent to the thecal sac from approximately T12 to L5 suggesting epidural  abscess.  Follow-up recommended.  See above comments. 2. Multilevel degenerative changes most prominent at L5-S1. See above comments. 3. This report was flagged in Epic as abnormal. Electronically signed by: Edgard Martinez Date:    09/18/2024 Time:    23:09    US Abdomen Complete    Result Date: 9/18/2024  Biliary sludge.  No evidence of cholecystitis or biliary obstruction. Hepatosplenomegaly. Electronically signed by: Ravi Trujillo Date:    09/18/2024 Time:    15:17     Assessment/Plan:     Other osteomyelitis, multiple sites  49F PMH DM, HTN, hep c, opioid abuse, presenting after down for unknown period of time and inability to ambulate secondary to pain with imaging demonstrating L SI osteo with associated abscesses extending to retroperitoneum without involvement of thecal sac. Denies true weakness, states pain is limiting her ability to move. Pain in hips and low back. On clinda/zosyn, blood culture 9/16 with staph aureus. Denies bowel or bladder incontinence, complaining of constipation about 6 days. Denies numbness    She is now s/p L3-L4 laminectomy for epidural abscess evacuation on 9/19/24 with neurosurgery    Imaging:  CT Lsp / pelvis 9/16: fluid collection T11 on L through left psoas, felt abscess  CT CAP 9/16: extensive edema with subq gas through left flank, small psoas abscess and fluid collections left flank, septic arthritis of L SI joint  MRI Lsp w/wo 9/17: L SI complex fluid collection felt to represent osteo with adjacent abscess, extends to approximately T12 level  MRI Lsp w/wo repeat 9/18: epidural collections T12-L5   MRI C and T spine on 9/20 negative for other signs of infection    Plan:  - Admitted MICU; q1h nc/vs  - infectious workup  - Abx for Staph aureus blood culture 9/16 per primary  - OR cultures growing staph aureus  - psoas abscess s/p drainage and drain placement by IR on 9/21/24  - SI joint tap unsuccessful in drawing any fluid back  - Trend inflammatory markers   - ortho  consulted for SI joint findings, non operative at this time  - rest of care per primary team  - Please notify for questions/concerns/neurologic decline          Santy Pal MD  Neurosurgery  [facility] - Medical ICU

## 2024-09-22 NOTE — PLAN OF CARE
Patient remains intubated and medically unstable. Discharge needs pending and TBD.   09/22/24 1121   Discharge Reassessment   Assessment Type Discharge Planning Reassessment   Did the patient's condition or plan change since previous assessment? No   Communicated [facility] with patient/caregiver Date not available/Unable to determine   Transition of Care Barriers Underinsured   Why the patient remains in the hospital Requires continued medical care   Post-Acute Status   Discharge Delays None known at this time

## 2024-09-22 NOTE — PLAN OF CARE
MICU DAILY GOALS     Family/Goals of care/Code Status   Code Status: Full Code    24H Vital Sign Range  Temp:  [98.2 °F (36.8 °C)-98.7 °F (37.1 °C)]   Pulse:  [67-97]   Resp:  [17-30]   BP: ()/(51-80)   SpO2:  [91 %-99 %]      Shift Events (include procedures and significant events)   No acute events throughout shift. Titrated down sedation. Still on 40%/8. Potassium and Magnesium replaced.     AWAKE RASS: Goal - RASS Goal: -1-->drowsy  Actual - RASS ([place] Agitation-Sedation Scale): light sedation    Restraint necessity: Removing medical devices   BREATHE SBT: Not attempted    Coordinate A & B, analgesics/sedatives Pain: managed   SAT: Not attempted   Delirium CAM-ICU: Overall CAM-ICU: Positive   Early(intubated/ Progressive (non-intubated) Mobility MOVE Screen (INTUBATED ONLY): NA    Activity: Activity Management: Rolling - L1, Arm raise - L1   Feeding/Nutrition Diet order: Diet/Nutrition Received: NPO,     Thrombus DVT prophylaxis: VTE Required Core Measure: Pharmacological prophylaxis initiated/maintained   HOB Elevation Head of Bed (HOB) Positioning: HOB at 30-45 degrees   Ulcer Prophylaxis GI: yes   Glucose control managed Glycemic Management: blood glucose monitored   Skin Skin assessed during: Daily Assessment    Sacrum intact/not altered? Yes  Heels intact/not altered? Yes  Surgical wound? Yes  Drains x3    CHECK ONE!   (no altered skin or altered skin) and sub boxes:  [] No Altered Skin Integrity Present    [x]Prevention Measures Documented    [x] Altered Skin Integrity Present or Discovered   [x] LDA present in EPIC, daily doc completed              [] LDA added if not in EPIC (describe wound).                    When describing wound, do not stage, use descriptive words only.    [] Wound Image Taken (required on admit,                   transfer/discharge and every Tuesday)    Wound Care Consulted? Yes    4 EYES:  Attending Nurse (1st set of eyes): Ricky RHOADES RN    Second RN/Staff Member (2nd set  of eyes): Dee BAUTISTA RN   Bowel Function constipation    Indwelling Catheter Necessity      Urethral Catheter 09/19/24 1800 Silicone 16 Fr.-Reason for Continuing Urinary Catheterization: Critically ill in ICU and requiring hourly monitoring of intake/output    Percutaneous Central Line - Triple Lumen  09/16/24 1700 Internal Jugular Right-Line Necessity Review: Frequent Blood Draws     De-escalation Antibiotics Yes        VS and assessment per flow sheet, patient progressing towards goals as tolerated, plan of care reviewed with [unfilled] and family, all concerns addressed at this time.    Ricky Alves RN

## 2024-09-22 NOTE — ASSESSMENT & PLAN NOTE
Hep C ab positive. Hepatitis C RNA not detected. Possible cleared infection if not previously treated.    Recommendations   Outpatient Hepatology follow up

## 2024-09-22 NOTE — PROGRESS NOTES
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 S/p L3-5 laminectomy and epidural abscess washout on 9/18, IR drainage of psoas abscess 9/21, pending ortho eval for SI joint aspiration.     No hardware/devices in place. Does admit to snorting fentanyl. Does have multiple skin blisters on JESSICA UE and LE that she admits to picking at.     Afebrile. Blood cultures positive from 9/16-9/19.     Recommendations  Continue oxacillin via continuous infusion  Follow up all culture data  Repeat blood cultures tomorrow 9/23      Other osteomyelitis, multiple sites  See above    GI  Hepatitis C  Hep C ab positive. Hepatitis C RNA not detected. Possible cleared infection if not previously treated.    Recommendations   Outpatient Hepatology follow up    Psoas muscle abscess  See above        Anticipated Disposition: TBD    Thank you for your consult. I will follow-up with patient. Please contact us if you have any additional questions.    Lashawn Barbosa DO  Infectious Disease  [facility]    Subjective:     Principal Problem:Paraspinal abscess    HPI: Ms. Sloan is a 49F with PMH of DM2, HTN, and substance abuse homelessness, initially presented to OSH with back pain, found to have spinal OM, L SI joint septic arthritis, and L psoas/iliacus muscle abscess, transferred to  [facility]  on 9/16 for IR and neurosurgery eval, now [place] [facility] [place] / general surgery for necrosis of L flank.     Imaging of L spine and pelvis were concerning for OM/septic arthritis of the left SI joint and L5-S1 along with an abnormal fluid collection extending from T11 through the L iliacus muscle concerning for abscess as well as at psoas. She was started on empiric vanc and zosyn. Case was discussed with neurosurgery who did not feel surgical intervention was warranted and recommended IR drainage. IR recommended against drainage given extensive superficial infection.    On arrival to  antibiotics were transitioned to doxy, vanc and meropenem. BCx grew GPC in clusters. TTE concerning for MV endocarditis. MRI revealed "Complex fluid collection possibly extending from the left SI joint to the left iliac fossa displacing the left psoas muscle medially. This is similar to the study 1 day prior could represent infectious left sacroiliitis/osteomyelitis and adjacent abscess. The collection extends superiorly to approximately T12 level at the left posterior retroperitoneum, posterior to the left kidney. Enhancement noted to much of the sacrum including right of midline again could represent osteomyelitis."     Infectious disease consulted for "Patient being folled by ID  is osh with gram positive cocci resempblig stap and positive pcr for stap, concerns for si joint osteo and retroperitoneal abscess".   Interval History: extubated today on BIPAP. Pending ortho eval of SI joint    Review of Systems   Unable to perform ROS: Acuity of condition     Objective:     Vital Signs (Most Recent):  Temp: 100.1 °F (37.8 °C) (09/22/24 0800)  Pulse: 105 (09/22/24 1032)  Resp: (!) 26 (09/22/24 1032)  BP: 128/80 (09/22/24 1032)  SpO2: 100 % (09/22/24 1032) Vital Signs (24h Range):  Temp:  [98.2 °F (36.8 °C)-100.1 °F (37.8 °C)] 100.1 °F (37.8 °C)  Pulse:  [] 105  Resp:  [12-27] 26  SpO2:  [90 %-100 " %] 100 %  BP: ()/(51-80) 128/80     Weight: 65.2 kg (143 lb 11.8 oz)  Body mass index is 26.29 kg/m².    Estimated Creatinine Clearance: 98.2 mL/min (based on SCr of 0.6 mg/dL).     Physical Exam  Vitals reviewed.   Constitutional:       General: She is not in acute distress.     Appearance: Normal appearance.   HENT:      Head: Normocephalic and atraumatic.   Cardiovascular:      Rate and Rhythm: Normal rate and regular rhythm.      Heart sounds: No murmur heard.  Pulmonary:      Effort: Pulmonary effort is normal. No respiratory distress.      Breath sounds: Normal breath sounds.   Abdominal:      General: Abdomen is flat. Bowel sounds are normal.      Palpations: Abdomen is soft.   Skin:     General: Skin is warm and dry.   Neurological:      Mental Status: She is alert.          Significant Labs: All pertinent labs within the past 24 hours have been reviewed.  Recent Lab Results  (Last 5 results in the past 24 hours)        09/22/24  0330   09/22/24  0024   09/21/24  2128   09/21/24  1813   09/21/24  1502        AEROBIC CULTURE - FLUID         No growth  [P]       Albumin 1.1                              ALT 15               Anion Gap 13       12         Aniso Slight               AST 13               Baso # 0.10               Basophil % 1.1               BILIRUBIN TOTAL 1.8  Comment: For infants and newborns, interpretation of results should be based  on gestational age, weight and in agreement with clinical  observations.    Premature Infant recommended reference ranges:  Up to 24 hours.............<8.0 mg/dL  Up to 48 hours............<12.0 mg/dL  3-5 days..................<15.0 mg/dL  6-29 days.................<15.0 mg/dL                 BUN 7       8         Calcium 7.4       7.1         Chloride 96       100         CO2 29       26         Creatinine 0.6       0.6         Differential Method Automated               eGFR >60.0       >60.0         Eos # 0.3               Eos % 3.5                Glucose 109       109         GRAM STAIN         Moderate WBC's                Moderate Gram positive cocci       Gran # (ANC) 5.9               Gran % 64.4               Hematocrit 25.6               Hemoglobin 8.2               Hypo Occasional               Immature Grans (Abs) 0.41  Comment: Mild elevation in immature granulocytes is non specific and   can be seen in a variety of conditions including stress response,   acute inflammation, trauma and pregnancy. Correlation with other   laboratory and clinical findings is essential.                 Immature Granulocytes 4.5               Lymph # 2.0               Lymph % 21.7               Magnesium  1.6               MCH 25.9               MCHC 32.0               MCV 81               Mono # 0.4               Mono % 4.8               MPV 10.8               nRBC 0               Ovalocytes Occasional               Phosphorus Level 4.4               Platelet Count 153               POCT Glucose   131   108           Poikilocytosis Slight               Poly Occasional               Potassium 3.0       3.3         PROTEIN TOTAL 4.9               RBC 3.17               RDW 17.9               Sodium 138       138         Teardrop Cells Occasional               WBC 9.12                                       [P] - Preliminary Result               Significant Imaging: I have reviewed all pertinent imaging results/findings within the past 24 hours.

## 2024-09-23 LAB
ACID FAST MOD KINY STN SPEC: NORMAL
ALBUMIN SERPL BCP-MCNC: 1.2 G/DL (ref 3.5–5.2)
ALP SERPL-CCNC: 110 U/L (ref 55–135)
ALT SERPL W/O P-5'-P-CCNC: 14 U/L (ref 10–44)
ANION GAP SERPL CALC-SCNC: 11 MMOL/L (ref 8–16)
ANION GAP SERPL CALC-SCNC: 7 MMOL/L (ref 8–16)
ANION GAP SERPL CALC-SCNC: 8 MMOL/L (ref 8–16)
AST SERPL-CCNC: 13 U/L (ref 10–40)
BACTERIA SPEC ANAEROBE CULT: NORMAL
BASOPHILS # BLD AUTO: 0.09 K/UL (ref 0–0.2)
BASOPHILS NFR BLD: 1.1 % (ref 0–1.9)
BILIRUB SERPL-MCNC: 1.6 MG/DL (ref 0.1–1)
BUN SERPL-MCNC: 7 MG/DL (ref 6–20)
BUN SERPL-MCNC: 7 MG/DL (ref 6–20)
BUN SERPL-MCNC: 8 MG/DL (ref 6–20)
CALCIUM SERPL-MCNC: 7.7 MG/DL (ref 8.7–10.5)
CALCIUM SERPL-MCNC: 8 MG/DL (ref 8.7–10.5)
CALCIUM SERPL-MCNC: 8 MG/DL (ref 8.7–10.5)
CHLORIDE SERPL-SCNC: 94 MMOL/L (ref 95–110)
CHLORIDE SERPL-SCNC: 95 MMOL/L (ref 95–110)
CHLORIDE SERPL-SCNC: 97 MMOL/L (ref 95–110)
CO2 SERPL-SCNC: 32 MMOL/L (ref 23–29)
CO2 SERPL-SCNC: 33 MMOL/L (ref 23–29)
CO2 SERPL-SCNC: 36 MMOL/L (ref 23–29)
CREAT SERPL-MCNC: 0.6 MG/DL (ref 0.5–1.4)
DIFFERENTIAL METHOD BLD: ABNORMAL
EOSINOPHIL # BLD AUTO: 0.3 K/UL (ref 0–0.5)
EOSINOPHIL NFR BLD: 3.7 % (ref 0–8)
ERYTHROCYTE [DISTWIDTH] IN BLOOD BY AUTOMATED COUNT: 17.7 % (ref 11.5–14.5)
EST. GFR  (NO RACE VARIABLE): >60 ML/MIN/1.73 M^2
GLUCOSE SERPL-MCNC: 60 MG/DL (ref 70–110)
GLUCOSE SERPL-MCNC: 65 MG/DL (ref 70–110)
GLUCOSE SERPL-MCNC: 84 MG/DL (ref 70–110)
HCT VFR BLD AUTO: 26.5 % (ref 37–48.5)
HGB BLD-MCNC: 7.9 G/DL (ref 12–16)
IMM GRANULOCYTES # BLD AUTO: 0.29 K/UL (ref 0–0.04)
IMM GRANULOCYTES NFR BLD AUTO: 3.5 % (ref 0–0.5)
LYMPHOCYTES # BLD AUTO: 2.1 K/UL (ref 1–4.8)
LYMPHOCYTES NFR BLD: 25.6 % (ref 18–48)
MAGNESIUM SERPL-MCNC: 1.9 MG/DL (ref 1.6–2.6)
MCH RBC QN AUTO: 24.8 PG (ref 27–31)
MCHC RBC AUTO-ENTMCNC: 29.8 G/DL (ref 32–36)
MCV RBC AUTO: 83 FL (ref 82–98)
MONOCYTES # BLD AUTO: 0.7 K/UL (ref 0.3–1)
MONOCYTES NFR BLD: 8.3 % (ref 4–15)
MYCOBACTERIUM SPEC QL CULT: NORMAL
NEUTROPHILS # BLD AUTO: 4.7 K/UL (ref 1.8–7.7)
NEUTROPHILS NFR BLD: 57.8 % (ref 38–73)
NRBC BLD-RTO: 0 /100 WBC
PHOSPHATE SERPL-MCNC: 4.7 MG/DL (ref 2.7–4.5)
PLATELET # BLD AUTO: 181 K/UL (ref 150–450)
PLATELET BLD QL SMEAR: ABNORMAL
PMV BLD AUTO: 10.8 FL (ref 9.2–12.9)
POCT GLUCOSE: 73 MG/DL (ref 70–110)
POTASSIUM SERPL-SCNC: 3 MMOL/L (ref 3.5–5.1)
POTASSIUM SERPL-SCNC: 3.8 MMOL/L (ref 3.5–5.1)
POTASSIUM SERPL-SCNC: 4.4 MMOL/L (ref 3.5–5.1)
PROT SERPL-MCNC: 5.3 G/DL (ref 6–8.4)
RBC # BLD AUTO: 3.18 M/UL (ref 4–5.4)
SODIUM SERPL-SCNC: 137 MMOL/L (ref 136–145)
SODIUM SERPL-SCNC: 137 MMOL/L (ref 136–145)
SODIUM SERPL-SCNC: 139 MMOL/L (ref 136–145)
VIT B1 BLD-MCNC: 28 UG/L (ref 38–122)
WBC # BLD AUTO: 8.2 K/UL (ref 3.9–12.7)
WBC TOXIC VACUOLES BLD QL SMEAR: PRESENT

## 2024-09-23 PROCEDURE — 97535 SELF CARE MNGMENT TRAINING: CPT

## 2024-09-23 PROCEDURE — 80053 COMPREHEN METABOLIC PANEL: CPT | Performed by: STUDENT IN AN ORGANIZED HEALTH CARE EDUCATION/TRAINING PROGRAM

## 2024-09-23 PROCEDURE — 97165 OT EVAL LOW COMPLEX 30 MIN: CPT

## 2024-09-23 PROCEDURE — 25000003 PHARM REV CODE 250: Performed by: STUDENT IN AN ORGANIZED HEALTH CARE EDUCATION/TRAINING PROGRAM

## 2024-09-23 PROCEDURE — 25000003 PHARM REV CODE 250: Performed by: INTERNAL MEDICINE

## 2024-09-23 PROCEDURE — 99233 SBSQ HOSP IP/OBS HIGH 50: CPT | Mod: ,,, | Performed by: STUDENT IN AN ORGANIZED HEALTH CARE EDUCATION/TRAINING PROGRAM

## 2024-09-23 PROCEDURE — 63600175 PHARM REV CODE 636 W HCPCS

## 2024-09-23 PROCEDURE — 83735 ASSAY OF MAGNESIUM: CPT

## 2024-09-23 PROCEDURE — 97162 PT EVAL MOD COMPLEX 30 MIN: CPT

## 2024-09-23 PROCEDURE — 25000003 PHARM REV CODE 250

## 2024-09-23 PROCEDURE — 63600175 PHARM REV CODE 636 W HCPCS: Performed by: STUDENT IN AN ORGANIZED HEALTH CARE EDUCATION/TRAINING PROGRAM

## 2024-09-23 PROCEDURE — 80048 BASIC METABOLIC PNL TOTAL CA: CPT | Mod: 91,XB | Performed by: STUDENT IN AN ORGANIZED HEALTH CARE EDUCATION/TRAINING PROGRAM

## 2024-09-23 PROCEDURE — 99233 SBSQ HOSP IP/OBS HIGH 50: CPT | Mod: ,,, | Performed by: INTERNAL MEDICINE

## 2024-09-23 PROCEDURE — 97530 THERAPEUTIC ACTIVITIES: CPT

## 2024-09-23 PROCEDURE — 36415 COLL VENOUS BLD VENIPUNCTURE: CPT | Performed by: STUDENT IN AN ORGANIZED HEALTH CARE EDUCATION/TRAINING PROGRAM

## 2024-09-23 PROCEDURE — 20600001 HC STEP DOWN PRIVATE ROOM

## 2024-09-23 PROCEDURE — 85025 COMPLETE CBC W/AUTO DIFF WBC: CPT | Performed by: STUDENT IN AN ORGANIZED HEALTH CARE EDUCATION/TRAINING PROGRAM

## 2024-09-23 PROCEDURE — 84100 ASSAY OF PHOSPHORUS: CPT

## 2024-09-23 PROCEDURE — 87040 BLOOD CULTURE FOR BACTERIA: CPT | Performed by: STUDENT IN AN ORGANIZED HEALTH CARE EDUCATION/TRAINING PROGRAM

## 2024-09-23 RX ORDER — THIAMINE HCL 100 MG
100 TABLET ORAL DAILY
Status: DISCONTINUED | OUTPATIENT
Start: 2024-09-24 | End: 2024-10-18 | Stop reason: HOSPADM

## 2024-09-23 RX ORDER — POTASSIUM CHLORIDE 29.8 MG/ML
40 INJECTION INTRAVENOUS ONCE
Status: COMPLETED | OUTPATIENT
Start: 2024-09-23 | End: 2024-09-23

## 2024-09-23 RX ORDER — METHADONE HYDROCHLORIDE 10 MG/1
70 TABLET ORAL DAILY
Status: DISCONTINUED | OUTPATIENT
Start: 2024-09-24 | End: 2024-10-18 | Stop reason: HOSPADM

## 2024-09-23 RX ORDER — POTASSIUM CHLORIDE 7.45 MG/ML
10 INJECTION INTRAVENOUS
Status: DISCONTINUED | OUTPATIENT
Start: 2024-09-23 | End: 2024-09-23

## 2024-09-23 RX ORDER — CLONIDINE HYDROCHLORIDE 0.1 MG/1
0.1 TABLET ORAL 3 TIMES DAILY
Status: DISCONTINUED | OUTPATIENT
Start: 2024-09-23 | End: 2024-10-05

## 2024-09-23 RX ORDER — GLUCAGON 1 MG
1 KIT INJECTION
Status: DISCONTINUED | OUTPATIENT
Start: 2024-09-23 | End: 2024-10-01 | Stop reason: SDUPTHER

## 2024-09-23 RX ORDER — GABAPENTIN 300 MG/1
300 CAPSULE ORAL 3 TIMES DAILY
Status: DISCONTINUED | OUTPATIENT
Start: 2024-09-23 | End: 2024-10-18 | Stop reason: HOSPADM

## 2024-09-23 RX ORDER — IBUPROFEN 200 MG
16 TABLET ORAL
Status: DISCONTINUED | OUTPATIENT
Start: 2024-09-23 | End: 2024-10-01 | Stop reason: SDUPTHER

## 2024-09-23 RX ORDER — ENOXAPARIN SODIUM 100 MG/ML
40 INJECTION SUBCUTANEOUS EVERY 24 HOURS
Status: DISCONTINUED | OUTPATIENT
Start: 2024-09-23 | End: 2024-10-06

## 2024-09-23 RX ORDER — HYDROXYZINE PAMOATE 25 MG/1
25 CAPSULE ORAL NIGHTLY
Status: DISCONTINUED | OUTPATIENT
Start: 2024-09-23 | End: 2024-10-18 | Stop reason: HOSPADM

## 2024-09-23 RX ORDER — POLYETHYLENE GLYCOL 3350 17 G/17G
17 POWDER, FOR SOLUTION ORAL 2 TIMES DAILY
Status: DISCONTINUED | OUTPATIENT
Start: 2024-09-23 | End: 2024-10-08

## 2024-09-23 RX ORDER — IBUPROFEN 200 MG
24 TABLET ORAL
Status: DISCONTINUED | OUTPATIENT
Start: 2024-09-23 | End: 2024-10-01 | Stop reason: SDUPTHER

## 2024-09-23 RX ORDER — AMOXICILLIN 250 MG
1 CAPSULE ORAL 2 TIMES DAILY
Status: DISCONTINUED | OUTPATIENT
Start: 2024-09-23 | End: 2024-10-08

## 2024-09-23 RX ORDER — INSULIN GLARGINE 100 [IU]/ML
15 INJECTION, SOLUTION SUBCUTANEOUS NIGHTLY
Status: DISCONTINUED | OUTPATIENT
Start: 2024-09-23 | End: 2024-09-24

## 2024-09-23 RX ORDER — POLYETHYLENE GLYCOL 3350 17 G/17G
17 POWDER, FOR SOLUTION ORAL 2 TIMES DAILY
Status: DISCONTINUED | OUTPATIENT
Start: 2024-09-23 | End: 2024-09-23

## 2024-09-23 RX ORDER — OXYCODONE HYDROCHLORIDE 10 MG/1
10 TABLET ORAL EVERY 4 HOURS PRN
Status: DISCONTINUED | OUTPATIENT
Start: 2024-09-23 | End: 2024-10-07

## 2024-09-23 RX ORDER — INSULIN ASPART 100 [IU]/ML
0-10 INJECTION, SOLUTION INTRAVENOUS; SUBCUTANEOUS
Status: DISCONTINUED | OUTPATIENT
Start: 2024-09-23 | End: 2024-10-01 | Stop reason: SDUPTHER

## 2024-09-23 RX ORDER — FOLIC ACID 1 MG/1
1 TABLET ORAL DAILY
Status: DISCONTINUED | OUTPATIENT
Start: 2024-09-24 | End: 2024-10-18 | Stop reason: HOSPADM

## 2024-09-23 RX ADMIN — OXYCODONE HYDROCHLORIDE 10 MG: 10 TABLET ORAL at 04:09

## 2024-09-23 RX ADMIN — HYDROXYZINE PAMOATE 25 MG: 25 CAPSULE ORAL at 09:09

## 2024-09-23 RX ADMIN — CLONIDINE HYDROCHLORIDE 0.1 MG: 0.1 TABLET ORAL at 09:09

## 2024-09-23 RX ADMIN — CLONIDINE HYDROCHLORIDE 0.2 MG: 0.1 TABLET ORAL at 08:09

## 2024-09-23 RX ADMIN — METHADONE HYDROCHLORIDE 70 MG: 10 TABLET ORAL at 08:09

## 2024-09-23 RX ADMIN — INSULIN GLARGINE 15 UNITS: 100 INJECTION, SOLUTION SUBCUTANEOUS at 09:09

## 2024-09-23 RX ADMIN — OXYCODONE HYDROCHLORIDE 10 MG: 10 TABLET ORAL at 09:09

## 2024-09-23 RX ADMIN — POLYETHYLENE GLYCOL 3350 17 G: 17 POWDER, FOR SOLUTION ORAL at 09:09

## 2024-09-23 RX ADMIN — FUROSEMIDE 40 MG: 10 INJECTION, SOLUTION INTRAVENOUS at 08:09

## 2024-09-23 RX ADMIN — SENNOSIDES AND DOCUSATE SODIUM 1 TABLET: 50; 8.6 TABLET ORAL at 09:09

## 2024-09-23 RX ADMIN — HEPARIN SODIUM 5000 UNITS: 5000 INJECTION INTRAVENOUS; SUBCUTANEOUS at 05:09

## 2024-09-23 RX ADMIN — FUROSEMIDE 40 MG: 10 INJECTION, SOLUTION INTRAVENOUS at 09:09

## 2024-09-23 RX ADMIN — POTASSIUM BICARBONATE 40 MEQ: 391 TABLET, EFFERVESCENT ORAL at 06:09

## 2024-09-23 RX ADMIN — FOLIC ACID 1 MG: 1 TABLET ORAL at 08:09

## 2024-09-23 RX ADMIN — PANTOPRAZOLE SODIUM 40 MG: 40 INJECTION, POWDER, FOR SOLUTION INTRAVENOUS at 08:09

## 2024-09-23 RX ADMIN — ACETAMINOPHEN 1000 MG: 500 TABLET ORAL at 08:09

## 2024-09-23 RX ADMIN — Medication 100 MG: at 08:09

## 2024-09-23 RX ADMIN — GABAPENTIN 300 MG: 300 CAPSULE ORAL at 09:09

## 2024-09-23 RX ADMIN — ENOXAPARIN SODIUM 40 MG: 40 INJECTION SUBCUTANEOUS at 04:09

## 2024-09-23 RX ADMIN — GABAPENTIN 300 MG: 300 CAPSULE ORAL at 08:09

## 2024-09-23 RX ADMIN — POTASSIUM CHLORIDE 40 MEQ: 29.8 INJECTION, SOLUTION INTRAVENOUS at 05:09

## 2024-09-23 RX ADMIN — OXACILLIN 12 G: 2 INJECTION, POWDER, FOR SOLUTION INTRAMUSCULAR; INTRAVENOUS at 01:09

## 2024-09-23 NOTE — SUBJECTIVE & OBJECTIVE
Interval History: states her L hip and back pain are still present but improved    Review of Systems   Constitutional:  Negative for chills and fever.   Respiratory:  Negative for cough and shortness of breath.    Cardiovascular:  Negative for chest pain.   Gastrointestinal:  Negative for abdominal pain, constipation, diarrhea, nausea and vomiting.   Musculoskeletal:  Positive for arthralgias and back pain. Negative for myalgias.   Skin:  Positive for wound. Negative for rash.   Neurological:  Negative for headaches.     Objective:     Vital Signs (Most Recent):  Temp: 98.6 °F (37 °C) (09/23/24 1100)  Pulse: 75 (09/23/24 1300)  Resp: (!) 21 (09/23/24 1300)  BP: 111/76 (09/23/24 1300)  SpO2: 100 % (09/23/24 1300) Vital Signs (24h Range):  Temp:  [98.1 °F (36.7 °C)-98.6 °F (37 °C)] 98.6 °F (37 °C)  Pulse:  [60-93] 75  Resp:  [12-39] 21  SpO2:  [88 %-100 %] 100 %  BP: ()/(52-81) 111/76     Weight: 65.2 kg (143 lb 11.8 oz)  Body mass index is 26.29 kg/m².    Estimated Creatinine Clearance: 98.2 mL/min (based on SCr of 0.6 mg/dL).     Physical Exam  Vitals reviewed.   Constitutional:       General: She is not in acute distress.     Appearance: Normal appearance. She is not ill-appearing.   HENT:      Head: Normocephalic and atraumatic.   Eyes:      Extraocular Movements: Extraocular movements intact.      Conjunctiva/sclera: Conjunctivae normal.   Cardiovascular:      Rate and Rhythm: Normal rate and regular rhythm.      Heart sounds: No murmur heard.  Pulmonary:      Effort: Pulmonary effort is normal. No respiratory distress.      Breath sounds: Normal breath sounds. No wheezing.   Abdominal:      General: Abdomen is flat. Bowel sounds are normal.      Palpations: Abdomen is soft.      Tenderness: There is no abdominal tenderness.   Musculoskeletal:      Cervical back: Normal range of motion.      Comments: L hip less tender, still erythematous and warm   Skin:     General: Skin is warm and dry.   Neurological:       General: No focal deficit present.      Mental Status: She is alert and oriented to person, place, and time.   Psychiatric:         Mood and Affect: Mood normal.         Behavior: Behavior normal.         Thought Content: Thought content normal.          Significant Labs: All pertinent labs within the past 24 hours have been reviewed.  Recent Lab Results  (Last 5 results in the past 24 hours)        09/23/24  1242   09/23/24  0815   09/23/24  0313   09/22/24  2053   09/22/24  1658        Albumin     1.2           ALP     110           ALT     14           Anion Gap   7   8     10       AST     13           Baso #     0.09           Basophil %     1.1           BILIRUBIN TOTAL     1.6  Comment: For infants and newborns, interpretation of results should be based  on gestational age, weight and in agreement with clinical  observations.    Premature Infant recommended reference ranges:  Up to 24 hours.............<8.0 mg/dL  Up to 48 hours............<12.0 mg/dL  3-5 days..................<15.0 mg/dL  6-29 days.................<15.0 mg/dL             BUN   7   7     7       Calcium   8.0   7.7     7.5       Chloride   97   95     96       CO2   33   36     32       Creatinine   0.6   0.6     0.6       Differential Method     Automated           eGFR   >60.0   >60.0     >60.0       Eos #     0.3           Eos %     3.7           Glucose   65   60     134       Gran # (ANC)     4.7           Gran %     57.8           Hematocrit     26.5           Hemoglobin     7.9           Immature Grans (Abs)     0.29  Comment: Mild elevation in immature granulocytes is non specific and   can be seen in a variety of conditions including stress response,   acute inflammation, trauma and pregnancy. Correlation with other   laboratory and clinical findings is essential.             Immature Granulocytes     3.5           Lymph #     2.1           Lymph %     25.6  Comment: atypical lymphs present           Magnesium      1.9            MCH     24.8           MCHC     29.8           MCV     83           Mono #     0.7           Mono %     8.3           MPV     10.8           nRBC     0           Phosphorus Level     4.7           Platelet Estimate     Decreased           Platelet Count     181           POCT Glucose 73       120         Potassium   4.4   3.0     3.6       PROTEIN TOTAL     5.3           RBC     3.18           RDW     17.7           Sodium   137   139     138       Vacuolated Granulocytes     Present           WBC     8.20                                  Significant Imaging: I have reviewed all pertinent imaging results/findings within the past 24 hours.

## 2024-09-23 NOTE — PROGRESS NOTES
[facility] - Medical ICU  Neurosurgery  Progress Note    Subjective:     History of Present Illness: 49F PMH DM, HTN, hep c, opioid abuse, presenting after down for unknown period of time and inability to ambulate secondary to pain with imaging demonstrating L SI osteo with associated abscesses extending to retroperitoneum without involvement of thecal sac. Denies true weakness, states pain is limiting her ability to move. Pain in hips and low back. On clinda/zosyn, blood culture 9/16 with staph aureus. Denies bowel or bladder incontinence, complaining of constipation about 6 days. Denies numbness    Post-Op Info:  Procedure(s) (LRB):  L3-L5 laminectomy with epidural abscess evac (N/A)  WASHOUT, WOUND, SPINE   4 Days Post-Op   Interval History: NAEON. Will remove HV drains today. IR drain to remain s/p psoas abscess. Rec Ortho eval for treatment/drainage of SI joint injection.    Medications:  Continuous Infusions:  Scheduled Meds:   cloNIDine  0.1 mg Oral TID    enoxparin  40 mg Subcutaneous Q24H (prophylaxis, 1700)    [START ON 9/24/2024] folic acid  1 mg Oral Daily    furosemide (LASIX) injection  40 mg Intravenous Q12H    gabapentin  300 mg Oral TID    hydrOXYzine pamoate  25 mg Oral QHS    insulin glargine U-100  15 Units Subcutaneous QHS    LIDOcaine  1 patch Transdermal Q24H    [START ON 9/24/2024] methadone  70 mg Oral Daily    nicotine  1 patch Transdermal Daily    oxacillin 12 g in  mL CONTINUOUS INFUSION  12 g Intravenous Q24H    polyethylene glycol  17 g Oral BID    senna-docusate 8.6-50 mg  1 tablet Oral BID    [START ON 9/24/2024] thiamine  100 mg Oral Daily     PRN Meds:  Current Facility-Administered Medications:     acetaminophen, 1,000 mg, Per OG tube, Q6H PRN    albuterol-ipratropium, 3 mL, Nebulization, Q4H PRN    dextrose 10%, 12.5 g, Intravenous, PRN    dextrose 10%, 12.5 g, Intravenous, PRN    dextrose 10%, 25 g, Intravenous, PRN    glucagon (human recombinant), 1 mg, Intramuscular, PRN     Subjective:       Patient ID: Lola Mann is a 52 y.o. female.    Chief Complaint:  Well Woman (Pap/hpv 10-7-20, Hpv +  (Colpo 21, No sig abn)  -- mmg Today (done)  --  dex, never  --  Cscope 2017 (10 yrs) )      History of Present Illness.  Lola Mann is a 52 y.o. female.  She has no breast or urinary symptoms.  She has no postcoital bleeding, pelvic pain or vaginal discharge.  She has hot flashes off and on.  No period fro over a year.  Natural products helped some, but now they're worse.    GYN & OB History  Patient's last menstrual period was 2020 (exact date).   Pap: 2020  Normal HPV+, colpo bx normal  Mammogram: today  Colonoscopy:  Normal  DEXA: No  PCP:  Dr. Alvarez  Routine Labs:   22    OB History    Para Term  AB Living   2 2 2 0 0 2   SAB IAB Ectopic Multiple Live Births   0 0 0   2      # Outcome Date GA Lbr Taj/2nd Weight Sex Delivery Anes PTL Lv   2 Term 12/15/04 39w0d  4.139 kg (9 lb 2 oz) M CS-LTranv Spinal  EARLENE   1 Term 97 43w0d  4.196 kg (9 lb 4 oz) M Vag-Spont Spinal  EARLENE      Obstetric Comments   Menarche ~ 12       Past Medical History:   Diagnosis Date    Abnormal Pap smear of cervix 2014    Hpv +     Acid reflux     Basal cell carcinoma     DUB (dysfunctional uterine bleeding)     H/O tubal ligation     History of endometriosis     History of HPV infection 2014    Pap Hpv +    Menopause     Neck and shoulder pain     Rheumatoid arthritis     Right Olecranon Bursitis     Seasonal allergies     Thyroid disease     Hypo    Venous insufficiency of leg      Past Surgical History:   Procedure Laterality Date    APPENDECTOMY  1985    BASAL CELL CARCINOMA EXCISION       SECTION  , 2004    x 2     CHOLECYSTECTOMY  2009    COLONOSCOPY  2017    Normal  w/ Dr Moore  (Rtn 10 yrs)    DILATION AND CURETTAGE OF UTERUS  08/15/2013    DUB    ENDOMETRIAL ABLATION N/A 08/15/2013    [facility]  -- Dr Mcnamara    ENDOMETRIAL BIOPSY      LASER ABLATION  12/26/2017    Endovenous Laser Ablation-Leg    PELVIC LAPAROSCOPY  1994 1994 and 1996  Dr Ferro    TUBAL LIGATION  2004    VARICOSE VEIN SURGERY       Family History   Problem Relation Age of Onset    Seizures Mother     Hypertension Mother     Thyroid disease Mother     Alzheimer's disease Mother     Cancer Father         melanoma    Heart disease Father     Hyperlipidemia Father     Melanoma Father     Hypertension Sister     Bladder Cancer Sister     Breast cancer Maternal Grandmother     Hypertension Maternal Grandmother     Heart disease Maternal Grandmother     Heart disease Maternal Grandfather 42        death due to MI    Cancer Paternal Grandmother     Cancer Paternal Grandfather         liver cancer    Hypertension Brother     Stroke Brother     Breast cancer Other         niece    Colon cancer Neg Hx     Ovarian cancer Neg Hx      Social History     Tobacco Use    Smoking status: Never Smoker    Smokeless tobacco: Never Used   Substance Use Topics    Alcohol use: Yes     Alcohol/week: 1.0 standard drink     Types: 1 Glasses of wine per week     Comment: social    Drug use: No       Current Outpatient Medications:     ARMOUR THYROID 120 mg Tab, Take 1 tablet by mouth once daily., Disp: , Rfl: 3    fexofenadine-pseudoephedrine (ALLEGRA-D 24) 180-240 mg per 24 hr tablet, Take 1 tablet by mouth as needed. , Disp: , Rfl:     finasteride (PROSCAR) 5 mg tablet, Take 1 tablet by mouth once daily., Disp: , Rfl:     hydroxychloroquine (PLAQUENIL) 200 mg tablet, Take 200 mg by mouth once daily., Disp: , Rfl:     meloxicam (MOBIC) 15 MG tablet, Take 15 mg by mouth once daily., Disp: , Rfl:     pantoprazole (PROTONIX) 20 MG tablet, Take 20 mg by mouth once daily., Disp: , Rfl:     albuterol (PROVENTIL/VENTOLIN HFA) 90 mcg/actuation inhaler, Inhale 2 puffs into the lungs every 4 (four) hours as needed for Wheezing or  glucose, 16 g, Oral, PRN    glucose, 24 g, Oral, PRN    hydrOXYzine pamoate, 50 mg, Oral, Q6H PRN    insulin aspart U-100, 0-10 Units, Subcutaneous, QID (AC + HS) PRN    melatonin, 6 mg, Oral, Nightly PRN    naloxone, 0.4 mg, Intravenous, PRN    ondansetron, 8 mg, Intravenous, Once PRN    oxyCODONE, 10 mg, Oral, Q4H PRN    sodium chloride 0.9%, 10 mL, Intravenous, Q12H PRN     Review of Systems  Objective:     Weight: 65.2 kg (143 lb 11.8 oz)  Body mass index is 26.29 kg/m².  Vital Signs (Most Recent):  Temp: 98.6 °F (37 °C) (09/23/24 1100)  Pulse: 63 (09/23/24 1400)  Resp: 15 (09/23/24 1400)  BP: (!) 78/52 (09/23/24 1400)  SpO2: 100 % (09/23/24 1400) Vital Signs (24h Range):  Temp:  [98.1 °F (36.7 °C)-98.6 °F (37 °C)] 98.6 °F (37 °C)  Pulse:  [63-93] 63  Resp:  [12-39] 15  SpO2:  [91 %-100 %] 100 %  BP: ()/(52-81) 78/52     Date 09/23/24 0700 - 09/24/24 0659   Shift 4810-3019 7004-2588 9901-7461 24 Hour Total   INTAKE   IV Piggyback 101.2   101.2   Shift Total(mL/kg) 101.2(1.6)   101.2(1.6)   OUTPUT   Urine(mL/kg/hr) 1000   1000   Shift Total(mL/kg) 1000(15.3)   1000(15.3)   Weight (kg) 65.2 65.2 65.2 65.2                            Closed/Suction Drain 09/19/24 1549 Left Back Accordion 10 Fr. (Active)   Site Description Unable to view 09/23/24 1300   Dressing Type Transparent (Tegaderm) 09/23/24 1300   Dressing Status Clean;Dry;Intact 09/23/24 1300   Dressing Intervention Integrity maintained 09/23/24 1300   Drainage Serosanguineous 09/23/24 1300   Status Other (Comment) 09/22/24 1545   Output (mL) 0 mL 09/23/24 0645            Closed/Suction Drain 09/19/24 1550 Right Back Accordion 10 Fr. (Active)   Site Description Unable to view 09/23/24 1300   Dressing Type Transparent (Tegaderm) 09/23/24 1300   Dressing Status Clean;Dry;Intact 09/23/24 1300   Dressing Intervention Integrity maintained 09/23/24 1300   Drainage Serosanguineous 09/23/24 1300   Status Other (Comment) 09/22/24 1545   Output (mL) 5 mL  "09/23/24 0645            Closed/Suction Drain 09/21/24 1510 Left Back Bulb 10 Fr. (Active)   Site Description Unable to view 09/23/24 1300   Dressing Type Transparent (Tegaderm) 09/23/24 1300   Dressing Status Clean;Dry;Intact 09/23/24 1300   Dressing Intervention Integrity maintained 09/23/24 1300   Drainage Purulent 09/23/24 1300   Status To bulb suction 09/23/24 1300   Output (mL) 30 mL 09/23/24 0645            Urethral Catheter 09/19/24 1800 Silicone 16 Fr. (Active)   $ López Insertion Bedside Insertion Performed 09/19/24 1845   Site Assessment Clean;Intact 09/23/24 1300   Collection Container Urimeter 09/23/24 1300   Securement Method secured to top of thigh w/ adhesive device 09/23/24 1300   Catheter Care Performed yes 09/23/24 1300   Reason for Continuing Urinary Catheterization Critically ill in ICU and requiring hourly monitoring of intake/output 09/23/24 1300   CAUTI Prevention Bundle Securement Device in place with 1" slack;Intact seal between catheter & drainage tubing;Drainage bag/urimeter off the floor;Sheeting clip in use;No dependent loops or kinks;Drainage bag/urimeter not overfilled (<2/3 full);Drainage bag/urimeter below bladder 09/23/24 0715   Output (mL) 1000 mL 09/23/24 1050          Physical Exam         Neurosurgery Physical Exam  [id number]  PERRL  Full strength BUE/RLE  Pain limited LLE movement  Dressing c/d/i       Significant Labs:  Recent Labs   Lab 09/22/24  0330 09/22/24  1252 09/22/24  1658 09/23/24  0313 09/23/24  0815      < > 134* 60* 65*      < > 138 139 137   K 3.0*   < > 3.6 3.0* 4.4   CL 96   < > 96 95 97   CO2 29   < > 32* 36* 33*   BUN 7   < > 7 7 7   CREATININE 0.6   < > 0.6 0.6 0.6   CALCIUM 7.4*   < > 7.5* 7.7* 8.0*   MG 1.6  --   --  1.9  --     < > = values in this interval not displayed.     Recent Labs   Lab 09/22/24  0330 09/23/24  0313   WBC 9.12 8.20   HGB 8.2* 7.9*   HCT 25.6* 26.5*    181     No results for input(s): "LABPT", "INR", "APTT" in the " "Shortness of Breath. (Patient not taking: Reported on 8/4/2022), Disp: 18 g, Rfl: 3    ibuprofen (ADVIL,MOTRIN) 600 MG tablet, Take 1 tablet (600 mg total) by mouth every 8 (eight) hours as needed for Pain. (Patient not taking: Reported on 8/4/2022), Disp: 60 tablet, Rfl: 0    liothyronine (CYTOMEL) 5 MCG Tab, Take 5 mcg by mouth once daily., Disp: , Rfl:     metFORMIN (GLUCOPHAGE-XR) 500 MG ER 24hr tablet, Take 500 mg by mouth Daily., Disp: , Rfl:     semaglutide (OZEMPIC) 0.25 mg or 0.5 mg(2 mg/1.5 mL) pen injector, Inject 0.25 mg into the skin every 7 days. (Patient not taking: Reported on 8/4/2022), Disp: 2 pen, Rfl: 0    tobramycin-dexamethasone 0.3-0.1% (TOBRADEX) 0.3-0.1 % DrpS, Place 1 drop into both eyes every 6 (six) hours. (Patient not taking: Reported on 8/4/2022), Disp: 5 mL, Rfl: 0    Review of patient's allergies indicates:   Allergen Reactions    Morphine Shortness Of Breath     Other reaction(s): Asthma       Review of Systems  Review of Systems   Constitutional: Negative for fatigue.   HENT: Negative for trouble swallowing.    Eyes: Negative for visual disturbance.   Respiratory: Negative for cough and shortness of breath.    Cardiovascular: Negative for chest pain.   Gastrointestinal: Negative for abdominal distention, abdominal pain, blood in stool, nausea and vomiting.   Genitourinary: Negative for difficulty urinating, dyspareunia, dysuria, flank pain, frequency, hematuria, pelvic pain, urgency, vaginal bleeding, vaginal discharge and vaginal pain.   Musculoskeletal: Negative for arthralgias.   Skin: Negative for rash.   Neurological: Negative for dizziness and headaches.   Psychiatric/Behavioral: Negative for sleep disturbance. The patient is not nervous/anxious.         Objective:     Vitals:    08/04/22 1057   BP: 120/78   Weight: 88 kg (194 lb 0.1 oz)   Height: 5' 5" (1.651 m)   PainSc: 0-No pain     Body mass index is 32.28 kg/m².    Physical Exam:   Constitutional: She is oriented " last 48 hours.  Microbiology Results (last 7 days)       Procedure Component Value Units Date/Time    Culture, Body Fluid (Aerobic) w/ GS [[id number]]  (Abnormal) Collected: 09/21/24 1502    Order Status: Completed Specimen: Body Fluid from Back Updated: 09/23/24 1150     AEROBIC CULTURE - FLUID STAPHYLOCOCCUS AUREUS  Many  Susceptibility pending       Gram Stain Result Moderate WBC's      Moderate Gram positive cocci    Blood culture [[id number]] Collected: 09/23/24 1127    Order Status: Sent Specimen: Blood from Peripheral, Forearm, Right Updated: 09/23/24 1135    Blood culture [[id number]]     Order Status: Sent Specimen: Blood     Culture, Anaerobe [[id number]] Collected: 09/19/24 1527    Order Status: Completed Specimen: Abscess from Back Updated: 09/23/24 0957     Anaerobic Culture No anaerobes isolated    Narrative:      Epidural abscess    Fungus culture [[id number]] Collected: 09/21/24 1502    Order Status: Completed Specimen: Body Fluid from Pelvis Updated: 09/23/24 0926     Fungus (Mycology) Culture Culture in progress    Fungus culture [[id number]] Collected: 09/19/24 1527    Order Status: Completed Specimen: Abscess from Back Updated: 09/23/24 0926     Fungus (Mycology) Culture Culture in progress    Narrative:      Epidural abscess    Fungus culture [[id number]] Collected: 09/19/24 1515    Order Status: Completed Specimen: Abscess from Back Updated: 09/23/24 0926     Fungus (Mycology) Culture Culture in progress    Narrative:      Subfascial abscess    AFB Culture & Smear [[id number]] Collected: 09/21/24 1502    Order Status: Completed Specimen: Body Fluid from Pelvis Updated: 09/22/24 2127     AFB Culture & Smear Culture in progress    Blood culture [[id number]]  (Abnormal)  (Susceptibility) Collected: 09/19/24 1033    Order Status: Completed Specimen: Blood from Peripheral, Lower Arm, Left Updated: 09/22/24 0918     Blood Culture, Routine Gram stain aer bottle: Gram positive cocci in clusters  to person, place, and time. Vital signs are normal. She appears well-developed and well-nourished.    HENT:   Head: Normocephalic.     Neck: No thyromegaly present.     Pulmonary/Chest: Right breast exhibits no mass, no nipple discharge, no skin change, no tenderness and no swelling. Left breast exhibits no mass, no nipple discharge, no skin change, no tenderness and no swelling. Breasts are symmetrical.        Abdominal: Soft. Bowel sounds are normal. She exhibits no distension. There is no abdominal tenderness.     Genitourinary:    Vagina and uterus normal.      Pelvic exam was performed with patient supine.   There is no rash, tenderness, lesion or injury on the right labia. There is no rash, tenderness, lesion or injury on the left labia. Cervix is normal. Right adnexum displays no mass, no tenderness and no fullness. Left adnexum displays no mass, no tenderness and no fullness. No erythema or  no vaginal discharge in the vagina. Cervix exhibits no motion tenderness and no discharge.           Musculoskeletal: Normal range of motion.      Lymphadenopathy:        Right: No supraclavicular adenopathy present.        Left: No supraclavicular adenopathy present.    Neurological: She is alert and oriented to person, place, and time.    Skin: Skin is warm and dry.    Psychiatric: She has a normal mood and affect.        Assessment/ Plan:     Pap smear abnormality of cervix/human papillomavirus (HPV) positive  -     Liquid-Based Pap Smear, Screening    Screening for human papillomavirus  -     HPV High Risk Genotypes, PCR    Screening for cervical cancer  -     Liquid-Based Pap Smear, Screening    Screening for osteoporosis  -     DXA Bone Density Spine And Hip; Future; Expected date: 08/04/2022    Encounter for gynecological examination        Routine pap smears.  Self breast exam and mammography discussed  Routine colonoscopy discussed.  Diet and exercise discussed.  Recommend routine bone mineral density  resembling Staph      Results called to and read back by:Mehreen Vital 09/20/2024  13:40      STAPHYLOCOCCUS AUREUS  ID consult required at [facility] and [facility] locations.      Gram stain [[id number]]     Order Status: No result Specimen: Joint Fluid     AFB culture [[id number]]     Order Status: No result Specimen: Joint Fluid     AFB stain [[id number]]     Order Status: No result Specimen: Joint Fluid     Culture, body fluid - Bactec [[id number]]     Order Status: No result Specimen: Joint Fluid     Fungus culture [[id number]]     Order Status: No result Specimen: Joint Fluid     Gram stain [[id number]] Collected: 09/21/24 1502    Order Status: Canceled Specimen: Body Fluid from Pelvis     Culture, Anaerobe [[id number]] Collected: 09/19/24 1515    Order Status: Completed Specimen: Abscess from Back Updated: 09/21/24 1201     Anaerobic Culture Culture in progress    Narrative:      Subfascial abscess    Aerobic culture [[id number]]  (Abnormal)  (Susceptibility) Collected: 09/19/24 1527    Order Status: Completed Specimen: Abscess from Back Updated: 09/21/24 1044     Aerobic Bacterial Culture STAPHYLOCOCCUS AUREUS  Moderate      Narrative:      Epidural abscess    Aerobic culture [[id number]]  (Abnormal)  (Susceptibility) Collected: 09/19/24 1515    Order Status: Completed Specimen: Abscess from Back Updated: 09/21/24 1027     Aerobic Bacterial Culture STAPHYLOCOCCUS AUREUS  Moderate      Narrative:      Subfascial abscess    Blood culture [[id number]]  (Abnormal) Collected: 09/18/24 0045    Order Status: Completed Specimen: Blood from Peripheral, Antecubital, Left Updated: 09/21/24 0732     Blood Culture, Routine Gram stain aer bottle: Gram positive cocci in clusters resembling Staph      Results called to and read back by: Chema 09/19/2024  01:39      STAPHYLOCOCCUS AUREUS  ID consult required at [facility] and [facility] locations.  For susceptibility see order #[id number]      AFB Culture &  testing.  Yearly influenza vaccination discussed.  Follow-up with me for HRT consult     Smear [[id number]] Collected: 09/19/24 1527    Order Status: Completed Specimen: Abscess from Back Updated: 09/20/24 2127     AFB Culture & Smear Culture in progress     AFB CULTURE STAIN No acid fast bacilli seen.    Narrative:      Epidural abscess    AFB Culture & Smear [[id number]] Collected: 09/19/24 1515    Order Status: Completed Specimen: Abscess from Back Updated: 09/20/24 2127     AFB Culture & Smear Culture in progress     AFB CULTURE STAIN No acid fast bacilli seen.    Narrative:      Subfascial abscess    Blood culture [[id number]]  (Abnormal) Collected: 09/18/24 0045    Order Status: Completed Specimen: Blood from Peripheral, Antecubital, Left Updated: 09/20/24 0920     Blood Culture, Routine Gram stain aer bottle: Gram positive cocci in clusters resembling Staph      Results called to and read back by:Chema Oconnor RN 09/18/2024  20:18      STAPHYLOCOCCUS AUREUS  ID consult required at [facility] and [facility].  For susceptibility see order #[id number]      Gram stain [[id number]] Collected: 09/19/24 1515    Order Status: Completed Specimen: Abscess from Back Updated: 09/19/24 1908     Gram Stain Result Rare WBC's      Few Gram positive cocci    Narrative:      Subfascial abscess    Gram stain [[id number]] Collected: 09/19/24 1527    Order Status: Completed Specimen: Abscess from Back Updated: 09/19/24 1906     Gram Stain Result Rare WBC's      Few Gram positive cocci      Rare Gram positive rods    Narrative:      Epidural abscess    Blood Culture #2 **CANNOT BE ORDERED STAT** [[id number]]  (Abnormal) Collected: 09/16/24 1145    Order Status: Completed Specimen: Blood from Peripheral, Wrist, Right Updated: 09/19/24 1002     Blood Culture, Routine Gram stain felix bottle: Gram positive cocci in clusters resembling Staph      Results called to and read back by: FATIMAH Mcnulty. 09/17/2024  03:48      Gram stain aer bottle: Gram positive cocci in clusters resembling Staph      Positive  results previously called 09/17/2024  06:05      STAPHYLOCOCCUS AUREUS  ID consult required at [facility] and [facility] [place] [place].  For susceptibility see order #[id number]      Blood culture #1 **CANNOT BE ORDERED STAT** [[id number]]  (Abnormal)  (Susceptibility) Collected: 09/16/24 1054    Order Status: Completed Specimen: Blood from Peripheral, Antecubital, Right Updated: 09/19/24 1001     Blood Culture, Routine Gram stain aer bottle: Gram positive cocci in clusters resembling Staph      Gram stain felix bottle: Gram positive cocci in clusters resembling Staph      Results called to and read back by: FATIMAH Mcnulty. 09/17/2024  03:43      STAPHYLOCOCCUS AUREUS  ID consult required at [facility] and [facility] [place] [place].      Blood culture [[id number]]     Order Status: Canceled Specimen: Blood     MRSA/SA Rapid ID by PCR from Blood culture [[id number]]  (Abnormal) Collected: 09/16/24 1054    Order Status: Completed Updated: 09/17/24 0455     Staph aureus ID by PCR Positive     Methicillin Resistant ID by PCR Negative          All pertinent labs from the last 24 hours have been reviewed.    Significant Diagnostics:  I have reviewed all pertinent imaging results/findings within the past 24 hours.      Assessment/Plan:     Other osteomyelitis, multiple sites  49F PMH DM, HTN, hep c, opioid abuse, presenting after down for unknown period of time and inability to ambulate secondary to pain with imaging demonstrating L SI osteo with associated abscesses extending to retroperitoneum without involvement of thecal sac. Denies true weakness, states pain is limiting her ability to move. Pain in hips and low back. On clinda/zosyn, blood culture 9/16 with staph aureus. Denies bowel or bladder incontinence, complaining of constipation about 6 days. Denies numbness    She is now s/p L3-L4 laminectomy for epidural abscess evacuation on 9/19/24 with neurosurgery and IR drain placement in psoas abscess 9/21    Imaging:  CT  Lsp / pelvis 9/16: fluid collection T11 on L through left psoas, felt abscess  CT CAP 9/16: extensive edema with subq gas through left flank, small psoas abscess and fluid collections left flank, septic arthritis of L SI joint  MRI Lsp w/wo 9/17: L SI complex fluid collection felt to represent osteo with adjacent abscess, extends to approximately T12 level  MRI Lsp w/wo repeat 9/18: epidural collections T12-L5   MRI C and T spine on 9/20 negative for other signs of infection    Plan:  - Admitted MICU; q1h nc/vs  - infectious workup  - Abx for Staph aureus blood culture 9/16 per primary  - OR cultures growing staph aureus  - psoas abscess s/p drainage and drain placement by IR on 9/21/24  - SI joint tap unsuccessful in drawing any fluid back -rec Ortho reconsultation   - Trend inflammatory markers   - will remove HV drains today  - rest of care per primary team  - Please notify for questions/concerns/neurologic decline    D/w Dr. Jim by NSGY team        Margarita Roberts MD  Neurosurgery  [facility] - Medical ICU

## 2024-09-23 NOTE — PLAN OF CARE
Patient and/spouse have not provided a choice for inpt rehab because spouse would like to discuss with patient before making a decision.  SW attempt to speak with patient about rehab but patient was asleep and unable to respond to verbal call.       09/23/24 1433   Post-Acute Status   Post-Acute Authorization Placement   Post-Acute Placement Status Referrals Sent   Coverage Medicaid - [facility] ([place] Medicaid).   Discharge Delays None known at this time   Discharge Plan   Discharge Plan A Rehab   Discharge Plan B Home Health;Home with family     Janel Berg LMSW  [facility]  [phone]

## 2024-09-23 NOTE — PLAN OF CARE
MICU DAILY GOALS     Family/Goals of care/Code Status   Code Status: Full Code    24H Vital Sign Range  Temp:  [98.1 °F (36.7 °C)-100.1 °F (37.8 °C)]   Pulse:  []   Resp:  [12-39]   BP: ()/(52-89)   SpO2:  [87 %-100 %]      Shift Events (include procedures and significant events)   No acute events throughout shift    AWAKE RASS: Goal - RASS Goal: 0-->alert and calm  Actual - RASS (Zavala Agitation-Sedation Scale): drowsy    Restraint necessity: Not necessary   BREATHE SBT: Not intubated    Coordinate A & B, analgesics/sedatives Pain: managed   SAT: Not intubated   Delirium CAM-ICU: Overall CAM-ICU: Positive   Early(intubated/ Progressive (non-intubated) Mobility MOVE Screen (INTUBATED ONLY): Not intubated    Activity: Activity Management: Patient unable to perform activities   Feeding/Nutrition Diet order: Diet/Nutrition Received: NPO,     Thrombus DVT prophylaxis: VTE Required Core Measure: Pharmacological prophylaxis initiated/maintained   HOB Elevation Head of Bed (HOB) Positioning: HOB at 30-45 degrees   Ulcer Prophylaxis GI: yes   Glucose control managed Glycemic Management: blood glucose monitored   Skin Skin assessed during: Q Shift Change    Sacrum intact/not altered? No  Heels intact/not altered? Yes  Surgical wound? Yes    CHECK ONE!   (no altered skin or altered skin) and sub boxes:  [] No Altered Skin Integrity Present    []Prevention Measures Documented    [x] Altered Skin Integrity Present or Discovered   [x] LDA present in EPIC, daily doc completed              [x] LDA added if not in EPIC (describe wound).                    When describing wound, do not stage, use descriptive words only.    [x] Wound Image Taken (required on admit,                   transfer/discharge and every Tuesday)    Wound Care Consulted? No    4 EYES:  Attending Nurse (1st set of eyes):     Second RN/Staff Member (2nd set of eyes):    Bowel Function no issues    Indwelling Catheter Necessity      Urethral  Catheter 09/19/24 1800 Silicone 16 Fr.-Reason for Continuing Urinary Catheterization: Critically ill in ICU and requiring hourly monitoring of intake/output    Percutaneous Central Line - Triple Lumen  09/16/24 1700 Internal Jugular Right-Line Necessity Review: Frequent Blood Draws     De-escalation Antibiotics No        VS and assessment per flow sheet, patient progressing towards goals as tolerated, plan of care reviewed with family, all concerns addressed, will continue to monitor.

## 2024-09-23 NOTE — SUBJECTIVE & OBJECTIVE
Interval History/Significant Events: NAEON, 3 drains present ( 2 left, 1 right), on NC with successful extubation yesterday, continued oxacillin for MSSA bacteremia, stable for stepdown to     Review of Systems  Objective:     Vital Signs (Most Recent):  Temp: 98.6 °F (37 °C) (09/23/24 1100)  Pulse: 69 (09/23/24 1200)  Resp: 15 (09/23/24 1200)  BP: (!) 86/55 (09/23/24 1000)  SpO2: 100 % (09/23/24 1200) Vital Signs (24h Range):  Temp:  [98.1 °F (36.7 °C)-98.6 °F (37 °C)] 98.6 °F (37 °C)  Pulse:  [60-93] 69  Resp:  [12-39] 15  SpO2:  [87 %-100 %] 100 %  BP: ()/(52-81) 86/55   Weight: 65.2 kg (143 lb 11.8 oz)  Body mass index is 26.29 kg/m².      Intake/Output Summary (Last 24 hours) at 9/23/2024 1235  Last data filed at 9/23/2024 1108  Gross per 24 hour   Intake 412.75 ml   Output 6491 ml   Net -6078.25 ml          Physical Exam  Constitutional:       Appearance: She is ill-appearing.      Interventions: She is sedated. She is not intubated.     Comments: In pain, specifically right hip   HENT:      Head: Normocephalic and atraumatic.      Right Ear: External ear normal.      Left Ear: External ear normal.   Cardiovascular:      Rate and Rhythm: Normal rate and regular rhythm.   Pulmonary:      Effort: Pulmonary effort is normal. No respiratory distress. She is not intubated.      Breath sounds: Rales present.   Chest:      Chest wall: No tenderness.   Abdominal:      General: There is distension.      Palpations: There is no mass.      Tenderness: There is no abdominal tenderness.      Comments: Left flank indurated, no fluctuation, minimally tender  Minimally tender abdominal exam   Musculoskeletal:      Cervical back: No rigidity.      Comments: Pain with flexion of left hip   Skin:     Coloration: Skin is pale.      Findings: Bruising (left arm) present.      Comments: Multiple acute and chronic , circular ulcers in upper and lower extremities   Psychiatric:         Mood and Affect: Mood normal.          Behavior: Behavior normal.         Thought Content: Thought content normal.            Vents:  Vent Mode: Spont (09/22/24 1025)  Ventilator Initiated: Yes (09/18/24 2201)  Set Rate: 18 BPM (09/22/24 0740)  Vt Set: 390 mL (09/22/24 0740)  Pressure Support: 8 cmH20 (09/22/24 1025)  PEEP/CPAP: 5 cmH20 (09/22/24 1025)  Oxygen Concentration (%): 40 (09/22/24 1032)  Peak Airway Pressure: 13 cmH20 (09/22/24 1025)  Plateau Pressure: 0 cmH20 (09/22/24 1025)  Total Ve: 7.15 L/m (09/22/24 1025)  Negative Inspiratory Force (cm H2O): 0 (09/22/24 1025)  F/VT Ratio<105 (RSBI): (!) 46.3 (09/22/24 1025)  Lines/Drains/Airways       Central Venous Catheter Line  Duration             Percutaneous Central Line - Triple Lumen  09/16/24 1700 Internal Jugular Right 6 days              Drain  Duration                  Closed/Suction Drain 09/19/24 1549 Left Back Accordion 10 Fr. 3 days         Closed/Suction Drain 09/19/24 1550 Right Back Accordion 10 Fr. 3 days         Urethral Catheter 09/19/24 1800 Silicone 16 Fr. 3 days         Closed/Suction Drain 09/21/24 1510 Left Back Bulb 10 Fr. 1 day              Peripheral Intravenous Line  Duration                  Peripheral IV - Single Lumen 09/19/24 1110 18 G 1 3/4 in No Anterior;Right Upper Arm 4 days                  Significant Labs:    CBC/Anemia Profile:  Recent Labs   Lab 09/22/24  0330 09/23/24  0313   WBC 9.12 8.20   HGB 8.2* 7.9*   HCT 25.6* 26.5*    181   MCV 81* 83   RDW 17.9* 17.7*        Chemistries:  Recent Labs   Lab 09/22/24  0330 09/22/24  1252 09/22/24  1658 09/23/24  0313 09/23/24  0815      < > 138 139 137   K 3.0*   < > 3.6 3.0* 4.4   CL 96   < > 96 95 97   CO2 29   < > 32* 36* 33*   BUN 7   < > 7 7 7   CREATININE 0.6   < > 0.6 0.6 0.6   CALCIUM 7.4*   < > 7.5* 7.7* 8.0*   ALBUMIN 1.1*  --   --  1.2*  --    PROT 4.9*  --   --  5.3*  --    BILITOT 1.8*  --   --  1.6*  --    ALKPHOS 117  --   --  110  --    ALT 15  --   --  14  --    AST 13  --   --  13  --     MG 1.6  --   --  1.9  --    PHOS 4.4  --   --  4.7*  --     < > = values in this interval not displayed.         Significant Imaging:  I have reviewed all pertinent imaging results/findings within the past 24 hours.

## 2024-09-23 NOTE — PROGRESS NOTES
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 S/p L3-5 laminectomy and epidural abscess washout on 9/18, IR drainage of psoas abscess 9/21.    No hardware/devices in place. Does admit to snorting fentanyl. Does have multiple skin blisters on JESSICA UE and LE that she admits to picking at.     Recommendations  Continue oxacillin via continuous infusion  Follow up all culture data  Obtain 2 sets of blood cultures today (ordered)  Follow up ortho recs for SI joint      Other osteomyelitis, multiple sites  See above    GI  Hepatitis C  Hep C ab positive. Hepatitis C RNA not detected. Possible cleared infection if not previously treated.    Recommendations   Outpatient Hepatology follow up    Psoas muscle abscess  See above        Anticipated Disposition: TBD    Thank you for your consult. I will follow-up with patient. Please contact us if you have any additional questions.    Lashawn Barbosa DO  Infectious Disease  [facility]    Subjective:     Principal Problem:Paraspinal abscess    HPI: Ms. Sloan is a 49F with PMH of DM2, HTN, and substance abuse homelessness, initially presented to [facility] with back pain, found to have spinal OM, L SI joint septic arthritis, and L psoas/iliacus muscle abscess, transferred to [facility] on 9/16 for IR and neurosurgery eval, now  "transferred to [facility] [place] multidisciplinary / general surgery for necrosis of L flank.     Imaging of L spine and pelvis were concerning for OM/septic arthritis of the left SI joint and L5-S1 along with an abnormal fluid collection extending from T11 through the L iliacus muscle concerning for abscess as well as at psoas. She was started on empiric vanc and zosyn. Case was discussed with neurosurgery who did not feel surgical intervention was warranted and recommended IR drainage. IR recommended against drainage given extensive superficial infection.    On arrival to  antibiotics were transitioned to doxy, vanc and meropenem. BCx grew GPC in clusters. TTE concerning for MV endocarditis. MRI revealed "Complex fluid collection possibly extending from the left SI joint to the left iliac fossa displacing the left psoas muscle medially. This is similar to the study 1 day prior could represent infectious left sacroiliitis/osteomyelitis and adjacent abscess. The collection extends superiorly to approximately T12 level at the left posterior retroperitoneum, posterior to the left kidney. Enhancement noted to much of the sacrum including right of midline again could represent osteomyelitis."     Infectious disease consulted for "Patient being folled by ID  is osh with gram positive cocci resempblig stap and positive pcr for stap, concerns for si joint osteo and retroperitoneal abscess".   Interval History: states her L hip and back pain are still present but improved    Review of Systems   Constitutional:  Negative for chills and fever.   Respiratory:  Negative for cough and shortness of breath.    Cardiovascular:  Negative for chest pain.   Gastrointestinal:  Negative for abdominal pain, constipation, diarrhea, nausea and vomiting.   Musculoskeletal:  Positive for arthralgias and back pain. Negative for myalgias.   Skin:  Positive for wound. Negative for rash.   Neurological:  Negative for headaches.     Objective:     Vital " Signs (Most Recent):  Temp: 98.6 °F (37 °C) (09/23/24 1100)  Pulse: 75 (09/23/24 1300)  Resp: (!) 21 (09/23/24 1300)  BP: 111/76 (09/23/24 1300)  SpO2: 100 % (09/23/24 1300) Vital Signs (24h Range):  Temp:  [98.1 °F (36.7 °C)-98.6 °F (37 °C)] 98.6 °F (37 °C)  Pulse:  [60-93] 75  Resp:  [12-39] 21  SpO2:  [88 %-100 %] 100 %  BP: ()/(52-81) 111/76     Weight: 65.2 kg (143 lb 11.8 oz)  Body mass index is 26.29 kg/m².    Estimated Creatinine Clearance: 98.2 mL/min (based on SCr of 0.6 mg/dL).     Physical Exam  Vitals reviewed.   Constitutional:       General: She is not in acute distress.     Appearance: Normal appearance. She is not ill-appearing.   HENT:      Head: Normocephalic and atraumatic.   Eyes:      Extraocular Movements: Extraocular movements intact.      Conjunctiva/sclera: Conjunctivae normal.   Cardiovascular:      Rate and Rhythm: Normal rate and regular rhythm.      Heart sounds: No murmur heard.  Pulmonary:      Effort: Pulmonary effort is normal. No respiratory distress.      Breath sounds: Normal breath sounds. No wheezing.   Abdominal:      General: Abdomen is flat. Bowel sounds are normal.      Palpations: Abdomen is soft.      Tenderness: There is no abdominal tenderness.   Musculoskeletal:      Cervical back: Normal range of motion.      Comments: L hip less tender, still erythematous and warm   Skin:     General: Skin is warm and dry.   Neurological:      General: No focal deficit present.      Mental Status: She is alert and oriented to person, place, and time.   Psychiatric:         Mood and Affect: Mood normal.         Behavior: Behavior normal.         Thought Content: Thought content normal.          Significant Labs: All pertinent labs within the past 24 hours have been reviewed.  Recent Lab Results  (Last 5 results in the past 24 hours)        09/23/24  1242   09/23/24  0815   09/23/24  0313   09/22/24  2053   09/22/24  1658        Albumin     1.2           ALP     110           ALT      14           Anion Gap   7   8     10       AST     13           Baso #     0.09           Basophil %     1.1           BILIRUBIN TOTAL     1.6  Comment: For infants and newborns, interpretation of results should be based  on gestational age, weight and in agreement with clinical  observations.    Premature Infant recommended reference ranges:  Up to 24 hours.............<8.0 mg/dL  Up to 48 hours............<12.0 mg/dL  3-5 days..................<15.0 mg/dL  6-29 days.................<15.0 mg/dL             BUN   7   7     7       Calcium   8.0   7.7     7.5       Chloride   97   95     96       CO2   33   36     32       Creatinine   0.6   0.6     0.6       Differential Method     Automated           eGFR   >60.0   >60.0     >60.0       Eos #     0.3           Eos %     3.7           Glucose   65   60     134       Gran # (ANC)     4.7           Gran %     57.8           Hematocrit     26.5           Hemoglobin     7.9           Immature Grans (Abs)     0.29  Comment: Mild elevation in immature granulocytes is non specific and   can be seen in a variety of conditions including stress response,   acute inflammation, trauma and pregnancy. Correlation with other   laboratory and clinical findings is essential.             Immature Granulocytes     3.5           Lymph #     2.1           Lymph %     25.6  Comment: atypical lymphs present           Magnesium      1.9           MCH     24.8           MCHC     29.8           MCV     83           Mono #     0.7           Mono %     8.3           MPV     10.8           nRBC     0           Phosphorus Level     4.7           Platelet Estimate     Decreased           Platelet Count     181           POCT Glucose 73       120         Potassium   4.4   3.0     3.6       PROTEIN TOTAL     5.3           RBC     3.18           RDW     17.7           Sodium   137   139     138       Vacuolated Granulocytes     Present           WBC     8.20                                   Significant Imaging: I have reviewed all pertinent imaging results/findings within the past 24 hours.

## 2024-09-23 NOTE — PLAN OF CARE
Problem: Occupational Therapy  Goal: Occupational Therapy Goal  Description: Goals to be met by: 10/21/2024     Patient will increase functional independence with ADLs by performing:    UE Dressing with Set-up Assistance.  LE Dressing with Stand-by Assistance.  Grooming while standing at sink with Supervision.  Toileting from toilet with Supervision for hygiene and clothing management.   Supine to sit with Contact Guard Assistance.  Stand pivot transfers with Supervision.  Toilet transfer to toilet with Supervision.    DME Justifications    Rolling Walker- Patient demonstrates a mobility limitation that significantly impairs their ability to participate in one or more mobility related activities of daily living. Patient's mobility limitation cannot be sufficiently resolved with the use of a cane, but can be sufficiently resolved with the use of a rolling walker.The use of a rolling walker will considerably improve their ability to participate in MRADLs. Patient will use the walker on a regular basis at home.      Outcome: Progressing   Patient's goals are set.

## 2024-09-23 NOTE — PROGRESS NOTES
[facility] - Medical ICU  Critical Care Medicine  Progress Note    Patient Name: Mari Sloan  MRN: [id number]  Admission Date: 9/16/2024  Hospital Length of Stay: 7 days  Code Status: Full Code  Attending Provider: Francis Downs MD  Primary Care Provider: Liliana, Primary Doctor   Principal Problem: Paraspinal abscess    Subjective:     HPI:  Miss Sloan is a pleasant 49-year-old female with a medical history of diabetes, hypertension, and substance use disorder  transferred from OSH for specialized evaluation following imaging studies concerning for osteomyelitis or septic arthritis of the left sacroiliac joint, as well as an abnormal fluid collection extending from T11 through the iliacus muscle, raising suspicion for an abscess. The patient reports a mechanical fall six days ago.     Laboratory results from the OSH revealed significant findings, including hypokalemia, hypomagnesemia, severe anemia, metabolic alkalosis, and hyperglycemia.     Labs today CBC leukocytosis (WBC 13.5), hgb 6.9 following transfusion with one unit of packed red blood cells,  platelets at 119. Iron studies revealed a total iron-binding capacity (TIBC) of 152, transferrin of 103, and elevated ferritin at 2000, suggestive of anemia of chronic disease or inflammation. CMP potassium 3.2.  Liver function  unremarkable. Her CRP was markedly elevated at 179.6, indicating significant inflammation or infection. Lactate dehydrogenase was 474, while CPK was within normal limits. Her hemoglobin A1c was elevated at 7.9, reflecting poor glycemic control.    A urine drug screen was positive for presumptive methadone (follows in methano clinic) and opioids. The infectious workup thus far positive staph aureus by PCR and blood cultures growing Gram-positive cocci resembling staph.  MRSA PCR testing returning negative.  Additionally, the patients urinalysis was notable for a hazy appearance, with trace ketones, glucose, positive nitrites, and moderate  bacteriuria, raising suspicion for a urinary tract infection.     Hospital/ICU Course:  Patient was admitted to the MICU 9/18 with concern for paraspinal abscess. Infectious workup was ordered. Blood cultures were positive for MSSA bacteremia. Neurosurgery, general surgery and IR were consulted to evaluate the patient's paraspinal abscess. Neurosurgery performed a L3-L4 laminectomy for epidural abscess evacuation on 9/19/24 and the cultures grew MSSA. MARICHUY showed normal EF of 60-65% with diastolic dysfunction, could not exclude mitral vegetation vs redundant chordae. ID was consulted and recommended oxacillin and repeat blood cultures. With her electrolyte derangement, and a background of appetitive loss in the past 2 months, there was concern for refeeding syndrome. On 9/21, IR took the patient for abscess drain placement and aspiration of SI joint. Pending culture results. SI joint couldn't be aspirated but retroperitoneal  abscess was drained of 100cc of purulent fluid. Successfully extubated 9/22. Blood culture due tomorrow (9/23) at 15:00. Plan to SD to hospital medicine, no longer requiring ICU level care.     Interval History/Significant Events: NAEON, 3 drains present ( 2 left, 1 right), on NC with successful extubation yesterday, continued oxacillin for MSSA bacteremia, stable for stepdown to     Review of Systems  Objective:     Vital Signs (Most Recent):  Temp: 98.6 °F (37 °C) (09/23/24 1100)  Pulse: 69 (09/23/24 1200)  Resp: 15 (09/23/24 1200)  BP: (!) 86/55 (09/23/24 1000)  SpO2: 100 % (09/23/24 1200) Vital Signs (24h Range):  Temp:  [98.1 °F (36.7 °C)-98.6 °F (37 °C)] 98.6 °F (37 °C)  Pulse:  [60-93] 69  Resp:  [12-39] 15  SpO2:  [87 %-100 %] 100 %  BP: ()/(52-81) 86/55   Weight: 65.2 kg (143 lb 11.8 oz)  Body mass index is 26.29 kg/m².      Intake/Output Summary (Last 24 hours) at 9/23/2024 1235  Last data filed at 9/23/2024 1108  Gross per 24 hour   Intake 412.75 ml   Output 6491 ml   Net  -6078.25 ml          Physical Exam  Constitutional:       Appearance: She is ill-appearing.      Interventions: She is sedated. She is not intubated.     Comments: In pain, specifically right hip   HENT:      Head: Normocephalic and atraumatic.      Right Ear: External ear normal.      Left Ear: External ear normal.   Cardiovascular:      Rate and Rhythm: Normal rate and regular rhythm.   Pulmonary:      Effort: Pulmonary effort is normal. No respiratory distress. She is not intubated.      Breath sounds: Rales present.   Chest:      Chest wall: No tenderness.   Abdominal:      General: There is distension.      Palpations: There is no mass.      Tenderness: There is no abdominal tenderness.      Comments: Left flank indurated, no fluctuation, minimally tender  Minimally tender abdominal exam   Musculoskeletal:      Cervical back: No rigidity.      Comments: Pain with flexion of left hip   Skin:     Coloration: Skin is pale.      Findings: Bruising (left arm) present.      Comments: Multiple acute and chronic , circular ulcers in upper and lower extremities   Psychiatric:         Mood and Affect: Mood normal.         Behavior: Behavior normal.         Thought Content: Thought content normal.            Vents:  Vent Mode: Spont (09/22/24 1025)  Ventilator Initiated: Yes (09/18/24 2201)  Set Rate: 18 BPM (09/22/24 0740)  Vt Set: 390 mL (09/22/24 0740)  Pressure Support: 8 cmH20 (09/22/24 1025)  PEEP/CPAP: 5 cmH20 (09/22/24 1025)  Oxygen Concentration (%): 40 (09/22/24 1032)  Peak Airway Pressure: 13 cmH20 (09/22/24 1025)  Plateau Pressure: 0 cmH20 (09/22/24 1025)  Total Ve: 7.15 L/m (09/22/24 1025)  Negative Inspiratory Force (cm H2O): 0 (09/22/24 1025)  F/VT Ratio<105 (RSBI): (!) 46.3 (09/22/24 1025)  Lines/Drains/Airways       Central Venous Catheter Line  Duration             Percutaneous Central Line - Triple Lumen  09/16/24 1700 Internal Jugular Right 6 days              Drain  Duration                   Closed/Suction Drain 09/19/24 1549 Left Back Accordion 10 Fr. 3 days         Closed/Suction Drain 09/19/24 1550 Right Back Accordion 10 Fr. 3 days         Urethral Catheter 09/19/24 1800 Silicone 16 Fr. 3 days         Closed/Suction Drain 09/21/24 1510 Left Back Bulb 10 Fr. 1 day              Peripheral Intravenous Line  Duration                  Peripheral IV - Single Lumen 09/19/24 1110 18 G 1 3/4 in No Anterior;Right Upper Arm 4 days                  Significant Labs:    CBC/Anemia Profile:  Recent Labs   Lab 09/22/24  0330 09/23/24  0313   WBC 9.12 8.20   HGB 8.2* 7.9*   HCT 25.6* 26.5*    181   MCV 81* 83   RDW 17.9* 17.7*        Chemistries:  Recent Labs   Lab 09/22/24  0330 09/22/24  1252 09/22/24  1658 09/23/24  0313 09/23/24  0815      < > 138 139 137   K 3.0*   < > 3.6 3.0* 4.4   CL 96   < > 96 95 97   CO2 29   < > 32* 36* 33*   BUN 7   < > 7 7 7   CREATININE 0.6   < > 0.6 0.6 0.6   CALCIUM 7.4*   < > 7.5* 7.7* 8.0*   ALBUMIN 1.1*  --   --  1.2*  --    PROT 4.9*  --   --  5.3*  --    BILITOT 1.8*  --   --  1.6*  --    ALKPHOS 117  --   --  110  --    ALT 15  --   --  14  --    AST 13  --   --  13  --    MG 1.6  --   --  1.9  --    PHOS 4.4  --   --  4.7*  --     < > = values in this interval not displayed.         Significant Imaging:  I have reviewed all pertinent imaging results/findings within the past 24 hours.    ABG  Recent Labs   Lab 09/19/24  1509   PH 7.416   PO2 33*   PCO2 43.3   HCO3 27.8   BE 3*     Assessment/Plan:     Psychiatric  History of drug use  Patient with history of drug misuse disorder, following with methadone clinic. Endorses snorting methamphetamine and fentanyl regularly. Home regimen: Methadone 70 mg Q6H    - On methadone 70 QD  - Uptitrate pain regimen as needed given history of chronic opiate use  - Consider addiction psych consult    Cardiac/Vascular  Endocarditis  - BCx with MSSA bacteremia  - TTE with possible MV endocarditis  - continue oxacillin     ID  *  "Paraspinal abscess  See Osteomyelitis multiple sites    Epidural abscess  - s/p L3-L5 laminectomy with epidural abscess evacuation 9/19 by PATRICK    Staphylococcus aureus bacteremia  -repeat Bcx for 9/23 at 1500    Sepsis  This patient does have evidence of infective focus  My overall impression is sepsis.  Source: Skin and Soft Tissue (location lefts psoas, SI joint, paraspinal abcess)  Antibiotics given-   Antibiotics (72h ago, onward)      Start     Stop Route Frequency Ordered    09/18/24 1345  oxacillin 12 g in  mL CONTINUOUS INFUSION         -- IV Every 24 hours (non-standard times) 09/18/24 1241          Latest lactate reviewed-  No results for input(s): "LACTATE", "POCLAC" in the last 72 hours.    Organ dysfunction indicated by Thrombocytopenia     Fluid challenge Not needed - patient is not hypotensive      Post- resuscitation assessment No - Post resuscitation assessment not needed       Will Not start Pressors- Levophed for MAP of 65      Other osteomyelitis, multiple sites  48y/o F with hx of being un-housed, T2DM, HTN and drug use transferred for spinal osteomyelitis, septic arthritis and Lt flank/ retroperitoneal abscess. Imaging studies of her lumbar spine and pelvis were concerning for osteomyelitis/septic arthritis of the left SI joint and L5-S1 along with an abnormal fluid collection extending from T11 through the left iliacus muscle concerning for abscess as well as at psoas.     - s/p L3-L5 laminectomy with epidural abscess evacuation 9/19  - BCx with MSSA bacteremia  - TTE with possible MV endocarditis  - ID consulted, currently on oxacillin for MSSA  - IR SI joint aspiration and abscess drain placement 9/21, pending culture  - Culture order placed- F/U Blood culture order for 9/23 at 15:00(48 hours after the aspiration and drainage)      Oncology  Anemia, unspecified  Patient with anemia upon lab review with a hemoglobin 6.9 post transfusion; likely multifactorial in the setting of chronic " disease, deficiency and acute blood loss. Initial CBC showing microcytic anemia with elevated ferritin of 2000     - Daily CBC   - Transfuse Hgb < 7      Endocrine  Refeeding syndrome  Patient has had reduced appetite for the past several months.  Labs today show low-sodium, no potassium, phosphorus, magnesium, and calcium (uncorrected) with an elevated LDH and Ferritin. Being managed as a potential Refeeding syndrome:     PLAN:  - IV Thiamine,   - Monitor daily weights  - Monitor I/O  - Continuous cardiac monitoring  - Diet and Nutrition consult  - CBC BID; trend and replete  - CMP BID; trend and replete.  9/23:  As patient recovers, F/U PT/OT, SLT, and Nutrition on next steps    Uncontrolled type 2 diabetes mellitus with hyperglycemia  Home regimen: metformin BID and glipizide BID    - Holding oral home medications while inpatient  - On MD SSI      GI  Hepatitis C  - Hep C antibody positive  - per ID, hep C quant showed HepC RNA not detected      Psoas muscle abscess  - IR SI joint aspiration and abscess drain placement 9/21, pending cultures         Other  Smoker  - Ordered nicotine patch QD       Critical Care Daily Checklist:    A: Awake: RASS Goal/Actual Goal: RASS Goal: 0-->alert and calm  Actual:     B: Spontaneous Breathing Trial Performed? Spon. Breathing Trial Initiated?: Initiated (09/22/24 1025)   C: SAT & SBT Coordinated?                        D: Delirium: CAM-ICU Overall CAM-ICU: Negative   E: Early Mobility Performed? Yes   F: Feeding Goal: Goals: Meet % EEN/EPN by RD follow up.  Status: Nutrition Goal Status: new   Current Diet Order   Procedures    Diet diabetic 2000 Calorie; Standard Tray     Order Specific Question:   Total calories:     Answer:   2000 Calorie     Order Specific Question:   Tray type:     Answer:   Standard Tray      AS: Analgesia/Sedation    T: Thromboembolic Prophylaxis Lovenox   H: HOB > 300 Yes   U: Stress Ulcer Prophylaxis (if needed)    G: Glucose Control MDSSI   B:  Bowel Function     I: Indwelling Catheter (Lines & López) Necessity López, PIVs, right IJ central line   D: De-escalation of Antimicrobials/Pharmacotherapies On Oxacillin    Plan for the day/ETD Stepdown to     Code Status:  Family/Goals of Care: Full Code         Critical secondary to Patient has a condition that poses threat to life and bodily function: MSSA bacteremia and IE      Critical care was time spent personally by me on the following activities: development of treatment plan with patient or surrogate and bedside caregivers, discussions with consultants, evaluation of patient's response to treatment, examination of patient, ordering and performing treatments and interventions, ordering and review of laboratory studies, ordering and review of radiographic studies, pulse oximetry, re-evaluation of patient's condition. This critical care time did not overlap with that of any other provider or involve time for any procedures.     Chaparro Valles MD  Critical Care Medicine  [facility] - Medical ICU

## 2024-09-23 NOTE — PLAN OF CARE
SW has placed a PM&R consult for eval for inpatient rehab to eval for placement ([facility]).     SYLVAIN spoke with Marcelino Sloan, spouse/cp# [phone], about PT/OT discharge recs which is High Intensity Therapy. Marcelino replied that he would like to talk with patient before agreeing to inpt rehab.      SW went to the bedside to speak with patient about ip rehab but she was asleep and unable to awake by calling her name.       CM will continue to follow up with dc planning recs.    Janel Berg, CATHY  [facility]  [phone]

## 2024-09-23 NOTE — NURSING
Patient aao x's 4 transferred from micu c/o back pain and right leg pain. On o2 2l nc lung sounds diminished multiple openings noted to bilateral arms and bilateral legs patient noted picking scabs on skin educated the patient not to pick her skin. Drains in place to back dressing noted to back also. Safety precautions maintained call light in reach patient familiarized with room.

## 2024-09-23 NOTE — PLAN OF CARE
Problem: Physical Therapy  Goal: Physical Therapy Goal  Description: Goals to be met by: 10/18/24     Patient will increase functional independence with mobility by performin. Supine to sit with MInimal Assistance  2. Sit to stand transfer with Minimal Assistance with RW if needed.   3. Bed to chair transfer with Minimal Assistance using Rolling Walker  4. Gait  x 30 feet with Minimal Assistance using Rolling Walker.   5. Lower extremity exercise program x10 reps per handout, with assistance as needed to increase strength for increased mobility.     DME Justifications (see above for complete DME recommendations)      Rolling Walker- Patient demonstrates a mobility limitation that significantly impairs their ability to participate in one or more mobility related activities of daily living. Patient's mobility limitation cannot be sufficiently resolved with the use of a cane, but can be sufficiently resolved with the use of a rolling walker.The use of a rolling walker will considerably improve their ability to participate in MRADLs. Patient will use the walker on a regular basis at home.        Outcome: Progressing   Evaluation completed and goals appropriate. 2024

## 2024-09-23 NOTE — ASSESSMENT & PLAN NOTE
50y/o F with hx of being un-housed, T2DM, HTN and drug use transferred for spinal osteomyelitis, septic arthritis and Lt flank/ retroperitoneal abscess. Imaging studies of her lumbar spine and pelvis were concerning for osteomyelitis/septic arthritis of the left SI joint and L5-S1 along with an abnormal fluid collection extending from T11 through the left iliacus muscle concerning for abscess as well as at psoas.     - s/p L3-L5 laminectomy with epidural abscess evacuation 9/19  - BCx with MSSA bacteremia  - TTE with possible MV endocarditis  - ID consulted, currently on oxacillin for MSSA  - IR SI joint aspiration and abscess drain placement 9/21, pending culture  - Culture order placed- F/U Blood culture order for 9/23 at 15:00(48 hours after the aspiration and drainage)

## 2024-09-23 NOTE — NURSING
.Nurses Note -- 4 Eyes      9/23/2024   3:49 PM      Skin assessed during: Transfer      [] No Altered Skin Integrity Present    []Prevention Measures Documented      [x] Yes- Altered Skin Integrity Present or Discovered   [] LDA Added if Not in Epic (Describe Wound)   [] New Altered Skin Integrity was Present on Admit and Documented in LDA   [] Wound Image Taken    Wound Care Consulted? No    Attending Nurse:  Richar Caba RN/Staff Member:  Belem Jimenez RN

## 2024-09-23 NOTE — PROGRESS NOTES
[facility] - Medical ICU  Critical Care Medicine  Progress Note    Patient Name: Mari Sloan  MRN: [id number]  Admission Date: 9/16/2024  Hospital Length of Stay: 6 days  Code Status: Full Code  Attending Provider: Bentley Connell MD  Primary Care Provider: Liliana, Primary Doctor   Principal Problem: Paraspinal abscess    Subjective:     HPI:  Miss Sloan is a pleasant 49-year-old female with a medical history of diabetes, hypertension, and substance use disorder  transferred from OS for specialized evaluation following imaging studies concerning for osteomyelitis or septic arthritis of the left sacroiliac joint, as well as an abnormal fluid collection extending from T11 through the iliacus muscle, raising suspicion for an abscess. The patient reports a mechanical fall six days ago.     Laboratory results from the OSH revealed significant findings, including hypokalemia, hypomagnesemia, severe anemia, metabolic alkalosis, and hyperglycemia.     Labs today CBC leukocytosis (WBC 13.5), hgb 6.9 following transfusion with one unit of packed red blood cells,  platelets at 119. Iron studies revealed a total iron-binding capacity (TIBC) of 152, transferrin of 103, and elevated ferritin at 2000, suggestive of anemia of chronic disease or inflammation. CMP potassium 3.2.  Liver function  unremarkable. Her CRP was markedly elevated at 179.6, indicating significant inflammation or infection. Lactate dehydrogenase was 474, while CPK was within normal limits. Her hemoglobin A1c was elevated at 7.9, reflecting poor glycemic control.    A urine drug screen was positive for presumptive methadone (follows in methano clinic) and opioids. The infectious workup thus far positive staph aureus by PCR and blood cultures growing Gram-positive cocci resembling staph.  MRSA PCR testing returning negative.  Additionally, the patients urinalysis was notable for a hazy appearance, with trace ketones, glucose, positive nitrites, and moderate  bacteriuria, raising suspicion for a urinary tract infection.     Hospital/ICU Course:  Patient was admitted to the MICU 9/18 with concern for paraspinal abscess. Infectious workup was ordered. Blood cultures were positive for MSSA bacteremia. Neurosurgery, general surgery and IR were consulted to evaluate the patient's paraspinal abscess. Neurosurgery performed a L3-L4 laminectomy for epidural abscess evacuation on 9/19/24 and the cultures grew MSSA. MARICHUY showed normal EF of 60-65% with diastolic dysfunction, could not exclude mitral vegetation vs redundant chordae. ID was consulted and recommended oxacillin and repeat blood cultures. With her electrolyte derangement, and a background of appetitive loss in the past 2 months, there was concern for refeeding syndrome. On 9/21, IR took the patient for abscess drain placement and aspiration of SI joint. Pending culture results. SI joint couldn't be aspirated but retroperitoneal  abscess was drained of 100cc of purulent fluid. Blood culture due tomorrow (9/23) at 15:00    Interval History/Significant Events: NAEON. Patient is post NSGY laminectomy and post-IR abscess drainage. He was extubated this am and was briefly on BiPAP  and is now on 2L NC. Patient is due for a repeat culture tomorrow at 15:00    Review of Systems   Unable to perform ROS: Intubated     Objective:     Vital Signs (Most Recent):  Temp: 98.1 °F (36.7 °C) (09/22/24 1600)  Pulse: 80 (09/22/24 1900)  Resp: (!) 39 (09/22/24 1900)  BP: (!) 143/81 (09/22/24 1900)  SpO2: 96 % (09/22/24 1900) Vital Signs (24h Range):  Temp:  [98.1 °F (36.7 °C)-100.1 °F (37.8 °C)] 98.1 °F (36.7 °C)  Pulse:  [] 80  Resp:  [12-39] 39  SpO2:  [87 %-100 %] 96 %  BP: ()/(52-89) 143/81     Weight: 65.2 kg (143 lb 11.8 oz)  Body mass index is 26.29 kg/m².     Physical Exam  Constitutional:       Appearance: She is ill-appearing.      Interventions: She is sedated. She is not intubated.     Comments: In pain, specifically  right hip   HENT:      Head: Normocephalic and atraumatic.      Right Ear: External ear normal.      Left Ear: External ear normal.   Cardiovascular:      Rate and Rhythm: Normal rate and regular rhythm.   Pulmonary:      Effort: Pulmonary effort is normal. No respiratory distress. She is not intubated.      Breath sounds: Rales present.   Chest:      Chest wall: No tenderness.   Abdominal:      General: There is distension.      Palpations: There is no mass.      Tenderness: There is no abdominal tenderness.      Comments: Left flank indurated, no fluctuation, minimally tender  Minimally tender abdominal exam   Musculoskeletal:      Cervical back: No rigidity.      Comments: Pain with flexion of left hip   Skin:     Coloration: Skin is pale.      Findings: Bruising (left arm) present.      Comments: Multiple acute and chronic , circular ulcers in upper and lower extremities   Psychiatric:         Mood and Affect: Mood normal.         Behavior: Behavior normal.         Thought Content: Thought content normal.            I have reviewed all pertinent lab results within the past 24 hours.  CBC:   Recent Labs   Lab 09/22/24  0330   WBC 9.12   RBC 3.17*   HGB 8.2*   HCT 25.6*      MCV 81*   MCH 25.9*   MCHC 32.0     CMP:   Recent Labs   Lab 09/22/24  0330 09/22/24  1252 09/22/24  1658      < > 134*   CALCIUM 7.4*   < > 7.5*   ALBUMIN 1.1*  --   --    PROT 4.9*  --   --       < > 138   K 3.0*   < > 3.6   CO2 29   < > 32*   CL 96   < > 96   BUN 7   < > 7   CREATININE 0.6   < > 0.6   ALKPHOS 117  --   --    ALT 15  --   --    AST 13  --   --    BILITOT 1.8*  --   --     < > = values in this interval not displayed.       Significant Diagnostics:  I have reviewed all pertinent imaging results/findings within the past 24 hours.      Review of Systems    ABG  Recent Labs   Lab 09/19/24  1509   PH 7.416   PO2 33*   PCO2 43.3   HCO3 27.8   BE 3*     Assessment/Plan:     Psychiatric  History of drug  "use  Patient with history of drug misuse disorder, following with methadone clinic. Endorses snorting methamphetamine and fentanyl regularly. Home regimen: Methadone 70 mg Q6H    - On methadone 70 QD  - Uptitrate pain regimen as needed given history of chronic opiate use  - Consider addiction psych consult    Cardiac/Vascular  Endocarditis  - BCx with MSSA bacteremia  - TTE with possible MV endocarditis  - continue oxacillin     ID  * Paraspinal abscess  See Osteomyelitis multiple sites    Epidural abscess  - s/p L3-L5 laminectomy with epidural abscess evacuation 9/19 by NSGY    Sepsis  This patient does have evidence of infective focus  My overall impression is sepsis.  Source: Skin and Soft Tissue (location lefts psoas, SI joint, paraspinal abcess)  Antibiotics given-   Antibiotics (72h ago, onward)      Start     Stop Route Frequency Ordered    09/18/24 1345  oxacillin 12 g in  mL CONTINUOUS INFUSION         -- IV Every 24 hours (non-standard times) 09/18/24 1241          Latest lactate reviewed-  No results for input(s): "LACTATE", "POCLAC" in the last 72 hours.    Organ dysfunction indicated by Thrombocytopenia     Fluid challenge Not needed - patient is not hypotensive      Post- resuscitation assessment No - Post resuscitation assessment not needed       Will Not start Pressors- Levophed for MAP of 65      Other osteomyelitis, multiple sites  48y/o F with hx of being un-housed, T2DM, HTN and drug use transferred for spinal osteomyelitis, septic arthritis and Lt flank/ retroperitoneal abscess. Imaging studies of her lumbar spine and pelvis were concerning for osteomyelitis/septic arthritis of the left SI joint and L5-S1 along with an abnormal fluid collection extending from T11 through the left iliacus muscle concerning for abscess as well as at psoas.     - s/p L3-L5 laminectomy with epidural abscess evacuation 9/19  - BCx with MSSA bacteremia  - TTE with possible MV endocarditis  - ID consulted, " currently on oxacillin for MSSA  - IR SI joint aspiration and abscess drain placement 9/21, pending culture  - Culture order placed- F/U Blood culture order for 9/23 at 15:00(48 hours after the aspiration and drainage)      Oncology  Anemia, unspecified  Patient with anemia upon lab review with a hemoglobin 6.9 post transfusion; likely multifactorial in the setting of chronic disease, deficiency and acute blood loss. Initial CBC showing microcytic anemia with elevated ferritin of 2000     - Daily CBC   - Transfuse Hgb < 7      Endocrine  Refeeding syndrome  Patient has had reduced appetite for the past several months.  Labs today show low-sodium, no potassium, phosphorus, magnesium, and calcium (uncorrected) with an elevated LDH and Ferritin. Being managed as a potential Refeeding syndrome:     PLAN:  - IV Thiamine,   - Monitor daily weights  - Monitor I/O  - Continuous cardiac monitoring  - Diet and Nutrition consult  - CBC BID; trend and replete  - CMP BID; trend and replete.  9/23:  As patient recovers, F/U PT/OT, SLT, and Nutrition on next steps    Uncontrolled type 2 diabetes mellitus with hyperglycemia  Home regimen: metformin BID and glipizide BID    - Holding oral home medications while inpatient  - On MD SSI      GI  Hepatitis C  - Hep C antibody positive  - per ID, hep C quant showed HepC RNA not detected      Psoas muscle abscess  - IR SI joint aspiration and abscess drain placement 9/21, pending cultures         Other  Smoker  - Ordered nicotine patch QD       Critical Care Daily Checklist:    A: Awake: RASS Goal/Actual Goal: RASS Goal: 0-->alert and calm  Actual:     B: Spontaneous Breathing Trial Performed? Spon. Breathing Trial Initiated?: Initiated (09/22/24 1025)   C: SAT & SBT Coordinated?  Yes                    D: Delirium: CAM-ICU Overall CAM-ICU: Positive   E: Early Mobility Performed? No   F: Feeding Goal: Goals: Meet % EEN/EPN by RD follow up.  Status: Nutrition Goal Status: new    Current Diet Order   Procedures    Diet NPO Except for: Medication, Sips with Medication     Order Specific Question:   Except for:     Answer:   Medication     Order Specific Question:   Except for:     Answer:   Sips with Medication      AS: Analgesia/Sedation    T: Thromboembolic Prophylaxis Heparin   H: HOB > 300 Yes   U: Stress Ulcer Prophylaxis (if needed) Pantoprazole   G: Glucose Control Controlled   B: Bowel Function     I: Indwelling Catheter (Lines & López) Necessity PICC, PIV, Suction drain x2 Foles   D: De-escalation of Antimicrobials/Pharmacotherapies Clindamycin, Oxacillin    Plan for the day/ETD F/U on Bcx tomorrow    Code Status:  Family/Goals of Care: Full Code         Critical secondary to Patient has a condition that poses threat to life and bodily function: Paranasal abscess     Critical care was time spent personally by me on the following activities: development of treatment plan with patient or surrogate and bedside caregivers, discussions with consultants, evaluation of patient's response to treatment, examination of patient, ordering and performing treatments and interventions, ordering and review of laboratory studies, ordering and review of radiographic studies, pulse oximetry, re-evaluation of patient's condition. This critical care time did not overlap with that of any other provider or involve time for any procedures.     Adelita Barnes MD  Critical Care Medicine  [facility] - Medical ICU

## 2024-09-23 NOTE — PLAN OF CARE
Patient aao x's 4 on o2 3l/nc c/o back and leg pain prn oxycodone given. Md removed the two accordian drains and dressing from back no d/c noted to sites.  is at bedside, safety precautions maintained and plan of care ongoing.                       Problem: Skin Injury Risk Increased  Goal: Skin Health and Integrity  Outcome: Progressing     Problem: Adult Inpatient Plan of Care  Goal: Plan of Care Review  Outcome: Progressing  Goal: Patient-Specific Goal (Individualized)  Outcome: Progressing  Goal: Absence of Hospital-Acquired Illness or Injury  Outcome: Progressing  Goal: Optimal Comfort and Wellbeing  Outcome: Progressing  Goal: Readiness for Transition of Care  Outcome: Progressing     Problem: Infection  Goal: Absence of Infection Signs and Symptoms  Outcome: Progressing     Problem: Diabetes Comorbidity  Goal: Blood Glucose Level Within Targeted Range  Outcome: Progressing     Problem: Sepsis/Septic Shock  Goal: Optimal Coping  Outcome: Progressing  Goal: Absence of Bleeding  Outcome: Progressing  Goal: Blood Glucose Level Within Targeted Range  Outcome: Progressing  Goal: Absence of Infection Signs and Symptoms  Outcome: Progressing  Goal: Optimal Nutrition Intake  Outcome: Progressing     Problem: Fall Injury Risk  Goal: Absence of Fall and Fall-Related Injury  Outcome: Progressing     Problem: Restraint, Nonviolent  Goal: Absence of Harm or Injury  Outcome: Progressing     Problem: Wound  Goal: Optimal Coping  Outcome: Progressing  Goal: Optimal Functional Ability  Outcome: Progressing  Goal: Absence of Infection Signs and Symptoms  Outcome: Progressing  Goal: Improved Oral Intake  Outcome: Progressing  Goal: Optimal Pain Control and Function  Outcome: Progressing  Goal: Skin Health and Integrity  Outcome: Progressing  Goal: Optimal Wound Healing  Outcome: Progressing     Problem: Mechanical Ventilation Invasive  Goal: Effective Communication  Outcome: Progressing  Goal: Optimal Device  Function  Outcome: Progressing  Goal: Mechanical Ventilation Liberation  Outcome: Progressing  Goal: Optimal Nutrition Delivery  Outcome: Progressing  Goal: Absence of Device-Related Skin and Tissue Injury  Outcome: Progressing  Goal: Absence of Ventilator-Induced Lung Injury  Outcome: Progressing

## 2024-09-23 NOTE — RESIDENT HANDOFF
Handoff     [place] [facility] [place] Room Number: [id number] A     Patient Name: Mari Sloan MRN: [id number]     Date of Birth: [id number] Allergies: Patient has no known allergies.     Age: 49 y.o. Admit Date: 9/16/2024     Sex: female  BMI: Body mass index is 26.29 kg/m².     Code Status: Full Code        Illness Level (current clinical status): Watcher - No    Reason for Admission: Paraspinal abscess    Brief HPI/Hospital Course (pertinent PMH and diagnosis or differential diagnosis): 50 yo female with DM, substance use transferred for osteo/septic arthritis of left SI joint and mechanical fall on 9/18. She had NSGY 9/19 laminectomy/wash out, IR drain 9/21. She has MSSA bacteremia treated as IE complication by cellulitis, psoas abscess and sacroiliac osteo as well as epidural involvement. She is currently on oxacillin per ID and needs repeat Bcx for this afternoon at 1500 per ID. She was successful extubated yesterday and has remained hemodynamically stable on NC. She is no longer needing ICU level care.     Procedure Date: 9/19, 9/21    Tasks (specific, using if-then statements): Blood culture 9/23 at 1500 per ID    Estimated Discharge Date: unknown    Discharge Disposition: Skilled Nursing Facility    Mentored By: Dr. Downs

## 2024-09-23 NOTE — ASSESSMENT & PLAN NOTE
Patient has had reduced appetite for the past several months.  Labs today show low-sodium, no potassium, phosphorus, magnesium, and calcium (uncorrected) with an elevated LDH and Ferritin. Being managed as a potential Refeeding syndrome:     PLAN:  - IV Thiamine,   - Monitor daily weights  - Monitor I/O  - Continuous cardiac monitoring  - Diet and Nutrition consult  - CBC BID; trend and replete  - CMP BID; trend and replete.  9/23:  As patient recovers, F/U PT/OT, SLT, and Nutrition on next steps

## 2024-09-23 NOTE — PT/OT/SLP EVAL
Occupational Therapy   Evaluation/Treatment    Name: Mari Sloan  MRN: [id number]  Admitting Diagnosis: Paraspinal abscess  Recent Surgery: Procedure(s) (LRB):  L3-L5 laminectomy with epidural abscess evac (N/A)  WASHOUT, WOUND, SPINE 4 Days Post-Op    Recommendations:     Discharge Recommendations: High Intensity Therapy  Discharge Equipment Recommendations:  walker, rolling  Barriers to discharge:  Decreased caregiver support    Assessment:     Mari Sloan is a 49 y.o. female with a medical diagnosis of Paraspinal abscess. Performance deficits affecting function: weakness, impaired endurance, impaired self care skills, impaired functional mobility, gait instability, impaired balance, decreased safety awareness, decreased lower extremity function. Patient cleared for therapy and participated in ADLs during session. Patient sat EOB and attempted sit<>stand during session. All VSS throughout session despite patient did have some c/o dizziness and pain. Patient would benefit from continued skilled acute OT 4x/wk to improve functional mobility, increase independence with ADLs, and address established goals. Recommending high intensity therapy once medically appropriate for discharge to increase maximal independence, reduce burden of care, and ensure safety.     Rehab Prognosis: Good; patient would benefit from acute skilled OT services to address these deficits and reach maximum level of function.       Plan:     Patient to be seen 4 x/week to address the above listed problems via self-care/home management, therapeutic activities, therapeutic exercises  Plan of Care Expires: 10/23/24  Plan of Care Reviewed with: patient    Subjective     Chief Complaint: pain and dizziness EOB. VSS throughout session.   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 They live in [address] [facility] [facility]  combo with no bench and no grab bar. Patient has a regular toilet.   Prior to admission, patients level of function was Independent with mobility and ADLs.  Equipment used at home: none.  DME owned (not currently used): none.  Upon discharge, patient will have assistance from  who can take time off of work. .    Pain/Comfort:  Pain Rating 1: 5/10  Location - Side 1: Bilateral  Location - Orientation 1: generalized  Location 1: abdomen  Pain Addressed 1: Reposition, Distraction  Pain Rating Post-Intervention 1: 5/10    Patients cultural, spiritual, Protestant conflicts given the current situation: no    Objective:     Communicated with: NSG prior to session.  Patient found HOB elevated with telemetry, peripheral IV, blood pressure cuff, oxygen, central line, hemovac, PREMA drain, bland catheter, SCD upon OT entry to room.    General Precautions: Standard, fall  Orthopedic Precautions: spinal precautions  Braces: N/A  Respiratory Status: Nasal cannula, flow 2 L/min    Occupational Performance:    Bed Mobility:    Patient completed Rolling/Turning to Right with total assistance and 2 persons  Patient completed Supine to Sit with total assistance and 2 persons  Patient completed Sit to Supine with total assistance and 2 persons    Functional Mobility/Transfers:  Patient completed Sit <> Stand Transfer with total assistance and of 2 persons  with  no assistive device . 2 attempts and only able to lift buttocks off of bed to come upright.     Activities of Daily Living:  Grooming: stand by assistance oral care and washing face  Lower Body Dressing: total assistance Donning socks    Cleaned legs bilaterally supine prior to EOB activity and back when sitting EOB.     Cognitive/Visual Perceptual:  Cognitive/Psychosocial Skills:     -       Oriented to: Person, Place, Time, and Situation   -       Follows Commands/attention:Follows multistep  commands  -       Communication: clear/fluent  -       Memory: No Deficits  noted  -       Safety awareness/insight to disability: impaired   -       Mood/Affect/Coping skills/emotional control: Appropriate to situation  Visual/Perceptual:      -Intact      Physical Exam:  Upper Extremity Range of Motion:     -       Right Upper Extremity: WFL  -       Left Upper Extremity: WFL  Upper Extremity Strength:    -       Right Upper Extremity: 4/5  -       Left Upper Extremity: 4/5   Strength:    -       Right Upper Extremity: WFL  -       Left Upper Extremity: WFL  Fine Motor Coordination:    -       Intact  Gross motor coordination:   WFL    AMPAC 6 Click ADL:  AMPAC Total Score: 12    Treatment & Education:  Role of OT and POC  ADL retraining  Functional mobility training  Safety  Discharge planning  Importance EOB/OOB activity    Patient sat EOB for sitting balance with CGA<>min A. Patient had a tendency to lean laterally to the R    Co-treatment performed due to patient's multiple deficits requiring two skilled therapists to appropriately and safely assess patient's strength and endurance while facilitating functional tasks in addition to accommodating for patient's activity tolerance.     Patient left HOB elevated with all lines intact, call button in reach, and all needs met.     GOALS:   Multidisciplinary Problems       Occupational Therapy Goals          Problem: Occupational Therapy    Goal Priority Disciplines Outcome Interventions   Occupational Therapy Goal     OT, PT/OT Progressing    Description: Goals to be met by: 10/21/2024     Patient will increase functional independence with ADLs by performing:    UE Dressing with Set-up Assistance.  LE Dressing with Stand-by Assistance.  Grooming while standing at sink with Supervision.  Toileting from toilet with Supervision for hygiene and clothing management.   Supine to sit with Contact Guard Assistance.  Stand pivot transfers with Supervision.  Toilet transfer to toilet with Supervision.    DME Justifications    Rolling Walker-  Patient demonstrates a mobility limitation that significantly impairs their ability to participate in one or more mobility related activities of daily living. Patient's mobility limitation cannot be sufficiently resolved with the use of a cane, but can be sufficiently resolved with the use of a rolling walker.The use of a rolling walker will considerably improve their ability to participate in MRADLs. Patient will use the walker on a regular basis at home.                             History:     Past Medical History:   Diagnosis Date    Diabetes mellitus     Hypertension     Unspecified viral hepatitis C without hepatic coma          Past Surgical History:   Procedure Laterality Date     SECTION      LAMINECTOMY N/A 2024    Procedure: L3-L5 laminectomy with epidural abscess evac;  Surgeon: Sourav Jim DO;  Location: [facility] OR [address] [place] [address];  Service: Neurosurgery;  Laterality: N/A;    MAGNETIC RESONANCE IMAGING N/A 2024    Procedure: MRI (Magnetic Resonance Imagine);  Surgeon: Kamran Storey;  Location: [facility];  Service: Anesthesiology;  Laterality: N/A;  conscious sedaation MRI lumbar spine w/ and without contrast    WASHOUT, WOUND, SPINE  2024    Procedure: WASHOUT, WOUND, SPINE;  Surgeon: Sourav Jim DO;  Location: [address];  Service: Neurosurgery;;       Time Tracking:     OT Date of Treatment: 24  OT Start Time: 1005  OT Stop Time: 1042  OT Total Time (min): 37 min    Billable Minutes:Evaluation 10  Self Care/Home Management 27    2024

## 2024-09-23 NOTE — SUBJECTIVE & OBJECTIVE
Interval History: NAEON. Will remove HV drains today. IR drain to remain s/p psoas abscess. Rec Ortho eval for treatment/drainage of SI joint injection.    Medications:  Continuous Infusions:  Scheduled Meds:   cloNIDine  0.1 mg Oral TID    enoxparin  40 mg Subcutaneous Q24H (prophylaxis, 1700)    [START ON 9/24/2024] folic acid  1 mg Oral Daily    furosemide (LASIX) injection  40 mg Intravenous Q12H    gabapentin  300 mg Oral TID    hydrOXYzine pamoate  25 mg Oral QHS    insulin glargine U-100  15 Units Subcutaneous QHS    LIDOcaine  1 patch Transdermal Q24H    [START ON 9/24/2024] methadone  70 mg Oral Daily    nicotine  1 patch Transdermal Daily    oxacillin 12 g in  mL CONTINUOUS INFUSION  12 g Intravenous Q24H    polyethylene glycol  17 g Oral BID    senna-docusate 8.6-50 mg  1 tablet Oral BID    [START ON 9/24/2024] thiamine  100 mg Oral Daily     PRN Meds:  Current Facility-Administered Medications:     acetaminophen, 1,000 mg, Per OG tube, Q6H PRN    albuterol-ipratropium, 3 mL, Nebulization, Q4H PRN    dextrose 10%, 12.5 g, Intravenous, PRN    dextrose 10%, 12.5 g, Intravenous, PRN    dextrose 10%, 25 g, Intravenous, PRN    glucagon (human recombinant), 1 mg, Intramuscular, PRN    glucose, 16 g, Oral, PRN    glucose, 24 g, Oral, PRN    hydrOXYzine pamoate, 50 mg, Oral, Q6H PRN    insulin aspart U-100, 0-10 Units, Subcutaneous, QID (AC + HS) PRN    melatonin, 6 mg, Oral, Nightly PRN    naloxone, 0.4 mg, Intravenous, PRN    ondansetron, 8 mg, Intravenous, Once PRN    oxyCODONE, 10 mg, Oral, Q4H PRN    sodium chloride 0.9%, 10 mL, Intravenous, Q12H PRN     Review of Systems  Objective:     Weight: 65.2 kg (143 lb 11.8 oz)  Body mass index is 26.29 kg/m².  Vital Signs (Most Recent):  Temp: 98.6 °F (37 °C) (09/23/24 1100)  Pulse: 63 (09/23/24 1400)  Resp: 15 (09/23/24 1400)  BP: (!) 78/52 (09/23/24 1400)  SpO2: 100 % (09/23/24 1400) Vital Signs (24h Range):  Temp:  [98.1 °F (36.7 °C)-98.6 °F (37 °C)] 98.6  °F (37 °C)  Pulse:  [63-93] 63  Resp:  [12-39] 15  SpO2:  [91 %-100 %] 100 %  BP: ()/(52-81) 78/52     Date 09/23/24 0700 - 09/24/24 0659   Shift 2426-1938 0134-4441 0505-5157 24 Hour Total   INTAKE   IV Piggyback 101.2   101.2   Shift Total(mL/kg) 101.2(1.6)   101.2(1.6)   OUTPUT   Urine(mL/kg/hr) 1000   1000   Shift Total(mL/kg) 1000(15.3)   1000(15.3)   Weight (kg) 65.2 65.2 65.2 65.2                            Closed/Suction Drain 09/19/24 1549 Left Back Accordion 10 Fr. (Active)   Site Description Unable to view 09/23/24 1300   Dressing Type Transparent (Tegaderm) 09/23/24 1300   Dressing Status Clean;Dry;Intact 09/23/24 1300   Dressing Intervention Integrity maintained 09/23/24 1300   Drainage Serosanguineous 09/23/24 1300   Status Other (Comment) 09/22/24 1545   Output (mL) 0 mL 09/23/24 0645            Closed/Suction Drain 09/19/24 1550 Right Back Accordion 10 Fr. (Active)   Site Description Unable to view 09/23/24 1300   Dressing Type Transparent (Tegaderm) 09/23/24 1300   Dressing Status Clean;Dry;Intact 09/23/24 1300   Dressing Intervention Integrity maintained 09/23/24 1300   Drainage Serosanguineous 09/23/24 1300   Status Other (Comment) 09/22/24 1545   Output (mL) 5 mL 09/23/24 0645            Closed/Suction Drain 09/21/24 1510 Left Back Bulb 10 Fr. (Active)   Site Description Unable to view 09/23/24 1300   Dressing Type Transparent (Tegaderm) 09/23/24 1300   Dressing Status Clean;Dry;Intact 09/23/24 1300   Dressing Intervention Integrity maintained 09/23/24 1300   Drainage Purulent 09/23/24 1300   Status To bulb suction 09/23/24 1300   Output (mL) 30 mL 09/23/24 0645            Urethral Catheter 09/19/24 1800 Silicone 16 Fr. (Active)   $ López Insertion Bedside Insertion Performed 09/19/24 1845   Site Assessment Clean;Intact 09/23/24 1300   Collection Container Urimeter 09/23/24 1300   Securement Method secured to top of thigh w/ adhesive device 09/23/24 1300   Catheter Care Performed yes  "09/23/24 1300   Reason for Continuing Urinary Catheterization Critically ill in ICU and requiring hourly monitoring of intake/output 09/23/24 1300   CAUTI Prevention Bundle Securement Device in place with 1" slack;Intact seal between catheter & drainage tubing;Drainage bag/urimeter off the floor;Sheeting clip in use;No dependent loops or kinks;Drainage bag/urimeter not overfilled (<2/3 full);Drainage bag/urimeter below bladder 09/23/24 0715   Output (mL) 1000 mL 09/23/24 1050          Physical Exam         Neurosurgery Physical Exam  E2V2M5  PERRL  Full strength BUE/RLE  Pain limited LLE movement  Dressing c/d/i       Significant Labs:  Recent Labs   Lab 09/22/24  0330 09/22/24  1252 09/22/24  1658 09/23/24  0313 09/23/24  0815      < > 134* 60* 65*      < > 138 139 137   K 3.0*   < > 3.6 3.0* 4.4   CL 96   < > 96 95 97   CO2 29   < > 32* 36* 33*   BUN 7   < > 7 7 7   CREATININE 0.6   < > 0.6 0.6 0.6   CALCIUM 7.4*   < > 7.5* 7.7* 8.0*   MG 1.6  --   --  1.9  --     < > = values in this interval not displayed.     Recent Labs   Lab 09/22/24  0330 09/23/24  0313   WBC 9.12 8.20   HGB 8.2* 7.9*   HCT 25.6* 26.5*    181     No results for input(s): "LABPT", "INR", "APTT" in the last 48 hours.  Microbiology Results (last 7 days)       Procedure Component Value Units Date/Time    Culture, Body Fluid (Aerobic) w/ GS [[id number]]  (Abnormal) Collected: 09/21/24 1502    Order Status: Completed Specimen: Body Fluid from Back Updated: 09/23/24 1150     AEROBIC CULTURE - FLUID STAPHYLOCOCCUS AUREUS  Many  Susceptibility pending       Gram Stain Result Moderate WBC's      Moderate Gram positive cocci    Blood culture [[id number]] Collected: 09/23/24 1127    Order Status: Sent Specimen: Blood from Peripheral, Forearm, Right Updated: 09/23/24 1135    Blood culture [[id number]]     Order Status: Sent Specimen: Blood     Culture, Anaerobe [[id number]] Collected: 09/19/24 1527    Order Status: Completed Specimen: " Abscess from Back Updated: 09/23/24 0957     Anaerobic Culture No anaerobes isolated    Narrative:      Epidural abscess    Fungus culture [[id number]] Collected: 09/21/24 1502    Order Status: Completed Specimen: Body Fluid from Pelvis Updated: 09/23/24 0926     Fungus (Mycology) Culture Culture in progress    Fungus culture [[id number]] Collected: 09/19/24 1527    Order Status: Completed Specimen: Abscess from Back Updated: 09/23/24 0926     Fungus (Mycology) Culture Culture in progress    Narrative:      Epidural abscess    Fungus culture [[id number]] Collected: 09/19/24 1515    Order Status: Completed Specimen: Abscess from Back Updated: 09/23/24 0926     Fungus (Mycology) Culture Culture in progress    Narrative:      Subfascial abscess    AFB Culture & Smear [[id number]] Collected: 09/21/24 1502    Order Status: Completed Specimen: Body Fluid from Pelvis Updated: 09/22/24 2127     AFB Culture & Smear Culture in progress    Blood culture [[id number]]  (Abnormal)  (Susceptibility) Collected: 09/19/24 1033    Order Status: Completed Specimen: Blood from Peripheral, Lower Arm, Left Updated: 09/22/24 0918     Blood Culture, Routine Gram stain aer bottle: Gram positive cocci in clusters resembling Staph      Results called to and read back by:Mehreen Vital 09/20/2024  13:40      STAPHYLOCOCCUS AUREUS  ID consult required at [facility],[place] and [facility].      Gram stain [[id number]]     Order Status: No result Specimen: Joint Fluid     AFB culture [[id number]]     Order Status: No result Specimen: Joint Fluid     AFB stain [[id number]]     Order Status: No result Specimen: Joint Fluid     Culture, body fluid - Bactec [[id number]]     Order Status: No result Specimen: Joint Fluid     Fungus culture [[id number]]     Order Status: No result Specimen: Joint Fluid     Gram stain [[id number]] Collected: 09/21/24 1502    Order Status: Canceled Specimen: Body Fluid from Pelvis     Culture, Anaerobe [[id number]]  Collected: 09/19/24 1515    Order Status: Completed Specimen: Abscess from Back Updated: 09/21/24 1201     Anaerobic Culture Culture in progress    Narrative:      Subfascial abscess    Aerobic culture [[id number]]  (Abnormal)  (Susceptibility) Collected: 09/19/24 1527    Order Status: Completed Specimen: Abscess from Back Updated: 09/21/24 1044     Aerobic Bacterial Culture STAPHYLOCOCCUS AUREUS  Moderate      Narrative:      Epidural abscess    Aerobic culture [[id number]]  (Abnormal)  (Susceptibility) Collected: 09/19/24 1515    Order Status: Completed Specimen: Abscess from Back Updated: 09/21/24 1027     Aerobic Bacterial Culture STAPHYLOCOCCUS AUREUS  Moderate      Narrative:      Subfascial abscess    Blood culture [[id number]]  (Abnormal) Collected: 09/18/24 0045    Order Status: Completed Specimen: Blood from Peripheral, Antecubital, Left Updated: 09/21/24 0732     Blood Culture, Routine Gram stain aer bottle: Gram positive cocci in clusters resembling Staph      Results called to and read back by: Chema 09/19/2024  01:39      STAPHYLOCOCCUS AUREUS  ID consult required at [facility],[place] and [facility] locations.  For susceptibility see order #[id number]      AFB Culture & Smear [[id number]] Collected: 09/19/24 1527    Order Status: Completed Specimen: Abscess from Back Updated: 09/20/24 2127     AFB Culture & Smear Culture in progress     AFB CULTURE STAIN No acid fast bacilli seen.    Narrative:      Epidural abscess    AFB Culture & Smear [[id number]] Collected: 09/19/24 1515    Order Status: Completed Specimen: Abscess from Back Updated: 09/20/24 2127     AFB Culture & Smear Culture in progress     AFB CULTURE STAIN No acid fast bacilli seen.    Narrative:      Subfascial abscess    Blood culture [[id number]]  (Abnormal) Collected: 09/18/24 0045    Order Status: Completed Specimen: Blood from Peripheral, Antecubital, Left Updated: 09/20/24 0920     Blood Culture, Routine Gram stain aer bottle: Gram  positive cocci in clusters resembling Staph      Results called to and read back by:Chema Oconnor RN 09/18/2024  20:18      STAPHYLOCOCCUS AUREUS  ID consult required at [facility].  For susceptibility see order #[id number]      Gram stain [[id number]] Collected: 09/19/24 1515    Order Status: Completed Specimen: Abscess from Back Updated: 09/19/24 1908     Gram Stain Result Rare WBC's      Few Gram positive cocci    Narrative:      Subfascial abscess    Gram stain [[id number]] Collected: 09/19/24 1527    Order Status: Completed Specimen: Abscess from Back Updated: 09/19/24 1906     Gram Stain Result Rare WBC's      Few Gram positive cocci      Rare Gram positive rods    Narrative:      Epidural abscess    Blood Culture #2 **CANNOT BE ORDERED STAT** [[id number]]  (Abnormal) Collected: 09/16/24 1145    Order Status: Completed Specimen: Blood from Peripheral, Wrist, Right Updated: 09/19/24 1002     Blood Culture, Routine Gram stain felix bottle: Gram positive cocci in clusters resembling Staph      Results called to and read back by: FATIMAH Mcnulty. 09/17/2024  03:48      Gram stain aer bottle: Gram positive cocci in clusters resembling Staph      Positive results previously called 09/17/2024  06:05      STAPHYLOCOCCUS AUREUS  ID consult required at [facility].  For susceptibility see order #[id number]      Blood culture #1 **CANNOT BE ORDERED STAT** [[id number]]  (Abnormal)  (Susceptibility) Collected: 09/16/24 1054    Order Status: Completed Specimen: Blood from Peripheral, Antecubital, Right Updated: 09/19/24 1001     Blood Culture, Routine Gram stain aer bottle: Gram positive cocci in clusters resembling Staph      Gram stain felix bottle: Gram positive cocci in clusters resembling Staph      Results called to and read back by: FATIMAH Mcnulty. 09/17/2024  03:43      STAPHYLOCOCCUS AUREUS  ID consult required at [facility].       Blood culture [[id number]]     Order Status: Canceled Specimen: Blood     MRSA/SA Rapid ID by PCR from Blood culture [[id number]]  (Abnormal) Collected: 09/16/24 1054    Order Status: Completed Updated: 09/17/24 0455     Staph aureus ID by PCR Positive     Methicillin Resistant ID by PCR Negative          All pertinent labs from the last 24 hours have been reviewed.    Significant Diagnostics:  I have reviewed all pertinent imaging results/findings within the past 24 hours.

## 2024-09-23 NOTE — ASSESSMENT & PLAN NOTE
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 S/p L3-5 laminectomy and epidural abscess washout on 9/18, IR drainage of psoas abscess 9/21.    No hardware/devices in place. Does admit to snorting fentanyl. Does have multiple skin blisters on JESSICA UE and LE that she admits to picking at.     Recommendations  Continue oxacillin via continuous infusion  Follow up all culture data  Obtain 2 sets of blood cultures today (ordered)  Follow up ortho recs for SI joint

## 2024-09-23 NOTE — PT/OT/SLP EVAL
Physical Therapy  Co-Evaluation and treatment with OT    Patient Name:  Mari Sloan   MRN:  [id number]    Recommendations:     Discharge Recommendations: High Intensity Therapy   Discharge Equipment Recommendations: walker, rolling   Barriers to discharge: Decreased caregiver support    Assessment:     Mari Sloan is a 49 y.o. female admitted with a medical diagnosis of Paraspinal abscess.  She presents with the following impairments/functional limitations: impaired endurance, impaired functional mobility, gait instability, impaired balance, decreased safety awareness, decreased lower extremity function pt tolerated treatment well but c/o pain and dizziness decreased functional mobility. Pt is will benefit from skilled PT 4x/wk to progress.  Pt is significantly below previous functional level, increased risk of falls and increased burden of care currently. Patient presents with good participation and motivation to return to prior level of function with high intensity therapy.  The patient demonstrates appropriate endurance to participate in up to 3 hours or 15hrs of combined therapy post acute. Pt was transferred from [facility]. Pt is s/p laminectomy/foraminectomies L3-5, evacuation epidural abscess 9/19/24.   .    Rehab Prognosis: Good; patient would benefit from acute skilled PT services to address these deficits and reach maximum level of function.    Recent Surgery: Procedure(s) (LRB):  L3-L5 laminectomy with epidural abscess evac (N/A)  WASHOUT, WOUND, SPINE 4 Days Post-Op    Plan:     During this hospitalization, patient to be seen 4 x/week to address the identified rehab impairments via gait training, therapeutic activities, therapeutic exercises and progress toward the following goals:    Plan of Care Expires:  10/18/24    Subjective     Chief Complaint: pt c/o pain and dizziness with sitting EOB. Pt vital signs stable during treatment.   Patient/Family Comments/goals: to get better and go home.    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 They live in 1 story slab.   Prior to admission, patients level of function was Independent .  Equipment used at home: none.  DME owned (not currently used): none.  Upon discharge, patient will have assistance from  who can take time off of work. .    Objective:     Communicated with nurse  prior to session.  Patient found supine with telemetry, pulse ox (continuous), blood pressure cuff, oxygen, central line, hemovac, PREMA drain, bland catheter, SCD  upon PT entry to room.    General Precautions: Standard, fall  Orthopedic Precautions:spinal precautions   Braces:    Respiratory Status: Nasal cannula, flow 2 L/min    Exams:  Cognitive Exam:  Patient is oriented to Person, Place, Time, and Situation  RLE ROM: WFL  RLE Strength: functionally fair but not formally tested due to pain.   LLE ROM: WFL  LLE Strength: functionally fair but not formally tested due to pain.     Functional Mobility:  Bed Mobility:  pt needed verbal cues for hand placement and sequencing for functional mobility and log rolling..    Rolling Right: total assistance and of 2 persons  Supine to Sit: total assistance and of 2 persons  Sit to Supine: total assistance and of 2 persons    Transfers:     Sit to Stand:  total assistance and of 2 persons with no AD. Pt stood x 2 reps and was only able to lift buttocks off of bed to come to upright posture.     Balance: pt sat on EOB  x 12 min with CGA to min assist. Pt leaned to R side and needed min assist to weight shift base of support over center of gravity to achieve sitting balance and postural alignment.      Due to pt complex medical condition, the skill of 2 licensed therapists is  needed to maximize treatment session and progression towards goals  Pt white board updated with current therapists name and level of mobility assistance needed.         AM-PAC 6 CLICK MOBILITY  Total Score:9       Treatment & Education:  Pt received verbal instructions in role of PT and POC. Pt verbally expressed understanding of such.     Patient left supine with all lines intact, call button in reach, and RN  notified.    GOALS:   Multidisciplinary Problems       Physical Therapy Goals          Problem: Physical Therapy    Goal Priority Disciplines Outcome Goal Variances Interventions   Physical Therapy Goal     PT, PT/OT Progressing     Description: Goals to be met by: 10/18/24     Patient will increase functional independence with mobility by performin. Supine to sit with MInimal Assistance  2. Sit to stand transfer with Minimal Assistance with RW if needed.   3. Bed to chair transfer with Minimal Assistance using Rolling Walker  4. Gait  x 30 feet with Minimal Assistance using Rolling Walker.   5. Lower extremity exercise program x10 reps per handout, with assistance as needed to increase strength for increased mobility.     DME Justifications (see above for complete DME recommendations)      Rolling Walker- Patient demonstrates a mobility limitation that significantly impairs their ability to participate in one or more mobility related activities of daily living. Patient's mobility limitation cannot be sufficiently resolved with the use of a cane, but can be sufficiently resolved with the use of a rolling walker.The use of a rolling walker will considerably improve their ability to participate in MRADLs. Patient will use the walker on a regular basis at home.                             History:     Past Medical History:   Diagnosis Date    Diabetes mellitus     Hypertension     Unspecified viral hepatitis C without hepatic coma        Past Surgical History:   Procedure Laterality Date      SECTION      LAMINECTOMY N/A 9/19/2024    Procedure: L3-L5 laminectomy with epidural abscess evac;  Surgeon: Sourav Jim DO;  [facility];  Service: Neurosurgery;  Laterality: N/A;    MAGNETIC RESONANCE IMAGING N/A 9/18/2024    Procedure: MRI (Magnetic Resonance Imagine);  Surgeon: Kamran Storey;  Location: [facility];  Service: Anesthesiology;  Laterality: N/A;  conscious sedaation MRI lumbar spine w/ and without contrast    WASHOUT, WOUND, SPINE  9/19/2024    Procedure: WASHOUT, WOUND, SPINE;  Surgeon: Sourav Jim DO;  [facility];  Service: Neurosurgery;;       Time Tracking:     PT Received On: 09/23/24  PT Start Time: 1004     PT Stop Time: 1040  PT Total Time (min): 36 min     Billable Minutes: Evaluation 10 min  and Therapeutic Activity 26 min       09/23/2024

## 2024-09-24 LAB
ALBUMIN SERPL BCP-MCNC: 1.3 G/DL (ref 3.5–5.2)
ALP SERPL-CCNC: 129 U/L (ref 55–135)
ALT SERPL W/O P-5'-P-CCNC: 17 U/L (ref 10–44)
ANION GAP SERPL CALC-SCNC: 10 MMOL/L (ref 8–16)
ANION GAP SERPL CALC-SCNC: 11 MMOL/L (ref 8–16)
ANION GAP SERPL CALC-SCNC: 12 MMOL/L (ref 8–16)
ANISOCYTOSIS BLD QL SMEAR: SLIGHT
AST SERPL-CCNC: 14 U/L (ref 10–40)
BACTERIA FLD AEROBE CULT: ABNORMAL
BACTERIA SPEC ANAEROBE CULT: NORMAL
BASOPHILS # BLD AUTO: 0.2 K/UL (ref 0–0.2)
BASOPHILS NFR BLD: 1.3 % (ref 0–1.9)
BILIRUB SERPL-MCNC: 1.3 MG/DL (ref 0.1–1)
BUN SERPL-MCNC: 7 MG/DL (ref 6–20)
CALCIUM SERPL-MCNC: 8 MG/DL (ref 8.7–10.5)
CALCIUM SERPL-MCNC: 8.2 MG/DL (ref 8.7–10.5)
CALCIUM SERPL-MCNC: 8.2 MG/DL (ref 8.7–10.5)
CHLORIDE SERPL-SCNC: 90 MMOL/L (ref 95–110)
CHLORIDE SERPL-SCNC: 91 MMOL/L (ref 95–110)
CHLORIDE SERPL-SCNC: 92 MMOL/L (ref 95–110)
CO2 SERPL-SCNC: 30 MMOL/L (ref 23–29)
CO2 SERPL-SCNC: 32 MMOL/L (ref 23–29)
CO2 SERPL-SCNC: 33 MMOL/L (ref 23–29)
CREAT SERPL-MCNC: 0.6 MG/DL (ref 0.5–1.4)
CREAT SERPL-MCNC: 0.7 MG/DL (ref 0.5–1.4)
CREAT SERPL-MCNC: 0.8 MG/DL (ref 0.5–1.4)
CRP SERPL-MCNC: 86.8 MG/L (ref 0–8.2)
DIFFERENTIAL METHOD BLD: ABNORMAL
EOSINOPHIL # BLD AUTO: 0.4 K/UL (ref 0–0.5)
EOSINOPHIL NFR BLD: 2.6 % (ref 0–8)
ERYTHROCYTE [DISTWIDTH] IN BLOOD BY AUTOMATED COUNT: 18.2 % (ref 11.5–14.5)
EST. GFR  (NO RACE VARIABLE): >60 ML/MIN/1.73 M^2
GLUCOSE SERPL-MCNC: 122 MG/DL (ref 70–110)
GLUCOSE SERPL-MCNC: 40 MG/DL (ref 70–110)
GLUCOSE SERPL-MCNC: 93 MG/DL (ref 70–110)
GRAM STN SPEC: ABNORMAL
GRAM STN SPEC: ABNORMAL
HCT VFR BLD AUTO: 30.2 % (ref 37–48.5)
HGB BLD-MCNC: 9.4 G/DL (ref 12–16)
HYPOCHROMIA BLD QL SMEAR: ABNORMAL
IMM GRANULOCYTES # BLD AUTO: 0.61 K/UL (ref 0–0.04)
IMM GRANULOCYTES NFR BLD AUTO: 4.1 % (ref 0–0.5)
LYMPHOCYTES # BLD AUTO: 4 K/UL (ref 1–4.8)
LYMPHOCYTES NFR BLD: 26.7 % (ref 18–48)
MAGNESIUM SERPL-MCNC: 1.6 MG/DL (ref 1.6–2.6)
MCH RBC QN AUTO: 26 PG (ref 27–31)
MCHC RBC AUTO-ENTMCNC: 31.1 G/DL (ref 32–36)
MCV RBC AUTO: 83 FL (ref 82–98)
MONOCYTES # BLD AUTO: 1.2 K/UL (ref 0.3–1)
MONOCYTES NFR BLD: 8.3 % (ref 4–15)
NEUTROPHILS # BLD AUTO: 8.5 K/UL (ref 1.8–7.7)
NEUTROPHILS NFR BLD: 57 % (ref 38–73)
NRBC BLD-RTO: 0 /100 WBC
OVALOCYTES BLD QL SMEAR: ABNORMAL
PHOSPHATE SERPL-MCNC: 5.1 MG/DL (ref 2.7–4.5)
PLATELET # BLD AUTO: 342 K/UL (ref 150–450)
PMV BLD AUTO: 11.3 FL (ref 9.2–12.9)
POCT GLUCOSE: 117 MG/DL (ref 70–110)
POCT GLUCOSE: 120 MG/DL (ref 70–110)
POCT GLUCOSE: 181 MG/DL (ref 70–110)
POCT GLUCOSE: 58 MG/DL (ref 70–110)
POCT GLUCOSE: 58 MG/DL (ref 70–110)
POCT GLUCOSE: 63 MG/DL (ref 70–110)
POCT GLUCOSE: 63 MG/DL (ref 70–110)
POCT GLUCOSE: 75 MG/DL (ref 70–110)
POCT GLUCOSE: 76 MG/DL (ref 70–110)
POIKILOCYTOSIS BLD QL SMEAR: SLIGHT
POLYCHROMASIA BLD QL SMEAR: ABNORMAL
POTASSIUM SERPL-SCNC: 3.4 MMOL/L (ref 3.5–5.1)
POTASSIUM SERPL-SCNC: 3.6 MMOL/L (ref 3.5–5.1)
POTASSIUM SERPL-SCNC: 4.2 MMOL/L (ref 3.5–5.1)
PROT SERPL-MCNC: 6.1 G/DL (ref 6–8.4)
RBC # BLD AUTO: 3.62 M/UL (ref 4–5.4)
SODIUM SERPL-SCNC: 133 MMOL/L (ref 136–145)
SODIUM SERPL-SCNC: 134 MMOL/L (ref 136–145)
SODIUM SERPL-SCNC: 134 MMOL/L (ref 136–145)
WBC # BLD AUTO: 14.89 K/UL (ref 3.9–12.7)

## 2024-09-24 PROCEDURE — 63600175 PHARM REV CODE 636 W HCPCS

## 2024-09-24 PROCEDURE — 86140 C-REACTIVE PROTEIN: CPT | Performed by: STUDENT IN AN ORGANIZED HEALTH CARE EDUCATION/TRAINING PROGRAM

## 2024-09-24 PROCEDURE — 83735 ASSAY OF MAGNESIUM: CPT

## 2024-09-24 PROCEDURE — 85025 COMPLETE CBC W/AUTO DIFF WBC: CPT | Performed by: STUDENT IN AN ORGANIZED HEALTH CARE EDUCATION/TRAINING PROGRAM

## 2024-09-24 PROCEDURE — 97116 GAIT TRAINING THERAPY: CPT

## 2024-09-24 PROCEDURE — 36415 COLL VENOUS BLD VENIPUNCTURE: CPT | Performed by: STUDENT IN AN ORGANIZED HEALTH CARE EDUCATION/TRAINING PROGRAM

## 2024-09-24 PROCEDURE — 99024 POSTOP FOLLOW-UP VISIT: CPT | Mod: ,,, | Performed by: PHYSICIAN ASSISTANT

## 2024-09-24 PROCEDURE — 25000003 PHARM REV CODE 250: Performed by: STUDENT IN AN ORGANIZED HEALTH CARE EDUCATION/TRAINING PROGRAM

## 2024-09-24 PROCEDURE — 97535 SELF CARE MNGMENT TRAINING: CPT

## 2024-09-24 PROCEDURE — 63600175 PHARM REV CODE 636 W HCPCS: Performed by: INTERNAL MEDICINE

## 2024-09-24 PROCEDURE — 97530 THERAPEUTIC ACTIVITIES: CPT

## 2024-09-24 PROCEDURE — 80053 COMPREHEN METABOLIC PANEL: CPT | Performed by: STUDENT IN AN ORGANIZED HEALTH CARE EDUCATION/TRAINING PROGRAM

## 2024-09-24 PROCEDURE — 25000003 PHARM REV CODE 250

## 2024-09-24 PROCEDURE — 63600175 PHARM REV CODE 636 W HCPCS: Performed by: STUDENT IN AN ORGANIZED HEALTH CARE EDUCATION/TRAINING PROGRAM

## 2024-09-24 PROCEDURE — 80048 BASIC METABOLIC PNL TOTAL CA: CPT | Mod: 91,XB | Performed by: STUDENT IN AN ORGANIZED HEALTH CARE EDUCATION/TRAINING PROGRAM

## 2024-09-24 PROCEDURE — 99233 SBSQ HOSP IP/OBS HIGH 50: CPT | Mod: ,,, | Performed by: STUDENT IN AN ORGANIZED HEALTH CARE EDUCATION/TRAINING PROGRAM

## 2024-09-24 PROCEDURE — 84100 ASSAY OF PHOSPHORUS: CPT

## 2024-09-24 PROCEDURE — 25000003 PHARM REV CODE 250: Performed by: INTERNAL MEDICINE

## 2024-09-24 PROCEDURE — 20600001 HC STEP DOWN PRIVATE ROOM

## 2024-09-24 RX ORDER — POTASSIUM CHLORIDE 20 MEQ/1
40 TABLET, EXTENDED RELEASE ORAL ONCE
Status: COMPLETED | OUTPATIENT
Start: 2024-09-24 | End: 2024-09-24

## 2024-09-24 RX ADMIN — HYDROXYZINE PAMOATE 50 MG: 25 CAPSULE ORAL at 08:09

## 2024-09-24 RX ADMIN — OXYCODONE HYDROCHLORIDE 10 MG: 10 TABLET ORAL at 08:09

## 2024-09-24 RX ADMIN — INSULIN ASPART 2 UNITS: 100 INJECTION, SOLUTION INTRAVENOUS; SUBCUTANEOUS at 12:09

## 2024-09-24 RX ADMIN — GABAPENTIN 300 MG: 300 CAPSULE ORAL at 08:09

## 2024-09-24 RX ADMIN — OXYCODONE HYDROCHLORIDE 10 MG: 10 TABLET ORAL at 02:09

## 2024-09-24 RX ADMIN — OXYCODONE HYDROCHLORIDE 10 MG: 10 TABLET ORAL at 10:09

## 2024-09-24 RX ADMIN — CLONIDINE HYDROCHLORIDE 0.1 MG: 0.1 TABLET ORAL at 05:09

## 2024-09-24 RX ADMIN — SENNOSIDES AND DOCUSATE SODIUM 1 TABLET: 50; 8.6 TABLET ORAL at 08:09

## 2024-09-24 RX ADMIN — GABAPENTIN 300 MG: 300 CAPSULE ORAL at 10:09

## 2024-09-24 RX ADMIN — OXACILLIN 12 G: 2 INJECTION, POWDER, FOR SOLUTION INTRAMUSCULAR; INTRAVENOUS at 01:09

## 2024-09-24 RX ADMIN — ENOXAPARIN SODIUM 40 MG: 40 INJECTION SUBCUTANEOUS at 05:09

## 2024-09-24 RX ADMIN — POTASSIUM CHLORIDE 40 MEQ: 1500 TABLET, EXTENDED RELEASE ORAL at 12:09

## 2024-09-24 RX ADMIN — METHADONE HYDROCHLORIDE 70 MG: 10 TABLET ORAL at 08:09

## 2024-09-24 RX ADMIN — FUROSEMIDE 40 MG: 10 INJECTION, SOLUTION INTRAVENOUS at 08:09

## 2024-09-24 RX ADMIN — HYDROXYZINE PAMOATE 50 MG: 25 CAPSULE ORAL at 06:09

## 2024-09-24 RX ADMIN — Medication 100 MG: at 08:09

## 2024-09-24 RX ADMIN — OXYCODONE HYDROCHLORIDE 10 MG: 10 TABLET ORAL at 01:09

## 2024-09-24 RX ADMIN — OXYCODONE HYDROCHLORIDE 10 MG: 10 TABLET ORAL at 06:09

## 2024-09-24 RX ADMIN — Medication 16 G: at 06:09

## 2024-09-24 RX ADMIN — GABAPENTIN 300 MG: 300 CAPSULE ORAL at 05:09

## 2024-09-24 RX ADMIN — FOLIC ACID 1 MG: 1 TABLET ORAL at 08:09

## 2024-09-24 RX ADMIN — POLYETHYLENE GLYCOL 3350 17 G: 17 POWDER, FOR SOLUTION ORAL at 08:09

## 2024-09-24 RX ADMIN — CLONIDINE HYDROCHLORIDE 0.1 MG: 0.1 TABLET ORAL at 08:09

## 2024-09-24 NOTE — PROGRESS NOTES
[facility] - Stepdown [facility] ([facility]-)  Neurosurgery  Progress Note    Subjective:     History of Present Illness: 49F PMH DM, HTN, hep c, opioid abuse, presenting after down for unknown period of time and inability to ambulate secondary to pain with imaging demonstrating L SI osteo with associated abscesses extending to retroperitoneum without involvement of thecal sac. Denies true weakness, states pain is limiting her ability to move. Pain in hips and low back. On clinda/zosyn, blood culture 9/16 with staph aureus. Denies bowel or bladder incontinence, complaining of constipation about 6 days. Denies numbness    Post-Op Info:  Procedure(s) (LRB):  L3-L5 laminectomy with epidural abscess evac (N/A)  WASHOUT, WOUND, SPINE   5 Days Post-Op   Interval History: NAEON. Participating with therapy. C/o left sided back pain. Continue left PREMA drain. 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 IR unable to aspirate on 9/21. Exam stable.     Medications:  Continuous Infusions:  Scheduled Meds:   cloNIDine  0.1 mg Oral TID    enoxparin  40 mg Subcutaneous Q24H (prophylaxis, 1700)    folic acid  1 mg Oral Daily    furosemide (LASIX) injection  40 mg Intravenous Q12H    gabapentin  300 mg Oral TID    hydrOXYzine pamoate  25 mg Oral QHS    LIDOcaine  1 patch Transdermal Q24H    methadone  70 mg Oral Daily    nicotine  1 patch Transdermal Daily    oxacillin 12 g in  mL CONTINUOUS INFUSION  12 g Intravenous Q24H    polyethylene glycol  17 g Oral BID    senna-docusate 8.6-50 mg  1 tablet Oral BID    thiamine  100 mg Oral Daily     PRN Meds:  Current Facility-Administered Medications:     acetaminophen, 1,000 mg, Per OG tube, Q6H PRN    albuterol-ipratropium, 3 mL, Nebulization, Q4H PRN    dextrose 10%, 12.5 g, Intravenous, PRN    dextrose 10%, 25 g, Intravenous, PRN    glucagon (human recombinant), 1 mg, Intramuscular, PRN    glucose, 16 g, Oral, PRN    glucose, 24 g, Oral, PRN    hydrOXYzine pamoate, 50 mg,  Oral, Q6H PRN    insulin aspart U-100, 0-10 Units, Subcutaneous, QID (AC + HS) PRN    melatonin, 6 mg, Oral, Nightly PRN    naloxone, 0.4 mg, Intravenous, PRN    ondansetron, 8 mg, Intravenous, Once PRN    oxyCODONE, 10 mg, Oral, Q4H PRN    sodium chloride 0.9%, 10 mL, Intravenous, Q12H PRN     Review of Systems  Objective:     Weight: 65.2 kg (143 lb 11.8 oz)  Body mass index is 26.29 kg/m².  Vital Signs (Most Recent):  Temp: 98.3 °F (36.8 °C) (09/24/24 1135)  Pulse: 75 (09/24/24 1135)  Resp: 18 (09/24/24 1302)  BP: 105/66 (09/24/24 1135)  SpO2: 96 % (09/24/24 1135) Vital Signs (24h Range):  Temp:  [97.8 °F (36.6 °C)-98.3 °F (36.8 °C)] 98.3 °F (36.8 °C)  Pulse:  [62-92] 75  Resp:  [15-20] 18  SpO2:  [93 %-100 %] 96 %  BP: ()/(52-91) 105/66     Date 09/24/24 0700 - 09/25/24 0659   Shift 4110-2333 1994-7776 5354-2079 24 Hour Total   INTAKE   Shift Total(mL/kg)       OUTPUT   Urine(mL/kg/hr) 1950   1950   Drains 50   50   Shift Total(mL/kg) 2000(30.7)   2000(30.7)   Weight (kg) 65.2 65.2 65.2 65.2                            Closed/Suction Drain 09/19/24 1549 Left Back Accordion 10 Fr. (Active)   Site Description Unable to view 09/23/24 1300   Dressing Type Transparent (Tegaderm) 09/23/24 1300   Dressing Status Clean;Dry;Intact 09/23/24 1300   Dressing Intervention Integrity maintained 09/23/24 1300   Drainage Serosanguineous 09/23/24 1300   Status Other (Comment) 09/22/24 1545   Output (mL) 0 mL 09/23/24 0645            Closed/Suction Drain 09/19/24 1550 Right Back Accordion 10 Fr. (Active)   Site Description Unable to view 09/23/24 1300   Dressing Type Transparent (Tegaderm) 09/23/24 1300   Dressing Status Clean;Dry;Intact 09/23/24 1300   Dressing Intervention Integrity maintained 09/23/24 1300   Drainage Serosanguineous 09/23/24 1300   Status Other (Comment) 09/22/24 1545   Output (mL) 5 mL 09/23/24 0645            Closed/Suction Drain 09/21/24 1510 Left Back Bulb 10 Fr. (Active)   Site Description Unable to  "view 09/23/24 1300   Dressing Type Transparent (Tegaderm) 09/23/24 1300   Dressing Status Clean;Dry;Intact 09/23/24 1300   Dressing Intervention Integrity maintained 09/23/24 1300   Drainage Purulent 09/23/24 1300   Status To bulb suction 09/23/24 1300   Output (mL) 30 mL 09/23/24 0645            Urethral Catheter 09/19/24 1800 Silicone 16 Fr. (Active)   $ López Insertion Bedside Insertion Performed 09/19/24 1845   Site Assessment Clean;Intact 09/23/24 1300   Collection Container Urimeter 09/23/24 1300   Securement Method secured to top of thigh w/ adhesive device 09/23/24 1300   Catheter Care Performed yes 09/23/24 1300   Reason for Continuing Urinary Catheterization Critically ill in ICU and requiring hourly monitoring of intake/output 09/23/24 1300   CAUTI Prevention Bundle Securement Device in place with 1" slack;Intact seal between catheter & drainage tubing;Drainage bag/urimeter off the floor;Sheeting clip in use;No dependent loops or kinks;Drainage bag/urimeter not overfilled (<2/3 full);Drainage bag/urimeter below bladder 09/23/24 0715   Output (mL) 1000 mL 09/23/24 1050          Physical Exam         Neurosurgery Physical Exam  General: well developed, well nourished, no distress.   Head: normocephalic, atraumatic  Neurologic: Alert and oriented. Thought content appropriate.  GCS: Motor: 6/Verbal: 5/Eyes: 4 GCS Total: 15  Mental Status: Awake, Alert, Oriented x 4  Language: No aphasia  Speech: No dysarthria  Cranial nerves: face symmetric, tongue midline, CN II-XII grossly intact.   Eyes: pupils equal, round, reactive to light with accomodation, EOMI.  Pulmonary: normal respirations, no signs of respiratory distress  Sensory: intact to light touch throughout  Motor Strength: Moves all extremities spontaneously with good tone. No abnormal movements seen.     Strength  Deltoids Triceps Biceps Wrist Extension Wrist Flexion Hand    Upper: R 5/5 5/5 5/5 5/5 5/5 5/5    L 5/5 5/5 5/5 5/5 5/5 5/5     Iliopsoas " "Quadriceps Knee  Flexion Tibialis  anterior Gastro- cnemius EHL   Lower: R 4/5 4+/5 4+/5 5/5 5/5 5/5    L 2/5 4+/5 4+/5 5/5 5/5 5/5     Skin: Skin is warm, dry and intact.    Incision c/d/I with skin edges well approximated with dermabond/prineo. No surrounding erythema or edema. No drainage from incision. No palpable hematoma or underlying fluid collection.           Significant Labs:  Recent Labs   Lab 09/23/24  0313 09/23/24  0815 09/23/24  1832 09/24/24  0448 09/24/24  1018   GLU 60*   < > 84 40* 122*      < > 137 134* 134*   K 3.0*   < > 3.8 3.4* 3.6   CL 95   < > 94* 92* 90*   CO2 36*   < > 32* 30* 33*   BUN 7   < > 8 7 7   CREATININE 0.6   < > 0.6 0.6 0.8   CALCIUM 7.7*   < > 8.0* 8.2* 8.2*   MG 1.9  --   --  1.6  --     < > = values in this interval not displayed.     Recent Labs   Lab 09/23/24 0313 09/24/24 0448   WBC 8.20 14.89*   HGB 7.9* 9.4*   HCT 26.5* 30.2*    342     No results for input(s): "LABPT", "INR", "APTT" in the last 48 hours.  Microbiology Results (last 7 days)       Procedure Component Value Units Date/Time    Blood culture [[id number]] Collected: 09/23/24 1127    Order Status: Completed Specimen: Blood from Peripheral, Forearm, Right Updated: 09/24/24 1212     Blood Culture, Routine No Growth to date      No Growth to date    Culture, Anaerobe [[id number]] Collected: 09/19/24 1515    Order Status: Completed Specimen: Abscess from Back Updated: 09/24/24 1024     Anaerobic Culture No anaerobes isolated    Narrative:      Subfascial abscess    Culture, Body Fluid (Aerobic) w/ GS [[id number]]  (Abnormal)  (Susceptibility) Collected: 09/21/24 1502    Order Status: Completed Specimen: Body Fluid from Back Updated: 09/24/24 1009     AEROBIC CULTURE - FLUID STAPHYLOCOCCUS AUREUS  Many       Gram Stain Result Moderate WBC's      Moderate Gram positive cocci    AFB Culture & Smear [[id number]] Collected: 09/21/24 1506    Order Status: Completed Specimen: Body Fluid from Pelvis " Updated: 09/23/24 1555     AFB Culture & Smear Culture in progress     AFB CULTURE STAIN No acid fast bacilli seen.    Blood culture [[id number]]     Order Status: Sent Specimen: Blood     Culture, Anaerobe [[id number]] Collected: 09/19/24 1527    Order Status: Completed Specimen: Abscess from Back Updated: 09/23/24 0957     Anaerobic Culture No anaerobes isolated    Narrative:      Epidural abscess    Fungus culture [[id number]] Collected: 09/21/24 1502    Order Status: Completed Specimen: Body Fluid from Pelvis Updated: 09/23/24 0926     Fungus (Mycology) Culture Culture in progress    Fungus culture [[id number]] Collected: 09/19/24 1527    Order Status: Completed Specimen: Abscess from Back Updated: 09/23/24 0926     Fungus (Mycology) Culture Culture in progress    Narrative:      Epidural abscess    Fungus culture [[id number]] Collected: 09/19/24 1515    Order Status: Completed Specimen: Abscess from Back Updated: 09/23/24 0926     Fungus (Mycology) Culture Culture in progress    Narrative:      Subfascial abscess    Blood culture [[id number]]  (Abnormal)  (Susceptibility) Collected: 09/19/24 1033    Order Status: Completed Specimen: Blood from Peripheral, Lower Arm, Left Updated: 09/22/24 0918     Blood Culture, Routine Gram stain aer bottle: Gram positive cocci in clusters resembling Staph      Results called to and read back by:Mehreen Vital 09/20/2024  13:40      STAPHYLOCOCCUS AUREUS  ID consult required at [facility] and [facility].      Gram stain [[id number]]     Order Status: No result Specimen: Joint Fluid     AFB culture [[id number]]     Order Status: No result Specimen: Joint Fluid     AFB stain [[id number]]     Order Status: No result Specimen: Joint Fluid     Culture, body fluid - Bactec [[id number]]     Order Status: No result Specimen: Joint Fluid     Fungus culture [[id number]]     Order Status: No result Specimen: Joint Fluid     Gram stain [[id number]] Collected: 09/21/24 1502     Order Status: Canceled Specimen: Body Fluid from Pelvis     Aerobic culture [[id number]]  (Abnormal)  (Susceptibility) Collected: 09/19/24 1527    Order Status: Completed Specimen: Abscess from Back Updated: 09/21/24 1044     Aerobic Bacterial Culture STAPHYLOCOCCUS AUREUS  Moderate      Narrative:      Epidural abscess    Aerobic culture [[id number]]  (Abnormal)  (Susceptibility) Collected: 09/19/24 1515    Order Status: Completed Specimen: Abscess from Back Updated: 09/21/24 1027     Aerobic Bacterial Culture STAPHYLOCOCCUS AUREUS  Moderate      Narrative:      Subfascial abscess    Blood culture [[id number]]  (Abnormal) Collected: 09/18/24 0045    Order Status: Completed Specimen: Blood from Peripheral, Antecubital, Left Updated: 09/21/24 0732     Blood Culture, Routine Gram stain aer bottle: Gram positive cocci in clusters resembling Staph      Results called to and read back by: Chema 09/19/2024  01:39      STAPHYLOCOCCUS AUREUS  ID consult required at [facility] and [facility].  For susceptibility see order #[id number]      AFB Culture & Smear [[id number]] Collected: 09/19/24 1527    Order Status: Completed Specimen: Abscess from Back Updated: 09/20/24 2127     AFB Culture & Smear Culture in progress     AFB CULTURE STAIN No acid fast bacilli seen.    Narrative:      Epidural abscess    AFB Culture & Smear [[id number]] Collected: 09/19/24 1515    Order Status: Completed Specimen: Abscess from Back Updated: 09/20/24 2127     AFB Culture & Smear Culture in progress     AFB CULTURE STAIN No acid fast bacilli seen.    Narrative:      Subfascial abscess    Blood culture [[id number]]  (Abnormal) Collected: 09/18/24 0045    Order Status: Completed Specimen: Blood from Peripheral, Antecubital, Left Updated: 09/20/24 0920     Blood Culture, Routine Gram stain aer bottle: Gram positive cocci in clusters resembling Staph      Results called to and read back by:Chema Oconnor RN 09/18/2024  20:18       STAPHYLOCOCCUS AUREUS  ID consult required at [facility].  For susceptibility see order #[id number]      Gram stain [[id number]] Collected: 09/19/24 1515    Order Status: Completed Specimen: Abscess from Back Updated: 09/19/24 1908     Gram Stain Result Rare WBC's      Few Gram positive cocci    Narrative:      Subfascial abscess    Gram stain [[id number]] Collected: 09/19/24 1527    Order Status: Completed Specimen: Abscess from Back Updated: 09/19/24 1906     Gram Stain Result Rare WBC's      Few Gram positive cocci      Rare Gram positive rods    Narrative:      Epidural abscess    Blood Culture #2 **CANNOT BE ORDERED STAT** [[id number]]  (Abnormal) Collected: 09/16/24 1145    Order Status: Completed Specimen: Blood from Peripheral, Wrist, Right Updated: 09/19/24 1002     Blood Culture, Routine Gram stain felix bottle: Gram positive cocci in clusters resembling Staph      Results called to and read back by: FATIMAH Mcnulty. 09/17/2024  03:48      Gram stain aer bottle: Gram positive cocci in clusters resembling Staph      Positive results previously called 09/17/2024  06:05      STAPHYLOCOCCUS AUREUS  ID consult required at [facility].  For susceptibility see order #[id number]      Blood culture #1 **CANNOT BE ORDERED STAT** [[id number]]  (Abnormal)  (Susceptibility) Collected: 09/16/24 1054    Order Status: Completed Specimen: Blood from Peripheral, Antecubital, Right Updated: 09/19/24 1001     Blood Culture, Routine Gram stain aer bottle: Gram positive cocci in clusters resembling Staph      Gram stain felix bottle: Gram positive cocci in clusters resembling Staph      Results called to and read back by: FATIMAH Mcnulty. 09/17/2024  03:43      STAPHYLOCOCCUS AUREUS  ID consult required at [facility].      Blood culture [[id number]]     Order Status: Canceled Specimen: Blood           All pertinent labs from the last 24 hours have  been reviewed.    Significant Diagnostics:  I have reviewed all pertinent imaging results/findings within the past 24 hours.      Assessment/Plan:     Other osteomyelitis, multiple sites  49F PMH DM, HTN, hep c, opioid abuse, presenting after down for unknown period of time and inability to ambulate secondary to pain with imaging demonstrating L SI osteo with associated abscesses extending to retroperitoneum without involvement of thecal sac. Denies true weakness, states pain is limiting her ability to move. Pain in hips and low back. On clinda/zosyn, blood culture 9/16 with staph aureus. Denies bowel or bladder incontinence, complaining of constipation about 6 days. Denies numbness    She is now s/p L3-L4 laminectomy for epidural abscess evacuation on 9/19/24 with neurosurgery and IR drain placement in psoas abscess 9/21    Imaging:  CT Lsp / pelvis 9/16: fluid collection T11 on L through left psoas, felt abscess  CT CAP 9/16: extensive edema with subq gas through left flank, small psoas abscess and fluid collections left flank, septic arthritis of L SI joint  MRI Lsp w/wo 9/17: L SI complex fluid collection felt to represent osteo with adjacent abscess, extends to approximately T12 level  MRI Lsp w/wo repeat 9/18: epidural collections T12-L5   MRI C and T spine on 9/20 negative for other signs of infection    Plan:  - TTF 9/23; q4h nc/vs  - infectious workup  - Abx for Staph aureus blood culture 9/16 per primary  - OR cultures growing staph aureus  - psoas abscess s/p drainage and drain placement by IR on 9/21/24  - SI joint tap unsuccessful in drawing any fluid back - Ortho rec abx, no surgical intervention  - Trend inflammatory markers   - rest of care per primary team  - Please notify for questions/concerns/neurologic decline    D/w Dr. Jim by NSGY team        Haylee Eason PA-C  Neurosurgery  [facility] - Stepdown Flex ([place])

## 2024-09-24 NOTE — NURSING
Patient resting in bed ,Aox4, does complain of some back pain and request medication. Patient still experiencing generalized weakness but no other complaints at this time. Plan of care to monitor outouts from bland and drain discussed with patient. Call light within reach, will continue to monitor.

## 2024-09-24 NOTE — PROGRESS NOTES
[facility] - Stepdown Flex ([place] [address])  [facility] Medicine  Progress Note    Patient Name: Mari Sloan  MRN: [id number]  Patient Class: IP- Inpatient   Admission Date: 9/16/2024  Length of Stay: 8 days  Attending Physician: Yuniel Wells*  Primary Care Provider: Liliana, Primary Doctor        Subjective:     Principal Problem:Paraspinal abscess        HPI:  49 y.o.  woman with h/o homelessness (recently was able to obtain living arrangements but states she was living [place] the bridge), NIDDM type 2, Essential hypertension, and substance use (denies any IVDU in he past or any illicit drug use recenly, denies ETOH abuse/overuse) presents to [facility] for further evaluation of her back pain.  She apparently presented to the [facility] Emergency Department on September 16 with nausea and vomiting and a mechanical fall 5 days prior. She reported pain worse in her back and on her sides radiating down both legs. She noted muscle spasm in her back and legs. She had no head trauma or loss of consciousness.  W/u in the [facility] ED noted significant hypokalemia, hypomagnesemia, severe anemia, metabolic alkalosis, and hyperglycemia. Vitals signs stable with no sepsis at this time noted. SBP>90, HR normal,afebrile.     Imaging studies of her lumbar spine and pelvis were concerning for osteomyelitis/septic arthritis of the left SI joint and L5-S1 along with an abnormal fluid collection extending from T11 through the left iliacus muscle concerning for abscess. Blood cultures sent.  In the emergency department she is receiving IV fluid, Zofran, Zosyn, vancomycin, potassium, magnesium, pain medication, and nausea medication.   She also received 2 units of PRBC for an H/H of 5.7/18.4,     Case discussed with Neurosurgery and Interventional Radiology. Plan would be for transfer to Hospital Medicine at [facility] for IR evaluation of the fluid collection and potential sampling of the joint  "osteomyelitis.  I reviewed the discussions made with the multiple sub specialists regarding transfer of this patient to [facility]: IR/General surgery/Neurosurgery/ER/Internal Med.     Neurosurgery contacted by the  did not feel surgical intervention was needed at this time but would follow along and requested [facility] for further management and IR backup. IR on call apparently read the CT and at the time felt "while the left retroperitoneal fluid collections do appear organized, percutaneous drainage is not advised given the extensive soft tissue infection superficial to these collections.  In addition, there is extensive evidence of soft tissue infection that does not appear organized or percutaneously drainable."     General surgery was consulted to review the case and felt that there was no immediate surgical intervention at this time to be had. I spoke with the on surgeon contacted regarding the location of the fluid collections and inquired if perhaps orthopedic surgery should be consulted to review given the involvement of bony pelvis.      I spoke orthopedic surgery who will see the patient but recommended re-consulting IR here and Neurosurgery given the diskitis/OM L5-S1. (Please see his consult note in the chart)     Repeat labs here again noted for persistently low mag, K, phos.  H/H stable with improved H/H. Pain control improved with Dilaudid.  I consulted ID for further assistance with ABX adjustments and changed her to Meropenem and doxy as well as continued Vanc. Echo ordred for am.     CT lumbar spine and pelvis had findings most concerning for infectious process. There were findings concerning for osteomyelitis and septic arthritis in the left SI joint. Findings concerning for osteomyelitis/diskitis L5-S1. Possible osteomyelitis and septic joint at the pubic symphysis. Abnormal fluid collection on the left extending from T11 through the left iliacus muscle along and within the " left iliopsoas muscle most concerning for abscess. Multiple additional small abscesses suspected in the pelvis. Multifocal collections of air in the fluid in the subcutaneous tissues of the left flank, incompletely imaged.    Chest x-ray noted a loop in the central venous catheter. Tip currently at the level of the brachiocephalic vein. Lungs are fairly clear.        Overview/Hospital Course:  49 y.o. female, with history of homelessness (recently was able to obtain living arrangements but states she was living [place] bridge), NIDDM type 2, Essential hypertension, and substance use (denies IVDU) who was transferred from [facility] ED to [facility] ICU on 09/16/2024.  She presented to [facility] ED  for back pain, nausea/vomiting.  CT L-spine revealed osteomyelitis/diskitis L5-S1 along with abnormal fluid collection on left extending from T11 through left iliacus muscle and left iliopsoas muscle concerning for abscess.  Multifocal collections of air in fluid in subcutaneous tissues of left flank.  Patient was initiated on empiric vancomycin and meropenem.  Blood cultures with staph aureus.  Obtain echo.  Unable to repeat blood cultures given shortage of cx.  Neurosurgery recommended to obtain MRI L-spine, pending.  General surgery evaluated the patient and recommended urgent transfer to [facility] for surgical evaluation/source control given extent of necrosis.  Transfer process initiated.      Patient was transferred to [facility] and admitted to the MICU 9/18 with concern for paraspinal abscess. Infectious workup was ordered. Blood cultures were positive for MSSA bacteremia. Neurosurgery, general surgery and IR were consulted to evaluate the patient's paraspinal abscess. Neurosurgery performed a L3-L4 laminectomy for epidural abscess evacuation on 9/19/24 and the cultures grew MSSA. TTE showed normal EF of 60-65% with diastolic dysfunction, could not exclude mitral vegetation vs redundant chordae. ID was  consulted and recommended oxacillin and repeat blood cultures. With her electrolyte derangement, and a background of appetitive loss in the past 2 months, there was concern for refeeding syndrome. On 9/21, IR took the patient for abscess drain placement and aspiration of SI joint. Pending culture results. SI joint couldn't be aspirated but retroperitoneal abscess was drained of 100cc of purulent fluid. Successfully extubated 9/22.   Patient was stepped down to hospital medicine 9/24/24.     Interval History: Stepped down to hospital medicine. AFebrile. NSGY removed HV drains  Pt c/o left sided pain making it difficult to move    Review of Systems  Objective:     Vital Signs (Most Recent):  Temp: 98.3 °F (36.8 °C) (09/24/24 1135)  Pulse: 75 (09/24/24 1135)  Resp: 18 (09/24/24 1302)  BP: 105/66 (09/24/24 1135)  SpO2: 96 % (09/24/24 1135) Vital Signs (24h Range):  Temp:  [97.8 °F (36.6 °C)-98.3 °F (36.8 °C)] 98.3 °F (36.8 °C)  Pulse:  [62-92] 75  Resp:  [17-20] 18  SpO2:  [93 %-98 %] 96 %  BP: (100-142)/(59-91) 105/66     Weight: 65.2 kg (143 lb 11.8 oz)  Body mass index is 26.29 kg/m².    Intake/Output Summary (Last 24 hours) at 9/24/2024 1423  Last data filed at 9/24/2024 1135  Gross per 24 hour   Intake --   Output 3870 ml   Net -3870 ml      Physical Exam  Constitutional:       General: She is not in acute distress.     Appearance: She is not toxic-appearing.   Cardiovascular:      Rate and Rhythm: Normal rate and regular rhythm.      Heart sounds: No murmur heard.     No friction rub. No gallop.   Pulmonary:      Effort: Pulmonary effort is normal. No respiratory distress.      Breath sounds: Normal breath sounds.   Abdominal:      General: Abdomen is flat. There is no distension.      Palpations: Abdomen is soft.      Tenderness: There is no abdominal tenderness. There is no guarding or rebound.   Musculoskeletal:         General: Swelling and tenderness present.      Right lower leg: No edema.      Left lower  "leg: No edema.      Comments: L flank drain   Skin:     Findings: Bruising and lesion present.   Neurological:      Mental Status: She is alert.         MELD 3.0: 17 at 9/21/2024  6:13 PM  MELD-Na: 13 at 9/21/2024  6:13 PM  Calculated from:  Serum Creatinine: 0.6 mg/dL (Using min of 1 mg/dL) at 9/21/2024  6:13 PM  Serum Sodium: 138 mmol/L (Using max of 137 mmol/L) at 9/21/2024  6:13 PM  Total Bilirubin: 2.1 mg/dL at 9/21/2024  2:34 AM  Serum Albumin: 1.2 g/dL (Using min of 1.5 g/dL) at 9/21/2024  2:34 AM  INR(ratio): 1.4 at 9/19/2024  3:19 AM  Age at listing (hypothetical): 49 years  Sex: Female at 9/21/2024  6:13 PM      Significant Labs:  CBC:  Recent Labs   Lab 09/23/24  0313 09/24/24  0448   WBC 8.20 14.89*   HGB 7.9* 9.4*   HCT 26.5* 30.2*    342     CMP:  Recent Labs   Lab 09/23/24  0313 09/23/24  0815 09/23/24  1832 09/24/24  0448 09/24/24  1018      < > 137 134* 134*   K 3.0*   < > 3.8 3.4* 3.6   CL 95   < > 94* 92* 90*   CO2 36*   < > 32* 30* 33*   GLU 60*   < > 84 40* 122*   BUN 7   < > 8 7 7   CREATININE 0.6   < > 0.6 0.6 0.8   CALCIUM 7.7*   < > 8.0* 8.2* 8.2*   PROT 5.3*  --   --  6.1  --    ALBUMIN 1.2*  --   --  1.3*  --    BILITOT 1.6*  --   --  1.3*  --    ALKPHOS 110  --   --  129  --    AST 13  --   --  14  --    ALT 14  --   --  17  --    ANIONGAP 8   < > 11 12 11    < > = values in this interval not displayed.     PTINR:  No results for input(s): "INR" in the last 48 hours.    Significant Procedures:   Dobutamine Stress Test with Color Flow: No results found for this or any previous visit.        Assessment/Plan:      * Paraspinal abscess  MSSA endocarditis  MSSA paraspinal abscess  MSSA psoas abscess    Noted to have extensive fluid collection on left extending from T11 through left iliacus muscle and within the left iliopsoas muscle.  Multifocal collections of air including subcutaneous tissues on the left flank noted.  ID/neurosurgery consulted. s/p L3-L5 laminectomy with " epidural abscess evacuation 9/19   Ortho consulted  IR SI joint aspiration and abscess drain placement 9/21   ID consulted. On oxacillin    Severe sepsis  See above    Hepatitis C    Hepatitis C antibody positive.  Obtain HCV RNA    Anemia, unspecified  S/p 2u PRBC transfusion on admit . NO signs of acute GI bleed on exam at this time. Denies any hematemesis or hemoptysis.   Obtain iron B12/folate/peripheral smear and LDH/hapto/retic  No evidence of active bleed     Uncontrolled type 2 diabetes mellitus with hyperglycemia  Patient's FSGs are uncontrolled due to hyperglycemia on current medication regimen.  Last A1c reviewed-   Lab Results   Component Value Date    HGBA1C 7.9 (H) 09/17/2024     Most recent fingerstick glucose reviewed-   Recent Labs   Lab 09/24/24  0637 09/24/24  0650 09/24/24  0705 09/24/24  1136   POCTGLUCOSE 63* 75 76 181*       Current correctional scale  Medium  Maintain anti-hyperglycemic dose as follows-   Antihyperglycemics (From admission, onward)      Start     Stop Route Frequency Ordered    09/23/24 1033  insulin aspart U-100 pen 0-10 Units         -- SubQ Before meals & nightly PRN 09/23/24 0933          Hold Oral hypoglycemics while patient is in the hospital.    Smoker  PRN nicotine patch    History of drug use  On methadone at home, continue    Psoas muscle abscess  As above    Other osteomyelitis, multiple sites  As above        VTE Risk Mitigation (From admission, onward)           Ordered     enoxaparin injection 40 mg  Every 24 hours         09/23/24 1234     IP VTE HIGH RISK PATIENT  Once         09/22/24 1553                    Discharge Planning   LUH: 9/27/2024     Code Status: Full Code   Is the patient medically ready for discharge?:     Reason for patient still in hospital (select all that apply): Patient trending condition and Treatment  Discharge Plan A: Rehab   Discharge Delays: None known at this time      Yuniel Wells MD  Department of Hospital Medicine    [facility] - Stepdown Flex ([place]-14)

## 2024-09-24 NOTE — PLAN OF CARE
Discharge Plan A and Plan B have been determined by review of patient's clinical status, future medical and therapeutic needs, and coverage/benefits for post-acute care in coordination with multidisciplinary team members.      09/24/24 1603   Post-Acute Status   Post-Acute Authorization Placement   Post-Acute Placement Status Referrals Sent   Coverage [facility] Phone: [phone]   Patient choice form signed by patient/caregiver List from CMS Compare;List with quality metrics by geographic area provided   Discharge Delays None known at this time   Discharge Plan   Discharge Plan A Long-term acute care facility (LTAC)       CM spoke with patient and called patients spouse, Marcelino Sloan [phone]   And updated on discharge plan to LTAC. The patients spouse is amenable and he will speak with his wife.      Met with patient  to review discharge recommendation of  LTAC  and is agreeable to plan    Patient/family provided list of facilities in-network with patient's payor plan. Providers that are owned, operated, or affiliated with [facility] are included on the list.     Notified that referral sent to below listed facilities from in-network list based on proximity to home/family support:   1.[facility] Phone: [phone]       Patient/family instructed to identify preference.    Preferred Facility: (if more than 1, listed in order of descending preference)  [facility] Phone: [phone]       If an additional preferred facility not listed above is identified, additional referral to be sent. If above facilities unable to accept, will send additional referrals to in-network providers.        Nabila Elise RN  Case Management  [facility]  [phone]

## 2024-09-24 NOTE — PT/OT/SLP PROGRESS
Physical Therapy Co-Treatment    Patient Name:  Mari Sloan   MRN:  [id number]    Co-evaluation and co-treatment performed for this visit due to suspected patient need for two skilled therapists to ensure patient and staff safety and to accommodate for patient activity tolerance/pain management     Recommendations:     Discharge Recommendations: High Intensity Therapy  Discharge Equipment Recommendations: walker, rolling  Barriers to discharge: None    Assessment:     Mari Sloan is a 49 y.o. female admitted with a medical diagnosis of Paraspinal abscess.  She presents with the following impairments/functional limitations: weakness, impaired endurance, impaired self care skills, gait instability, impaired functional mobility, impaired balance, pain, decreased safety awareness, orthopedic precautions Pt. cooperative and tolerated treatment fairly well, but limited by pain. Pt. progressing with mobility with RW.    Rehab Prognosis: Good; patient would benefit from acute skilled PT services to address these deficits and reach maximum level of function.    Recent Surgery: Procedure(s) (LRB):  L3-L5 laminectomy with epidural abscess evac (N/A)  WASHOUT, WOUND, SPINE 5 Days Post-Op    Plan:     During this hospitalization, patient to be seen 4 x/week to address the identified rehab impairments via gait training, therapeutic activities, therapeutic exercises and progress toward the following goals:    Plan of Care Expires:  10/18/24    Subjective     Chief Complaint: back discomfort  Patient/Family Comments/goals: pt. Agreeable to PT  Pain/Comfort:  Pain Rating 1: 9/10  Location - Orientation 1: generalized  Location 1: back  Pain Addressed 1: Reposition, Distraction  Pain Rating Post-Intervention 1: 8/10      Objective:     Communicated with nursing prior to session.  Patient found supine with PREMA drain, bland catheter, peripheral IV upon PT entry to room.     General Precautions: Standard, fall  Orthopedic  Precautions: spinal precautions  Braces: N/A  Respiratory Status: Room air     Functional Mobility:  Bed Mobility:     Rolling Right: moderate assistance  Scooting: maximal assistance  Supine to Sit: moderate assistance  Sit to Supine: moderate assistance  Transfers:     Sit to Stand:  minimum assistance and of 2 persons with rolling walker  Gait: 12 steps(6 forward/backward) and 4 side steps with Min A x2 for balance/weight shift/guidance/(L) LE advancement  Balance: fair sitting and poor standing      AM-PAC 6 CLICK MOBILITY  Turning over in bed (including adjusting bedclothes, sheets and blankets)?: 2  Sitting down on and standing up from a chair with arms (e.g., wheelchair, bedside commode, etc.): 2  Moving from lying on back to sitting on the side of the bed?: 2  Moving to and from a bed to a chair (including a wheelchair)?: 2  Need to walk in hospital room?: 2  Climbing 3-5 steps with a railing?: 1  Basic Mobility Total Score: 11       Treatment & Education:  Discussed pt.'s progress, goals, and POC.    Patient left supine with all lines intact and call button in reach..    GOALS:   Multidisciplinary Problems       Physical Therapy Goals          Problem: Physical Therapy    Goal Priority Disciplines Outcome Goal Variances Interventions   Physical Therapy Goal     PT, PT/OT Progressing     Description: Goals to be met by: 10/18/24     Patient will increase functional independence with mobility by performin. Supine to sit with MInimal Assistance  2. Sit to stand transfer with Minimal Assistance with RW if needed.   3. Bed to chair transfer with Minimal Assistance using Rolling Walker  4. Gait  x 30 feet with Minimal Assistance using Rolling Walker.   5. Lower extremity exercise program x10 reps per handout, with assistance as needed to increase strength for increased mobility.     DME Justifications (see above for complete DME recommendations)      Rolling Walker- Patient demonstrates a mobility  limitation that significantly impairs their ability to participate in one or more mobility related activities of daily living. Patient's mobility limitation cannot be sufficiently resolved with the use of a cane, but can be sufficiently resolved with the use of a rolling walker.The use of a rolling walker will considerably improve their ability to participate in MRADLs. Patient will use the walker on a regular basis at home.                             Time Tracking:     PT Received On: 09/24/24  PT Start Time: 1040     PT Stop Time: 1105  PT Total Time (min): 25 min     Billable Minutes: Gait Training 15 and Therapeutic Activity 10    Treatment Type: Treatment  PT/PTA: PT     Number of PTA visits since last PT visit: 0     09/24/2024

## 2024-09-24 NOTE — CONSULTS
Inpatient consult to Physical Medicine Rehab  Consult performed by: Arcelia Hayden NP  Consult ordered by: Francis Downs MD  Reason for consult: Rehab      Consult received.     DANNY العراقي, FNP-C  Physical Medicine & Rehabilitation   09/24/2024

## 2024-09-24 NOTE — ASSESSMENT & PLAN NOTE
MSSA endocarditis  MSSA paraspinal abscess  MSSA psoas abscess    Noted to have extensive fluid collection on left extending from T11 through left iliacus muscle and within the left iliopsoas muscle.  Multifocal collections of air including subcutaneous tissues on the left flank noted.  ID/neurosurgery consulted. s/p L3-L5 laminectomy with epidural abscess evacuation 9/19   Ortho consulted  IR SI joint aspiration and abscess drain placement 9/21   ID consulted. On oxacillin

## 2024-09-24 NOTE — ASSESSMENT & PLAN NOTE
I independently reviewed patient's lab work and images as documented. 48 yo female with HCV, unstable housing and substance use admitted for back pain found to have MSSA bacteremia c/b L SI joint septic arthritis/OM, L psoas/iliacus abscess s/p IR drain 9/21 (cx with mssa), T12-L5 epidural abscess s/p L3-5 laminectomy/epidural abscess washout on 9/19 (cx with MSSA). Echo with potential MV vegetation vs redundant chordae. Repeat blood cx as of 9/23 no growth to date so far.     Recommendations:  -continue oxacillin continuous infusion 12g iv daily   -not an opat candidate, will need placement for iv abx  -follow up cx data  -follow up ortho recommendations

## 2024-09-24 NOTE — SUBJECTIVE & OBJECTIVE
Interval History: Stepped down to hospital medicine. AFebrile. NSGY removed HV drains  Pt c/o left sided pain making it difficult to move    Review of Systems  Objective:     Vital Signs (Most Recent):  Temp: 98.3 °F (36.8 °C) (09/24/24 1135)  Pulse: 75 (09/24/24 1135)  Resp: 18 (09/24/24 1302)  BP: 105/66 (09/24/24 1135)  SpO2: 96 % (09/24/24 1135) Vital Signs (24h Range):  Temp:  [97.8 °F (36.6 °C)-98.3 °F (36.8 °C)] 98.3 °F (36.8 °C)  Pulse:  [62-92] 75  Resp:  [17-20] 18  SpO2:  [93 %-98 %] 96 %  BP: (100-142)/(59-91) 105/66     Weight: 65.2 kg (143 lb 11.8 oz)  Body mass index is 26.29 kg/m².    Intake/Output Summary (Last 24 hours) at 9/24/2024 1423  Last data filed at 9/24/2024 1135  Gross per 24 hour   Intake --   Output 3870 ml   Net -3870 ml      Physical Exam  Constitutional:       General: She is not in acute distress.     Appearance: She is not toxic-appearing.   Cardiovascular:      Rate and Rhythm: Normal rate and regular rhythm.      Heart sounds: No murmur heard.     No friction rub. No gallop.   Pulmonary:      Effort: Pulmonary effort is normal. No respiratory distress.      Breath sounds: Normal breath sounds.   Abdominal:      General: Abdomen is flat. There is no distension.      Palpations: Abdomen is soft.      Tenderness: There is no abdominal tenderness. There is no guarding or rebound.   Musculoskeletal:         General: Swelling and tenderness present.      Right lower leg: No edema.      Left lower leg: No edema.      Comments: L flank drain   Skin:     Findings: Bruising and lesion present.   Neurological:      Mental Status: She is alert.         MELD 3.0: 17 at 9/21/2024  6:13 PM  MELD-Na: 13 at 9/21/2024  6:13 PM  Calculated from:  Serum Creatinine: 0.6 mg/dL (Using min of 1 mg/dL) at 9/21/2024  6:13 PM  Serum Sodium: 138 mmol/L (Using max of 137 mmol/L) at 9/21/2024  6:13 PM  Total Bilirubin: 2.1 mg/dL at 9/21/2024  2:34 AM  Serum Albumin: 1.2 g/dL (Using min of 1.5 g/dL) at  "9/21/2024  2:34 AM  INR(ratio): 1.4 at 9/19/2024  3:19 AM  Age at listing (hypothetical): 49 years  Sex: Female at 9/21/2024  6:13 PM      Significant Labs:  CBC:  Recent Labs   Lab 09/23/24 0313 09/24/24 0448   WBC 8.20 14.89*   HGB 7.9* 9.4*   HCT 26.5* 30.2*    342     CMP:  Recent Labs   Lab 09/23/24 0313 09/23/24  0815 09/23/24  1832 09/24/24 0448 09/24/24  1018      < > 137 134* 134*   K 3.0*   < > 3.8 3.4* 3.6   CL 95   < > 94* 92* 90*   CO2 36*   < > 32* 30* 33*   GLU 60*   < > 84 40* 122*   BUN 7   < > 8 7 7   CREATININE 0.6   < > 0.6 0.6 0.8   CALCIUM 7.7*   < > 8.0* 8.2* 8.2*   PROT 5.3*  --   --  6.1  --    ALBUMIN 1.2*  --   --  1.3*  --    BILITOT 1.6*  --   --  1.3*  --    ALKPHOS 110  --   --  129  --    AST 13  --   --  14  --    ALT 14  --   --  17  --    ANIONGAP 8   < > 11 12 11    < > = values in this interval not displayed.     PTINR:  No results for input(s): "INR" in the last 48 hours.    Significant Procedures:   Dobutamine Stress Test with Color Flow: No results found for this or any previous visit.      "

## 2024-09-24 NOTE — ASSESSMENT & PLAN NOTE
49F PMH DM, HTN, hep c, opioid abuse, presenting after down for unknown period of time and inability to ambulate secondary to pain with imaging demonstrating L SI osteo with associated abscesses extending to retroperitoneum without involvement of thecal sac. Denies true weakness, states pain is limiting her ability to move. Pain in hips and low back. On clinda/zosyn, blood culture 9/16 with staph aureus. Denies bowel or bladder incontinence, complaining of constipation about 6 days. Denies numbness    She is now s/p L3-L4 laminectomy for epidural abscess evacuation on 9/19/24 with neurosurgery and IR drain placement in psoas abscess 9/21    Imaging:  CT Lsp / pelvis 9/16: fluid collection T11 on L through left psoas, felt abscess  CT CAP 9/16: extensive edema with subq gas through left flank, small psoas abscess and fluid collections left flank, septic arthritis of L SI joint  MRI Lsp w/wo 9/17: L SI complex fluid collection felt to represent osteo with adjacent abscess, extends to approximately T12 level  MRI Lsp w/wo repeat 9/18: epidural collections T12-L5   MRI C and T spine on 9/20 negative for other signs of infection    Plan:  - TTF 9/23; q4h nc/vs  - infectious workup  - Abx for Staph aureus blood culture 9/16 per primary  - OR cultures growing staph aureus  - psoas abscess s/p drainage and drain placement by IR on 9/21/24  - SI joint tap unsuccessful in drawing any fluid back - Ortho rec abx, no surgical intervention  - Trend inflammatory markers   - rest of care per primary team  - Please notify for questions/concerns/neurologic decline    D/w Dr. Jim by NSGY team

## 2024-09-24 NOTE — ASSESSMENT & PLAN NOTE
S/p 2u PRBC transfusion on admit . NO signs of acute GI bleed on exam at this time. Denies any hematemesis or hemoptysis.   Obtain iron B12/folate/peripheral smear and LDH/hapto/retic  No evidence of active bleed

## 2024-09-24 NOTE — PROGRESS NOTES
[facility] ([facility])  Infectious Disease  Progress Note    Patient Name: Mari Sloan  MRN: [id number]  Admission Date: 9/16/2024  Length of Stay: 8 days  Attending Physician: Yuniel Wells*  Primary Care Provider: No, Primary Doctor    Isolation Status: No active isolations  Assessment/Plan:      ID  Staphylococcus aureus bacteremia  I independently reviewed patient's lab work and images as documented. 48 yo female with HCV, unstable housing and substance use admitted for back pain found to have MSSA bacteremia c/b L SI joint septic arthritis/OM, L psoas/iliacus abscess s/p IR drain 9/21 (cx with mssa), T12-L5 epidural abscess s/p L3-5 laminectomy/epidural abscess washout on 9/19 (cx with MSSA). Echo with potential MV vegetation vs redundant chordae. Repeat blood cx as of 9/23 no growth to date so far. Labs notable for mild bump in WBC this morning, cr/lfts stable.     Recommendations:  -continue oxacillin continuous infusion 12g iv daily   -not an opat candidate, will need placement for iv abx  -follow up cx data  -follow up ortho recommendations             Thank you for your consult. I will follow-up with patient. Please contact us if you have any additional questions. Above d/w primary team.       Arcelia Salas MD  Infectious Disease  [facility] ([facility])      50 minutes of total time spent on the encounter, which includes face to face time and non-face to face time preparing to see the patient (eg, review of tests), obtaining and/or reviewing separately obtained history, documenting clinical information in the electronic or other health record, independently interpreting results (not separately reported) and communicating results to the patient/family/caregiver, or care coordination (not separately reported).       Subjective:     Principal Problem:Paraspinal abscess    HPI: Ms. Sloan is a 49F with PMH of DM2, HTN, and substance abuse homelessness,  "initially presented to OSH with back pain, found to have spinal OM, L SI joint septic arthritis, and L psoas/iliacus muscle abscess, transferred to [facility] on 9/16 for IR and neurosurgery eval, now transferred to [facility] [place] / general surgery for necrosis of L flank.     Imaging of L spine and pelvis were concerning for OM/septic arthritis of the left SI joint and L5-S1 along with an abnormal fluid collection extending from T11 through the L iliacus muscle concerning for abscess as well as at psoas. She was started on empiric vanc and zosyn. Case was discussed with neurosurgery who did not feel surgical intervention was warranted and recommended IR drainage. IR recommended against drainage given extensive superficial infection.    On arrival to  antibiotics were transitioned to doxy, vanc and meropenem. BCx grew GPC in clusters. TTE concerning for MV endocarditis. MRI revealed "Complex fluid collection possibly extending from the left SI joint to the left iliac fossa displacing the left psoas muscle medially. This is similar to the study 1 day prior could represent infectious left sacroiliitis/osteomyelitis and adjacent abscess. The collection extends superiorly to approximately T12 level at the left posterior retroperitoneum, posterior to the left kidney. Enhancement noted to much of the sacrum including right of midline again could represent osteomyelitis."     Infectious disease consulted for "Patient being folled by ID  is osh with gram positive cocci resempblig stap and positive pcr for stap, concerns for si joint osteo and retroperitoneal abscess".   Interval History: No fevers documented overnight. Transferred out of ICU last night. Pt reports tolerating antibiotics without issues. Still has tenderness around L hip and L knee.     Review of Systems   Constitutional:  Negative for chills and fever.   Musculoskeletal:  Positive for arthralgias.   Skin:  Positive for rash.   All other systems " reviewed and are negative.    Objective:     Vital Signs (Most Recent):  Temp: 97.8 °F (36.6 °C) (09/24/24 0714)  Pulse: 83 (09/24/24 0714)  Resp: 18 (09/24/24 0829)  BP: 104/61 (09/24/24 0714)  SpO2: 95 % (09/24/24 0714) Vital Signs (24h Range):  Temp:  [97.8 °F (36.6 °C)-98.6 °F (37 °C)] 97.8 °F (36.6 °C)  Pulse:  [62-85] 83  Resp:  [12-21] 18  SpO2:  [93 %-100 %] 95 %  BP: ()/(52-91) 104/61     Weight: 65.2 kg (143 lb 11.8 oz)  Body mass index is 26.29 kg/m².    Estimated Creatinine Clearance: 98.2 mL/min (based on SCr of 0.6 mg/dL).     Physical Exam  Eyes:      General:         Right eye: No discharge.         Left eye: No discharge.   Pulmonary:      Effort: Pulmonary effort is normal. No respiratory distress.   Abdominal:      General: There is no distension.      Palpations: Abdomen is soft.      Tenderness: There is no abdominal tenderness.   Genitourinary:     Comments: bland  Skin:     General: Skin is warm and dry.      Comments: L hip/flank - induration noted  L drain - purulent fluid  Excoriations on UE/LE  RIJ   Neurological:      Mental Status: She is alert and oriented to person, place, and time.          Significant Labs:   Microbiology Results (last 7 days)       Procedure Component Value Units Date/Time    Culture, Anaerobe [[id number]] Collected: 09/19/24 1515    Order Status: Completed Specimen: Abscess from Back Updated: 09/24/24 1024     Anaerobic Culture No anaerobes isolated    Narrative:      Subfascial abscess    Culture, Body Fluid (Aerobic) w/ GS [[id number]]  (Abnormal)  (Susceptibility) Collected: 09/21/24 1502    Order Status: Completed Specimen: Body Fluid from Back Updated: 09/24/24 1009     AEROBIC CULTURE - FLUID STAPHYLOCOCCUS AUREUS  Many       Gram Stain Result Moderate WBC's      Moderate Gram positive cocci    Blood culture [[id number]] Collected: 09/23/24 1127    Order Status: Completed Specimen: Blood from Peripheral, Forearm, Right Updated: 09/23/24 1915     Blood  Culture, Routine No Growth to date    AFB Culture & Smear [[id number]] Collected: 09/21/24 1502    Order Status: Completed Specimen: Body Fluid from Pelvis Updated: 09/23/24 1555     AFB Culture & Smear Culture in progress     AFB CULTURE STAIN No acid fast bacilli seen.    Blood culture [[id number]]     Order Status: Sent Specimen: Blood     Culture, Anaerobe [[id number]] Collected: 09/19/24 1527    Order Status: Completed Specimen: Abscess from Back Updated: 09/23/24 0957     Anaerobic Culture No anaerobes isolated    Narrative:      Epidural abscess    Fungus culture [[id number]] Collected: 09/21/24 1502    Order Status: Completed Specimen: Body Fluid from Pelvis Updated: 09/23/24 0926     Fungus (Mycology) Culture Culture in progress    Fungus culture [[id number]] Collected: 09/19/24 1527    Order Status: Completed Specimen: Abscess from Back Updated: 09/23/24 0926     Fungus (Mycology) Culture Culture in progress    Narrative:      Epidural abscess    Fungus culture [[id number]] Collected: 09/19/24 1515    Order Status: Completed Specimen: Abscess from Back Updated: 09/23/24 0926     Fungus (Mycology) Culture Culture in progress    Narrative:      Subfascial abscess    Blood culture [[id number]]  (Abnormal)  (Susceptibility) Collected: 09/19/24 1033    Order Status: Completed Specimen: Blood from Peripheral, Lower Arm, Left Updated: 09/22/24 0918     Blood Culture, Routine Gram stain aer bottle: Gram positive cocci in clusters resembling Staph      Results called to and read back by:Mehreen Vital 09/20/2024  13:40      STAPHYLOCOCCUS AUREUS  [facility].      Gram stain [[id number]]     Order Status: No result Specimen: Joint Fluid     AFB culture [[id number]]     Order Status: No result Specimen: Joint Fluid     AFB stain [[id number]]     Order Status: No result Specimen: Joint Fluid     Culture, body fluid - Bactec [[id number]]     Order Status: No result  Specimen: Joint Fluid     Fungus culture [[id number]]     Order Status: No result Specimen: Joint Fluid     Gram stain [[id number]] Collected: 09/21/24 1502    Order Status: Canceled Specimen: Body Fluid from Pelvis     Aerobic culture [[id number]]  (Abnormal)  (Susceptibility) Collected: 09/19/24 1527    Order Status: Completed Specimen: Abscess from Back Updated: 09/21/24 1044     Aerobic Bacterial Culture STAPHYLOCOCCUS AUREUS  Moderate      Narrative:      Epidural abscess    Aerobic culture [[id number]]  (Abnormal)  (Susceptibility) Collected: 09/19/24 1515    Order Status: Completed Specimen: Abscess from Back Updated: 09/21/24 1027     Aerobic Bacterial Culture STAPHYLOCOCCUS AUREUS  Moderate      Narrative:      Subfascial abscess    Blood culture [[id number]]  (Abnormal) Collected: 09/18/24 0045    Order Status: Completed Specimen: Blood from Peripheral, Antecubital, Left Updated: 09/21/24 0732     Blood Culture, Routine Gram stain aer bottle: Gram positive cocci in clusters resembling Staph      Results called to and read back by: Chema 09/19/2024  01:39      STAPHYLOCOCCUS AUREUS  ID consult required at [facility] and [facility] locations.  For susceptibility see order #[id number]      AFB Culture & Smear [[id number]] Collected: 09/19/24 1527    Order Status: Completed Specimen: Abscess from Back Updated: 09/20/24 2127     AFB Culture & Smear Culture in progress     AFB CULTURE STAIN No acid fast bacilli seen.    Narrative:      Epidural abscess    AFB Culture & Smear [[id number]] Collected: 09/19/24 1515    Order Status: Completed Specimen: Abscess from Back Updated: 09/20/24 2127     AFB Culture & Smear Culture in progress     AFB CULTURE STAIN No acid fast bacilli seen.    Narrative:      Subfascial abscess    Blood culture [[id number]]  (Abnormal) Collected: 09/18/24 0045    Order Status: Completed Specimen: Blood from Peripheral, Antecubital, Left Updated: 09/20/24 0920     Blood Culture,  Routine Gram stain aer bottle: Gram positive cocci in clusters resembling Staph      Results called to and read back by:Chema Oconnor RN 09/18/2024  20:18      STAPHYLOCOCCUS AUREUS  ID consult required at [facility].  For susceptibility see order #[id number]      Gram stain [[id number]] Collected: 09/19/24 1515    Order Status: Completed Specimen: Abscess from Back Updated: 09/19/24 1908     Gram Stain Result Rare WBC's      Few Gram positive cocci    Narrative:      Subfascial abscess    Gram stain [[id number]] Collected: 09/19/24 1527    Order Status: Completed Specimen: Abscess from Back Updated: 09/19/24 1906     Gram Stain Result Rare WBC's      Few Gram positive cocci      Rare Gram positive rods    Narrative:      Epidural abscess    Blood Culture #2 **CANNOT BE ORDERED STAT** [[id number]]  (Abnormal) Collected: 09/16/24 1145    Order Status: Completed Specimen: Blood from Peripheral, Wrist, Right Updated: 09/19/24 1002     Blood Culture, Routine Gram stain felix bottle: Gram positive cocci in clusters resembling Staph      Results called to and read back by: FATIMAH Mcnulty. 09/17/2024  03:48      Gram stain aer bottle: Gram positive cocci in clusters resembling Staph      Positive results previously called 09/17/2024  06:05      STAPHYLOCOCCUS AUREUS  ID consult required at [facility].  For susceptibility see order #[id number]      Blood culture #1 **CANNOT BE ORDERED STAT** [[id number]]  (Abnormal)  (Susceptibility) Collected: 09/16/24 1054    Order Status: Completed Specimen: Blood from Peripheral, Antecubital, Right Updated: 09/19/24 1001     Blood Culture, Routine Gram stain aer bottle: Gram positive cocci in clusters resembling Staph      Gram stain felix bottle: Gram positive cocci in clusters resembling Staph      Results called to and read back by: FATIMAH Mcnulty. 09/17/2024  03:43      STAPHYLOCOCCUS AUREUS  ID consult required at [facility]  [place].      Blood culture [[id number]]     Order Status: Canceled Specimen: Blood             Significant Imaging: I have reviewed all pertinent imaging results/findings within the past 24 hours.

## 2024-09-24 NOTE — PLAN OF CARE
Problem: Occupational Therapy  Goal: Occupational Therapy Goal  Description: Goals to be met by: 10/21/2024     Patient will increase functional independence with ADLs by performing:    UE Dressing with Set-up Assistance.  LE Dressing with Stand-by Assistance.  Grooming while standing at sink with Supervision.  Toileting from toilet with Supervision for hygiene and clothing management.   Supine to sit with Contact Guard Assistance.  Stand pivot transfers with Supervision.  Toilet transfer to toilet with Supervision.    DME Justifications:    Rolling Walker- Patient demonstrates a mobility limitation that significantly impairs their ability to participate in one or more mobility related activities of daily living. Patient's mobility limitation cannot be sufficiently resolved with the use of a cane, but can be sufficiently resolved with the use of a rolling walker.The use of a rolling walker will considerably improve their ability to participate in MRADLs. Patient will use the walker on a regular basis at home.      Outcome: Progressing

## 2024-09-24 NOTE — CARE UPDATE
Contacted for further recommendations regarding patient's SI joint septic arthritis.  Prior IR aspiration was unfortunately unable to be sent for culture due to inadequate fluid, however, other aspiration sites positive for staph.      At this time, there are no changes in our recommendations from prior consult note.  No plans for surgical intervention, and patient should continue receiving targeted IV abx for treatment of her SI joint septic arthritis.

## 2024-09-24 NOTE — SUBJECTIVE & OBJECTIVE
Interval History: NAEON. Participating with therapy. C/o left sided back pain. Continue left PREMA drain. 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 IR unable to aspirate on 9/21. Exam stable.     Medications:  Continuous Infusions:  Scheduled Meds:   cloNIDine  0.1 mg Oral TID    enoxparin  40 mg Subcutaneous Q24H (prophylaxis, 1700)    folic acid  1 mg Oral Daily    furosemide (LASIX) injection  40 mg Intravenous Q12H    gabapentin  300 mg Oral TID    hydrOXYzine pamoate  25 mg Oral QHS    LIDOcaine  1 patch Transdermal Q24H    methadone  70 mg Oral Daily    nicotine  1 patch Transdermal Daily    oxacillin 12 g in  mL CONTINUOUS INFUSION  12 g Intravenous Q24H    polyethylene glycol  17 g Oral BID    senna-docusate 8.6-50 mg  1 tablet Oral BID    thiamine  100 mg Oral Daily     PRN Meds:  Current Facility-Administered Medications:     acetaminophen, 1,000 mg, Per OG tube, Q6H PRN    albuterol-ipratropium, 3 mL, Nebulization, Q4H PRN    dextrose 10%, 12.5 g, Intravenous, PRN    dextrose 10%, 25 g, Intravenous, PRN    glucagon (human recombinant), 1 mg, Intramuscular, PRN    glucose, 16 g, Oral, PRN    glucose, 24 g, Oral, PRN    hydrOXYzine pamoate, 50 mg, Oral, Q6H PRN    insulin aspart U-100, 0-10 Units, Subcutaneous, QID (AC + HS) PRN    melatonin, 6 mg, Oral, Nightly PRN    naloxone, 0.4 mg, Intravenous, PRN    ondansetron, 8 mg, Intravenous, Once PRN    oxyCODONE, 10 mg, Oral, Q4H PRN    sodium chloride 0.9%, 10 mL, Intravenous, Q12H PRN     Review of Systems  Objective:     Weight: 65.2 kg (143 lb 11.8 oz)  Body mass index is 26.29 kg/m².  Vital Signs (Most Recent):  Temp: 98.3 °F (36.8 °C) (09/24/24 1135)  Pulse: 75 (09/24/24 1135)  Resp: 18 (09/24/24 1302)  BP: 105/66 (09/24/24 1135)  SpO2: 96 % (09/24/24 1135) Vital Signs (24h Range):  Temp:  [97.8 °F (36.6 °C)-98.3 °F (36.8 °C)] 98.3 °F (36.8 °C)  Pulse:  [62-92] 75  Resp:  [15-20] 18  SpO2:  [93 %-100 %] 96 %  BP: ()/(52-91)  105/66     Date 09/24/24 0700 - 09/25/24 0659   Shift 7663-6788 0422-7710 1928-4566 24 Hour Total   INTAKE   Shift Total(mL/kg)       OUTPUT   Urine(mL/kg/hr) 1950   1950   Drains 50   50   Shift Total(mL/kg) 2000(30.7)   2000(30.7)   Weight (kg) 65.2 65.2 65.2 65.2                            Closed/Suction Drain 09/19/24 1549 Left Back Accordion 10 Fr. (Active)   Site Description Unable to view 09/23/24 1300   Dressing Type Transparent (Tegaderm) 09/23/24 1300   Dressing Status Clean;Dry;Intact 09/23/24 1300   Dressing Intervention Integrity maintained 09/23/24 1300   Drainage Serosanguineous 09/23/24 1300   Status Other (Comment) 09/22/24 1545   Output (mL) 0 mL 09/23/24 0645            Closed/Suction Drain 09/19/24 1550 Right Back Accordion 10 Fr. (Active)   Site Description Unable to view 09/23/24 1300   Dressing Type Transparent (Tegaderm) 09/23/24 1300   Dressing Status Clean;Dry;Intact 09/23/24 1300   Dressing Intervention Integrity maintained 09/23/24 1300   Drainage Serosanguineous 09/23/24 1300   Status Other (Comment) 09/22/24 1545   Output (mL) 5 mL 09/23/24 0645            Closed/Suction Drain 09/21/24 1510 Left Back Bulb 10 Fr. (Active)   Site Description Unable to view 09/23/24 1300   Dressing Type Transparent (Tegaderm) 09/23/24 1300   Dressing Status Clean;Dry;Intact 09/23/24 1300   Dressing Intervention Integrity maintained 09/23/24 1300   Drainage Purulent 09/23/24 1300   Status To bulb suction 09/23/24 1300   Output (mL) 30 mL 09/23/24 0645            Urethral Catheter 09/19/24 1800 Silicone 16 Fr. (Active)   $ López Insertion Bedside Insertion Performed 09/19/24 1845   Site Assessment Clean;Intact 09/23/24 1300   Collection Container Urimeter 09/23/24 1300   Securement Method secured to top of thigh w/ adhesive device 09/23/24 1300   Catheter Care Performed yes 09/23/24 1300   Reason for Continuing Urinary Catheterization Critically ill in ICU and requiring hourly monitoring of intake/output  "09/23/24 1300   CAUTI Prevention Bundle Securement Device in place with 1" slack;Intact seal between catheter & drainage tubing;Drainage bag/urimeter off the floor;Sheeting clip in use;No dependent loops or kinks;Drainage bag/urimeter not overfilled (<2/3 full);Drainage bag/urimeter below bladder 09/23/24 0715   Output (mL) 1000 mL 09/23/24 1050          Physical Exam         Neurosurgery Physical Exam  General: well developed, well nourished, no distress.   Head: normocephalic, atraumatic  Neurologic: Alert and oriented. Thought content appropriate.  GCS: Motor: 6/Verbal: 5/Eyes: 4 GCS Total: 15  Mental Status: Awake, Alert, Oriented x 4  Language: No aphasia  Speech: No dysarthria  Cranial nerves: face symmetric, tongue midline, CN II-XII grossly intact.   Eyes: pupils equal, round, reactive to light with accomodation, EOMI.  Pulmonary: normal respirations, no signs of respiratory distress  Sensory: intact to light touch throughout  Motor Strength: Moves all extremities spontaneously with good tone. No abnormal movements seen.     Strength  Deltoids Triceps Biceps Wrist Extension Wrist Flexion Hand    Upper: R 5/5 5/5 5/5 5/5 5/5 5/5    L 5/5 5/5 5/5 5/5 5/5 5/5     Iliopsoas Quadriceps Knee  Flexion Tibialis  anterior Gastro- cnemius EHL   Lower: R 4/5 4+/5 4+/5 5/5 5/5 5/5    L 2/5 4+/5 4+/5 5/5 5/5 5/5     Skin: Skin is warm, dry and intact.    Incision c/d/I with skin edges well approximated with dermabond/prineo. No surrounding erythema or edema. No drainage from incision. No palpable hematoma or underlying fluid collection.           Significant Labs:  Recent Labs   Lab 09/23/24  0313 09/23/24  0815 09/23/24  1832 09/24/24  0448 09/24/24  1018   GLU 60*   < > 84 40* 122*      < > 137 134* 134*   K 3.0*   < > 3.8 3.4* 3.6   CL 95   < > 94* 92* 90*   CO2 36*   < > 32* 30* 33*   BUN 7   < > 8 7 7   CREATININE 0.6   < > 0.6 0.6 0.8   CALCIUM 7.7*   < > 8.0* 8.2* 8.2*   MG 1.9  --   --  1.6  --     < > " "= values in this interval not displayed.     Recent Labs   Lab 09/23/24  0313 09/24/24  0448   WBC 8.20 14.89*   HGB 7.9* 9.4*   HCT 26.5* 30.2*    342     No results for input(s): "LABPT", "INR", "APTT" in the last 48 hours.  Microbiology Results (last 7 days)       Procedure Component Value Units Date/Time    Blood culture [[id number]] Collected: 09/23/24 1127    Order Status: Completed Specimen: Blood from Peripheral, Forearm, Right Updated: 09/24/24 1212     Blood Culture, Routine No Growth to date      No Growth to date    Culture, Anaerobe [[id number]] Collected: 09/19/24 1515    Order Status: Completed Specimen: Abscess from Back Updated: 09/24/24 1024     Anaerobic Culture No anaerobes isolated    Narrative:      Subfascial abscess    Culture, Body Fluid (Aerobic) w/ GS [[id number]]  (Abnormal)  (Susceptibility) Collected: 09/21/24 1502    Order Status: Completed Specimen: Body Fluid from Back Updated: 09/24/24 1009     AEROBIC CULTURE - FLUID STAPHYLOCOCCUS AUREUS  Many       Gram Stain Result Moderate WBC's      Moderate Gram positive cocci    AFB Culture & Smear [[id number]] Collected: 09/21/24 1502    Order Status: Completed Specimen: Body Fluid from Pelvis Updated: 09/23/24 1555     AFB Culture & Smear Culture in progress     AFB CULTURE STAIN No acid fast bacilli seen.    Blood culture [[id number]]     Order Status: Sent Specimen: Blood     Culture, Anaerobe [[id number]] Collected: 09/19/24 1527    Order Status: Completed Specimen: Abscess from Back Updated: 09/23/24 0957     Anaerobic Culture No anaerobes isolated    Narrative:      Epidural abscess    Fungus culture [[id number]] Collected: 09/21/24 1502    Order Status: Completed Specimen: Body Fluid from Pelvis Updated: 09/23/24 0926     Fungus (Mycology) Culture Culture in progress    Fungus culture [[id number]] Collected: 09/19/24 1527    Order Status: Completed Specimen: Abscess from Back Updated: 09/23/24 0926     Fungus (Mycology) " Culture Culture in progress    Narrative:      Epidural abscess    Fungus culture [[id number]] Collected: 09/19/24 1515    Order Status: Completed Specimen: Abscess from Back Updated: 09/23/24 0926     Fungus (Mycology) Culture Culture in progress    Narrative:      Subfascial abscess    Blood culture [[id number]]  (Abnormal)  (Susceptibility) Collected: 09/19/24 1033    Order Status: Completed Specimen: Blood from Peripheral, Lower Arm, Left Updated: 09/22/24 0918     Blood Culture, Routine Gram stain aer bottle: Gram positive cocci in clusters resembling Staph      Results called to and read back by:Mehreen Vital 09/20/2024  13:40      STAPHYLOCOCCUS AUREUS  ID consult required at [facility] [facility].      Gram stain [[id number]]     Order Status: No result Specimen: Joint Fluid     AFB culture [[id number]]     Order Status: No result Specimen: Joint Fluid     AFB stain [[id number]]     Order Status: No result Specimen: Joint Fluid     Culture, body fluid - Bactec [[id number]]     Order Status: No result Specimen: Joint Fluid     Fungus culture [[id number]]     Order Status: No result Specimen: Joint Fluid     Gram stain [[id number]] Collected: 09/21/24 1502    Order Status: Canceled Specimen: Body Fluid from Pelvis     Aerobic culture [[id number]]  (Abnormal)  (Susceptibility) Collected: 09/19/24 1527    Order Status: Completed Specimen: Abscess from Back Updated: 09/21/24 1044     Aerobic Bacterial Culture STAPHYLOCOCCUS AUREUS  Moderate      Narrative:      Epidural abscess    Aerobic culture [[id number]]  (Abnormal)  (Susceptibility) Collected: 09/19/24 1515    Order Status: Completed Specimen: Abscess from Back Updated: 09/21/24 1027     Aerobic Bacterial Culture STAPHYLOCOCCUS AUREUS  Moderate      Narrative:      Subfascial abscess    Blood culture [[id number]]  (Abnormal) Collected: 09/18/24 0045    Order Status: Completed Specimen: Blood from Peripheral, Antecubital, Left Updated:  09/21/24 0732     Blood Culture, Routine Gram stain aer bottle: Gram positive cocci in clusters resembling Staph      Results called to and read back by: Chema 09/19/2024  01:39      STAPHYLOCOCCUS AUREUS  ID consult required at [facility].  For susceptibility see order #[id number]      AFB Culture & Smear [[id number]] Collected: 09/19/24 1527    Order Status: Completed Specimen: Abscess from Back Updated: 09/20/24 2127     AFB Culture & Smear Culture in progress     AFB CULTURE STAIN No acid fast bacilli seen.    Narrative:      Epidural abscess    AFB Culture & Smear [[id number]] Collected: 09/19/24 1515    Order Status: Completed Specimen: Abscess from Back Updated: 09/20/24 2127     AFB Culture & Smear Culture in progress     AFB CULTURE STAIN No acid fast bacilli seen.    Narrative:      Subfascial abscess    Blood culture [[id number]]  (Abnormal) Collected: 09/18/24 0045    Order Status: Completed Specimen: Blood from Peripheral, Antecubital, Left Updated: 09/20/24 0920     Blood Culture, Routine Gram stain aer bottle: Gram positive cocci in clusters resembling Staph      Results called to and read back by:Chema Oconnor RN 09/18/2024  20:18      STAPHYLOCOCCUS AUREUS  ID consult required at [facility].  For susceptibility see order #[id number]      Gram stain [[id number]] Collected: 09/19/24 1515    Order Status: Completed Specimen: Abscess from Back Updated: 09/19/24 1908     Gram Stain Result Rare WBC's      Few Gram positive cocci    Narrative:      Subfascial abscess    Gram stain [[id number]] Collected: 09/19/24 1527    Order Status: Completed Specimen: Abscess from Back Updated: 09/19/24 1906     Gram Stain Result Rare WBC's      Few Gram positive cocci      Rare Gram positive rods    Narrative:      Epidural abscess    Blood Culture #2 **CANNOT BE ORDERED STAT** [[id number]]  (Abnormal) Collected: 09/16/24 1145    Order Status: Completed  Specimen: Blood from Peripheral, Wrist, Right Updated: 09/19/24 1002     Blood Culture, Routine Gram stain felix bottle: Gram positive cocci in clusters resembling Staph      Results called to and read back by: FATIMAH Mcnulty. 09/17/2024  03:48      Gram stain aer bottle: Gram positive cocci in clusters resembling Staph      Positive results previously called 09/17/2024  06:05      STAPHYLOCOCCUS AUREUS  [facility]  For susceptibility see order #[id number]      Blood culture #1 **CANNOT BE ORDERED STAT** [[id number]]  (Abnormal)  (Susceptibility) Collected: 09/16/24 1054    Order Status: Completed Specimen: Blood from Peripheral, Antecubital, Right Updated: 09/19/24 1001     Blood Culture, Routine Gram stain aer bottle: Gram positive cocci in clusters resembling Staph      Gram stain felix bottle: Gram positive cocci in clusters resembling Staph      Results called to and read back by: FATIMAH Mcnulty. 09/17/2024  03:43      STAPHYLOCOCCUS AUREUS  [facility]      Blood culture [[id number]]     Order Status: Canceled Specimen: Blood           All pertinent labs from the last 24 hours have been reviewed.    Significant Diagnostics:  I have reviewed all pertinent imaging results/findings within the past 24 hours.

## 2024-09-24 NOTE — SUBJECTIVE & OBJECTIVE
Interval History: No fevers documented overnight. Transferred out of ICU last night. Pt reports tolerating antibiotics without issues. Still has tenderness around L hip and L knee.     Review of Systems   Constitutional:  Negative for chills and fever.   Musculoskeletal:  Positive for arthralgias.   Skin:  Positive for rash.   All other systems reviewed and are negative.    Objective:     Vital Signs (Most Recent):  Temp: 97.8 °F (36.6 °C) (09/24/24 0714)  Pulse: 83 (09/24/24 0714)  Resp: 18 (09/24/24 0829)  BP: 104/61 (09/24/24 0714)  SpO2: 95 % (09/24/24 0714) Vital Signs (24h Range):  Temp:  [97.8 °F (36.6 °C)-98.6 °F (37 °C)] 97.8 °F (36.6 °C)  Pulse:  [62-85] 83  Resp:  [12-21] 18  SpO2:  [93 %-100 %] 95 %  BP: ()/(52-91) 104/61     Weight: 65.2 kg (143 lb 11.8 oz)  Body mass index is 26.29 kg/m².    Estimated Creatinine Clearance: 98.2 mL/min (based on SCr of 0.6 mg/dL).     Physical Exam  Eyes:      General:         Right eye: No discharge.         Left eye: No discharge.   Pulmonary:      Effort: Pulmonary effort is normal. No respiratory distress.   Abdominal:      General: There is no distension.      Palpations: Abdomen is soft.      Tenderness: There is no abdominal tenderness.   Genitourinary:     Comments: bland  Skin:     General: Skin is warm and dry.      Comments: L hip/flank - induration noted  L drain - purulent fluid  Excoriations on UE/LE  RIJ   Neurological:      Mental Status: She is alert and oriented to person, place, and time.          Significant Labs:   Microbiology Results (last 7 days)       Procedure Component Value Units Date/Time    Culture, Anaerobe [[id number]] Collected: 09/19/24 1515    Order Status: Completed Specimen: Abscess from Back Updated: 09/24/24 1024     Anaerobic Culture No anaerobes isolated    Narrative:      Subfascial abscess    Culture, Body Fluid (Aerobic) w/ GS [[id number]]  (Abnormal)  (Susceptibility) Collected: 09/21/24 1502    Order Status: Completed  Specimen: Body Fluid from Back Updated: 09/24/24 1009     AEROBIC CULTURE - FLUID STAPHYLOCOCCUS AUREUS  Many       Gram Stain Result Moderate WBC's      Moderate Gram positive cocci    Blood culture [[id number]] Collected: 09/23/24 1127    Order Status: Completed Specimen: Blood from Peripheral, Forearm, Right Updated: 09/23/24 1915     Blood Culture, Routine No Growth to date    AFB Culture & Smear [[id number]] Collected: 09/21/24 1502    Order Status: Completed Specimen: Body Fluid from Pelvis Updated: 09/23/24 1555     AFB Culture & Smear Culture in progress     AFB CULTURE STAIN No acid fast bacilli seen.    Blood culture [[id number]]     Order Status: Sent Specimen: Blood     Culture, Anaerobe [[id number]] Collected: 09/19/24 1527    Order Status: Completed Specimen: Abscess from Back Updated: 09/23/24 0957     Anaerobic Culture No anaerobes isolated    Narrative:      Epidural abscess    Fungus culture [[id number]] Collected: 09/21/24 1502    Order Status: Completed Specimen: Body Fluid from Pelvis Updated: 09/23/24 0926     Fungus (Mycology) Culture Culture in progress    Fungus culture [[id number]] Collected: 09/19/24 1527    Order Status: Completed Specimen: Abscess from Back Updated: 09/23/24 0926     Fungus (Mycology) Culture Culture in progress    Narrative:      Epidural abscess    Fungus culture [[id number]] Collected: 09/19/24 1515    Order Status: Completed Specimen: Abscess from Back Updated: 09/23/24 0926     Fungus (Mycology) Culture Culture in progress    Narrative:      Subfascial abscess    Blood culture [[id number]]  (Abnormal)  (Susceptibility) Collected: 09/19/24 1033    Order Status: Completed Specimen: Blood from Peripheral, Lower Arm, Left Updated: 09/22/24 0918     Blood Culture, Routine Gram stain aer bottle: Gram positive cocci in clusters resembling Staph      Results called to and read back by:Mehreen Vital 09/20/2024  13:40      STAPHYLOCOCCUS AUREUS  ID consult required at [facility]  [place].[facility] and [facility] locations.      Gram stain [[id number]]     Order Status: No result Specimen: Joint Fluid     AFB culture [[id number]]     Order Status: No result Specimen: Joint Fluid     AFB stain [[id number]]     Order Status: No result Specimen: Joint Fluid     Culture, body fluid - Bactec [[id number]]     Order Status: No result Specimen: Joint Fluid     Fungus culture [[id number]]     Order Status: No result Specimen: Joint Fluid     Gram stain [[id number]] Collected: 09/21/24 1502    Order Status: Canceled Specimen: Body Fluid from Pelvis     Aerobic culture [[id number]]  (Abnormal)  (Susceptibility) Collected: 09/19/24 1527    Order Status: Completed Specimen: Abscess from Back Updated: 09/21/24 1044     Aerobic Bacterial Culture STAPHYLOCOCCUS AUREUS  Moderate      Narrative:      Epidural abscess    Aerobic culture [[id number]]  (Abnormal)  (Susceptibility) Collected: 09/19/24 1515    Order Status: Completed Specimen: Abscess from Back Updated: 09/21/24 1027     Aerobic Bacterial Culture STAPHYLOCOCCUS AUREUS  Moderate      Narrative:      Subfascial abscess    Blood culture [[id number]]  (Abnormal) Collected: 09/18/24 0045    Order Status: Completed Specimen: Blood from Peripheral, Antecubital, Left Updated: 09/21/24 0732     Blood Culture, Routine Gram stain aer bottle: Gram positive cocci in clusters resembling Staph      Results called to and read back by: Chema 09/19/2024  01:39      STAPHYLOCOCCUS AUREUS  ID consult required at [facility] and [facility] locations.  For susceptibility see order #[id number]      AFB Culture & Smear [[id number]] Collected: 09/19/24 1527    Order Status: Completed Specimen: Abscess from Back Updated: 09/20/24 2127     AFB Culture & Smear Culture in progress     AFB CULTURE STAIN No acid fast bacilli seen.    Narrative:      Epidural abscess    AFB Culture & Smear [[id number]] Collected: 09/19/24 1515    Order Status: Completed Specimen: Abscess from  Back Updated: 09/20/24 2127     AFB Culture & Smear Culture in progress     AFB CULTURE STAIN No acid fast bacilli seen.    Narrative:      Subfascial abscess    Blood culture [[id number]]  (Abnormal) Collected: 09/18/24 0045    Order Status: Completed Specimen: Blood from Peripheral, Antecubital, Left Updated: 09/20/24 0920     Blood Culture, Routine Gram stain aer bottle: Gram positive cocci in clusters resembling Staph      Results called to and read back by:Chema Oconnor RN 09/18/2024  20:18      STAPHYLOCOCCUS AUREUS  ID consult required at [facility].  For susceptibility see order #[id number]      Gram stain [[id number]] Collected: 09/19/24 1515    Order Status: Completed Specimen: Abscess from Back Updated: 09/19/24 1908     Gram Stain Result Rare WBC's      Few Gram positive cocci    Narrative:      Subfascial abscess    Gram stain [[id number]] Collected: 09/19/24 1527    Order Status: Completed Specimen: Abscess from Back Updated: 09/19/24 1906     Gram Stain Result Rare WBC's      Few Gram positive cocci      Rare Gram positive rods    Narrative:      Epidural abscess    Blood Culture #2 **CANNOT BE ORDERED STAT** [[id number]]  (Abnormal) Collected: 09/16/24 1145    Order Status: Completed Specimen: Blood from Peripheral, Wrist, Right Updated: 09/19/24 1002     Blood Culture, Routine Gram stain felix bottle: Gram positive cocci in clusters resembling Staph      Results called to and read back by: FATIMAH Mcnulty. 09/17/2024  03:48      Gram stain aer bottle: Gram positive cocci in clusters resembling Staph      Positive results previously called 09/17/2024  06:05      STAPHYLOCOCCUS AUREUS  ID consult required at [facility].  For susceptibility see order #[id number]      Blood culture #1 **CANNOT BE ORDERED STAT** [[id number]]  (Abnormal)  (Susceptibility) Collected: 09/16/24 1054    Order Status: Completed Specimen: Blood from Peripheral,  Antecubital, Right Updated: 09/19/24 1001     Blood Culture, Routine Gram stain aer bottle: Gram positive cocci in clusters resembling Staph      Gram stain felix bottle: Gram positive cocci in clusters resembling Staph      Results called to and read back by: FATIMAH Mcnulty. 09/17/2024  03:43      STAPHYLOCOCCUS AUREUS  ID consult required at [facility],[place] and [place] locations.      Blood culture [[id number]]     Order Status: Canceled Specimen: Blood             Significant Imaging: I have reviewed all pertinent imaging results/findings within the past 24 hours.

## 2024-09-24 NOTE — PT/OT/SLP PROGRESS
Occupational Therapy  Co-Treatment with PT    Name: Mari Sloan  MRN: [id number]  Admitting Diagnosis:  Paraspinal abscess  5 Days Post-Op    Recommendations:     Discharge Recommendations: High Intensity Therapy  Discharge Equipment Recommendations:  walker, rolling  Barriers to discharge:  Decreased caregiver support    Assessment:     Mari Sloan is a 49 y.o. female with a medical diagnosis of Paraspinal abscess. She presents s/p L3-L5 laminectomy with epidural abscess evac and spine wound washout on 09/19/2024. Performance deficits affecting function are weakness, impaired endurance, impaired self care skills, impaired functional mobility, gait instability, impaired balance, decreased coordination, decreased lower extremity function, decreased safety awareness, pain, orthopedic precautions. Pt agreeable to participate in therapy session this AM with encouragement. Pt making progress with established OT goals. Pt currently requires min A with UB dressing, setup A for bed-level grooming task, total A for toileting, and min A x 2 for transfers/functional mobility using RW. Pt would benefit from continued skilled acute OT services in order to maximize IND with ADLs and functional mobility to ensure safe return to PLOF in the least restrictive environment. OT continuing to recommend high intensity therapy once pt is medically appropriate for d/c.    Rehab Prognosis: Good; patient would benefit from acute skilled OT services to address these deficits and reach maximum level of function.       Plan:     Patient to be seen 4 x/week to address the above listed problems via self-care/home management, therapeutic activities, therapeutic exercises, neuromuscular re-education  Plan of Care Expires: 10/23/24  Plan of Care Reviewed with: patient    Subjective     Chief Complaint: Pain in back. Pt agreeable to participate in therapy this AM with encouragement.  Patient/Family Comments/goals: Return to  PLOF.  Pain/Comfort:  Pain Rating 1: 9/10  Location - Orientation 1: generalized  Location 1: back  Pain Addressed 1: Reposition, Distraction, Nurse notified  Pain Rating Post-Intervention 1: 8/10    Objective:     Communicated with: RN prior to session. Patient cleared to participate in therapy at this time. Patient found HOB elevated with PREMA drain, bland catheter upon OT entry to room.    General Precautions: Standard, fall    Orthopedic Precautions: spinal precautions  Braces: N/A  Respiratory Status: Room air     Occupational Performance:     Bed Mobility:    Patient completed Supine to Sit with moderate assistance with HOB elevated  Patient completed anterior Scooting towards the EOB with maximal assistance  Patient completed Sit to Supine with maximal assistance with HOB elevated    Functional Mobility/Transfers:  Patient completed Sit <> Stand Transfer from EOB with minimum assistance and of 2 persons with rolling walker  Functional Mobility: Patient completed 6 steps forward + 6 steps backward, as well as 4 lateral steps to the R with min A x 2 persons with rolling walker. Pt unsteady, however no LOB noted.    Activities of Daily Living:  Grooming: setup assistance to wash face with washcloth at bed-level  Upper Body Dressing: minimum assistance to doff/lane gown seated EOB  Toileting: total assistance to perform hygiene after incontinent episode at bed-level    [facility] 6 Click ADL: 15    Treatment & Education:  Pt educated on:   Role of OT, OT POC, and discharge planning.  Safe transfer techniques and proper body mechanics for fall prevention and improved independence with functional transfers.  Importance of OOB activities to increase endurance and tolerance for increased participation in daily ADLs.  Spinal precautions (no bending, lifting, twisting) and various compensatory/adaptive strategies to utilize with functional activities/ADLs to increase/maintain independence.  Utilizing the call bell to request  for assistance with all functional mobility to ensure safety during hospital stay.  Whiteboard updated.    Pt verbalized understanding and all questions were addressed within the scope of OT.     Patient left HOB elevated with all lines intact, call button in reach, RN notified, and no visitors present.    GOALS:   Multidisciplinary Problems       Occupational Therapy Goals          Problem: Occupational Therapy    Goal Priority Disciplines Outcome Interventions   Occupational Therapy Goal     OT, PT/OT Progressing    Description: Goals to be met by: 10/21/2024     Patient will increase functional independence with ADLs by performing:    UE Dressing with Set-up Assistance.  LE Dressing with Stand-by Assistance.  Grooming while standing at sink with Supervision.  Toileting from toilet with Supervision for hygiene and clothing management.   Supine to sit with Contact Guard Assistance.  Stand pivot transfers with Supervision.  Toilet transfer to toilet with Supervision.    DME Justifications:    Rolling Walker- Patient demonstrates a mobility limitation that significantly impairs their ability to participate in one or more mobility related activities of daily living. Patient's mobility limitation cannot be sufficiently resolved with the use of a cane, but can be sufficiently resolved with the use of a rolling walker.The use of a rolling walker will considerably improve their ability to participate in MRADLs. Patient will use the walker on a regular basis at home.                           Time Tracking:     OT Date of Treatment: 09/24/24  OT Start Time: 1040  OT Stop Time: 1107  OT Total Time (min): 27 min    Billable Minutes: Self Care/Home Management 10  Therapeutic Activity 17    Co-treatment with PT performed due to patient's multiple deficits requiring two skilled therapists to appropriately and safely assess patient's strength, endurance, functional mobility, and ADL performance while facilitating functional  tasks in addition to accommodating for patient's activity tolerance and medical acuity.     OT/ANABEL: OT     Number of ANABEL visits since last OT visit: 0    9/24/2024

## 2024-09-24 NOTE — ASSESSMENT & PLAN NOTE
IR drain. Leave drain in place until less than 10 cc of fluid is aspirated daily for 2 days.   As above

## 2024-09-24 NOTE — ASSESSMENT & PLAN NOTE
Patient's FSGs are uncontrolled due to hyperglycemia on current medication regimen.  Last A1c reviewed-   Lab Results   Component Value Date    HGBA1C 7.9 (H) 09/17/2024     Most recent fingerstick glucose reviewed-   Recent Labs   Lab 09/24/24  0637 09/24/24  0650 09/24/24  0705 09/24/24  1136   POCTGLUCOSE 63* 75 76 181*       Current correctional scale  Medium  Maintain anti-hyperglycemic dose as follows-   Antihyperglycemics (From admission, onward)      Start     Stop Route Frequency Ordered    09/23/24 1033  insulin aspart U-100 pen 0-10 Units         -- SubQ Before meals & nightly PRN 09/23/24 0933          Hold Oral hypoglycemics while patient is in the hospital.

## 2024-09-25 LAB
ALBUMIN SERPL BCP-MCNC: 1.3 G/DL (ref 3.5–5.2)
ALP SERPL-CCNC: 130 U/L (ref 55–135)
ALT SERPL W/O P-5'-P-CCNC: 14 U/L (ref 10–44)
ANION GAP SERPL CALC-SCNC: 10 MMOL/L (ref 8–16)
ANION GAP SERPL CALC-SCNC: 11 MMOL/L (ref 8–16)
ANION GAP SERPL CALC-SCNC: 9 MMOL/L (ref 8–16)
ANISOCYTOSIS BLD QL SMEAR: SLIGHT
AST SERPL-CCNC: 14 U/L (ref 10–40)
BASOPHILS NFR BLD: 1 % (ref 0–1.9)
BILIRUB SERPL-MCNC: 1.3 MG/DL (ref 0.1–1)
BUN SERPL-MCNC: 7 MG/DL (ref 6–20)
BUN SERPL-MCNC: 7 MG/DL (ref 6–20)
BUN SERPL-MCNC: 9 MG/DL (ref 6–20)
CALCIUM SERPL-MCNC: 8.2 MG/DL (ref 8.7–10.5)
CALCIUM SERPL-MCNC: 8.3 MG/DL (ref 8.7–10.5)
CALCIUM SERPL-MCNC: 8.5 MG/DL (ref 8.7–10.5)
CHLORIDE SERPL-SCNC: 91 MMOL/L (ref 95–110)
CHLORIDE SERPL-SCNC: 91 MMOL/L (ref 95–110)
CHLORIDE SERPL-SCNC: 92 MMOL/L (ref 95–110)
CO2 SERPL-SCNC: 30 MMOL/L (ref 23–29)
CO2 SERPL-SCNC: 31 MMOL/L (ref 23–29)
CO2 SERPL-SCNC: 32 MMOL/L (ref 23–29)
CREAT SERPL-MCNC: 0.8 MG/DL (ref 0.5–1.4)
DIFFERENTIAL METHOD BLD: ABNORMAL
EOSINOPHIL NFR BLD: 5 % (ref 0–8)
ERYTHROCYTE [DISTWIDTH] IN BLOOD BY AUTOMATED COUNT: 18.2 % (ref 11.5–14.5)
EST. GFR  (NO RACE VARIABLE): >60 ML/MIN/1.73 M^2
GLUCOSE SERPL-MCNC: 77 MG/DL (ref 70–110)
GLUCOSE SERPL-MCNC: 88 MG/DL (ref 70–110)
GLUCOSE SERPL-MCNC: 90 MG/DL (ref 70–110)
HCT VFR BLD AUTO: 30.1 % (ref 37–48.5)
HGB BLD-MCNC: 8.9 G/DL (ref 12–16)
HYPOCHROMIA BLD QL SMEAR: ABNORMAL
IMM GRANULOCYTES # BLD AUTO: ABNORMAL K/UL (ref 0–0.04)
IMM GRANULOCYTES NFR BLD AUTO: ABNORMAL % (ref 0–0.5)
LYMPHOCYTES NFR BLD: 21 % (ref 18–48)
MAGNESIUM SERPL-MCNC: 1.8 MG/DL (ref 1.6–2.6)
MCH RBC QN AUTO: 25.1 PG (ref 27–31)
MCHC RBC AUTO-ENTMCNC: 29.6 G/DL (ref 32–36)
MCV RBC AUTO: 85 FL (ref 82–98)
MONOCYTES NFR BLD: 4 % (ref 4–15)
MYELOCYTES NFR BLD MANUAL: 1 %
NEUTROPHILS NFR BLD: 68 % (ref 38–73)
NRBC BLD-RTO: 0 /100 WBC
OVALOCYTES BLD QL SMEAR: ABNORMAL
PHOSPHATE SERPL-MCNC: 5.6 MG/DL (ref 2.7–4.5)
PLATELET # BLD AUTO: 293 K/UL (ref 150–450)
PLATELET BLD QL SMEAR: ABNORMAL
PMV BLD AUTO: 10.5 FL (ref 9.2–12.9)
POCT GLUCOSE: 105 MG/DL (ref 70–110)
POCT GLUCOSE: 149 MG/DL (ref 70–110)
POCT GLUCOSE: 152 MG/DL (ref 70–110)
POCT GLUCOSE: 86 MG/DL (ref 70–110)
POCT GLUCOSE: 99 MG/DL (ref 70–110)
POIKILOCYTOSIS BLD QL SMEAR: SLIGHT
POTASSIUM SERPL-SCNC: 3.2 MMOL/L (ref 3.5–5.1)
POTASSIUM SERPL-SCNC: 3.4 MMOL/L (ref 3.5–5.1)
POTASSIUM SERPL-SCNC: 4 MMOL/L (ref 3.5–5.1)
PROT SERPL-MCNC: 6.1 G/DL (ref 6–8.4)
RBC # BLD AUTO: 3.55 M/UL (ref 4–5.4)
SODIUM SERPL-SCNC: 132 MMOL/L (ref 136–145)
SODIUM SERPL-SCNC: 132 MMOL/L (ref 136–145)
SODIUM SERPL-SCNC: 133 MMOL/L (ref 136–145)
WBC # BLD AUTO: 9.48 K/UL (ref 3.9–12.7)

## 2024-09-25 PROCEDURE — 25000003 PHARM REV CODE 250: Performed by: STUDENT IN AN ORGANIZED HEALTH CARE EDUCATION/TRAINING PROGRAM

## 2024-09-25 PROCEDURE — 25000003 PHARM REV CODE 250

## 2024-09-25 PROCEDURE — 83735 ASSAY OF MAGNESIUM: CPT

## 2024-09-25 PROCEDURE — 20600001 HC STEP DOWN PRIVATE ROOM

## 2024-09-25 PROCEDURE — 63600175 PHARM REV CODE 636 W HCPCS

## 2024-09-25 PROCEDURE — 80048 BASIC METABOLIC PNL TOTAL CA: CPT | Mod: XB | Performed by: STUDENT IN AN ORGANIZED HEALTH CARE EDUCATION/TRAINING PROGRAM

## 2024-09-25 PROCEDURE — 99024 POSTOP FOLLOW-UP VISIT: CPT | Mod: ,,,

## 2024-09-25 PROCEDURE — 25000003 PHARM REV CODE 250: Performed by: INTERNAL MEDICINE

## 2024-09-25 PROCEDURE — 85007 BL SMEAR W/DIFF WBC COUNT: CPT | Performed by: STUDENT IN AN ORGANIZED HEALTH CARE EDUCATION/TRAINING PROGRAM

## 2024-09-25 PROCEDURE — 80053 COMPREHEN METABOLIC PANEL: CPT | Performed by: STUDENT IN AN ORGANIZED HEALTH CARE EDUCATION/TRAINING PROGRAM

## 2024-09-25 PROCEDURE — 99222 1ST HOSP IP/OBS MODERATE 55: CPT | Mod: ,,, | Performed by: NURSE PRACTITIONER

## 2024-09-25 PROCEDURE — 84100 ASSAY OF PHOSPHORUS: CPT

## 2024-09-25 PROCEDURE — 85027 COMPLETE CBC AUTOMATED: CPT | Performed by: STUDENT IN AN ORGANIZED HEALTH CARE EDUCATION/TRAINING PROGRAM

## 2024-09-25 PROCEDURE — 63600175 PHARM REV CODE 636 W HCPCS: Performed by: STUDENT IN AN ORGANIZED HEALTH CARE EDUCATION/TRAINING PROGRAM

## 2024-09-25 PROCEDURE — 36415 COLL VENOUS BLD VENIPUNCTURE: CPT | Performed by: STUDENT IN AN ORGANIZED HEALTH CARE EDUCATION/TRAINING PROGRAM

## 2024-09-25 PROCEDURE — 99233 SBSQ HOSP IP/OBS HIGH 50: CPT | Mod: ,,, | Performed by: STUDENT IN AN ORGANIZED HEALTH CARE EDUCATION/TRAINING PROGRAM

## 2024-09-25 RX ORDER — POTASSIUM CHLORIDE 20 MEQ/1
40 TABLET, EXTENDED RELEASE ORAL EVERY 4 HOURS
Status: COMPLETED | OUTPATIENT
Start: 2024-09-25 | End: 2024-09-25

## 2024-09-25 RX ADMIN — FOLIC ACID 1 MG: 1 TABLET ORAL at 08:09

## 2024-09-25 RX ADMIN — OXYCODONE HYDROCHLORIDE 10 MG: 10 TABLET ORAL at 08:09

## 2024-09-25 RX ADMIN — OXYCODONE HYDROCHLORIDE 10 MG: 10 TABLET ORAL at 01:09

## 2024-09-25 RX ADMIN — METHADONE HYDROCHLORIDE 70 MG: 10 TABLET ORAL at 08:09

## 2024-09-25 RX ADMIN — HYDROXYZINE PAMOATE 25 MG: 25 CAPSULE ORAL at 08:09

## 2024-09-25 RX ADMIN — Medication 100 MG: at 08:09

## 2024-09-25 RX ADMIN — OXYCODONE HYDROCHLORIDE 10 MG: 10 TABLET ORAL at 04:09

## 2024-09-25 RX ADMIN — GABAPENTIN 300 MG: 300 CAPSULE ORAL at 08:09

## 2024-09-25 RX ADMIN — SENNOSIDES AND DOCUSATE SODIUM 1 TABLET: 50; 8.6 TABLET ORAL at 08:09

## 2024-09-25 RX ADMIN — POTASSIUM CHLORIDE 40 MEQ: 1500 TABLET, EXTENDED RELEASE ORAL at 01:09

## 2024-09-25 RX ADMIN — OXACILLIN 12 G: 2 INJECTION, POWDER, FOR SOLUTION INTRAMUSCULAR; INTRAVENOUS at 02:09

## 2024-09-25 RX ADMIN — GABAPENTIN 300 MG: 300 CAPSULE ORAL at 01:09

## 2024-09-25 RX ADMIN — ENOXAPARIN SODIUM 40 MG: 40 INJECTION SUBCUTANEOUS at 05:09

## 2024-09-25 RX ADMIN — FUROSEMIDE 40 MG: 10 INJECTION, SOLUTION INTRAVENOUS at 08:09

## 2024-09-25 RX ADMIN — POTASSIUM CHLORIDE 40 MEQ: 1500 TABLET, EXTENDED RELEASE ORAL at 11:09

## 2024-09-25 NOTE — HPI
"Per chart review, "49 y.o.  woman with h/o homelessness (recently was able to obtain living arrangements but states [place]), NIDDM type 2, Essential hypertension, and substance use (denies any IVDU in he past or any illicit drug use recenly, denies ETOH abuse/overuse) presents to [facility] for further evaluation of her back pain.  She apparently presented to the [facility] on September 16 with nausea and vomiting and a mechanical fall 5 days prior. She reported pain worse in her back and on her sides radiating down both legs. She noted muscle spasm in her back and legs. She had no head trauma or loss of consciousness.Imaging studies of her lumbar spine and pelvis were concerning for osteomyelitis/septic arthritis of the left SI joint and L5-S1 along with an abnormal fluid collection extending from T11 through the left iliacus muscle concerning for abscess."  Pt transferred to [facility] main campus for Neurosurgery, general surgery and IR consults. Pt is now S/P L3-L4 laminectomy for epidural abscess evacuation on 9/19/24 and the cultures grew MSSA. TTE showed normal EF of 60-65% with diastolic dysfunction,  could not exclude mitral vegetation vs redundant chordae. ID was consulted and recommended oxacillin and repeat blood cultures. IR took the patient for abscess drain placement and aspiration of SI joint. PM &R was consulted to evaluate pt for post acute placement.       Functional History: Patient lives with her . Prior to admission, Pt was I.  DME: None.      "

## 2024-09-25 NOTE — PT/OT/SLP PROGRESS
Physical Therapy      Patient Name:  Mari Sloan   MRN:  [id number]    Patient not seen today secondary to Patient ill (Nauseated). Upon 1st attempt (938), pt reported feeling nauseated and decline getting out of bed at this time. Upon 2nd attempt (1419), pt continues to report feeling nauseated and requesting to rest today. Nurse notified. Will follow-up as schedule per PT POC.    09/25/2024

## 2024-09-25 NOTE — PROGRESS NOTES
[facility] ([place]  Infectious Disease  Progress Note    Patient Name: Mari Sloan  MRN: [id number]  Admission Date: 9/16/2024  Length of Stay: 9 days  Attending Physician: Yuniel Wells*  Primary Care Provider: No, Primary Doctor    Isolation Status: No active isolations  Assessment/Plan:      ID  Staphylococcus aureus bacteremia  I independently reviewed patient's lab work and images as documented. 48 yo female with HCV, unstable housing and substance use admitted for back pain found to have MSSA bacteremia c/b L SI joint septic arthritis/OM, L psoas/iliacus abscess s/p IR drain 9/21 (cx with mssa), T12-L5 epidural abscess s/p L3-5 laminectomy/epidural abscess washout on 9/19 (cx with MSSA). Echo with potential MV vegetation vs redundant chordae. Repeat blood cx as of 9/23 no growth to date so far. No acute surgical intervention per ortho.     Recommendations:  -continue oxacillin continuous infusion 12g iv daily, anticipate 8w course of therapy from cleared blood cx, jose e 11/18   -not an opat candidate, will need placement for iv abx, discussed with patient   -weekly lab work and dressing changes per facility protocol   -favor repeat thoracic/lumbar imaging (MRI wwo) prior to completion of abx to evaluate for resolution of abscess and reassess need for abx extension              Thank you for your consult. Will sign off, please call with questions, new culture data or clinical changes. Above d/w primary team.         Arcelia Salas MD  Infectious Disease  [facility] ([place]      50 minutes of total time spent on the encounter, which includes face to face time and non-face to face time preparing to see the patient (eg, review of tests), obtaining and/or reviewing separately obtained history, documenting clinical information in the electronic or other health record, independently interpreting results (not separately reported) and communicating results to  "the patient/family/caregiver, or care coordination (not separately reported).         Subjective:     Principal Problem:Paraspinal abscess    HPI: Ms. Sloan is a 49F with PMH of DM2, HTN, and substance abuse homelessness, initially presented to OSH with back pain, found to have spinal OM, L SI joint septic arthritis, and L psoas/iliacus muscle abscess, transferred to [facility] on 9/16 for IR and neurosurgery eval, now transferred to [facility] for multidisciplinary / general surgery for necrosis of L flank.     Imaging of L spine and pelvis were concerning for OM/septic arthritis of the left SI joint and L5-S1 along with an abnormal fluid collection extending from T11 through the L iliacus muscle concerning for abscess as well as at psoas. She was started on empiric vanc and zosyn. Case was discussed with neurosurgery who did not feel surgical intervention was warranted and recommended IR drainage. IR recommended against drainage given extensive superficial infection.    On arrival to  antibiotics were transitioned to doxy, vanc and meropenem. BCx grew GPC in clusters. TTE concerning for MV endocarditis. MRI revealed "Complex fluid collection possibly extending from the left SI joint to the left iliac fossa displacing the left psoas muscle medially. This is similar to the study 1 day prior could represent infectious left sacroiliitis/osteomyelitis and adjacent abscess. The collection extends superiorly to approximately T12 level at the left posterior retroperitoneum, posterior to the left kidney. Enhancement noted to much of the sacrum including right of midline again could represent osteomyelitis."     Infectious disease consulted for "Patient being folled by ID  is osh with gram positive cocci resempblig stap and positive pcr for stap, concerns for si joint osteo and retroperitoneal abscess".   Interval History: No fevers documented overnight. Pt reports tolerating abx. Still with some pain on L side.   .     Review " of Systems   Constitutional:  Negative for chills and fever.   Musculoskeletal:  Positive for arthralgias.   Skin:  Positive for rash.   All other systems reviewed and are negative.    Objective:     Vital Signs (Most Recent):  Temp: 99.5 °F (37.5 °C) (09/25/24 1118)  Pulse: 86 (09/25/24 1118)  Resp: 18 (09/25/24 1118)  BP: 108/69 (09/25/24 1118)  SpO2: 95 % (09/25/24 1118) Vital Signs (24h Range):  Temp:  [97.4 °F (36.3 °C)-99.5 °F (37.5 °C)] 99.5 °F (37.5 °C)  Pulse:  [72-86] 86  Resp:  [18-20] 18  SpO2:  [89 %-95 %] 95 %  BP: ()/(56-71) 108/69     Weight: 65.2 kg (143 lb 11.8 oz)  Body mass index is 26.29 kg/m².    Estimated Creatinine Clearance: 73.7 mL/min (based on SCr of 0.8 mg/dL).     Physical Exam  Eyes:      General:         Right eye: No discharge.         Left eye: No discharge.   Pulmonary:      Effort: Pulmonary effort is normal. No respiratory distress.   Abdominal:      General: There is no distension.      Palpations: Abdomen is soft.      Tenderness: There is no abdominal tenderness.   Genitourinary:     Comments: bland  Skin:     General: Skin is warm and dry.      Comments: L hip/flank - induration noted  L drain - purulent fluid  Excoriations on UE/LE     Neurological:      Mental Status: She is alert and oriented to person, place, and time.          Significant Labs:   Microbiology Results (last 7 days)       Procedure Component Value Units Date/Time    Blood culture [[id number]] Collected: 09/23/24 1127    Order Status: Completed Specimen: Blood from Peripheral, Forearm, Right Updated: 09/25/24 1212     Blood Culture, Routine No Growth to date      No Growth to date      No Growth to date    Culture, Anaerobe [[id number]] Collected: 09/19/24 1515    Order Status: Completed Specimen: Abscess from Back Updated: 09/24/24 1024     Anaerobic Culture No anaerobes isolated    Narrative:      Subfascial abscess    Culture, Body Fluid (Aerobic) w/ GS [[id number]]  (Abnormal)  (Susceptibility)  Collected: 09/21/24 1502    Order Status: Completed Specimen: Body Fluid from Back Updated: 09/24/24 1009     AEROBIC CULTURE - FLUID STAPHYLOCOCCUS AUREUS  Many       Gram Stain Result Moderate WBC's      Moderate Gram positive cocci    AFB Culture & Smear [[id number]] Collected: 09/21/24 1502    Order Status: Completed Specimen: Body Fluid from Pelvis Updated: 09/23/24 1555     AFB Culture & Smear Culture in progress     AFB CULTURE STAIN No acid fast bacilli seen.    Blood culture [[id number]]     Order Status: Sent Specimen: Blood     Culture, Anaerobe [[id number]] Collected: 09/19/24 1527    Order Status: Completed Specimen: Abscess from Back Updated: 09/23/24 0957     Anaerobic Culture No anaerobes isolated    Narrative:      Epidural abscess    Fungus culture [[id number]] Collected: 09/21/24 1502    Order Status: Completed Specimen: Body Fluid from Pelvis Updated: 09/23/24 0926     Fungus (Mycology) Culture Culture in progress    Fungus culture [[id number]] Collected: 09/19/24 1527    Order Status: Completed Specimen: Abscess from Back Updated: 09/23/24 0926     Fungus (Mycology) Culture Culture in progress    Narrative:      Epidural abscess    Fungus culture [[id number]] Collected: 09/19/24 1515    Order Status: Completed Specimen: Abscess from Back Updated: 09/23/24 0926     Fungus (Mycology) Culture Culture in progress    Narrative:      Subfascial abscess    Blood culture [[id number]]  (Abnormal)  (Susceptibility) Collected: 09/19/24 1033    Order Status: Completed Specimen: Blood from Peripheral, Lower Arm, Left Updated: 09/22/24 0918     Blood Culture, Routine Gram stain aer bottle: Gram positive cocci in clusters resembling Staph      Results called to and read back by:Mehreen Vital 09/20/2024  13:40      STAPHYLOCOCCUS AUREUS  ID consult required at [facility].[place] and [place].      Gram stain [[id number]]     Order Status: No result Specimen: Joint Fluid     AFB culture [[id number]]      Order Status: No result Specimen: Joint Fluid     AFB stain [[id number]]     Order Status: No result Specimen: Joint Fluid     Culture, body fluid - Bactec [[id number]]     Order Status: No result Specimen: Joint Fluid     Fungus culture [[id number]]     Order Status: No result Specimen: Joint Fluid     Gram stain [[id number]] Collected: 09/21/24 1502    Order Status: Canceled Specimen: Body Fluid from Pelvis     Aerobic culture [[id number]]  (Abnormal)  (Susceptibility) Collected: 09/19/24 1527    Order Status: Completed Specimen: Abscess from Back Updated: 09/21/24 1044     Aerobic Bacterial Culture STAPHYLOCOCCUS AUREUS  Moderate      Narrative:      Epidural abscess    Aerobic culture [[id number]]  (Abnormal)  (Susceptibility) Collected: 09/19/24 1515    Order Status: Completed Specimen: Abscess from Back Updated: 09/21/24 1027     Aerobic Bacterial Culture STAPHYLOCOCCUS AUREUS  Moderate      Narrative:      Subfascial abscess    Blood culture [[id number]]  (Abnormal) Collected: 09/18/24 0045    Order Status: Completed Specimen: Blood from Peripheral, Antecubital, Left Updated: 09/21/24 0732     Blood Culture, Routine Gram stain aer bottle: Gram positive cocci in clusters resembling Staph      Results called to and read back by: Chema 09/19/2024  01:39      STAPHYLOCOCCUS AUREUS  ID consult required at [facility]  For susceptibility see order #[id number]      AFB Culture & Smear [[id number]] Collected: 09/19/24 1527    Order Status: Completed Specimen: Abscess from Back Updated: 09/20/24 2127     AFB Culture & Smear Culture in progress     AFB CULTURE STAIN No acid fast bacilli seen.    Narrative:      Epidural abscess    AFB Culture & Smear [[id number]] Collected: 09/19/24 1515    Order Status: Completed Specimen: Abscess from Back Updated: 09/20/24 2127     AFB Culture & Smear Culture in progress     AFB CULTURE STAIN No acid fast bacilli seen.    Narrative:      Subfascial  abscess    Blood culture [[id number]]  (Abnormal) Collected: 09/18/24 0045    Order Status: Completed Specimen: Blood from Peripheral, Antecubital, Left Updated: 09/20/24 0920     Blood Culture, Routine Gram stain aer bottle: Gram positive cocci in clusters resembling Staph      Results called to and read back by:Chema Oconnor RN 09/18/2024  20:18      STAPHYLOCOCCUS AUREUS  ID consult required at [facility].  For susceptibility see order #[id number]      Gram stain [[id number]] Collected: 09/19/24 1515    Order Status: Completed Specimen: Abscess from Back Updated: 09/19/24 1908     Gram Stain Result Rare WBC's      Few Gram positive cocci    Narrative:      Subfascial abscess    Gram stain [[id number]] Collected: 09/19/24 1527    Order Status: Completed Specimen: Abscess from Back Updated: 09/19/24 1906     Gram Stain Result Rare WBC's      Few Gram positive cocci      Rare Gram positive rods    Narrative:      Epidural abscess    Blood Culture #2 **CANNOT BE ORDERED STAT** [[id number]]  (Abnormal) Collected: 09/16/24 1145    Order Status: Completed Specimen: Blood from Peripheral, Wrist, Right Updated: 09/19/24 1002     Blood Culture, Routine Gram stain felix bottle: Gram positive cocci in clusters resembling Staph      Results called to and read back by: FATIMAH Mcnulty. 09/17/2024  03:48      Gram stain aer bottle: Gram positive cocci in clusters resembling Staph      Positive results previously called 09/17/2024  06:05      STAPHYLOCOCCUS AUREUS  ID consult required at [facility].  For susceptibility see order #[id number]      Blood culture #1 **CANNOT BE ORDERED STAT** [[id number]]  (Abnormal)  (Susceptibility) Collected: 09/16/24 1054    Order Status: Completed Specimen: Blood from Peripheral, Antecubital, Right Updated: 09/19/24 1001     Blood Culture, Routine Gram stain aer bottle: Gram positive cocci in clusters resembling Staph      Gram stain felix  bottle: Gram positive cocci in clusters resembling Staph      Results called to and read back by: FATIMAH Mcnulty. 09/17/2024  03:43      STAPHYLOCOCCUS AUREUS  ID consult required at [facility].[place] and [place].      Blood culture [[id number]]     Order Status: Canceled Specimen: Blood             Significant Imaging: I have reviewed all pertinent imaging results/findings within the past 24 hours.

## 2024-09-25 NOTE — ASSESSMENT & PLAN NOTE
MSSA endocarditis  MSSA paraspinal abscess  MSSA psoas abscess    Noted to have extensive fluid collection on left extending from T11 through left iliacus muscle and within the left iliopsoas muscle.  Multifocal collections of air including subcutaneous tissues on the left flank noted.  ID/neurosurgery consulted. s/p L3-L5 laminectomy with epidural abscess evacuation 9/19   Ortho consulted. Rec continued abx tx and no further interventions  IR SI joint aspiration and abscess drain placement 9/21. Unable to aspirate joint  ID consulted. On oxacillin

## 2024-09-25 NOTE — PROGRESS NOTES
[facility] - Stepdown Flex ([place] [id number])  [facility] Medicine  Progress Note    Patient Name: Mari Sloan  MRN: [id number]  Patient Class: IP- Inpatient   Admission Date: 9/16/2024  Length of Stay: 9 days  Attending Physician: Yuniel Wells*  Primary Care Provider: Liliana, Primary Doctor        Subjective:     Principal Problem:Paraspinal abscess        HPI:  49 y.o.  woman with h/o homelessness (recently was able to obtain living arrangements but states she [place] living [place] the bridge), NIDDM type 2, Essential hypertension, and substance use (denies any IVDU in he past or any illicit drug use recenly, denies ETOH abuse/overuse) presents to [facility] for further evaluation of her back pain.  She apparently presented to the [facility] Emergency Department on September 16 with nausea and vomiting and a mechanical fall 5 days prior. She reported pain worse in her back and on her sides radiating down both legs. She noted muscle spasm in her back and legs. She had no head trauma or loss of consciousness.  W/u in the [facility] ED noted significant hypokalemia, hypomagnesemia, severe anemia, metabolic alkalosis, and hyperglycemia. Vitals signs stable with no sepsis at this time noted. SBP>90, HR normal,afebrile.     Imaging studies of her lumbar spine and pelvis were concerning for osteomyelitis/septic arthritis of the left SI joint and L5-S1 along with an abnormal fluid collection extending from T11 through the left iliacus muscle concerning for abscess. Blood cultures sent.  In the emergency department she is receiving IV fluid, Zofran, Zosyn, vancomycin, potassium, magnesium, pain medication, and nausea medication.   She also received 2 units of PRBC for an H/H of 5.7/18.4,     Case discussed with Neurosurgery and Interventional Radiology. Plan would be for transfer to Hospital Medicine at [facility] for IR evaluation of the fluid collection and potential sampling of the joint  "osteomyelitis.  I reviewed the discussions made with the multiple sub specialists regarding transfer of this patient to [facility]: IR/General surgery/Neurosurgery/ER/Internal Med.     Neurosurgery contacted by the  did not feel surgical intervention was needed at this time but would follow along and requested [facility] for further management and IR backup. IR on call apparently read the CT and at the time felt "while the left retroperitoneal fluid collections do appear organized, percutaneous drainage is not advised given the extensive soft tissue infection superficial to these collections.  In addition, there is extensive evidence of soft tissue infection that does not appear organized or percutaneously drainable."     General surgery was consulted to review the case and felt that there was no immediate surgical intervention at this time to be had. I spoke with the on surgeon contacted regarding the location of the fluid collections and inquired if perhaps orthopedic surgery should be consulted to review given the involvement of bony pelvis.      I spoke orthopedic surgery who will see the patient but recommended re-consulting IR here and Neurosurgery given the diskitis/OM L5-S1. (Please see his consult note in the chart)     Repeat labs here again noted for persistently low mag, K, phos.  H/H stable with improved H/H. Pain control improved with Dilaudid.  I consulted ID for further assistance with ABX adjustments and changed her to Meropenem and doxy as well as continued Vanc. Echo ordred for am.     CT lumbar spine and pelvis had findings most concerning for infectious process. There were findings concerning for osteomyelitis and septic arthritis in the left SI joint. Findings concerning for osteomyelitis/diskitis L5-S1. Possible osteomyelitis and septic joint at the pubic symphysis. Abnormal fluid collection on the left extending from T11 through the left iliacus muscle along and within the " left iliopsoas muscle most concerning for abscess. Multiple additional small abscesses suspected in the pelvis. Multifocal collections of air in the fluid in the subcutaneous tissues of the left flank, incompletely imaged.    Chest x-ray noted a loop in the central venous catheter. Tip currently at the level of the brachiocephalic vein. Lungs are fairly clear.        Overview/Hospital Course:  49 y.o. female, with history of homelessness (recently was able to obtain living arrangements but states she was [place] bridge), NIDDM type 2, Essential hypertension, and substance use (denies IVDU) who was transferred from [facility] ED to [facility] ICU on 09/16/2024.  She presented to [facility] ED  for back pain, nausea/vomiting.  CT L-spine revealed osteomyelitis/diskitis L5-S1 along with abnormal fluid collection on left extending from T11 through left iliacus muscle and left iliopsoas muscle concerning for abscess.  Multifocal collections of air in fluid in subcutaneous tissues of left flank.  Patient was initiated on empiric vancomycin and meropenem.  Blood cultures with staph aureus.  Obtain echo.  Unable to repeat blood cultures given shortage of cx.  Neurosurgery recommended to obtain MRI L-spine, pending.  General surgery evaluated the patient and recommended urgent transfer to [facility] for surgical evaluation/source control given extent of necrosis.  Transfer process initiated.      Patient was transferred to [facility] and admitted to the MICU 9/18 with concern for paraspinal abscess. Infectious workup was ordered. Blood cultures were positive for MSSA bacteremia. Neurosurgery, general surgery and IR were consulted to evaluate the patient's paraspinal abscess. Neurosurgery performed a L3-L4 laminectomy for epidural abscess evacuation on 9/19/24 and the cultures grew MSSA. TTE showed normal EF of 60-65% with diastolic dysfunction, could not exclude mitral vegetation vs redundant chordae. ID was  consulted and recommended oxacillin and repeat blood cultures. With her electrolyte derangement, and a background of appetitive loss in the past 2 months, there was concern for refeeding syndrome. On 9/21, IR took the patient for abscess drain placement and aspiration of SI joint. Pending culture results. SI joint couldn't be aspirated but retroperitoneal abscess was drained of 100cc of purulent fluid. Successfully extubated 9/22.   Patient was stepped down to hospital medicine 9/24/24.     Interval History: No acute events. Afebrile  Had about 70 cc output from psoas drain    Review of Systems  Objective:     Vital Signs (Most Recent):  Temp: 99.5 °F (37.5 °C) (09/25/24 1118)  Pulse: 86 (09/25/24 1118)  Resp: 18 (09/25/24 1118)  BP: 108/69 (09/25/24 1118)  SpO2: 95 % (09/25/24 1118) Vital Signs (24h Range):  Temp:  [97.4 °F (36.3 °C)-99.5 °F (37.5 °C)] 99.5 °F (37.5 °C)  Pulse:  [72-86] 86  Resp:  [18-20] 18  SpO2:  [89 %-95 %] 95 %  BP: ()/(56-71) 108/69     Weight: 65.2 kg (143 lb 11.8 oz)  Body mass index is 26.29 kg/m².    Intake/Output Summary (Last 24 hours) at 9/25/2024 1257  Last data filed at 9/25/2024 1152  Gross per 24 hour   Intake 140 ml   Output 2230 ml   Net -2090 ml      Physical Exam  Constitutional:       General: She is not in acute distress.     Appearance: She is not toxic-appearing.   Cardiovascular:      Rate and Rhythm: Normal rate and regular rhythm.      Heart sounds: No murmur heard.     No friction rub. No gallop.   Pulmonary:      Effort: Pulmonary effort is normal. No respiratory distress.      Breath sounds: Normal breath sounds.   Abdominal:      General: Abdomen is flat. There is no distension.      Palpations: Abdomen is soft.      Tenderness: There is no abdominal tenderness. There is no guarding or rebound.   Musculoskeletal:         General: Swelling and tenderness present.      Right lower leg: No edema.      Left lower leg: No edema.      Comments: L flank drain  "  Skin:     Findings: Bruising and lesion present.   Neurological:      Mental Status: She is alert.         MELD 3.0: 17 at 9/21/2024  6:13 PM  MELD-Na: 13 at 9/21/2024  6:13 PM  Calculated from:  Serum Creatinine: 0.6 mg/dL (Using min of 1 mg/dL) at 9/21/2024  6:13 PM  Serum Sodium: 138 mmol/L (Using max of 137 mmol/L) at 9/21/2024  6:13 PM  Total Bilirubin: 2.1 mg/dL at 9/21/2024  2:34 AM  Serum Albumin: 1.2 g/dL (Using min of 1.5 g/dL) at 9/21/2024  2:34 AM  INR(ratio): 1.4 at 9/19/2024  3:19 AM  Age at listing (hypothetical): 49 years  Sex: Female at 9/21/2024  6:13 PM      Significant Labs:  CBC:  Recent Labs   Lab 09/24/24  0448 09/25/24  0552   WBC 14.89* 9.48   HGB 9.4* 8.9*   HCT 30.2* 30.1*    293     CMP:  Recent Labs   Lab 09/24/24  0448 09/24/24  1018 09/24/24  1842 09/25/24  0552 09/25/24  0808   *   < > 133* 132* 132*   K 3.4*   < > 4.2 3.2* 3.4*   CL 92*   < > 91* 91* 92*   CO2 30*   < > 32* 32* 30*   GLU 40*   < > 93 88 77   BUN 7   < > 7 7 7   CREATININE 0.6   < > 0.7 0.8 0.8   CALCIUM 8.2*   < > 8.0* 8.2* 8.3*   PROT 6.1  --   --  6.1  --    ALBUMIN 1.3*  --   --  1.3*  --    BILITOT 1.3*  --   --  1.3*  --    ALKPHOS 129  --   --  130  --    AST 14  --   --  14  --    ALT 17  --   --  14  --    ANIONGAP 12   < > 10 9 10    < > = values in this interval not displayed.     PTINR:  No results for input(s): "INR" in the last 48 hours.    Significant Procedures:   Dobutamine Stress Test with Color Flow: No results found for this or any previous visit.        Assessment/Plan:      * Paraspinal abscess  MSSA endocarditis  MSSA paraspinal abscess  MSSA psoas abscess    Noted to have extensive fluid collection on left extending from T11 through left iliacus muscle and within the left iliopsoas muscle.  Multifocal collections of air including subcutaneous tissues on the left flank noted.  ID/neurosurgery consulted. s/p L3-L5 laminectomy with epidural abscess evacuation 9/19   Ortho consulted. " Rec continued abx tx and no further interventions  IR SI joint aspiration and abscess drain placement 9/21. Unable to aspirate joint  ID consulted. On oxacillin    Severe sepsis  See above    Hepatitis C    Hepatitis C antibody positive.  Obtain HCV RNA. Quant negative    Anemia, unspecified  S/p 2u PRBC transfusion on admit . NO signs of acute GI bleed on exam at this time. Denies any hematemesis or hemoptysis.   Obtain iron B12/folate/peripheral smear and LDH/hapto/retic  No evidence of active bleed   Stable    Uncontrolled type 2 diabetes mellitus with hyperglycemia  Patient's FSGs are uncontrolled due to hyperglycemia on current medication regimen.  Last A1c reviewed-   Lab Results   Component Value Date    HGBA1C 7.9 (H) 09/17/2024     Most recent fingerstick glucose reviewed-   Recent Labs   Lab 09/24/24 2002 09/25/24  0219 09/25/24  0724 09/25/24  1119   POCTGLUCOSE 120* 105 86 152*       Current correctional scale  Medium  Maintain anti-hyperglycemic dose as follows-   Antihyperglycemics (From admission, onward)      Start     Stop Route Frequency Ordered    09/23/24 1033  insulin aspart U-100 pen 0-10 Units         -- SubQ Before meals & nightly PRN 09/23/24 0933          Hold Oral hypoglycemics while patient is in the hospital.    Smoker  PRN nicotine patch    History of drug use  On methadone at home, continue    Psoas muscle abscess  IR drain. Leave drain in place until less than 10 cc of fluid is aspirated daily for 2 days.   As above    Other osteomyelitis, multiple sites  As above        VTE Risk Mitigation (From admission, onward)           Ordered     enoxaparin injection 40 mg  Every 24 hours         09/23/24 1234     IP VTE HIGH RISK PATIENT  Once         09/22/24 1553                    Discharge Planning   LUH: 9/27/2024     Code Status: Full Code   Is the patient medically ready for discharge?:     Reason for patient still in hospital (select all that apply): Patient trending condition and  Treatment  Discharge Plan A: Long-term acute care facility (LTAC)   Discharge Delays: None known at this time    Yuniel Wells MD  Department of Hospital Medicine   [facility] - Stepdown Flex ([place])

## 2024-09-25 NOTE — SUBJECTIVE & OBJECTIVE
Interval History: No fevers documented overnight. Pt reports tolerating abx. Still with some pain on L side.   .     Review of Systems   Constitutional:  Negative for chills and fever.   Musculoskeletal:  Positive for arthralgias.   Skin:  Positive for rash.   All other systems reviewed and are negative.    Objective:     Vital Signs (Most Recent):  Temp: 99.5 °F (37.5 °C) (09/25/24 1118)  Pulse: 86 (09/25/24 1118)  Resp: 18 (09/25/24 1118)  BP: 108/69 (09/25/24 1118)  SpO2: 95 % (09/25/24 1118) Vital Signs (24h Range):  Temp:  [97.4 °F (36.3 °C)-99.5 °F (37.5 °C)] 99.5 °F (37.5 °C)  Pulse:  [72-86] 86  Resp:  [18-20] 18  SpO2:  [89 %-95 %] 95 %  BP: ()/(56-71) 108/69     Weight: 65.2 kg (143 lb 11.8 oz)  Body mass index is 26.29 kg/m².    Estimated Creatinine Clearance: 73.7 mL/min (based on SCr of 0.8 mg/dL).     Physical Exam  Eyes:      General:         Right eye: No discharge.         Left eye: No discharge.   Pulmonary:      Effort: Pulmonary effort is normal. No respiratory distress.   Abdominal:      General: There is no distension.      Palpations: Abdomen is soft.      Tenderness: There is no abdominal tenderness.   Genitourinary:     Comments: bland  Skin:     General: Skin is warm and dry.      Comments: L hip/flank - induration noted  L drain - purulent fluid  Excoriations on UE/LE     Neurological:      Mental Status: She is alert and oriented to person, place, and time.          Significant Labs:   Microbiology Results (last 7 days)       Procedure Component Value Units Date/Time    Blood culture [[id number]] Collected: 09/23/24 1127    Order Status: Completed Specimen: Blood from Peripheral, Forearm, Right Updated: 09/25/24 1212     Blood Culture, Routine No Growth to date      No Growth to date      No Growth to date    Culture, Anaerobe [[id number]] Collected: 09/19/24 1515    Order Status: Completed Specimen: Abscess from Back Updated: 09/24/24 1024     Anaerobic Culture No anaerobes  isolated    Narrative:      Subfascial abscess    Culture, Body Fluid (Aerobic) w/ GS [[id number]]  (Abnormal)  (Susceptibility) Collected: 09/21/24 1502    Order Status: Completed Specimen: Body Fluid from Back Updated: 09/24/24 1009     AEROBIC CULTURE - FLUID STAPHYLOCOCCUS AUREUS  Many       Gram Stain Result Moderate WBC's      Moderate Gram positive cocci    AFB Culture & Smear [[id number]] Collected: 09/21/24 1502    Order Status: Completed Specimen: Body Fluid from Pelvis Updated: 09/23/24 1555     AFB Culture & Smear Culture in progress     AFB CULTURE STAIN No acid fast bacilli seen.    Blood culture [[id number]]     Order Status: Sent Specimen: Blood     Culture, Anaerobe [[id number]] Collected: 09/19/24 1527    Order Status: Completed Specimen: Abscess from Back Updated: 09/23/24 0957     Anaerobic Culture No anaerobes isolated    Narrative:      Epidural abscess    Fungus culture [[id number]] Collected: 09/21/24 1502    Order Status: Completed Specimen: Body Fluid from Pelvis Updated: 09/23/24 0926     Fungus (Mycology) Culture Culture in progress    Fungus culture [[id number]] Collected: 09/19/24 1527    Order Status: Completed Specimen: Abscess from Back Updated: 09/23/24 0926     Fungus (Mycology) Culture Culture in progress    Narrative:      Epidural abscess    Fungus culture [[id number]] Collected: 09/19/24 1515    Order Status: Completed Specimen: Abscess from Back Updated: 09/23/24 0926     Fungus (Mycology) Culture Culture in progress    Narrative:      Subfascial abscess    Blood culture [[id number]]  (Abnormal)  (Susceptibility) Collected: 09/19/24 1033    Order Status: Completed Specimen: Blood from Peripheral, Lower Arm, Left Updated: 09/22/24 0918     Blood Culture, Routine Gram stain aer bottle: Gram positive cocci in clusters resembling Staph      Results called to and read back by:Mehreen Vital 09/20/2024  13:40      STAPHYLOCOCCUS AUREUS  ID consult required at [facility]ir[facility] and  [facility].      Gram stain [[id number]]     Order Status: No result Specimen: Joint Fluid     AFB culture [[id number]]     Order Status: No result Specimen: Joint Fluid     AFB stain [[id number]]     Order Status: No result Specimen: Joint Fluid     Culture, body fluid - Bactec [[id number]]     Order Status: No result Specimen: Joint Fluid     Fungus culture [[id number]]     Order Status: No result Specimen: Joint Fluid     Gram stain [[id number]] Collected: 09/21/24 1502    Order Status: Canceled Specimen: Body Fluid from Pelvis     Aerobic culture [[id number]]  (Abnormal)  (Susceptibility) Collected: 09/19/24 1527    Order Status: Completed Specimen: Abscess from Back Updated: 09/21/24 1044     Aerobic Bacterial Culture STAPHYLOCOCCUS AUREUS  Moderate      Narrative:      Epidural abscess    Aerobic culture [[id number]]  (Abnormal)  (Susceptibility) Collected: 09/19/24 1515    Order Status: Completed Specimen: Abscess from Back Updated: 09/21/24 1027     Aerobic Bacterial Culture STAPHYLOCOCCUS AUREUS  Moderate      Narrative:      Subfascial abscess    Blood culture [[id number]]  (Abnormal) Collected: 09/18/24 0045    Order Status: Completed Specimen: Blood from Peripheral, Antecubital, Left Updated: 09/21/24 0732     Blood Culture, Routine Gram stain aer bottle: Gram positive cocci in clusters resembling Staph      Results called to and read back by: Chema 09/19/2024  01:39      STAPHYLOCOCCUS AUREUS  ID consult required at [facility] and [facility].  For susceptibility see order #[id number]      AFB Culture & Smear [[id number]] Collected: 09/19/24 1527    Order Status: Completed Specimen: Abscess from Back Updated: 09/20/24 2127     AFB Culture & Smear Culture in progress     AFB CULTURE STAIN No acid fast bacilli seen.    Narrative:      Epidural abscess    AFB Culture & Smear [[id number]] Collected: 09/19/24 1515    Order Status: Completed Specimen: Abscess from Back Updated: 09/20/24  2127     AFB Culture & Smear Culture in progress     AFB CULTURE STAIN No acid fast bacilli seen.    Narrative:      Subfascial abscess    Blood culture [[id number]]  (Abnormal) Collected: 09/18/24 0045    Order Status: Completed Specimen: Blood from Peripheral, Antecubital, Left Updated: 09/20/24 0920     Blood Culture, Routine Gram stain aer bottle: Gram positive cocci in clusters resembling Staph      Results called to and read back by:Chema Oconnor RN 09/18/2024  20:18      STAPHYLOCOCCUS AUREUS  ID consult required at [facility].  For susceptibility see order #[id number]      Gram stain [[id number]] Collected: 09/19/24 1515    Order Status: Completed Specimen: Abscess from Back Updated: 09/19/24 1908     Gram Stain Result Rare WBC's      Few Gram positive cocci    Narrative:      Subfascial abscess    Gram stain [[id number]] Collected: 09/19/24 1527    Order Status: Completed Specimen: Abscess from Back Updated: 09/19/24 1906     Gram Stain Result Rare WBC's      Few Gram positive cocci      Rare Gram positive rods    Narrative:      Epidural abscess    Blood Culture #2 **CANNOT BE ORDERED STAT** [[id number]]  (Abnormal) Collected: 09/16/24 1145    Order Status: Completed Specimen: Blood from Peripheral, Wrist, Right Updated: 09/19/24 1002     Blood Culture, Routine Gram stain felix bottle: Gram positive cocci in clusters resembling Staph      Results called to and read back by: FATIMAH Mcnulty. 09/17/2024  03:48      Gram stain aer bottle: Gram positive cocci in clusters resembling Staph      Positive results previously called 09/17/2024  06:05      STAPHYLOCOCCUS AUREUS  ID consult required at [facility].  For susceptibility see order #[id number]      Blood culture #1 **CANNOT BE ORDERED STAT** [[id number]]  (Abnormal)  (Susceptibility) Collected: 09/16/24 1054    Order Status: Completed Specimen: Blood from Peripheral, Antecubital, Right Updated:  09/19/24 1001     Blood Culture, Routine Gram stain aer bottle: Gram positive cocci in clusters resembling Staph      Gram stain felix bottle: Gram positive cocci in clusters resembling Staph      Results called to and read back by: FATIMAH Mcnulty. 09/17/2024  03:43      STAPHYLOCOCCUS AUREUS  ID consult required at [facility] and [facility].      Blood culture [[id number]]     Order Status: Canceled Specimen: Blood             Significant Imaging: I have reviewed all pertinent imaging results/findings within the past 24 hours.

## 2024-09-25 NOTE — SUBJECTIVE & OBJECTIVE
Interval History: NAEON. AFVSS. C/o of low back pain. Exam stable. Getting OOB with therapy. PREMA drain in place.    Medications:  Continuous Infusions:  Scheduled Meds:   cloNIDine  0.1 mg Oral TID    enoxparin  40 mg Subcutaneous Q24H (prophylaxis, 1700)    folic acid  1 mg Oral Daily    gabapentin  300 mg Oral TID    hydrOXYzine pamoate  25 mg Oral QHS    LIDOcaine  1 patch Transdermal Q24H    methadone  70 mg Oral Daily    nicotine  1 patch Transdermal Daily    oxacillin 12 g in  mL CONTINUOUS INFUSION  12 g Intravenous Q24H    polyethylene glycol  17 g Oral BID    potassium chloride  40 mEq Oral Q4H    senna-docusate 8.6-50 mg  1 tablet Oral BID    thiamine  100 mg Oral Daily     PRN Meds:  Current Facility-Administered Medications:     acetaminophen, 1,000 mg, Per OG tube, Q6H PRN    albuterol-ipratropium, 3 mL, Nebulization, Q4H PRN    dextrose 10%, 12.5 g, Intravenous, PRN    dextrose 10%, 25 g, Intravenous, PRN    glucagon (human recombinant), 1 mg, Intramuscular, PRN    glucose, 16 g, Oral, PRN    glucose, 24 g, Oral, PRN    hydrOXYzine pamoate, 50 mg, Oral, Q6H PRN    insulin aspart U-100, 0-10 Units, Subcutaneous, QID (AC + HS) PRN    melatonin, 6 mg, Oral, Nightly PRN    naloxone, 0.4 mg, Intravenous, PRN    ondansetron, 8 mg, Intravenous, Once PRN    oxyCODONE, 10 mg, Oral, Q4H PRN    sodium chloride 0.9%, 10 mL, Intravenous, Q12H PRN     Review of Systems  Objective:     Weight: 65.2 kg (143 lb 11.8 oz)  Body mass index is 26.29 kg/m².  Vital Signs (Most Recent):  Temp: 99.5 °F (37.5 °C) (09/25/24 1118)  Pulse: 86 (09/25/24 1118)  Resp: 18 (09/25/24 1118)  BP: 108/69 (09/25/24 1118)  SpO2: 95 % (09/25/24 1118) Vital Signs (24h Range):  Temp:  [97.4 °F (36.3 °C)-99.5 °F (37.5 °C)] 99.5 °F (37.5 °C)  Pulse:  [72-86] 86  Resp:  [18-20] 18  SpO2:  [89 %-95 %] 95 %  BP: ()/(56-71) 108/69     Date 09/25/24 0700 - 09/26/24 0659   Shift 9926-0702 2584-1980 8464-2079 24 Hour Total   INTAKE   P.O. 140    140   Shift Total(mL/kg) 140(2.1)   140(2.1)   OUTPUT   Urine(mL/kg/hr) 1000   1000   Drains 10   10   Shift Total(mL/kg) 1010(15.5)   1010(15.5)   Weight (kg) 65.2 65.2 65.2 65.2                            Closed/Suction Drain 09/21/24 1510 Left Back Bulb 10 Fr. (Active)   Site Description Unable to view 09/25/24 0800   Dressing Type Other (Comment) 09/25/24 0800   Dressing Status Clean;Dry;Intact 09/25/24 0800   Dressing Intervention Integrity maintained 09/25/24 0800   Drainage Thick 09/25/24 0800   Status To bulb suction 09/25/24 0800   Output (mL) 10 mL 09/25/24 1146       Neurosurgery Physical Exam  General: well developed, well nourished, no distress.   Head: normocephalic, atraumatic  Neurologic: Alert and oriented. Thought content appropriate.  GCS: Motor: 6/Verbal: 5/Eyes: 4 GCS Total: 15  Mental Status: Awake, Alert, Oriented x 4  Language: No aphasia  Speech: No dysarthria  Cranial nerves: face symmetric, tongue midline, CN II-XII grossly intact.   Eyes: pupils equal, round, reactive to light with accomodation, EOMI.  Pulmonary: normal respirations, no signs of respiratory distress  Sensory: intact to light touch throughout  Motor Strength: Moves all extremities spontaneously with good tone. No abnormal movements seen.      Strength   Deltoids Triceps Biceps Wrist Extension Wrist Flexion Hand    Upper: R 5/5 5/5 5/5 5/5 5/5 5/5     L 5/5 5/5 5/5 5/5 5/5 5/5       Iliopsoas Quadriceps Knee  Flexion Tibialis  anterior Gastro- cnemius EHL   Lower: R 4/5 4+/5 4+/5 5/5 5/5 5/5     L 2/5 4+/5 4+/5 5/5 5/5 5/5      Skin: Skin is warm, dry and intact.     Incision c/d/I with skin edges well approximated with dermabond/prineo. No surrounding erythema or edema. No drainage from incision. No palpable hematoma or underlying fluid collection.    Significant Labs:  Recent Labs   Lab 09/24/24  0448 09/24/24  1018 09/24/24  1842 09/25/24  0552 09/25/24  0808   GLU 40*   < > 93 88 77   *   < > 133* 132* 132*  "  K 3.4*   < > 4.2 3.2* 3.4*   CL 92*   < > 91* 91* 92*   CO2 30*   < > 32* 32* 30*   BUN 7   < > 7 7 7   CREATININE 0.6   < > 0.7 0.8 0.8   CALCIUM 8.2*   < > 8.0* 8.2* 8.3*   MG 1.6  --   --  1.8  --     < > = values in this interval not displayed.     Recent Labs   Lab 09/24/24  0448 09/25/24  0552   WBC 14.89* 9.48   HGB 9.4* 8.9*   HCT 30.2* 30.1*    293     No results for input(s): "LABPT", "INR", "APTT" in the last 48 hours.  Microbiology Results (last 7 days)       Procedure Component Value Units Date/Time    Blood culture [[id number]] Collected: 09/23/24 1127    Order Status: Completed Specimen: Blood from Peripheral, Forearm, Right Updated: 09/25/24 1212     Blood Culture, Routine No Growth to date      No Growth to date      No Growth to date    Culture, Anaerobe [[id number]] Collected: 09/19/24 1515    Order Status: Completed Specimen: Abscess from Back Updated: 09/24/24 1024     Anaerobic Culture No anaerobes isolated    Narrative:      Subfascial abscess    Culture, Body Fluid (Aerobic) w/ GS [[id number]]  (Abnormal)  (Susceptibility) Collected: 09/21/24 1502    Order Status: Completed Specimen: Body Fluid from Back Updated: 09/24/24 1009     AEROBIC CULTURE - FLUID STAPHYLOCOCCUS AUREUS  Many       Gram Stain Result Moderate WBC's      Moderate Gram positive cocci    AFB Culture & Smear [[id number]] Collected: 09/21/24 1502    Order Status: Completed Specimen: Body Fluid from Pelvis Updated: 09/23/24 1555     AFB Culture & Smear Culture in progress     AFB CULTURE STAIN No acid fast bacilli seen.    Blood culture [[id number]]     Order Status: Sent Specimen: Blood     Culture, Anaerobe [[id number]] Collected: 09/19/24 1527    Order Status: Completed Specimen: Abscess from Back Updated: 09/23/24 0957     Anaerobic Culture No anaerobes isolated    Narrative:      Epidural abscess    Fungus culture [[id number]] Collected: 09/21/24 1502    Order Status: Completed Specimen: Body Fluid from Pelvis " Updated: 09/23/24 0926     Fungus (Mycology) Culture Culture in progress    Fungus culture [[id number]] Collected: 09/19/24 1527    Order Status: Completed Specimen: Abscess from Back Updated: 09/23/24 0926     Fungus (Mycology) Culture Culture in progress    Narrative:      Epidural abscess    Fungus culture [[id number]] Collected: 09/19/24 1515    Order Status: Completed Specimen: Abscess from Back Updated: 09/23/24 0926     Fungus (Mycology) Culture Culture in progress    Narrative:      Subfascial abscess    Blood culture [[id number]]  (Abnormal)  (Susceptibility) Collected: 09/19/24 1033    Order Status: Completed Specimen: Blood from Peripheral, Lower Arm, Left Updated: 09/22/24 0918     Blood Culture, Routine Gram stain aer bottle: Gram positive cocci in clusters resembling Staph      Results called to and read back by:Mehreen Vital 09/20/2024  13:40      STAPHYLOCOCCUS AUREUS  ID consult required at [facility] and [facility].      Gram stain [[id number]]     Order Status: No result Specimen: Joint Fluid     AFB culture [[id number]]     Order Status: No result Specimen: Joint Fluid     AFB stain [[id number]]     Order Status: No result Specimen: Joint Fluid     Culture, body fluid - Bactec [[id number]]     Order Status: No result Specimen: Joint Fluid     Fungus culture [[id number]]     Order Status: No result Specimen: Joint Fluid     Gram stain [[id number]] Collected: 09/21/24 1502    Order Status: Canceled Specimen: Body Fluid from Pelvis     Aerobic culture [[id number]]  (Abnormal)  (Susceptibility) Collected: 09/19/24 1527    Order Status: Completed Specimen: Abscess from Back Updated: 09/21/24 1044     Aerobic Bacterial Culture STAPHYLOCOCCUS AUREUS  Moderate      Narrative:      Epidural abscess    Aerobic culture [[id number]]  (Abnormal)  (Susceptibility) Collected: 09/19/24 1515    Order Status: Completed Specimen: Abscess from Back Updated: 09/21/24 1027     Aerobic Bacterial  Culture STAPHYLOCOCCUS AUREUS  Moderate      Narrative:      Subfascial abscess    Blood culture [[id number]]  (Abnormal) Collected: 09/18/24 0045    Order Status: Completed Specimen: Blood from Peripheral, Antecubital, Left Updated: 09/21/24 0732     Blood Culture, Routine Gram stain aer bottle: Gram positive cocci in clusters resembling Staph      Results called to and read back by: Chema 09/19/2024  01:39      STAPHYLOCOCCUS AUREUS  ID consult required at [facility].  For susceptibility see order #[id number]      AFB Culture & Smear [[id number]] Collected: 09/19/24 1527    Order Status: Completed Specimen: Abscess from Back Updated: 09/20/24 2127     AFB Culture & Smear Culture in progress     AFB CULTURE STAIN No acid fast bacilli seen.    Narrative:      Epidural abscess    AFB Culture & Smear [[id number]] Collected: 09/19/24 1515    Order Status: Completed Specimen: Abscess from Back Updated: 09/20/24 2127     AFB Culture & Smear Culture in progress     AFB CULTURE STAIN No acid fast bacilli seen.    Narrative:      Subfascial abscess    Blood culture [[id number]]  (Abnormal) Collected: 09/18/24 0045    Order Status: Completed Specimen: Blood from Peripheral, Antecubital, Left Updated: 09/20/24 0920     Blood Culture, Routine Gram stain aer bottle: Gram positive cocci in clusters resembling Staph      Results called to and read back by:Chema Oconnor RN 09/18/2024  20:18      STAPHYLOCOCCUS AUREUS  ID consult required at [facility].  For susceptibility see order #[id number]      Gram stain [[id number]] Collected: 09/19/24 1515    Order Status: Completed Specimen: Abscess from Back Updated: 09/19/24 1908     Gram Stain Result Rare WBC's      Few Gram positive cocci    Narrative:      Subfascial abscess    Gram stain [[id number]] Collected: 09/19/24 1527    Order Status: Completed Specimen: Abscess from Back Updated: 09/19/24 1906     Gram Stain Result  Rare WBC's      Few Gram positive cocci      Rare Gram positive rods    Narrative:      Epidural abscess    Blood Culture #2 **CANNOT BE ORDERED STAT** [[id number]]  (Abnormal) Collected: 09/16/24 1145    Order Status: Completed Specimen: Blood from Peripheral, Wrist, Right Updated: 09/19/24 1002     Blood Culture, Routine Gram stain felix bottle: Gram positive cocci in clusters resembling Staph      Results called to and read back by: FATIMAH Mcnulty. 09/17/2024  03:48      Gram stain aer bottle: Gram positive cocci in clusters resembling Staph      Positive results previously called 09/17/2024  06:05      STAPHYLOCOCCUS AUREUS  [facility].  For susceptibility see order #[id number]      Blood culture #1 **CANNOT BE ORDERED STAT** [[id number]]  (Abnormal)  (Susceptibility) Collected: 09/16/24 1054    Order Status: Completed Specimen: Blood from Peripheral, Antecubital, Right Updated: 09/19/24 1001     Blood Culture, Routine Gram stain aer bottle: Gram positive cocci in clusters resembling Staph      Gram stain felix bottle: Gram positive cocci in clusters resembling Staph      Results called to and read back by: FATIMAH Mcnulty. 09/17/2024  03:43      STAPHYLOCOCCUS AUREUS  [facility] consult required at [facility].      Blood culture [[id number]]     Order Status: Canceled Specimen: Blood           All pertinent labs from the last 24 hours have been reviewed.    Significant Diagnostics:  I have reviewed and interpreted all pertinent imaging results/findings within the past 24 hours.

## 2024-09-25 NOTE — SUBJECTIVE & OBJECTIVE
Past Medical History:   Diagnosis Date    Diabetes mellitus     Hypertension     Unspecified viral hepatitis C without hepatic coma      Past Surgical History:   Procedure Laterality Date     SECTION      LAMINECTOMY N/A 2024    Procedure: L3-L5 laminectomy with epidural abscess evac;  Surgeon: Sourav Jim DO;  Location: [facility] OR [address] [place] [address];  Service: Neurosurgery;  Laterality: N/A;    MAGNETIC RESONANCE IMAGING N/A 2024    Procedure: MRI (Magnetic Resonance Imagine);  Surgeon: Kamran Storey;  Location: [facility];  Service: Anesthesiology;  Laterality: N/A;  conscious sedaation MRI lumbar spine w/ and without contrast    WASHOUT, WOUND, SPINE  2024    Procedure: WASHOUT, WOUND, SPINE;  Surgeon: Sourav Jim DO;  [facility];  Service: Neurosurgery;;     Review of patient's allergies indicates:  No Known Allergies    Scheduled Medications:    cloNIDine  0.1 mg Oral TID    enoxparin  40 mg Subcutaneous Q24H (prophylaxis, 1700)    folic acid  1 mg Oral Daily    gabapentin  300 mg Oral TID    hydrOXYzine pamoate  25 mg Oral QHS    LIDOcaine  1 patch Transdermal Q24H    methadone  70 mg Oral Daily    nicotine  1 patch Transdermal Daily    oxacillin 12 g in  mL CONTINUOUS INFUSION  12 g Intravenous Q24H    polyethylene glycol  17 g Oral BID    senna-docusate 8.6-50 mg  1 tablet Oral BID    thiamine  100 mg Oral Daily       PRN Medications:   Current Facility-Administered Medications:     acetaminophen, 1,000 mg, Per OG tube, Q6H PRN    albuterol-ipratropium, 3 mL, Nebulization, Q4H PRN    dextrose 10%, 12.5 g, Intravenous, PRN    dextrose 10%, 25 g, Intravenous, PRN    glucagon (human recombinant), 1 mg, Intramuscular, PRN    glucose, 16 g, Oral, PRN    glucose, 24 g, Oral, PRN    hydrOXYzine pamoate, 50 mg, Oral, Q6H PRN    insulin aspart U-100, 0-10 Units, Subcutaneous, QID (AC + HS) PRN    melatonin, 6 mg, Oral, Nightly PRN    naloxone, 0.4 mg, Intravenous, PRN     ondansetron, 8 mg, Intravenous, Once PRN    oxyCODONE, 10 mg, Oral, Q4H PRN    sodium chloride 0.9%, 10 mL, Intravenous, Q12H PRN    Family History    None       Tobacco Use    Smoking status: Every Day     Current packs/day: 0.50     Types: Cigarettes    Smokeless tobacco: Never   Substance and Sexual Activity    Alcohol use: Not Currently    Drug use: Not Currently    Sexual activity: Not on file     Review of Systems   Constitutional:  Positive for activity change.   Neurological:  Positive for weakness.   Psychiatric/Behavioral:  Positive for decreased concentration.      Objective:     Vital Signs (Most Recent):  Temp: 99.5 °F (37.5 °C) (09/25/24 1118)  Pulse: 86 (09/25/24 1118)  Resp: 18 (09/25/24 1354)  BP: 108/69 (09/25/24 1118)  SpO2: 95 % (09/25/24 1118)    Vital Signs (24h Range):  Temp:  [97.4 °F (36.3 °C)-99.5 °F (37.5 °C)] 99.5 °F (37.5 °C)  Pulse:  [72-86] 86  Resp:  [18-20] 18  SpO2:  [89 %-95 %] 95 %  BP: ()/(56-71) 108/69     Body mass index is 26.29 kg/m².     Physical Exam  Vitals and nursing note reviewed.   Constitutional:       General: She is awake.   Pulmonary:      Effort: Pulmonary effort is normal.   Abdominal:      General: There is no distension.      Palpations: Abdomen is soft.   Musculoskeletal:         General: Normal range of motion.      Cervical back: Normal range of motion and neck supple.      Comments: Open wound needle amilcar appearing like skin openings BUE, BLE   Neurological:      General: No focal deficit present.      Mental Status: She is alert.      GCS: GCS eye subscore is 4. GCS verbal subscore is 4. GCS motor subscore is 5.      Motor: Weakness present.      Gait: Gait abnormal.   Psychiatric:         Mood and Affect: Mood normal.         Behavior: Behavior normal.               Diagnostic Results: Labs: Reviewed

## 2024-09-25 NOTE — ASSESSMENT & PLAN NOTE
S/p 2u PRBC transfusion on admit . NO signs of acute GI bleed on exam at this time. Denies any hematemesis or hemoptysis.   Obtain iron B12/folate/peripheral smear and LDH/hapto/retic  No evidence of active bleed   Stable

## 2024-09-25 NOTE — PT/OT/SLP PROGRESS
Occupational Therapy      Patient Name:  Mari Sloan   MRN:  [id number]    OT attempted x2 today. Patient not seen today secondary to patient unwilling to participate 2/2 to nausea. Pt educated on importance of OOB mobility. OT to attempt at next available date.     9/25/2024

## 2024-09-25 NOTE — PROGRESS NOTES
[facility] - [facility] ([facility]-)  Neurosurgery  Progress Note    Subjective:     History of Present Illness: 49F PMH DM, HTN, hep c, opioid abuse, presenting after down for unknown period of time and inability to ambulate secondary to pain with imaging demonstrating L SI osteo with associated abscesses extending to retroperitoneum without involvement of thecal sac. Denies true weakness, states pain is limiting her ability to move. Pain in hips and low back. On clinda/zosyn, blood culture 9/16 with staph aureus. Denies bowel or bladder incontinence, complaining of constipation about 6 days. Denies numbness    Post-Op Info:  Procedure(s) (LRB):  L3-L5 laminectomy with epidural abscess evac (N/A)  WASHOUT, WOUND, SPINE   6 Days Post-Op   Interval History: NAEON. AFVSS. C/o of low back pain. Exam stable. Getting OOB with therapy. PREMA drain in place.    Medications:  Continuous Infusions:  Scheduled Meds:   cloNIDine  0.1 mg Oral TID    enoxparin  40 mg Subcutaneous Q24H (prophylaxis, 1700)    folic acid  1 mg Oral Daily    gabapentin  300 mg Oral TID    hydrOXYzine pamoate  25 mg Oral QHS    LIDOcaine  1 patch Transdermal Q24H    methadone  70 mg Oral Daily    nicotine  1 patch Transdermal Daily    oxacillin 12 g in  mL CONTINUOUS INFUSION  12 g Intravenous Q24H    polyethylene glycol  17 g Oral BID    potassium chloride  40 mEq Oral Q4H    senna-docusate 8.6-50 mg  1 tablet Oral BID    thiamine  100 mg Oral Daily     PRN Meds:  Current Facility-Administered Medications:     acetaminophen, 1,000 mg, Per OG tube, Q6H PRN    albuterol-ipratropium, 3 mL, Nebulization, Q4H PRN    dextrose 10%, 12.5 g, Intravenous, PRN    dextrose 10%, 25 g, Intravenous, PRN    glucagon (human recombinant), 1 mg, Intramuscular, PRN    glucose, 16 g, Oral, PRN    glucose, 24 g, Oral, PRN    hydrOXYzine pamoate, 50 mg, Oral, Q6H PRN    insulin aspart U-100, 0-10 Units, Subcutaneous, QID (AC + HS) PRN    melatonin, 6 mg, Oral,  Nightly PRN    naloxone, 0.4 mg, Intravenous, PRN    ondansetron, 8 mg, Intravenous, Once PRN    oxyCODONE, 10 mg, Oral, Q4H PRN    sodium chloride 0.9%, 10 mL, Intravenous, Q12H PRN     Review of Systems  Objective:     Weight: 65.2 kg (143 lb 11.8 oz)  Body mass index is 26.29 kg/m².  Vital Signs (Most Recent):  Temp: 99.5 °F (37.5 °C) (09/25/24 1118)  Pulse: 86 (09/25/24 1118)  Resp: 18 (09/25/24 1118)  BP: 108/69 (09/25/24 1118)  SpO2: 95 % (09/25/24 1118) Vital Signs (24h Range):  Temp:  [97.4 °F (36.3 °C)-99.5 °F (37.5 °C)] 99.5 °F (37.5 °C)  Pulse:  [72-86] 86  Resp:  [18-20] 18  SpO2:  [89 %-95 %] 95 %  BP: ()/(56-71) 108/69     Date 09/25/24 0700 - 09/26/24 0659   Shift 7069-7330 2958-5020 6034-3691 24 Hour Total   INTAKE   P.O. 140   140   Shift Total(mL/kg) 140(2.1)   140(2.1)   OUTPUT   Urine(mL/kg/hr) 1000   1000   Drains 10   10   Shift Total(mL/kg) 1010(15.5)   1010(15.5)   Weight (kg) 65.2 65.2 65.2 65.2                            Closed/Suction Drain 09/21/24 1510 Left Back Bulb 10 Fr. (Active)   Site Description Unable to view 09/25/24 0800   Dressing Type Other (Comment) 09/25/24 0800   Dressing Status Clean;Dry;Intact 09/25/24 0800   Dressing Intervention Integrity maintained 09/25/24 0800   Drainage Thick 09/25/24 0800   Status To bulb suction 09/25/24 0800   Output (mL) 10 mL 09/25/24 1146       Neurosurgery Physical Exam  General: well developed, well nourished, no distress.   Head: normocephalic, atraumatic  Neurologic: Alert and oriented. Thought content appropriate.  GCS: Motor: 6/Verbal: 5/Eyes: 4 GCS Total: 15  Mental Status: Awake, Alert, Oriented x 4  Language: No aphasia  Speech: No dysarthria  Cranial nerves: face symmetric, tongue midline, CN II-XII grossly intact.   Eyes: pupils equal, round, reactive to light with accomodation, EOMI.  Pulmonary: normal respirations, no signs of respiratory distress  Sensory: intact to light touch throughout  Motor Strength: Moves all  "extremities spontaneously with good tone. No abnormal movements seen.      Strength   Deltoids Triceps Biceps Wrist Extension Wrist Flexion Hand    Upper: R 5/5 5/5 5/5 5/5 5/5 5/5     L 5/5 5/5 5/5 5/5 5/5 5/5       Iliopsoas Quadriceps Knee  Flexion Tibialis  anterior Gastro- cnemius EHL   Lower: R 4/5 4+/5 4+/5 5/5 5/5 5/5     L 2/5 4+/5 4+/5 5/5 5/5 5/5      Skin: Skin is warm, dry and intact.     Incision c/d/I with skin edges well approximated with dermabond/prineo. No surrounding erythema or edema. No drainage from incision. No palpable hematoma or underlying fluid collection.    Significant Labs:  Recent Labs   Lab 09/24/24 0448 09/24/24  1018 09/24/24  1842 09/25/24  0552 09/25/24  0808   GLU 40*   < > 93 88 77   *   < > 133* 132* 132*   K 3.4*   < > 4.2 3.2* 3.4*   CL 92*   < > 91* 91* 92*   CO2 30*   < > 32* 32* 30*   BUN 7   < > 7 7 7   CREATININE 0.6   < > 0.7 0.8 0.8   CALCIUM 8.2*   < > 8.0* 8.2* 8.3*   MG 1.6  --   --  1.8  --     < > = values in this interval not displayed.     Recent Labs   Lab 09/24/24 0448 09/25/24  0552   WBC 14.89* 9.48   HGB 9.4* 8.9*   HCT 30.2* 30.1*    293     No results for input(s): "LABPT", "INR", "APTT" in the last 48 hours.  Microbiology Results (last 7 days)       Procedure Component Value Units Date/Time    Blood culture [[id number]] Collected: 09/23/24 1127    Order Status: Completed Specimen: Blood from Peripheral, Forearm, Right Updated: 09/25/24 1212     Blood Culture, Routine No Growth to date      No Growth to date      No Growth to date    Culture, Anaerobe [[id number]] Collected: 09/19/24 1515    Order Status: Completed Specimen: Abscess from Back Updated: 09/24/24 1024     Anaerobic Culture No anaerobes isolated    Narrative:      Subfascial abscess    Culture, Body Fluid (Aerobic) w/ GS [[id number]]  (Abnormal)  (Susceptibility) Collected: 09/21/24 1502    Order Status: Completed Specimen: Body Fluid from Back Updated: 09/24/24 1009     " AEROBIC CULTURE - FLUID STAPHYLOCOCCUS AUREUS  Many       Gram Stain Result Moderate WBC's      Moderate Gram positive cocci    AFB Culture & Smear [[id number]] Collected: 09/21/24 1502    Order Status: Completed Specimen: Body Fluid from Pelvis Updated: 09/23/24 1555     AFB Culture & Smear Culture in progress     AFB CULTURE STAIN No acid fast bacilli seen.    Blood culture [[id number]]     Order Status: Sent Specimen: Blood     Culture, Anaerobe [[id number]] Collected: 09/19/24 1527    Order Status: Completed Specimen: Abscess from Back Updated: 09/23/24 0957     Anaerobic Culture No anaerobes isolated    Narrative:      Epidural abscess    Fungus culture [[id number]] Collected: 09/21/24 1502    Order Status: Completed Specimen: Body Fluid from Pelvis Updated: 09/23/24 0926     Fungus (Mycology) Culture Culture in progress    Fungus culture [[id number]] Collected: 09/19/24 1527    Order Status: Completed Specimen: Abscess from Back Updated: 09/23/24 0926     Fungus (Mycology) Culture Culture in progress    Narrative:      Epidural abscess    Fungus culture [[id number]] Collected: 09/19/24 1515    Order Status: Completed Specimen: Abscess from Back Updated: 09/23/24 0926     Fungus (Mycology) Culture Culture in progress    Narrative:      Subfascial abscess    Blood culture [[id number]]  (Abnormal)  (Susceptibility) Collected: 09/19/24 1033    Order Status: Completed Specimen: Blood from Peripheral, Lower Arm, Left Updated: 09/22/24 0918     Blood Culture, Routine Gram stain aer bottle: Gram positive cocci in clusters resembling Staph      Results called to and read back by:Mehreen Vital 09/20/2024  13:40      STAPHYLOCOCCUS AUREUS  ID consult required at [facility].[place] and [place] locations.      Gram stain [[id number]]     Order Status: No result Specimen: Joint Fluid     AFB culture [[id number]]     Order Status: No result Specimen: Joint Fluid     AFB stain [[id number]]     Order Status: No result  Specimen: Joint Fluid     Culture, body fluid - Bactec [[id number]]     Order Status: No result Specimen: Joint Fluid     Fungus culture [[id number]]     Order Status: No result Specimen: Joint Fluid     Gram stain [[id number]] Collected: 09/21/24 1502    Order Status: Canceled Specimen: Body Fluid from Pelvis     Aerobic culture [[id number]]  (Abnormal)  (Susceptibility) Collected: 09/19/24 1527    Order Status: Completed Specimen: Abscess from Back Updated: 09/21/24 1044     Aerobic Bacterial Culture STAPHYLOCOCCUS AUREUS  Moderate      Narrative:      Epidural abscess    Aerobic culture [[id number]]  (Abnormal)  (Susceptibility) Collected: 09/19/24 1515    Order Status: Completed Specimen: Abscess from Back Updated: 09/21/24 1027     Aerobic Bacterial Culture STAPHYLOCOCCUS AUREUS  Moderate      Narrative:      Subfascial abscess    Blood culture [[id number]]  (Abnormal) Collected: 09/18/24 0045    Order Status: Completed Specimen: Blood from Peripheral, Antecubital, Left Updated: 09/21/24 0732     Blood Culture, Routine Gram stain aer bottle: Gram positive cocci in clusters resembling Staph      Results called to and read back by: Chema 09/19/2024  01:39      STAPHYLOCOCCUS AUREUS  ID consult required at [facility],[place] and [facility] locations.  For susceptibility see order #[id number]      AFB Culture & Smear [[id number]] Collected: 09/19/24 1527    Order Status: Completed Specimen: Abscess from Back Updated: 09/20/24 2127     AFB Culture & Smear Culture in progress     AFB CULTURE STAIN No acid fast bacilli seen.    Narrative:      Epidural abscess    AFB Culture & Smear [[id number]] Collected: 09/19/24 1515    Order Status: Completed Specimen: Abscess from Back Updated: 09/20/24 2127     AFB Culture & Smear Culture in progress     AFB CULTURE STAIN No acid fast bacilli seen.    Narrative:      Subfascial abscess    Blood culture [[id number]]  (Abnormal) Collected: 09/18/24 0045    Order Status: Completed  Specimen: Blood from Peripheral, Antecubital, Left Updated: 09/20/24 0920     Blood Culture, Routine Gram stain aer bottle: Gram positive cocci in clusters resembling Staph      Results called to and read back by:Chema Oconnor RN 09/18/2024  20:18      STAPHYLOCOCCUS AUREUS  ID consult required at [facility].  For susceptibility see order #[id number]      Gram stain [[id number]] Collected: 09/19/24 1515    Order Status: Completed Specimen: Abscess from Back Updated: 09/19/24 1908     Gram Stain Result Rare WBC's      Few Gram positive cocci    Narrative:      Subfascial abscess    Gram stain [[id number]] Collected: 09/19/24 1527    Order Status: Completed Specimen: Abscess from Back Updated: 09/19/24 1906     Gram Stain Result Rare WBC's      Few Gram positive cocci      Rare Gram positive rods    Narrative:      Epidural abscess    Blood Culture #2 **CANNOT BE ORDERED STAT** [[id number]]  (Abnormal) Collected: 09/16/24 1145    Order Status: Completed Specimen: Blood from Peripheral, Wrist, Right Updated: 09/19/24 1002     Blood Culture, Routine Gram stain felix bottle: Gram positive cocci in clusters resembling Staph      Results called to and read back by: FATIMAH Mcnulty. 09/17/2024  03:48      Gram stain aer bottle: Gram positive cocci in clusters resembling Staph      Positive results previously called 09/17/2024  06:05      STAPHYLOCOCCUS AUREUS  ID consult required at [facility].  For susceptibility see order #[id number]      Blood culture #1 **CANNOT BE ORDERED STAT** [[id number]]  (Abnormal)  (Susceptibility) Collected: 09/16/24 1054    Order Status: Completed Specimen: Blood from Peripheral, Antecubital, Right Updated: 09/19/24 1001     Blood Culture, Routine Gram stain aer bottle: Gram positive cocci in clusters resembling Staph      Gram stain felix bottle: Gram positive cocci in clusters resembling Staph      Results called to and read back by:  FATIMAH Mcnulty. 09/17/2024  03:43      STAPHYLOCOCCUS AUREUS  ID consult required at [facility],[place] and [facility] locations.      Blood culture [[id number]]     Order Status: Canceled Specimen: Blood           All pertinent labs from the last 24 hours have been reviewed.    Significant Diagnostics:  I have reviewed and interpreted all pertinent imaging results/findings within the past 24 hours.  Assessment/Plan:     Other osteomyelitis, multiple sites  49F PMH DM, HTN, hep c, opioid abuse, presenting after down for unknown period of time and inability to ambulate secondary to pain with imaging demonstrating L SI osteo with associated abscesses extending to retroperitoneum without involvement of thecal sac. Denies true weakness, states pain is limiting her ability to move. Pain in hips and low back. On clinda/zosyn, blood culture 9/16 with staph aureus. Denies bowel or bladder incontinence, complaining of constipation about 6 days. Denies numbness    She is now s/p L3-L4 laminectomy for epidural abscess evacuation on 9/19/24 with neurosurgery and IR drain placement in psoas abscess 9/21    Imaging:  CT Lsp / pelvis 9/16: fluid collection T11 on L through left psoas, felt abscess  CT CAP 9/16: extensive edema with subq gas through left flank, small psoas abscess and fluid collections left flank, septic arthritis of L SI joint  MRI Lsp w/wo 9/17: L SI complex fluid collection felt to represent osteo with adjacent abscess, extends to approximately T12 level  MRI Lsp w/wo repeat 9/18: epidural collections T12-L5   MRI C and T spine on 9/20 negative for other signs of infection    Plan:  - TTF 9/23; q4h nc/vs  - infectious workup  - Abx for Staph aureus blood culture 9/16 per primary  - OR cultures growing staph aureus  - psoas abscess s/p drainage and drain placement by IR on 9/21/24  - SI joint tap unsuccessful in drawing any fluid back - Ortho rec abx, no surgical intervention  - Trend inflammatory markers   - rest of  care per primary team  - Please notify for questions/concerns/neurologic decline    D/w Dr. Jim by NSGY team        Ligia Almazan PA-C  Neurosurgery  [facility] - Stepdown [facility] ([place])

## 2024-09-25 NOTE — NURSING
Patient resting in bed ,Aox4, does complain of some pain at this time and request medication when available. Plan of care to monitor output from drain discussed with patient, call light within reach, will continue to monitor.

## 2024-09-25 NOTE — NURSING
Pt AAO, VSS. Pt with c/o back pain. Pt with drain to L lower back. Dressing C,D,I. Educated pt on timing of pain medication. Pt verbalized understanding. Bed in lowest position, call light in reach.

## 2024-09-25 NOTE — SUBJECTIVE & OBJECTIVE
Interval History: No acute events. Afebrile  Had about 70 cc output from psoas drain    Review of Systems  Objective:     Vital Signs (Most Recent):  Temp: 99.5 °F (37.5 °C) (09/25/24 1118)  Pulse: 86 (09/25/24 1118)  Resp: 18 (09/25/24 1118)  BP: 108/69 (09/25/24 1118)  SpO2: 95 % (09/25/24 1118) Vital Signs (24h Range):  Temp:  [97.4 °F (36.3 °C)-99.5 °F (37.5 °C)] 99.5 °F (37.5 °C)  Pulse:  [72-86] 86  Resp:  [18-20] 18  SpO2:  [89 %-95 %] 95 %  BP: ()/(56-71) 108/69     Weight: 65.2 kg (143 lb 11.8 oz)  Body mass index is 26.29 kg/m².    Intake/Output Summary (Last 24 hours) at 9/25/2024 1257  Last data filed at 9/25/2024 1152  Gross per 24 hour   Intake 140 ml   Output 2230 ml   Net -2090 ml      Physical Exam  Constitutional:       General: She is not in acute distress.     Appearance: She is not toxic-appearing.   Cardiovascular:      Rate and Rhythm: Normal rate and regular rhythm.      Heart sounds: No murmur heard.     No friction rub. No gallop.   Pulmonary:      Effort: Pulmonary effort is normal. No respiratory distress.      Breath sounds: Normal breath sounds.   Abdominal:      General: Abdomen is flat. There is no distension.      Palpations: Abdomen is soft.      Tenderness: There is no abdominal tenderness. There is no guarding or rebound.   Musculoskeletal:         General: Swelling and tenderness present.      Right lower leg: No edema.      Left lower leg: No edema.      Comments: L flank drain   Skin:     Findings: Bruising and lesion present.   Neurological:      Mental Status: She is alert.         MELD 3.0: 17 at 9/21/2024  6:13 PM  MELD-Na: 13 at 9/21/2024  6:13 PM  Calculated from:  Serum Creatinine: 0.6 mg/dL (Using min of 1 mg/dL) at 9/21/2024  6:13 PM  Serum Sodium: 138 mmol/L (Using max of 137 mmol/L) at 9/21/2024  6:13 PM  Total Bilirubin: 2.1 mg/dL at 9/21/2024  2:34 AM  Serum Albumin: 1.2 g/dL (Using min of 1.5 g/dL) at 9/21/2024  2:34 AM  INR(ratio): 1.4 at 9/19/2024  3:19  "AM  Age at listing (hypothetical): 49 years  Sex: Female at 9/21/2024  6:13 PM      Significant Labs:  CBC:  Recent Labs   Lab 09/24/24 0448 09/25/24  0552   WBC 14.89* 9.48   HGB 9.4* 8.9*   HCT 30.2* 30.1*    293     CMP:  Recent Labs   Lab 09/24/24 0448 09/24/24  1018 09/24/24  1842 09/25/24  0552 09/25/24  0808   *   < > 133* 132* 132*   K 3.4*   < > 4.2 3.2* 3.4*   CL 92*   < > 91* 91* 92*   CO2 30*   < > 32* 32* 30*   GLU 40*   < > 93 88 77   BUN 7   < > 7 7 7   CREATININE 0.6   < > 0.7 0.8 0.8   CALCIUM 8.2*   < > 8.0* 8.2* 8.3*   PROT 6.1  --   --  6.1  --    ALBUMIN 1.3*  --   --  1.3*  --    BILITOT 1.3*  --   --  1.3*  --    ALKPHOS 129  --   --  130  --    AST 14  --   --  14  --    ALT 17  --   --  14  --    ANIONGAP 12   < > 10 9 10    < > = values in this interval not displayed.     PTINR:  No results for input(s): "INR" in the last 48 hours.    Significant Procedures:   Dobutamine Stress Test with Color Flow: No results found for this or any previous visit.      "

## 2024-09-25 NOTE — ASSESSMENT & PLAN NOTE
I independently reviewed patient's lab work and images as documented. 48 yo female with HCV, unstable housing and substance use admitted for back pain found to have MSSA bacteremia c/b L SI joint septic arthritis/OM, L psoas/iliacus abscess s/p IR drain 9/21 (cx with mssa), T12-L5 epidural abscess s/p L3-5 laminectomy/epidural abscess washout on 9/19 (cx with MSSA). Echo with potential MV vegetation vs redundant chordae. Repeat blood cx as of 9/23 no growth to date so far. No acute surgical intervention per ortho.     Recommendations:  -continue oxacillin continuous infusion 12g iv daily, anticipate 8w course of therapy, jose e 11/18   -not an opat candidate, will need placement for iv abx, discussed with patient   -weekly lab work and dressing changes per facility protocol   -favor repeat thoracic/lumbar imaging (MRI wwo) prior to completion of abx to evaluate for resolution of abscess and reassess need for abx extension

## 2024-09-25 NOTE — ASSESSMENT & PLAN NOTE
Patient's FSGs are uncontrolled due to hyperglycemia on current medication regimen.  Last A1c reviewed-   Lab Results   Component Value Date    HGBA1C 7.9 (H) 09/17/2024     Most recent fingerstick glucose reviewed-   Recent Labs   Lab 09/24/24 2002 09/25/24  0219 09/25/24  0724 09/25/24  1119   POCTGLUCOSE 120* 105 86 152*       Current correctional scale  Medium  Maintain anti-hyperglycemic dose as follows-   Antihyperglycemics (From admission, onward)    Start     Stop Route Frequency Ordered    09/23/24 1033  insulin aspart U-100 pen 0-10 Units         -- SubQ Before meals & nightly PRN 09/23/24 0933        Hold Oral hypoglycemics while patient is in the hospital.

## 2024-09-25 NOTE — PLAN OF CARE
Pt had uneventful shift. VSS. Pt's BP low, held clonidine. MD aware. PREMA drain put out 10cc.  Pt refusing PICC line, MD aware. Pt with c/o back pain, PRN Norco administered.  PO K+ given for replacement. Call light in reach, bed in lowest position.     Problem: Skin Injury Risk Increased  Goal: Skin Health and Integrity  Outcome: Progressing     Problem: Adult Inpatient Plan of Care  Goal: Plan of Care Review  Outcome: Progressing  Goal: Patient-Specific Goal (Individualized)  Outcome: Progressing  Goal: Absence of Hospital-Acquired Illness or Injury  Outcome: Progressing  Goal: Optimal Comfort and Wellbeing  Outcome: Progressing  Goal: Readiness for Transition of Care  Outcome: Progressing     Problem: Infection  Goal: Absence of Infection Signs and Symptoms  Outcome: Progressing     Problem: Diabetes Comorbidity  Goal: Blood Glucose Level Within Targeted Range  Outcome: Progressing     Problem: Sepsis/Septic Shock  Goal: Optimal Coping  Outcome: Progressing  Goal: Absence of Bleeding  Outcome: Progressing  Goal: Blood Glucose Level Within Targeted Range  Outcome: Progressing  Goal: Absence of Infection Signs and Symptoms  Outcome: Progressing  Goal: Optimal Nutrition Intake  Outcome: Progressing     Problem: Fall Injury Risk  Goal: Absence of Fall and Fall-Related Injury  Outcome: Progressing     Problem: Restraint, Nonviolent  Goal: Absence of Harm or Injury  Outcome: Progressing     Problem: Wound  Goal: Optimal Coping  Outcome: Progressing  Goal: Optimal Functional Ability  Outcome: Progressing  Goal: Absence of Infection Signs and Symptoms  Outcome: Progressing  Goal: Improved Oral Intake  Outcome: Progressing  Goal: Optimal Pain Control and Function  Outcome: Progressing  Goal: Skin Health and Integrity  Outcome: Progressing  Goal: Optimal Wound Healing  Outcome: Progressing     Problem: Mechanical Ventilation Invasive  Goal: Effective Communication  Outcome: Progressing  Goal: Optimal Device  Function  Outcome: Progressing  Goal: Mechanical Ventilation Liberation  Outcome: Progressing  Goal: Optimal Nutrition Delivery  Outcome: Progressing  Goal: Absence of Device-Related Skin and Tissue Injury  Outcome: Progressing  Goal: Absence of Ventilator-Induced Lung Injury  Outcome: Progressing

## 2024-09-25 NOTE — CONSULTS
VANE at bedside to place PICC for 8 wks abx.    Pt states she will most likely leave and not go to another facility to continue her treatment and wants to talk with her .    RN made aware.    Please re-consult if patient changes her mind about picc placement.

## 2024-09-25 NOTE — ASSESSMENT & PLAN NOTE
- S/P L3-L5 laminectomy with epidural abscess evacuation 9/19.  -S/P IR SI joint aspiration and abscess drain placement 9/21. Unable to aspirate joint   -ID consulted. Recommends Oxacillin. Will need end date.   -Pt is refusing to go to any placement at this time.    Patient with altered mental status, rectal temp of 100F. Will get labs and reassess.

## 2024-09-25 NOTE — HOSPITAL COURSE
Per chart review,    PT- 09/24    Functional Mobility:  Bed Mobility:     Rolling Right: moderate assistance  Scooting: maximal assistance  Supine to Sit: moderate assistance  Sit to Supine: moderate assistance  Transfers:     Sit to Stand:  minimum assistance and of 2 persons with rolling walker  Gait: 12 steps(6 forward/backward) and 4 side steps with Min A x2.    OT- 09/24    Bed Mobility:    Patient completed Supine to Sit with moderate assistance with HOB elevated  Patient completed anterior Scooting towards the EOB with maximal assistance  Patient completed Sit to Supine with maximal assistance with HOB elevated     Functional Mobility/Transfers:  Patient completed Sit <> Stand Transfer from EOB with minimum assistance and of 2 persons with rolling walker  Functional Mobility: Patient completed 6 steps forward + 6 steps backward, as well as 4 lateral steps to the R with min A x 2 persons with rolling walker. Pt unsteady, however no LOB noted.     Activities of Daily Living:  Grooming: setup assistance to wash face with washcloth at bed-level  Upper Body Dressing: minimum assistance to doff/lnae gown seated EOB  Toileting: total assistance to perform hygiene after incontinent episode at bed-level.

## 2024-09-26 PROBLEM — E44.0 MODERATE MALNUTRITION: Status: ACTIVE | Noted: 2024-09-26

## 2024-09-26 LAB
ALBUMIN SERPL BCP-MCNC: 1.4 G/DL (ref 3.5–5.2)
ALP SERPL-CCNC: 144 U/L (ref 55–135)
ALT SERPL W/O P-5'-P-CCNC: 12 U/L (ref 10–44)
ANION GAP SERPL CALC-SCNC: 10 MMOL/L (ref 8–16)
ANION GAP SERPL CALC-SCNC: 10 MMOL/L (ref 8–16)
ANION GAP SERPL CALC-SCNC: 8 MMOL/L (ref 8–16)
ANISOCYTOSIS BLD QL SMEAR: SLIGHT
AST SERPL-CCNC: 13 U/L (ref 10–40)
BASOPHILS # BLD AUTO: ABNORMAL K/UL (ref 0–0.2)
BASOPHILS NFR BLD: 2 % (ref 0–1.9)
BILIRUB SERPL-MCNC: 1.2 MG/DL (ref 0.1–1)
BUN SERPL-MCNC: 8 MG/DL (ref 6–20)
BUN SERPL-MCNC: 9 MG/DL (ref 6–20)
BUN SERPL-MCNC: 9 MG/DL (ref 6–20)
CALCIUM SERPL-MCNC: 8.2 MG/DL (ref 8.7–10.5)
CALCIUM SERPL-MCNC: 8.7 MG/DL (ref 8.7–10.5)
CALCIUM SERPL-MCNC: 8.7 MG/DL (ref 8.7–10.5)
CHLORIDE SERPL-SCNC: 94 MMOL/L (ref 95–110)
CHLORIDE SERPL-SCNC: 94 MMOL/L (ref 95–110)
CHLORIDE SERPL-SCNC: 95 MMOL/L (ref 95–110)
CO2 SERPL-SCNC: 29 MMOL/L (ref 23–29)
CO2 SERPL-SCNC: 29 MMOL/L (ref 23–29)
CO2 SERPL-SCNC: 30 MMOL/L (ref 23–29)
CREAT SERPL-MCNC: 0.8 MG/DL (ref 0.5–1.4)
DIFFERENTIAL METHOD BLD: ABNORMAL
EOSINOPHIL # BLD AUTO: ABNORMAL K/UL (ref 0–0.5)
EOSINOPHIL NFR BLD: 3 % (ref 0–8)
ERYTHROCYTE [DISTWIDTH] IN BLOOD BY AUTOMATED COUNT: 18.6 % (ref 11.5–14.5)
EST. GFR  (NO RACE VARIABLE): >60 ML/MIN/1.73 M^2
GLUCOSE SERPL-MCNC: 114 MG/DL (ref 70–110)
GLUCOSE SERPL-MCNC: 125 MG/DL (ref 70–110)
GLUCOSE SERPL-MCNC: 164 MG/DL (ref 70–110)
HCT VFR BLD AUTO: 32.8 % (ref 37–48.5)
HGB BLD-MCNC: 10 G/DL (ref 12–16)
HYPOCHROMIA BLD QL SMEAR: ABNORMAL
IMM GRANULOCYTES # BLD AUTO: ABNORMAL K/UL (ref 0–0.04)
IMM GRANULOCYTES NFR BLD AUTO: ABNORMAL % (ref 0–0.5)
LYMPHOCYTES # BLD AUTO: ABNORMAL K/UL (ref 1–4.8)
LYMPHOCYTES NFR BLD: 24 % (ref 18–48)
MAGNESIUM SERPL-MCNC: 1.9 MG/DL (ref 1.6–2.6)
MCH RBC QN AUTO: 25.5 PG (ref 27–31)
MCHC RBC AUTO-ENTMCNC: 30.5 G/DL (ref 32–36)
MCV RBC AUTO: 84 FL (ref 82–98)
MONOCYTES # BLD AUTO: ABNORMAL K/UL (ref 0.3–1)
MONOCYTES NFR BLD: 2 % (ref 4–15)
NEUTROPHILS NFR BLD: 69 % (ref 38–73)
NRBC BLD-RTO: 0 /100 WBC
OVALOCYTES BLD QL SMEAR: ABNORMAL
PHOSPHATE SERPL-MCNC: 5.3 MG/DL (ref 2.7–4.5)
PLATELET # BLD AUTO: 389 K/UL (ref 150–450)
PLATELET BLD QL SMEAR: ABNORMAL
PMV BLD AUTO: 10.5 FL (ref 9.2–12.9)
POCT GLUCOSE: 130 MG/DL (ref 70–110)
POCT GLUCOSE: 146 MG/DL (ref 70–110)
POCT GLUCOSE: 240 MG/DL (ref 70–110)
POCT GLUCOSE: 94 MG/DL (ref 70–110)
POIKILOCYTOSIS BLD QL SMEAR: SLIGHT
POLYCHROMASIA BLD QL SMEAR: ABNORMAL
POTASSIUM SERPL-SCNC: 3.5 MMOL/L (ref 3.5–5.1)
POTASSIUM SERPL-SCNC: 4.2 MMOL/L (ref 3.5–5.1)
POTASSIUM SERPL-SCNC: 4.4 MMOL/L (ref 3.5–5.1)
PROT SERPL-MCNC: 6.7 G/DL (ref 6–8.4)
RBC # BLD AUTO: 3.92 M/UL (ref 4–5.4)
SODIUM SERPL-SCNC: 132 MMOL/L (ref 136–145)
SODIUM SERPL-SCNC: 133 MMOL/L (ref 136–145)
SODIUM SERPL-SCNC: 134 MMOL/L (ref 136–145)
WBC # BLD AUTO: 10.59 K/UL (ref 3.9–12.7)

## 2024-09-26 PROCEDURE — 80048 BASIC METABOLIC PNL TOTAL CA: CPT | Mod: 91,XB | Performed by: STUDENT IN AN ORGANIZED HEALTH CARE EDUCATION/TRAINING PROGRAM

## 2024-09-26 PROCEDURE — 84100 ASSAY OF PHOSPHORUS: CPT

## 2024-09-26 PROCEDURE — 83735 ASSAY OF MAGNESIUM: CPT

## 2024-09-26 PROCEDURE — 85007 BL SMEAR W/DIFF WBC COUNT: CPT | Performed by: STUDENT IN AN ORGANIZED HEALTH CARE EDUCATION/TRAINING PROGRAM

## 2024-09-26 PROCEDURE — 25000003 PHARM REV CODE 250

## 2024-09-26 PROCEDURE — 63600175 PHARM REV CODE 636 W HCPCS: Performed by: INTERNAL MEDICINE

## 2024-09-26 PROCEDURE — 85027 COMPLETE CBC AUTOMATED: CPT | Performed by: STUDENT IN AN ORGANIZED HEALTH CARE EDUCATION/TRAINING PROGRAM

## 2024-09-26 PROCEDURE — 25000003 PHARM REV CODE 250: Performed by: STUDENT IN AN ORGANIZED HEALTH CARE EDUCATION/TRAINING PROGRAM

## 2024-09-26 PROCEDURE — 63600175 PHARM REV CODE 636 W HCPCS

## 2024-09-26 PROCEDURE — 20600001 HC STEP DOWN PRIVATE ROOM

## 2024-09-26 PROCEDURE — 63600175 PHARM REV CODE 636 W HCPCS: Performed by: STUDENT IN AN ORGANIZED HEALTH CARE EDUCATION/TRAINING PROGRAM

## 2024-09-26 PROCEDURE — 25000003 PHARM REV CODE 250: Performed by: INTERNAL MEDICINE

## 2024-09-26 PROCEDURE — 80053 COMPREHEN METABOLIC PANEL: CPT | Performed by: STUDENT IN AN ORGANIZED HEALTH CARE EDUCATION/TRAINING PROGRAM

## 2024-09-26 PROCEDURE — 36415 COLL VENOUS BLD VENIPUNCTURE: CPT | Performed by: STUDENT IN AN ORGANIZED HEALTH CARE EDUCATION/TRAINING PROGRAM

## 2024-09-26 RX ADMIN — SENNOSIDES AND DOCUSATE SODIUM 1 TABLET: 50; 8.6 TABLET ORAL at 08:09

## 2024-09-26 RX ADMIN — GABAPENTIN 300 MG: 300 CAPSULE ORAL at 08:09

## 2024-09-26 RX ADMIN — OXYCODONE HYDROCHLORIDE 10 MG: 10 TABLET ORAL at 08:09

## 2024-09-26 RX ADMIN — METHADONE HYDROCHLORIDE 70 MG: 10 TABLET ORAL at 08:09

## 2024-09-26 RX ADMIN — Medication 100 MG: at 08:09

## 2024-09-26 RX ADMIN — POLYETHYLENE GLYCOL 3350 17 G: 17 POWDER, FOR SOLUTION ORAL at 08:09

## 2024-09-26 RX ADMIN — FOLIC ACID 1 MG: 1 TABLET ORAL at 08:09

## 2024-09-26 RX ADMIN — CLONIDINE HYDROCHLORIDE 0.1 MG: 0.1 TABLET ORAL at 03:09

## 2024-09-26 RX ADMIN — HYDROXYZINE PAMOATE 25 MG: 25 CAPSULE ORAL at 08:09

## 2024-09-26 RX ADMIN — OXYCODONE HYDROCHLORIDE 10 MG: 10 TABLET ORAL at 01:09

## 2024-09-26 RX ADMIN — GABAPENTIN 300 MG: 300 CAPSULE ORAL at 03:09

## 2024-09-26 RX ADMIN — INSULIN ASPART 4 UNITS: 100 INJECTION, SOLUTION INTRAVENOUS; SUBCUTANEOUS at 12:09

## 2024-09-26 RX ADMIN — ENOXAPARIN SODIUM 40 MG: 40 INJECTION SUBCUTANEOUS at 05:09

## 2024-09-26 RX ADMIN — OXACILLIN 12 G: 2 INJECTION, POWDER, FOR SOLUTION INTRAMUSCULAR; INTRAVENOUS at 02:09

## 2024-09-26 NOTE — PROGRESS NOTES
[facility] ([place] [address])  [facility] Medicine  Progress Note    Patient Name: Mari Sloan  MRN: [id number]  Patient Class: IP- Inpatient   Admission Date: 9/16/2024  Length of Stay: 10 days  Attending Physician: Anastasia German MD  Primary Care Provider: Liliana, Primary Doctor        Subjective:     Principal Problem:Paraspinal abscess        HPI:  49 y.o.  woman with h/o homelessness (recently was able to obtain living arrangements but states she was living [place] the bridge), NIDDM type 2, Essential hypertension, and substance use (denies any IVDU in he past or any illicit drug use recenly, denies ETOH abuse/overuse) presents to [facility] for further evaluation of her back pain.  She apparently presented to the [facility] Emergency Department on September 16 with nausea and vomiting and a mechanical fall 5 days prior. She reported pain worse in her back and on her sides radiating down both legs. She noted muscle spasm in her back and legs. She had no head trauma or loss of consciousness.  W/u in the [facility] ED noted significant hypokalemia, hypomagnesemia, severe anemia, metabolic alkalosis, and hyperglycemia. Vitals signs stable with no sepsis at this time noted. SBP>90, HR normal,afebrile.     Imaging studies of her lumbar spine and pelvis were concerning for osteomyelitis/septic arthritis of the left SI joint and L5-S1 along with an abnormal fluid collection extending from T11 through the left iliacus muscle concerning for abscess. Blood cultures sent.  In the emergency department she is receiving IV fluid, Zofran, Zosyn, vancomycin, potassium, magnesium, pain medication, and nausea medication.   She also received 2 units of PRBC for an H/H of 5.7/18.4,     Case discussed with Neurosurgery and Interventional Radiology. Plan would be for transfer to Hospital Medicine at [facility] for IR evaluation of the fluid collection and potential sampling of the joint  "osteomyelitis.  I reviewed the discussions made with the multiple sub specialists regarding transfer of this patient to [facility]: IR/General surgery/Neurosurgery/ER/Internal Med.     Neurosurgery contacted by the  did not feel surgical intervention was needed at this time but would follow along and requested [facility] for further management and IR backup. IR on call apparently read the CT and at the time felt "while the left retroperitoneal fluid collections do appear organized, percutaneous drainage is not advised given the extensive soft tissue infection superficial to these collections.  In addition, there is extensive evidence of soft tissue infection that does not appear organized or percutaneously drainable."     General surgery was consulted to review the case and felt that there was no immediate surgical intervention at this time to be had. I spoke with the on surgeon contacted regarding the location of the fluid collections and inquired if perhaps orthopedic surgery should be consulted to review given the involvement of bony pelvis.      I spoke orthopedic surgery who will see the patient but recommended re-consulting IR here and Neurosurgery given the diskitis/OM L5-S1. (Please see his consult note in the chart)     Repeat labs here again noted for persistently low mag, K, phos.  H/H stable with improved H/H. Pain control improved with Dilaudid.  I consulted ID for further assistance with ABX adjustments and changed her to Meropenem and doxy as well as continued Vanc. Echo ordred for am.     CT lumbar spine and pelvis had findings most concerning for infectious process. There were findings concerning for osteomyelitis and septic arthritis in the left SI joint. Findings concerning for osteomyelitis/diskitis L5-S1. Possible osteomyelitis and septic joint at the pubic symphysis. Abnormal fluid collection on the left extending from T11 through the left iliacus muscle along and within the " left iliopsoas muscle most concerning for abscess. Multiple additional small abscesses suspected in the pelvis. Multifocal collections of air in the fluid in the subcutaneous tissues of the left flank, incompletely imaged.    Chest x-ray noted a loop in the central venous catheter. Tip currently at the level of the brachiocephalic vein. Lungs are fairly clear.        Overview/Hospital Course:  49 y.o. female, with history of homelessness (recently was able to obtain living arrangements but states she was living [place] the bridge), NIDDM type 2, Essential hypertension, and substance use (denies IVDU) who was transferred from [facility] ED to [facility] ICU on 09/16/2024.  She presented to [facility] ED  for back pain, nausea/vomiting.  CT L-spine revealed osteomyelitis/diskitis L5-S1 along with abnormal fluid collection on left extending from T11 through left iliacus muscle and left iliopsoas muscle concerning for abscess.  Multifocal collections of air in fluid in subcutaneous tissues of left flank.  Patient was initiated on empiric vancomycin and meropenem.  Blood cultures with staph aureus.  Obtain echo.  Unable to repeat blood cultures given shortage of cx.  Neurosurgery recommended to obtain MRI L-spine, pending.  General surgery evaluated the patient and recommended urgent transfer to [facility] for surgical evaluation/source control given extent of necrosis.  Transfer process initiated.      Patient was transferred to [facility] and admitted to the MICU 9/18 with concern for paraspinal abscess. Infectious workup was ordered. Blood cultures were positive for MSSA bacteremia. Neurosurgery, general surgery and IR were consulted to evaluate the patient's paraspinal abscess. Neurosurgery performed a L3-L4 laminectomy for epidural abscess evacuation on 9/19/24 and the cultures grew MSSA. TTE showed normal EF of 60-65% with diastolic dysfunction, could not exclude mitral vegetation vs redundant chordae. ID was  consulted and recommended oxacillin and repeat blood cultures. With her electrolyte derangement, and a background of appetitive loss in the past 2 months, there was concern for refeeding syndrome. On 9/21, IR took the patient for abscess drain placement and aspiration of SI joint. Pending culture results. SI joint couldn't be aspirated but retroperitoneal abscess was drained of 100cc of purulent fluid. Successfully extubated 9/22.   Patient was stepped down to hospital medicine 9/24/24.     Interval History: No acute events. Afebrile. Complains of abdominal pain, ordered kub.      Review of Systems  Objective:     Vital Signs (Most Recent):  Temp: 98.4 °F (36.9 °C) (09/26/24 1150)  Pulse: 85 (09/26/24 1447)  Resp: 18 (09/26/24 1150)  BP: 120/77 (09/26/24 1150)  SpO2: (!) 92 % (09/26/24 1150) Vital Signs (24h Range):  Temp:  [97.4 °F (36.3 °C)-98.4 °F (36.9 °C)] 98.4 °F (36.9 °C)  Pulse:  [] 85  Resp:  [16-20] 18  SpO2:  [91 %-95 %] 92 %  BP: (107-131)/(53-77) 120/77     Weight: 65.2 kg (143 lb 11.8 oz)  Body mass index is 26.29 kg/m².    Intake/Output Summary (Last 24 hours) at 9/26/2024 1453  Last data filed at 9/26/2024 0516  Gross per 24 hour   Intake --   Output 420 ml   Net -420 ml      Physical Exam  Constitutional:       General: She is not in acute distress.     Appearance: She is not toxic-appearing.   Cardiovascular:      Rate and Rhythm: Normal rate and regular rhythm.      Heart sounds: No murmur heard.     No friction rub. No gallop.   Pulmonary:      Effort: Pulmonary effort is normal. No respiratory distress.      Breath sounds: Normal breath sounds.   Abdominal:      General: Abdomen is flat. There is no distension.      Palpations: Abdomen is soft.      Tenderness: There is no abdominal tenderness. There is no guarding or rebound.   Musculoskeletal:         General: Swelling and tenderness present.      Right lower leg: No edema.      Left lower leg: No edema.      Comments: L flank drain  "  Skin:     Findings: Bruising and lesion present.   Neurological:      Mental Status: She is alert.         MELD 3.0: 17 at 9/21/2024  6:13 PM  MELD-Na: 13 at 9/21/2024  6:13 PM  Calculated from:  Serum Creatinine: 0.6 mg/dL (Using min of 1 mg/dL) at 9/21/2024  6:13 PM  Serum Sodium: 138 mmol/L (Using max of 137 mmol/L) at 9/21/2024  6:13 PM  Total Bilirubin: 2.1 mg/dL at 9/21/2024  2:34 AM  Serum Albumin: 1.2 g/dL (Using min of 1.5 g/dL) at 9/21/2024  2:34 AM  INR(ratio): 1.4 at 9/19/2024  3:19 AM  Age at listing (hypothetical): 49 years  Sex: Female at 9/21/2024  6:13 PM      Significant Labs:  CBC:  Recent Labs   Lab 09/25/24  0552 09/26/24  0313   WBC 9.48 10.59   HGB 8.9* 10.0*   HCT 30.1* 32.8*    389     CMP:  Recent Labs   Lab 09/25/24  0552 09/25/24  0808 09/25/24  1820 09/26/24  0313 09/26/24  0812   *   < > 133* 133* 132*   K 3.2*   < > 4.0 4.2 4.4   CL 91*   < > 91* 94* 94*   CO2 32*   < > 31* 29 30*   GLU 88   < > 90 164* 125*   BUN 7   < > 9 9 8   CREATININE 0.8   < > 0.8 0.8 0.8   CALCIUM 8.2*   < > 8.5* 8.2* 8.7   PROT 6.1  --   --  6.7  --    ALBUMIN 1.3*  --   --  1.4*  --    BILITOT 1.3*  --   --  1.2*  --    ALKPHOS 130  --   --  144*  --    AST 14  --   --  13  --    ALT 14  --   --  12  --    ANIONGAP 9   < > 11 10 8    < > = values in this interval not displayed.     PTINR:  No results for input(s): "INR" in the last 48 hours.    Significant Procedures:   Dobutamine Stress Test with Color Flow: No results found for this or any previous visit.        Assessment/Plan:      * Paraspinal abscess  MSSA endocarditis  MSSA paraspinal abscess  MSSA psoas abscess    Noted to have extensive fluid collection on left extending from T11 through left iliacus muscle and within the left iliopsoas muscle.  Multifocal collections of air including subcutaneous tissues on the left flank noted.  ID/neurosurgery consulted. s/p L3-L5 laminectomy with epidural abscess evacuation 9/19   Ortho consulted. " Rec continued abx tx and no further interventions  IR SI joint aspiration and abscess drain placement 9/21. Unable to aspirate joint  ID consulted. On oxacillin    Severe sepsis  See above    Hepatitis C    Hepatitis C antibody positive.  Obtain HCV RNA. Quant negative    Anemia, unspecified  S/p 2u PRBC transfusion on admit . NO signs of acute GI bleed on exam at this time. Denies any hematemesis or hemoptysis.   Obtain iron B12/folate/peripheral smear and LDH/hapto/retic  No evidence of active bleed   Stable    Uncontrolled type 2 diabetes mellitus with hyperglycemia  Patient's FSGs are uncontrolled due to hyperglycemia on current medication regimen.  Last A1c reviewed-   Lab Results   Component Value Date    HGBA1C 7.9 (H) 09/17/2024     Most recent fingerstick glucose reviewed-   Recent Labs   Lab 09/24/24 2002 09/25/24  0219 09/25/24  0724 09/25/24  1119   POCTGLUCOSE 120* 105 86 152*       Current correctional scale  Medium  Maintain anti-hyperglycemic dose as follows-   Antihyperglycemics (From admission, onward)      Start     Stop Route Frequency Ordered    09/23/24 1033  insulin aspart U-100 pen 0-10 Units         -- SubQ Before meals & nightly PRN 09/23/24 0933          Hold Oral hypoglycemics while patient is in the hospital.    Smoker  PRN nicotine patch    History of drug use  On methadone at home, continue    Psoas muscle abscess  IR drain. Leave drain in place until less than 10 cc of fluid is aspirated daily for 2 days.   As above    Other osteomyelitis, multiple sites  As above        VTE Risk Mitigation (From admission, onward)           Ordered     enoxaparin injection 40 mg  Every 24 hours         09/23/24 1234     IP VTE HIGH RISK PATIENT  Once         09/22/24 1553                    Discharge Planning   LUH: 9/27/2024     Code Status: Full Code   Is the patient medically ready for discharge?:     Reason for patient still in hospital (select all that apply): Patient trending condition,  Laboratory test, Treatment, Consult recommendations, and Pending disposition  Discharge Plan A: Long-term acute care facility (LTAC)   Discharge Delays: None known at this time              Anastasia German MD  Department of Hospital Medicine   [facility] ([place])

## 2024-09-26 NOTE — SUBJECTIVE & OBJECTIVE
Interval History: No acute events. Afebrile. Complains of abdominal pain, ordered kub.      Review of Systems  Objective:     Vital Signs (Most Recent):  Temp: 98.4 °F (36.9 °C) (09/26/24 1150)  Pulse: 85 (09/26/24 1447)  Resp: 18 (09/26/24 1150)  BP: 120/77 (09/26/24 1150)  SpO2: (!) 92 % (09/26/24 1150) Vital Signs (24h Range):  Temp:  [97.4 °F (36.3 °C)-98.4 °F (36.9 °C)] 98.4 °F (36.9 °C)  Pulse:  [] 85  Resp:  [16-20] 18  SpO2:  [91 %-95 %] 92 %  BP: (107-131)/(53-77) 120/77     Weight: 65.2 kg (143 lb 11.8 oz)  Body mass index is 26.29 kg/m².    Intake/Output Summary (Last 24 hours) at 9/26/2024 1453  Last data filed at 9/26/2024 0516  Gross per 24 hour   Intake --   Output 420 ml   Net -420 ml      Physical Exam  Constitutional:       General: She is not in acute distress.     Appearance: She is not toxic-appearing.   Cardiovascular:      Rate and Rhythm: Normal rate and regular rhythm.      Heart sounds: No murmur heard.     No friction rub. No gallop.   Pulmonary:      Effort: Pulmonary effort is normal. No respiratory distress.      Breath sounds: Normal breath sounds.   Abdominal:      General: Abdomen is flat. There is no distension.      Palpations: Abdomen is soft.      Tenderness: There is no abdominal tenderness. There is no guarding or rebound.   Musculoskeletal:         General: Swelling and tenderness present.      Right lower leg: No edema.      Left lower leg: No edema.      Comments: L flank drain   Skin:     Findings: Bruising and lesion present.   Neurological:      Mental Status: She is alert.         MELD 3.0: 17 at 9/21/2024  6:13 PM  MELD-Na: 13 at 9/21/2024  6:13 PM  Calculated from:  Serum Creatinine: 0.6 mg/dL (Using min of 1 mg/dL) at 9/21/2024  6:13 PM  Serum Sodium: 138 mmol/L (Using max of 137 mmol/L) at 9/21/2024  6:13 PM  Total Bilirubin: 2.1 mg/dL at 9/21/2024  2:34 AM  Serum Albumin: 1.2 g/dL (Using min of 1.5 g/dL) at 9/21/2024  2:34 AM  INR(ratio): 1.4 at 9/19/2024   "3:19 AM  Age at listing (hypothetical): 49 years  Sex: Female at 9/21/2024  6:13 PM      Significant Labs:  CBC:  Recent Labs   Lab 09/25/24  0552 09/26/24  0313   WBC 9.48 10.59   HGB 8.9* 10.0*   HCT 30.1* 32.8*    389     CMP:  Recent Labs   Lab 09/25/24  0552 09/25/24  0808 09/25/24  1820 09/26/24  0313 09/26/24  0812   *   < > 133* 133* 132*   K 3.2*   < > 4.0 4.2 4.4   CL 91*   < > 91* 94* 94*   CO2 32*   < > 31* 29 30*   GLU 88   < > 90 164* 125*   BUN 7   < > 9 9 8   CREATININE 0.8   < > 0.8 0.8 0.8   CALCIUM 8.2*   < > 8.5* 8.2* 8.7   PROT 6.1  --   --  6.7  --    ALBUMIN 1.3*  --   --  1.4*  --    BILITOT 1.3*  --   --  1.2*  --    ALKPHOS 130  --   --  144*  --    AST 14  --   --  13  --    ALT 14  --   --  12  --    ANIONGAP 9   < > 11 10 8    < > = values in this interval not displayed.     PTINR:  No results for input(s): "INR" in the last 48 hours.    Significant Procedures:   Dobutamine Stress Test with Color Flow: No results found for this or any previous visit.      "

## 2024-09-26 NOTE — PT/OT/SLP PROGRESS
Occupational Therapy      Patient Name:  Mari Sloan   MRN:  [id number]    Patient not seen today secondary to Pain & Patient fatigue. Writing therapist attempted in AM and PM. Will follow-up as able.    9/26/2024

## 2024-09-26 NOTE — PT/OT/SLP PROGRESS
Physical Therapy      Patient Name:  Mari Sloan   MRN:  [id number]    Patient not seen today secondary to at 10:15 AM pt reporting 9/10 back pain. Second attempt at 13:38 PM pt reporting fatigue. Will follow-up at next PT POC date.

## 2024-09-26 NOTE — PLAN OF CARE
Pt AAO, VSS. Pt with c/o back pain.  Administered PRN pain medication. Pt was able to get up in chair with assistance. PREMA drain in place with 10cc output. Swollen abdomen, MD aware. Abd x-ray ordered. Call light in reach, bed in lowest position.     Problem: Skin Injury Risk Increased  Goal: Skin Health and Integrity  Outcome: Progressing     Problem: Adult Inpatient Plan of Care  Goal: Plan of Care Review  Outcome: Progressing  Goal: Patient-Specific Goal (Individualized)  Outcome: Progressing  Goal: Absence of Hospital-Acquired Illness or Injury  Outcome: Progressing  Goal: Optimal Comfort and Wellbeing  Outcome: Progressing  Goal: Readiness for Transition of Care  Outcome: Progressing     Problem: Infection  Goal: Absence of Infection Signs and Symptoms  Outcome: Progressing     Problem: Diabetes Comorbidity  Goal: Blood Glucose Level Within Targeted Range  Outcome: Progressing     Problem: Sepsis/Septic Shock  Goal: Optimal Coping  Outcome: Progressing  Goal: Absence of Bleeding  Outcome: Progressing  Goal: Blood Glucose Level Within Targeted Range  Outcome: Progressing  Goal: Absence of Infection Signs and Symptoms  Outcome: Progressing  Goal: Optimal Nutrition Intake  Outcome: Progressing     Problem: Fall Injury Risk  Goal: Absence of Fall and Fall-Related Injury  Outcome: Progressing     Problem: Restraint, Nonviolent  Goal: Absence of Harm or Injury  Outcome: Progressing     Problem: Wound  Goal: Optimal Coping  Outcome: Progressing  Goal: Optimal Functional Ability  Outcome: Progressing  Goal: Absence of Infection Signs and Symptoms  Outcome: Progressing  Goal: Improved Oral Intake  Outcome: Progressing  Goal: Optimal Pain Control and Function  Outcome: Progressing  Goal: Skin Health and Integrity  Outcome: Progressing  Goal: Optimal Wound Healing  Outcome: Progressing     Problem: Mechanical Ventilation Invasive  Goal: Effective Communication  Outcome: Progressing  Goal: Optimal Device Function  Outcome:  Progressing  Goal: Mechanical Ventilation Liberation  Outcome: Progressing  Goal: Optimal Nutrition Delivery  Outcome: Progressing  Goal: Absence of Device-Related Skin and Tissue Injury  Outcome: Progressing  Goal: Absence of Ventilator-Induced Lung Injury  Outcome: Progressing

## 2024-09-26 NOTE — PROGRESS NOTES
[facility] ([place])  Adult Nutrition  Progress Note    SUMMARY       Recommendations    Continue 2000 kcal ADA diet.  Add Boost Glucose Control ONS TID if PO intake declines.  RD to monitor & follow-up.    Goals: Meet % EEN/EPN by RD follow up.  Nutrition Goal Status: goal met  Communication of RD Recs: other (comment) (POC)    Assessment and Plan    Moderate malnutrition    Malnutrition Type:  Context: social/environmental circumstances  Level: moderate    Related to (etiology):   Inability to consume sufficient energy     Signs and Symptoms (as evidenced by):   Weight loss, Inadequate energy intake    Malnutrition Characteristic Summary:  Weight Loss (Malnutrition): 10% in 6 months  Energy Intake (Malnutrition): less than 75% for greater than 7 days    Interventions/Recommendations (treatment strategy):  Collaboration of nutrition care w/ other providers     Nutrition Diagnosis Status:   New     Malnutrition Assessment    Malnutrition Context: social/environmental circumstances  Malnutrition Level: moderate    Weight Loss (Malnutrition): 10% in 6 months  Energy Intake (Malnutrition): less than 75% for greater than 7 days     Reason for Assessment    Reason For Assessment: RD follow-up  Diagnosis: other (see comments) (Paraspinal abscess)  Relevant Medical History: DM, HTN  Interdisciplinary Rounds: did not attend    General Information Comments: Extubated 9/22 & diet advanced. Per initial RD assessment, pt w/ inability to tolerate oral intake PTA x 2 weeks. Per chart review, pt weighed 160# x 6 months ago. Pt currently tolerating diet w/ 100% PO intake. RD feels pt meets criteria for moderate malnutrition (improving) - please see PES statement for details.  Nutrition Discharge Planning: Adequate PO intake    Nutrition/Diet History    Spiritual, Cultural Beliefs, Yazidism Practices, Values that Affect Care: no  Food Allergies: NKFA  Factors Affecting Nutritional Intake: None identified  "at this time    Anthropometrics    Temp: 98.4 °F (36.9 °C)  Height Method: Stated  Height: 5' 2" (157.5 cm)  Height (inches): 62 in  Weight Method: Bed Scale  Weight: 65.2 kg (143 lb 11.8 oz)  Weight (lb): 143.74 lb  Ideal Body Weight (IBW), Female: 110 lb  % Ideal Body Weight, Female (lb): 130.67 %  BMI (Calculated): 26.3  BMI Grade: 25 - 29.9 - overweight  Usual Body Weight (UBW), k.7 kg  % Usual Body Weight: 89.87  % Weight Change From Usual Weight: -10.32 %    Lab/Procedures/Meds    Pertinent Labs Reviewed: reviewed  Pertinent Labs Comments: A1C 7.9  Pertinent Medications Reviewed: reviewed  Pertinent Medications Comments: Folic acid, Thiamine    Estimated/Assessed Needs    Weight Used For Calorie Calculations: 65.2 kg (143 lb 11.8 oz)    Energy Calorie Requirements (kcal): 1586 kcal/d  Energy Need Method: [place]-St Jeor (1.3 PAL)    Protein Requirements: 78 g/d (1.2 g/kg)  Weight Used For Protein Calculations: 65.2 kg (143 lb 11.8 oz)    Estimated Fluid Requirement Method: other (see comments) (Per MD)  RDA Method (mL): 1586    Nutrition Prescription Ordered    Current Diet Order: 2000 kcal ADA    Evaluation of Received Nutrient/Fluid Intake    I/O: -2.9L since admit    Comments: LBM:     Tolerance: tolerating    Nutrition Risk    Level of Risk/Frequency of Follow-up:  (1x/week)     Monitor and Evaluation    Food and Nutrient Intake: energy intake, food and beverage intake  Food and Nutrient Adminstration: diet order  Physical Activity and Function: nutrition-related ADLs and IADLs  Anthropometric Measurements: weight, weight change  Biochemical Data, Medical Tests and Procedures: glucose/endocrine profile, lipid profile, inflammatory profile, gastrointestinal profile, electrolyte and renal panel  Nutrition-Focused Physical Findings: overall appearance, skin     Nutrition Follow-Up    RD Follow-up?: Yes      "

## 2024-09-26 NOTE — PLAN OF CARE
During my shift, pt AAOx4, NAD. VS stable. Pt remains afebrile. SPO2 wnl on room air. Pt reports pain to back that has been managed with Oxycodone PO. Oxacillin infusing continuously as ordered. No adverse reaction noted. Purewick in place collecting concentrated yellow urine. Pt's  at the bedside. Pt has no known needs at this time.   Problem: Adult Inpatient Plan of Care  Goal: Plan of Care Review  Outcome: Progressing  Goal: Patient-Specific Goal (Individualized)  Outcome: Progressing  Goal: Absence of Hospital-Acquired Illness or Injury  Outcome: Progressing  Goal: Optimal Comfort and Wellbeing  Outcome: Progressing  Goal: Readiness for Transition of Care  Outcome: Progressing     Problem: Infection  Goal: Absence of Infection Signs and Symptoms  Outcome: Progressing

## 2024-09-26 NOTE — ASSESSMENT & PLAN NOTE
Malnutrition Type:  Context: social/environmental circumstances  Level: moderate    Related to (etiology):   Inability to consume sufficient energy     Signs and Symptoms (as evidenced by):   Weight loss, Inadequate energy intake    Malnutrition Characteristic Summary:  Weight Loss (Malnutrition): 10% in 6 months  Energy Intake (Malnutrition): less than 75% for greater than 7 days    Interventions/Recommendations (treatment strategy):  Collaboration of nutrition care w/ other providers     Nutrition Diagnosis Status:   New

## 2024-09-27 ENCOUNTER — TELEPHONE (OUTPATIENT)
Dept: NEUROSURGERY | Facility: CLINIC | Age: 49
End: 2024-09-27
Payer: MEDICAID

## 2024-09-27 LAB
ALBUMIN SERPL BCP-MCNC: 1.3 G/DL (ref 3.5–5.2)
ALP SERPL-CCNC: 134 U/L (ref 55–135)
ALT SERPL W/O P-5'-P-CCNC: 12 U/L (ref 10–44)
ANION GAP SERPL CALC-SCNC: 10 MMOL/L (ref 8–16)
ANION GAP SERPL CALC-SCNC: 12 MMOL/L (ref 8–16)
ANION GAP SERPL CALC-SCNC: 15 MMOL/L (ref 8–16)
AST SERPL-CCNC: 16 U/L (ref 10–40)
BASOPHILS # BLD AUTO: 0.21 K/UL (ref 0–0.2)
BASOPHILS NFR BLD: 1.8 % (ref 0–1.9)
BILIRUB SERPL-MCNC: 1.1 MG/DL (ref 0.1–1)
BUN SERPL-MCNC: 5 MG/DL (ref 6–20)
BUN SERPL-MCNC: 6 MG/DL (ref 6–20)
BUN SERPL-MCNC: 7 MG/DL (ref 6–20)
CALCIUM SERPL-MCNC: 8.1 MG/DL (ref 8.7–10.5)
CALCIUM SERPL-MCNC: 8.1 MG/DL (ref 8.7–10.5)
CALCIUM SERPL-MCNC: 8.4 MG/DL (ref 8.7–10.5)
CHLORIDE SERPL-SCNC: 93 MMOL/L (ref 95–110)
CHLORIDE SERPL-SCNC: 95 MMOL/L (ref 95–110)
CHLORIDE SERPL-SCNC: 98 MMOL/L (ref 95–110)
CO2 SERPL-SCNC: 22 MMOL/L (ref 23–29)
CO2 SERPL-SCNC: 24 MMOL/L (ref 23–29)
CO2 SERPL-SCNC: 27 MMOL/L (ref 23–29)
CREAT SERPL-MCNC: 0.7 MG/DL (ref 0.5–1.4)
DIFFERENTIAL METHOD BLD: ABNORMAL
EOSINOPHIL # BLD AUTO: 0.1 K/UL (ref 0–0.5)
EOSINOPHIL NFR BLD: 1 % (ref 0–8)
ERYTHROCYTE [DISTWIDTH] IN BLOOD BY AUTOMATED COUNT: 19.4 % (ref 11.5–14.5)
EST. GFR  (NO RACE VARIABLE): >60 ML/MIN/1.73 M^2
GLUCOSE SERPL-MCNC: 105 MG/DL (ref 70–110)
GLUCOSE SERPL-MCNC: 111 MG/DL (ref 70–110)
GLUCOSE SERPL-MCNC: 209 MG/DL (ref 70–110)
HCT VFR BLD AUTO: 29.6 % (ref 37–48.5)
HGB BLD-MCNC: 9.1 G/DL (ref 12–16)
IMM GRANULOCYTES # BLD AUTO: 0.58 K/UL (ref 0–0.04)
IMM GRANULOCYTES NFR BLD AUTO: 4.9 % (ref 0–0.5)
LYMPHOCYTES # BLD AUTO: 3.2 K/UL (ref 1–4.8)
LYMPHOCYTES NFR BLD: 27 % (ref 18–48)
MCH RBC QN AUTO: 26.1 PG (ref 27–31)
MCHC RBC AUTO-ENTMCNC: 30.7 G/DL (ref 32–36)
MCV RBC AUTO: 85 FL (ref 82–98)
MONOCYTES # BLD AUTO: 0.8 K/UL (ref 0.3–1)
MONOCYTES NFR BLD: 6.6 % (ref 4–15)
NEUTROPHILS # BLD AUTO: 7 K/UL (ref 1.8–7.7)
NEUTROPHILS NFR BLD: 58.7 % (ref 38–73)
NRBC BLD-RTO: 0 /100 WBC
PLATELET # BLD AUTO: 450 K/UL (ref 150–450)
PMV BLD AUTO: 10.3 FL (ref 9.2–12.9)
POCT GLUCOSE: 110 MG/DL (ref 70–110)
POCT GLUCOSE: 119 MG/DL (ref 70–110)
POCT GLUCOSE: 143 MG/DL (ref 70–110)
POCT GLUCOSE: 163 MG/DL (ref 70–110)
POCT GLUCOSE: 203 MG/DL (ref 70–110)
POTASSIUM SERPL-SCNC: 3.7 MMOL/L (ref 3.5–5.1)
POTASSIUM SERPL-SCNC: 3.8 MMOL/L (ref 3.5–5.1)
POTASSIUM SERPL-SCNC: 4.5 MMOL/L (ref 3.5–5.1)
PROT SERPL-MCNC: 6.6 G/DL (ref 6–8.4)
RBC # BLD AUTO: 3.48 M/UL (ref 4–5.4)
SODIUM SERPL-SCNC: 132 MMOL/L (ref 136–145)
WBC # BLD AUTO: 11.88 K/UL (ref 3.9–12.7)

## 2024-09-27 PROCEDURE — 25000003 PHARM REV CODE 250: Performed by: STUDENT IN AN ORGANIZED HEALTH CARE EDUCATION/TRAINING PROGRAM

## 2024-09-27 PROCEDURE — 85025 COMPLETE CBC W/AUTO DIFF WBC: CPT | Performed by: STUDENT IN AN ORGANIZED HEALTH CARE EDUCATION/TRAINING PROGRAM

## 2024-09-27 PROCEDURE — 36415 COLL VENOUS BLD VENIPUNCTURE: CPT | Performed by: STUDENT IN AN ORGANIZED HEALTH CARE EDUCATION/TRAINING PROGRAM

## 2024-09-27 PROCEDURE — 63600175 PHARM REV CODE 636 W HCPCS

## 2024-09-27 PROCEDURE — 25000003 PHARM REV CODE 250: Performed by: INTERNAL MEDICINE

## 2024-09-27 PROCEDURE — 36573 INSJ PICC RS&I 5 YR+: CPT

## 2024-09-27 PROCEDURE — 97530 THERAPEUTIC ACTIVITIES: CPT | Mod: CQ

## 2024-09-27 PROCEDURE — 20600001 HC STEP DOWN PRIVATE ROOM

## 2024-09-27 PROCEDURE — 63600175 PHARM REV CODE 636 W HCPCS: Performed by: STUDENT IN AN ORGANIZED HEALTH CARE EDUCATION/TRAINING PROGRAM

## 2024-09-27 PROCEDURE — 80048 BASIC METABOLIC PNL TOTAL CA: CPT | Mod: XB | Performed by: STUDENT IN AN ORGANIZED HEALTH CARE EDUCATION/TRAINING PROGRAM

## 2024-09-27 PROCEDURE — 76937 US GUIDE VASCULAR ACCESS: CPT

## 2024-09-27 PROCEDURE — 80053 COMPREHEN METABOLIC PANEL: CPT | Performed by: STUDENT IN AN ORGANIZED HEALTH CARE EDUCATION/TRAINING PROGRAM

## 2024-09-27 PROCEDURE — C1751 CATH, INF, PER/CENT/MIDLINE: HCPCS

## 2024-09-27 PROCEDURE — 25000003 PHARM REV CODE 250

## 2024-09-27 PROCEDURE — 97535 SELF CARE MNGMENT TRAINING: CPT | Mod: CO

## 2024-09-27 PROCEDURE — 97530 THERAPEUTIC ACTIVITIES: CPT | Mod: CO

## 2024-09-27 RX ORDER — SODIUM CHLORIDE 0.9 % (FLUSH) 0.9 %
10 SYRINGE (ML) INJECTION EVERY 6 HOURS
Status: DISCONTINUED | OUTPATIENT
Start: 2024-09-27 | End: 2024-10-01 | Stop reason: SDUPTHER

## 2024-09-27 RX ORDER — SODIUM CHLORIDE 0.9 % (FLUSH) 0.9 %
10 SYRINGE (ML) INJECTION
Status: DISCONTINUED | OUTPATIENT
Start: 2024-09-27 | End: 2024-10-18 | Stop reason: HOSPADM

## 2024-09-27 RX ORDER — SODIUM CHLORIDE 0.9 % (FLUSH) 0.9 %
10 SYRINGE (ML) INJECTION EVERY 6 HOURS
Status: DISCONTINUED | OUTPATIENT
Start: 2024-09-27 | End: 2024-10-18 | Stop reason: HOSPADM

## 2024-09-27 RX ORDER — SODIUM CHLORIDE 0.9 % (FLUSH) 0.9 %
10 SYRINGE (ML) INJECTION
Status: DISCONTINUED | OUTPATIENT
Start: 2024-09-27 | End: 2024-10-01 | Stop reason: SDUPTHER

## 2024-09-27 RX ADMIN — OXACILLIN 12 G: 2 INJECTION, POWDER, FOR SOLUTION INTRAMUSCULAR; INTRAVENOUS at 02:09

## 2024-09-27 RX ADMIN — METHADONE HYDROCHLORIDE 70 MG: 10 TABLET ORAL at 08:09

## 2024-09-27 RX ADMIN — POLYETHYLENE GLYCOL 3350 17 G: 17 POWDER, FOR SOLUTION ORAL at 09:09

## 2024-09-27 RX ADMIN — POLYETHYLENE GLYCOL 3350 17 G: 17 POWDER, FOR SOLUTION ORAL at 08:09

## 2024-09-27 RX ADMIN — CLONIDINE HYDROCHLORIDE 0.1 MG: 0.1 TABLET ORAL at 02:09

## 2024-09-27 RX ADMIN — GABAPENTIN 300 MG: 300 CAPSULE ORAL at 02:09

## 2024-09-27 RX ADMIN — FOLIC ACID 1 MG: 1 TABLET ORAL at 08:09

## 2024-09-27 RX ADMIN — OXYCODONE HYDROCHLORIDE 10 MG: 10 TABLET ORAL at 08:09

## 2024-09-27 RX ADMIN — GABAPENTIN 300 MG: 300 CAPSULE ORAL at 08:09

## 2024-09-27 RX ADMIN — SENNOSIDES AND DOCUSATE SODIUM 1 TABLET: 50; 8.6 TABLET ORAL at 09:09

## 2024-09-27 RX ADMIN — OXYCODONE HYDROCHLORIDE 10 MG: 10 TABLET ORAL at 02:09

## 2024-09-27 RX ADMIN — GABAPENTIN 300 MG: 300 CAPSULE ORAL at 09:09

## 2024-09-27 RX ADMIN — OXYCODONE HYDROCHLORIDE 10 MG: 10 TABLET ORAL at 04:09

## 2024-09-27 RX ADMIN — Medication 100 MG: at 08:09

## 2024-09-27 RX ADMIN — INSULIN ASPART 4 UNITS: 100 INJECTION, SOLUTION INTRAVENOUS; SUBCUTANEOUS at 12:09

## 2024-09-27 RX ADMIN — HYDROXYZINE PAMOATE 25 MG: 25 CAPSULE ORAL at 09:09

## 2024-09-27 RX ADMIN — ENOXAPARIN SODIUM 40 MG: 40 INJECTION SUBCUTANEOUS at 04:09

## 2024-09-27 RX ADMIN — SENNOSIDES AND DOCUSATE SODIUM 1 TABLET: 50; 8.6 TABLET ORAL at 08:09

## 2024-09-27 NOTE — CONSULTS
11/11/18 1555   Oxygen Therapy   SpO2 (!) 87 %   O2 Device High Flow Nasal Cannula (HFNC)   FiO2 (%) 21 %   Oxygen Delivery 12 LPM  (due to increased WOB)     Sats dropped to high 80s after NP suction. Per RT, increased FiO2 to let pt recover and slowly weaned back to 21%. MD notified. Sats now mid-high 90s. Will continue to monitor and update with changes.    Double lumen PICC to right basilic vein.  33 cm in length, 25 cm arm circumference and 0 cm exposed.   Lot # [id number].

## 2024-09-27 NOTE — PT/OT/SLP PROGRESS
Occupational Therapy   Treatment    Name: Mari Sloan  MRN: [id number]  Admitting Diagnosis:  Paraspinal abscess  8 Days Post-Op    Recommendations:     Discharge Recommendations: High Intensity Therapy  Discharge Equipment Recommendations:  walker, rolling  Barriers to discharge:  Decreased caregiver support    Assessment:     Mari Sloan is a 49 y.o. female with a medical diagnosis of Paraspinal abscess.  She presents with the fallowing performance deficits affecting function are weakness, impaired endurance, impaired self care skills, impaired functional mobility, gait instability, impaired balance, pain, decreased safety awareness. Patient agreeable to tx session, patient required significant assistance with functional transfers, bed mobility, and self-care task and continues to have limited activity tolerance due to pain and weakness from recent surgical procedure. Patient would benefit from High intensity therapy intervention to address over all functional decline with mobility task, endurance, and ADLs in order to return to PLOF.     Rehab Prognosis:  Good; patient would benefit from acute skilled OT services to address these deficits and reach maximum level of function.       Plan:     Patient to be seen 4 x/week to address the above listed problems via self-care/home management, therapeutic activities, therapeutic exercises, neuromuscular re-education  Plan of Care Expires: 10/23/24  Plan of Care Reviewed with: patient    Subjective     Chief Complaint: pain and right leg numbness   Patient/Family Comments/goals: to return to PLOF  Pain/Comfort:  Pain Rating 1: 9/10  Location - Side 1: Right  Location - Orientation 1: generalized  Location 1: leg  Pain Addressed 1: Reposition, Distraction, Nurse notified  Pain Rating Post-Intervention 1:  (patient did not rate)    Objective:     Communicated with: Nurse prior to session.  Patient found HOB elevated with PREMA drain, PureWick upon OT entry to room.  A  client care conference was completed by the OTR and the MEMBRENO prior to treatment by the MEMBRENO to discuss the patient's POC and current status.   Co-treated (partial) with PT due to patient complexity deficits requiring two skilled therapist to appropriately and safely mobilized patient while facilitating functional task in addition to accommodating for patient's activity tolerance.    General Precautions: Standard, fall    Orthopedic Precautions:spinal precautions  Braces: N/A  Respiratory Status: Room air     Occupational Performance:     Bed Mobility:    Patient completed Rolling/Turning to Left with  maximal assistance  Patient completed Rolling/Turning to Right with maximal assistance  Patient completed Scooting to EOB  with maximal assistance  Patient required max assistance of 2 persons for scooting to HOB via drawsheet   Patient completed Supine to Sit with moderate assistance of 2 persons   Patient completed Sit to Supine with maximal assistance of 2 persons   Patient required SBA for static sitting balance     Functional Mobility/Transfers:  .Partial Sit <> Stand Transfer from EOB x2 trials with maximal assistance of 2 persons with  rolling walker and with no AD   Patient unable to fully come up to an upright position due weakness, pain, and limited hip/knee extension   Functional Mobility: deferred further mobility task due to poor standing tolerance and balance     Activities of Daily Living:  Upper Body Dressing: maximal assistance to don/doff gown   Lower Body Dressing: total assistance to don/doff socks   Toileting: total assistance for pericare hygiene task       [facility] 6 Click ADL: 15    Treatment & Education:  Discussed OT POC and progress  Educated patient on the importance to continue to perform exercises in order to reduce stiffness and promote joint mobility and blood flow  Addressed patient's questions and concerns within MEMBRENO scope of practice      Patient left HOB elevated with all lines intact,  call button in reach, bed alarm on, and nurse notified    GOALS:   Multidisciplinary Problems       Occupational Therapy Goals          Problem: Occupational Therapy    Goal Priority Disciplines Outcome Interventions   Occupational Therapy Goal     OT, PT/OT Progressing    Description: Goals to be met by: 10/21/2024     Patient will increase functional independence with ADLs by performing:    UE Dressing with Set-up Assistance.  LE Dressing with Stand-by Assistance.  Grooming while standing at sink with Supervision.  Toileting from toilet with Supervision for hygiene and clothing management.   Supine to sit with Contact Guard Assistance.  Stand pivot transfers with Supervision.  Toilet transfer to toilet with Supervision.    DME Justifications:    Rolling Walker- Patient demonstrates a mobility limitation that significantly impairs their ability to participate in one or more mobility related activities of daily living. Patient's mobility limitation cannot be sufficiently resolved with the use of a cane, but can be sufficiently resolved with the use of a rolling walker.The use of a rolling walker will considerably improve their ability to participate in MRADLs. Patient will use the walker on a regular basis at home.                           Time Tracking:     OT Date of Treatment: 09/27/24  OT Start Time: 1048  OT Stop Time: 1130  OT Total Time (min): 42 min    Billable Minutes:Self Care/Home Management 30  Therapeutic Activity 12    OT/ANABEL: ANABEL     Number of ANABEL visits since last OT visit: 1    9/27/2024

## 2024-09-27 NOTE — PROCEDURES
"Mari Sloan is a 49 y.o. female patient.    Temp: 98.6 °F (37 °C) (09/27/24 0746)  Pulse: 95 (09/27/24 0746)  Resp: 18 (09/27/24 0842)  BP: 128/86 (09/27/24 0746)  SpO2: (!) 90 % (09/27/24 0746)  Weight: 65.2 kg (143 lb 11.8 oz) (09/26/24 1324)  Height: 5' 2" (157.5 cm) (09/26/24 1324)    PICC  Date/Time: 9/27/2024 10:36 AM  Performed by: Jennifer Wadsworth RN  Assisting provider: Blane Quiroga RN  Consent Done: Yes  Time out: Immediately prior to procedure a time out was called to verify the correct patient, procedure, equipment, support staff and site/side marked as required  Indications: med administration and vascular access  Anesthesia: local infiltration  Local anesthetic: lidocaine 1% without epinephrine  Anesthetic Total (mL): 3  Description of findings: PICC  Preparation: skin prepped with ChloraPrep  Skin prep agent dried: skin prep agent completely dried prior to procedure  Sterile barriers: all five maximum sterile barriers used - cap, mask, sterile gown, sterile gloves, and large sterile sheet  Hand hygiene: hand hygiene performed prior to central venous catheter insertion  Location details: right basilic  Catheter type: double lumen  Catheter size: 5 Fr  Catheter Length: 33cm    Ultrasound guidance: yes  Vessel Caliber: medium and patent, compressibility normal  Vascular Doppler: not done  Needle advanced into vessel with real time Ultrasound guidance.  Guidewire confirmed in vessel.  Image recorded and saved.  Sterile sheath used.  Number of attempts: 1  Post-procedure: blood return through all ports, chlorhexidine patch and sterile dressing applied  Technical procedures used: 3CG  Specimens: No  Implants: No  Assessment: placement verified by x-ray  Complications: none          Name Jennifer Wadsworth RN  9/27/2024    "

## 2024-09-27 NOTE — PT/OT/SLP PROGRESS
"Physical Therapy Co Treatment    Patient Name:  Mari Sloan   MRN:  [id number]    Recommendations:     Discharge Recommendations: High Intensity Therapy  Discharge Equipment Recommendations: walker, rolling  Barriers to discharge: None    Assessment:     Mari Sloan is a 49 y.o. female admitted with a medical diagnosis of Paraspinal abscess.  She presents with the following impairments/functional limitations: weakness, impaired endurance, gait instability, impaired balance, impaired cognition, decreased safety awareness, pain, impaired skin, orthopedic precautions. Pt was agreeable to skilled therapy session with encouragement. Pt demonstrated decreased ability to perform functional mobility this session and had difficulty following verbal cue's for sequencing. Transfer was deferred secondary to decreased safety and increased fall risk at time of session.     Co-treatment performed due to patient's multiple deficits requiring two skilled therapists to appropriately and safely assess patient's strength and endurance while facilitating functional tasks in addition to accommodating for patient's activity tolerance.    Rehab Prognosis: Good; patient would benefit from acute skilled PT services to address these deficits and reach maximum level of function.    Recent Surgery: Procedure(s) (LRB):  L3-L5 laminectomy with epidural abscess evac (N/A)  WASHOUT, WOUND, SPINE 8 Days Post-Op    Plan:     During this hospitalization, patient to be seen 4 x/week to address the identified rehab impairments via gait training, therapeutic activities, therapeutic exercises and progress toward the following goals:    Plan of Care Expires:  10/18/24    Subjective     Chief Complaint: "I can't walk, I didn't walk the other day"  Patient/Family Comments/goals: None verbalized  Pain/Comfort:  Pain Rating 1: 9/10  Location - Side 1: Right  Location - Orientation 1: generalized  Location 1: leg  Pain Addressed 1: Reposition, " Distraction, Nurse notified      Objective:     Communicated with FATIMAH Bagley prior to session.  Patient found supine with PREMA drain, bland catheter, peripheral IV upon PT entry to room.     General Precautions: Standard, fall  Orthopedic Precautions: spinal precautions  Braces: N/A  Respiratory Status: Room air     Functional Mobility:  Bed Mobility:     Rolling Left:  minimum assistance  Rolling Right: minimum assistance  Scooting: minimum assistance and of 2 persons  Supine to Sit: moderate assistance and of 2 persons  Sit to Supine: moderate assistance and of 2 persons  Transfers:     Sit to Stand:  total assistance and of 2 persons with rolling walker (x3 attempts; <75% clearance each time)  Balance: Sitting EOB: SBA      AM-PAC 6 CLICK MOBILITY  Turning over in bed (including adjusting bedclothes, sheets and blankets)?: 2  Sitting down on and standing up from a chair with arms (e.g., wheelchair, bedside commode, etc.): 2  Moving from lying on back to sitting on the side of the bed?: 2  Moving to and from a bed to a chair (including a wheelchair)?: 1  Need to walk in hospital room?: 1  Climbing 3-5 steps with a railing?: 1  Basic Mobility Total Score: 9       Treatment & Education:  Patient provided with daily orientation and goals of this PT session. They were educated to call for assistance and to transfer with hospital staff only.  Also, pt was educated on the effects of prolonged immobility and the importance of performing OOB activity and exercises to promote healing and reduce recovery time    Patient left HOB elevated with all lines intact, call button in reach, and RN notified, ANABEL present.    GOALS:   Multidisciplinary Problems       Physical Therapy Goals          Problem: Physical Therapy    Goal Priority Disciplines Outcome Goal Variances Interventions   Physical Therapy Goal     PT, PT/OT Progressing     Description: Goals to be met by: 10/18/24     Patient will increase functional independence with  mobility by performin. Supine to sit with MInimal Assistance  2. Sit to stand transfer with Minimal Assistance with RW if needed.   3. Bed to chair transfer with Minimal Assistance using Rolling Walker  4. Gait  x 30 feet with Minimal Assistance using Rolling Walker.   5. Lower extremity exercise program x10 reps per handout, with assistance as needed to increase strength for increased mobility.     DME Justifications (see above for complete DME recommendations)      Rolling Walker- Patient demonstrates a mobility limitation that significantly impairs their ability to participate in one or more mobility related activities of daily living. Patient's mobility limitation cannot be sufficiently resolved with the use of a cane, but can be sufficiently resolved with the use of a rolling walker.The use of a rolling walker will considerably improve their ability to participate in MRADLs. Patient will use the walker on a regular basis at home.                             Time Tracking:     PT Received On: 24  PT Start Time: 1048     PT Stop Time: 1108  PT Total Time (min): 20 min     Billable Minutes: Therapeutic Activity 16    Treatment Type: Treatment  PT/PTA: PTA     Number of PTA visits since last PT visit: 2024

## 2024-09-27 NOTE — PLAN OF CARE
During my shift, pt AAOx4, NAD. VS stable. Pt remains afebrile. SPO2 wnl on room air. Pt reports pain to back that has been managed with Oxycodone PO. Oxacillin infusing continuously as ordered. No adverse reaction noted. Distended abd noted. LBM 9/24/24. Purewick in place collecting concentrated yellow urine. Pt's  at the bedside. Pt has no known needs at this time.     Problem: Adult Inpatient Plan of Care  Goal: Plan of Care Review  9/27/2024 0617 by Юлия Rich LPN  Outcome: Progressing  9/27/2024 0616 by Юлия Rich LPN  Outcome: Progressing  Goal: Patient-Specific Goal (Individualized)  9/27/2024 0617 by Юлия Rich LPN  Outcome: Progressing  9/27/2024 0616 by Юлия Rich LPN  Outcome: Progressing  Goal: Absence of Hospital-Acquired Illness or Injury  9/27/2024 0617 by Юлия Rich LPN  Outcome: Progressing  9/27/2024 0616 by Юлия Rich LPN  Outcome: Progressing  Goal: Optimal Comfort and Wellbeing  9/27/2024 0617 by Юлия Rich LPN  Outcome: Progressing  9/27/2024 0616 by Юлия Rich LPN  Outcome: Progressing  Goal: Readiness for Transition of Care  9/27/2024 0617 by Юлия Rich LPN  Outcome: Progressing  9/27/2024 0616 by Юлия Rich LPN  Outcome: Progressing

## 2024-09-27 NOTE — TELEPHONE ENCOUNTER
----- Message from Ju Nava PA-C sent at 9/26/2024  3:19 PM CDT -----  Please schedule a follow up appointment for this patient:    With: RN @  2 weeks/ nagi or kamla @ 6 weeks  Diagnosis: discitis s/p laminectomy         Thank you!    Ju

## 2024-09-28 LAB
ALBUMIN SERPL BCP-MCNC: 1.3 G/DL (ref 3.5–5.2)
ALP SERPL-CCNC: 133 U/L (ref 55–135)
ALT SERPL W/O P-5'-P-CCNC: 10 U/L (ref 10–44)
ANION GAP SERPL CALC-SCNC: 12 MMOL/L (ref 8–16)
ANION GAP SERPL CALC-SCNC: 12 MMOL/L (ref 8–16)
ANION GAP SERPL CALC-SCNC: 8 MMOL/L (ref 8–16)
AST SERPL-CCNC: 11 U/L (ref 10–40)
BACTERIA BLD CULT: NORMAL
BASOPHILS # BLD AUTO: 0.18 K/UL (ref 0–0.2)
BASOPHILS NFR BLD: 1.7 % (ref 0–1.9)
BILIRUB SERPL-MCNC: 1.1 MG/DL (ref 0.1–1)
BUN SERPL-MCNC: 5 MG/DL (ref 6–20)
CALCIUM SERPL-MCNC: 8.1 MG/DL (ref 8.7–10.5)
CALCIUM SERPL-MCNC: 8.3 MG/DL (ref 8.7–10.5)
CALCIUM SERPL-MCNC: 8.3 MG/DL (ref 8.7–10.5)
CHLORIDE SERPL-SCNC: 100 MMOL/L (ref 95–110)
CHLORIDE SERPL-SCNC: 97 MMOL/L (ref 95–110)
CHLORIDE SERPL-SCNC: 98 MMOL/L (ref 95–110)
CO2 SERPL-SCNC: 23 MMOL/L (ref 23–29)
CO2 SERPL-SCNC: 26 MMOL/L (ref 23–29)
CO2 SERPL-SCNC: 27 MMOL/L (ref 23–29)
CREAT SERPL-MCNC: 0.7 MG/DL (ref 0.5–1.4)
DIFFERENTIAL METHOD BLD: ABNORMAL
EOSINOPHIL # BLD AUTO: 0.1 K/UL (ref 0–0.5)
EOSINOPHIL NFR BLD: 1.2 % (ref 0–8)
ERYTHROCYTE [DISTWIDTH] IN BLOOD BY AUTOMATED COUNT: 19.6 % (ref 11.5–14.5)
EST. GFR  (NO RACE VARIABLE): >60 ML/MIN/1.73 M^2
GLUCOSE SERPL-MCNC: 119 MG/DL (ref 70–110)
GLUCOSE SERPL-MCNC: 124 MG/DL (ref 70–110)
GLUCOSE SERPL-MCNC: 146 MG/DL (ref 70–110)
HCT VFR BLD AUTO: 29.1 % (ref 37–48.5)
HGB BLD-MCNC: 9 G/DL (ref 12–16)
IMM GRANULOCYTES # BLD AUTO: 0.41 K/UL (ref 0–0.04)
IMM GRANULOCYTES NFR BLD AUTO: 3.8 % (ref 0–0.5)
LYMPHOCYTES # BLD AUTO: 2.9 K/UL (ref 1–4.8)
LYMPHOCYTES NFR BLD: 27.1 % (ref 18–48)
MCH RBC QN AUTO: 26.2 PG (ref 27–31)
MCHC RBC AUTO-ENTMCNC: 30.9 G/DL (ref 32–36)
MCV RBC AUTO: 85 FL (ref 82–98)
MONOCYTES # BLD AUTO: 0.7 K/UL (ref 0.3–1)
MONOCYTES NFR BLD: 6.3 % (ref 4–15)
NEUTROPHILS # BLD AUTO: 6.4 K/UL (ref 1.8–7.7)
NEUTROPHILS NFR BLD: 59.9 % (ref 38–73)
NRBC BLD-RTO: 0 /100 WBC
PLATELET # BLD AUTO: 394 K/UL (ref 150–450)
PMV BLD AUTO: 9.9 FL (ref 9.2–12.9)
POCT GLUCOSE: 131 MG/DL (ref 70–110)
POCT GLUCOSE: 160 MG/DL (ref 70–110)
POCT GLUCOSE: 233 MG/DL (ref 70–110)
POCT GLUCOSE: 250 MG/DL (ref 70–110)
POTASSIUM SERPL-SCNC: 3.1 MMOL/L (ref 3.5–5.1)
POTASSIUM SERPL-SCNC: 3.5 MMOL/L (ref 3.5–5.1)
POTASSIUM SERPL-SCNC: 3.7 MMOL/L (ref 3.5–5.1)
PROT SERPL-MCNC: 6.2 G/DL (ref 6–8.4)
RBC # BLD AUTO: 3.44 M/UL (ref 4–5.4)
SODIUM SERPL-SCNC: 132 MMOL/L (ref 136–145)
SODIUM SERPL-SCNC: 135 MMOL/L (ref 136–145)
SODIUM SERPL-SCNC: 136 MMOL/L (ref 136–145)
WBC # BLD AUTO: 10.72 K/UL (ref 3.9–12.7)

## 2024-09-28 PROCEDURE — 25000003 PHARM REV CODE 250: Performed by: INTERNAL MEDICINE

## 2024-09-28 PROCEDURE — 20600001 HC STEP DOWN PRIVATE ROOM

## 2024-09-28 PROCEDURE — 63600175 PHARM REV CODE 636 W HCPCS

## 2024-09-28 PROCEDURE — A4216 STERILE WATER/SALINE, 10 ML: HCPCS | Performed by: STUDENT IN AN ORGANIZED HEALTH CARE EDUCATION/TRAINING PROGRAM

## 2024-09-28 PROCEDURE — 80048 BASIC METABOLIC PNL TOTAL CA: CPT | Mod: XB | Performed by: STUDENT IN AN ORGANIZED HEALTH CARE EDUCATION/TRAINING PROGRAM

## 2024-09-28 PROCEDURE — 25000003 PHARM REV CODE 250

## 2024-09-28 PROCEDURE — 80053 COMPREHEN METABOLIC PANEL: CPT | Performed by: STUDENT IN AN ORGANIZED HEALTH CARE EDUCATION/TRAINING PROGRAM

## 2024-09-28 PROCEDURE — 85025 COMPLETE CBC W/AUTO DIFF WBC: CPT | Performed by: STUDENT IN AN ORGANIZED HEALTH CARE EDUCATION/TRAINING PROGRAM

## 2024-09-28 PROCEDURE — 25000003 PHARM REV CODE 250: Performed by: STUDENT IN AN ORGANIZED HEALTH CARE EDUCATION/TRAINING PROGRAM

## 2024-09-28 PROCEDURE — 63600175 PHARM REV CODE 636 W HCPCS: Performed by: STUDENT IN AN ORGANIZED HEALTH CARE EDUCATION/TRAINING PROGRAM

## 2024-09-28 PROCEDURE — 36415 COLL VENOUS BLD VENIPUNCTURE: CPT | Performed by: STUDENT IN AN ORGANIZED HEALTH CARE EDUCATION/TRAINING PROGRAM

## 2024-09-28 RX ADMIN — Medication 10 ML: at 05:09

## 2024-09-28 RX ADMIN — OXYCODONE HYDROCHLORIDE 10 MG: 10 TABLET ORAL at 01:09

## 2024-09-28 RX ADMIN — POLYETHYLENE GLYCOL 3350 17 G: 17 POWDER, FOR SOLUTION ORAL at 09:09

## 2024-09-28 RX ADMIN — METHADONE HYDROCHLORIDE 70 MG: 10 TABLET ORAL at 09:09

## 2024-09-28 RX ADMIN — Medication 10 ML: at 12:09

## 2024-09-28 RX ADMIN — FOLIC ACID 1 MG: 1 TABLET ORAL at 09:09

## 2024-09-28 RX ADMIN — OXYCODONE HYDROCHLORIDE 10 MG: 10 TABLET ORAL at 05:09

## 2024-09-28 RX ADMIN — GABAPENTIN 300 MG: 300 CAPSULE ORAL at 09:09

## 2024-09-28 RX ADMIN — Medication 10 ML: at 01:09

## 2024-09-28 RX ADMIN — SENNOSIDES AND DOCUSATE SODIUM 1 TABLET: 50; 8.6 TABLET ORAL at 09:09

## 2024-09-28 RX ADMIN — POTASSIUM BICARBONATE 50 MEQ: 978 TABLET, EFFERVESCENT ORAL at 09:09

## 2024-09-28 RX ADMIN — OXACILLIN 12 G: 2 INJECTION, POWDER, FOR SOLUTION INTRAMUSCULAR; INTRAVENOUS at 01:09

## 2024-09-28 RX ADMIN — ONDANSETRON 8 MG: 2 INJECTION INTRAMUSCULAR; INTRAVENOUS at 07:09

## 2024-09-28 RX ADMIN — ENOXAPARIN SODIUM 40 MG: 40 INJECTION SUBCUTANEOUS at 05:09

## 2024-09-28 RX ADMIN — INSULIN ASPART 4 UNITS: 100 INJECTION, SOLUTION INTRAVENOUS; SUBCUTANEOUS at 05:09

## 2024-09-28 RX ADMIN — HYDROXYZINE PAMOATE 25 MG: 25 CAPSULE ORAL at 09:09

## 2024-09-28 RX ADMIN — GABAPENTIN 300 MG: 300 CAPSULE ORAL at 03:09

## 2024-09-28 RX ADMIN — OXYCODONE HYDROCHLORIDE 10 MG: 10 TABLET ORAL at 09:09

## 2024-09-28 RX ADMIN — INSULIN ASPART 4 UNITS: 100 INJECTION, SOLUTION INTRAVENOUS; SUBCUTANEOUS at 01:09

## 2024-09-28 RX ADMIN — Medication 100 MG: at 09:09

## 2024-09-28 NOTE — ASSESSMENT & PLAN NOTE
Patient's FSGs are uncontrolled due to hyperglycemia on current medication regimen.  Last A1c reviewed-   Lab Results   Component Value Date    HGBA1C 7.9 (H) 09/17/2024     Most recent fingerstick glucose reviewed-   Recent Labs   Lab 09/26/24  1948 09/27/24  0744 09/27/24  1142 09/27/24  1623   POCTGLUCOSE 146* 110 203* 119*       Current correctional scale  Medium  Maintain anti-hyperglycemic dose as follows-   Antihyperglycemics (From admission, onward)    Start     Stop Route Frequency Ordered    09/23/24 1033  insulin aspart U-100 pen 0-10 Units         -- SubQ Before meals & nightly PRN 09/23/24 0933        Hold Oral hypoglycemics while patient is in the hospital.

## 2024-09-28 NOTE — SUBJECTIVE & OBJECTIVE
Interval History: No acute events. Afebrile.  Drain removed yesterday.  CT abdomen, read pending.    Review of Systems  Objective:     Vital Signs (Most Recent):  Temp: 98.2 °F (36.8 °C) (09/28/24 1139)  Pulse: 89 (09/28/24 1139)  Resp: 18 (09/28/24 1339)  BP: 108/68 (09/28/24 1139)  SpO2: (!) 93 % (09/28/24 1139) Vital Signs (24h Range):  Temp:  [97.8 °F (36.6 °C)-98.9 °F (37.2 °C)] 98.2 °F (36.8 °C)  Pulse:  [84-98] 89  Resp:  [17-18] 18  SpO2:  [92 %-96 %] 93 %  BP: ()/(55-90) 108/68     Weight: 65.2 kg (143 lb 11.8 oz)  Body mass index is 26.29 kg/m².    Intake/Output Summary (Last 24 hours) at 9/28/2024 1442  Last data filed at 9/28/2024 0628  Gross per 24 hour   Intake 10 ml   Output 1450 ml   Net -1440 ml      Physical Exam  Constitutional:       General: She is not in acute distress.     Appearance: She is not toxic-appearing.   Cardiovascular:      Rate and Rhythm: Normal rate and regular rhythm.      Heart sounds: No murmur heard.     No friction rub. No gallop.   Pulmonary:      Effort: Pulmonary effort is normal. No respiratory distress.      Breath sounds: Normal breath sounds.   Abdominal:      General: Abdomen is flat. There is no distension.      Palpations: Abdomen is soft.      Tenderness: There is no abdominal tenderness. There is no guarding or rebound.   Musculoskeletal:         General: Swelling and tenderness present.      Right lower leg: No edema.      Left lower leg: No edema.      Comments: L flank drain   Skin:     Findings: Bruising and lesion present.   Neurological:      Mental Status: She is alert.         MELD 3.0: 17 at 9/21/2024  6:13 PM  MELD-Na: 13 at 9/21/2024  6:13 PM  Calculated from:  Serum Creatinine: 0.6 mg/dL (Using min of 1 mg/dL) at 9/21/2024  6:13 PM  Serum Sodium: 138 mmol/L (Using max of 137 mmol/L) at 9/21/2024  6:13 PM  Total Bilirubin: 2.1 mg/dL at 9/21/2024  2:34 AM  Serum Albumin: 1.2 g/dL (Using min of 1.5 g/dL) at 9/21/2024  2:34 AM  INR(ratio): 1.4 at  "9/19/2024  3:19 AM  Age at listing (hypothetical): 49 years  Sex: Female at 9/21/2024  6:13 PM      Significant Labs:  CBC:  Recent Labs   Lab 09/27/24 0627 09/28/24  0420   WBC 11.88 10.72   HGB 9.1* 9.0*   HCT 29.6* 29.1*    394     CMP:  Recent Labs   Lab 09/27/24 0627 09/27/24  1050 09/27/24  1802 09/28/24  0420 09/28/24  0942   *   < > 132* 132* 136   K 3.8   < > 4.5 3.1* 3.5   CL 98   < > 95 97 98   CO2 24   < > 22* 27 26   *   < > 105 119* 124*   BUN 7   < > 5* 5* 5*   CREATININE 0.7   < > 0.7 0.7 0.7   CALCIUM 8.4*   < > 8.1* 8.1* 8.3*   PROT 6.6  --   --  6.2  --    ALBUMIN 1.3*  --   --  1.3*  --    BILITOT 1.1*  --   --  1.1*  --    ALKPHOS 134  --   --  133  --    AST 16  --   --  11  --    ALT 12  --   --  10  --    ANIONGAP 10   < > 15 8 12    < > = values in this interval not displayed.     PTINR:  No results for input(s): "INR" in the last 48 hours.    Significant Procedures:   Dobutamine Stress Test with Color Flow: No results found for this or any previous visit.      "

## 2024-09-28 NOTE — PROGRESS NOTES
[facility] ([place] [id number])  [facility] Medicine  Progress Note    Patient Name: Mari Sloan  MRN: [id number]  Patient Class: IP- Inpatient   Admission Date: 9/16/2024  Length of Stay: 11 days  Attending Physician: Anastasia German MD  Primary Care Provider: Liliana, Primary Doctor        Subjective:     Principal Problem:Paraspinal abscess        HPI:  49 y.o.  woman with h/o homelessness (recently was able to obtain living arrangements but states she was living [place] the bridge), NIDDM type 2, Essential hypertension, and substance use (denies any IVDU in he past or any illicit drug use recenly, denies ETOH abuse/overuse) presents to [facility] for further evaluation of her back pain.  She apparently presented to the [facility] Emergency Department on September 16 with nausea and vomiting and a mechanical fall 5 days prior. She reported pain worse in her back and on her sides radiating down both legs. She noted muscle spasm in her back and legs. She had no head trauma or loss of consciousness.  W/u in the [facility] ED noted significant hypokalemia, hypomagnesemia, severe anemia, metabolic alkalosis, and hyperglycemia. Vitals signs stable with no sepsis at this time noted. SBP>90, HR normal,afebrile.     Imaging studies of her lumbar spine and pelvis were concerning for osteomyelitis/septic arthritis of the left SI joint and L5-S1 along with an abnormal fluid collection extending from T11 through the left iliacus muscle concerning for abscess. Blood cultures sent.  In the emergency department she is receiving IV fluid, Zofran, Zosyn, vancomycin, potassium, magnesium, pain medication, and nausea medication.   She also received 2 units of PRBC for an H/H of 5.7/18.4,     Case discussed with Neurosurgery and Interventional Radiology. Plan would be for transfer to Hospital Medicine at [facility] for IR evaluation of the fluid collection and potential sampling of the joint  "osteomyelitis.  I reviewed the discussions made with the multiple sub specialists regarding transfer of this patient to [facility]: IR/General surgery/Neurosurgery/ER/Internal Med.     Neurosurgery contacted by the  did not feel surgical intervention was needed at this time but would follow along and requested [facility] for further management and IR backup. IR on call apparently read the CT and at the time felt "while the left retroperitoneal fluid collections do appear organized, percutaneous drainage is not advised given the extensive soft tissue infection superficial to these collections.  In addition, there is extensive evidence of soft tissue infection that does not appear organized or percutaneously drainable."     General surgery was consulted to review the case and felt that there was no immediate surgical intervention at this time to be had. I spoke with the on surgeon contacted regarding the location of the fluid collections and inquired if perhaps orthopedic surgery should be consulted to review given the involvement of bony pelvis.      I spoke orthopedic surgery who will see the patient but recommended re-consulting IR here and Neurosurgery given the diskitis/OM L5-S1. (Please see his consult note in the chart)     Repeat labs here again noted for persistently low mag, K, phos.  H/H stable with improved H/H. Pain control improved with Dilaudid.  I consulted ID for further assistance with ABX adjustments and changed her to Meropenem and doxy as well as continued Vanc. Echo ordred for am.     CT lumbar spine and pelvis had findings most concerning for infectious process. There were findings concerning for osteomyelitis and septic arthritis in the left SI joint. Findings concerning for osteomyelitis/diskitis L5-S1. Possible osteomyelitis and septic joint at the pubic symphysis. Abnormal fluid collection on the left extending from T11 through the left iliacus muscle along and within the " left iliopsoas muscle most concerning for abscess. Multiple additional small abscesses suspected in the pelvis. Multifocal collections of air in the fluid in the subcutaneous tissues of the left flank, incompletely imaged.    Chest x-ray noted a loop in the central venous catheter. Tip currently at the level of the brachiocephalic vein. Lungs are fairly clear.        Overview/Hospital Course:  49 y.o. female, with history of homelessness (recently was able to obtain living arrangements but states she was living [place] the bridge), NIDDM type 2, Essential hypertension, and substance use (denies IVDU) who was transferred from [facility] ED to [facility] ICU on 09/16/2024.  She presented to [facility] ED  for back pain, nausea/vomiting.  CT L-spine revealed osteomyelitis/diskitis L5-S1 along with abnormal fluid collection on left extending from T11 through left iliacus muscle and left iliopsoas muscle concerning for abscess.  Multifocal collections of air in fluid in subcutaneous tissues of left flank.  Patient was initiated on empiric vancomycin and meropenem.  Blood cultures with staph aureus.  Obtain echo.  Unable to repeat blood cultures given shortage of cx.  Neurosurgery recommended to obtain MRI L-spine, pending.  General surgery evaluated the patient and recommended urgent transfer to [facility] for surgical evaluation/source control given extent of necrosis.  Transfer process initiated.      [place] was [place] [facility] [place] to the MICU 9/18 with concern for paraspinal abscess. Infectious workup was ordered. Blood cultures were positive for MSSA bacteremia. Neurosurgery, general surgery and IR were consulted to evaluate the patient's paraspinal abscess. Neurosurgery performed a L3-L4 laminectomy for epidural abscess evacuation on 9/19/24 and the cultures grew MSSA. TTE showed normal EF of 60-65% with diastolic dysfunction, could not exclude mitral vegetation vs redundant chordae. ID was  consulted and recommended oxacillin and repeat blood cultures. With her electrolyte derangement, and a background of appetitive loss in the past 2 months, there was concern for refeeding syndrome. On 9/21, IR took the patient for abscess drain placement and aspiration of SI joint. Pending culture results. SI joint couldn't be aspirated but retroperitoneal abscess was drained of 100cc of purulent fluid. Successfully extubated 9/22.   Patient was stepped down to hospital medicine 9/24/24.     Interval History: No acute events. Afebrile.  KUB suggestive of ileus versus obstruction, patient reports passing gas.  Enema offered, patient deferred and would like to wait.  Patient had leakage around the drain, discussed with IR, drain was taken out.  CT was ordered to check for interval changes.      Review of Systems  Objective:     Vital Signs (Most Recent):  Temp: 98.9 °F (37.2 °C) (09/27/24 1628)  Pulse: 98 (09/27/24 1628)  Resp: 18 (09/27/24 1628)  BP: (!) 134/90 (09/27/24 1628)  SpO2: (!) 94 % (09/27/24 1750) Vital Signs (24h Range):  Temp:  [97.8 °F (36.6 °C)-98.9 °F (37.2 °C)] 98.9 °F (37.2 °C)  Pulse:  [] 98  Resp:  [16-20] 18  SpO2:  [90 %-95 %] 94 %  BP: (105-134)/(67-96) 134/90     Weight: 65.2 kg (143 lb 11.8 oz)  Body mass index is 26.29 kg/m².    Intake/Output Summary (Last 24 hours) at 9/27/2024 1919  Last data filed at 9/27/2024 1859  Gross per 24 hour   Intake 354 ml   Output 905 ml   Net -551 ml      Physical Exam  Constitutional:       General: She is not in acute distress.     Appearance: She is not toxic-appearing.   Cardiovascular:      Rate and Rhythm: Normal rate and regular rhythm.      Heart sounds: No murmur heard.     No friction rub. No gallop.   Pulmonary:      Effort: Pulmonary effort is normal. No respiratory distress.      Breath sounds: Normal breath sounds.   Abdominal:      General: Abdomen is flat. There is no distension.      Palpations: Abdomen is soft.      Tenderness: There is  "no abdominal tenderness. There is no guarding or rebound.   Musculoskeletal:         General: Swelling and tenderness present.      Right lower leg: No edema.      Left lower leg: No edema.      Comments: L flank drain   Skin:     Findings: Bruising and lesion present.   Neurological:      Mental Status: She is alert.         MELD 3.0: 17 at 9/21/2024  6:13 PM  MELD-Na: 13 at 9/21/2024  6:13 PM  Calculated from:  Serum Creatinine: 0.6 mg/dL (Using min of 1 mg/dL) at 9/21/2024  6:13 PM  Serum Sodium: 138 mmol/L (Using max of 137 mmol/L) at 9/21/2024  6:13 PM  Total Bilirubin: 2.1 mg/dL at 9/21/2024  2:34 AM  Serum Albumin: 1.2 g/dL (Using min of 1.5 g/dL) at 9/21/2024  2:34 AM  INR(ratio): 1.4 at 9/19/2024  3:19 AM  Age at listing (hypothetical): 49 years  Sex: Female at 9/21/2024  6:13 PM      Significant Labs:  CBC:  Recent Labs   Lab 09/26/24  0313 09/27/24  0627   WBC 10.59 11.88   HGB 10.0* 9.1*   HCT 32.8* 29.6*    450     CMP:  Recent Labs   Lab 09/26/24  0313 09/26/24  0812 09/26/24  1815 09/27/24  0627 09/27/24  1050   *   < > 134* 132* 132*   K 4.2   < > 3.5 3.8 3.7   CL 94*   < > 95 98 93*   CO2 29   < > 29 24 27   *   < > 114* 111* 209*   BUN 9   < > 9 7 6   CREATININE 0.8   < > 0.8 0.7 0.7   CALCIUM 8.2*   < > 8.7 8.4* 8.1*   PROT 6.7  --   --  6.6  --    ALBUMIN 1.4*  --   --  1.3*  --    BILITOT 1.2*  --   --  1.1*  --    ALKPHOS 144*  --   --  134  --    AST 13  --   --  16  --    ALT 12  --   --  12  --    ANIONGAP 10   < > 10 10 12    < > = values in this interval not displayed.     PTINR:  No results for input(s): "INR" in the last 48 hours.    Significant Procedures:   Dobutamine Stress Test with Color Flow: No results found for this or any previous visit.        Assessment/Plan:      * Paraspinal abscess  MSSA endocarditis  MSSA paraspinal abscess  MSSA psoas abscess    Noted to have extensive fluid collection on left extending from T11 through left iliacus muscle and within " the left iliopsoas muscle.  Multifocal collections of air including subcutaneous tissues on the left flank noted.  ID/neurosurgery consulted. s/p L3-L5 laminectomy with epidural abscess evacuation 9/19   Ortho consulted. Rec continued abx tx and no further interventions  IR SI joint aspiration and abscess drain placement 9/21. Unable to aspirate joint  ID consulted. On oxacillin    Severe sepsis  See above    Hepatitis C    Hepatitis C antibody positive.  Obtain HCV RNA. Quant negative    Anemia, unspecified  S/p 2u PRBC transfusion on admit . NO signs of acute GI bleed on exam at this time. Denies any hematemesis or hemoptysis.   Obtain iron B12/folate/peripheral smear and LDH/hapto/retic  No evidence of active bleed   Stable    Uncontrolled type 2 diabetes mellitus with hyperglycemia  Patient's FSGs are uncontrolled due to hyperglycemia on current medication regimen.  Last A1c reviewed-   Lab Results   Component Value Date    HGBA1C 7.9 (H) 09/17/2024     Most recent fingerstick glucose reviewed-   Recent Labs   Lab 09/26/24  1948 09/27/24  0744 09/27/24  1142 09/27/24  1623   POCTGLUCOSE 146* 110 203* 119*       Current correctional scale  Medium  Maintain anti-hyperglycemic dose as follows-   Antihyperglycemics (From admission, onward)      Start     Stop Route Frequency Ordered    09/23/24 1033  insulin aspart U-100 pen 0-10 Units         -- SubQ Before meals & nightly PRN 09/23/24 0933          Hold Oral hypoglycemics while patient is in the hospital.    Smoker  PRN nicotine patch    History of drug use  On methadone at home, continue    Psoas muscle abscess  IR drain. Leave drain in place until less than 10 cc of fluid is aspirated daily for 2 days.   As above    Other osteomyelitis, multiple sites  As above        VTE Risk Mitigation (From admission, onward)           Ordered     enoxaparin injection 40 mg  Every 24 hours         09/23/24 1234     IP VTE HIGH RISK PATIENT  Once         09/22/24 5232                     Discharge Planning   LUH: 9/30/2024     Code Status: Full Code   Is the patient medically ready for discharge?:     Reason for patient still in hospital (select all that apply): Patient trending condition and Treatment  Discharge Plan A: Long-term acute care facility (LTAC)   Discharge Delays: None known at this time              Anastasia German MD  Department of Hospital Medicine   [facility] ([place])

## 2024-09-28 NOTE — PT/OT/SLP PROGRESS
Physical Therapy      Patient Name:  Mari Sloan   MRN:  [id number]    Patient not seen today secondary to Patient unwilling to participate. Upon 1st attempt, pt reported stomach not feeling well and recently returned from CT scan. Pt denies nausea and did not clarify further about discomfort. Therapist educated about the importance of working with therapy and as pt required increase assistance yesterday during therapy session. Pt continues to decline and reported she was able to take steps to wheelchair with nursing staff earlier. Per pt's nurse, pt took ~2 steps requiring 2 person assistance with increase difficulty. Writing therapist unable to return for additional attempts. Will follow-up as schedule per PT POC.    09/28/2024

## 2024-09-28 NOTE — ASSESSMENT & PLAN NOTE
Patient's FSGs are uncontrolled due to hyperglycemia on current medication regimen.  Last A1c reviewed-   Lab Results   Component Value Date    HGBA1C 7.9 (H) 09/17/2024     Most recent fingerstick glucose reviewed-   Recent Labs   Lab 09/27/24  1923 09/27/24  2132 09/28/24  0837 09/28/24  1247   POCTGLUCOSE 163* 143* 131* 233*       Current correctional scale  Medium  Maintain anti-hyperglycemic dose as follows-   Antihyperglycemics (From admission, onward)    Start     Stop Route Frequency Ordered    09/23/24 1033  insulin aspart U-100 pen 0-10 Units         -- SubQ Before meals & nightly PRN 09/23/24 0933        Hold Oral hypoglycemics while patient is in the hospital.   89M, Valerie speaking only, PMH HTN, HLD, T2DM, recent hospitalization (d/c'd 6/5/19) for hyponatremia, lactic acidosis, SMA stenosis, pw hyponatremia, lactic acidosis, abdominal pain, hyperglycemia presented with nausea, vomiting and abdominal pain and was admitted for hyponatremia and metabolic acidosis.

## 2024-09-28 NOTE — PLAN OF CARE
Problem: Infection  Goal: Absence of Infection Signs and Symptoms  Outcome: Progressing     Problem: Diabetes Comorbidity  Goal: Blood Glucose Level Within Targeted Range  Outcome: Progressing     Problem: Sepsis/Septic Shock  Goal: Optimal Coping  Outcome: Progressing  Goal: Absence of Bleeding  Outcome: Progressing  Goal: Blood Glucose Level Within Targeted Range  Outcome: Progressing  Goal: Absence of Infection Signs and Symptoms  Outcome: Progressing  Goal: Optimal Nutrition Intake  Outcome: Progressing     Patient alert and oriented. VSS. Room air. CT of abd. Oxacillin continuous. PRN oxy q4.

## 2024-09-28 NOTE — SUBJECTIVE & OBJECTIVE
Interval History: No acute events. Afebrile.  KUB suggestive of ileus versus obstruction, patient reports passing gas.  Enema offered, patient deferred and would like to wait.  Patient had leakage around the drain, discussed with IR, drain was taken out.  CT was ordered to check for interval changes.      Review of Systems  Objective:     Vital Signs (Most Recent):  Temp: 98.9 °F (37.2 °C) (09/27/24 1628)  Pulse: 98 (09/27/24 1628)  Resp: 18 (09/27/24 1628)  BP: (!) 134/90 (09/27/24 1628)  SpO2: (!) 94 % (09/27/24 1750) Vital Signs (24h Range):  Temp:  [97.8 °F (36.6 °C)-98.9 °F (37.2 °C)] 98.9 °F (37.2 °C)  Pulse:  [] 98  Resp:  [16-20] 18  SpO2:  [90 %-95 %] 94 %  BP: (105-134)/(67-96) 134/90     Weight: 65.2 kg (143 lb 11.8 oz)  Body mass index is 26.29 kg/m².    Intake/Output Summary (Last 24 hours) at 9/27/2024 1919  Last data filed at 9/27/2024 1859  Gross per 24 hour   Intake 354 ml   Output 905 ml   Net -551 ml      Physical Exam  Constitutional:       General: She is not in acute distress.     Appearance: She is not toxic-appearing.   Cardiovascular:      Rate and Rhythm: Normal rate and regular rhythm.      Heart sounds: No murmur heard.     No friction rub. No gallop.   Pulmonary:      Effort: Pulmonary effort is normal. No respiratory distress.      Breath sounds: Normal breath sounds.   Abdominal:      General: Abdomen is flat. There is no distension.      Palpations: Abdomen is soft.      Tenderness: There is no abdominal tenderness. There is no guarding or rebound.   Musculoskeletal:         General: Swelling and tenderness present.      Right lower leg: No edema.      Left lower leg: No edema.      Comments: L flank drain   Skin:     Findings: Bruising and lesion present.   Neurological:      Mental Status: She is alert.         MELD 3.0: 17 at 9/21/2024  6:13 PM  MELD-Na: 13 at 9/21/2024  6:13 PM  Calculated from:  Serum Creatinine: 0.6 mg/dL (Using min of 1 mg/dL) at 9/21/2024  6:13 PM  Serum  "Sodium: 138 mmol/L (Using max of 137 mmol/L) at 9/21/2024  6:13 PM  Total Bilirubin: 2.1 mg/dL at 9/21/2024  2:34 AM  Serum Albumin: 1.2 g/dL (Using min of 1.5 g/dL) at 9/21/2024  2:34 AM  INR(ratio): 1.4 at 9/19/2024  3:19 AM  Age at listing (hypothetical): 49 years  Sex: Female at 9/21/2024  6:13 PM      Significant Labs:  CBC:  Recent Labs   Lab 09/26/24 0313 09/27/24 0627   WBC 10.59 11.88   HGB 10.0* 9.1*   HCT 32.8* 29.6*    450     CMP:  Recent Labs   Lab 09/26/24 0313 09/26/24  0812 09/26/24  1815 09/27/24 0627 09/27/24  1050   *   < > 134* 132* 132*   K 4.2   < > 3.5 3.8 3.7   CL 94*   < > 95 98 93*   CO2 29   < > 29 24 27   *   < > 114* 111* 209*   BUN 9   < > 9 7 6   CREATININE 0.8   < > 0.8 0.7 0.7   CALCIUM 8.2*   < > 8.7 8.4* 8.1*   PROT 6.7  --   --  6.6  --    ALBUMIN 1.4*  --   --  1.3*  --    BILITOT 1.2*  --   --  1.1*  --    ALKPHOS 144*  --   --  134  --    AST 13  --   --  16  --    ALT 12  --   --  12  --    ANIONGAP 10   < > 10 10 12    < > = values in this interval not displayed.     PTINR:  No results for input(s): "INR" in the last 48 hours.    Significant Procedures:   Dobutamine Stress Test with Color Flow: No results found for this or any previous visit.      "

## 2024-09-28 NOTE — NURSING
End of shift summary: Patient AAOx4. VSS. SPO2 wnl on room air. C/o tenderness, redness and pain reported at LL back. Oxy 10 given for pain management per MAR. Abd is distended, non tender, no pain reported. Scheduled Joanne lax and Senna given.No BM on overnight shift. Purewick in place and collecting dark, susie urine.  remains at bedside. Bed is locked and in low position. No known needs at this time. Call light is within reach.

## 2024-09-28 NOTE — PROGRESS NOTES
[facility] ([place] [id number])  [facility] Medicine  Progress Note    Patient Name: Mari Sloan  MRN: [id number]  Patient Class: IP- Inpatient   Admission Date: 9/16/2024  Length of Stay: 12 days  Attending Physician: Anastasia German MD  Primary Care Provider: Liliana, Primary Doctor        Subjective:     Principal Problem:Paraspinal abscess        HPI:  49 y.o.  woman with h/o homelessness (recently was able to obtain living arrangements but states she was living [place] the bridge), NIDDM type 2, Essential hypertension, and substance use (denies any IVDU in he past or any illicit drug use recenly, denies ETOH abuse/overuse) presents to [facility] for further evaluation of her back pain.  She apparently presented to the [facility] Emergency Department on September 16 with nausea and vomiting and a mechanical fall 5 days prior. She reported pain worse in her back and on her sides radiating down both legs. She noted muscle spasm in her back and legs. She had no head trauma or loss of consciousness.  W/u in the [facility] ED noted significant hypokalemia, hypomagnesemia, severe anemia, metabolic alkalosis, and hyperglycemia. Vitals signs stable with no sepsis at this time noted. SBP>90, HR normal,afebrile.     Imaging studies of her lumbar spine and pelvis were concerning for osteomyelitis/septic arthritis of the left SI joint and L5-S1 along with an abnormal fluid collection extending from T11 through the left iliacus muscle concerning for abscess. Blood cultures sent.  In the emergency department she is receiving IV fluid, Zofran, Zosyn, vancomycin, potassium, magnesium, pain medication, and nausea medication.   She also received 2 units of PRBC for an H/H of 5.7/18.4,     Case discussed with Neurosurgery and Interventional Radiology. Plan would be for transfer to Hospital Medicine at [facility] for IR evaluation of the fluid collection and potential sampling of the joint  "osteomyelitis.  I reviewed the discussions made with the multiple sub specialists regarding transfer of this patient to [facility]: IR/General surgery/Neurosurgery/ER/Internal Med.     Neurosurgery contacted by the  did not feel surgical intervention was needed at this time but would follow along and requested [facility] for further management and IR backup. IR on call apparently read the CT and at the time felt "while the left retroperitoneal fluid collections do appear organized, percutaneous drainage is not advised given the extensive soft tissue infection superficial to these collections.  In addition, there is extensive evidence of soft tissue infection that does not appear organized or percutaneously drainable."     General surgery was consulted to review the case and felt that there was no immediate surgical intervention at this time to be had. I spoke with the on surgeon contacted regarding the location of the fluid collections and inquired if perhaps orthopedic surgery should be consulted to review given the involvement of bony pelvis.      I spoke orthopedic surgery who will see the patient but recommended re-consulting IR here and Neurosurgery given the diskitis/OM L5-S1. (Please see his consult note in the chart)     Repeat labs here again noted for persistently low mag, K, phos.  H/H stable with improved H/H. Pain control improved with Dilaudid.  I consulted ID for further assistance with ABX adjustments and changed her to Meropenem and doxy as well as continued Vanc. Echo ordred for am.     CT lumbar spine and pelvis had findings most concerning for infectious process. There were findings concerning for osteomyelitis and septic arthritis in the left SI joint. Findings concerning for osteomyelitis/diskitis L5-S1. Possible osteomyelitis and septic joint at the pubic symphysis. Abnormal fluid collection on the left extending from T11 through the left iliacus muscle along and within the " left iliopsoas muscle most concerning for abscess. Multiple additional small abscesses suspected in the pelvis. Multifocal collections of air in the fluid in the subcutaneous tissues of the left flank, incompletely imaged.    Chest x-ray noted a loop in the central venous catheter. Tip currently at the level of the brachiocephalic vein. Lungs are fairly clear.        Overview/Hospital Course:  49 y.o. female, with history of homelessness (recently was able to obtain living arrangements but states she was living [place] the bridge), NIDDM type 2, Essential hypertension, and substance use (denies IVDU) who was transferred from [facility] ED [facility]16/[facility].  She presented to [facility] ED  for back pain, nausea/vomiting.  CT L-spine revealed osteomyelitis/diskitis L5-S1 along with abnormal fluid collection on left extending from T11 through left iliacus muscle and left iliopsoas muscle concerning for abscess.  Multifocal collections of air in fluid in subcutaneous tissues of left flank.  Patient was initiated on empiric vancomycin and meropenem.  Blood cultures with staph aureus.  Obtain echo.  Unable to repeat blood cultures given shortage of cx.  Neurosurgery recommended to obtain MRI L-spine, pending.  General surgery evaluated the patient and recommended urgent transfer to [facility] for surgical evaluation/source control given extent of necrosis.  Transfer process initiated.      Patient was transferred to [facility] and admitted to the MICU 9/18 with concern for paraspinal abscess. Infectious workup was ordered. Blood cultures were positive for MSSA bacteremia. Neurosurgery, general surgery and IR were consulted to evaluate the patient's paraspinal abscess. Neurosurgery performed a L3-L4 laminectomy for epidural abscess evacuation on 9/19/24 and the cultures grew MSSA. TTE showed normal EF of 60-65% with diastolic dysfunction, could not exclude mitral vegetation vs redundant chordae. ID was  consulted and recommended oxacillin and repeat blood cultures. With her electrolyte derangement, and a background of appetitive loss in the past 2 months, there was concern for refeeding syndrome. On 9/21, IR took the patient for abscess drain placement and aspiration of SI joint. Pending culture results. SI joint couldn't be aspirated but retroperitoneal abscess was drained of 100cc of purulent fluid. Successfully extubated 9/22.   Patient was stepped down to hospital medicine 9/24/24.     Interval History: No acute events. Afebrile.  Drain removed yesterday.  CT abdomen, read pending.    Review of Systems  Objective:     Vital Signs (Most Recent):  Temp: 98.2 °F (36.8 °C) (09/28/24 1139)  Pulse: 89 (09/28/24 1139)  Resp: 18 (09/28/24 1339)  BP: 108/68 (09/28/24 1139)  SpO2: (!) 93 % (09/28/24 1139) Vital Signs (24h Range):  Temp:  [97.8 °F (36.6 °C)-98.9 °F (37.2 °C)] 98.2 °F (36.8 °C)  Pulse:  [84-98] 89  Resp:  [17-18] 18  SpO2:  [92 %-96 %] 93 %  BP: ()/(55-90) 108/68     Weight: 65.2 kg (143 lb 11.8 oz)  Body mass index is 26.29 kg/m².    Intake/Output Summary (Last 24 hours) at 9/28/2024 1442  Last data filed at 9/28/2024 0628  Gross per 24 hour   Intake 10 ml   Output 1450 ml   Net -1440 ml      Physical Exam  Constitutional:       General: She is not in acute distress.     Appearance: She is not toxic-appearing.   Cardiovascular:      Rate and Rhythm: Normal rate and regular rhythm.      Heart sounds: No murmur heard.     No friction rub. No gallop.   Pulmonary:      Effort: Pulmonary effort is normal. No respiratory distress.      Breath sounds: Normal breath sounds.   Abdominal:      General: Abdomen is flat. There is no distension.      Palpations: Abdomen is soft.      Tenderness: There is no abdominal tenderness. There is no guarding or rebound.   Musculoskeletal:         General: Swelling and tenderness present.      Right lower leg: No edema.      Left lower leg: No edema.      Comments: L  "flank drain   Skin:     Findings: Bruising and lesion present.   Neurological:      Mental Status: She is alert.         MELD 3.0: 17 at 9/21/2024  6:13 PM  MELD-Na: 13 at 9/21/2024  6:13 PM  Calculated from:  Serum Creatinine: 0.6 mg/dL (Using min of 1 mg/dL) at 9/21/2024  6:13 PM  Serum Sodium: 138 mmol/L (Using max of 137 mmol/L) at 9/21/2024  6:13 PM  Total Bilirubin: 2.1 mg/dL at 9/21/2024  2:34 AM  Serum Albumin: 1.2 g/dL (Using min of 1.5 g/dL) at 9/21/2024  2:34 AM  INR(ratio): 1.4 at 9/19/2024  3:19 AM  Age at listing (hypothetical): 49 years  Sex: Female at 9/21/2024  6:13 PM      Significant Labs:  CBC:  Recent Labs   Lab 09/27/24  0627 09/28/24  0420   WBC 11.88 10.72   HGB 9.1* 9.0*   HCT 29.6* 29.1*    394     CMP:  Recent Labs   Lab 09/27/24  0627 09/27/24  1050 09/27/24  1802 09/28/24  0420 09/28/24  0942   *   < > 132* 132* 136   K 3.8   < > 4.5 3.1* 3.5   CL 98   < > 95 97 98   CO2 24   < > 22* 27 26   *   < > 105 119* 124*   BUN 7   < > 5* 5* 5*   CREATININE 0.7   < > 0.7 0.7 0.7   CALCIUM 8.4*   < > 8.1* 8.1* 8.3*   PROT 6.6  --   --  6.2  --    ALBUMIN 1.3*  --   --  1.3*  --    BILITOT 1.1*  --   --  1.1*  --    ALKPHOS 134  --   --  133  --    AST 16  --   --  11  --    ALT 12  --   --  10  --    ANIONGAP 10   < > 15 8 12    < > = values in this interval not displayed.     PTINR:  No results for input(s): "INR" in the last 48 hours.    Significant Procedures:   Dobutamine Stress Test with Color Flow: No results found for this or any previous visit.        Assessment/Plan:      * Paraspinal abscess  MSSA endocarditis  MSSA paraspinal abscess  MSSA psoas abscess    Noted to have extensive fluid collection on left extending from T11 through left iliacus muscle and within the left iliopsoas muscle.  Multifocal collections of air including subcutaneous tissues on the left flank noted.  ID/neurosurgery consulted. s/p L3-L5 laminectomy with epidural abscess evacuation 9/19   Ortho " consulted. Rec continued abx tx and no further interventions  IR SI joint aspiration and abscess drain placement 9/21. Unable to aspirate joint  ID consulted. On oxacillin  Drain removed on 09/27.  Repeat CT abdomen ordered to look for recollection of fluid    Severe sepsis  See above    Hepatitis C    Hepatitis C antibody positive.  Obtain HCV RNA. Quant negative    Anemia, unspecified  S/p 2u PRBC transfusion on admit . NO signs of acute GI bleed on exam at this time. Denies any hematemesis or hemoptysis.   Obtain iron B12/folate/peripheral smear and LDH/hapto/retic  No evidence of active bleed   Stable    Uncontrolled type 2 diabetes mellitus with hyperglycemia  Patient's FSGs are uncontrolled due to hyperglycemia on current medication regimen.  Last A1c reviewed-   Lab Results   Component Value Date    HGBA1C 7.9 (H) 09/17/2024     Most recent fingerstick glucose reviewed-   Recent Labs   Lab 09/27/24  1923 09/27/24  2132 09/28/24  0837 09/28/24  1247   POCTGLUCOSE 163* 143* 131* 233*       Current correctional scale  Medium  Maintain anti-hyperglycemic dose as follows-   Antihyperglycemics (From admission, onward)      Start     Stop Route Frequency Ordered    09/23/24 1033  insulin aspart U-100 pen 0-10 Units         -- SubQ Before meals & nightly PRN 09/23/24 0933          Hold Oral hypoglycemics while patient is in the hospital.    Smoker  PRN nicotine patch    History of drug use  On methadone at home, continue    Psoas muscle abscess  IR drain. Leave drain in place until less than 10 cc of fluid is aspirated daily for 2 days.   As above    Other osteomyelitis, multiple sites  As above        VTE Risk Mitigation (From admission, onward)           Ordered     enoxaparin injection 40 mg  Every 24 hours         09/23/24 1234     IP VTE HIGH RISK PATIENT  Once         09/22/24 1553                    Discharge Planning   LUH: 9/30/2024     Code Status: Full Code   Is the patient medically ready for discharge?:      Reason for patient still in hospital (select all that apply): Patient trending condition, Laboratory test, and Treatment  Discharge Plan A: Long-term acute care facility (LTAC)   Discharge Delays: None known at this time              Anastasia German MD  Department of Hospital Medicine   [facility] ([place])

## 2024-09-28 NOTE — PLAN OF CARE
Problem: Skin Injury Risk Increased  Goal: Skin Health and Integrity  9/28/2024 0638 by Halina Villasneor LPN  Outcome: Progressing  9/28/2024 0637 by Halina Villasenor LPN  Outcome: Progressing     Problem: Adult Inpatient Plan of Care  Goal: Plan of Care Review  9/28/2024 0638 by Halina Villasenor LPN  Outcome: Progressing  9/28/2024 0637 by Halina Villasenor LPN  Outcome: Progressing  Goal: Patient-Specific Goal (Individualized)  9/28/2024 0638 by Halina Villasenor LPN  Outcome: Progressing  9/28/2024 0637 by Halina Villasenor LPN  Outcome: Progressing  Goal: Absence of Hospital-Acquired Illness or Injury  9/28/2024 0638 by Halina Villasenor LPN  Outcome: Progressing  9/28/2024 0637 by Halina Villasenor LPN  Outcome: Progressing  Goal: Optimal Comfort and Wellbeing  9/28/2024 0638 by Halina Villasenor LPN  Outcome: Progressing  9/28/2024 0637 by Halina Villasenor LPN  Outcome: Progressing  Goal: Readiness for Transition of Care  9/28/2024 0638 by Halina Villasenor LPN  Outcome: Progressing  9/28/2024 0637 by Halina Villasenor LPN  Outcome: Progressing     Problem: Infection  Goal: Absence of Infection Signs and Symptoms  9/28/2024 0638 by Halina Villasenor LPN  Outcome: Progressing  9/28/2024 0637 by Halina Villasenor LPN  Outcome: Progressing     Problem: Diabetes Comorbidity  Goal: Blood Glucose Level Within Targeted Range  9/28/2024 0638 by Halina Villasenor LPN  Outcome: Progressing  9/28/2024 0637 by Halina Villasenor LPN  Outcome: Progressing     Problem: Sepsis/Septic Shock  Goal: Optimal Coping  9/28/2024 0638 by Halina Villasenor LPN  Outcome: Progressing  9/28/2024 0637 by Halina Villasenor LPN  Outcome: Progressing  Goal: Absence of Bleeding  9/28/2024 0638 by Halina Villasenor LPN  Outcome: Progressing  9/28/2024 0637 by Halina Villasenor LPN  Outcome: Progressing  Goal: Blood Glucose Level Within Targeted Range  9/28/2024 0638 by Halina Villasenor LPN  Outcome: Progressing  9/28/2024 0637 by  Halina Villasenor LPN  Outcome: Progressing  Goal: Absence of Infection Signs and Symptoms  9/28/2024 0638 by Halina Villasenor LPN  Outcome: Progressing  9/28/2024 0637 by Halina Villasenor LPN  Outcome: Progressing  Goal: Optimal Nutrition Intake  9/28/2024 0638 by Halina Villasenor LPN  Outcome: Progressing  9/28/2024 0637 by Halina Villasenor LPN  Outcome: Progressing     Problem: Fall Injury Risk  Goal: Absence of Fall and Fall-Related Injury  9/28/2024 0638 by Halina Villasenor LPN  Outcome: Progressing  9/28/2024 0637 by Halina Villasenor LPN  Outcome: Progressing     Problem: Restraint, Nonviolent  Goal: Absence of Harm or Injury  9/28/2024 0638 by Halina Villasenor LPN  Outcome: Progressing  9/28/2024 0637 by Halina Villasenor LPN  Outcome: Progressing     Problem: Wound  Goal: Optimal Coping  9/28/2024 0638 by Halina Villasenor LPN  Outcome: Progressing  9/28/2024 0637 by Halina Villasenor LPN  Outcome: Progressing  Goal: Optimal Functional Ability  9/28/2024 0638 by Halina Villasenor LPN  Outcome: Progressing  9/28/2024 0637 by Halina Villasenor LPN  Outcome: Progressing  Goal: Absence of Infection Signs and Symptoms  9/28/2024 0638 by Halina Villasenor LPN  Outcome: Progressing  9/28/2024 0637 by Halina Villasenor LPN  Outcome: Progressing  Goal: Improved Oral Intake  9/28/2024 0638 by Halina Villasenor LPN  Outcome: Progressing  9/28/2024 0637 by Halina Villasenor LPN  Outcome: Progressing  Goal: Optimal Pain Control and Function  9/28/2024 0638 by Halina Villasenor LPN  Outcome: Progressing  9/28/2024 0637 by Halina Villasenor LPN  Outcome: Progressing  Goal: Skin Health and Integrity  9/28/2024 0638 by Halina Villasenor LPN  Outcome: Progressing  9/28/2024 0637 by Halina Villasenor LPN  Outcome: Progressing  Goal: Optimal Wound Healing  9/28/2024 0638 by Halina Villasenor LPN  Outcome: Progressing  9/28/2024 0637 by Halina Villasenor LPN  Outcome: Progressing     Problem: Mechanical Ventilation  Invasive  Goal: Effective Communication  9/28/2024 0638 by Halina Villasenor LPN  Outcome: Progressing  9/28/2024 0637 by Halina Villasenor LPN  Outcome: Progressing  Goal: Optimal Device Function  9/28/2024 0638 by Halina Villasenor LPN  Outcome: Progressing  9/28/2024 0637 by Halina Villasenor LPN  Outcome: Progressing  Goal: Mechanical Ventilation Liberation  9/28/2024 0638 by Halina Villasenor LPN  Outcome: Progressing  9/28/2024 0637 by Halina Villasenor LPN  Outcome: Progressing  Goal: Optimal Nutrition Delivery  9/28/2024 0638 by Halina Villasenor LPN  Outcome: Progressing  9/28/2024 0637 by Halina Villasenor LPN  Outcome: Progressing  Goal: Absence of Device-Related Skin and Tissue Injury  9/28/2024 0638 by Halina Villasenor LPN  Outcome: Progressing  9/28/2024 0637 by Halina Villasenor LPN  Outcome: Progressing  Goal: Absence of Ventilator-Induced Lung Injury  9/28/2024 0638 by Halina Villasenor LPN  Outcome: Progressing  9/28/2024 0637 by Halina Villasenor LPN  Outcome: Progressing

## 2024-09-28 NOTE — ASSESSMENT & PLAN NOTE
MSSA endocarditis  MSSA paraspinal abscess  MSSA psoas abscess    Noted to have extensive fluid collection on left extending from T11 through left iliacus muscle and within the left iliopsoas muscle.  Multifocal collections of air including subcutaneous tissues on the left flank noted.  ID/neurosurgery consulted. s/p L3-L5 laminectomy with epidural abscess evacuation 9/19   Ortho consulted. Rec continued abx tx and no further interventions  IR SI joint aspiration and abscess drain placement 9/21. Unable to aspirate joint  ID consulted. On oxacillin  Drain removed on 09/27.  Repeat CT abdomen ordered to look for recollection of fluid

## 2024-09-29 LAB
ALBUMIN SERPL BCP-MCNC: 1.3 G/DL (ref 3.5–5.2)
ALP SERPL-CCNC: 125 U/L (ref 55–135)
ALT SERPL W/O P-5'-P-CCNC: 9 U/L (ref 10–44)
ANION GAP SERPL CALC-SCNC: 10 MMOL/L (ref 8–16)
ANION GAP SERPL CALC-SCNC: 10 MMOL/L (ref 8–16)
AST SERPL-CCNC: 9 U/L (ref 10–40)
BASOPHILS # BLD AUTO: 0.18 K/UL (ref 0–0.2)
BASOPHILS NFR BLD: 1.9 % (ref 0–1.9)
BILIRUB SERPL-MCNC: 1 MG/DL (ref 0.1–1)
BUN SERPL-MCNC: 5 MG/DL (ref 6–20)
BUN SERPL-MCNC: 5 MG/DL (ref 6–20)
CALCIUM SERPL-MCNC: 8.2 MG/DL (ref 8.7–10.5)
CALCIUM SERPL-MCNC: 8.5 MG/DL (ref 8.7–10.5)
CHLORIDE SERPL-SCNC: 98 MMOL/L (ref 95–110)
CHLORIDE SERPL-SCNC: 99 MMOL/L (ref 95–110)
CO2 SERPL-SCNC: 23 MMOL/L (ref 23–29)
CO2 SERPL-SCNC: 27 MMOL/L (ref 23–29)
CREAT SERPL-MCNC: 0.6 MG/DL (ref 0.5–1.4)
CREAT SERPL-MCNC: 0.7 MG/DL (ref 0.5–1.4)
DIFFERENTIAL METHOD BLD: ABNORMAL
EOSINOPHIL # BLD AUTO: 0.2 K/UL (ref 0–0.5)
EOSINOPHIL NFR BLD: 1.7 % (ref 0–8)
ERYTHROCYTE [DISTWIDTH] IN BLOOD BY AUTOMATED COUNT: 19.8 % (ref 11.5–14.5)
EST. GFR  (NO RACE VARIABLE): >60 ML/MIN/1.73 M^2
EST. GFR  (NO RACE VARIABLE): >60 ML/MIN/1.73 M^2
GLUCOSE SERPL-MCNC: 122 MG/DL (ref 70–110)
GLUCOSE SERPL-MCNC: 97 MG/DL (ref 70–110)
HCT VFR BLD AUTO: 30.8 % (ref 37–48.5)
HGB BLD-MCNC: 8.9 G/DL (ref 12–16)
IMM GRANULOCYTES # BLD AUTO: 0.35 K/UL (ref 0–0.04)
IMM GRANULOCYTES NFR BLD AUTO: 3.6 % (ref 0–0.5)
LYMPHOCYTES # BLD AUTO: 2.9 K/UL (ref 1–4.8)
LYMPHOCYTES NFR BLD: 29.9 % (ref 18–48)
MCH RBC QN AUTO: 25.9 PG (ref 27–31)
MCHC RBC AUTO-ENTMCNC: 28.9 G/DL (ref 32–36)
MCV RBC AUTO: 90 FL (ref 82–98)
MONOCYTES # BLD AUTO: 0.6 K/UL (ref 0.3–1)
MONOCYTES NFR BLD: 6 % (ref 4–15)
NEUTROPHILS # BLD AUTO: 5.5 K/UL (ref 1.8–7.7)
NEUTROPHILS NFR BLD: 56.9 % (ref 38–73)
NRBC BLD-RTO: 0 /100 WBC
PLATELET # BLD AUTO: 402 K/UL (ref 150–450)
PMV BLD AUTO: 9.7 FL (ref 9.2–12.9)
POCT GLUCOSE: 113 MG/DL (ref 70–110)
POCT GLUCOSE: 122 MG/DL (ref 70–110)
POCT GLUCOSE: 140 MG/DL (ref 70–110)
POCT GLUCOSE: 161 MG/DL (ref 70–110)
POTASSIUM SERPL-SCNC: 3.7 MMOL/L (ref 3.5–5.1)
POTASSIUM SERPL-SCNC: 4.2 MMOL/L (ref 3.5–5.1)
PROT SERPL-MCNC: 6.2 G/DL (ref 6–8.4)
RBC # BLD AUTO: 3.43 M/UL (ref 4–5.4)
SODIUM SERPL-SCNC: 132 MMOL/L (ref 136–145)
SODIUM SERPL-SCNC: 135 MMOL/L (ref 136–145)
WBC # BLD AUTO: 9.62 K/UL (ref 3.9–12.7)

## 2024-09-29 PROCEDURE — 25000003 PHARM REV CODE 250: Performed by: INTERNAL MEDICINE

## 2024-09-29 PROCEDURE — 80053 COMPREHEN METABOLIC PANEL: CPT | Performed by: STUDENT IN AN ORGANIZED HEALTH CARE EDUCATION/TRAINING PROGRAM

## 2024-09-29 PROCEDURE — 20600001 HC STEP DOWN PRIVATE ROOM

## 2024-09-29 PROCEDURE — 80048 BASIC METABOLIC PNL TOTAL CA: CPT | Mod: XB | Performed by: STUDENT IN AN ORGANIZED HEALTH CARE EDUCATION/TRAINING PROGRAM

## 2024-09-29 PROCEDURE — 63600175 PHARM REV CODE 636 W HCPCS: Performed by: STUDENT IN AN ORGANIZED HEALTH CARE EDUCATION/TRAINING PROGRAM

## 2024-09-29 PROCEDURE — 25000003 PHARM REV CODE 250: Performed by: STUDENT IN AN ORGANIZED HEALTH CARE EDUCATION/TRAINING PROGRAM

## 2024-09-29 PROCEDURE — 36415 COLL VENOUS BLD VENIPUNCTURE: CPT | Performed by: STUDENT IN AN ORGANIZED HEALTH CARE EDUCATION/TRAINING PROGRAM

## 2024-09-29 PROCEDURE — 85025 COMPLETE CBC W/AUTO DIFF WBC: CPT | Performed by: STUDENT IN AN ORGANIZED HEALTH CARE EDUCATION/TRAINING PROGRAM

## 2024-09-29 PROCEDURE — A4216 STERILE WATER/SALINE, 10 ML: HCPCS | Performed by: STUDENT IN AN ORGANIZED HEALTH CARE EDUCATION/TRAINING PROGRAM

## 2024-09-29 PROCEDURE — 25000003 PHARM REV CODE 250

## 2024-09-29 RX ADMIN — GABAPENTIN 300 MG: 300 CAPSULE ORAL at 08:09

## 2024-09-29 RX ADMIN — Medication 10 ML: at 06:09

## 2024-09-29 RX ADMIN — GABAPENTIN 300 MG: 300 CAPSULE ORAL at 03:09

## 2024-09-29 RX ADMIN — METHADONE HYDROCHLORIDE 70 MG: 10 TABLET ORAL at 09:09

## 2024-09-29 RX ADMIN — Medication 100 MG: at 09:09

## 2024-09-29 RX ADMIN — Medication 10 ML: at 12:09

## 2024-09-29 RX ADMIN — OXYCODONE HYDROCHLORIDE 10 MG: 10 TABLET ORAL at 03:09

## 2024-09-29 RX ADMIN — SENNOSIDES AND DOCUSATE SODIUM 1 TABLET: 50; 8.6 TABLET ORAL at 09:09

## 2024-09-29 RX ADMIN — GABAPENTIN 300 MG: 300 CAPSULE ORAL at 09:09

## 2024-09-29 RX ADMIN — FOLIC ACID 1 MG: 1 TABLET ORAL at 09:09

## 2024-09-29 RX ADMIN — Medication 10 ML: at 01:09

## 2024-09-29 RX ADMIN — Medication 10 ML: at 11:09

## 2024-09-29 RX ADMIN — OXYCODONE HYDROCHLORIDE 10 MG: 10 TABLET ORAL at 09:09

## 2024-09-29 RX ADMIN — ACETAMINOPHEN 1000 MG: 500 TABLET ORAL at 11:09

## 2024-09-29 RX ADMIN — OXYCODONE HYDROCHLORIDE 10 MG: 10 TABLET ORAL at 12:09

## 2024-09-29 RX ADMIN — HYDROXYZINE PAMOATE 25 MG: 25 CAPSULE ORAL at 08:09

## 2024-09-29 RX ADMIN — OXACILLIN 12 G: 2 INJECTION, POWDER, FOR SOLUTION INTRAMUSCULAR; INTRAVENOUS at 01:09

## 2024-09-29 RX ADMIN — SENNOSIDES AND DOCUSATE SODIUM 1 TABLET: 50; 8.6 TABLET ORAL at 08:09

## 2024-09-29 RX ADMIN — OXYCODONE HYDROCHLORIDE 10 MG: 10 TABLET ORAL at 08:09

## 2024-09-29 NOTE — ASSESSMENT & PLAN NOTE
MSSA endocarditis  MSSA paraspinal abscess  MSSA psoas abscess    Noted to have extensive fluid collection on left extending from T11 through left iliacus muscle and within the left iliopsoas muscle.  Multifocal collections of air including subcutaneous tissues on the left flank noted.  ID/neurosurgery consulted. s/p L3-L5 laminectomy with epidural abscess evacuation 9/19   Ortho consulted. Rec continued abx tx and no further interventions  IR SI joint aspiration and abscess drain placement 9/21. Unable to aspirate joint  ID consulted. On oxacillin  Drain removed on 09/27.  Repeat CT abdomen ordered to look for recollection of fluid.  Ill-defined subcutaneous edema and soft tissue inflammatory change in the surgical bed and subcutaneous soft tissues without discrete organized fluid collection,  Stable size of 3.6 cm fluid collection along the left posterior pararenal space which appears to communicate with the with the aforementioned collection and fascial thickening. IR planning to drain retroperitoneal fluid.

## 2024-09-29 NOTE — PROGRESS NOTES
[facility] ([place] [address])  [facility] Medicine  Progress Note    Patient Name: Mari Sloan  MRN: [id number]  Patient Class: IP- Inpatient   Admission Date: 9/16/2024  Length of Stay: 13 days  Attending Physician: Anastasia German MD  Primary Care Provider: Liliana, Primary Doctor        Subjective:     Principal Problem:Paraspinal abscess        HPI:  49 y.o.  woman with h/o homelessness (recently was able to obtain living arrangements but states she was living [place] the bridge), NIDDM type 2, Essential hypertension, and substance use (denies any IVDU in he past or any illicit drug use recenly, denies ETOH abuse/overuse) presents to [facility] for further evaluation of her back pain.  She apparently presented to the [facility] Emergency Department on September 16 with nausea and vomiting and a mechanical fall 5 days prior. She reported pain worse in her back and on her sides radiating down both legs. She noted muscle spasm in her back and legs. She had no head trauma or loss of consciousness.  W/u in the [facility] ED noted significant hypokalemia, hypomagnesemia, severe anemia, metabolic alkalosis, and hyperglycemia. Vitals signs stable with no sepsis at this time noted. SBP>90, HR normal,afebrile.     Imaging studies of her lumbar spine and pelvis were concerning for osteomyelitis/septic arthritis of the left SI joint and L5-S1 along with an abnormal fluid collection extending from T11 through the left iliacus muscle concerning for abscess. Blood cultures sent.  In the emergency department she is receiving IV fluid, Zofran, Zosyn, vancomycin, potassium, magnesium, pain medication, and nausea medication.   She also received 2 units of PRBC for an H/H of 5.7/18.4,     Case discussed with Neurosurgery and Interventional Radiology. Plan would be for transfer to Hospital Medicine at [facility] for IR evaluation of the fluid collection and potential sampling of the joint  "osteomyelitis.  I reviewed the discussions made with the multiple sub specialists regarding transfer of this patient to [facility]: IR/General surgery/Neurosurgery/ER/Internal Med.     Neurosurgery contacted by the  did not feel surgical intervention was needed at this time but would follow along and requested [facility] for further management and IR backup. IR on call apparently read the CT and at the time felt "while the left retroperitoneal fluid collections do appear organized, percutaneous drainage is not advised given the extensive soft tissue infection superficial to these collections.  In addition, there is extensive evidence of soft tissue infection that does not appear organized or percutaneously drainable."     General surgery was consulted to review the case and felt that there was no immediate surgical intervention at this time to be had. I spoke with the on surgeon contacted regarding the location of the fluid collections and inquired if perhaps orthopedic surgery should be consulted to review given the involvement of bony pelvis.      I spoke orthopedic surgery who will see the patient but recommended re-consulting IR here and Neurosurgery given the diskitis/OM L5-S1. (Please see his consult note in the chart)     Repeat labs here again noted for persistently low mag, K, phos.  H/H stable with improved H/H. Pain control improved with Dilaudid.  I consulted ID for further assistance with ABX adjustments and changed her to Meropenem and doxy as well as continued Vanc. Echo ordred for am.     CT lumbar spine and pelvis had findings most concerning for infectious process. There were findings concerning for osteomyelitis and septic arthritis in the left SI joint. Findings concerning for osteomyelitis/diskitis L5-S1. Possible osteomyelitis and septic joint at the pubic symphysis. Abnormal fluid collection on the left extending from T11 through the left iliacus muscle along and within the " left iliopsoas muscle most concerning for abscess. Multiple additional small abscesses suspected in the pelvis. Multifocal collections of air in the fluid in the subcutaneous tissues of the left flank, incompletely imaged.    Chest x-ray noted a loop in the central venous catheter. Tip currently at the level of the brachiocephalic vein. Lungs are fairly clear.        Overview/Hospital Course:  49 y.o. female, with history of homelessness (recently was able to obtain living arrangements but states she was living [place] the bridge), NIDDM type 2, Essential hypertension, and substance use (denies IVDU) who was transferred from [facility] ED to [facility] ICU on 09/16/2024.  She presented to [facility] ED  for back pain, nausea/vomiting.  CT L-spine revealed osteomyelitis/diskitis L5-S1 along with abnormal fluid collection on left extending from T11 through left iliacus muscle and left iliopsoas muscle concerning for abscess.  Multifocal collections of air in fluid in subcutaneous tissues of left flank.  Patient was initiated on empiric vancomycin and meropenem.  Blood cultures with staph aureus.  Obtain echo.  Unable to repeat blood cultures given shortage of cx.  Neurosurgery recommended to obtain MRI L-spine, pending.  General surgery evaluated the patient and recommended urgent transfer to [facility] for surgical evaluation/source control given extent of necrosis.  Transfer process initiated.      Patient was transferred to [facility] and admitted to the MICU 9/18 with concern for paraspinal abscess. Infectious workup was ordered. Blood cultures were positive for MSSA bacteremia. Neurosurgery, general surgery and IR were consulted to evaluate the patient's paraspinal abscess. Neurosurgery performed a L3-L4 laminectomy for epidural abscess evacuation on 9/19/24 and the cultures grew MSSA. TTE showed normal EF of 60-65% with diastolic dysfunction, could not exclude mitral vegetation vs redundant chordae. ID was  consulted and recommended oxacillin and repeat blood cultures. With her electrolyte derangement, and a background of appetitive loss in the past 2 months, there was concern for refeeding syndrome. On 9/21, IR took the patient for abscess drain placement and aspiration of SI joint. Pending culture results. SI joint couldn't be aspirated but retroperitoneal abscess was drained of 100cc of purulent fluid. Successfully extubated 9/22.   Patient was stepped down to hospital medicine 9/24/24.   Drain was removed on 09/27.  Repeat CT Ill-defined subcutaneous edema and soft tissue inflammatory change in the surgical bed and subcutaneous soft tissues without discrete organized fluid collection,  Stable size of 3.6 cm fluid collection along the left posterior pararenal space which appears to communicate with the with the aforementioned collection and fascial thickening. IR planning to drain retroperitoneal fluid.        Interval History: No acute events. Afebrile.  Planning for IR guided drainage of retroperitoneal fluid. Npo after midnight.    Review of Systems  Objective:     Vital Signs (Most Recent):  Temp: 98.4 °F (36.9 °C) (09/29/24 1142)  Pulse: 65 (09/29/24 1142)  Resp: 16 (09/29/24 1142)  BP: (!) 83/54 (09/29/24 1142)  SpO2: (!) 81 % (09/29/24 1142) Vital Signs (24h Range):  Temp:  [97.7 °F (36.5 °C)-98.7 °F (37.1 °C)] 98.4 °F (36.9 °C)  Pulse:  [64-94] 65  Resp:  [16-20] 16  SpO2:  [81 %-96 %] 81 %  BP: ()/(54-86) 83/54     Weight: 65.2 kg (143 lb 11.8 oz)  Body mass index is 26.29 kg/m².    Intake/Output Summary (Last 24 hours) at 9/29/2024 1223  Last data filed at 9/29/2024 0106  Gross per 24 hour   Intake --   Output 450 ml   Net -450 ml      Physical Exam  Constitutional:       General: She is not in acute distress.     Appearance: She is not toxic-appearing.   Cardiovascular:      Rate and Rhythm: Normal rate and regular rhythm.      Heart sounds: No murmur heard.     No friction rub. No gallop.  "  Pulmonary:      Effort: Pulmonary effort is normal. No respiratory distress.      Breath sounds: Normal breath sounds.   Abdominal:      General: Abdomen is flat. There is no distension.      Palpations: Abdomen is soft.      Tenderness: There is no abdominal tenderness. There is no guarding or rebound.   Musculoskeletal:         General: Swelling and tenderness present.      Right lower leg: No edema.      Left lower leg: No edema.      Comments: L flank drain   Skin:     Findings: Bruising and lesion present.   Neurological:      Mental Status: She is alert.         MELD 3.0: 17 at 9/21/2024  6:13 PM  MELD-Na: 13 at 9/21/2024  6:13 PM  Calculated from:  Serum Creatinine: 0.6 mg/dL (Using min of 1 mg/dL) at 9/21/2024  6:13 PM  Serum Sodium: 138 mmol/L (Using max of 137 mmol/L) at 9/21/2024  6:13 PM  Total Bilirubin: 2.1 mg/dL at 9/21/2024  2:34 AM  Serum Albumin: 1.2 g/dL (Using min of 1.5 g/dL) at 9/21/2024  2:34 AM  INR(ratio): 1.4 at 9/19/2024  3:19 AM  Age at listing (hypothetical): 49 years  Sex: Female at 9/21/2024  6:13 PM      Significant Labs:  CBC:  Recent Labs   Lab 09/28/24  0420 09/29/24  0731   WBC 10.72 9.62   HGB 9.0* 8.9*   HCT 29.1* 30.8*    402     CMP:  Recent Labs   Lab 09/28/24  0420 09/28/24  0942 09/28/24  1800 09/29/24  0731   * 136 135* 135*   K 3.1* 3.5 3.7 3.7   CL 97 98 100 98   CO2 27 26 23 27   * 124* 146* 97   BUN 5* 5* 5* 5*   CREATININE 0.7 0.7 0.7 0.6   CALCIUM 8.1* 8.3* 8.3* 8.2*   PROT 6.2  --   --  6.2   ALBUMIN 1.3*  --   --  1.3*   BILITOT 1.1*  --   --  1.0   ALKPHOS 133  --   --  125   AST 11  --   --  9*   ALT 10  --   --  9*   ANIONGAP 8 12 12 10     PTINR:  No results for input(s): "INR" in the last 48 hours.    Significant Procedures:   Dobutamine Stress Test with Color Flow: No results found for this or any previous visit.        Assessment/Plan:      * Paraspinal abscess  MSSA endocarditis  MSSA paraspinal abscess  MSSA psoas abscess    Noted to " have extensive fluid collection on left extending from T11 through left iliacus muscle and within the left iliopsoas muscle.  Multifocal collections of air including subcutaneous tissues on the left flank noted.  ID/neurosurgery consulted. s/p L3-L5 laminectomy with epidural abscess evacuation 9/19   Ortho consulted. Rec continued abx tx and no further interventions  IR SI joint aspiration and abscess drain placement 9/21. Unable to aspirate joint  ID consulted. On oxacillin  Drain removed on 09/27.  Repeat CT abdomen ordered to look for recollection of fluid.  Ill-defined subcutaneous edema and soft tissue inflammatory change in the surgical bed and subcutaneous soft tissues without discrete organized fluid collection,  Stable size of 3.6 cm fluid collection along the left posterior pararenal space which appears to communicate with the with the aforementioned collection and fascial thickening. IR planning to drain retroperitoneal fluid.    Severe sepsis  See above    Hepatitis C    Hepatitis C antibody positive.  Obtain HCV RNA. Quant negative    Anemia, unspecified  S/p 2u PRBC transfusion on admit . NO signs of acute GI bleed on exam at this time. Denies any hematemesis or hemoptysis.   Obtain iron B12/folate/peripheral smear and LDH/hapto/retic  No evidence of active bleed   Stable    Uncontrolled type 2 diabetes mellitus with hyperglycemia  Patient's FSGs are uncontrolled due to hyperglycemia on current medication regimen.  Last A1c reviewed-   Lab Results   Component Value Date    HGBA1C 7.9 (H) 09/17/2024     Most recent fingerstick glucose reviewed-   Recent Labs   Lab 09/27/24  1923 09/27/24  2132 09/28/24  0837 09/28/24  1247   POCTGLUCOSE 163* 143* 131* 233*       Current correctional scale  Medium  Maintain anti-hyperglycemic dose as follows-   Antihyperglycemics (From admission, onward)      Start     Stop Route Frequency Ordered    09/23/24 1033  insulin aspart U-100 pen 0-10 Units         -- SubQ  Before meals & nightly PRN 09/23/24 0933          Hold Oral hypoglycemics while patient is in the hospital.    Smoker  PRN nicotine patch    History of drug use  On methadone at home, continue    Psoas muscle abscess  IR drain. Leave drain in place until less than 10 cc of fluid is aspirated daily for 2 days.   As above    Other osteomyelitis, multiple sites  As above        VTE Risk Mitigation (From admission, onward)           Ordered     enoxaparin injection 40 mg  Every 24 hours         09/23/24 1234     IP VTE HIGH RISK PATIENT  Once         09/22/24 1553                    Discharge Planning   LUH: 9/30/2024     Code Status: Full Code   Is the patient medically ready for discharge?:     Reason for patient still in hospital (select all that apply): Patient trending condition, Laboratory test, Treatment, Imaging, and Consult recommendations  Discharge Plan A: Long-term acute care facility (LTAC)   Discharge Delays: None known at this time              Anastasia German MD  Department of Hospital Medicine   [facility] ([place])

## 2024-09-29 NOTE — NURSING
End of shift summary: AAOx4. VSS. Spo2 wnl on room air.C/o tenderness and pain on LL side. PRN oxy 10 mg was given. Full relief obtained. Assessed purewick. Scant vaginal bleeding noted. Patient stated that she isn't on her menstrual  cycle and not sure why she's having vaginal bleeding. Perineum assessed. Breakdown on outside of labia. Cleansed perineum area and changed purewick and in place. No discomfort reported. No BM on PM shift.  remains at bedside. Bed is locked and in low position. No known needs at this time.

## 2024-09-29 NOTE — CONSULTS
Inpatient Radiology Pre-procedure Note    History of Present Illness:  Mari Sloan is a 49 y.o. female who presents for retroperitoneal fluid collection.    Admission H&P reviewed.  Past Medical History:   Diagnosis Date    Diabetes mellitus     Hypertension     Unspecified viral hepatitis C without hepatic coma      Past Surgical History:   Procedure Laterality Date     SECTION      LAMINECTOMY N/A 2024    Procedure: L3-L5 laminectomy with epidural abscess evac;  Surgeon: Sourav Jim DO;  Location: [facility] OR [address] [place] [address];  Service: Neurosurgery;  Laterality: N/A;    MAGNETIC RESONANCE IMAGING N/A 2024    Procedure: MRI (Magnetic Resonance Imagine);  Surgeon: Dolores Storey;  Location: [facility];  Service: Anesthesiology;  Laterality: N/A;  conscious sedaation MRI lumbar spine w/ and without contrast    WASHOUT, WOUND, SPINE  2024    Procedure: WASHOUT, WOUND, SPINE;  Surgeon: Sourav Jim DO;  Location: [facility] OR [address] [place] [address];  Service: Neurosurgery;;       Review of Systems:   As documented in primary team H&P    Home Meds:   Prior to Admission medications    Medication Sig Start Date End Date Taking? Authorizing Provider   gabapentin (NEURONTIN) 300 MG capsule Take 300 mg by mouth 3 (three) times daily.   Yes Provider, Historical   hydrOXYzine pamoate (VISTARIL) 25 MG Cap Take 25 mg by mouth 3 (three) times daily.   Yes Provider, Historical   metFORMIN (GLUCOPHAGE) 500 MG tablet Take 500 mg by mouth 2 (two) times daily with meals. 24 Yes Provider, Historical   methadone (DOLOPHINE) 10 MG tablet Take 70 mg by mouth Daily.   Yes Provider, Historical   cloNIDine (CATAPRES) 0.2 MG tablet Take 0.2 mg by mouth 3 (three) times daily.    Provider, Historical   glipiZIDE (GLUCOTROL) 5 MG tablet Take 5 mg by mouth 2 (two) times daily.    Provider, Historical     Scheduled Meds:    cloNIDine  0.1 mg Oral TID    enoxparin  40 mg Subcutaneous Q24H (prophylaxis, 1700)    folic acid  1 mg  "Oral Daily    gabapentin  300 mg Oral TID    hydrOXYzine pamoate  25 mg Oral QHS    LIDOcaine  1 patch Transdermal Q24H    methadone  70 mg Oral Daily    nicotine  1 patch Transdermal Daily    oxacillin 12 g in  mL CONTINUOUS INFUSION  12 g Intravenous Q24H    polyethylene glycol  17 g Oral BID    senna-docusate 8.6-50 mg  1 tablet Oral BID    sodium chloride 0.9%  10 mL Intravenous Q6H    sodium chloride 0.9%  10 mL Intravenous Q6H    thiamine  100 mg Oral Daily     Continuous Infusions:   PRN Meds:  Current Facility-Administered Medications:     acetaminophen, 1,000 mg, Per OG tube, Q6H PRN    albuterol-ipratropium, 3 mL, Nebulization, Q4H PRN    dextrose 10%, 12.5 g, Intravenous, PRN    dextrose 10%, 25 g, Intravenous, PRN    glucagon (human recombinant), 1 mg, Intramuscular, PRN    glucose, 16 g, Oral, PRN    glucose, 24 g, Oral, PRN    hydrOXYzine pamoate, 50 mg, Oral, Q6H PRN    insulin aspart U-100, 0-10 Units, Subcutaneous, QID (AC + HS) PRN    melatonin, 6 mg, Oral, Nightly PRN    naloxone, 0.4 mg, Intravenous, PRN    oxyCODONE, 10 mg, Oral, Q4H PRN    sodium chloride 0.9%, 10 mL, Intravenous, Q12H PRN    Flushing PICC/Midline Protocol, , , Until Discontinued **AND** sodium chloride 0.9%, 10 mL, Intravenous, Q6H **AND** sodium chloride 0.9%, 10 mL, Intravenous, PRN    Flushing PICC/Midline Protocol, , , Until Discontinued **AND** sodium chloride 0.9%, 10 mL, Intravenous, Q6H **AND** sodium chloride 0.9%, 10 mL, Intravenous, PRN    Anticoagulants/Antiplatelets: lovenox 40mg qd    Allergies: Review of patient's allergies indicates:  No Known Allergies  Sedation Hx: have not been any systemic reactions    Labs:  No results for input(s): "INR", "PT", "PTT" in the last 168 hours.    Recent Labs   Lab 09/29/24  0731   WBC 9.62   HGB 8.9*   HCT 30.8*   MCV 90         Recent Labs   Lab 09/26/24  0313 09/26/24  0812 09/29/24  0731   *   < > 97   *   < > 135*   K 4.2   < > 3.7   CL 94*   < > " 98   CO2 29   < > 27   BUN 9   < > 5*   CREATININE 0.8   < > 0.6   CALCIUM 8.2*   < > 8.2*   MG 1.9  --   --    ALT 12   < > 9*   AST 13   < > 9*   ALBUMIN 1.4*   < > 1.3*   BILITOT 1.2*   < > 1.0    < > = values in this interval not displayed.       Vitals:  Temp: 98.7 °F (37.1 °C) (09/29/24 0732)  Pulse: 75 (09/29/24 0732)  Resp: 18 (09/29/24 0911)  BP: 96/65 (09/29/24 0732)  SpO2: (!) 90 % (09/29/24 0732)     Physical Exam:  ASA: II  Mallampati: II    General: no acute distress  Mental Status: alert and oriented   HEENT: normocephalic, atraumatic  Chest: unlabored breathing  Abdomen: distended  Extremities: moves all extremities    Plan: Proceed with percutaneous drainage of retroperitoneal fluid collection  Sedation plan: up to moderate  Please make NPO and hold anticoagulation midnight before procedure.      Radha Edmonds MD  Diagnostic Radiology

## 2024-09-29 NOTE — SUBJECTIVE & OBJECTIVE
Interval History: No acute events. Afebrile.  Planning for IR guided drainage of retroperitoneal fluid. Npo after midnight.    Review of Systems  Objective:     Vital Signs (Most Recent):  Temp: 98.4 °F (36.9 °C) (09/29/24 1142)  Pulse: 65 (09/29/24 1142)  Resp: 16 (09/29/24 1142)  BP: (!) 83/54 (09/29/24 1142)  SpO2: (!) 81 % (09/29/24 1142) Vital Signs (24h Range):  Temp:  [97.7 °F (36.5 °C)-98.7 °F (37.1 °C)] 98.4 °F (36.9 °C)  Pulse:  [64-94] 65  Resp:  [16-20] 16  SpO2:  [81 %-96 %] 81 %  BP: ()/(54-86) 83/54     Weight: 65.2 kg (143 lb 11.8 oz)  Body mass index is 26.29 kg/m².    Intake/Output Summary (Last 24 hours) at 9/29/2024 1223  Last data filed at 9/29/2024 0106  Gross per 24 hour   Intake --   Output 450 ml   Net -450 ml      Physical Exam  Constitutional:       General: She is not in acute distress.     Appearance: She is not toxic-appearing.   Cardiovascular:      Rate and Rhythm: Normal rate and regular rhythm.      Heart sounds: No murmur heard.     No friction rub. No gallop.   Pulmonary:      Effort: Pulmonary effort is normal. No respiratory distress.      Breath sounds: Normal breath sounds.   Abdominal:      General: Abdomen is flat. There is no distension.      Palpations: Abdomen is soft.      Tenderness: There is no abdominal tenderness. There is no guarding or rebound.   Musculoskeletal:         General: Swelling and tenderness present.      Right lower leg: No edema.      Left lower leg: No edema.      Comments: L flank drain   Skin:     Findings: Bruising and lesion present.   Neurological:      Mental Status: She is alert.         MELD 3.0: 17 at 9/21/2024  6:13 PM  MELD-Na: 13 at 9/21/2024  6:13 PM  Calculated from:  Serum Creatinine: 0.6 mg/dL (Using min of 1 mg/dL) at 9/21/2024  6:13 PM  Serum Sodium: 138 mmol/L (Using max of 137 mmol/L) at 9/21/2024  6:13 PM  Total Bilirubin: 2.1 mg/dL at 9/21/2024  2:34 AM  Serum Albumin: 1.2 g/dL (Using min of 1.5 g/dL) at 9/21/2024  2:34  "AM  INR(ratio): 1.4 at 9/19/2024  3:19 AM  Age at listing (hypothetical): 49 years  Sex: Female at 9/21/2024  6:13 PM      Significant Labs:  CBC:  Recent Labs   Lab 09/28/24  0420 09/29/24  0731   WBC 10.72 9.62   HGB 9.0* 8.9*   HCT 29.1* 30.8*    402     CMP:  Recent Labs   Lab 09/28/24  0420 09/28/24  0942 09/28/24  1800 09/29/24  0731   * 136 135* 135*   K 3.1* 3.5 3.7 3.7   CL 97 98 100 98   CO2 27 26 23 27   * 124* 146* 97   BUN 5* 5* 5* 5*   CREATININE 0.7 0.7 0.7 0.6   CALCIUM 8.1* 8.3* 8.3* 8.2*   PROT 6.2  --   --  6.2   ALBUMIN 1.3*  --   --  1.3*   BILITOT 1.1*  --   --  1.0   ALKPHOS 133  --   --  125   AST 11  --   --  9*   ALT 10  --   --  9*   ANIONGAP 8 12 12 10     PTINR:  No results for input(s): "INR" in the last 48 hours.    Significant Procedures:   Dobutamine Stress Test with Color Flow: No results found for this or any previous visit.      "

## 2024-09-29 NOTE — PLAN OF CARE
Problem: Infection  Goal: Absence of Infection Signs and Symptoms  Outcome: Progressing     Problem: Diabetes Comorbidity  Goal: Blood Glucose Level Within Targeted Range  Outcome: Progressing     Problem: Sepsis/Septic Shock  Goal: Optimal Coping  Outcome: Progressing  Goal: Absence of Bleeding  Outcome: Progressing  Goal: Blood Glucose Level Within Targeted Range  Outcome: Progressing  Goal: Absence of Infection Signs and Symptoms  Outcome: Progressing  Goal: Optimal Nutrition Intake  Outcome: Progressing    Patient alert and oriented x 4. VSS. Room air. IV abx q24. IR consult; drain placement. NPO at midnight. PRN oxy q4 given x2.

## 2024-09-30 ENCOUNTER — ANESTHESIA (OUTPATIENT)
Dept: INTERVENTIONAL RADIOLOGY/VASCULAR | Facility: HOSPITAL | Age: 49
End: 2024-09-30
Payer: MEDICAID

## 2024-09-30 PROBLEM — R18.8 RETROPERITONEAL FLUID COLLECTION: Status: ACTIVE | Noted: 2024-09-30

## 2024-09-30 LAB
ALBUMIN SERPL BCP-MCNC: 1.3 G/DL (ref 3.5–5.2)
ALP SERPL-CCNC: 125 U/L (ref 55–135)
ALT SERPL W/O P-5'-P-CCNC: 12 U/L (ref 10–44)
ANION GAP SERPL CALC-SCNC: 10 MMOL/L (ref 8–16)
ANION GAP SERPL CALC-SCNC: 11 MMOL/L (ref 8–16)
ANION GAP SERPL CALC-SCNC: 9 MMOL/L (ref 8–16)
APPEARANCE FLD: NORMAL
AST SERPL-CCNC: 12 U/L (ref 10–40)
BASOPHILS # BLD AUTO: 0.2 K/UL (ref 0–0.2)
BASOPHILS NFR BLD: 2 % (ref 0–1.9)
BILIRUB SERPL-MCNC: 0.9 MG/DL (ref 0.1–1)
BODY FLD TYPE: NORMAL
BODY FLUID COMMENTS: NORMAL
BUN SERPL-MCNC: 3 MG/DL (ref 6–20)
BUN SERPL-MCNC: 4 MG/DL (ref 6–20)
BUN SERPL-MCNC: 4 MG/DL (ref 6–20)
CALCIUM SERPL-MCNC: 8.2 MG/DL (ref 8.7–10.5)
CALCIUM SERPL-MCNC: 8.4 MG/DL (ref 8.7–10.5)
CALCIUM SERPL-MCNC: 8.5 MG/DL (ref 8.7–10.5)
CHLORIDE SERPL-SCNC: 102 MMOL/L (ref 95–110)
CHLORIDE SERPL-SCNC: 102 MMOL/L (ref 95–110)
CHLORIDE SERPL-SCNC: 103 MMOL/L (ref 95–110)
CO2 SERPL-SCNC: 22 MMOL/L (ref 23–29)
CO2 SERPL-SCNC: 24 MMOL/L (ref 23–29)
CO2 SERPL-SCNC: 25 MMOL/L (ref 23–29)
COLOR FLD: NORMAL
CREAT SERPL-MCNC: 0.6 MG/DL (ref 0.5–1.4)
CREAT SERPL-MCNC: 0.6 MG/DL (ref 0.5–1.4)
CREAT SERPL-MCNC: 0.7 MG/DL (ref 0.5–1.4)
DIFFERENTIAL METHOD BLD: ABNORMAL
EOSINOPHIL # BLD AUTO: 0.1 K/UL (ref 0–0.5)
EOSINOPHIL NFR BLD: 1.3 % (ref 0–8)
ERYTHROCYTE [DISTWIDTH] IN BLOOD BY AUTOMATED COUNT: 19.9 % (ref 11.5–14.5)
EST. GFR  (NO RACE VARIABLE): >60 ML/MIN/1.73 M^2
GLUCOSE SERPL-MCNC: 106 MG/DL (ref 70–110)
GLUCOSE SERPL-MCNC: 80 MG/DL (ref 70–110)
GLUCOSE SERPL-MCNC: 89 MG/DL (ref 70–110)
GRAM STN SPEC: NORMAL
GRAM STN SPEC: NORMAL
HCT VFR BLD AUTO: 30 % (ref 37–48.5)
HGB BLD-MCNC: 9 G/DL (ref 12–16)
IMM GRANULOCYTES # BLD AUTO: 0.23 K/UL (ref 0–0.04)
IMM GRANULOCYTES NFR BLD AUTO: 2.3 % (ref 0–0.5)
LYMPHOCYTES # BLD AUTO: 2.6 K/UL (ref 1–4.8)
LYMPHOCYTES NFR BLD: 25.5 % (ref 18–48)
MCH RBC QN AUTO: 26.8 PG (ref 27–31)
MCHC RBC AUTO-ENTMCNC: 30 G/DL (ref 32–36)
MCV RBC AUTO: 89 FL (ref 82–98)
MONOCYTES # BLD AUTO: 0.5 K/UL (ref 0.3–1)
MONOCYTES NFR BLD: 5.4 % (ref 4–15)
NEUTROPHILS # BLD AUTO: 6.4 K/UL (ref 1.8–7.7)
NEUTROPHILS NFR BLD: 63.5 % (ref 38–73)
NRBC BLD-RTO: 0 /100 WBC
PLATELET # BLD AUTO: 382 K/UL (ref 150–450)
PMV BLD AUTO: 9.4 FL (ref 9.2–12.9)
POCT GLUCOSE: 117 MG/DL (ref 70–110)
POCT GLUCOSE: 130 MG/DL (ref 70–110)
POCT GLUCOSE: 92 MG/DL (ref 70–110)
POTASSIUM SERPL-SCNC: 3.2 MMOL/L (ref 3.5–5.1)
PROT SERPL-MCNC: 6.2 G/DL (ref 6–8.4)
RBC # BLD AUTO: 3.36 M/UL (ref 4–5.4)
SODIUM SERPL-SCNC: 134 MMOL/L (ref 136–145)
SODIUM SERPL-SCNC: 136 MMOL/L (ref 136–145)
SODIUM SERPL-SCNC: 138 MMOL/L (ref 136–145)
WBC # BLD AUTO: 10.04 K/UL (ref 3.9–12.7)
WBC # FLD: NORMAL /CU MM

## 2024-09-30 PROCEDURE — 25000003 PHARM REV CODE 250: Performed by: STUDENT IN AN ORGANIZED HEALTH CARE EDUCATION/TRAINING PROGRAM

## 2024-09-30 PROCEDURE — 25000003 PHARM REV CODE 250: Performed by: INTERNAL MEDICINE

## 2024-09-30 PROCEDURE — 80048 BASIC METABOLIC PNL TOTAL CA: CPT | Mod: XB | Performed by: STUDENT IN AN ORGANIZED HEALTH CARE EDUCATION/TRAINING PROGRAM

## 2024-09-30 PROCEDURE — 87075 CULTR BACTERIA EXCEPT BLOOD: CPT | Performed by: STUDENT IN AN ORGANIZED HEALTH CARE EDUCATION/TRAINING PROGRAM

## 2024-09-30 PROCEDURE — 63600175 PHARM REV CODE 636 W HCPCS

## 2024-09-30 PROCEDURE — 85025 COMPLETE CBC W/AUTO DIFF WBC: CPT | Performed by: STUDENT IN AN ORGANIZED HEALTH CARE EDUCATION/TRAINING PROGRAM

## 2024-09-30 PROCEDURE — 36415 COLL VENOUS BLD VENIPUNCTURE: CPT | Performed by: STUDENT IN AN ORGANIZED HEALTH CARE EDUCATION/TRAINING PROGRAM

## 2024-09-30 PROCEDURE — 87205 SMEAR GRAM STAIN: CPT | Performed by: STUDENT IN AN ORGANIZED HEALTH CARE EDUCATION/TRAINING PROGRAM

## 2024-09-30 PROCEDURE — 25000003 PHARM REV CODE 250

## 2024-09-30 PROCEDURE — 82962 GLUCOSE BLOOD TEST: CPT

## 2024-09-30 PROCEDURE — 87015 SPECIMEN INFECT AGNT CONCNTJ: CPT | Performed by: STUDENT IN AN ORGANIZED HEALTH CARE EDUCATION/TRAINING PROGRAM

## 2024-09-30 PROCEDURE — 87206 SMEAR FLUORESCENT/ACID STAI: CPT | Performed by: STUDENT IN AN ORGANIZED HEALTH CARE EDUCATION/TRAINING PROGRAM

## 2024-09-30 PROCEDURE — 25000003 PHARM REV CODE 250: Performed by: NURSE PRACTITIONER

## 2024-09-30 PROCEDURE — 87102 FUNGUS ISOLATION CULTURE: CPT | Performed by: STUDENT IN AN ORGANIZED HEALTH CARE EDUCATION/TRAINING PROGRAM

## 2024-09-30 PROCEDURE — 87186 SC STD MICRODIL/AGAR DIL: CPT | Performed by: STUDENT IN AN ORGANIZED HEALTH CARE EDUCATION/TRAINING PROGRAM

## 2024-09-30 PROCEDURE — 89051 BODY FLUID CELL COUNT: CPT | Performed by: STUDENT IN AN ORGANIZED HEALTH CARE EDUCATION/TRAINING PROGRAM

## 2024-09-30 PROCEDURE — 87116 MYCOBACTERIA CULTURE: CPT | Performed by: STUDENT IN AN ORGANIZED HEALTH CARE EDUCATION/TRAINING PROGRAM

## 2024-09-30 PROCEDURE — 20600001 HC STEP DOWN PRIVATE ROOM

## 2024-09-30 PROCEDURE — 80053 COMPREHEN METABOLIC PANEL: CPT | Performed by: STUDENT IN AN ORGANIZED HEALTH CARE EDUCATION/TRAINING PROGRAM

## 2024-09-30 PROCEDURE — A4216 STERILE WATER/SALINE, 10 ML: HCPCS | Performed by: STUDENT IN AN ORGANIZED HEALTH CARE EDUCATION/TRAINING PROGRAM

## 2024-09-30 PROCEDURE — 63600175 PHARM REV CODE 636 W HCPCS: Performed by: ANESTHESIOLOGY

## 2024-09-30 PROCEDURE — 87077 CULTURE AEROBIC IDENTIFY: CPT | Performed by: STUDENT IN AN ORGANIZED HEALTH CARE EDUCATION/TRAINING PROGRAM

## 2024-09-30 PROCEDURE — 87070 CULTURE OTHR SPECIMN AEROBIC: CPT | Performed by: STUDENT IN AN ORGANIZED HEALTH CARE EDUCATION/TRAINING PROGRAM

## 2024-09-30 PROCEDURE — 63600175 PHARM REV CODE 636 W HCPCS: Performed by: STUDENT IN AN ORGANIZED HEALTH CARE EDUCATION/TRAINING PROGRAM

## 2024-09-30 RX ORDER — FENTANYL CITRATE 50 UG/ML
INJECTION, SOLUTION INTRAMUSCULAR; INTRAVENOUS
Status: DISCONTINUED | OUTPATIENT
Start: 2024-09-30 | End: 2024-09-30

## 2024-09-30 RX ORDER — PROPOFOL 10 MG/ML
VIAL (ML) INTRAVENOUS
Status: DISCONTINUED | OUTPATIENT
Start: 2024-09-30 | End: 2024-09-30

## 2024-09-30 RX ORDER — SUCCINYLCHOLINE CHLORIDE 20 MG/ML
INJECTION INTRAMUSCULAR; INTRAVENOUS
Status: DISCONTINUED | OUTPATIENT
Start: 2024-09-30 | End: 2024-09-30

## 2024-09-30 RX ORDER — GLUCAGON 1 MG
1 KIT INJECTION
Status: DISCONTINUED | OUTPATIENT
Start: 2024-09-30 | End: 2024-10-05

## 2024-09-30 RX ORDER — HYDROMORPHONE HYDROCHLORIDE 1 MG/ML
0.2 INJECTION, SOLUTION INTRAMUSCULAR; INTRAVENOUS; SUBCUTANEOUS EVERY 5 MIN PRN
Status: DISCONTINUED | OUTPATIENT
Start: 2024-09-30 | End: 2024-10-03

## 2024-09-30 RX ORDER — PHENYLEPHRINE HYDROCHLORIDE 10 MG/ML
INJECTION INTRAVENOUS
Status: DISCONTINUED | OUTPATIENT
Start: 2024-09-30 | End: 2024-09-30

## 2024-09-30 RX ORDER — SODIUM CHLORIDE 0.9 % (FLUSH) 0.9 %
10 SYRINGE (ML) INJECTION
Status: DISCONTINUED | OUTPATIENT
Start: 2024-09-30 | End: 2024-10-07 | Stop reason: HOSPADM

## 2024-09-30 RX ORDER — MIDAZOLAM HYDROCHLORIDE 1 MG/ML
INJECTION INTRAMUSCULAR; INTRAVENOUS
Status: DISCONTINUED | OUTPATIENT
Start: 2024-09-30 | End: 2024-09-30

## 2024-09-30 RX ORDER — ONDANSETRON HYDROCHLORIDE 2 MG/ML
INJECTION, SOLUTION INTRAVENOUS
Status: DISCONTINUED | OUTPATIENT
Start: 2024-09-30 | End: 2024-09-30

## 2024-09-30 RX ORDER — LIDOCAINE HYDROCHLORIDE 20 MG/ML
INJECTION INTRAVENOUS
Status: DISCONTINUED | OUTPATIENT
Start: 2024-09-30 | End: 2024-09-30

## 2024-09-30 RX ORDER — ROCURONIUM BROMIDE 10 MG/ML
INJECTION, SOLUTION INTRAVENOUS
Status: DISCONTINUED | OUTPATIENT
Start: 2024-09-30 | End: 2024-09-30

## 2024-09-30 RX ADMIN — FENTANYL CITRATE 100 MCG: 50 INJECTION, SOLUTION INTRAMUSCULAR; INTRAVENOUS at 04:09

## 2024-09-30 RX ADMIN — HYDROMORPHONE HYDROCHLORIDE 0.2 MG: 1 INJECTION, SOLUTION INTRAMUSCULAR; INTRAVENOUS; SUBCUTANEOUS at 05:09

## 2024-09-30 RX ADMIN — LIDOCAINE HYDROCHLORIDE 100 MG: 20 INJECTION INTRAVENOUS at 04:09

## 2024-09-30 RX ADMIN — OXACILLIN 12 G: 2 INJECTION, POWDER, FOR SOLUTION INTRAMUSCULAR; INTRAVENOUS at 01:09

## 2024-09-30 RX ADMIN — PHENYLEPHRINE HYDROCHLORIDE 200 MCG: 10 INJECTION INTRAVENOUS at 04:09

## 2024-09-30 RX ADMIN — SUGAMMADEX 200 MG: 100 INJECTION, SOLUTION INTRAVENOUS at 05:09

## 2024-09-30 RX ADMIN — PHENYLEPHRINE HYDROCHLORIDE 100 MCG: 10 INJECTION INTRAVENOUS at 04:09

## 2024-09-30 RX ADMIN — SENNOSIDES AND DOCUSATE SODIUM 1 TABLET: 50; 8.6 TABLET ORAL at 08:09

## 2024-09-30 RX ADMIN — CLONIDINE HYDROCHLORIDE 0.1 MG: 0.1 TABLET ORAL at 09:09

## 2024-09-30 RX ADMIN — HYDROXYZINE PAMOATE 25 MG: 25 CAPSULE ORAL at 08:09

## 2024-09-30 RX ADMIN — OXYCODONE HYDROCHLORIDE 10 MG: 10 TABLET ORAL at 12:09

## 2024-09-30 RX ADMIN — FOLIC ACID 1 MG: 1 TABLET ORAL at 08:09

## 2024-09-30 RX ADMIN — GABAPENTIN 300 MG: 300 CAPSULE ORAL at 09:09

## 2024-09-30 RX ADMIN — SENNOSIDES AND DOCUSATE SODIUM 1 TABLET: 50; 8.6 TABLET ORAL at 09:09

## 2024-09-30 RX ADMIN — OXYCODONE HYDROCHLORIDE 10 MG: 10 TABLET ORAL at 08:09

## 2024-09-30 RX ADMIN — Medication 10 ML: at 06:09

## 2024-09-30 RX ADMIN — ROCURONIUM BROMIDE 5 MG: 10 INJECTION INTRAVENOUS at 04:09

## 2024-09-30 RX ADMIN — MIDAZOLAM HYDROCHLORIDE 2 MG: 2 INJECTION, SOLUTION INTRAMUSCULAR; INTRAVENOUS at 04:09

## 2024-09-30 RX ADMIN — GABAPENTIN 300 MG: 300 CAPSULE ORAL at 05:09

## 2024-09-30 RX ADMIN — GABAPENTIN 300 MG: 300 CAPSULE ORAL at 08:09

## 2024-09-30 RX ADMIN — POLYETHYLENE GLYCOL 3350 17 G: 17 POWDER, FOR SOLUTION ORAL at 09:09

## 2024-09-30 RX ADMIN — SODIUM CHLORIDE: 0.9 INJECTION, SOLUTION INTRAVENOUS at 04:09

## 2024-09-30 RX ADMIN — Medication 100 MG: at 08:09

## 2024-09-30 RX ADMIN — CLONIDINE HYDROCHLORIDE 0.1 MG: 0.1 TABLET ORAL at 08:09

## 2024-09-30 RX ADMIN — ONDANSETRON 4 MG: 2 INJECTION INTRAMUSCULAR; INTRAVENOUS at 05:09

## 2024-09-30 RX ADMIN — Medication 10 ML: at 12:09

## 2024-09-30 RX ADMIN — PROPOFOL 150 MG: 10 INJECTION, EMULSION INTRAVENOUS at 04:09

## 2024-09-30 RX ADMIN — ROCURONIUM BROMIDE 20 MG: 10 INJECTION INTRAVENOUS at 04:09

## 2024-09-30 RX ADMIN — POTASSIUM BICARBONATE 50 MEQ: 978 TABLET, EFFERVESCENT ORAL at 08:09

## 2024-09-30 RX ADMIN — METHADONE HYDROCHLORIDE 70 MG: 10 TABLET ORAL at 08:09

## 2024-09-30 RX ADMIN — SUCCINYLCHOLINE CHLORIDE 120 MG: 20 INJECTION, SOLUTION INTRAMUSCULAR; INTRAVENOUS at 04:09

## 2024-09-30 RX ADMIN — ENOXAPARIN SODIUM 40 MG: 40 INJECTION SUBCUTANEOUS at 06:09

## 2024-09-30 NOTE — PT/OT/SLP PROGRESS
Physical Therapy      Patient Name:  Mari Sloan   MRN:  [id number]    Patient not seen today secondary to declining due to scheduled surgery today. Pt reported that she has a lot going on and that she does not want to move until after surgery. Will follow-up at next PT POC date.

## 2024-09-30 NOTE — TRANSFER OF CARE
"Anesthesia Transfer of Care Note    Patient: Mari Sloan    Procedure(s) Performed: * No procedures listed *    Patient location: PACU    Anesthesia Type: general    Transport from OR: Transported from OR on 6-10 L/min O2 by face mask with adequate spontaneous ventilation    Post pain: adequate analgesia    Post assessment: no apparent anesthetic complications    Post vital signs: stable    Level of consciousness: awake    Nausea/Vomiting: no nausea/vomiting    Complications: none    Transfer of care protocol was followed      Last vitals: Visit Vitals  /66 (BP Location: Left arm, Patient Position: Lying)   Pulse 77   Temp 36.6 °C (97.9 °F) (Temporal)   Resp 13   Ht 5' 2" (1.575 m)   Wt 65.2 kg (143 lb 11.8 oz)   LMP  (LMP Unknown)   SpO2 100%   Breastfeeding No   BMI 26.29 kg/m²     "

## 2024-09-30 NOTE — PROCEDURES
Radiology Post-Procedure Note    Pre Op Diagnosis: RP abscess    Post Op Diagnosis: Same     Procedure:left RP drainage    Procedure performed by: George Mixon MD    Written Informed Consent Obtained: Yes    Specimen Removed: YES 10 cc purulent fluid    Estimated Blood Loss: 2 cc    Findings: CT was used for localization of abnormal fluid collection. A needle was inserted into the fluid collection and purulent fluid was aspirated.  A wire was inserted into the collection and the tract was dilated.  A 12 Yi all-purpose drainage catheter was inserted and a pigtail loop of the distal end was formed.  The catheter was sutured into place and approximately  10 mL fluid was removed.     A specimen was sent to the lab for further analysis and culture.    The patient tolerated procedure well and there were no complications. Please see procedure report under Imaging for further details.    George Mixon M.D.  Interventional Radiology  Department of Radiology

## 2024-09-30 NOTE — SUBJECTIVE & OBJECTIVE
Interval History: No acute events. Afebrile.  No active complaints.  Potassium repleted.    Planning for IR guided drainage of retroperitoneal fluid today.    Review of Systems  Objective:     Vital Signs (Most Recent):  Temp: 99.1 °F (37.3 °C) (09/30/24 1111)  Pulse: 90 (09/30/24 1111)  Resp: 18 (09/30/24 1111)  BP: 128/82 (09/30/24 1111)  SpO2: (!) 93 % (09/30/24 1111) Vital Signs (24h Range):  Temp:  [98.5 °F (36.9 °C)-99.1 °F (37.3 °C)] 99.1 °F (37.3 °C)  Pulse:  [] 90  Resp:  [16-20] 18  SpO2:  [93 %-98 %] 93 %  BP: (120-150)/(72-88) 128/82     Weight: 65.2 kg (143 lb 11.8 oz)  Body mass index is 26.29 kg/m².    Intake/Output Summary (Last 24 hours) at 9/30/2024 1346  Last data filed at 9/29/2024 2028  Gross per 24 hour   Intake --   Output 1000 ml   Net -1000 ml      Physical Exam  Constitutional:       General: She is not in acute distress.     Appearance: She is not toxic-appearing.   Cardiovascular:      Rate and Rhythm: Normal rate and regular rhythm.      Heart sounds: No murmur heard.     No friction rub. No gallop.   Pulmonary:      Effort: Pulmonary effort is normal. No respiratory distress.      Breath sounds: Normal breath sounds.   Abdominal:      General: Abdomen is flat. There is no distension.      Palpations: Abdomen is soft.      Tenderness: There is no abdominal tenderness. There is no guarding or rebound.   Musculoskeletal:         General: Swelling and tenderness present.      Right lower leg: No edema.      Left lower leg: No edema.      Comments: L flank drain   Skin:     Findings: Bruising and lesion present.   Neurological:      Mental Status: She is alert.         MELD 3.0: 17 at 9/21/2024  6:13 PM  MELD-Na: 13 at 9/21/2024  6:13 PM  Calculated from:  Serum Creatinine: 0.6 mg/dL (Using min of 1 mg/dL) at 9/21/2024  6:13 PM  Serum Sodium: 138 mmol/L (Using max of 137 mmol/L) at 9/21/2024  6:13 PM  Total Bilirubin: 2.1 mg/dL at 9/21/2024  2:34 AM  Serum Albumin: 1.2 g/dL (Using min  "of 1.5 g/dL) at 9/21/2024  2:34 AM  INR(ratio): 1.4 at 9/19/2024  3:19 AM  Age at listing (hypothetical): 49 years  Sex: Female at 9/21/2024  6:13 PM      Significant Labs:  CBC:  Recent Labs   Lab 09/29/24  0731 09/30/24  0505   WBC 9.62 10.04   HGB 8.9* 9.0*   HCT 30.8* 30.0*    382     CMP:  Recent Labs   Lab 09/29/24  0731 09/29/24  1834 09/30/24  0505 09/30/24  0847   * 132* 134* 136   K 3.7 4.2 3.2* 3.2*   CL 98 99 102 102   CO2 27 23 22* 25   GLU 97 122* 80 106   BUN 5* 5* 4* 4*   CREATININE 0.6 0.7 0.7 0.6   CALCIUM 8.2* 8.5* 8.4* 8.2*   PROT 6.2  --  6.2  --    ALBUMIN 1.3*  --  1.3*  --    BILITOT 1.0  --  0.9  --    ALKPHOS 125  --  125  --    AST 9*  --  12  --    ALT 9*  --  12  --    ANIONGAP 10 10 10 9     PTINR:  No results for input(s): "INR" in the last 48 hours.    Significant Procedures:   Dobutamine Stress Test with Color Flow: No results found for this or any previous visit.      "

## 2024-09-30 NOTE — PLAN OF CARE
Discharge Plan A and Plan B have been determined by review of patient's clinical status, future medical and therapeutic needs, and coverage/benefits for post-acute care in coordination with multidisciplinary team members.    09/30/24 1620   Post-Acute Status   Post-Acute Authorization Placement   Post-Acute Placement Status Referrals Sent   Coverage MEDICAID - [facility] COMMUNITY PLAN [facility] ([place] MEDICAID) -   Patient choice form signed by patient/caregiver List from CMS Compare;List with quality metrics by geographic area provided   Discharge Plan   Discharge Plan A Skilled Nursing Facility     Spoke with patients spouse   to review discharge recommendation of SNF and is agreeable to plan    Patient/family provided list of facilities in-network with patient's payor plan. Providers that are owned, operated, or affiliated with [facility] are included on the list.     Notified that referral sent to below listed facilities from in-network list based on proximity to home/family support:     [facility] Phone: [phone]    Response: No, unable to accept patient  [address] [facility] [place] [address] 9/30/2024 16:11 (CT)  10/1/2024 9:10 (CT)  -  9/30/2024 16:16 (CT)  9/30/2024 16:16 (CT)  Response: No, unable to accept patient  Reason: Out of Network  Waiting for [facility] Phone: [phone]    Response: No, unable to accept patient  [address] [facility] [place] [address] 9/30/2024 16:11 (CT)  10/1/2024 9:10 (CT)  -  9/30/2024 16:16 (CT)  9/30/2024 16:16 (CT)  Response: No, unable to accept patient  Reason: Out of Network  Waiting for you     [facility] Phone: [phone]   [address] [place] [address] 9/30/2024 16:11 (CT)  10/1/2024 9:10 (CT)  -  -  -  -  Waiting for recipient    Response Notes   [facility] Phone: [phone]    Response: No, unable to accept patient  [address] [facility] [place] [address] 9/30/2024 16:11 (CT)   "10/1/2024 9:10 (CT)  -  -  -  -  Waiting for recipient     [facility] Phone: [phone]    Response: No, unable to accept patient  [address] [place] [address] 9/30/2024 16:11 (CT)  10/1/2024 9:10 (CT)  -  -  -  -  Waiting for recipient    Response Notes   [facility] Phone: [phone]    Response: No, unable to accept patient  [address] [place] [address] 9/30/2024 16:11 (CT)  10/1/2024 9:10 (CT)  -  -  -  -  Waiting for recipient     [facility] Phone: [phone]    Response: Referral Received     [address] [place] [address] [address] 9/30/2024 16:11 (CT)  10/1/2024 9:10 (CT)  -  -  -  -  Waiting for recipient     [facility] Phone: [phone]    Response: No, unable to accept patient  [address] [place] [place] [address] 9/30/2024 16:11 (CT)  10/1/2024 9:10 (CT)  -  -  -  -  Waiting for recipient     [facility] Phone: [phone]    Response: No, unable to accept patient  [address] [place] [address] 9/30/2024 16:11 (CT)  10/1/2024 9:10 (CT)  -  -  -  -  Waiting for recipient       Patient/family instructed to identify preference.    Preferred Facility: (if more than 1, listed in order of descending preference)  No verbalized preference     If an additional preferred facility not listed above is identified, additional referral to be sent. If above facilities unable to accept, will send additional referrals to in-network providers.      3:08 pm  CM completed the LOCET via phone. CM faxed PASRR to obtain the 142 for NH admission    Your fax has been successfully sent to [phone] at [phone].  ------------------------------------------------------------  From: [id number]  ------------------------------------------------------------   10/1/2024 15:04:19 PM Transmission Record          Sent to [phone] with remote ID "Fax "          Result: " (0/339;0/0) Success          Page record: 1 - 5          Elapsed time: 02:09 on channel 62       CM spoke with Zeb WILLETT at MEDICAID - [employer] COMMUNITY PLAN [facility] ([place] MEDICAID) -# [phone] and provided a list of in network facility's and if 3 in network denials is received will consider an out if network single case agreement     [facility] Phone: [phone]   Response: No, unable to accept patient     [facility] Phone: [phone]   Response: No, unable to accept patient     10/02/24 10:00 AM    [facility] Phone: [phone]   F: [phone]  CM called and spoke with Vishnu @ [facility] and emailed SNF referral        Nabila Elise RN  Case Management  [facility]  [phone]

## 2024-09-30 NOTE — ASSESSMENT & PLAN NOTE
Patient's FSGs are uncontrolled due to hyperglycemia on current medication regimen.  Last A1c reviewed-   Lab Results   Component Value Date    HGBA1C 7.9 (H) 09/17/2024     Most recent fingerstick glucose reviewed-   Recent Labs   Lab 09/29/24  1552 09/29/24 2009 09/30/24  1110   POCTGLUCOSE 161* 122* 130*       Current correctional scale  Medium  Maintain anti-hyperglycemic dose as follows-   Antihyperglycemics (From admission, onward)    Start     Stop Route Frequency Ordered    09/23/24 1033  insulin aspart U-100 pen 0-10 Units         -- SubQ Before meals & nightly PRN 09/23/24 0933        Hold Oral hypoglycemics while patient is in the hospital.

## 2024-09-30 NOTE — PLAN OF CARE
Procedure completed.  Site CDI without hematoma. Patient to be transported to PACU accompanied by this RN

## 2024-09-30 NOTE — NURSING TRANSFER
Nursing Transfer Note      9/30/2024   6:07 PM    Transfer To: Room 52921    Transfer via stretcher    Transfer with cardiac monitoring    Transported by PCT    Telemetry: Rate 89  Order for Tele Monitor? Yes    4eyes on Skin: yes    Medicines sent: None    Any special needs or follow-up needed: None    Patient belongings transferred with patient: No    Chart send with patient: Yes    Notified: spouse    Patient reassessed at: 9/30/2024  18:05 (date, time)

## 2024-09-30 NOTE — ANESTHESIA PROCEDURE NOTES
Intubation    Date/Time: 9/30/2024 4:32 PM    Performed by: Everardo Castro CRNA  Authorized by: Sisi Holder MD    Intubation:     Induction:  Intravenous    Intubated:  Postinduction    Mask Ventilation:  Easy mask    Attempts:  1    Attempted By:  CRNA    Method of Intubation:  Direct    Blade:  Matos 2    Laryngeal View Grade: Grade I - full view of cords      Difficult Airway Encountered?: No      Complications:  None    Airway Device:  Oral endotracheal tube    Airway Device Size:  7.0    Style/Cuff Inflation:  Cuffed (inflated to minimal occlusive pressure)    Inflation Amount (mL):  6    Tube secured:  22    Secured at:  The lips    Placement Verified By:  Fiber optic visualization and Capnometry    Complicating Factors:  None    Findings Post-Intubation:  BS equal bilateral and atraumatic/condition of teeth unchanged

## 2024-09-30 NOTE — PLAN OF CARE
Discharge Plan A and Plan B have been determined by review of patient's clinical status, future medical and therapeutic needs, and coverage/benefits for post-acute care in coordination with multidisciplinary team members.      09/30/24 0858   Discharge Reassessment   Assessment Type Discharge Planning Reassessment   Did the patient's condition or plan change since previous assessment? No   Discharge Plan discussed with: Spouse/sig other;Patient   Name(s) and Number(s) Marcelino Sloan (Spouse)  [phone] (Mobile   Communicated [facility] with patient/caregiver Yes   Discharge Plan A Long-term acute care facility (LTAC)   DME Needed Upon Discharge  other (see comments)  (TBD)   Transition of Care Barriers Substance Abuse;Mobility   Why the patient remains in the hospital Requires continued medical care   Post-Acute Status   Post-Acute Authorization Placement   Post-Acute Placement Status Pending payor review/awaiting authorization (if required)   Coverage : MEDICAID - [facility] ([place] MEDICA[place]   Patient choice form signed by patient/caregiver List from CMS Compare;List with quality metrics by geographic area provided   Discharge Delays None known at this time     CM met with patient and spoke with patients spouse, Marcelino Sloan   to discuss discharge planning.  Patients plan is to dc to  LTAC. [facility] is reviewing referral.      [facility]:  10/1/24      Discharge Recommendations:OT will follow-up on 10/01/2024 or when medically appropriate.     Discharge Equipment Recommendations:    Barriers to discharge: None       1100 am  CM received a secure chat that patients insurance : MEDICAID - [facility] ([place] MEDICA[place]  denied LTAC and SNF is recommended.         TREATMENT PLANS:   IR 89 for L retroperitoneal drain.       Follow up: TBD      Referrals:  TBD    Nabila Elise RN  Case Management  [facility]  [phone]

## 2024-09-30 NOTE — PLAN OF CARE
Problem: Skin Injury Risk Increased  Goal: Skin Health and Integrity  Outcome: Progressing  Intervention: Optimize Skin Protection  Flowsheets (Taken 9/30/2024 0149)  Pressure Reduction Techniques: frequent weight shift encouraged  Pressure Reduction Devices: foam padding utilized  Skin Protection: incontinence pads utilized  Activity Management: Rolling - L1  Head of Bed (HOB) Positioning: HOB at 20-30 degrees  Intervention: Promote and Optimize Oral Intake  Flowsheets (Taken 9/30/2024 0149)  Oral Nutrition Promotion: rest periods promoted  Nutrition Interventions: meal set-up provided     Problem: Adult Inpatient Plan of Care  Goal: Plan of Care Review  Outcome: Progressing  Flowsheets (Taken 9/30/2024 0149)  Plan of Care Reviewed With: ( at bedside)   patient   significant other   other (see comments)  Goal: Patient-Specific Goal (Individualized)  Outcome: Progressing  Goal: Absence of Hospital-Acquired Illness or Injury  Outcome: Progressing  Intervention: Identify and Manage Fall Risk  Flowsheets (Taken 9/30/2024 0149)  Safety Promotion/Fall Prevention:   assistive device/personal item within reach   family expresses understanding of fall risk and prevention   family to remain at bedside   nonskid shoes/socks when out of bed   medications reviewed  Intervention: Prevent Skin Injury  Flowsheets (Taken 9/30/2024 0149)  Body Position: position changed independently  Skin Protection: incontinence pads utilized  Device Skin Pressure Protection:   absorbent pad utilized/changed   positioning supports utilized   pressure points protected  Intervention: Prevent and Manage VTE (Venous Thromboembolism) Risk  Flowsheets (Taken 9/30/2024 0149)  VTE Prevention/Management: fluids promoted  Intervention: Prevent Infection  Flowsheets (Taken 9/30/2024 0149)  Infection Prevention:   hand hygiene promoted   rest/sleep promoted   single patient room provided  Goal: Optimal Comfort and Wellbeing  Outcome:  Progressing  Goal: Readiness for Transition of Care  Outcome: Progressing     Problem: Infection  Goal: Absence of Infection Signs and Symptoms  Outcome: Progressing  Intervention: Prevent or Manage Infection  Flowsheets (Taken 9/30/2024 3701)  Fever Reduction/Comfort Measures:   lightweight bedding   lightweight clothing  Infection Management: aseptic technique maintained  Isolation Precautions: precautions maintained     Problem: Diabetes Comorbidity  Goal: Blood Glucose Level Within Targeted Range  Outcome: Progressing     Problem: Sepsis/Septic Shock  Goal: Optimal Coping  Outcome: Progressing  Goal: Absence of Bleeding  Outcome: Progressing  Goal: Blood Glucose Level Within Targeted Range  Outcome: Progressing  Goal: Absence of Infection Signs and Symptoms  Outcome: Progressing  Goal: Optimal Nutrition Intake  Outcome: Progressing     Problem: Fall Injury Risk  Goal: Absence of Fall and Fall-Related Injury  Outcome: Progressing     Problem: Restraint, Nonviolent  Goal: Absence of Harm or Injury  Outcome: Progressing     Problem: Wound  Goal: Optimal Coping  Outcome: Progressing  Goal: Optimal Functional Ability  Outcome: Progressing  Goal: Absence of Infection Signs and Symptoms  Outcome: Progressing  Goal: Improved Oral Intake  Outcome: Progressing  Goal: Optimal Pain Control and Function  Outcome: Progressing  Goal: Skin Health and Integrity  Outcome: Progressing  Goal: Optimal Wound Healing  Outcome: Progressing     Problem: Mechanical Ventilation Invasive  Goal: Effective Communication  Outcome: Progressing  Goal: Optimal Device Function  Outcome: Progressing  Goal: Mechanical Ventilation Liberation  Outcome: Progressing  Goal: Optimal Nutrition Delivery  Outcome: Progressing  Goal: Absence of Device-Related Skin and Tissue Injury  Outcome: Progressing  Goal: Absence of Ventilator-Induced Lung Injury  Outcome: Progressing

## 2024-09-30 NOTE — PT/OT/SLP PROGRESS
Occupational Therapy      Patient Name:  Mari Sloan   MRN:  [id number]    Patient not seen today secondary to declining due to scheduled surgery today. Pt reported that she has a lot going on and that she does not want to move until after surgery. OT will follow-up on 10/01/2024 or when medically appropriate.     9/30/2024

## 2024-09-30 NOTE — ANESTHESIA PREPROCEDURE EVALUATION
[facility]  Anesthesia Pre-Operative Evaluation    Patient Name: Mari Sloan  YOB: 1975  MRN: [id number]    SUBJECTIVE:     Pre-operative evaluation for IR Abscess Drainage    09/30/2024    Mari Sloan is a 49 y.o. female with HTN, T2DM, and drug use (on methadone) transferred for MSSA bacteremia complicated by spinal osteomyelitis, septic arthritis of the left SI joint and Lt flank/ retroperitoneal abscess. On broad spectrum antibiotics. S/p L3-5 laminectomy and epidural abscess washout on 9/18.     Patient now presents for the above procedure(s).    Functional status/ Lifestyle factors:  - Positive for opioids on UDS. Admits to using methamphetamine and fentanyl    Revised Cardiac Risk Index   1. History of ischemic heart disease   2. History of congestive heart failure   3. History of cerebrovascular disease (stroke or transient ischemic attack)   4. History of diabetes requiring preoperative insulin use   5. Chronic kidney disease (creatinine > 2 mg/dL)   6. Undergoing suprainguinal vascular, intraperitoneal, or intrathoracic surgery     Risk for cardiac death, nonfatal myocardial infarction, and nonfatal cardiac arrest     0 predictors = 3.9%, 1 predictor = 6.0%, 2 predictors = 10.1%, >=3 predictors = >15%    RCRI Calculator Class and Risk percentage:                               2D ECHO:  TTE:  Results for orders placed during the hospital encounter of 09/16/24    Echo Saline Bubble? No    Interpretation Summary    Left Ventricle: The left ventricle is normal in size. Normal wall thickness. There is concentric remodeling. There is normal systolic function with a visually estimated ejection fraction of 60 - 65%. There is normal diastolic function.    Right Ventricle: Normal right ventricular cavity size. Systolic function is normal.    Right Atrium: Catheter present in the right atrium.    Mitral Valve: The mitral valve is structurally normal. Redundant chordal  structures noted. There is no stenosis. There is mild regurgitation.    Pulmonary Artery: The estimated pulmonary artery systolic pressure is 39 mmHg.    Cannot entirely exclude mitral vegetation vs redundant chordae.  If high clinical suspicion for endocarditis, would rx as such (not clear MARICHUY would be able to exclude vegetation based on TTE findings).      MARICHUY:  No results found for this or any previous visit.    LDA: None documented.  Percutaneous Central Line - Triple Lumen  09/16/24 1700 Internal Jugular Right (Active)   Line Necessity Review Frequent Blood Draws 09/20/24 1101   Verification by X-ray Yes 09/20/24 1901   Site Assessment No redness;No swelling;No warmth 09/21/24 1200   Line Securement Device Secured with sutureless device 09/21/24 0800   Dressing Type CHG impregnated dressing/sponge;Central line dressing;Transparent (Tegaderm) 09/21/24 1200   Dressing Status Clean;Dry;Intact 09/21/24 1200   Dressing Intervention Integrity maintained 09/21/24 1200   Date on Dressing 09/17/24 09/20/24 1901   Dressing Due to be Changed 09/24/24 09/21/24 0301   Distal Patency/Care infusing 09/21/24 1200   Medial 1 Patency/Care infusing 09/21/24 1200   Proximal 1 Patency/Care infusing 09/21/24 1200   Waveform Not being transduced 09/21/24 0301   Number of days: 4            Peripheral IV - Single Lumen 09/19/24 1110 18 G 1 3/4 in No Anterior;Right Upper Arm (Active)   Site Assessment Clean;Dry;Intact 09/21/24 1200   Extremity Assessment Distal to IV No abnormal discoloration 09/21/24 1200   Line Status Blood return noted 09/21/24 1200   Dressing Status Dry;Clean;Intact 09/21/24 1200   Dressing Intervention Integrity maintained 09/21/24 1200   Dressing Change Due 09/23/24 09/20/24 1701   Site Change Due 09/23/24 09/20/24 1101   Reason Not Rotated Not due 09/20/24 1701   Number of days: 2            Closed/Suction Drain 09/19/24 1549 Left Back Accordion 10 Fr. (Active)   Site Description Unable to view 09/21/24 1200  "  Dressing Type Transparent (Tegaderm) 09/21/24 1200   Dressing Status Clean;Dry;Intact 09/21/24 1200   Dressing Intervention Integrity maintained 09/21/24 1200   Drainage Serosanguineous 09/21/24 1200   Status Other (Comment) 09/21/24 1200   Output (mL) 5 mL 09/21/24 0640   Number of days: 1            Closed/Suction Drain 09/19/24 1550 Right Back Accordion 10 Fr. (Active)   Site Description Unable to view 09/21/24 1200   Dressing Type CHG impregnated dressing/sponge;Central line dressing;Transparent (Tegaderm) 09/21/24 1200   Dressing Status Dry;Clean;Intact 09/21/24 1200   Dressing Intervention Integrity maintained 09/21/24 1200   Drainage Sanguineous 09/21/24 1200   Status Other (Comment) 09/21/24 1200   Output (mL) 20 mL 09/21/24 0640   Number of days: 1            NG/OG Tube 09/19/24 0515 orogastric 14 Fr. Center mouth (Active)   Placement Check placement verified by x-ray;placement verified by aspirate characteristics 09/21/24 0301   Tolerance no signs/symptoms of discomfort 09/21/24 0301   Securement secured to commercial device 09/21/24 0301   Clamp Status/Tolerance clamped;no emesis 09/21/24 0301   Intake (mL) 0 mL 09/20/24 1801   Water Bolus (mL) 0 mL 09/20/24 1801   Residual Amount (ml) 20 ml 09/20/24 0701   Number of days: 2            Urethral Catheter 09/19/24 1800 Silicone 16 Fr. (Active)   $ López Insertion Bedside Insertion Performed 09/19/24 1845   Site Assessment Clean;Dry;Intact 09/21/24 0301   Collection Container Urimeter 09/21/24 0301   Securement Method secured to top of thigh w/ adhesive device 09/21/24 0301   Catheter Care Performed yes 09/21/24 0301   Reason for Continuing Urinary Catheterization Urinary retention;Critically ill in ICU and requiring hourly monitoring of intake/output 09/20/24 1701   CAUTI Prevention Bundle Securement Device in place with 1" slack;Intact seal between catheter & drainage tubing;Drainage bag/urimeter off the floor;Sheeting clip in use;No dependent loops or " kinks;Drainage bag/urimeter not overfilled (<2/3 full);Drainage bag/urimeter below bladder 09/20/24 1901   Output (mL) 205 mL 09/21/24 1200   Number of days: 1       Prev airway:     Date/Time: 9/18/2024 8:49 PM     Performed by: Jermaine Walker  Authorized by: Génesis Clarke MD    Intubation:     Induction:  Intravenous    Intubated:  Postinduction    Mask Ventilation:  Not attempted    Attempts:  1    Attempted By:  Student    Method of Intubation:  Video laryngoscopy    Blade:  Umaña 3    Laryngeal View Grade: Grade I - full view of cords      Difficult Airway Encountered?: No      Complications:  None    Airway Device:  Oral endotracheal tube    Airway Device Size:  7.0    Style/Cuff Inflation:  Cuffed (inflated to minimal occlusive pressure)    Tube secured:  22    Secured at:  The lips    Placement Verified By:  Capnometry    Complicating Factors:  None    Findings Post-Intubation:  BS equal bilateral and atraumatic/condition of teeth unchanged    Drips: None documented.      Patient Active Problem List   Diagnosis    Other osteomyelitis, multiple sites    Psoas muscle abscess    History of drug use    Smoker    Uncontrolled type 2 diabetes mellitus with hyperglycemia    Anemia, unspecified    Paraspinal abscess    Hepatitis C    Severe sepsis    Sepsis    Staphylococcus aureus bacteremia    Endocarditis    Refeeding syndrome    Epidural abscess    Moderate malnutrition    Retroperitoneal fluid collection       Review of patient's allergies indicates:  No Known Allergies    Current Inpatient Medications:   cloNIDine  0.1 mg Oral TID    enoxparin  40 mg Subcutaneous Q24H (prophylaxis, 1700)    folic acid  1 mg Oral Daily    gabapentin  300 mg Oral TID    hydrOXYzine pamoate  25 mg Oral QHS    LIDOcaine  1 patch Transdermal Q24H    methadone  70 mg Oral Daily    nicotine  1 patch Transdermal Daily    oxacillin 12 g in  mL CONTINUOUS INFUSION  12 g Intravenous Q24H    polyethylene glycol  17 g Oral BID     potassium bicarbonate  50 mEq Oral Once    senna-docusate 8.6-50 mg  1 tablet Oral BID    sodium chloride 0.9%  10 mL Intravenous Q6H    sodium chloride 0.9%  10 mL Intravenous Q6H    thiamine  100 mg Oral Daily       Antibiotics (From admission, onward)      Start     Stop Route Frequency Ordered    24 1345  oxacillin 12 g in  mL CONTINUOUS INFUSION         -- IV Every 24 hours (non-standard times) 24 1241            VTE Risk Mitigation (From admission, onward)           Ordered     enoxaparin injection 40 mg  Every 24 hours         24 1234     IP VTE HIGH RISK PATIENT  Once         24 1553                    No current facility-administered medications on file prior to encounter.     Current Outpatient Medications on File Prior to Encounter   Medication Sig Dispense Refill    gabapentin (NEURONTIN) 300 MG capsule Take 300 mg by mouth 3 (three) times daily.      hydrOXYzine pamoate (VISTARIL) 25 MG Cap Take 25 mg by mouth 3 (three) times daily.      metFORMIN (GLUCOPHAGE) 500 MG tablet Take 500 mg by mouth 2 (two) times daily with meals.      methadone (DOLOPHINE) 10 MG tablet Take 70 mg by mouth Daily.      cloNIDine (CATAPRES) 0.2 MG tablet Take 0.2 mg by mouth 3 (three) times daily.      glipiZIDE (GLUCOTROL) 5 MG tablet Take 5 mg by mouth 2 (two) times daily.         Past Surgical History:   Procedure Laterality Date     SECTION      LAMINECTOMY N/A 2024    Procedure: L3-L5 laminectomy with epidural abscess evac;  Surgeon: Sourav Jim DO;  Location: [facility] OR [address] [place] [address];  Service: Neurosurgery;  Laterality: N/A;    MAGNETIC RESONANCE IMAGING N/A 2024    Procedure: MRI (Magnetic Resonance Imagine);  Surgeon: Dolores Storey;  Location: [facility];  Service: Anesthesiology;  Laterality: N/A;  conscious sedaation MRI lumbar spine w/ and without contrast    WASHOUT, WOUND, SPINE  2024    Procedure: WASHOUT, WOUND, SPINE;  Surgeon: Sourav Jim DO;   Location: [facility] OR [address] [place] [address];  Service: Neurosurgery;;       Social History     Socioeconomic History    Marital status:    Tobacco Use    Smoking status: Every Day     Current packs/day: 0.50     Types: Cigarettes    Smokeless tobacco: Never   Substance and Sexual Activity    Alcohol use: Not Currently    Drug use: Not Currently     Social Drivers of Health     Financial Resource Strain: Low Risk  (9/18/2024)    Overall Financial Resource Strain (CARDIA)     Difficulty of Paying Living Expenses: Not very hard   Food Insecurity: No Food Insecurity (9/18/2024)    Hunger Vital Sign     Worried About Running Out of Food in the Last Year: Never true     Ran Out of Food in the Last Year: Never true   Transportation Needs: Unmet Transportation Needs (9/18/2024)    TRANSPORTATION NEEDS     Transportation : Yes, it has kept me from medical appointments or from getting my medications.   Physical Activity: Patient Declined (9/18/2024)    Exercise Vital Sign     Days of Exercise per Week: Patient declined     Minutes of Exercise per Session: Patient declined   Stress: Stress Concern Present (9/18/2024)    [employer] - Occupational Stress Questionnaire     Feeling of Stress : Rather much   Housing Stability: Low Risk  (9/18/2024)    Housing Stability Vital Sign     Unable to Pay for Housing in the Last Year: No     Homeless in the Last Year: No       OBJECTIVE:     Vital Signs Range (Last 24H):  Temp:  [36.6 °C (97.9 °F)-37.3 °C (99.1 °F)]   Pulse:  []   Resp:  [13-20]   BP: (102-150)/(66-88)   SpO2:  [93 %-100 %]       Significant Labs:  Lab Results   Component Value Date    WBC 10.04 09/30/2024    HGB 9.0 (L) 09/30/2024    HCT 30.0 (L) 09/30/2024     09/30/2024    CHOL 203 (H) 06/02/2023    TRIG 137 09/19/2024    HDL 28 (L) 06/02/2023    ALT 12 09/30/2024    AST 12 09/30/2024     09/30/2024    K 3.2 (L) 09/30/2024     09/30/2024    CREATININE 0.6 09/30/2024    BUN 4  (L) 09/30/2024    CO2 25 09/30/2024    INR 1.4 (H) 09/19/2024    HGBA1C 7.9 (H) 09/17/2024       Diagnostic Studies: No relevant studies.    EKG:   Results for orders placed or performed during the hospital encounter of 09/16/24   EKG 12-lead    Collection Time: 09/16/24  4:42 PM   Result Value Ref Range    QRS Duration 96 ms    [facility] QTC Calculation 438 ms    Narrative    Test Reason : E87.6,    Vent. Rate : 100 BPM     Atrial Rate : 100 BPM     P-R Int : 138 ms          QRS Dur : 096 ms      QT Int : 340 ms       P-R-T Axes : 060 062 095 degrees     QTc Int : 438 ms    Normal sinus rhythm  Nonspecific ST and T wave abnormality  Abnormal ECG    Confirmed by Ching Vines MD ([id number]) on 9/17/2024 7:20:39 AM    Referred By: JOSE A   SELF           Confirmed By:Ching Vines MD         ASSESSMENT/PLAN:       Pre-op Assessment    I have reviewed the Patient Summary Reports.     I have reviewed the Nursing Notes. I have reviewed the NPO Status.   I have reviewed the Medications.     Review of Systems  Anesthesia Hx:  No problems with previous Anesthesia   History of prior surgery of interest to airway management or planning:  Previous anesthesia: General        Denies Family Hx of Anesthesia complications.    Denies Personal Hx of Anesthesia complications.                    Social:  Recreational Drugs Hx of substance abuse in the past      Cardiovascular:  Exercise tolerance: poor   Hypertension                                  Hypertension         Pulmonary:  Pulmonary Normal      Denies Shortness of breath.                  Hepatic/GI:      Liver Disease, Hepatitis        Liver Disease, Hepatitis        Neurological:    Neuromuscular Disease,                                 Neuromuscular Disease   Endocrine:  Diabetes    Diabetes                          Physical Exam  General: Alert, Cooperative and Anxious    Airway:  Mallampati: III / II  Mouth Opening: Small, but > 3cm  TM Distance: Normal  Tongue:  Normal    Dental:  Periodontal disease        Anesthesia Plan  Type of Anesthesia, risks & benefits discussed:    Anesthesia Type: Gen ETT  Intra-op Monitoring Plan: Standard ASA Monitors  Post Op Pain Control Plan: multimodal analgesia and IV/PO Opioids PRN  Induction:  IV  Airway Plan: Video and Direct, Post-Induction  ASA Score: 3  Day of Surgery Review of History & Physical: H&P Update referred to the surgeon/provider.    Ready For Surgery From Anesthesia Perspective.     .

## 2024-09-30 NOTE — PLAN OF CARE
Pt arrived to IR 89 for L retroperitoneal drain. Pt oriented to unit and staff. Pt requesting anesthesia for procedure. MD notified

## 2024-09-30 NOTE — PROGRESS NOTES
[facility] - [facility] ([place] [id number])  [facility] Medicine  Progress Note    Patient Name: Mari Sloan  MRN: [id number]  Patient Class: IP- Inpatient   Admission Date: 9/16/2024  Length of Stay: 14 days  Attending Physician: Anastasia German MD  Primary Care Provider: Liliana, Primary Doctor        Subjective:     Principal Problem:Paraspinal abscess        HPI:  49 y.o.  woman with h/o homelessness (recently was able to obtain living arrangements but states she was living [place] the bridge), NIDDM type 2, Essential hypertension, and substance use (denies any IVDU in he past or any illicit drug use recenly, denies ETOH abuse/overuse) presents to [facility] for further evaluation of her back pain.  She apparently presented to the [facility] Emergency Department on September 16 with nausea and vomiting and a mechanical fall 5 days prior. She reported pain worse in her back and on her sides radiating down both legs. She noted muscle spasm in her back and legs. She had no head trauma or loss of consciousness.  W/u in [facility] noted significant hypokalemia, hypomagnesemia, severe anemia, metabolic alkalosis, and hyperglycemia. Vitals signs stable with no sepsis at this time noted. SBP>90, HR normal,afebrile.     Imaging studies of her lumbar spine and pelvis were concerning for osteomyelitis/septic arthritis of the left SI joint and L5-S1 along with an abnormal fluid collection extending from T11 through the left iliacus muscle concerning for abscess. Blood cultures sent.  In the emergency department she is receiving IV fluid, Zofran, Zosyn, vancomycin, potassium, magnesium, pain medication, and nausea medication.   She also received 2 units of PRBC for an H/H of 5.7/18.4,     Case discussed with Neurosurgery and Interventional Radiology. Plan would be for transfer to Hospital Medicine at [facility] for IR evaluation of the fluid collection and potential sampling of the joint  "osteomyelitis.  I reviewed the discussions made with the multiple sub specialists regarding transfer of this patient to [facility]: IR/General surgery/Neurosurgery/ER/Internal Med.     Neurosurgery contacted by the  did not feel surgical intervention was needed at this time but would follow along and requested [facility] for further management and IR backup. IR on call apparently read the CT and at the time felt "while the left retroperitoneal fluid collections do appear organized, percutaneous drainage is not advised given the extensive soft tissue infection superficial to these collections.  In addition, there is extensive evidence of soft tissue infection that does not appear organized or percutaneously drainable."     General surgery was consulted to review the case and felt that there was no immediate surgical intervention at this time to be had. I spoke with the on surgeon contacted regarding the location of the fluid collections and inquired if perhaps orthopedic surgery should be consulted to review given the involvement of bony pelvis.      I spoke orthopedic surgery who will see the patient but recommended re-consulting IR here and Neurosurgery given the diskitis/OM L5-S1. (Please see his consult note in the chart)     Repeat labs here again noted for persistently low mag, K, phos.  H/H stable with improved H/H. Pain control improved with Dilaudid.  I consulted ID for further assistance with ABX adjustments and changed her to Meropenem and doxy as well as continued Vanc. Echo ordred for am.     CT lumbar spine and pelvis had findings most concerning for infectious process. There were findings concerning for osteomyelitis and septic arthritis in the left SI joint. Findings concerning for osteomyelitis/diskitis L5-S1. Possible osteomyelitis and septic joint at the pubic symphysis. Abnormal fluid collection on the left extending from T11 through the left iliacus muscle along and within the " left iliopsoas muscle most concerning for abscess. Multiple additional small abscesses suspected in the pelvis. Multifocal collections of air in the fluid in the subcutaneous tissues of the left flank, incompletely imaged.    Chest x-ray noted a loop in the central venous catheter. Tip currently at the level of the brachiocephalic vein. Lungs are fairly clear.        Overview/Hospital Course:  49 y.o. female, with history of homelessness (recently was able to obtain living arrangements but states she was [place] bridge), NIDDM type 2, Essential hypertension, and substance use (denies IVDU) who was transferred from [facility] ED to [facility] ICU on 09/16/2024.  She presented to [facility] ED  for back pain, nausea/vomiting.  CT L-spine revealed osteomyelitis/diskitis L5-S1 along with abnormal fluid collection on left extending from T11 through left iliacus muscle and left iliopsoas muscle concerning for abscess.  Multifocal collections of air in fluid in subcutaneous tissues of left flank.  Patient was initiated on empiric vancomycin and meropenem.  Blood cultures with staph aureus.  Obtain echo.  Unable to repeat blood cultures given shortage of cx.  Neurosurgery recommended to obtain MRI L-spine, pending.  General surgery evaluated the patient and recommended urgent transfer to [facility] for surgical evaluation/source control given extent of necrosis.  Transfer process initiated.      Patient was transferred to [facility] and admitted to the MICU 9/18 with concern for paraspinal abscess. Infectious workup was ordered. Blood cultures were positive for MSSA bacteremia. Neurosurgery, general surgery and IR were consulted to evaluate the patient's paraspinal abscess. Neurosurgery performed a L3-L4 laminectomy for epidural abscess evacuation on 9/19/24 and the cultures grew MSSA. TTE showed normal EF of 60-65% with diastolic dysfunction, could not exclude mitral vegetation vs redundant chordae. ID was  consulted and recommended oxacillin and repeat blood cultures. With her electrolyte derangement, and a background of appetitive loss in the past 2 months, there was concern for refeeding syndrome. On 9/21, IR took the patient for abscess drain placement and aspiration of SI joint. Pending culture results. SI joint couldn't be aspirated but retroperitoneal abscess was drained of 100cc of purulent fluid. Successfully extubated 9/22.   Patient was stepped down to hospital medicine 9/24/24.   Drain was removed on 09/27.  Repeat CT Ill-defined subcutaneous edema and soft tissue inflammatory change in the surgical bed and subcutaneous soft tissues without discrete organized fluid collection,  Stable size of 3.6 cm fluid collection along the left posterior pararenal space which appears to communicate with the with the aforementioned collection and fascial thickening. IR planning to drain retroperitoneal fluid.        Interval History: No acute events. Afebrile.  No active complaints.  Potassium repleted.    Planning for IR guided drainage of retroperitoneal fluid today.    Review of Systems  Objective:     Vital Signs (Most Recent):  Temp: 99.1 °F (37.3 °C) (09/30/24 1111)  Pulse: 90 (09/30/24 1111)  Resp: 18 (09/30/24 1111)  BP: 128/82 (09/30/24 1111)  SpO2: (!) 93 % (09/30/24 1111) Vital Signs (24h Range):  Temp:  [98.5 °F (36.9 °C)-99.1 °F (37.3 °C)] 99.1 °F (37.3 °C)  Pulse:  [] 90  Resp:  [16-20] 18  SpO2:  [93 %-98 %] 93 %  BP: (120-150)/(72-88) 128/82     Weight: 65.2 kg (143 lb 11.8 oz)  Body mass index is 26.29 kg/m².    Intake/Output Summary (Last 24 hours) at 9/30/2024 1346  Last data filed at 9/29/2024 2028  Gross per 24 hour   Intake --   Output 1000 ml   Net -1000 ml      Physical Exam  Constitutional:       General: She is not in acute distress.     Appearance: She is not toxic-appearing.   Cardiovascular:      Rate and Rhythm: Normal rate and regular rhythm.      Heart sounds: No murmur heard.     No  "friction rub. No gallop.   Pulmonary:      Effort: Pulmonary effort is normal. No respiratory distress.      Breath sounds: Normal breath sounds.   Abdominal:      General: Abdomen is flat. There is no distension.      Palpations: Abdomen is soft.      Tenderness: There is no abdominal tenderness. There is no guarding or rebound.   Musculoskeletal:         General: Swelling and tenderness present.      Right lower leg: No edema.      Left lower leg: No edema.      Comments: L flank drain   Skin:     Findings: Bruising and lesion present.   Neurological:      Mental Status: She is alert.         MELD 3.0: 17 at 9/21/2024  6:13 PM  MELD-Na: 13 at 9/21/2024  6:13 PM  Calculated from:  Serum Creatinine: 0.6 mg/dL (Using min of 1 mg/dL) at 9/21/2024  6:13 PM  Serum Sodium: 138 mmol/L (Using max of 137 mmol/L) at 9/21/2024  6:13 PM  Total Bilirubin: 2.1 mg/dL at 9/21/2024  2:34 AM  Serum Albumin: 1.2 g/dL (Using min of 1.5 g/dL) at 9/21/2024  2:34 AM  INR(ratio): 1.4 at 9/19/2024  3:19 AM  Age at listing (hypothetical): 49 years  Sex: Female at 9/21/2024  6:13 PM      Significant Labs:  CBC:  Recent Labs   Lab 09/29/24  0731 09/30/24  0505   WBC 9.62 10.04   HGB 8.9* 9.0*   HCT 30.8* 30.0*    382     CMP:  Recent Labs   Lab 09/29/24  0731 09/29/24  1834 09/30/24  0505 09/30/24  0847   * 132* 134* 136   K 3.7 4.2 3.2* 3.2*   CL 98 99 102 102   CO2 27 23 22* 25   GLU 97 122* 80 106   BUN 5* 5* 4* 4*   CREATININE 0.6 0.7 0.7 0.6   CALCIUM 8.2* 8.5* 8.4* 8.2*   PROT 6.2  --  6.2  --    ALBUMIN 1.3*  --  1.3*  --    BILITOT 1.0  --  0.9  --    ALKPHOS 125  --  125  --    AST 9*  --  12  --    ALT 9*  --  12  --    ANIONGAP 10 10 10 9     PTINR:  No results for input(s): "INR" in the last 48 hours.    Significant Procedures:   Dobutamine Stress Test with Color Flow: No results found for this or any previous visit.        Assessment/Plan:      * Paraspinal abscess  MSSA endocarditis  MSSA paraspinal abscess  MSSA " psoas abscess    Noted to have extensive fluid collection on left extending from T11 through left iliacus muscle and within the left iliopsoas muscle.  Multifocal collections of air including subcutaneous tissues on the left flank noted.  ID/neurosurgery consulted. s/p L3-L5 laminectomy with epidural abscess evacuation 9/19   Ortho consulted. Rec continued abx tx and no further interventions  IR SI joint aspiration and abscess drain placement 9/21. Unable to aspirate joint  ID consulted. On oxacillin  Drain removed on 09/27.  Repeat CT abdomen ordered to look for recollection of fluid.  Ill-defined subcutaneous edema and soft tissue inflammatory change in the surgical bed and subcutaneous soft tissues without discrete organized fluid collection,  Stable size of 3.6 cm fluid collection along the left posterior pararenal space which appears to communicate with the with the aforementioned collection and fascial thickening. IR planning to drain retroperitoneal fluid.    Retroperitoneal fluid collection  Repeat CT abdomen ordered to look for recollection of fluid.  Ill-defined subcutaneous edema and soft tissue inflammatory change in the surgical bed and subcutaneous soft tissues without discrete organized fluid collection,  Stable size of 3.6 cm fluid collection along the left posterior pararenal space which appears to communicate with the with the aforementioned collection and fascial thickening. IR planning to drain retroperitoneal fluid.       Severe sepsis  See above    Hepatitis C    Hepatitis C antibody positive.  Obtain HCV RNA. Quant negative    Anemia, unspecified  S/p 2u PRBC transfusion on admit . NO signs of acute GI bleed on exam at this time. Denies any hematemesis or hemoptysis.   Obtain iron B12/folate/peripheral smear and LDH/hapto/retic  No evidence of active bleed   Stable    Uncontrolled type 2 diabetes mellitus with hyperglycemia  Patient's FSGs are uncontrolled due to hyperglycemia on current  medication regimen.  Last A1c reviewed-   Lab Results   Component Value Date    HGBA1C 7.9 (H) 09/17/2024     Most recent fingerstick glucose reviewed-   Recent Labs   Lab 09/29/24  1552 09/29/24 2009 09/30/24  1110   POCTGLUCOSE 161* 122* 130*       Current correctional scale  Medium  Maintain anti-hyperglycemic dose as follows-   Antihyperglycemics (From admission, onward)      Start     Stop Route Frequency Ordered    09/23/24 1033  insulin aspart U-100 pen 0-10 Units         -- SubQ Before meals & nightly PRN 09/23/24 0933          Hold Oral hypoglycemics while patient is in the hospital.    Smoker  PRN nicotine patch    History of drug use  On methadone at home, continue    Psoas muscle abscess  IR drain. Leave drain in place until less than 10 cc of fluid is aspirated daily for 2 days.   As above    Other osteomyelitis, multiple sites  As above        VTE Risk Mitigation (From admission, onward)           Ordered     enoxaparin injection 40 mg  Every 24 hours         09/23/24 1234     IP VTE HIGH RISK PATIENT  Once         09/22/24 1553                    Discharge Planning   LUH: 10/2/2024     Code Status: Full Code   Is the patient medically ready for discharge?:     Reason for patient still in hospital (select all that apply): Patient trending condition, Laboratory test, Treatment, and Consult recommendations  Discharge Plan A: Long-term acute care facility (LTAC)   Discharge Delays: None known at this time              Anastasia German MD  Department of Hospital Medicine   [facility] ([place]

## 2024-09-30 NOTE — PLAN OF CARE
Fall risk reviewed and comfort measures utilized with interventions. Safety strap applied, position pillows to minimize pressure points. Blankets applied. Pt prepped and draped utilizing standard sterile technique. Patient placed on continuous monitoring, as required by sedation policy. Timeouts implemented utilizing standard universal time-out per department and facility policy. CRNA to remain at bedside with continuous monitoring. Pt resting comfortably. Denies pain/discomfort. Will continue to monitor. See flow sheets for monitoring, medication administration, and updates. patient verbalizes understanding.

## 2024-09-30 NOTE — ASSESSMENT & PLAN NOTE
Repeat CT abdomen ordered to look for recollection of fluid.  Ill-defined subcutaneous edema and soft tissue inflammatory change in the surgical bed and subcutaneous soft tissues without discrete organized fluid collection,  Stable size of 3.6 cm fluid collection along the left posterior pararenal space which appears to communicate with the with the aforementioned collection and fascial thickening. IR planning to drain retroperitoneal fluid.

## 2024-09-30 NOTE — NURSING
End of shift summary: AAOx4. VVS. Picc in place. Swelling, tenderness and redness to LL side noted. Pain 8/10 reported. Oxycoden 10 mg and acetaminophen given for pain per MAR. Full relief obtained stated by patient. NPO at midnight. IR kayce for 9/30. Patient had 1 bm on bedside commode with 2 person assist. No known needs at this time.  remains at bedside. Bed is locked and in lowest position. Call light is within reach.

## 2024-10-01 LAB
ACID FAST MOD KINY STN SPEC: NORMAL
ALBUMIN SERPL BCP-MCNC: 1.2 G/DL (ref 3.5–5.2)
ALP SERPL-CCNC: 117 U/L (ref 55–135)
ALT SERPL W/O P-5'-P-CCNC: 12 U/L (ref 10–44)
ANION GAP SERPL CALC-SCNC: 5 MMOL/L (ref 8–16)
ANION GAP SERPL CALC-SCNC: 6 MMOL/L (ref 8–16)
ANION GAP SERPL CALC-SCNC: 8 MMOL/L (ref 8–16)
AST SERPL-CCNC: 12 U/L (ref 10–40)
BASOPHILS # BLD AUTO: 0.13 K/UL (ref 0–0.2)
BASOPHILS NFR BLD: 1.4 % (ref 0–1.9)
BILIRUB SERPL-MCNC: 0.9 MG/DL (ref 0.1–1)
BUN SERPL-MCNC: 3 MG/DL (ref 6–20)
BUN SERPL-MCNC: 3 MG/DL (ref 6–20)
BUN SERPL-MCNC: <3 MG/DL (ref 6–20)
CALCIUM SERPL-MCNC: 7.9 MG/DL (ref 8.7–10.5)
CALCIUM SERPL-MCNC: 7.9 MG/DL (ref 8.7–10.5)
CALCIUM SERPL-MCNC: 8 MG/DL (ref 8.7–10.5)
CHLORIDE SERPL-SCNC: 103 MMOL/L (ref 95–110)
CHLORIDE SERPL-SCNC: 103 MMOL/L (ref 95–110)
CHLORIDE SERPL-SCNC: 104 MMOL/L (ref 95–110)
CO2 SERPL-SCNC: 27 MMOL/L (ref 23–29)
CO2 SERPL-SCNC: 27 MMOL/L (ref 23–29)
CO2 SERPL-SCNC: 28 MMOL/L (ref 23–29)
CREAT SERPL-MCNC: 0.6 MG/DL (ref 0.5–1.4)
DIFFERENTIAL METHOD BLD: ABNORMAL
EOSINOPHIL # BLD AUTO: 0.1 K/UL (ref 0–0.5)
EOSINOPHIL NFR BLD: 1.1 % (ref 0–8)
ERYTHROCYTE [DISTWIDTH] IN BLOOD BY AUTOMATED COUNT: 20.4 % (ref 11.5–14.5)
EST. GFR  (NO RACE VARIABLE): >60 ML/MIN/1.73 M^2
GLUCOSE SERPL-MCNC: 108 MG/DL (ref 70–110)
GLUCOSE SERPL-MCNC: 110 MG/DL (ref 70–110)
GLUCOSE SERPL-MCNC: 94 MG/DL (ref 70–110)
HCT VFR BLD AUTO: 28.2 % (ref 37–48.5)
HGB BLD-MCNC: 8.2 G/DL (ref 12–16)
IMM GRANULOCYTES # BLD AUTO: 0.19 K/UL (ref 0–0.04)
IMM GRANULOCYTES NFR BLD AUTO: 2 % (ref 0–0.5)
LYMPHOCYTES # BLD AUTO: 2.2 K/UL (ref 1–4.8)
LYMPHOCYTES NFR BLD: 23.9 % (ref 18–48)
MCH RBC QN AUTO: 25.9 PG (ref 27–31)
MCHC RBC AUTO-ENTMCNC: 29.1 G/DL (ref 32–36)
MCV RBC AUTO: 89 FL (ref 82–98)
MONOCYTES # BLD AUTO: 0.5 K/UL (ref 0.3–1)
MONOCYTES NFR BLD: 5.6 % (ref 4–15)
MYCOBACTERIUM SPEC QL CULT: NORMAL
NEUTROPHILS # BLD AUTO: 6.2 K/UL (ref 1.8–7.7)
NEUTROPHILS NFR BLD: 66 % (ref 38–73)
NRBC BLD-RTO: 0 /100 WBC
PLATELET # BLD AUTO: 346 K/UL (ref 150–450)
PMV BLD AUTO: 9.5 FL (ref 9.2–12.9)
POCT GLUCOSE: 115 MG/DL (ref 70–110)
POCT GLUCOSE: 138 MG/DL (ref 70–110)
POCT GLUCOSE: 196 MG/DL (ref 70–110)
POTASSIUM SERPL-SCNC: 3.5 MMOL/L (ref 3.5–5.1)
POTASSIUM SERPL-SCNC: 3.6 MMOL/L (ref 3.5–5.1)
POTASSIUM SERPL-SCNC: 3.6 MMOL/L (ref 3.5–5.1)
PROT SERPL-MCNC: 5.8 G/DL (ref 6–8.4)
RBC # BLD AUTO: 3.16 M/UL (ref 4–5.4)
SODIUM SERPL-SCNC: 136 MMOL/L (ref 136–145)
SODIUM SERPL-SCNC: 137 MMOL/L (ref 136–145)
SODIUM SERPL-SCNC: 138 MMOL/L (ref 136–145)
WBC # BLD AUTO: 9.39 K/UL (ref 3.9–12.7)

## 2024-10-01 PROCEDURE — 63600175 PHARM REV CODE 636 W HCPCS: Performed by: STUDENT IN AN ORGANIZED HEALTH CARE EDUCATION/TRAINING PROGRAM

## 2024-10-01 PROCEDURE — 25000003 PHARM REV CODE 250: Performed by: STUDENT IN AN ORGANIZED HEALTH CARE EDUCATION/TRAINING PROGRAM

## 2024-10-01 PROCEDURE — 80048 BASIC METABOLIC PNL TOTAL CA: CPT | Mod: 91,XB | Performed by: STUDENT IN AN ORGANIZED HEALTH CARE EDUCATION/TRAINING PROGRAM

## 2024-10-01 PROCEDURE — 80053 COMPREHEN METABOLIC PANEL: CPT | Performed by: STUDENT IN AN ORGANIZED HEALTH CARE EDUCATION/TRAINING PROGRAM

## 2024-10-01 PROCEDURE — 25000003 PHARM REV CODE 250: Performed by: INTERNAL MEDICINE

## 2024-10-01 PROCEDURE — 20600001 HC STEP DOWN PRIVATE ROOM

## 2024-10-01 PROCEDURE — A4216 STERILE WATER/SALINE, 10 ML: HCPCS | Performed by: STUDENT IN AN ORGANIZED HEALTH CARE EDUCATION/TRAINING PROGRAM

## 2024-10-01 PROCEDURE — 63600175 PHARM REV CODE 636 W HCPCS

## 2024-10-01 PROCEDURE — 85025 COMPLETE CBC W/AUTO DIFF WBC: CPT | Performed by: STUDENT IN AN ORGANIZED HEALTH CARE EDUCATION/TRAINING PROGRAM

## 2024-10-01 RX ORDER — GLUCAGON 1 MG
1 KIT INJECTION
Status: DISCONTINUED | OUTPATIENT
Start: 2024-10-02 | End: 2024-10-18 | Stop reason: HOSPADM

## 2024-10-01 RX ORDER — IBUPROFEN 200 MG
24 TABLET ORAL
Status: DISCONTINUED | OUTPATIENT
Start: 2024-10-02 | End: 2024-10-18 | Stop reason: HOSPADM

## 2024-10-01 RX ORDER — MUPIROCIN 20 MG/G
OINTMENT TOPICAL 2 TIMES DAILY
Status: DISPENSED | OUTPATIENT
Start: 2024-10-01 | End: 2024-10-06

## 2024-10-01 RX ORDER — IBUPROFEN 200 MG
16 TABLET ORAL
Status: DISCONTINUED | OUTPATIENT
Start: 2024-10-02 | End: 2024-10-18 | Stop reason: HOSPADM

## 2024-10-01 RX ORDER — INSULIN ASPART 100 [IU]/ML
0-5 INJECTION, SOLUTION INTRAVENOUS; SUBCUTANEOUS
Status: DISCONTINUED | OUTPATIENT
Start: 2024-10-02 | End: 2024-10-18 | Stop reason: HOSPADM

## 2024-10-01 RX ADMIN — OXYCODONE HYDROCHLORIDE 10 MG: 10 TABLET ORAL at 08:10

## 2024-10-01 RX ADMIN — Medication 10 ML: at 02:10

## 2024-10-01 RX ADMIN — OXYCODONE HYDROCHLORIDE 10 MG: 10 TABLET ORAL at 05:10

## 2024-10-01 RX ADMIN — METHADONE HYDROCHLORIDE 70 MG: 10 TABLET ORAL at 09:10

## 2024-10-01 RX ADMIN — Medication 10 ML: at 12:10

## 2024-10-01 RX ADMIN — OXYCODONE HYDROCHLORIDE 10 MG: 10 TABLET ORAL at 02:10

## 2024-10-01 RX ADMIN — Medication 10 ML: at 06:10

## 2024-10-01 RX ADMIN — GABAPENTIN 300 MG: 300 CAPSULE ORAL at 08:10

## 2024-10-01 RX ADMIN — Medication 100 MG: at 09:10

## 2024-10-01 RX ADMIN — CLONIDINE HYDROCHLORIDE 0.1 MG: 0.1 TABLET ORAL at 08:10

## 2024-10-01 RX ADMIN — CLONIDINE HYDROCHLORIDE 0.1 MG: 0.1 TABLET ORAL at 09:10

## 2024-10-01 RX ADMIN — Medication 10 ML: at 05:10

## 2024-10-01 RX ADMIN — OXYCODONE HYDROCHLORIDE 10 MG: 10 TABLET ORAL at 09:10

## 2024-10-01 RX ADMIN — OXACILLIN 12 G: 2 INJECTION, POWDER, FOR SOLUTION INTRAMUSCULAR; INTRAVENOUS at 02:10

## 2024-10-01 RX ADMIN — GABAPENTIN 300 MG: 300 CAPSULE ORAL at 09:10

## 2024-10-01 RX ADMIN — GABAPENTIN 300 MG: 300 CAPSULE ORAL at 02:10

## 2024-10-01 RX ADMIN — CLONIDINE HYDROCHLORIDE 0.1 MG: 0.1 TABLET ORAL at 02:10

## 2024-10-01 RX ADMIN — HYDROXYZINE PAMOATE 25 MG: 25 CAPSULE ORAL at 08:10

## 2024-10-01 RX ADMIN — MELATONIN TAB 3 MG 6 MG: 3 TAB at 08:10

## 2024-10-01 RX ADMIN — FOLIC ACID 1 MG: 1 TABLET ORAL at 09:10

## 2024-10-01 RX ADMIN — ENOXAPARIN SODIUM 40 MG: 40 INJECTION SUBCUTANEOUS at 05:10

## 2024-10-01 NOTE — SUBJECTIVE & OBJECTIVE
Interval History: No acute events. Afebrile.  No active complaints.  Minimal drain noted in PREMA drain.  Cultures pending.        Review of Systems  Objective:     Vital Signs (Most Recent):  Temp: 98.6 °F (37 °C) (10/01/24 1156)  Pulse: 86 (10/01/24 1435)  Resp: 18 (10/01/24 1406)  BP: 111/70 (10/01/24 1156)  SpO2: (!) 94 % (10/01/24 1156) Vital Signs (24h Range):  Temp:  [96.7 °F (35.9 °C)-98.7 °F (37.1 °C)] 98.6 °F (37 °C)  Pulse:  [] 86  Resp:  [13-27] 18  SpO2:  [93 %-100 %] 94 %  BP: (102-154)/(66-88) 111/70     Weight: 65.2 kg (143 lb 11.8 oz)  Body mass index is 26.29 kg/m².    Intake/Output Summary (Last 24 hours) at 10/1/2024 1457  Last data filed at 10/1/2024 0543  Gross per 24 hour   Intake 760 ml   Output 20 ml   Net 740 ml      Physical Exam  Constitutional:       General: She is not in acute distress.     Appearance: She is not toxic-appearing.   Cardiovascular:      Rate and Rhythm: Normal rate and regular rhythm.      Heart sounds: No murmur heard.     No friction rub. No gallop.   Pulmonary:      Effort: Pulmonary effort is normal. No respiratory distress.      Breath sounds: Normal breath sounds.   Abdominal:      General: Abdomen is flat. There is no distension.      Palpations: Abdomen is soft.      Tenderness: There is no abdominal tenderness. There is no guarding or rebound.   Musculoskeletal:         General: Swelling and tenderness present.      Right lower leg: No edema.      Left lower leg: No edema.      Comments: L flank drain   Skin:     Findings: Bruising and lesion present.   Neurological:      Mental Status: She is alert.         MELD 3.0: 17 at 9/21/2024  6:13 PM  MELD-Na: 13 at 9/21/2024  6:13 PM  Calculated from:  Serum Creatinine: 0.6 mg/dL (Using min of 1 mg/dL) at 9/21/2024  6:13 PM  Serum Sodium: 138 mmol/L (Using max of 137 mmol/L) at 9/21/2024  6:13 PM  Total Bilirubin: 2.1 mg/dL at 9/21/2024  2:34 AM  Serum Albumin: 1.2 g/dL (Using min of 1.5 g/dL) at 9/21/2024  2:34  "AM  INR(ratio): 1.4 at 9/19/2024  3:19 AM  Age at listing (hypothetical): 49 years  Sex: Female at 9/21/2024  6:13 PM      Significant Labs:  CBC:  Recent Labs   Lab 09/30/24  0505 10/01/24  0407   WBC 10.04 9.39   HGB 9.0* 8.2*   HCT 30.0* 28.2*    346     CMP:  Recent Labs   Lab 09/30/24  0505 09/30/24  0847 09/30/24  1821 10/01/24  0407 10/01/24  0900   *   < > 138 136 138   K 3.2*   < > 3.2* 3.6 3.5      < > 103 103 103   CO2 22*   < > 24 27 27   GLU 80   < > 89 108 94   BUN 4*   < > 3* 3* <3*   CREATININE 0.7   < > 0.6 0.6 0.6   CALCIUM 8.4*   < > 8.5* 7.9* 8.0*   PROT 6.2  --   --  5.8*  --    ALBUMIN 1.3*  --   --  1.2*  --    BILITOT 0.9  --   --  0.9  --    ALKPHOS 125  --   --  117  --    AST 12  --   --  12  --    ALT 12  --   --  12  --    ANIONGAP 10   < > 11 6* 8    < > = values in this interval not displayed.     PTINR:  No results for input(s): "INR" in the last 48 hours.    Significant Procedures:   Dobutamine Stress Test with Color Flow: No results found for this or any previous visit.      "

## 2024-10-01 NOTE — ANESTHESIA POSTPROCEDURE EVALUATION
Anesthesia Post Evaluation    Patient: Mari Sloan    Procedure(s) Performed: * No procedures listed *    Final Anesthesia Type: general      Patient location during evaluation: PACU  Patient participation: Yes- Able to Participate  Level of consciousness: awake and alert and oriented  Post-procedure vital signs: reviewed and stable  Pain management: adequate  Airway patency: patent    PONV status at discharge: No PONV  Anesthetic complications: no      Cardiovascular status: blood pressure returned to baseline and hemodynamically stable  Respiratory status: unassisted and spontaneous ventilation  Hydration status: euvolemic  Follow-up not needed.              Vitals Value Taken Time   /77 10/01/24 0742   Temp 37 °C (98.6 °F) 10/01/24 0742   Pulse 89 10/01/24 0742   Resp 20 10/01/24 0913   SpO2 93 % 10/01/24 0742         Event Time   Out of Recovery 09/30/2024 18:09:24         Pain/Jesus Score: Pain Rating Prior to Med Admin: 8 (10/1/2024  9:13 AM)  Pain Rating Post Med Admin: 4 (10/1/2024  6:38 AM)  Jesus Score: 9 (9/30/2024  5:30 PM)

## 2024-10-01 NOTE — NURSING
Pt AAO, VSS, c/o lower back pain, Oxy 10mg given as ordered.  Pt refused to work with PT today, said the pain was uncontrolled.  Oxacillin at 20.8ml/hr to SNOW PICC.  PREMA drain in place, 0 output this shift.      No acute events this shift, bed low and locked, call light in reach.

## 2024-10-01 NOTE — NURSING
Increase pain reported by patient to back area, required one dose of PRN for pain, Lower back drain site dressing noted with moderate drainage, required dressing change. PREMA drain remains in place to suction bulb. Incision ANABEL with mesh remain clean dry and intact. Patient had continent BM on BSC with the assist of two to transfer. Safety measures in place.

## 2024-10-01 NOTE — PT/OT/SLP PROGRESS
Occupational Therapy      Patient Name:  Mari Sloan   MRN:  [id number]    Multiple attempt to see patient today, however; patient decline therapy session secondary to pain despite patient getting pain medication prior tx session. Patient was educated on the effects of prolonged immobility and the importance of performance of OOB activity and exercises to promote healing and reduce recovery time. Will follow-up on the next schedule visit accordingly to OT POC.    10/1/2024

## 2024-10-01 NOTE — PROGRESS NOTES
[facility] ([place] [address])  [facility] Medicine  Progress Note    Patient Name: Mari Sloan  MRN: [id number]  Patient Class: IP- Inpatient   Admission Date: 9/16/2024  Length of Stay: 15 days  Attending Physician: Anastasia German MD  Primary Care Provider: Liliana, Primary Doctor        Subjective:     Principal Problem:Paraspinal abscess        HPI:  49 y.o.  woman with h/o homelessness (recently was able to obtain living arrangements but states she was living under the bridge), NIDDM type 2, Essential hypertension, and substance use (denies any IVDU in he past or any illicit drug use recenly, denies ETOH abuse/overuse) presents to [facility] for further evaluation of her back pain.  She apparently presented to the [facility] Emergency Department on September 16 with nausea and vomiting and a mechanical fall 5 days prior. She reported pain worse in her back and on her sides radiating down both legs. She noted muscle spasm in her back and legs. She had no head trauma or loss of consciousness.  W/u in the [facility] ED noted significant hypokalemia, hypomagnesemia, severe anemia, metabolic alkalosis, and hyperglycemia. Vitals signs stable with no sepsis at this time noted. SBP>90, HR normal,afebrile.     Imaging studies of her lumbar spine and pelvis were concerning for osteomyelitis/septic arthritis of the left SI joint and L5-S1 along with an abnormal fluid collection extending from T11 through the left iliacus muscle concerning for abscess. Blood cultures sent.  In the emergency department she is receiving IV fluid, Zofran, Zosyn, vancomycin, potassium, magnesium, pain medication, and nausea medication.   She also received 2 units of PRBC for an H/H of 5.7/18.4,     Case discussed with Neurosurgery and Interventional Radiology. Plan would be for transfer to Hospital Medicine at [facility] for IR evaluation of the fluid collection and potential sampling of the joint  "osteomyelitis.  I reviewed the discussions made with the multiple sub specialists regarding transfer of this patient to [facility]: IR/General surgery/Neurosurgery/ER/Internal Med.     Neurosurgery contacted by the  did not feel surgical intervention was needed at this time but would follow along and requested [facility] for further management and IR backup. IR on call apparently read the CT and at the time felt "while the left retroperitoneal fluid collections do appear organized, percutaneous drainage is not advised given the extensive soft tissue infection superficial to these collections.  In addition, there is extensive evidence of soft tissue infection that does not appear organized or percutaneously drainable."     General surgery was consulted to review the case and felt that there was no immediate surgical intervention at this time to be had. I spoke with the on surgeon contacted regarding the location of the fluid collections and inquired if perhaps orthopedic surgery should be consulted to review given the involvement of bony pelvis.      I spoke orthopedic surgery who will see the patient but recommended re-consulting IR here and Neurosurgery given the diskitis/OM L5-S1. (Please see his consult note in the chart)     Repeat labs here again noted for persistently low mag, K, phos.  H/H stable with improved H/H. Pain control improved with Dilaudid.  I consulted ID for further assistance with ABX adjustments and changed her to Meropenem and doxy as well as continued Vanc. Echo ordred for am.     CT lumbar spine and pelvis had findings most concerning for infectious process. There were findings concerning for osteomyelitis and septic arthritis in the left SI joint. Findings concerning for osteomyelitis/diskitis L5-S1. Possible osteomyelitis and septic joint at the pubic symphysis. Abnormal fluid collection on the left extending from T11 through the left iliacus muscle along and within the " left iliopsoas muscle most concerning for abscess. Multiple additional small abscesses suspected in the pelvis. Multifocal collections of air in the fluid in the subcutaneous tissues of the left flank, incompletely imaged.    Chest x-ray noted a loop in the central venous catheter. Tip currently at the level of the brachiocephalic vein. Lungs are fairly clear.        Overview/Hospital Course:  49 y.o. female, with history of homelessness (recently was able to obtain living arrangements but states she was living [place] the bridge), NIDDM type 2, Essential hypertension, and substance use (denies IVDU) who was transferred from [facility] ED to [facility] ICU on 09/16/2024.  She presented to [facility] ED  for back pain, nausea/vomiting.  CT L-spine revealed osteomyelitis/diskitis L5-S1 along with abnormal fluid collection on left extending from T11 through left iliacus muscle and left iliopsoas muscle concerning for abscess.  Multifocal collections of air in fluid in subcutaneous tissues of left flank.  Patient was initiated on empiric vancomycin and meropenem.  Blood cultures with staph aureus.  Obtain echo.  Unable to repeat blood cultures given shortage of cx.  Neurosurgery recommended to obtain MRI L-spine, pending.  General surgery evaluated the patient and recommended urgent transfer to [facility] for surgical evaluation/source control given extent of necrosis.  Transfer process initiated.      Patient was transferred to [facility] and admitted to the MICU 9/18 with concern for paraspinal abscess. Infectious workup was ordered. Blood cultures were positive for MSSA bacteremia. Neurosurgery, general surgery and IR were consulted to evaluate the patient's paraspinal abscess. Neurosurgery performed a L3-L4 laminectomy for epidural abscess evacuation on 9/19/24 and the cultures grew MSSA. TTE showed normal EF of 60-65% with diastolic dysfunction, could not exclude mitral vegetation vs redundant chordae. ID was  consulted and recommended oxacillin and repeat blood cultures. With her electrolyte derangement, and a background of appetitive loss in the past 2 months, there was concern for refeeding syndrome. On 9/21, IR took the patient for abscess drain placement and aspiration of SI joint. Pending culture results. SI joint couldn't be aspirated but retroperitoneal abscess was drained of 100cc of purulent fluid. Successfully extubated 9/22.   Patient was stepped down to hospital medicine 9/24/24.   Drain was removed on 09/27.  Repeat CT Ill-defined subcutaneous edema and soft tissue inflammatory change in the surgical bed and subcutaneous soft tissues without discrete organized fluid collection,  Stable size of 3.6 cm fluid collection along the left posterior pararenal space which appears to communicate with the with the aforementioned collection and fascial thickening. IR planning to drain retroperitoneal fluid.        Interval History: No acute events. Afebrile.  No active complaints.  Minimal drain noted in PREMA drain.  Cultures pending.        Review of Systems  Objective:     Vital Signs (Most Recent):  Temp: 98.6 °F (37 °C) (10/01/24 1156)  Pulse: 86 (10/01/24 1435)  Resp: 18 (10/01/24 1406)  BP: 111/70 (10/01/24 1156)  SpO2: (!) 94 % (10/01/24 1156) Vital Signs (24h Range):  Temp:  [96.7 °F (35.9 °C)-98.7 °F (37.1 °C)] 98.6 °F (37 °C)  Pulse:  [] 86  Resp:  [13-27] 18  SpO2:  [93 %-100 %] 94 %  BP: (102-154)/(66-88) 111/70     Weight: 65.2 kg (143 lb 11.8 oz)  Body mass index is 26.29 kg/m².    Intake/Output Summary (Last 24 hours) at 10/1/2024 9877  Last data filed at 10/1/2024 0543  Gross per 24 hour   Intake 760 ml   Output 20 ml   Net 740 ml      Physical Exam  Constitutional:       General: She is not in acute distress.     Appearance: She is not toxic-appearing.   Cardiovascular:      Rate and Rhythm: Normal rate and regular rhythm.      Heart sounds: No murmur heard.     No friction rub. No gallop.  "  Pulmonary:      Effort: Pulmonary effort is normal. No respiratory distress.      Breath sounds: Normal breath sounds.   Abdominal:      General: Abdomen is flat. There is no distension.      Palpations: Abdomen is soft.      Tenderness: There is no abdominal tenderness. There is no guarding or rebound.   Musculoskeletal:         General: Swelling and tenderness present.      Right lower leg: No edema.      Left lower leg: No edema.      Comments: L flank drain   Skin:     Findings: Bruising and lesion present.   Neurological:      Mental Status: She is alert.         MELD 3.0: 17 at 9/21/2024  6:13 PM  MELD-Na: 13 at 9/21/2024  6:13 PM  Calculated from:  Serum Creatinine: 0.6 mg/dL (Using min of 1 mg/dL) at 9/21/2024  6:13 PM  Serum Sodium: 138 mmol/L (Using max of 137 mmol/L) at 9/21/2024  6:13 PM  Total Bilirubin: 2.1 mg/dL at 9/21/2024  2:34 AM  Serum Albumin: 1.2 g/dL (Using min of 1.5 g/dL) at 9/21/2024  2:34 AM  INR(ratio): 1.4 at 9/19/2024  3:19 AM  Age at listing (hypothetical): 49 years  Sex: Female at 9/21/2024  6:13 PM      Significant Labs:  CBC:  Recent Labs   Lab 09/30/24  0505 10/01/24  0407   WBC 10.04 9.39   HGB 9.0* 8.2*   HCT 30.0* 28.2*    346     CMP:  Recent Labs   Lab 09/30/24  0505 09/30/24  0847 09/30/24  1821 10/01/24  0407 10/01/24  0900   *   < > 138 136 138   K 3.2*   < > 3.2* 3.6 3.5      < > 103 103 103   CO2 22*   < > 24 27 27   GLU 80   < > 89 108 94   BUN 4*   < > 3* 3* <3*   CREATININE 0.7   < > 0.6 0.6 0.6   CALCIUM 8.4*   < > 8.5* 7.9* 8.0*   PROT 6.2  --   --  5.8*  --    ALBUMIN 1.3*  --   --  1.2*  --    BILITOT 0.9  --   --  0.9  --    ALKPHOS 125  --   --  117  --    AST 12  --   --  12  --    ALT 12  --   --  12  --    ANIONGAP 10   < > 11 6* 8    < > = values in this interval not displayed.     PTINR:  No results for input(s): "INR" in the last 48 hours.    Significant Procedures:   Dobutamine Stress Test with Color Flow: No results found for this or " any previous visit.        Assessment/Plan:      * Paraspinal abscess  MSSA endocarditis  MSSA paraspinal abscess  MSSA psoas abscess    Noted to have extensive fluid collection on left extending from T11 through left iliacus muscle and within the left iliopsoas muscle.  Multifocal collections of air including subcutaneous tissues on the left flank noted.  ID/neurosurgery consulted. s/p L3-L5 laminectomy with epidural abscess evacuation 9/19   Ortho consulted. Rec continued abx tx and no further interventions  IR SI joint aspiration and abscess drain placement 9/21. Unable to aspirate joint  ID consulted. On oxacillin  Drain removed on 09/27.  Repeat CT abdomen ordered to look for recollection of fluid.  Ill-defined subcutaneous edema and soft tissue inflammatory change in the surgical bed and subcutaneous soft tissues without discrete organized fluid collection,  Stable size of 3.6 cm fluid collection along the left posterior pararenal space which appears to communicate with the with the aforementioned collection and fascial thickening. IR planning to drain retroperitoneal fluid.    Retroperitoneal fluid collection  Repeat CT abdomen ordered to look for recollection of fluid.  Ill-defined subcutaneous edema and soft tissue inflammatory change in the surgical bed and subcutaneous soft tissues without discrete organized fluid collection,  Stable size of 3.6 cm fluid collection along the left posterior pararenal space which appears to communicate with the with the aforementioned collection and fascial thickening. IR placed drain in retroperitoneal fluid. Cx pending.      Severe sepsis  See above    Hepatitis C    Hepatitis C antibody positive.  Obtain HCV RNA. Quant negative    Anemia, unspecified  S/p 2u PRBC transfusion on admit . NO signs of acute GI bleed on exam at this time. Denies any hematemesis or hemoptysis.   Obtain iron B12/folate/peripheral smear and LDH/hapto/retic  No evidence of active bleed    Stable    Uncontrolled type 2 diabetes mellitus with hyperglycemia  Patient's FSGs are uncontrolled due to hyperglycemia on current medication regimen.  Last A1c reviewed-   Lab Results   Component Value Date    HGBA1C 7.9 (H) 09/17/2024     Most recent fingerstick glucose reviewed-   Recent Labs   Lab 09/30/24  1724 09/30/24  2022 10/01/24  0756 10/01/24  1158   POCTGLUCOSE 92 117* 115* 196*       Current correctional scale  Medium  Maintain anti-hyperglycemic dose as follows-   Antihyperglycemics (From admission, onward)      Start     Stop Route Frequency Ordered    09/23/24 1033  insulin aspart U-100 pen 0-10 Units         -- SubQ Before meals & nightly PRN 09/23/24 0933          Hold Oral hypoglycemics while patient is in the hospital.    Smoker  PRN nicotine patch    History of drug use  On methadone at home, continue    Psoas muscle abscess  IR drain. Leave drain in place until less than 10 cc of fluid is aspirated daily for 2 days.   As above    Other osteomyelitis, multiple sites  As above        VTE Risk Mitigation (From admission, onward)           Ordered     enoxaparin injection 40 mg  Every 24 hours         09/23/24 1234     IP VTE HIGH RISK PATIENT  Once         09/22/24 1553                    Discharge Planning   LUH: 10/3/2024     Code Status: Full Code   Is the patient medically ready for discharge?:     Reason for patient still in hospital (select all that apply): Patient trending condition, Laboratory test, Treatment, and Consult recommendations  Discharge Plan A: Skilled Nursing Facility   Discharge Delays: None known at this time              Anastasia German MD  Department of Hospital Medicine   [facility] ([place])

## 2024-10-01 NOTE — PROGRESS NOTES
[facility] ([place] [address])  Wound Care    Patient Name:  Mari Sloan   MRN:  [id number]  Date: 10/1/2024  Diagnosis: Paraspinal abscess    History:     Past Medical History:   Diagnosis Date    Diabetes mellitus     Hypertension     Unspecified viral hepatitis C without hepatic coma        Social History     Socioeconomic History    Marital status:    Tobacco Use    Smoking status: Every Day     Current packs/day: 0.50     Types: Cigarettes    Smokeless tobacco: Never   Substance and Sexual Activity    Alcohol use: Not Currently    Drug use: Not Currently     Social Drivers of Health     Financial Resource Strain: Low Risk  (9/18/2024)    Overall Financial Resource Strain (CARDIA)     Difficulty of Paying Living Expenses: Not very hard   Food Insecurity: No Food Insecurity (9/18/2024)    Hunger Vital Sign     Worried About Running Out of Food in the Last Year: Never true     Ran Out of Food in the Last Year: Never true   Transportation Needs: Unmet Transportation Needs (9/18/2024)    TRANSPORTATION NEEDS     Transportation : Yes, it has kept me from medical appointments or from getting my medications.   Physical Activity: Patient Declined (9/18/2024)    Exercise Vital Sign     Days of Exercise per Week: Patient declined     Minutes of Exercise per Session: Patient declined   Stress: Stress Concern Present (9/18/2024)    [employer] - Occupational Stress Questionnaire     Feeling of Stress : Rather much   Housing Stability: Low Risk  (9/18/2024)    Housing Stability Vital Sign     Unable to Pay for Housing in the Last Year: No     Homeless in the Last Year: No       Precautions:     Allergies as of 09/16/2024    (No Known Allergies)       [facility] Assessment Details/Treatment     Patient seen for inpatient wound care follow up. Patient sitting up in bed and agreeable to assessment at this time. Upon assessment, patient has diffuse, dried, sores across bilateral upper and  lower extremities, and in groin and perineum. Small sore previously noted to left labia is now resolved. No active drainage noted.            Reviewed chart for this encounter.  See flowsheet for findings.        Recommendations: Cleanse perineum and bilateral groin with sterile normal saline and pat dry. Apply triad BID and PRN if soiled for moisture barrier.      Paint dried sores with chloraprep daily and leaving open to air for antiseptic and drying properties.     No other issues or concerns at this time. Will follow up 10/8/2024 or sooner if needed. Nursing to maintain pressure injury prevention measures.           Discussed POC with patient and primary nurse.  See EMR for orders and patient education.     Bedside nursing to continue care and monitoring.  Bedside to maintain pressure injury prevention interventions.        10/01/24 0740   WOCN Assessment   WOCN Total Time (mins) 30   Visit Date 10/01/24   Visit Time 0740   Consult Type Follow Up   WOCN Speciality Wound   Intervention assessed;changed;applied;chart review;coordination of care;orders   Teaching on-going        Wound 09/19/24 1800 Ulceration Left medial Labia   Date First Assessed/Time First Assessed: 09/19/24 1800   Primary Wound Type: Ulceration  Side: Left  Orientation: medial  Location: Labia   Wound Image    Dressing Appearance Open to air;Dry   Drainage Amount None   Drainage Characteristics/Odor No odor   Appearance Intact;Pink   Care Cleansed with:;Sterile normal saline;Applied:;Skin Barrier                     Orders placed.   Brown BRO, RN, [facility]  10/01/2024

## 2024-10-01 NOTE — ASSESSMENT & PLAN NOTE
Patient's FSGs are uncontrolled due to hyperglycemia on current medication regimen.  Last A1c reviewed-   Lab Results   Component Value Date    HGBA1C 7.9 (H) 09/17/2024     Most recent fingerstick glucose reviewed-   Recent Labs   Lab 09/30/24  1724 09/30/24  2022 10/01/24  0756 10/01/24  1158   POCTGLUCOSE 92 117* 115* 196*       Current correctional scale  Medium  Maintain anti-hyperglycemic dose as follows-   Antihyperglycemics (From admission, onward)    Start     Stop Route Frequency Ordered    09/23/24 1033  insulin aspart U-100 pen 0-10 Units         -- SubQ Before meals & nightly PRN 09/23/24 0933        Hold Oral hypoglycemics while patient is in the hospital.

## 2024-10-01 NOTE — PT/OT/SLP PROGRESS
"Physical Therapy      Patient Name:  Mari Sloan   MRN:  [id number]    Patient not seen today secondary to at 10:32 AM pt reporting "I just had surgery yesterday, and I don't want to." Pt educated on the effects of prolonged immobility and the importance of performing EOB/OOB activity and exercises to promote healing and reduce recovery time. Yet pt ultimately unwilling at this time. Second attempt at 14:57 patient still unwilling to participate. Will follow-up at next PT POC date.    "

## 2024-10-01 NOTE — CARE UPDATE
Unit [facility] Care Support Interaction      I have reviewed the chart of Mari Sloan who is hospitalized for Paraspinal abscess. The patient is currently located in the following unit: 14W        I have assisted the primary physician in management of the following:      MRSA decolonization protocol ordered given risk factors.       Breanna Leary PA-C  Unit Based [facility]

## 2024-10-01 NOTE — ASSESSMENT & PLAN NOTE
Repeat CT abdomen ordered to look for recollection of fluid.  Ill-defined subcutaneous edema and soft tissue inflammatory change in the surgical bed and subcutaneous soft tissues without discrete organized fluid collection,  Stable size of 3.6 cm fluid collection along the left posterior pararenal space which appears to communicate with the with the aforementioned collection and fascial thickening. IR placed drain in retroperitoneal fluid. Cx pending.

## 2024-10-02 LAB
ALBUMIN SERPL BCP-MCNC: 1.1 G/DL (ref 3.5–5.2)
ALP SERPL-CCNC: 111 U/L (ref 55–135)
ALT SERPL W/O P-5'-P-CCNC: 8 U/L (ref 10–44)
ANION GAP SERPL CALC-SCNC: 7 MMOL/L (ref 8–16)
ANION GAP SERPL CALC-SCNC: 7 MMOL/L (ref 8–16)
AST SERPL-CCNC: 11 U/L (ref 10–40)
BASOPHILS # BLD AUTO: 0.11 K/UL (ref 0–0.2)
BASOPHILS NFR BLD: 1.5 % (ref 0–1.9)
BILIRUB SERPL-MCNC: 0.8 MG/DL (ref 0.1–1)
BUN SERPL-MCNC: 3 MG/DL (ref 6–20)
BUN SERPL-MCNC: 4 MG/DL (ref 6–20)
CALCIUM SERPL-MCNC: 7.1 MG/DL (ref 8.7–10.5)
CALCIUM SERPL-MCNC: 7.9 MG/DL (ref 8.7–10.5)
CHLORIDE SERPL-SCNC: 104 MMOL/L (ref 95–110)
CHLORIDE SERPL-SCNC: 107 MMOL/L (ref 95–110)
CO2 SERPL-SCNC: 24 MMOL/L (ref 23–29)
CO2 SERPL-SCNC: 27 MMOL/L (ref 23–29)
CREAT SERPL-MCNC: 0.6 MG/DL (ref 0.5–1.4)
CREAT SERPL-MCNC: 0.6 MG/DL (ref 0.5–1.4)
DIFFERENTIAL METHOD BLD: ABNORMAL
EOSINOPHIL # BLD AUTO: 0.1 K/UL (ref 0–0.5)
EOSINOPHIL NFR BLD: 1.5 % (ref 0–8)
ERYTHROCYTE [DISTWIDTH] IN BLOOD BY AUTOMATED COUNT: 20.5 % (ref 11.5–14.5)
EST. GFR  (NO RACE VARIABLE): >60 ML/MIN/1.73 M^2
EST. GFR  (NO RACE VARIABLE): >60 ML/MIN/1.73 M^2
GLUCOSE SERPL-MCNC: 115 MG/DL (ref 70–110)
GLUCOSE SERPL-MCNC: 91 MG/DL (ref 70–110)
HCT VFR BLD AUTO: 26.3 % (ref 37–48.5)
HGB BLD-MCNC: 7.9 G/DL (ref 12–16)
IMM GRANULOCYTES # BLD AUTO: 0.09 K/UL (ref 0–0.04)
IMM GRANULOCYTES NFR BLD AUTO: 1.2 % (ref 0–0.5)
LYMPHOCYTES # BLD AUTO: 2.1 K/UL (ref 1–4.8)
LYMPHOCYTES NFR BLD: 27.6 % (ref 18–48)
MCH RBC QN AUTO: 26.7 PG (ref 27–31)
MCHC RBC AUTO-ENTMCNC: 30 G/DL (ref 32–36)
MCV RBC AUTO: 89 FL (ref 82–98)
MONOCYTES # BLD AUTO: 0.4 K/UL (ref 0.3–1)
MONOCYTES NFR BLD: 5.2 % (ref 4–15)
NEUTROPHILS # BLD AUTO: 4.7 K/UL (ref 1.8–7.7)
NEUTROPHILS NFR BLD: 63 % (ref 38–73)
NRBC BLD-RTO: 0 /100 WBC
PLATELET # BLD AUTO: 282 K/UL (ref 150–450)
PMV BLD AUTO: 9.6 FL (ref 9.2–12.9)
POCT GLUCOSE: 107 MG/DL (ref 70–110)
POCT GLUCOSE: 123 MG/DL (ref 70–110)
POCT GLUCOSE: 140 MG/DL (ref 70–110)
POCT GLUCOSE: 142 MG/DL (ref 70–110)
POTASSIUM SERPL-SCNC: 3.2 MMOL/L (ref 3.5–5.1)
POTASSIUM SERPL-SCNC: 3.2 MMOL/L (ref 3.5–5.1)
PROT SERPL-MCNC: 5.5 G/DL (ref 6–8.4)
RBC # BLD AUTO: 2.96 M/UL (ref 4–5.4)
SODIUM SERPL-SCNC: 138 MMOL/L (ref 136–145)
SODIUM SERPL-SCNC: 138 MMOL/L (ref 136–145)
WBC # BLD AUTO: 7.5 K/UL (ref 3.9–12.7)

## 2024-10-02 PROCEDURE — 80053 COMPREHEN METABOLIC PANEL: CPT | Performed by: STUDENT IN AN ORGANIZED HEALTH CARE EDUCATION/TRAINING PROGRAM

## 2024-10-02 PROCEDURE — 80048 BASIC METABOLIC PNL TOTAL CA: CPT | Mod: XB | Performed by: STUDENT IN AN ORGANIZED HEALTH CARE EDUCATION/TRAINING PROGRAM

## 2024-10-02 PROCEDURE — 25000003 PHARM REV CODE 250: Performed by: STUDENT IN AN ORGANIZED HEALTH CARE EDUCATION/TRAINING PROGRAM

## 2024-10-02 PROCEDURE — 97110 THERAPEUTIC EXERCISES: CPT | Mod: CQ

## 2024-10-02 PROCEDURE — 94761 N-INVAS EAR/PLS OXIMETRY MLT: CPT

## 2024-10-02 PROCEDURE — 25000003 PHARM REV CODE 250: Performed by: INTERNAL MEDICINE

## 2024-10-02 PROCEDURE — 63600175 PHARM REV CODE 636 W HCPCS: Performed by: STUDENT IN AN ORGANIZED HEALTH CARE EDUCATION/TRAINING PROGRAM

## 2024-10-02 PROCEDURE — 97116 GAIT TRAINING THERAPY: CPT | Mod: CQ

## 2024-10-02 PROCEDURE — 85025 COMPLETE CBC W/AUTO DIFF WBC: CPT | Performed by: STUDENT IN AN ORGANIZED HEALTH CARE EDUCATION/TRAINING PROGRAM

## 2024-10-02 PROCEDURE — 63600175 PHARM REV CODE 636 W HCPCS

## 2024-10-02 PROCEDURE — 97530 THERAPEUTIC ACTIVITIES: CPT | Mod: CO

## 2024-10-02 PROCEDURE — 97530 THERAPEUTIC ACTIVITIES: CPT | Mod: CQ

## 2024-10-02 PROCEDURE — 25000003 PHARM REV CODE 250

## 2024-10-02 PROCEDURE — 97535 SELF CARE MNGMENT TRAINING: CPT | Mod: CO

## 2024-10-02 PROCEDURE — 20600001 HC STEP DOWN PRIVATE ROOM

## 2024-10-02 PROCEDURE — A4216 STERILE WATER/SALINE, 10 ML: HCPCS | Performed by: STUDENT IN AN ORGANIZED HEALTH CARE EDUCATION/TRAINING PROGRAM

## 2024-10-02 RX ORDER — LIDOCAINE 50 MG/G
1 PATCH TOPICAL
Status: DISCONTINUED | OUTPATIENT
Start: 2024-10-02 | End: 2024-10-08

## 2024-10-02 RX ORDER — POTASSIUM CHLORIDE 20 MEQ/1
40 TABLET, EXTENDED RELEASE ORAL ONCE
Status: COMPLETED | OUTPATIENT
Start: 2024-10-02 | End: 2024-10-02

## 2024-10-02 RX ADMIN — OXYCODONE HYDROCHLORIDE 10 MG: 10 TABLET ORAL at 09:10

## 2024-10-02 RX ADMIN — METHADONE HYDROCHLORIDE 70 MG: 10 TABLET ORAL at 09:10

## 2024-10-02 RX ADMIN — FOLIC ACID 1 MG: 1 TABLET ORAL at 09:10

## 2024-10-02 RX ADMIN — GABAPENTIN 300 MG: 300 CAPSULE ORAL at 03:10

## 2024-10-02 RX ADMIN — Medication 10 ML: at 01:10

## 2024-10-02 RX ADMIN — OXYCODONE HYDROCHLORIDE 10 MG: 10 TABLET ORAL at 08:10

## 2024-10-02 RX ADMIN — Medication 100 MG: at 09:10

## 2024-10-02 RX ADMIN — OXACILLIN 12 G: 2 INJECTION, POWDER, FOR SOLUTION INTRAMUSCULAR; INTRAVENOUS at 01:10

## 2024-10-02 RX ADMIN — ENOXAPARIN SODIUM 40 MG: 40 INJECTION SUBCUTANEOUS at 05:10

## 2024-10-02 RX ADMIN — SENNOSIDES AND DOCUSATE SODIUM 1 TABLET: 50; 8.6 TABLET ORAL at 09:10

## 2024-10-02 RX ADMIN — HYDROXYZINE PAMOATE 25 MG: 25 CAPSULE ORAL at 08:10

## 2024-10-02 RX ADMIN — Medication 10 ML: at 05:10

## 2024-10-02 RX ADMIN — Medication 10 ML: at 06:10

## 2024-10-02 RX ADMIN — OXYCODONE HYDROCHLORIDE 10 MG: 10 TABLET ORAL at 04:10

## 2024-10-02 RX ADMIN — Medication 10 ML: at 12:10

## 2024-10-02 RX ADMIN — GABAPENTIN 300 MG: 300 CAPSULE ORAL at 09:10

## 2024-10-02 RX ADMIN — POTASSIUM CHLORIDE 40 MEQ: 1500 TABLET, EXTENDED RELEASE ORAL at 05:10

## 2024-10-02 RX ADMIN — CLONIDINE HYDROCHLORIDE 0.1 MG: 0.1 TABLET ORAL at 09:10

## 2024-10-02 RX ADMIN — GABAPENTIN 300 MG: 300 CAPSULE ORAL at 08:10

## 2024-10-02 RX ADMIN — CLONIDINE HYDROCHLORIDE 0.1 MG: 0.1 TABLET ORAL at 08:10

## 2024-10-02 RX ADMIN — MELATONIN TAB 3 MG 6 MG: 3 TAB at 08:10

## 2024-10-02 NOTE — NURSING
"Shift Note    Pt slept well this shift. VSS. Pain managed with PRN's. PICC to RUE in place and patent. Drsg dated 9/27/24. PREMA to L back in place and patent. Serosanguinous drainage in tubing. Not enough volume to drain and measure. Skin care completed. T&R per self as tolerated. Refused vitals and medications this shift. Educated on pain management and lidocaine patch order. Pt refused stating "I just don't like that." Safety precautions in place. Call light in reach. No further concerns noted at this time.     "

## 2024-10-02 NOTE — ASSESSMENT & PLAN NOTE
Patient's FSGs are uncontrolled due to hyperglycemia on current medication regimen.  Last A1c reviewed-   Lab Results   Component Value Date    HGBA1C 7.9 (H) 09/17/2024     Most recent fingerstick glucose reviewed-   Recent Labs   Lab 10/02/24  0724 10/02/24  1110 10/02/24  1523   POCTGLUCOSE 123* 142* 140*       Current correctional scale  Medium  Maintain anti-hyperglycemic dose as follows-   Antihyperglycemics (From admission, onward)    Start     Stop Route Frequency Ordered    10/02/24 0006  insulin aspart U-100 pen 0-5 Units         -- SubQ Before meals & nightly PRN 10/01/24 2306        Hold Oral hypoglycemics while patient is in the hospital.

## 2024-10-02 NOTE — PLAN OF CARE
"Discharge Plan A and Plan B have been determined by review of patient's clinical status, future medical and therapeutic needs, and coverage/benefits for post-acute care in coordination with multidisciplinary team members.        10/02/24 1003   Post-Acute Status   Post-Acute Authorization Placement   Post-Acute Placement Status Referrals Sent   Coverage MEDICAID - [facility] ([place] MEDICAID) -   Discharge Plan   Discharge Plan A Skilled Nursing Facility     CM completed the LOCET via phone. CM faxed hospitals to obtain the 142 for NH admission      Your fax has been successfully sent to [phone] at [phone].  ------------------------------------------------------------  From: [id number]  ------------------------------------------------------------  10/2/2024 9:40:54 AM Transmission Record          Sent to [phone] with remote ID "Fax "          Result: (0/339;0/0) Success          Page record: 1 - 5          Elapsed time: 02:08 on channel 20     11:04 am  142 uploaded via [employer]     CM spoke with Vishnu Mccord and requested the IV ax name and duration. Ocacillin 12g End date 11/18 and will submit for auth today and discharge pending auth approval    12:20 pm  CM spoke with patients spouse and CM asked spouse to speak with the patient about being amenable with SNF placement for IV therapy.  Patients spouse will call the patient and speak with her. CM will follow up with spouse     12:30 pm  CM called and left a VM with Zeb CHIN   [phone] at MEDICAID - [employer] [facility] ([place] MEDICAID) - and informed that [facility] is willing to accept      Nabila Elise RN  Case Management  [facility]  [phone]   "

## 2024-10-02 NOTE — ASSESSMENT & PLAN NOTE
MSSA endocarditis  MSSA paraspinal abscess  MSSA psoas abscess    Noted to have extensive fluid collection on left extending from T11 through left iliacus muscle and within the left iliopsoas muscle.  Multifocal collections of air including subcutaneous tissues on the left flank noted.  ID/neurosurgery consulted. s/p L3-L5 laminectomy with epidural abscess evacuation 9/19   Ortho consulted. Rec continued abx tx and no further interventions  IR SI joint aspiration and abscess drain placement 9/21. Unable to aspirate joint  ID consulted. On oxacillin  Drain removed on 09/27.  Repeat CT abdomen ordered to look for recollection of fluid.  Ill-defined subcutaneous edema and soft tissue inflammatory change in the surgical bed and subcutaneous soft tissues without discrete organized fluid collection,  Stable size of 3.6 cm fluid collection along the left posterior pararenal space which appears to communicate with the with the aforementioned collection and fascial thickening. IR placed drain for  retroperitoneal fluid.

## 2024-10-02 NOTE — PROGRESS NOTES
[facility] ([place] [address])  [facility] Medicine  Progress Note    Patient Name: Mari Sloan  MRN: [id number]  Patient Class: IP- Inpatient   Admission Date: 9/16/2024  Length of Stay: 16 days  Attending Physician: Anastasia German MD  Primary Care Provider: Liliana, Primary Doctor        Subjective:     Principal Problem:Paraspinal abscess        HPI:  49 y.o.  woman with h/o homelessness (recently was able to obtain living arrangements but states she was living [place] the bridge), NIDDM type 2, Essential hypertension, and substance use (denies any IVDU in he past or any illicit drug use recenly, denies ETOH abuse/overuse) presents to [facility] for further evaluation of her back pain.  She apparently presented to the [facility] Emergency Department on September 16 with nausea and vomiting and a mechanical fall 5 days prior. She reported pain worse in her back and on her sides radiating down both legs. She noted muscle spasm in her back and legs. She had no head trauma or loss of consciousness.  W/u in the [facility] ED noted significant hypokalemia, hypomagnesemia, severe anemia, metabolic alkalosis, and hyperglycemia. Vitals signs stable with no sepsis at this time noted. SBP>90, HR normal,afebrile.     Imaging studies of her lumbar spine and pelvis were concerning for osteomyelitis/septic arthritis of the left SI joint and L5-S1 along with an abnormal fluid collection extending from T11 through the left iliacus muscle concerning for abscess. Blood cultures sent.  In the emergency department she is receiving IV fluid, Zofran, Zosyn, vancomycin, potassium, magnesium, pain medication, and nausea medication.   She also received 2 units of PRBC for an H/H of 5.7/18.4,     Case discussed with Neurosurgery and Interventional Radiology. Plan would be for transfer to Hospital Medicine at [facility] for IR evaluation of the fluid collection and potential sampling of the joint  "osteomyelitis.  I reviewed the discussions made with the multiple sub specialists regarding transfer of this patient to [facility]: IR/General surgery/Neurosurgery/ER/Internal Med.     Neurosurgery contacted by the  did not feel surgical intervention was needed at this time but would follow along and requested [facility] for further management and IR backup. IR on call apparently read the CT and at the time felt "while the left retroperitoneal fluid collections do appear organized, percutaneous drainage is not advised given the extensive soft tissue infection superficial to these collections.  In addition, there is extensive evidence of soft tissue infection that does not appear organized or percutaneously drainable."     General surgery was consulted to review the case and felt that there was no immediate surgical intervention at this time to be had. I spoke with the on surgeon contacted regarding the location of the fluid collections and inquired if perhaps orthopedic surgery should be consulted to review given the involvement of bony pelvis.      I spoke orthopedic surgery who will see the patient but recommended re-consulting IR here and Neurosurgery given the diskitis/OM L5-S1. (Please see his consult note in the chart)     Repeat labs here again noted for persistently low mag, K, phos.  H/H stable with improved H/H. Pain control improved with Dilaudid.  I consulted ID for further assistance with ABX adjustments and changed her to Meropenem and doxy as well as continued Vanc. Echo ordred for am.     CT lumbar spine and pelvis had findings most concerning for infectious process. There were findings concerning for osteomyelitis and septic arthritis in the left SI joint. Findings concerning for osteomyelitis/diskitis L5-S1. Possible osteomyelitis and septic joint at the pubic symphysis. Abnormal fluid collection on the left extending from T11 through the left iliacus muscle along and within the " left iliopsoas muscle most concerning for abscess. Multiple additional small abscesses suspected in the pelvis. Multifocal collections of air in the fluid in the subcutaneous tissues of the left flank, incompletely imaged.    Chest x-ray noted a loop in the central venous catheter. Tip currently at the level of the brachiocephalic vein. Lungs are fairly clear.        Overview/Hospital Course:  49 y.o. female, with history of homelessness (recently was able to obtain living arrangements but states she was living [place] bridge), NIDDM type 2, Essential hypertension, and substance use (denies IVDU) who was transferred from [facility] ED to [facility] ICU on 09/16/2024.  She presented to [facility] ED  for back pain, nausea/vomiting.  CT L-spine revealed osteomyelitis/diskitis L5-S1 along with abnormal fluid collection on left extending from T11 through left iliacus muscle and left iliopsoas muscle concerning for abscess.  Multifocal collections of air in fluid in subcutaneous tissues of left flank.  Patient was initiated on empiric vancomycin and meropenem.  Blood cultures with staph aureus.  Obtain echo.  Unable to repeat blood cultures given shortage of cx.  Neurosurgery recommended to obtain MRI L-spine, pending.  General surgery evaluated the patient and recommended urgent transfer to [facility] for surgical evaluation/source control given extent of necrosis.  Transfer process initiated.      Patient was transferred to [facility] and admitted to the MICU 9/18 with concern for paraspinal abscess. Infectious workup was ordered. Blood cultures were positive for MSSA bacteremia. Neurosurgery, general surgery and IR were consulted to evaluate the patient's paraspinal abscess. Neurosurgery performed a L3-L4 laminectomy for epidural abscess evacuation on 9/19/24 and the cultures grew MSSA. TTE showed normal EF of 60-65% with diastolic dysfunction, could not exclude mitral vegetation vs redundant chordae. ID was  consulted and recommended oxacillin and repeat blood cultures. With her electrolyte derangement, and a background of appetitive loss in the past 2 months, there was concern for refeeding syndrome. On 9/21, IR took the patient for abscess drain placement and aspiration of SI joint. Pending culture results. SI joint couldn't be aspirated but retroperitoneal abscess was drained of 100cc of purulent fluid. Successfully extubated 9/22.   Patient was stepped down to hospital medicine 9/24/24.   Drain was removed on 09/27.  Repeat CT Ill-defined subcutaneous edema and soft tissue inflammatory change in the surgical bed and subcutaneous soft tissues without discrete organized fluid collection,  Stable size of 3.6 cm fluid collection along the left posterior pararenal space which appears to communicate with the with the aforementioned collection and fascial thickening. IR placed drain for retroperitoneal fluid.        Interval History: No acute events. Afebrile.  No active complaints.  Minimal drain noted in PREMA drain.  Potassium repleted.        Review of Systems  Objective:     Vital Signs (Most Recent):  Temp: 98.8 °F (37.1 °C) (10/02/24 1524)  Pulse: 76 (10/02/24 1524)  Resp: 16 (10/02/24 1608)  BP: 121/79 (10/02/24 1524)  SpO2: 95 % (10/02/24 1524) Vital Signs (24h Range):  Temp:  [98.4 °F (36.9 °C)-99.1 °F (37.3 °C)] 98.8 °F (37.1 °C)  Pulse:  [75-97] 76  Resp:  [16-18] 16  SpO2:  [89 %-97 %] 95 %  BP: (107-121)/(68-82) 121/79     Weight: 65.2 kg (143 lb 11.8 oz)  Body mass index is 26.29 kg/m².    Intake/Output Summary (Last 24 hours) at 10/2/2024 1613  Last data filed at 10/2/2024 0915  Gross per 24 hour   Intake 480 ml   Output 1000 ml   Net -520 ml      Physical Exam  Constitutional:       General: She is not in acute distress.     Appearance: She is not toxic-appearing.   Cardiovascular:      Rate and Rhythm: Normal rate and regular rhythm.      Heart sounds: No murmur heard.     No friction rub. No gallop.  "  Pulmonary:      Effort: Pulmonary effort is normal. No respiratory distress.      Breath sounds: Normal breath sounds.   Abdominal:      General: Abdomen is flat. There is no distension.      Palpations: Abdomen is soft.      Tenderness: There is no abdominal tenderness. There is no guarding or rebound.   Musculoskeletal:         General: Swelling and tenderness present.      Right lower leg: No edema.      Left lower leg: No edema.      Comments: L flank drain   Skin:     Findings: Bruising and lesion present.   Neurological:      Mental Status: She is alert.         MELD 3.0: 17 at 9/21/2024  6:13 PM  MELD-Na: 13 at 9/21/2024  6:13 PM  Calculated from:  Serum Creatinine: 0.6 mg/dL (Using min of 1 mg/dL) at 9/21/2024  6:13 PM  Serum Sodium: 138 mmol/L (Using max of 137 mmol/L) at 9/21/2024  6:13 PM  Total Bilirubin: 2.1 mg/dL at 9/21/2024  2:34 AM  Serum Albumin: 1.2 g/dL (Using min of 1.5 g/dL) at 9/21/2024  2:34 AM  INR(ratio): 1.4 at 9/19/2024  3:19 AM  Age at listing (hypothetical): 49 years  Sex: Female at 9/21/2024  6:13 PM      Significant Labs:  CBC:  Recent Labs   Lab 10/01/24  0407 10/02/24  0448   WBC 9.39 7.50   HGB 8.2* 7.9*   HCT 28.2* 26.3*    282     CMP:  Recent Labs   Lab 10/01/24  0407 10/01/24  0900 10/01/24  1745 10/02/24  0448 10/02/24  0809      < > 137 138 138   K 3.6   < > 3.6 3.2* 3.2*      < > 104 104 107   CO2 27   < > 28 27 24      < > 110 91 115*   BUN 3*   < > 3* 4* 3*   CREATININE 0.6   < > 0.6 0.6 0.6   CALCIUM 7.9*   < > 7.9* 7.9* 7.1*   PROT 5.8*  --   --  5.5*  --    ALBUMIN 1.2*  --   --  1.1*  --    BILITOT 0.9  --   --  0.8  --    ALKPHOS 117  --   --  111  --    AST 12  --   --  11  --    ALT 12  --   --  8*  --    ANIONGAP 6*   < > 5* 7* 7*    < > = values in this interval not displayed.     PTINR:  No results for input(s): "INR" in the last 48 hours.    Significant Procedures:   Dobutamine Stress Test with Color Flow: No results found for this or " any previous visit.        Assessment/Plan:      * Paraspinal abscess  MSSA endocarditis  MSSA paraspinal abscess  MSSA psoas abscess    Noted to have extensive fluid collection on left extending from T11 through left iliacus muscle and within the left iliopsoas muscle.  Multifocal collections of air including subcutaneous tissues on the left flank noted.  ID/neurosurgery consulted. s/p L3-L5 laminectomy with epidural abscess evacuation 9/19   Ortho consulted. Rec continued abx tx and no further interventions  IR SI joint aspiration and abscess drain placement 9/21. Unable to aspirate joint  ID consulted. On oxacillin  Drain removed on 09/27.  Repeat CT abdomen ordered to look for recollection of fluid.  Ill-defined subcutaneous edema and soft tissue inflammatory change in the surgical bed and subcutaneous soft tissues without discrete organized fluid collection,  Stable size of 3.6 cm fluid collection along the left posterior pararenal space which appears to communicate with the with the aforementioned collection and fascial thickening. IR placed drain for  retroperitoneal fluid.    Retroperitoneal fluid collection  Repeat CT abdomen ordered to look for recollection of fluid.  Ill-defined subcutaneous edema and soft tissue inflammatory change in the surgical bed and subcutaneous soft tissues without discrete organized fluid collection,  Stable size of 3.6 cm fluid collection along the left posterior pararenal space which appears to communicate with the with the aforementioned collection and fascial thickening. IR placed drain in retroperitoneal fluid. Cx pending.      Severe sepsis  See above    Hepatitis C    Hepatitis C antibody positive.  Obtain HCV RNA. Quant negative    Anemia, unspecified  S/p 2u PRBC transfusion on admit . NO signs of acute GI bleed on exam at this time. Denies any hematemesis or hemoptysis.   Obtain iron B12/folate/peripheral smear and LDH/hapto/retic  No evidence of active bleed    Stable    Uncontrolled type 2 diabetes mellitus with hyperglycemia  Patient's FSGs are uncontrolled due to hyperglycemia on current medication regimen.  Last A1c reviewed-   Lab Results   Component Value Date    HGBA1C 7.9 (H) 09/17/2024     Most recent fingerstick glucose reviewed-   Recent Labs   Lab 10/02/24  0724 10/02/24  1110 10/02/24  1523   POCTGLUCOSE 123* 142* 140*       Current correctional scale  Medium  Maintain anti-hyperglycemic dose as follows-   Antihyperglycemics (From admission, onward)      Start     Stop Route Frequency Ordered    10/02/24 0006  insulin aspart U-100 pen 0-5 Units         -- SubQ Before meals & nightly PRN 10/01/24 2306          Hold Oral hypoglycemics while patient is in the hospital.    Smoker  PRN nicotine patch    History of drug use  On methadone at home, continue    Psoas muscle abscess  IR drain. Leave drain in place until less than 10 cc of fluid is aspirated daily for 2 days.   As above    Other osteomyelitis, multiple sites  As above        VTE Risk Mitigation (From admission, onward)           Ordered     enoxaparin injection 40 mg  Every 24 hours         09/23/24 1234     IP VTE HIGH RISK PATIENT  Once         09/22/24 1553                    Discharge Planning   LUH: 10/3/2024     Code Status: Full Code   Is the patient medically ready for discharge?:     Reason for patient still in hospital (select all that apply): Patient trending condition, Laboratory test, Treatment, and Pending disposition  Discharge Plan A: Skilled Nursing Facility   Discharge Delays: None known at this time              Anastasia German MD  Department of Hospital Medicine   [facility] ([place])

## 2024-10-02 NOTE — PT/OT/SLP PROGRESS
Physical Therapy Treatment  Co-treatment with OT due to acuity of condition, level of skilled assist needed for assessment of safety with mobility and potential of not tolerating a second treatment session.     Patient Name:  Mari Sloan   MRN:  [id number]    Recommendations:     Discharge Recommendations: High Intensity Therapy  Discharge Equipment Recommendations: walker, rolling  Barriers to discharge:  current level of assistance required     Assessment:     Mari Sloan is a 49 y.o. female admitted with a medical diagnosis of Paraspinal abscess.  She presents with the following impairments/functional limitations: weakness, impaired endurance, impaired self care skills, impaired functional mobility, gait instability, decreased safety awareness, decreased lower extremity function, decreased upper extremity function, impaired cardiopulmonary response to activity, pain, impaired skin, orthopedic precautions Pt tolerated treatment session well today. Patient remains appropriate for continued skilled services within the acute environment and goals remain appropriate.   .    Rehab Prognosis: Good; patient would benefit from acute skilled PT services to address these deficits and reach maximum level of function.    Recent Surgery: Procedure(s) (LRB):  L3-L5 laminectomy with epidural abscess evac (N/A)  WASHOUT, WOUND, SPINE 13 Days Post-Op    Plan:     During this hospitalization, patient to be seen 4 x/week to address the identified rehab impairments via gait training, therapeutic activities, therapeutic exercises and progress toward the following goals:    Plan of Care Expires:  10/18/24    Subjective     Chief Complaint: Back pain   Patient/Family Comments/goals: Pt agreeable to PT   Pain/Comfort:  Pain Rating 1:  (not rated)  Location - Orientation 1: generalized  Location 1: back  Pain Addressed 1: Reposition, Distraction  Pain Rating Post-Intervention 1:  (not rated)      Objective:     Communicated with Rn  prior to session.  Patient found supine with PREMA Santiago drain, telemetry upon PT entry to room.     General Precautions: Standard, fall  Orthopedic Precautions: spinal precautions  Braces: N/A  Respiratory Status: Room air     Functional Mobility:  Bed Mobility:     Supine to Sit: moderate assistance  EOB sitting: ~ 20 minutes SBA   Sit to Supine: moderate assistance and of 2 persons    Transfers:     Sit to Stand x 2:  minimum assistance with RW  Pt stood ~2-3 minutes with each standing trial   Gait: 3  side steps R Demetris x 2 with RW  Pt ambulated with decreased step length, difficulty advancing B LE (L>R), unable to  L LE only pivot/shuffling, and antalgic gait     Pt performed 10 repetitions of seated B LE exercises consisting of: Marching, LAQ, ABD/ADD, heel raises, and toe raises.   Performed in a limited ROM due to increased pain         AM-PAC 6 CLICK MOBILITY  Turning over in bed (including adjusting bedclothes, sheets and blankets)?: 3  Sitting down on and standing up from a chair with arms (e.g., wheelchair, bedside commode, etc.): 3  Moving from lying on back to sitting on the side of the bed?: 2  Moving to and from a bed to a chair (including a wheelchair)?: 2  Need to walk in hospital room?: 2  Climbing 3-5 steps with a railing?: 1  Basic Mobility Total Score: 13       Treatment & Education:  Therapist provided instruction and educated for safety during transfers and gait training. As well as proper body mechanics, energy conservation, and fall prevention strategies during tasks listed above, and the effects of prolonged immobility and the importance of performing EOB/OOB activity and exercises to promote healing and reduce recovery time.       Patient left supine with all lines intact, call button in reach, and Rn notified..    GOALS:   Multidisciplinary Problems       Physical Therapy Goals          Problem: Physical Therapy    Goal Priority Disciplines Outcome Interventions   Physical Therapy  Goal     PT, PT/OT Progressing    Description: Goals to be met by: 10/18/24     Patient will increase functional independence with mobility by performin. Supine to sit with MInimal Assistance  2. Sit to stand transfer with Minimal Assistance with RW if needed.   3. Bed to chair transfer with Minimal Assistance using Rolling Walker  4. Gait  x 30 feet with Minimal Assistance using Rolling Walker.   5. Lower extremity exercise program x10 reps per handout, with assistance as needed to increase strength for increased mobility.     DME Justifications (see above for complete DME recommendations)      Rolling Walker- Patient demonstrates a mobility limitation that significantly impairs their ability to participate in one or more mobility related activities of daily living. Patient's mobility limitation cannot be sufficiently resolved with the use of a cane, but can be sufficiently resolved with the use of a rolling walker.The use of a rolling walker will considerably improve their ability to participate in MRADLs. Patient will use the walker on a regular basis at home.                             Time Tracking:     PT Received On: 10/02/24  PT Start Time: 0951     PT Stop Time: 1031  PT Total Time (min): 40 min     Billable Minutes: Gait Training 10, Therapeutic Activity 15, and Therapeutic Exercise 15    Treatment Type: Treatment  PT/PTA: PTA     Number of PTA visits since last PT visit: 2     10/02/2024

## 2024-10-02 NOTE — PT/OT/SLP PROGRESS
Occupational Therapy   Treatment    Name: Mari Sloan  MRN: [id number]  Admitting Diagnosis:  Paraspinal abscess  13 Days Post-Op    Recommendations:     Discharge Recommendations: High Intensity Therapy  Discharge Equipment Recommendations:  walker, rolling  Barriers to discharge:  Decreased caregiver support    Assessment:     Mari Sloan is a 49 y.o. female with a medical diagnosis of Paraspinal abscess.  She presents with the fallowing performance deficits affecting function are weakness, impaired endurance, impaired self care skills, impaired functional mobility, gait instability, impaired balance, pain, decreased safety awareness, impaired cardiopulmonary response to activity, decreased lower extremity function, decreased upper extremity function, decreased coordination, impaired skin, orthopedic precautions. Patient agreeable to tx session, patient is demonstrating progress with functional bed mobility, and sitting balance.  However; patient continues to have limited activity tolerance due to pain and weakness from recent surgical procedure. Patient would benefit from High intensity therapy intervention to address over all functional decline with mobility task, endurance, and ADLs in order to return to PLOF.     Rehab Prognosis:  Good; patient would benefit from acute skilled OT services to address these deficits and reach maximum level of function.       Plan:     Patient to be seen 4 x/week to address the above listed problems via self-care/home management, therapeutic activities, therapeutic exercises, neuromuscular re-education  Plan of Care Expires: 10/23/24  Plan of Care Reviewed with: patient    Subjective     Chief Complaint: pain   Patient/Family Comments/goals: to return to PLOF  Pain/Comfort:  Pain Rating 1:  (patient did not rate)  Location - Orientation 1: generalized  Location 1: back  Pain Addressed 1: Reposition, Distraction, Pre-medicate for activity  Pain Rating Post-Intervention 1:   (patient did not rate)    Objective:     Communicated with: Nurse prior to session.  Patient found HOB elevated with PureWick, PREMA drain, telemetry upon OT entry to room.    General Precautions: Standard, fall    Orthopedic Precautions:spinal precautions  Braces: N/A  Respiratory Status: Room air     Occupational Performance:     Bed Mobility:    Patient completed Rolling/Turning to Left with  contact guard assistance and minimum assistance  Patient completed Rolling/Turning to Right with contact guard assistance and minimum assistance  Patient completed Scooting to EOB with contact guard assistance and minimum assistance  Patient completed Supine to Sit with moderate assistance  Patient completed Sit to Supine with moderate assistance of 2 persons   Patient was able to tolerate sitting up at EOB for 20 min with SBA for static and CGA to Min A for dynamic     Functional Mobility/Transfers:  Patient completed Sit <> Stand Transfer from EOB x2 trials with minimum assistance  with  rolling walker   Functional Mobility: Patient was able to take 2-3 lateral steps to HOB with Min A of 2 persons and verbal and tactile cues to manage walker properly  Patient exhibit mild instability, limited left foot clearance, and limited upright posture      Activities of Daily Living:  Upper Body Dressing: maximal assistance to don/doff gown for line mgmt   Toileting: total assistance for pericare hygiene in standing, patient was able to stand increments of 2-3 mins while assisting patient with hygiene task but did required rest breaks between trials due to weakness and pain       [facility] 6 Click ADL: 15    Treatment & Education:  Discussed OT POC and progress  Educated patient on the importance to continue to perform exercises in order to reduce stiffness and promote joint mobility and blood flow  Addressed patient's questions and concerns within [facility] scope of practice      Patient left HOB elevated with all lines intact, call button in  reach, and nurse notified    GOALS:   Multidisciplinary Problems       Occupational Therapy Goals          Problem: Occupational Therapy    Goal Priority Disciplines Outcome Interventions   Occupational Therapy Goal     OT, PT/OT Progressing    Description: Goals to be met by: 10/21/2024     Patient will increase functional independence with ADLs by performing:    UE Dressing with Set-up Assistance.  LE Dressing with Stand-by Assistance.  Grooming while standing at sink with Supervision.  Toileting from toilet with Supervision for hygiene and clothing management.   Supine to sit with Contact Guard Assistance.  Stand pivot transfers with Supervision.  Toilet transfer to toilet with Supervision.    DME Justifications:    Rolling Walker- Patient demonstrates a mobility limitation that significantly impairs their ability to participate in one or more mobility related activities of daily living. Patient's mobility limitation cannot be sufficiently resolved with the use of a cane, but can be sufficiently resolved with the use of a rolling walker.The use of a rolling walker will considerably improve their ability to participate in MRADLs. Patient will use the walker on a regular basis at home.                           Time Tracking:     OT Date of Treatment: 10/02/24  OT Start Time: 0952  OT Stop Time: 1031  OT Total Time (min): 39 min    Billable Minutes:Self Care/Home Management 24  Therapeutic Activity 15    OT/ANABEL: ANABEL     Number of ANABEL visits since last OT visit: 1    10/2/2024

## 2024-10-02 NOTE — PLAN OF CARE
Problem: Infection  Goal: Absence of Infection Signs and Symptoms  Outcome: Progressing     Problem: Diabetes Comorbidity  Goal: Blood Glucose Level Within Targeted Range  Outcome: Progressing     Problem: Sepsis/Septic Shock  Goal: Optimal Coping  Outcome: Progressing  Goal: Absence of Bleeding  Outcome: Progressing  Goal: Blood Glucose Level Within Targeted Range  Outcome: Progressing  Goal: Absence of Infection Signs and Symptoms  Outcome: Progressing  Goal: Optimal Nutrition Intake  Outcome: Progressing     Problem: Wound  Goal: Optimal Coping  Outcome: Progressing  Goal: Optimal Functional Ability  Outcome: Progressing  Goal: Absence of Infection Signs and Symptoms  Outcome: Progressing  Goal: Improved Oral Intake  Outcome: Progressing  Goal: Optimal Pain Control and Function  Outcome: Progressing  Goal: Skin Health and Integrity  Outcome: Progressing  Goal: Optimal Wound Healing  Outcome: Progressing

## 2024-10-02 NOTE — SUBJECTIVE & OBJECTIVE
Interval History: No acute events. Afebrile.  No active complaints.  Minimal drain noted in PREMA drain.  Potassium repleted.        Review of Systems  Objective:     Vital Signs (Most Recent):  Temp: 98.8 °F (37.1 °C) (10/02/24 1524)  Pulse: 76 (10/02/24 1524)  Resp: 16 (10/02/24 1608)  BP: 121/79 (10/02/24 1524)  SpO2: 95 % (10/02/24 1524) Vital Signs (24h Range):  Temp:  [98.4 °F (36.9 °C)-99.1 °F (37.3 °C)] 98.8 °F (37.1 °C)  Pulse:  [75-97] 76  Resp:  [16-18] 16  SpO2:  [89 %-97 %] 95 %  BP: (107-121)/(68-82) 121/79     Weight: 65.2 kg (143 lb 11.8 oz)  Body mass index is 26.29 kg/m².    Intake/Output Summary (Last 24 hours) at 10/2/2024 1613  Last data filed at 10/2/2024 0915  Gross per 24 hour   Intake 480 ml   Output 1000 ml   Net -520 ml      Physical Exam  Constitutional:       General: She is not in acute distress.     Appearance: She is not toxic-appearing.   Cardiovascular:      Rate and Rhythm: Normal rate and regular rhythm.      Heart sounds: No murmur heard.     No friction rub. No gallop.   Pulmonary:      Effort: Pulmonary effort is normal. No respiratory distress.      Breath sounds: Normal breath sounds.   Abdominal:      General: Abdomen is flat. There is no distension.      Palpations: Abdomen is soft.      Tenderness: There is no abdominal tenderness. There is no guarding or rebound.   Musculoskeletal:         General: Swelling and tenderness present.      Right lower leg: No edema.      Left lower leg: No edema.      Comments: L flank drain   Skin:     Findings: Bruising and lesion present.   Neurological:      Mental Status: She is alert.         MELD 3.0: 17 at 9/21/2024  6:13 PM  MELD-Na: 13 at 9/21/2024  6:13 PM  Calculated from:  Serum Creatinine: 0.6 mg/dL (Using min of 1 mg/dL) at 9/21/2024  6:13 PM  Serum Sodium: 138 mmol/L (Using max of 137 mmol/L) at 9/21/2024  6:13 PM  Total Bilirubin: 2.1 mg/dL at 9/21/2024  2:34 AM  Serum Albumin: 1.2 g/dL (Using min of 1.5 g/dL) at 9/21/2024  2:34  "AM  INR(ratio): 1.4 at 9/19/2024  3:19 AM  Age at listing (hypothetical): 49 years  Sex: Female at 9/21/2024  6:13 PM      Significant Labs:  CBC:  Recent Labs   Lab 10/01/24  0407 10/02/24  0448   WBC 9.39 7.50   HGB 8.2* 7.9*   HCT 28.2* 26.3*    282     CMP:  Recent Labs   Lab 10/01/24  0407 10/01/24  0900 10/01/24  1745 10/02/24  0448 10/02/24  0809      < > 137 138 138   K 3.6   < > 3.6 3.2* 3.2*      < > 104 104 107   CO2 27   < > 28 27 24      < > 110 91 115*   BUN 3*   < > 3* 4* 3*   CREATININE 0.6   < > 0.6 0.6 0.6   CALCIUM 7.9*   < > 7.9* 7.9* 7.1*   PROT 5.8*  --   --  5.5*  --    ALBUMIN 1.2*  --   --  1.1*  --    BILITOT 0.9  --   --  0.8  --    ALKPHOS 117  --   --  111  --    AST 12  --   --  11  --    ALT 12  --   --  8*  --    ANIONGAP 6*   < > 5* 7* 7*    < > = values in this interval not displayed.     PTINR:  No results for input(s): "INR" in the last 48 hours.    Significant Procedures:   Dobutamine Stress Test with Color Flow: No results found for this or any previous visit.      "

## 2024-10-03 LAB
ANION GAP SERPL CALC-SCNC: 10 MMOL/L (ref 8–16)
ANION GAP SERPL CALC-SCNC: 9 MMOL/L (ref 8–16)
BACTERIA SPEC AEROBE CULT: ABNORMAL
BUN SERPL-MCNC: 3 MG/DL (ref 6–20)
BUN SERPL-MCNC: 3 MG/DL (ref 6–20)
CALCIUM SERPL-MCNC: 7.7 MG/DL (ref 8.7–10.5)
CALCIUM SERPL-MCNC: 7.8 MG/DL (ref 8.7–10.5)
CHLORIDE SERPL-SCNC: 102 MMOL/L (ref 95–110)
CHLORIDE SERPL-SCNC: 103 MMOL/L (ref 95–110)
CO2 SERPL-SCNC: 24 MMOL/L (ref 23–29)
CO2 SERPL-SCNC: 24 MMOL/L (ref 23–29)
CREAT SERPL-MCNC: 0.6 MG/DL (ref 0.5–1.4)
CREAT SERPL-MCNC: 0.6 MG/DL (ref 0.5–1.4)
CRP SERPL-MCNC: 31.7 MG/L (ref 0–8.2)
EST. GFR  (NO RACE VARIABLE): >60 ML/MIN/1.73 M^2
EST. GFR  (NO RACE VARIABLE): >60 ML/MIN/1.73 M^2
GLUCOSE SERPL-MCNC: 116 MG/DL (ref 70–110)
GLUCOSE SERPL-MCNC: 165 MG/DL (ref 70–110)
POCT GLUCOSE: 106 MG/DL (ref 70–110)
POCT GLUCOSE: 135 MG/DL (ref 70–110)
POCT GLUCOSE: 176 MG/DL (ref 70–110)
POCT GLUCOSE: 187 MG/DL (ref 70–110)
POCT GLUCOSE: 191 MG/DL (ref 70–110)
POTASSIUM SERPL-SCNC: 3.7 MMOL/L (ref 3.5–5.1)
POTASSIUM SERPL-SCNC: 3.8 MMOL/L (ref 3.5–5.1)
SODIUM SERPL-SCNC: 135 MMOL/L (ref 136–145)
SODIUM SERPL-SCNC: 137 MMOL/L (ref 136–145)

## 2024-10-03 PROCEDURE — 86140 C-REACTIVE PROTEIN: CPT | Performed by: STUDENT IN AN ORGANIZED HEALTH CARE EDUCATION/TRAINING PROGRAM

## 2024-10-03 PROCEDURE — A4216 STERILE WATER/SALINE, 10 ML: HCPCS | Performed by: STUDENT IN AN ORGANIZED HEALTH CARE EDUCATION/TRAINING PROGRAM

## 2024-10-03 PROCEDURE — 25500020 PHARM REV CODE 255: Performed by: HOSPITALIST

## 2024-10-03 PROCEDURE — 63600175 PHARM REV CODE 636 W HCPCS: Performed by: STUDENT IN AN ORGANIZED HEALTH CARE EDUCATION/TRAINING PROGRAM

## 2024-10-03 PROCEDURE — 25500020 PHARM REV CODE 255

## 2024-10-03 PROCEDURE — 97116 GAIT TRAINING THERAPY: CPT | Mod: CQ

## 2024-10-03 PROCEDURE — 80048 BASIC METABOLIC PNL TOTAL CA: CPT | Performed by: STUDENT IN AN ORGANIZED HEALTH CARE EDUCATION/TRAINING PROGRAM

## 2024-10-03 PROCEDURE — 25000003 PHARM REV CODE 250: Performed by: STUDENT IN AN ORGANIZED HEALTH CARE EDUCATION/TRAINING PROGRAM

## 2024-10-03 PROCEDURE — 25000003 PHARM REV CODE 250: Performed by: INTERNAL MEDICINE

## 2024-10-03 PROCEDURE — 97530 THERAPEUTIC ACTIVITIES: CPT | Mod: CO

## 2024-10-03 PROCEDURE — 20600001 HC STEP DOWN PRIVATE ROOM

## 2024-10-03 PROCEDURE — 97530 THERAPEUTIC ACTIVITIES: CPT | Mod: CQ

## 2024-10-03 PROCEDURE — 97535 SELF CARE MNGMENT TRAINING: CPT | Mod: CO

## 2024-10-03 RX ADMIN — OXYCODONE HYDROCHLORIDE 10 MG: 10 TABLET ORAL at 01:10

## 2024-10-03 RX ADMIN — OXYCODONE HYDROCHLORIDE 10 MG: 10 TABLET ORAL at 07:10

## 2024-10-03 RX ADMIN — FOLIC ACID 1 MG: 1 TABLET ORAL at 08:10

## 2024-10-03 RX ADMIN — IOHEXOL 15 ML: 350 INJECTION, SOLUTION INTRAVENOUS at 07:10

## 2024-10-03 RX ADMIN — IOHEXOL 75 ML: 350 INJECTION, SOLUTION INTRAVENOUS at 08:10

## 2024-10-03 RX ADMIN — Medication 100 MG: at 08:10

## 2024-10-03 RX ADMIN — OXYCODONE HYDROCHLORIDE 10 MG: 10 TABLET ORAL at 09:10

## 2024-10-03 RX ADMIN — Medication 10 ML: at 12:10

## 2024-10-03 RX ADMIN — CLONIDINE HYDROCHLORIDE 0.1 MG: 0.1 TABLET ORAL at 08:10

## 2024-10-03 RX ADMIN — IOHEXOL 15 ML: 350 INJECTION, SOLUTION INTRAVENOUS at 05:10

## 2024-10-03 RX ADMIN — Medication 10 ML: at 06:10

## 2024-10-03 RX ADMIN — GABAPENTIN 300 MG: 300 CAPSULE ORAL at 03:10

## 2024-10-03 RX ADMIN — OXACILLIN 12 G: 2 INJECTION, POWDER, FOR SOLUTION INTRAMUSCULAR; INTRAVENOUS at 01:10

## 2024-10-03 RX ADMIN — CLONIDINE HYDROCHLORIDE 0.1 MG: 0.1 TABLET ORAL at 03:10

## 2024-10-03 RX ADMIN — GABAPENTIN 300 MG: 300 CAPSULE ORAL at 08:10

## 2024-10-03 RX ADMIN — Medication 10 ML: at 05:10

## 2024-10-03 RX ADMIN — HYDROXYZINE PAMOATE 25 MG: 25 CAPSULE ORAL at 09:10

## 2024-10-03 RX ADMIN — CLONIDINE HYDROCHLORIDE 0.1 MG: 0.1 TABLET ORAL at 09:10

## 2024-10-03 RX ADMIN — GABAPENTIN 300 MG: 300 CAPSULE ORAL at 09:10

## 2024-10-03 RX ADMIN — METHADONE HYDROCHLORIDE 70 MG: 10 TABLET ORAL at 08:10

## 2024-10-03 NOTE — ASSESSMENT & PLAN NOTE
Tobacco cessation discussed with patient for greater than 5 minutes.   Offered Nicotine patch while hospitalized  Patient is aware of need to quit tobacco products

## 2024-10-03 NOTE — NURSING
Pt AAO x 4, c/o pain this am. PRN medication given. VSS, 95% on RA. Neuro intact.  Right upper arm PICC intact. Both lumens flush and draw blood. Oxacillin infusing to PICC without difficulty. Pt out of bed with PT. Patient tolerated activity well. PREMA to lower back intact. No drainage this shift. CT of the abd/pelvis with contrast outstanding. Pt educated on NPO until the CT is done. First dose of contrast started @ 1742. Tianna Bañuelos, RT in CT notified. Pt verbalized understanding. No acute events this shift. Bed low and locked, call light in reach

## 2024-10-03 NOTE — ASSESSMENT & PLAN NOTE
Patient's FSGs are uncontrolled due to hyperglycemia on current medication regimen.  Last A1c reviewed-   Lab Results   Component Value Date    HGBA1C 7.9 (H) 09/17/2024     Most recent fingerstick glucose reviewed-   Recent Labs   Lab 10/02/24  1929 10/03/24  0813 10/03/24  1215 10/03/24  1234   POCTGLUCOSE 107 191* 187* 176*       Current correctional scale  Medium  Maintain anti-hyperglycemic dose as follows-   Antihyperglycemics (From admission, onward)    Start     Stop Route Frequency Ordered    10/02/24 0006  insulin aspart U-100 pen 0-5 Units         -- SubQ Before meals & nightly PRN 10/01/24 2306        Hold Oral hypoglycemics while patient is in the hospital.

## 2024-10-03 NOTE — ASSESSMENT & PLAN NOTE
MSSA Endocarditis  MSSA Paraspinal abscess  MSSA Psoas abscess    Blood cx 9/16 with MSSA  2D echo 9/17:  Cannot entirely exclude mitral vegetation vs redundant chordae. If high clinical suspicion for endocarditis, would rx as such (not clear MARICHUY would be able to exclude vegetation based on TTE findings).   Noted to have extensive fluid collection on left extending from T11 through left iliacus muscle and within the left iliopsoas muscle.  Multifocal collections of air including subcutaneous tissues on the left flank noted.  ID/Neurosurgery consulted.   S/p L3-L5 laminectomy with epidural abscess evacuation 9/19   Ortho consulted. Rec continued abx tx and no further interventions  IR SI joint aspiration and abscess drain placement 9/21. Unable to aspirate joint  IR drain removed on 09/27.    Repeat CT abdomen pelvis 9/28 ordered to look for recollection of fluid showed an ill-defined subcutaneous edema and soft tissue inflammatory change in the surgical bed and subcutaneous soft tissues without discrete organized fluid collection,  Stable size of 3.6 cm fluid collection along the left posterior pararenal space which appears to communicate with the with the aforementioned collection and fascial thickening.   IR placed drain for retroperitoneal fluid collection on 9/30  Culture with MSSA  ID consulted:  Suggested Oxacillin through 11/18 with repeat imaging prior to completion  Will need LTAC for completion of IV antibiotics given history of IVDA  Repeat CRP this morning given worsening pain and swelling - CRP down to 32

## 2024-10-03 NOTE — SUBJECTIVE & OBJECTIVE
Interval History: No acute events overnight.  Endorses worsening pain in her left flank and back, currently 10/10.  Drain in place.  Some mild left flank swelling noted.  Extensive chart review done - IR suggested repeat imaging to evaluate for drain removal, so given symptoms will order it.  Discussed with ID.  They said to continue antibiotics at this time.  Repeat CRP ordered this morning and trending down.    Review of Systems   Constitutional:  Negative for chills, fatigue and fever.   Respiratory:  Negative for cough and shortness of breath.    Cardiovascular:  Negative for chest pain, palpitations and leg swelling.   Gastrointestinal:  Positive for abdominal pain. Negative for diarrhea, nausea and vomiting.   Genitourinary:  Negative for dysuria and urgency.   Musculoskeletal:  Positive for back pain.   Neurological:  Negative for dizziness and headaches.   All other systems reviewed and are negative.    Objective:     Vital Signs (Most Recent):  Temp: 98.6 °F (37 °C) (10/03/24 0811)  Pulse: 90 (10/03/24 0811)  Resp: 16 (10/03/24 1259)  BP: 113/74 (10/03/24 0849)  SpO2: (!) 94 % (10/03/24 0811) Vital Signs (24h Range):  Temp:  [98.4 °F (36.9 °C)-98.8 °F (37.1 °C)] 98.6 °F (37 °C)  Pulse:  [76-99] 90  Resp:  [16-18] 16  SpO2:  [91 %-95 %] 94 %  BP: (102-129)/(64-90) 113/74     Weight: 65.2 kg (143 lb 11.8 oz)  Body mass index is 26.29 kg/m².  No intake or output data in the 24 hours ending 10/03/24 1312   Physical Exam  Constitutional:       Appearance: Normal appearance.   HENT:      Head: Normocephalic and atraumatic.   Cardiovascular:      Rate and Rhythm: Normal rate and regular rhythm.      Heart sounds: No murmur heard.  Pulmonary:      Effort: Pulmonary effort is normal. No respiratory distress.      Breath sounds: Normal breath sounds. No wheezing or rales.   Abdominal:      General: There is no distension.      Palpations: Abdomen is soft.      Tenderness: There is no abdominal tenderness.       Comments: Mild swelling to left flank.  Drain in place with mild drainage in grenade   Musculoskeletal:         General: Tenderness (Parapsinal, bandages C/D/I) present. No deformity.   Skin:     General: Skin is warm and dry.   Neurological:      General: No focal deficit present.      Mental Status: She is alert and oriented to person, place, and time. Mental status is at baseline.         MELD 3.0: 17 at 9/21/2024  6:13 PM  MELD-Na: 13 at 9/21/2024  6:13 PM  Calculated from:  Serum Creatinine: 0.6 mg/dL (Using min of 1 mg/dL) at 9/21/2024  6:13 PM  Serum Sodium: 138 mmol/L (Using max of 137 mmol/L) at 9/21/2024  6:13 PM  Total Bilirubin: 2.1 mg/dL at 9/21/2024  2:34 AM  Serum Albumin: 1.2 g/dL (Using min of 1.5 g/dL) at 9/21/2024  2:34 AM  INR(ratio): 1.4 at 9/19/2024  3:19 AM  Age at listing (hypothetical): 49 years  Sex: Female at 9/21/2024  6:13 PM      Significant Labs:  CBC:  Recent Labs   Lab 10/02/24  0448   WBC 7.50   HGB 7.9*   HCT 26.3*        CMP:  Recent Labs   Lab 10/02/24  0448 10/02/24  0809 10/03/24  0821    138 135*   K 3.2* 3.2* 3.8    107 102   CO2 27 24 24   GLU 91 115* 165*   BUN 4* 3* 3*   CREATININE 0.6 0.6 0.6   CALCIUM 7.9* 7.1* 7.8*   PROT 5.5*  --   --    ALBUMIN 1.1*  --   --    BILITOT 0.8  --   --    ALKPHOS 111  --   --    AST 11  --   --    ALT 8*  --   --    ANIONGAP 7* 7* 9

## 2024-10-03 NOTE — PT/OT/SLP PROGRESS
Occupational Therapy   Treatment    Name: Mari Sloan  MRN: [id number]  Admitting Diagnosis:  Paraspinal abscess  14 Days Post-Op    Recommendations:     Discharge Recommendations: High Intensity Therapy  Discharge Equipment Recommendations:  walker, rolling, shower chair  Barriers to discharge:  Decreased caregiver support, Other (Comment) (patient requires increased level of assistance)    Assessment:     Mari Sloan is a 49 y.o. female with a medical diagnosis of Paraspinal abscess.  She presents with the fallowing performance deficits affecting function are weakness, impaired endurance, impaired self care skills, impaired functional mobility, gait instability, impaired balance, pain, decreased safety awareness, impaired cardiopulmonary response to activity, decreased lower extremity function, decreased upper extremity function, decreased coordination. Patient agreeable to tx session, patient noticed to be more motivated to participate with therapy intervention this week then previously, patient is demonstrating progress with functional transfers and  bed mobility. Patient was willing to try to ambulate from the bed<>bathroom to for toileting task. Patient was able to take steps to the bathroom with the assistance from PT with Min A with tactile and verbal cues to manage walker and chair fallow for safety. Patient noticed with slight instability and decreased step length due to pain and weakness. Patient is below level of function due  to having  limiting  activity tolerance, deficits with balance due to compensated  due to pain and weakness from recent surgical procedure. Patient would benefit from High intensity therapy intervention to address over all functional decline with mobility task, endurance, and ADLs in order to return to PLOF.     Rehab Prognosis:  Good; patient would benefit from acute skilled OT services to address these deficits and reach maximum level of function.       Plan:     Patient to  be seen 4 x/week to address the above listed problems via self-care/home management, therapeutic activities, therapeutic exercises, neuromuscular re-education  Plan of Care Expires: 10/23/24  Plan of Care Reviewed with: patient    Subjective     Chief Complaint: pain and fatigue   Patient/Family Comments/goals: to return to PLOF   Pain/Comfort:  Pain Rating 1:  (patient did rate)  Location - Side 1: Bilateral  Location 1: back  Pain Addressed 1: Reposition, Distraction, Pre-medicate for activity  Pain Rating Post-Intervention 1:  (patient did not rate)    Objective:     Communicated with: Nurse prior to session.  Patient found HOB elevated with PREMA drain, telemetry, peripheral IV upon OT entry to room.  Co-treated with PT due to patient complexity deficits requiring two skilled therapist to appropriately and safely mobilized patient while facilitating functional task in addition to accommodating for patient's activity tolerance.    General Precautions: Standard, fall    Orthopedic Precautions:spinal precautions  Braces: N/A  Respiratory Status: Room air     Occupational Performance:     Bed Mobility:    Patient completed Rolling/Turning to Right with minimum assistance  Patient completed Scooting/Bridging with contact guard assistance  Patient completed Supine to Sit with moderate assistance  Patient completed Sit to Supine with moderate assistance of 2 persons to mange trunk and LB   Patient was able to sit up at EOB for 5 mins with SBA for static sitting balance     Functional Mobility/Transfers:  Patient completed Sit <> Stand Transfer from EOB with moderate assistance  with  rolling walker and verbal and tactile cues to manage walker properly   Patient completed Toilet Transfer Step Transfer technique with maximal assistance of 2 persons  with  grab bars, patient required verbal and tactile cues for proper sequencing.   Functional Mobility: Patient was able to take steps from bed<>bathroom with the assistance  from PT with Min A with RW. Patient continues to demonstrate poor stability and standing tolerance and decreased step length     Activities of Daily Living:  Toileting: total assistance for pericare hygiene task sitting and manage of Purewick in standing   Patient unable to stand for increased period of time to performed hygiene task in standing due to weakness and unable to reach back, and unable to let go of walker due to balance deficits       AMPA 6 Click ADL: 16    Treatment & Education:  Discussed OT POC and progress  Educated patient on the importance to continue to perform exercises in order to reduce stiffness and promote joint mobility and blood flow  Addressed patient's questions and concerns within [facility] scope of practice      Patient left HOB elevated with all lines intact, call button in reach, and nurse notified    GOALS:   Multidisciplinary Problems       Occupational Therapy Goals          Problem: Occupational Therapy    Goal Priority Disciplines Outcome Interventions   Occupational Therapy Goal     OT, PT/OT Progressing    Description: Goals to be met by: 10/21/2024     Patient will increase functional independence with ADLs by performing:    UE Dressing with Set-up Assistance.  LE Dressing with Stand-by Assistance.  Grooming while standing at sink with Supervision.  Toileting from toilet with Supervision for hygiene and clothing management.   Supine to sit with Contact Guard Assistance.  Stand pivot transfers with Supervision.  Toilet transfer to toilet with Supervision.    DME Justifications:    Rolling Walker- Patient demonstrates a mobility limitation that significantly impairs their ability to participate in one or more mobility related activities of daily living. Patient's mobility limitation cannot be sufficiently resolved with the use of a cane, but can be sufficiently resolved with the use of a rolling walker.The use of a rolling walker will considerably improve their ability to  participate in MRADLs. Patient will use the walker on a regular basis at home.                           Time Tracking:     OT Date of Treatment: 10/03/24  OT Start Time: 1405  OT Stop Time: 1443  OT Total Time (min): 38 min    Billable Minutes:Self Care/Home Management 23  Therapeutic Activity 15    OT/ANABEL: ANABEL     Number of ANABEL visits since last OT visit: 1    10/3/2024

## 2024-10-03 NOTE — NURSING
Shift Note    Pt slept well this shift. VSS. Pain managed with PRN's. PICC to RUE in place and patent. Drsg dated 9/27/24. PREMA to L back in place and patent. Serosanguinous drainage in tubing. Scant drainage noted in bulb. Purulent drainage around drain this AM after transferring to [facility]. Drsg changed by second nurse. Skin care completed. T&R per self as tolerated. Safety precautions in place. Call light in reach. No further concerns noted at this time

## 2024-10-03 NOTE — PLAN OF CARE
"Discharge Plan A and Plan B have been determined by review of patient's clinical status, future medical and therapeutic needs, and coverage/benefits for post-acute care in coordination with multidisciplinary team members.    10/03/24 1552   Discharge Reassessment   Assessment Type Discharge Planning Reassessment   Discharge Plan discussed with: Patient;Spouse/sig other   Name(s) and Number(s) Marcelino Sloan (Spouse)  [phone] (Mobile)   Communicated [facility] with patient/caregiver Yes   Discharge Plan A Skilled Nursing Facility   DME Needed Upon Discharge  other (see comments)   Transition of Care Barriers Substance Abuse   Why the patient remains in the hospital Requires continued medical care   Post-Acute Status   Post-Acute Authorization Placement   Post-Acute Placement Status Referrals Sent   Coverage MEDICAID - [facility] ([place] MEDICA[place]) -   Patient choice form signed by patient/caregiver List from CMS Compare;List with quality metrics by geographic area provided   Discharge Delays None known at this time     CM met with patient and spoke with patients spouse Marcelino Sloan  to discuss discharge planning.  Patients plan is to  dc to SNF for IV abx therapy and therapy.    LUH:  10/4/24    0845 am  CM called and spoke with Deana Mccord and stated, " Please send me updated therapy notes and I will be submitting auth."    1200 pm  CM called and left a VM for Vishnu to return call for any updates with submitted authorization    1:30 pm  CM called and left contact information  to speak with Vishnu in admissions, CM waiting on call back    2:15 pm-2:25 pm  CM spoke with patient and provided options and patient starts, " I am going home and can go home with this IV and get HH. CM informed the patient that her insurance denied LTAC and will approve for SNF.  Patient minimizes  substance abuse. Use.    3:30 pm  CM called and left contact information  to speak with Vishnu in admissions, CM waiting on call " back    3:45 pm  CM escalated authorization follow up with upper leadership       Discharge Recommendations:High Intensity Therapy     Discharge Equipment Recommendations: walker, rolling     Barriers to discharge: current level of assistance required              TREATMENT PLANS:    Paraspinal abscess  MSSA Endocarditis  MSSA Paraspinal abscess  MSSA Psoas abscess  Repeat CT abdomen pelvis 9/28 ordered to look for recollection of fluid showed an ill-defined subcutaneous edema and soft tissue inflammatory change in the surgical bed and subcutaneous soft tissues without discrete organized fluid collection,  Stable size of 3.6 cm fluid collection along the left posterior pararenal space which appears to communicate with the with the aforementioned collection and fascial thickening.   IR placed drain for retroperitoneal fluid collection on 9/30  Culture with MSSA  ID consulted:  Suggested Oxacillin through 11/18 with repeat imaging prior to completion  Will need LTAC for completion of IV antibiotics given history of IVDA  Repeat CRP this morning given worsening pain and swelling - CRP down to 32     Follow up: PCP       Referrals:  TBNICOLE Elise RN  Case Management  [facility]  [phone]

## 2024-10-03 NOTE — PROGRESS NOTES
[facility] - Stepdown Flex ([place] [id number])  [facility] Medicine  Progress Note    Patient Name: Mari Sloan  MRN: [id number]  Patient Class: IP- Inpatient   Admission Date: 9/16/2024  Length of Stay: 17 days  Attending Physician: Paty Kerr MD  Primary Care Provider: Liliana, Primary Doctor        Subjective:     Principal Problem:Paraspinal abscess        HPI:  By Dr. Alberta Reese MD    49 y.o.  woman with h/o homelessness (recently was able to obtain living arrangements but states she was living [place] the bridge), NIDDM type 2, Essential hypertension, and substance use (denies any IVDU in he past or any illicit drug use recenly, denies ETOH abuse/overuse) presents to [facility] for further evaluation of her back pain.  She apparently presented to the [facility] Emergency Department on September 16 with nausea and vomiting and a mechanical fall 5 days prior. She reported pain worse in her back and on her sides radiating down both legs. She noted muscle spasm in her back and legs. She had no head trauma or loss of consciousness.  W/u in the [facility] ED noted significant hypokalemia, hypomagnesemia, severe anemia, metabolic alkalosis, and hyperglycemia. Vitals signs stable with no sepsis at this time noted. SBP>90, HR normal,afebrile.     Imaging studies of her lumbar spine and pelvis were concerning for osteomyelitis/septic arthritis of the left SI joint and L5-S1 along with an abnormal fluid collection extending from T11 through the left iliacus muscle concerning for abscess. Blood cultures sent.  In the emergency department she is receiving IV fluid, Zofran, Zosyn, vancomycin, potassium, magnesium, pain medication, and nausea medication.   She also received 2 units of PRBC for an H/H of 5.7/18.4,     Case discussed with Neurosurgery and Interventional Radiology. Plan would be for transfer to Hospital Medicine at [facility] for IR evaluation of the fluid collection and potential  "sampling of the joint osteomyelitis.  I reviewed the discussions made with the multiple sub specialists regarding transfer of this patient to [facility]: IR/General surgery/Neurosurgery/ER/Internal Med.     Neurosurgery contacted by the  did not feel surgical intervention was needed at this time but would follow along and requested [facility] for further management and IR backup. IR on call apparently read the CT and at the time felt "while the left retroperitoneal fluid collections do appear organized, percutaneous drainage is not advised given the extensive soft tissue infection superficial to these collections.  In addition, there is extensive evidence of soft tissue infection that does not appear organized or percutaneously drainable."     General surgery was consulted to review the case and felt that there was no immediate surgical intervention at this time to be had. I spoke with the on surgeon contacted regarding the location of the fluid collections and inquired if perhaps orthopedic surgery should be consulted to review given the involvement of bony pelvis.      I spoke orthopedic surgery who will see the patient but recommended re-consulting IR here and Neurosurgery given the diskitis/OM L5-S1. (Please see his consult note in the chart)     Repeat labs here again noted for persistently low mag, K, phos.  H/H stable with improved H/H. Pain control improved with Dilaudid.  I consulted ID for further assistance with ABX adjustments and changed her to Meropenem and doxy as well as continued Vanc. Echo ordred for am.     CT lumbar spine and pelvis had findings most concerning for infectious process. There were findings concerning for osteomyelitis and septic arthritis in the left SI joint. Findings concerning for osteomyelitis/diskitis L5-S1. Possible osteomyelitis and septic joint at the pubic symphysis. Abnormal fluid collection on the left extending from T11 through the left iliacus muscle " along and within the left iliopsoas muscle most concerning for abscess. Multiple additional small abscesses suspected in the pelvis. Multifocal collections of air in the fluid in the subcutaneous tissues of the left flank, incompletely imaged.    Chest x-ray noted a loop in the central venous catheter. Tip currently at the level of the brachiocephalic vein. Lungs are fairly clear.        Overview/Hospital Course:  Ms. Sloan was transferred from [facility] ED to [facility] ICU on 09/16/2024.  She presented to [facility] ED  for back pain, nausea/vomiting.  CT L-spine revealed osteomyelitis/diskitis L5-S1 along with abnormal fluid collection on left extending from T11 through left iliacus muscle and left iliopsoas muscle concerning for abscess.  Multifocal collections of air in fluid in subcutaneous tissues of left flank.  Patient was initiated on empiric vancomycin and meropenem.  Blood cultures with staph aureus.  Obtain echo.  Unable to repeat blood cultures given shortage of cx.  Neurosurgery recommended to obtain MRI L-spine, pending.  General surgery evaluated the patient and recommended urgent transfer to [facility] for surgical evaluation/source control given extent of necrosis.     Ms. Sloan was transferred to [facility] and admitted to the MICU 9/18 with concern for paraspinal abscess. Infectious workup was ordered. Blood cultures were positive for MSSA bacteremia. Neurosurgery, General Surgery and IR were consulted to evaluate the patient's paraspinal abscess. Neurosurgery performed a L3-L4 laminectomy for epidural abscess evacuation on 9/19/24 and the cultures grew MSSA. TTE showed normal EF of 60-65% with diastolic dysfunction, could not exclude mitral vegetation vs redundant chordae. ID was consulted and recommended Oxacillin and repeat blood cultures. With her electrolyte derangement, and a background of appetitive loss in the past 2 months, there was concern for refeeding syndrome. On 9/21, IR  took the patient for abscess drain placement and aspiration of SI joint. SI joint couldn't be aspirated but retroperitoneal abscess was drained of 100cc of purulent fluid. She was successfully extubated 9/22 and was stepped down to Hospital Medicine on 9/24/24. Her drain was removed on 09/27.  Repeat CT abdomen/pelvis showed an ill-defined subcutaneous edema and soft tissue inflammatory change in the surgical bed and subcutaneous soft tissues without discrete organized fluid collection, stable size of 3.6 cm fluid collection along the left posterior pararenal space which appears to communicate with the with the aforementioned collection and fascial thickening.  IR placed drain for retroperitoneal fluid collection on 9/30.  Cultures returned with Staph aureus.  IR suggested to onitor drain output, and consider repeat imaging when output decreases to less than 10 cc in 24 hours to evaluate for possible drainage catheter removal.     Interval History: No acute events overnight.  Endorses worsening pain in her left flank and back, currently 10/10.  Drain in place.  Some mild left flank swelling noted.  Extensive chart review done - IR suggested repeat imaging to evaluate for drain removal, so given symptoms will order it.  Discussed with ID.  They said to continue antibiotics at this time.  Repeat CRP ordered this morning and trending down.    Review of Systems   Constitutional:  Negative for chills, fatigue and fever.   Respiratory:  Negative for cough and shortness of breath.    Cardiovascular:  Negative for chest pain, palpitations and leg swelling.   Gastrointestinal:  Positive for abdominal pain. Negative for diarrhea, nausea and vomiting.   Genitourinary:  Negative for dysuria and urgency.   Musculoskeletal:  Positive for back pain.   Neurological:  Negative for dizziness and headaches.   All other systems reviewed and are negative.    Objective:     Vital Signs (Most Recent):  Temp: 98.6 °F (37 °C) (10/03/24  0811)  Pulse: 90 (10/03/24 0811)  Resp: 16 (10/03/24 1259)  BP: 113/74 (10/03/24 0849)  SpO2: (!) 94 % (10/03/24 0811) Vital Signs (24h Range):  Temp:  [98.4 °F (36.9 °C)-98.8 °F (37.1 °C)] 98.6 °F (37 °C)  Pulse:  [76-99] 90  Resp:  [16-18] 16  SpO2:  [91 %-95 %] 94 %  BP: (102-129)/(64-90) 113/74     Weight: 65.2 kg (143 lb 11.8 oz)  Body mass index is 26.29 kg/m².  No intake or output data in the 24 hours ending 10/03/24 1312   Physical Exam  Constitutional:       Appearance: Normal appearance.   HENT:      Head: Normocephalic and atraumatic.   Cardiovascular:      Rate and Rhythm: Normal rate and regular rhythm.      Heart sounds: No murmur heard.  Pulmonary:      Effort: Pulmonary effort is normal. No respiratory distress.      Breath sounds: Normal breath sounds. No wheezing or rales.   Abdominal:      General: There is no distension.      Palpations: Abdomen is soft.      Tenderness: There is no abdominal tenderness.      Comments: Mild swelling to left flank.  Drain in place with mild drainage in grenade   Musculoskeletal:         General: Tenderness (Parapsinal, bandages C/D/I) present. No deformity.   Skin:     General: Skin is warm and dry.   Neurological:      General: No focal deficit present.      Mental Status: She is alert and oriented to person, place, and time. Mental status is at baseline.         MELD 3.0: 17 at 9/21/2024  6:13 PM  MELD-Na: 13 at 9/21/2024  6:13 PM  Calculated from:  Serum Creatinine: 0.6 mg/dL (Using min of 1 mg/dL) at 9/21/2024  6:13 PM  Serum Sodium: 138 mmol/L (Using max of 137 mmol/L) at 9/21/2024  6:13 PM  Total Bilirubin: 2.1 mg/dL at 9/21/2024  2:34 AM  Serum Albumin: 1.2 g/dL (Using min of 1.5 g/dL) at 9/21/2024  2:34 AM  INR(ratio): 1.4 at 9/19/2024  3:19 AM  Age at listing (hypothetical): 49 years  Sex: Female at 9/21/2024  6:13 PM      Significant Labs:  CBC:  Recent Labs   Lab 10/02/24  0448   WBC 7.50   HGB 7.9*   HCT 26.3*        CMP:  Recent Labs   Lab  10/02/24  0448 10/02/24  0809 10/03/24  0821    138 135*   K 3.2* 3.2* 3.8    107 102   CO2 27 24 24   GLU 91 115* 165*   BUN 4* 3* 3*   CREATININE 0.6 0.6 0.6   CALCIUM 7.9* 7.1* 7.8*   PROT 5.5*  --   --    ALBUMIN 1.1*  --   --    BILITOT 0.8  --   --    ALKPHOS 111  --   --    AST 11  --   --    ALT 8*  --   --    ANIONGAP 7* 7* 9         Assessment/Plan:      * Paraspinal abscess  MSSA Endocarditis  MSSA Paraspinal abscess  MSSA Psoas abscess    Blood cx 9/16 with MSSA  2D echo 9/17:  Cannot entirely exclude mitral vegetation vs redundant chordae. If high clinical suspicion for endocarditis, would rx as such (not clear MARICHUY would be able to exclude vegetation based on TTE findings).   Noted to have extensive fluid collection on left extending from T11 through left iliacus muscle and within the left iliopsoas muscle.  Multifocal collections of air including subcutaneous tissues on the left flank noted.  ID/Neurosurgery consulted.   S/p L3-L5 laminectomy with epidural abscess evacuation 9/19   Ortho consulted. Rec continued abx tx and no further interventions  IR SI joint aspiration and abscess drain placement 9/21. Unable to aspirate joint  IR drain removed on 09/27.    Repeat CT abdomen pelvis 9/28 ordered to look for recollection of fluid showed an ill-defined subcutaneous edema and soft tissue inflammatory change in the surgical bed and subcutaneous soft tissues without discrete organized fluid collection,  Stable size of 3.6 cm fluid collection along the left posterior pararenal space which appears to communicate with the with the aforementioned collection and fascial thickening.   IR placed drain for retroperitoneal fluid collection on 9/30  Culture with MSSA  ID consulted:  Suggested Oxacillin through 11/18 with repeat imaging prior to completion  Will need LTAC for completion of IV antibiotics given history of IVDA  Repeat CRP this morning given worsening pain and swelling - CRP down to  32    Other osteomyelitis, multiple sites  As above      Endocarditis  As above      Staphylococcus aureus bacteremia  As above      Sepsis  As above    Retroperitoneal fluid collection  As above    Epidural abscess  As above      Psoas muscle abscess  As above    Refeeding syndrome  In ICU      Severe sepsis  See above    Hepatitis C  Hepatitis C antibody positive.    Obtain HCV RNA. Quant negative    Anemia, unspecified  S/p 2u PRBC transfusion on admit   No signs of acute GI bleed on exam at this time. Denies any hematemesis or hemoptysis.   Obtain iron B12/folate/peripheral smear and LDH/hapto/retic  No evidence of active bleed   Stable    Uncontrolled type 2 diabetes mellitus with hyperglycemia  Patient's FSGs are uncontrolled due to hyperglycemia on current medication regimen.  Last A1c reviewed-   Lab Results   Component Value Date    HGBA1C 7.9 (H) 09/17/2024     Most recent fingerstick glucose reviewed-   Recent Labs   Lab 10/02/24  1929 10/03/24  0813 10/03/24  1215 10/03/24  1234   POCTGLUCOSE 107 191* 187* 176*       Current correctional scale  Medium  Maintain anti-hyperglycemic dose as follows-   Antihyperglycemics (From admission, onward)      Start     Stop Route Frequency Ordered    10/02/24 0006  insulin aspart U-100 pen 0-5 Units         -- SubQ Before meals & nightly PRN 10/01/24 2306          Hold Oral hypoglycemics while patient is in the hospital.    Smoker  Tobacco cessation discussed with patient for greater than 5 minutes.   Offered Nicotine patch while hospitalized  Patient is aware of need to quit tobacco products    History of drug use  On methadone at home, continue      VTE Risk Mitigation (From admission, onward)           Ordered     enoxaparin injection 40 mg  Every 24 hours         09/23/24 1234     IP VTE HIGH RISK PATIENT  Once         09/22/24 1553                    Discharge Planning   LUH: 10/4/2024     Code Status: Full Code   Is the patient medically ready for discharge?:      Reason for patient still in hospital (select all that apply): Patient trending condition, Imaging, and Pending disposition  Discharge Plan A: Skilled Nursing Facility   Discharge Delays: None known at this time              Paty Kerr MD  Department of Hospital Medicine   [facility] - Stepdown Flex ([place]-[facility])

## 2024-10-03 NOTE — ASSESSMENT & PLAN NOTE
S/p 2u PRBC transfusion on admit   No signs of acute GI bleed on exam at this time. Denies any hematemesis or hemoptysis.   Obtain iron B12/folate/peripheral smear and LDH/hapto/retic  No evidence of active bleed   Stable

## 2024-10-03 NOTE — PT/OT/SLP PROGRESS
Physical Therapy Treatment  Co-treatment with OT due to acuity of condition, level of skilled assist needed for assessment of safety with mobility and potential of not tolerating a second treatment session.     Patient Name:  Mari Sloan   MRN:  [id number]    Recommendations:     Discharge Recommendations: High Intensity Therapy  Discharge Equipment Recommendations: walker, rolling  Barriers to discharge:  current level of assistance required     Assessment:     Mari Sloan is a 49 y.o. female admitted with a medical diagnosis of Paraspinal abscess.  She presents with the following impairments/functional limitations: weakness, impaired endurance, impaired self care skills, impaired functional mobility, gait instability, pain, decreased safety awareness, decreased lower extremity function, decreased upper extremity function, impaired skin, impaired cardiopulmonary response to activity, orthopedic precautions Pt tolerated treatment session well today. Patient remains appropriate for continued skilled services within the acute environment and goals remain appropriate.   .    Rehab Prognosis: Good; patient would benefit from acute skilled PT services to address these deficits and reach maximum level of function.    Recent Surgery: Procedure(s) (LRB):  L3-L5 laminectomy with epidural abscess evac (N/A)  WASHOUT, WOUND, SPINE 14 Days Post-Op    Plan:     During this hospitalization, patient to be seen 4 x/week to address the identified rehab impairments via gait training, therapeutic activities, therapeutic exercises and progress toward the following goals:    Plan of Care Expires:  10/18/24    Subjective     Chief Complaint: back pain   Patient/Family Comments/goals: Pt agreeable to PT   Pain/Comfort:  Pain Rating 1:  (not rated)  Location - Orientation 1: generalized  Location 1: back  Pain Addressed 1: Reposition, Distraction  Pain Rating Post-Intervention 1:  (not rated)      Objective:     Communicated with Rn  prior to session.  Patient found supine with PREMA drain, telemetry, peripheral IV upon PT entry to room.     General Precautions: Standard, fall  Orthopedic Precautions: spinal precautions  Braces: N/A  Respiratory Status: Room air     Functional Mobility:  Bed Mobility:     Supine to Sit: moderate assistance  EOB sitting: SBA   Sit to Supine: moderate assistance and of 2 persons    Transfers:     Sit to Stand x 2:  ModA x 2 (from bed) MaxA x 2 (from toilet)  with rolling walker  Toilet Transfer: minimum assistance with  rolling walker  using  Step Transfer  Gait: 10 ft + 10 ft with RW initially Demetris progressing to close CGA   Pt ambulated with decreased bryce and step length, antalgic gait, decreased WS, and difficulty advancing B LE (did improve with time)      AM-PAC 6 CLICK MOBILITY  Turning over in bed (including adjusting bedclothes, sheets and blankets)?: 3  Sitting down on and standing up from a chair with arms (e.g., wheelchair, bedside commode, etc.): 2  Moving from lying on back to sitting on the side of the bed?: 2  Moving to and from a bed to a chair (including a wheelchair)?: 2  Need to walk in hospital room?: 2  Climbing 3-5 steps with a railing?: 1  Basic Mobility Total Score: 12       Treatment & Education:  Therapist provided instruction and educated for safety during transfers and gait training. As well as proper body mechanics, energy conservation, and fall prevention strategies during tasks listed above, and the effects of prolonged immobility and the importance of performing EOB/OOB activity and exercises to promote healing and reduce recovery time.       Patient left supine with all lines intact, call button in reach, and Rn notified..    GOALS:   Multidisciplinary Problems       Physical Therapy Goals          Problem: Physical Therapy    Goal Priority Disciplines Outcome Interventions   Physical Therapy Goal     PT, PT/OT Progressing    Description: Goals to be met by: 10/18/24     Patient  will increase functional independence with mobility by performin. Supine to sit with MInimal Assistance  2. Sit to stand transfer with Minimal Assistance with RW if needed.   3. Bed to chair transfer with Minimal Assistance using Rolling Walker  4. Gait  x 30 feet with Minimal Assistance using Rolling Walker.   5. Lower extremity exercise program x10 reps per handout, with assistance as needed to increase strength for increased mobility.     DME Justifications (see above for complete DME recommendations)      Rolling Walker- Patient demonstrates a mobility limitation that significantly impairs their ability to participate in one or more mobility related activities of daily living. Patient's mobility limitation cannot be sufficiently resolved with the use of a cane, but can be sufficiently resolved with the use of a rolling walker.The use of a rolling walker will considerably improve their ability to participate in MRADLs. Patient will use the walker on a regular basis at home.                             Time Tracking:     PT Received On: 10/03/24  PT Start Time: 1405     PT Stop Time: 1443  PT Total Time (min): 38 min     Billable Minutes: Gait Training 15 and Therapeutic Activity 23    Treatment Type: Treatment  PT/PTA: PTA     Number of PTA visits since last PT visit: 3     10/03/2024

## 2024-10-04 LAB
ALBUMIN SERPL BCP-MCNC: 1.2 G/DL (ref 3.5–5.2)
ALP SERPL-CCNC: 114 U/L (ref 55–135)
ALT SERPL W/O P-5'-P-CCNC: 9 U/L (ref 10–44)
ANION GAP SERPL CALC-SCNC: 9 MMOL/L (ref 8–16)
AST SERPL-CCNC: 11 U/L (ref 10–40)
BASOPHILS # BLD AUTO: 0.09 K/UL (ref 0–0.2)
BASOPHILS NFR BLD: 1 % (ref 0–1.9)
BILIRUB SERPL-MCNC: 0.8 MG/DL (ref 0.1–1)
BUN SERPL-MCNC: 3 MG/DL (ref 6–20)
CALCIUM SERPL-MCNC: 8 MG/DL (ref 8.7–10.5)
CHLORIDE SERPL-SCNC: 103 MMOL/L (ref 95–110)
CO2 SERPL-SCNC: 25 MMOL/L (ref 23–29)
CREAT SERPL-MCNC: 0.6 MG/DL (ref 0.5–1.4)
DIFFERENTIAL METHOD BLD: ABNORMAL
EOSINOPHIL # BLD AUTO: 0.2 K/UL (ref 0–0.5)
EOSINOPHIL NFR BLD: 1.8 % (ref 0–8)
ERYTHROCYTE [DISTWIDTH] IN BLOOD BY AUTOMATED COUNT: 20.5 % (ref 11.5–14.5)
EST. GFR  (NO RACE VARIABLE): >60 ML/MIN/1.73 M^2
GLUCOSE SERPL-MCNC: 107 MG/DL (ref 70–110)
HCT VFR BLD AUTO: 27.4 % (ref 37–48.5)
HGB BLD-MCNC: 8.3 G/DL (ref 12–16)
IMM GRANULOCYTES # BLD AUTO: 0.07 K/UL (ref 0–0.04)
IMM GRANULOCYTES NFR BLD AUTO: 0.8 % (ref 0–0.5)
LYMPHOCYTES # BLD AUTO: 2.2 K/UL (ref 1–4.8)
LYMPHOCYTES NFR BLD: 25.3 % (ref 18–48)
MCH RBC QN AUTO: 26.5 PG (ref 27–31)
MCHC RBC AUTO-ENTMCNC: 30.3 G/DL (ref 32–36)
MCV RBC AUTO: 88 FL (ref 82–98)
MONOCYTES # BLD AUTO: 0.5 K/UL (ref 0.3–1)
MONOCYTES NFR BLD: 6.2 % (ref 4–15)
NEUTROPHILS # BLD AUTO: 5.7 K/UL (ref 1.8–7.7)
NEUTROPHILS NFR BLD: 64.9 % (ref 38–73)
NRBC BLD-RTO: 0 /100 WBC
PLATELET # BLD AUTO: 250 K/UL (ref 150–450)
PMV BLD AUTO: 9.2 FL (ref 9.2–12.9)
POCT GLUCOSE: 126 MG/DL (ref 70–110)
POCT GLUCOSE: 166 MG/DL (ref 70–110)
POCT GLUCOSE: 166 MG/DL (ref 70–110)
POCT GLUCOSE: 174 MG/DL (ref 70–110)
POTASSIUM SERPL-SCNC: 3.7 MMOL/L (ref 3.5–5.1)
PROT SERPL-MCNC: 5.7 G/DL (ref 6–8.4)
RBC # BLD AUTO: 3.13 M/UL (ref 4–5.4)
SODIUM SERPL-SCNC: 137 MMOL/L (ref 136–145)
WBC # BLD AUTO: 8.71 K/UL (ref 3.9–12.7)

## 2024-10-04 PROCEDURE — A4216 STERILE WATER/SALINE, 10 ML: HCPCS | Performed by: STUDENT IN AN ORGANIZED HEALTH CARE EDUCATION/TRAINING PROGRAM

## 2024-10-04 PROCEDURE — 63600175 PHARM REV CODE 636 W HCPCS

## 2024-10-04 PROCEDURE — 97535 SELF CARE MNGMENT TRAINING: CPT

## 2024-10-04 PROCEDURE — 20600001 HC STEP DOWN PRIVATE ROOM

## 2024-10-04 PROCEDURE — 25000003 PHARM REV CODE 250: Performed by: STUDENT IN AN ORGANIZED HEALTH CARE EDUCATION/TRAINING PROGRAM

## 2024-10-04 PROCEDURE — 80053 COMPREHEN METABOLIC PANEL: CPT | Performed by: STUDENT IN AN ORGANIZED HEALTH CARE EDUCATION/TRAINING PROGRAM

## 2024-10-04 PROCEDURE — 25000003 PHARM REV CODE 250: Performed by: INTERNAL MEDICINE

## 2024-10-04 PROCEDURE — 85025 COMPLETE CBC W/AUTO DIFF WBC: CPT | Performed by: STUDENT IN AN ORGANIZED HEALTH CARE EDUCATION/TRAINING PROGRAM

## 2024-10-04 PROCEDURE — 99223 1ST HOSP IP/OBS HIGH 75: CPT | Mod: ,,, | Performed by: STUDENT IN AN ORGANIZED HEALTH CARE EDUCATION/TRAINING PROGRAM

## 2024-10-04 PROCEDURE — 63600175 PHARM REV CODE 636 W HCPCS: Performed by: HOSPITALIST

## 2024-10-04 PROCEDURE — 97530 THERAPEUTIC ACTIVITIES: CPT

## 2024-10-04 PROCEDURE — 63600175 PHARM REV CODE 636 W HCPCS: Performed by: STUDENT IN AN ORGANIZED HEALTH CARE EDUCATION/TRAINING PROGRAM

## 2024-10-04 PROCEDURE — 99024 POSTOP FOLLOW-UP VISIT: CPT | Mod: ,,, | Performed by: PHYSICIAN ASSISTANT

## 2024-10-04 RX ORDER — FUROSEMIDE 10 MG/ML
20 INJECTION INTRAMUSCULAR; INTRAVENOUS ONCE
Status: COMPLETED | OUTPATIENT
Start: 2024-10-04 | End: 2024-10-04

## 2024-10-04 RX ADMIN — GABAPENTIN 300 MG: 300 CAPSULE ORAL at 08:10

## 2024-10-04 RX ADMIN — Medication 10 ML: at 12:10

## 2024-10-04 RX ADMIN — OXYCODONE HYDROCHLORIDE 10 MG: 10 TABLET ORAL at 09:10

## 2024-10-04 RX ADMIN — FOLIC ACID 1 MG: 1 TABLET ORAL at 09:10

## 2024-10-04 RX ADMIN — GABAPENTIN 300 MG: 300 CAPSULE ORAL at 04:10

## 2024-10-04 RX ADMIN — SODIUM CHLORIDE, POTASSIUM CHLORIDE, SODIUM LACTATE AND CALCIUM CHLORIDE 500 ML: 600; 310; 30; 20 INJECTION, SOLUTION INTRAVENOUS at 08:10

## 2024-10-04 RX ADMIN — GABAPENTIN 300 MG: 300 CAPSULE ORAL at 09:10

## 2024-10-04 RX ADMIN — METHADONE HYDROCHLORIDE 70 MG: 10 TABLET ORAL at 09:10

## 2024-10-04 RX ADMIN — Medication 10 ML: at 05:10

## 2024-10-04 RX ADMIN — Medication 100 MG: at 09:10

## 2024-10-04 RX ADMIN — ENOXAPARIN SODIUM 40 MG: 40 INJECTION SUBCUTANEOUS at 04:10

## 2024-10-04 RX ADMIN — OXYCODONE HYDROCHLORIDE 10 MG: 10 TABLET ORAL at 08:10

## 2024-10-04 RX ADMIN — Medication 10 ML: at 01:10

## 2024-10-04 RX ADMIN — OXYCODONE HYDROCHLORIDE 10 MG: 10 TABLET ORAL at 01:10

## 2024-10-04 RX ADMIN — CLONIDINE HYDROCHLORIDE 0.1 MG: 0.1 TABLET ORAL at 09:10

## 2024-10-04 RX ADMIN — FUROSEMIDE 20 MG: 10 INJECTION, SOLUTION INTRAMUSCULAR; INTRAVENOUS at 12:10

## 2024-10-04 RX ADMIN — OXACILLIN 12 G: 2 INJECTION, POWDER, FOR SOLUTION INTRAMUSCULAR; INTRAVENOUS at 02:10

## 2024-10-04 RX ADMIN — CLONIDINE HYDROCHLORIDE 0.1 MG: 0.1 TABLET ORAL at 04:10

## 2024-10-04 NOTE — ASSESSMENT & PLAN NOTE
MSSA Endocarditis  MSSA Paraspinal abscess  MSSA Psoas abscess    Blood cx 9/16 with MSSA  2D echo 9/17:  Cannot entirely exclude mitral vegetation vs redundant chordae. If high clinical suspicion for endocarditis, would rx as such (not clear MARICHUY would be able to exclude vegetation based on TTE findings).   Noted to have extensive fluid collection on left extending from T11 through left iliacus muscle and within the left iliopsoas muscle.  Multifocal collections of air including subcutaneous tissues on the left flank noted.  ID/Neurosurgery consulted.   S/p L3-L5 laminectomy with epidural abscess evacuation 9/19   Ortho consulted. Rec continued abx tx and no further interventions  IR SI joint aspiration and abscess drain placement 9/21. Unable to aspirate joint  IR drain removed on 09/27.    Repeat CT abdomen pelvis 9/28 ordered to look for recollection of fluid showed an ill-defined subcutaneous edema and soft tissue inflammatory change in the surgical bed and subcutaneous soft tissues without discrete organized fluid collection,  Stable size of 3.6 cm fluid collection along the left posterior pararenal space which appears to communicate with the with the aforementioned collection and fascial thickening.   IR placed drain for retroperitoneal fluid collection on 9/30.  Culture with MSSA  ID consulted:  Suggested Oxacillin through 11/18 with repeat imaging prior to completion  Will need LTAC for completion of IV antibiotics given history of IVDA  PICC placed 9/27  Repeat CRP ordered 10/3 given worsening pain and swelling - CRP down to 32  Repeat CT abdomen/pelvis was ordered to assess for drain removal, that showed worsening abscesses.  Discussed with ID who suggested further source control and to continue Oxacillin   Gen Surg, Ortho, NSGY and IR consulted - Greatly appreciate assistance!

## 2024-10-04 NOTE — PROGRESS NOTES
[facility] - Stepdown [facility] ([place] [id number])  [facility]  Progress Note    Patient Name: Mari Sloan  MRN: [id number]  Patient Class: IP- Inpatient   Admission Date: 9/16/2024  Length of Stay: 18 days  Attending Physician: Paty Kerr MD  Primary Care Provider: Mary Fong APRN        Subjective:     Principal Problem:Paraspinal abscess        HPI:  By Dr. Alberta Reese MD    49 y.o.  woman with h/o homelessness (recently was able to obtain living arrangements but states she was living [place] the bridge), NIDDM type 2, Essential hypertension, and substance use (denies any IVDU in he past or any illicit drug use recenly, denies ETOH abuse/overuse) presents to [facility] for further evaluation of her back pain.  She apparently presented to the [facility] Emergency Department on September 16 with nausea and vomiting and a mechanical fall 5 days prior. She reported pain worse in her back and on her sides radiating down both legs. She noted muscle spasm in her back and legs. She had no head trauma or loss of consciousness.  W/u in the [facility] ED noted significant hypokalemia, hypomagnesemia, severe anemia, metabolic alkalosis, and hyperglycemia. Vitals signs stable with no sepsis at this time noted. SBP>90, HR normal,afebrile.     Imaging studies of her lumbar spine and pelvis were concerning for osteomyelitis/septic arthritis of the left SI joint and L5-S1 along with an abnormal fluid collection extending from T11 through the left iliacus muscle concerning for abscess. Blood cultures sent.  In the emergency department she is receiving IV fluid, Zofran, Zosyn, vancomycin, potassium, magnesium, pain medication, and nausea medication.   She also received 2 units of PRBC for an H/H of 5.7/18.4,     Case discussed with Neurosurgery and Interventional Radiology. Plan would be for transfer to Hospital Medicine at [facility] for IR evaluation of the fluid collection and  "potential sampling of the joint osteomyelitis.  I reviewed the discussions made with the multiple sub specialists regarding transfer of this patient to [facility]: IR/General surgery/Neurosurgery/ER/Internal Med.     Neurosurgery contacted by the  did not feel surgical intervention was needed at this time but would follow along and requested [facility] for further management and IR backup. IR on call apparently read the CT and at the time felt "while the left retroperitoneal fluid collections do appear organized, percutaneous drainage is not advised given the extensive soft tissue infection superficial to these collections.  In addition, there is extensive evidence of soft tissue infection that does not appear organized or percutaneously drainable."     General surgery was consulted to review the case and felt that there was no immediate surgical intervention at this time to be had. I spoke with the on surgeon contacted regarding the location of the fluid collections and inquired if perhaps orthopedic surgery should be consulted to review given the involvement of bony pelvis.      I spoke orthopedic surgery who will see the patient but recommended re-consulting IR here and Neurosurgery given the diskitis/OM L5-S1. (Please see his consult note in the chart)     Repeat labs here again noted for persistently low mag, K, phos.  H/H stable with improved H/H. Pain control improved with Dilaudid.  I consulted ID for further assistance with ABX adjustments and changed her to Meropenem and doxy as well as continued Vanc. Echo ordred for am.     CT lumbar spine and pelvis had findings most concerning for infectious process. There were findings concerning for osteomyelitis and septic arthritis in the left SI joint. Findings concerning for osteomyelitis/diskitis L5-S1. Possible osteomyelitis and septic joint at the pubic symphysis. Abnormal fluid collection on the left extending from T11 through the left " iliacus muscle along and within the left iliopsoas muscle most concerning for abscess. Multiple additional small abscesses suspected in the pelvis. Multifocal collections of air in the fluid in the subcutaneous tissues of the left flank, incompletely imaged.    Chest x-ray noted a loop in the central venous catheter. Tip currently at the level of the brachiocephalic vein. Lungs are fairly clear.        Overview/Hospital Course:  Ms. Sloan was transferred from [facility] ED to [facility] ICU on 09/16/2024.  She presented to [facility] ED  for back pain, nausea/vomiting.  CT L-spine revealed osteomyelitis/diskitis L5-S1 along with abnormal fluid collection on left extending from T11 through left iliacus muscle and left iliopsoas muscle concerning for abscess.  Multifocal collections of air in fluid in subcutaneous tissues of left flank.  Patient was initiated on empiric vancomycin and meropenem.  Blood cultures with staph aureus.  Obtain echo.  Unable to repeat blood cultures given shortage of cx.  Neurosurgery recommended to obtain MRI L-spine, pending.  General surgery evaluated the patient and recommended urgent transfer to [facility] for surgical evaluation/source control given extent of necrosis.     Ms. Sloan was transferred to [facility] and admitted to the MICU 9/18 with concern for paraspinal abscess. Infectious workup was ordered. Blood cultures were positive for MSSA bacteremia. Neurosurgery, General Surgery and IR were consulted to evaluate the patient's paraspinal abscess. Neurosurgery performed a L3-L4 laminectomy for epidural abscess evacuation on 9/19/24 and the cultures grew MSSA. TTE showed normal EF of 60-65% with diastolic dysfunction, could not exclude mitral vegetation vs redundant chordae. ID was consulted and recommended Oxacillin and repeat blood cultures. With her electrolyte derangement, and a background of appetitive loss in the past 2 months, there was concern for refeeding syndrome.  On 9/21, IR took the patient for abscess drain placement and aspiration of SI joint. SI joint couldn't be aspirated but retroperitoneal abscess was drained of 100cc of purulent fluid. She was successfully extubated 9/22 and was stepped down to Hospital Medicine on 9/24/24. Her drain was removed on 09/27.  Repeat CT abdomen/pelvis showed an ill-defined subcutaneous edema and soft tissue inflammatory change in the surgical bed and subcutaneous soft tissues without discrete organized fluid collection, stable size of 3.6 cm fluid collection along the left posterior pararenal space which appears to communicate with the with the aforementioned collection and fascial thickening.  IR placed drain for retroperitoneal fluid collection on 9/30.  Cultures returned with Staph aureus.  IR suggested to onitor drain output, and consider repeat imaging when output decreases to less than 10 cc in 24 hours to evaluate for possible drainage catheter removal. Repeat CT was ordered on 10/3 as she endorsed worsening pain on her left flank, and minimal drain output which could be removed.  It returned with multiple new and enlargening abscesses.  Discussed with ID, who said to continue Oxacillin.  NSGY, Gen Surg, Ortho and IR were consulted for further recommendations.    Interval History: No acute events overnight. Went for repeat CT abdomen pelvis yesterday.  It returned with multiple new and enlargening abscesses.  Discussed with ID, who said to continue Oxacillin and reach out to surgical services for further recs.  NSGY, Gen Surg, Ortho and IR were consulted for further recommendations.    Review of Systems   Constitutional:  Negative for chills, fatigue and fever.   Respiratory:  Negative for cough and shortness of breath.    Cardiovascular:  Negative for chest pain, palpitations and leg swelling.   Gastrointestinal:  Positive for abdominal pain. Negative for diarrhea, nausea and vomiting.   Genitourinary:  Negative for dysuria and  urgency.   Musculoskeletal:  Positive for back pain.   Neurological:  Negative for dizziness and headaches.   All other systems reviewed and are negative.    Objective:     Vital Signs (Most Recent):  Temp: 97.6 °F (36.4 °C) (10/04/24 0902)  Pulse: 86 (10/04/24 0902)  Resp: 16 (10/04/24 0936)  BP: 114/81 (10/04/24 0902)  SpO2: (!) 92 % (10/04/24 0902) Vital Signs (24h Range):  Temp:  [97.6 °F (36.4 °C)-99 °F (37.2 °C)] 97.6 °F (36.4 °C)  Pulse:  [75-93] 86  Resp:  [16-19] 16  SpO2:  [90 %-97 %] 92 %  BP: (107-147)/(75-97) 114/81     Weight: 65.2 kg (143 lb 11.8 oz)  Body mass index is 26.29 kg/m².    Intake/Output Summary (Last 24 hours) at 10/4/2024 1212  Last data filed at 10/4/2024 0531  Gross per 24 hour   Intake 3850.22 ml   Output 1900 ml   Net 1950.22 ml      Physical Exam  Constitutional:       Appearance: Normal appearance.   HENT:      Head: Normocephalic and atraumatic.   Cardiovascular:      Rate and Rhythm: Normal rate and regular rhythm.      Heart sounds: No murmur heard.  Pulmonary:      Effort: Pulmonary effort is normal. No respiratory distress.      Breath sounds: Normal breath sounds. No wheezing or rales.   Abdominal:      General: There is no distension.      Palpations: Abdomen is soft.      Tenderness: There is no abdominal tenderness.      Comments: Mild swelling to left flank.  Drain in place with no drainage in grenade   Musculoskeletal:         General: Tenderness (Parapsinal, bandages C/D/I) present. No deformity.   Skin:     General: Skin is warm and dry.   Neurological:      General: No focal deficit present.      Mental Status: She is alert and oriented to person, place, and time. Mental status is at baseline.         MELD 3.0: 17 at 9/21/2024  6:13 PM  MELD-Na: 13 at 9/21/2024  6:13 PM  Calculated from:  Serum Creatinine: 0.6 mg/dL (Using min of 1 mg/dL) at 9/21/2024  6:13 PM  Serum Sodium: 138 mmol/L (Using max of 137 mmol/L) at 9/21/2024  6:13 PM  Total Bilirubin: 2.1 mg/dL at  9/21/2024  2:34 AM  Serum Albumin: 1.2 g/dL (Using min of 1.5 g/dL) at 9/21/2024  2:34 AM  INR(ratio): 1.4 at 9/19/2024  3:19 AM  Age at listing (hypothetical): 49 years  Sex: Female at 9/21/2024  6:13 PM      Significant Labs:  CBC:  Recent Labs   Lab 10/04/24  0315   WBC 8.71   HGB 8.3*   HCT 27.4*        CMP:  Recent Labs   Lab 10/03/24  0821 10/03/24  1749 10/04/24  0315   * 137 137   K 3.8 3.7 3.7    103 103   CO2 24 24 25   * 116* 107   BUN 3* 3* 3*   CREATININE 0.6 0.6 0.6   CALCIUM 7.8* 7.7* 8.0*   PROT  --   --  5.7*   ALBUMIN  --   --  1.2*   BILITOT  --   --  0.8   ALKPHOS  --   --  114   AST  --   --  11   ALT  --   --  9*   ANIONGAP 9 10 9         Assessment/Plan:      * Paraspinal abscess  MSSA Endocarditis  MSSA Paraspinal abscess  MSSA Psoas abscess    Blood cx 9/16 with MSSA  2D echo 9/17:  Cannot entirely exclude mitral vegetation vs redundant chordae. If high clinical suspicion for endocarditis, would rx as such (not clear MARICHUY would be able to exclude vegetation based on TTE findings).   Noted to have extensive fluid collection on left extending from T11 through left iliacus muscle and within the left iliopsoas muscle.  Multifocal collections of air including subcutaneous tissues on the left flank noted.  ID/Neurosurgery consulted.   S/p L3-L5 laminectomy with epidural abscess evacuation 9/19   Ortho consulted. Rec continued abx tx and no further interventions  IR SI joint aspiration and abscess drain placement 9/21. Unable to aspirate joint  IR drain removed on 09/27.    Repeat CT abdomen pelvis 9/28 ordered to look for recollection of fluid showed an ill-defined subcutaneous edema and soft tissue inflammatory change in the surgical bed and subcutaneous soft tissues without discrete organized fluid collection,  Stable size of 3.6 cm fluid collection along the left posterior pararenal space which appears to communicate with the with the aforementioned collection and  fascial thickening.   IR placed drain for retroperitoneal fluid collection on 9/30.  Culture with MSSA  ID consulted:  Suggested Oxacillin through 11/18 with repeat imaging prior to completion  Will need LTAC for completion of IV antibiotics given history of IVDA  PICC placed 9/27  Repeat CRP ordered 10/3 given worsening pain and swelling - CRP down to 32  Repeat CT abdomen/pelvis was ordered to assess for drain removal, that showed worsening abscesses.  Discussed with ID who suggested further source control and to continue Oxacillin   Gen Surg, Ortho, NSGY and IR consulted - Greatly appreciate assistance!    Other osteomyelitis, multiple sites  As above      Endocarditis  As above      Staphylococcus aureus bacteremia  As above      Sepsis  As above    Retroperitoneal fluid collection  As above    Epidural abscess  As above      Psoas muscle abscess  As above    Moderate malnutrition  Nutrition consulted. Most recent weight and BMI monitored-     Measurements:  Wt Readings from Last 1 Encounters:   09/26/24 65.2 kg (143 lb 11.8 oz)   Body mass index is 26.29 kg/m².    Patient has been screened and assessed by RD.    Malnutrition Type:  Context: social/environmental circumstances  Level: moderate    Malnutrition Characteristic Summary:  Weight Loss (Malnutrition): 10% in 6 months  Energy Intake (Malnutrition): less than 75% for greater than 7 days    Interventions/Recommendations (treatment strategy):  1.      Refeeding syndrome  In ICU      Severe sepsis  See above    Hepatitis C  Hepatitis C antibody positive.    Obtain HCV RNA. Quant negative    Anemia, unspecified  S/p 2u PRBC transfusion on admit   No signs of acute GI bleed on exam at this time. Denies any hematemesis or hemoptysis.   Obtain iron B12/folate/peripheral smear and LDH/hapto/retic  No evidence of active bleed   Stable    Uncontrolled type 2 diabetes mellitus with hyperglycemia  Patient's FSGs are uncontrolled due to hyperglycemia on current  medication regimen.  Last A1c reviewed-   Lab Results   Component Value Date    HGBA1C 7.9 (H) 09/17/2024     Most recent fingerstick glucose reviewed-   Recent Labs   Lab 10/03/24  1234 10/03/24  1610 10/03/24  2117 10/04/24  0903   POCTGLUCOSE 176* 135* 106 126*       Current correctional scale  Medium  Maintain anti-hyperglycemic dose as follows-   Antihyperglycemics (From admission, onward)      Start     Stop Route Frequency Ordered    10/02/24 0006  insulin aspart U-100 pen 0-5 Units         -- SubQ Before meals & nightly PRN 10/01/24 2306          Hold Oral hypoglycemics while patient is in the hospital.    Smoker  Tobacco cessation discussed with patient for greater than 5 minutes.   Offered Nicotine patch while hospitalized  Patient is aware of need to quit tobacco products    History of drug use  On methadone at home, continue      VTE Risk Mitigation (From admission, onward)           Ordered     enoxaparin injection 40 mg  Every 24 hours         09/23/24 1234     IP VTE HIGH RISK PATIENT  Once         09/22/24 1553                    Discharge Planning   LUH: 10/11/2024     Code Status: Full Code   Is the patient medically ready for discharge?:     Reason for patient still in hospital (select all that apply): Patient new problem and Patient trending condition  Discharge Plan A: Skilled Nursing Facility ([facility] [phone])   Discharge Delays: None known at this time              Paty Kerr MD  Department of Hospital Medicine   [facility] - Stepdown Flex ([place]

## 2024-10-04 NOTE — ASSESSMENT & PLAN NOTE
As above     You can access the FollowMyHealth Patient Portal offered by [facility] by registering at the following website: [email or web address]. By joining [facility]’s FollowMyHealth portal, you will also be able to view your health information using other applications (apps) compatible with our system.

## 2024-10-04 NOTE — NURSING
Pt AAOx4. NAD. VSS. Pt reported paid; PRN pain meds given. Pt for MRI, oxacillin to be paused for the procedure; med A aware. BS sugar monitored. No acute events. Call light in reach.

## 2024-10-04 NOTE — PLAN OF CARE
Infectious Disease Note      Chart has been reviewed.  Patient has not been seen. This is a 49-year-old woman with HCV plus substance use disoder previously seen by the ID service for MSSA bacteremia, SI joint pyogenic arthritis with osteomyelitis, left psoas/iliacus abscess s/p IR drain (9/21), T12-L5 epidural abscess s/p L3-5 laminectomy with abscess washout (9/19), and possible MV endocarditis vs redundant chordae on a planned 8 week course of oxacillin with plans for repeat imaging prior to antibiotic completion.     Dr. Kerr has reached out for updated recommendations as patient underwent IR drain placement to a left retroperitoneal fluid collection on 9/30/24 with positive cultures for MSSA as well as repeat CT imaging which shows multiple fluid collections with a slightly enlarged collection at the pubic symphysis despite antibiotic therapy.    Recommendations:  Continue oxacillin as previously recommended with repeat imaging just prior to end date of antibiotics to determine management of antibiotics.  Recommend re-evaluation by the surgical services for source control in the setting of a possibly enlarging collection on imaging.   Antibiotic therapy without source control is NOT curative and increases the risks for ongoing invasion into the tissue with bony destruction.  No further recommendations from ID standpoint at this time.  Reconsult once repeat imaging is performed prior to antibiotic completion or if patient undergoes surgical intervention.    Infectious Diseases will sign off. Please call if questions arise.  Miranda Mcwilliams MD, formerly [facility]  Infectious Diseases

## 2024-10-04 NOTE — CONSULTS
[facility] ([facility]-)  Neurosurgery  Consult Note    Inpatient consult to Neurosurgery  Consult performed by: Haylee Eason PA-C  Consult ordered by: Paty Kerr MD        Subjective:     Chief Complaint/Reason for Admission: bacteremia, osteomyelitis    History of Present Illness: 49F PMH DM, HTN, hep c, opioid abuse, homelessness presenting after down for unknown period of time and inability to ambulate secondary to pain with imaging demonstrating L SI osteo with associated abscesses extending to retroperitoneum without involvement of thecal sac. Denies true weakness, states pain is limiting her ability to move. Pain in hips and low back. On clinda/zosyn, blood culture 9/16 with staph aureus. Denies bowel or bladder incontinence, complaining of constipation about 6 days. Denies numbness. S/p L3-5 laminectomy for evacuation of epidural abscess. Prolonged hospital course involving IR left retroperitoneal abscess drain placement 9/21 with removal 9/27. Now s/p left retroperitoneal abscess drain placement by IR 9/30. Repeat CT abdomen/pelvis yesterday showed multiple new and enlargening abscesses. ID recommending continue oxacillin and reconsult surgical services for further source control. NSGY consulted for evaluation.    Medications Prior to Admission   Medication Sig Dispense Refill Last Dose/Taking    gabapentin (NEURONTIN) 300 MG capsule Take 300 mg by mouth 3 (three) times daily.   9/17/2024    hydrOXYzine pamoate (VISTARIL) 25 MG Cap Take 25 mg by mouth 3 (three) times daily.   9/17/2024    metFORMIN (GLUCOPHAGE) 500 MG tablet Take 500 mg by mouth 2 (two) times daily with meals.   9/17/2024    methadone (DOLOPHINE) 10 MG tablet Take 70 mg by mouth Daily.   Past Week    cloNIDine (CATAPRES) 0.2 MG tablet Take 0.2 mg by mouth 3 (three) times daily.       glipiZIDE (GLUCOTROL) 5 MG tablet Take 5 mg by mouth 2 (two) times daily.          Review of patient's allergies indicates:  No Known  Allergies    Past Medical History:   Diagnosis Date    Diabetes mellitus     Hypertension     Unspecified viral hepatitis C without hepatic coma      Past Surgical History:   Procedure Laterality Date     SECTION      LAMINECTOMY N/A 2024    Procedure: L3-L5 laminectomy with epidural abscess evac;  Surgeon: Sourav Jim DO;  Location: [facility] OR [address] [place] [address];  Service: Neurosurgery;  Laterality: N/A;    MAGNETIC RESONANCE IMAGING N/A 2024    Procedure: MRI (Magnetic Resonance Imagine);  Surgeon: Kamran Storey;  Location: [facility];  Service: Anesthesiology;  Laterality: N/A;  conscious sedaation MRI lumbar spine w/ and without contrast    WASHOUT, WOUND, SPINE  2024    Procedure: WASHOUT, WOUND, SPINE;  Surgeon: Sourav Jim DO;  [facility];  Service: Neurosurgery;;     Family History    None       Tobacco Use    Smoking status: Every Day     Current packs/day: 0.50     Types: Cigarettes    Smokeless tobacco: Never   Substance and Sexual Activity    Alcohol use: Not Currently    Drug use: Not Currently    Sexual activity: Not on file     Review of Systems  Objective:     Weight: 65.2 kg (143 lb 11.8 oz)  Body mass index is 26.29 kg/m².  Vital Signs (Most Recent):  Temp: 98.8 °F (37.1 °C) (10/04/24 1232)  Pulse: 76 (10/04/24 1232)  Resp: 15 (10/04/24 1330)  BP: 100/71 (10/04/24 1232)  SpO2: (!) 94 % (10/04/24 1232) Vital Signs (24h Range):  Temp:  [97.6 °F (36.4 °C)-99 °F (37.2 °C)] 98.8 °F (37.1 °C)  Pulse:  [75-93] 76  Resp:  [15-19] 15  SpO2:  [90 %-97 %] 94 %  BP: (100-147)/(71-97) 100/71                              Closed/Suction Drain 24 1704 Lateral;Left Back 12 Fr. (Active)   Site Description Unable to view 10/04/24 08   Dressing Type Gauze 10/04/24 08   Dressing Status Clean;Dry;Intact 10/04/24 08   Dressing Intervention Integrity maintained 10/04/24 0800   Drainage Serosanguineous 10/04/24 0800   Status To bulb suction 10/04/24 08   Output (mL) 0  "mL 10/04/24 0500       Female External Urinary Catheter w/ Suction 09/24/24 (Active)   Skin reddened 10/03/24 1901   Tolerance no signs/symptoms of discomfort 10/03/24 1901   Suction Continuous suction at 60 mmHg 10/03/24 1901   Date of last wick change 10/03/24 10/03/24 1901   Time of last wick change 2150 10/03/24 1901   Output (mL) 400 mL 10/04/24 0500          Physical Exam     Neurosurgery Physical Exam    General: well developed, well nourished, no distress.   Head: normocephalic, atraumatic  Neurologic: Alert and oriented. Thought content appropriate.  GCS: Motor: 6/Verbal: 5/Eyes: 4 GCS Total: 15  Mental Status: Awake, Alert, Oriented x 4  Language: No aphasia  Speech: No dysarthria  Cranial nerves: face symmetric, tongue midline, CN II-XII grossly intact, EOMI.  Pulmonary: normal respirations, no signs of respiratory distress  Sensory: intact to light touch throughout  Motor Strength: Moves all extremities spontaneously with good tone. No abnormal movements seen. Proximal BLE pain limited weakness.    Strength  Deltoids Triceps Biceps Wrist Extension Wrist Flexion Hand    Upper: R 5/5 5/5 5/5 5/5 5/5 5/5    L 5/5 5/5 5/5 5/5 5/5 5/5     Iliopsoas Quadriceps Knee  Flexion Tibialis  anterior Gastro- cnemius EHL   Lower: R 2/5 4/5 4/5 5/5 5/5 5/5    L 2/5 4/5 4/5 5/5 5/5 5/5     Skin: Skin is warm, dry and intact.  Lumbar Incision c/d/I with skin edges well approximated with prineo/dermabond. No surrounding erythema or edema. No drainage from incision. L retroperitoneal drain in place.  + erythema/edema and TTP left flank/hip        Significant Labs:  Recent Labs   Lab 10/03/24  0821 10/03/24  1749 10/04/24  0315   * 116* 107   * 137 137   K 3.8 3.7 3.7    103 103   CO2 24 24 25   BUN 3* 3* 3*   CREATININE 0.6 0.6 0.6   CALCIUM 7.8* 7.7* 8.0*     Recent Labs   Lab 10/04/24  0315   WBC 8.71   HGB 8.3*   HCT 27.4*        No results for input(s): "LABPT", "INR", "APTT" in the last 48 " hours.  Microbiology Results (last 7 days)       Procedure Component Value Units Date/Time    Aerobic culture [[id number]]  (Abnormal)  (Susceptibility) Collected: 09/30/24 1705    Order Status: Completed Specimen: Abscess from Abdomen Updated: 10/03/24 1252     Aerobic Bacterial Culture STAPHYLOCOCCUS AUREUS  Many      Narrative:      L retroperitoneal fluid collection    Culture, Anaerobe [[id number]] Collected: 09/30/24 1705    Order Status: Completed Specimen: Abscess from Abdomen Updated: 10/03/24 0953     Anaerobic Culture Culture in progress    Narrative:      L retroperitoneal fluid collection    Fungus culture [[id number]] Collected: 09/30/24 1705    Order Status: Completed Specimen: Abscess from Abdomen Updated: 10/02/24 1457     Fungus (Mycology) Culture Culture in progress    Narrative:      L retroperitoneal fluid collection    AFB Culture & Smear [[id number]] Collected: 09/30/24 1705    Order Status: Completed Specimen: Abscess from Abdomen Updated: 10/01/24 2127     AFB Culture & Smear Culture in progress     AFB CULTURE STAIN No acid fast bacilli seen.    Narrative:      L retroperitoneal fluid collection    Gram stain [[id number]] Collected: 09/30/24 1705    Order Status: Completed Specimen: Abscess from Abdomen Updated: 09/30/24 2326     Gram Stain Result Moderate WBC's      Moderate Gram positive cocci    Narrative:      L retroperitoneal fluid collection    Culture, Body Fluid - Bactec [[id number]]     Order Status: No result Specimen: Body Fluid     Aerobic culture [[id number]]     Order Status: No result Specimen: Pleural Fluid     Culture, Anaerobic [[id number]]     Order Status: No result Specimen: Pleural Fluid     Gram stain [[id number]]     Order Status: No result Specimen: Pleural Fluid     AFB Culture & Smear [[id number]]     Order Status: No result Specimen: Pleural Fluid     Fungus culture [[id number]]     Order Status: No result Specimen: Pleural Fluid     Blood culture [[id number]]  Collected: 09/23/24 1127    Order Status: Completed Specimen: Blood from Peripheral, Forearm, Right Updated: 09/28/24 1212     Blood Culture, Routine No growth after 5 days.          Recent Lab Results  (Last 5 results in the past 24 hours)        10/04/24  1234   10/04/24  0903   10/04/24  0315   10/03/24  2117   10/03/24  1749        Albumin     1.2           ALP     114           ALT     9           Anion Gap     9     10       AST     11           Baso #     0.09           Basophil %     1.0           BILIRUBIN TOTAL     0.8  Comment: For infants and newborns, interpretation of results should be based  on gestational age, weight and in agreement with clinical  observations.    Premature Infant recommended reference ranges:  Up to 24 hours.............<8.0 mg/dL  Up to 48 hours............<12.0 mg/dL  3-5 days..................<15.0 mg/dL  6-29 days.................<15.0 mg/dL             BUN     3     3       Calcium     8.0     7.7       Chloride     103     103       CO2     25     24       Creatinine     0.6     0.6       Differential Method     Automated           eGFR     >60.0     >60.0       Eos #     0.2           Eos %     1.8           Glucose     107     116       Gran # (ANC)     5.7           Gran %     64.9           Hematocrit     27.4           Hemoglobin     8.3           Immature Grans (Abs)     0.07  Comment: Mild elevation in immature granulocytes is non specific and   can be seen in a variety of conditions including stress response,   acute inflammation, trauma and pregnancy. Correlation with other   laboratory and clinical findings is essential.             Immature Granulocytes     0.8           Lymph #     2.2           Lymph %     25.3           MCH     26.5           MCHC     30.3           MCV     88           Mono #     0.5           Mono %     6.2           MPV     9.2           nRBC     0           Platelet Count     250           POCT Glucose 166   126     106         Potassium      3.7     3.7       PROTEIN TOTAL     5.7           RBC     3.13           RDW     20.5           Sodium     137     137       WBC     8.71                                All pertinent labs from the last 24 hours have been reviewed.    Significant Diagnostics:  I have reviewed all pertinent imaging results/findings within the past 24 hours.  Assessment/Plan:     Other osteomyelitis, multiple sites  49F PMH DM, HTN, hep c, opioid abuse, presenting after down for unknown period of time and inability to ambulate secondary to pain with imaging demonstrating L SI osteo with associated abscesses extending to retroperitoneum without involvement of thecal sac. Denies true weakness, states pain is limiting her ability to move. Pain in hips and low back. On clinda/zosyn, blood culture 9/16 with staph aureus. Denies bowel or bladder incontinence, complaining of constipation about 6 days. Denies numbness    She is now s/p L3-L4 laminectomy for epidural abscess evacuation on 9/19/24 with neurosurgery and IR drain placement in psoas abscess 9/21-9/27 and replaced 9/30.    Imaging:  CT Lsp / pelvis 9/16: fluid collection T11 on L through left psoas, felt abscess  CT CAP 9/16: extensive edema with subq gas through left flank, small psoas abscess and fluid collections left flank, septic arthritis of L SI joint  MRI Lsp w/wo 9/17: L SI complex fluid collection felt to represent osteo with adjacent abscess, extends to approximately T12 level  MRI Lsp w/wo repeat 9/18: epidural collections T12-L5   MRI C and T spine on 9/20 negative for other signs of infection  CT AP 10/3: Interval placement of left flank drainage catheter with near resolution of left perirenal collection. Mild enlargement of peripherally enhancing collection of the pubic symphysis. Grossly stable peripherally enhancing collections within the bilateral adductor muscles. Subtle peripherally enhancing collections in the subcutaneous left flank and overlying the L4/L5  laminectomy surgical site, grossly similar in size to prior. Erosive changes in the pubic symphysis, left ilium, and left sacroiliac joints, in suggestive of osteomyelitis/septic joints.     Plan:  - Admitted to   - infectious workup  - Cx + staph aureus  - L retroperitoneal abscess s/p drainage and drain placement by IR on 9/27/24  - left SI joint septic arthritis unable to aspirate previously. Ortho re-consulted.  - Trend inflammatory markers - CRP 31 trending down   - Rest of care per primary team  - Please notify for questions/concerns/neurologic decline  - Rec MRI lumbar spine w/wo contrast for further evaluation   - Further recs pending completed imaging        Thank you for your consult. I will follow-up with patient. Please contact us if you have any additional questions.    Haylee Eason PA-C  Neurosurgery  [facility] ([place])

## 2024-10-04 NOTE — CONSULTS
[facility] ([place] [address])  General Surgery  Consult Note    Patient Name: Mari Sloan  MRN: [id number]  Code Status: Full Code  Admission Date: 9/16/2024  Hospital Length of Stay: 18 days  Attending Physician: Paty Kerr MD  Primary Care Provider: Mary Fong APRN    Patient information was obtained from patient and past medical records.     Inpatient consult to General Surgery  Consult performed by: Jermaine Armstrong MD  Consult ordered by: Paty Kerr MD        Subjective:     Principal Problem: Paraspinal abscess    History of Present Illness: Mari Sloan is a 49 y.o. 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 She has been admitted for 3 weeks and has been on broad spectrum antibiotics, undergone multiple IR drains, and a L3-L4 laminectomy for an epidural abscess. She has been found to have infective endocarditis. Echo on 9/17 does not mention endocarditis, but she is presumed positive and being treated for it. She has been bacteremic with MSSA, now on oxacillin. Her abscess cultures and blood cultures have all been MSSA. She recently developed left flank pain and had a CT that showed pelvic abscesses and a left flank fluid collection. General surgery was consulted to evaluate her left flank fluid collection.      No current facility-administered medications on file prior to encounter.     Current Outpatient Medications on File Prior to Encounter   Medication Sig    gabapentin (NEURONTIN) 300 MG capsule Take 300 mg by mouth 3 (three) times daily.    hydrOXYzine pamoate (VISTARIL) 25 MG Cap Take 25 mg by mouth 3 (three) times daily.    metFORMIN (GLUCOPHAGE) 500 MG tablet Take 500 mg by mouth 2 (two) times daily with meals.    methadone (DOLOPHINE) 10 MG tablet Take 70 mg by mouth Daily.     cloNIDine (CATAPRES) 0.2 MG tablet Take 0.2 mg by mouth 3 (three) times daily.    glipiZIDE (GLUCOTROL) 5 MG tablet Take 5 mg by mouth 2 (two) times daily.       Review of patient's allergies indicates:  No Known Allergies    Past Medical History:   Diagnosis Date    Diabetes mellitus     Hypertension     Unspecified viral hepatitis C without hepatic coma      Past Surgical History:   Procedure Laterality Date     SECTION      LAMINECTOMY N/A 2024    Procedure: L3-L5 laminectomy with epidural abscess evac;  Surgeon: Sourav Jim DO;  Location: [facility] OR [address] [place] [address];  Service: Neurosurgery;  Laterality: N/A;    MAGNETIC RESONANCE IMAGING N/A 2024    Procedure: MRI (Magnetic Resonance Imagine);  Surgeon: Kamran Storey;  Location: [facility];  Service: Anesthesiology;  Laterality: N/A;  conscious sedaation MRI lumbar spine w/ and without contrast    WASHOUT, WOUND, SPINE  2024    Procedure: WASHOUT, WOUND, SPINE;  Surgeon: Sourav Jim DO;  Location: [facility] OR [address] [place] [address];  Service: Neurosurgery;;     Family History    None       Tobacco Use    Smoking status: Every Day     Current packs/day: 0.50     Types: Cigarettes    Smokeless tobacco: Never   Substance and Sexual Activity    Alcohol use: Not Currently    Drug use: Not Currently    Sexual activity: Not on file     Review of Systems   Constitutional:  Negative for chills and fever.   HENT:  Negative for hearing loss and trouble swallowing.    Eyes:  Negative for discharge and visual disturbance.   Respiratory:  Negative for chest tightness and shortness of breath.    Cardiovascular:  Negative for chest pain and palpitations.   Gastrointestinal:  Negative for abdominal distention, abdominal pain, nausea and vomiting.   Genitourinary:  Negative for difficulty urinating and hematuria.   Musculoskeletal:  Negative for arthralgias and back pain.   Skin:  Positive for color change and wound. Negative for rash.   Neurological:  Negative for  dizziness and headaches.     Objective:     Vital Signs (Most Recent):  Temp: 98.1 °F (36.7 °C) (10/04/24 1511)  Pulse: 77 (10/04/24 1511)  Resp: 16 (10/04/24 1511)  BP: 106/71 (10/04/24 1606)  SpO2: 96 % (10/04/24 1511) Vital Signs (24h Range):  Temp:  [97.6 °F (36.4 °C)-99 °F (37.2 °C)] 98.1 °F (36.7 °C)  Pulse:  [75-93] 77  Resp:  [15-19] 16  SpO2:  [90 %-97 %] 96 %  BP: (100-147)/(70-97) 106/71     Weight: 65.2 kg (143 lb 11.8 oz)  Body mass index is 26.29 kg/m².     Physical Exam  Constitutional:       General: She is not in acute distress.     Appearance: Normal appearance.   HENT:      Head: Normocephalic and atraumatic.   Cardiovascular:      Rate and Rhythm: Normal rate and regular rhythm.   Pulmonary:      Effort: Pulmonary effort is normal. No respiratory distress.      Breath sounds: Normal breath sounds.   Abdominal:      General: Abdomen is flat. There is no distension.      Palpations: Abdomen is soft.      Tenderness: There is no abdominal tenderness.   Skin:     Comments: Left flank with fluctuance and erythematous changes to the overlying skin   Neurological:      General: No focal deficit present.      Mental Status: She is alert and oriented to person, place, and time.   Psychiatric:         Behavior: Behavior normal.         Thought Content: Thought content normal.            I have reviewed all pertinent lab results within the past 24 hours.  CBC:   Recent Labs   Lab 10/04/24  0315   WBC 8.71   RBC 3.13*   HGB 8.3*   HCT 27.4*      MCV 88   MCH 26.5*   MCHC 30.3*     CMP:   Recent Labs   Lab 10/04/24  0315      CALCIUM 8.0*   ALBUMIN 1.2*   PROT 5.7*      K 3.7   CO2 25      BUN 3*   CREATININE 0.6   ALKPHOS 114   ALT 9*   AST 11   BILITOT 0.8       Significant Diagnostics:  I have reviewed all pertinent imaging results/findings within the past 24 hours.    Assessment/Plan:     Endocarditis  Mari Sloan is a 49 y.o. lady with infectious endocarditis and multiple  abscesses.    Other osteomyelitis, multiple sites  Mari Sloan is a 49 y.o. female who presented with multiple fluid collections and concern for osteomyelitis at her SI joint. She has undergone IR drainage during this admission. Repeat imaging demonstrates persistent fluid at the pubic symphysis, left flank. General surgery consulted.     - patient seen and examined   - imaging reviewed  - would defer the fluid about the pubic symphysis to orthopedic surgery  - the left flank collection has a superficial, organized component that would benefit from drainage  - we will touch base with orthopedics to see if there is a role for operative intervention, as we can drain the left flank at that time   - if no role for OR with ortho, we will perform bedside I&D of the left flank  - please call with questions      VTE Risk Mitigation (From admission, onward)           Ordered     enoxaparin injection 40 mg  Every 24 hours         09/23/24 1234     IP VTE HIGH RISK PATIENT  Once         09/22/24 1553                    Thank you for your consult. I will follow-up with patient. Please contact us if you have any additional questions.    Jermaine Armstrong MD  General Surgery  [facility] ([place])

## 2024-10-04 NOTE — PT/OT/SLP PROGRESS
"Occupational Therapy   Treatment    Name: Mari Sloan  MRN: [id number]  Admitting Diagnosis:  Paraspinal abscess  15 Days Post-Op    Recommendations:     Discharge Recommendations: High Intensity Therapy  Discharge Equipment Recommendations:  walker, rolling, shower chair  Barriers to discharge:  Decreased caregiver support, Other (Comment) (increased (A) with ADLs and mobility)    Assessment:     Mari Sloan is a 49 y.o. female with a medical diagnosis of Paraspinal abscess.   Pt tolerated session well and without incident, but she continues to require assistance to perform self-care tasks and mobility.  She presents with the following.  Performance deficits affecting function are weakness, impaired endurance, impaired self care skills, impaired functional mobility, gait instability, impaired balance, impaired skin, pain, orthopedic precautions, decreased lower extremity function, decreased upper extremity function, decreased coordination.     Rehab Prognosis:  Good; patient would benefit from acute skilled OT services to address these deficits and reach maximum level of function.       Plan:     Patient to be seen 4 x/week to address the above listed problems via self-care/home management, therapeutic activities, therapeutic exercises, neuromuscular re-education  Plan of Care Expires: 10/23/24  Plan of Care Reviewed with: patient    Subjective     Chief Complaint: L low back and RLE pain   Patient/Family Comments/goals: "I feel better after moving."  Pain/Comfort:  Pain Rating 1: 9/10  Location - Side 1: Left  Location - Orientation 1: lower  Location 1: back (and RLE)  Pain Addressed 1: Reposition, Distraction, Cessation of Activity  Pain Rating Post-Intervention 1: 6/10    Objective:     Communicated with: nurse prior to session.  Patient found HOB elevated with telemetry, PureWick, PREMA drain, PICC line upon OT entry to room.    General Precautions: Standard, fall    Orthopedic Precautions:spinal " precautions  Braces: N/A  Respiratory Status: Room air     Occupational Performance:     Bed Mobility:  (via logroll technique due to spinal precautions)   Patient completed Supine to Sit to the R with minimum assistance for trunk management  Pt scooted to EOB to place her feet on the floor with SBA  Patient completed Sit to Supine with moderate assistance for LE management     Functional Mobility/Transfers:  Patient completed Sit <> Stand Transfer from EOB x 1 trial (2 attempts) with moderate assistance with rolling walker   Functional Mobility: Pt ambulated ~10 ft x 2 trials to and from the bathroom with CGA with RW.  She had no LOB.  Pt reported BLE were fatigued on way back to bed.  She declined to sit up in the chair due to not finding it comfortable.    Activities of Daily Living:  Grooming: stand by assistance to wash her face and perform oral care while standing at bathroom sink with RW.   Upper Body Dressing: minimum assistance to lane back gown as a robe/manage lines while seated EOB and to doff it while standing EOB    [facility] 6 Click ADL: 16    Treatment & Education:  Pt edu on role of OT, POC, her spinal precautions, safety when performing self care tasks, benefit of performing OOB activity, and safety when performing functional transfers and mobility.    - Self care tasks completed-- as noted above      Patient left HOB elevated with all lines intact and call button in reach    GOALS:   Multidisciplinary Problems       Occupational Therapy Goals          Problem: Occupational Therapy    Goal Priority Disciplines Outcome Interventions   Occupational Therapy Goal     OT, PT/OT Progressing    Description: Goals to be met by: 10/21/2024     Patient will increase functional independence with ADLs by performing:    UE Dressing with Set-up Assistance.  LE Dressing with Stand-by Assistance.  Grooming while standing at sink with Supervision.  Toileting from toilet with Supervision for hygiene and clothing  management.   Supine to sit with Contact Guard Assistance.  Stand pivot transfers with Supervision.  Toilet transfer to toilet with Supervision.    DME Justifications:    Rolling Walker- Patient demonstrates a mobility limitation that significantly impairs their ability to participate in one or more mobility related activities of daily living. Patient's mobility limitation cannot be sufficiently resolved with the use of a cane, but can be sufficiently resolved with the use of a rolling walker.The use of a rolling walker will considerably improve their ability to participate in MRADLs. Patient will use the walker on a regular basis at home.                           Time Tracking:     OT Date of Treatment: 10/04/24  OT Start Time: 1013  OT Stop Time: 1036  OT Total Time (min): 23 min    Billable Minutes:Self Care/Home Management 11 min  Therapeutic Activity 12 min    OT/ANABEL: OT     Number of ANABEL visits since last OT visit: 1    10/4/2024

## 2024-10-04 NOTE — PLAN OF CARE
Problem: Adult Inpatient Plan of Care  Goal: Plan of Care Review  Outcome: Progressing  Goal: Patient-Specific Goal (Individualized)  Outcome: Progressing  Goal: Absence of Hospital-Acquired Illness or Injury  Outcome: Progressing  Goal: Optimal Comfort and Wellbeing  Outcome: Progressing  Goal: Readiness for Transition of Care  Outcome: Progressing     Problem: Skin Injury Risk Increased  Goal: Skin Health and Integrity  Outcome: Progressing     Problem: Infection  Goal: Absence of Infection Signs and Symptoms  Outcome: Progressing     Problem: Sepsis/Septic Shock  Goal: Optimal Coping  Outcome: Progressing  Goal: Absence of Bleeding  Outcome: Progressing  Goal: Blood Glucose Level Within Targeted Range  Outcome: Progressing  Goal: Absence of Infection Signs and Symptoms  Outcome: Progressing  Goal: Optimal Nutrition Intake  Outcome: Progressing

## 2024-10-04 NOTE — ASSESSMENT & PLAN NOTE
Patient's FSGs are uncontrolled due to hyperglycemia on current medication regimen.  Last A1c reviewed-   Lab Results   Component Value Date    HGBA1C 7.9 (H) 09/17/2024     Most recent fingerstick glucose reviewed-   Recent Labs   Lab 10/03/24  1234 10/03/24  1610 10/03/24  2117 10/04/24  0903   POCTGLUCOSE 176* 135* 106 126*       Current correctional scale  Medium  Maintain anti-hyperglycemic dose as follows-   Antihyperglycemics (From admission, onward)    Start     Stop Route Frequency Ordered    10/02/24 0006  insulin aspart U-100 pen 0-5 Units         -- SubQ Before meals & nightly PRN 10/01/24 2306        Hold Oral hypoglycemics while patient is in the hospital.

## 2024-10-04 NOTE — SUBJECTIVE & OBJECTIVE
No current facility-administered medications on file prior to encounter.     Current Outpatient Medications on File Prior to Encounter   Medication Sig    gabapentin (NEURONTIN) 300 MG capsule Take 300 mg by mouth 3 (three) times daily.    hydrOXYzine pamoate (VISTARIL) 25 MG Cap Take 25 mg by mouth 3 (three) times daily.    metFORMIN (GLUCOPHAGE) 500 MG tablet Take 500 mg by mouth 2 (two) times daily with meals.    methadone (DOLOPHINE) 10 MG tablet Take 70 mg by mouth Daily.    cloNIDine (CATAPRES) 0.2 MG tablet Take 0.2 mg by mouth 3 (three) times daily.    glipiZIDE (GLUCOTROL) 5 MG tablet Take 5 mg by mouth 2 (two) times daily.       Review of patient's allergies indicates:  No Known Allergies    Past Medical History:   Diagnosis Date    Diabetes mellitus     Hypertension     Unspecified viral hepatitis C without hepatic coma      Past Surgical History:   Procedure Laterality Date     SECTION      LAMINECTOMY N/A 2024    Procedure: L3-L5 laminectomy with epidural abscess evac;  Surgeon: Sourav Jim DO;  Location: [facility] OR [address] [place] [address];  Service: Neurosurgery;  Laterality: N/A;    MAGNETIC RESONANCE IMAGING N/A 2024    Procedure: MRI (Magnetic Resonance Imagine);  Surgeon: Dolores Storey;  Location: [facility];  Service: Anesthesiology;  Laterality: N/A;  conscious sedaation MRI lumbar spine w/ and without contrast    WASHOUT, WOUND, SPINE  2024    Procedure: WASHOUT, WOUND, SPINE;  Surgeon: Sourav Jim DO;  Location: [facility] OR [address] [place] [address];  Service: Neurosurgery;;     Family History    None       Tobacco Use    Smoking status: Every Day     Current packs/day: 0.50     Types: Cigarettes    Smokeless tobacco: Never   Substance and Sexual Activity    Alcohol use: Not Currently    Drug use: Not Currently    Sexual activity: Not on file     Review of Systems   Constitutional:  Negative for chills and fever.   HENT:  Negative for hearing loss and trouble swallowing.    Eyes:  Negative for  discharge and visual disturbance.   Respiratory:  Negative for chest tightness and shortness of breath.    Cardiovascular:  Negative for chest pain and palpitations.   Gastrointestinal:  Negative for abdominal distention, abdominal pain, nausea and vomiting.   Genitourinary:  Negative for difficulty urinating and hematuria.   Musculoskeletal:  Negative for arthralgias and back pain.   Skin:  Positive for color change and wound. Negative for rash.   Neurological:  Negative for dizziness and headaches.     Objective:     Vital Signs (Most Recent):  Temp: 98.1 °F (36.7 °C) (10/04/24 1511)  Pulse: 77 (10/04/24 1511)  Resp: 16 (10/04/24 1511)  BP: 106/71 (10/04/24 1606)  SpO2: 96 % (10/04/24 1511) Vital Signs (24h Range):  Temp:  [97.6 °F (36.4 °C)-99 °F (37.2 °C)] 98.1 °F (36.7 °C)  Pulse:  [75-93] 77  Resp:  [15-19] 16  SpO2:  [90 %-97 %] 96 %  BP: (100-147)/(70-97) 106/71     Weight: 65.2 kg (143 lb 11.8 oz)  Body mass index is 26.29 kg/m².     Physical Exam  Constitutional:       General: She is not in acute distress.     Appearance: Normal appearance.   HENT:      Head: Normocephalic and atraumatic.   Cardiovascular:      Rate and Rhythm: Normal rate and regular rhythm.   Pulmonary:      Effort: Pulmonary effort is normal. No respiratory distress.      Breath sounds: Normal breath sounds.   Abdominal:      General: Abdomen is flat. There is no distension.      Palpations: Abdomen is soft.      Tenderness: There is no abdominal tenderness.   Skin:     Comments: Left flank with fluctuance and erythematous changes to the overlying skin   Neurological:      General: No focal deficit present.      Mental Status: She is alert and oriented to person, place, and time.   Psychiatric:         Behavior: Behavior normal.         Thought Content: Thought content normal.            I have reviewed all pertinent lab results within the past 24 hours.  CBC:   Recent Labs   Lab 10/04/24  0315   WBC 8.71   RBC 3.13*   HGB 8.3*   HCT  27.4*      MCV 88   MCH 26.5*   MCHC 30.3*     CMP:   Recent Labs   Lab 10/04/24  0315      CALCIUM 8.0*   ALBUMIN 1.2*   PROT 5.7*      K 3.7   CO2 25      BUN 3*   CREATININE 0.6   ALKPHOS 114   ALT 9*   AST 11   BILITOT 0.8       Significant Diagnostics:  I have reviewed all pertinent imaging results/findings within the past 24 hours.

## 2024-10-04 NOTE — ASSESSMENT & PLAN NOTE
Nutrition consulted. Most recent weight and BMI monitored-     Measurements:  Wt Readings from Last 1 Encounters:   09/26/24 65.2 kg (143 lb 11.8 oz)   Body mass index is 26.29 kg/m².    Patient has been screened and assessed by RD.    Malnutrition Type:  Context: social/environmental circumstances  Level: moderate    Malnutrition Characteristic Summary:  Weight Loss (Malnutrition): 10% in 6 months  Energy Intake (Malnutrition): less than 75% for greater than 7 days    Interventions/Recommendations (treatment strategy):  1.

## 2024-10-04 NOTE — PLAN OF CARE
Discharge Plan A and Plan B have been determined by review of patient's clinical status, future medical and therapeutic needs, and coverage/benefits for post-acute care in coordination with multidisciplinary team members.      10/04/24 1109   Post-Acute Status   Post-Acute Authorization Placement   Post-Acute Placement Status Pending payor review/awaiting authorization (if required)  ([id number])   Coverage MEDICAID - [facility] ([place] MEDICA[place] -   Hospital Resources/Appts/Education Provided Appointments scheduled and added to AVS   Patient choice form signed by patient/caregiver List from CMS Compare;List with quality metrics by geographic area provided   Discharge Delays None known at this time   Discharge Plan   Discharge Plan A Skilled Nursing Facility  ([facility] [phone])     CM met with patient and left VM with patients spouse. CM updated he patient on discharging to a SNF and patient spouse will sign paperwork.     10:51 am  CM spoke with Mehreen Mccord and SNF auth is pending. CM received auth referral # [id number]    1106 am  CM escalated SNF auth to [facility]     11:08 am  CM  spoke with Zeb kelly [phone] @ and will escalate auth and follow up with CM with determination         Discharge Recommendations: High Intensity Therapy    Discharge Equipment Recommendations:  walker, rolling, shower chair    Barriers to discharge:  Decreased caregiver support, Other (Comment) (patient requires increased level of assistance)       3:30 pm  Patient authorization has been approved and patient will not be medically ready until 10/11/24    Nabila Elise RN  Case Management  [facility]  [phone]

## 2024-10-04 NOTE — CONSULTS
IR Brief Consult Note    48 yo F w/ HCV, substance use disorder, osteomyelitis, s/p multiple drain placements, epidural abscess. IR was consulted for potential drainage of abscesses seen on CT (10/4). Case and imaging were reviewed and discussed with staff and demonstrates multiple fluid collections. Discussed with primary team and infectious disease via secure chat. Agree with infectious disease for evaluation by surgical services as source control has not been obtained by prior drain placement. If any questions, please reach out to IR.     Mirtha Soto MD PGY-2  Department of Radiology  [facility]        .

## 2024-10-04 NOTE — SUBJECTIVE & OBJECTIVE
Interval History: No acute events overnight. Went for repeat CT abdomen pelvis yesterday.  It returned with multiple new and enlargening abscesses.  Discussed with ID, who said to continue Oxacillin and reach out to surgical services for further recs.  NSGY, Gen Surg, Ortho and IR were consulted for further recommendations.    Review of Systems   Constitutional:  Negative for chills, fatigue and fever.   Respiratory:  Negative for cough and shortness of breath.    Cardiovascular:  Negative for chest pain, palpitations and leg swelling.   Gastrointestinal:  Positive for abdominal pain. Negative for diarrhea, nausea and vomiting.   Genitourinary:  Negative for dysuria and urgency.   Musculoskeletal:  Positive for back pain.   Neurological:  Negative for dizziness and headaches.   All other systems reviewed and are negative.    Objective:     Vital Signs (Most Recent):  Temp: 97.6 °F (36.4 °C) (10/04/24 0902)  Pulse: 86 (10/04/24 0902)  Resp: 16 (10/04/24 0936)  BP: 114/81 (10/04/24 0902)  SpO2: (!) 92 % (10/04/24 0902) Vital Signs (24h Range):  Temp:  [97.6 °F (36.4 °C)-99 °F (37.2 °C)] 97.6 °F (36.4 °C)  Pulse:  [75-93] 86  Resp:  [16-19] 16  SpO2:  [90 %-97 %] 92 %  BP: (107-147)/(75-97) 114/81     Weight: 65.2 kg (143 lb 11.8 oz)  Body mass index is 26.29 kg/m².    Intake/Output Summary (Last 24 hours) at 10/4/2024 1212  Last data filed at 10/4/2024 0531  Gross per 24 hour   Intake 3850.22 ml   Output 1900 ml   Net 1950.22 ml      Physical Exam  Constitutional:       Appearance: Normal appearance.   HENT:      Head: Normocephalic and atraumatic.   Cardiovascular:      Rate and Rhythm: Normal rate and regular rhythm.      Heart sounds: No murmur heard.  Pulmonary:      Effort: Pulmonary effort is normal. No respiratory distress.      Breath sounds: Normal breath sounds. No wheezing or rales.   Abdominal:      General: There is no distension.      Palpations: Abdomen is soft.      Tenderness: There is no abdominal  tenderness.      Comments: Mild swelling to left flank.  Drain in place with no drainage in grenade   Musculoskeletal:         General: Tenderness (Parapsinal, bandages C/D/I) present. No deformity.   Skin:     General: Skin is warm and dry.   Neurological:      General: No focal deficit present.      Mental Status: She is alert and oriented to person, place, and time. Mental status is at baseline.         MELD 3.0: 17 at 9/21/2024  6:13 PM  MELD-Na: 13 at 9/21/2024  6:13 PM  Calculated from:  Serum Creatinine: 0.6 mg/dL (Using min of 1 mg/dL) at 9/21/2024  6:13 PM  Serum Sodium: 138 mmol/L (Using max of 137 mmol/L) at 9/21/2024  6:13 PM  Total Bilirubin: 2.1 mg/dL at 9/21/2024  2:34 AM  Serum Albumin: 1.2 g/dL (Using min of 1.5 g/dL) at 9/21/2024  2:34 AM  INR(ratio): 1.4 at 9/19/2024  3:19 AM  Age at listing (hypothetical): 49 years  Sex: Female at 9/21/2024  6:13 PM      Significant Labs:  CBC:  Recent Labs   Lab 10/04/24  0315   WBC 8.71   HGB 8.3*   HCT 27.4*        CMP:  Recent Labs   Lab 10/03/24  0821 10/03/24  1749 10/04/24  0315   * 137 137   K 3.8 3.7 3.7    103 103   CO2 24 24 25   * 116* 107   BUN 3* 3* 3*   CREATININE 0.6 0.6 0.6   CALCIUM 7.8* 7.7* 8.0*   PROT  --   --  5.7*   ALBUMIN  --   --  1.2*   BILITOT  --   --  0.8   ALKPHOS  --   --  114   AST  --   --  11   ALT  --   --  9*   ANIONGAP 9 10 9

## 2024-10-04 NOTE — ASSESSMENT & PLAN NOTE
49F PMH DM, HTN, hep c, opioid abuse, presenting after down for unknown period of time and inability to ambulate secondary to pain with imaging demonstrating L SI osteo with associated abscesses extending to retroperitoneum without involvement of thecal sac. Denies true weakness, states pain is limiting her ability to move. Pain in hips and low back. On clinda/zosyn, blood culture 9/16 with staph aureus. Denies bowel or bladder incontinence, complaining of constipation about 6 days. Denies numbness    She is now s/p L3-L4 laminectomy for epidural abscess evacuation on 9/19/24 with neurosurgery and IR drain placement in psoas abscess 9/21-9/27 and replaced 9/30.    Imaging:  CT Lsp / pelvis 9/16: fluid collection T11 on L through left psoas, felt abscess  CT CAP 9/16: extensive edema with subq gas through left flank, small psoas abscess and fluid collections left flank, septic arthritis of L SI joint  MRI Lsp w/wo 9/17: L SI complex fluid collection felt to represent osteo with adjacent abscess, extends to approximately T12 level  MRI Lsp w/wo repeat 9/18: epidural collections T12-L5   MRI C and T spine on 9/20 negative for other signs of infection  CT AP 10/3: Interval placement of left flank drainage catheter with near resolution of left perirenal collection. Mild enlargement of peripherally enhancing collection of the pubic symphysis. Grossly stable peripherally enhancing collections within the bilateral adductor muscles. Subtle peripherally enhancing collections in the subcutaneous left flank and overlying the L4/L5 laminectomy surgical site, grossly similar in size to prior. Erosive changes in the pubic symphysis, left ilium, and left sacroiliac joints, in suggestive of osteomyelitis/septic joints.     Plan:  - Admitted to   - infectious workup  - Cx + staph aureus  - L retroperitoneal abscess s/p drainage and drain placement by IR on 9/27/24  - left SI joint septic arthritis unable to aspirate previously.  Ortho re-consulted.  - Trend inflammatory markers - CRP 31 trending down   - Rest of care per primary team  - Please notify for questions/concerns/neurologic decline  - Rec MRI lumbar spine w/wo contrast for further evaluation   - Further recs pending completed imaging

## 2024-10-04 NOTE — SUBJECTIVE & OBJECTIVE
Medications Prior to Admission   Medication Sig Dispense Refill Last Dose/Taking    gabapentin (NEURONTIN) 300 MG capsule Take 300 mg by mouth 3 (three) times daily.   2024    hydrOXYzine pamoate (VISTARIL) 25 MG Cap Take 25 mg by mouth 3 (three) times daily.   2024    metFORMIN (GLUCOPHAGE) 500 MG tablet Take 500 mg by mouth 2 (two) times daily with meals.   2024    methadone (DOLOPHINE) 10 MG tablet Take 70 mg by mouth Daily.   Past Week    cloNIDine (CATAPRES) 0.2 MG tablet Take 0.2 mg by mouth 3 (three) times daily.       glipiZIDE (GLUCOTROL) 5 MG tablet Take 5 mg by mouth 2 (two) times daily.          Review of patient's allergies indicates:  No Known Allergies    Past Medical History:   Diagnosis Date    Diabetes mellitus     Hypertension     Unspecified viral hepatitis C without hepatic coma      Past Surgical History:   Procedure Laterality Date     SECTION      LAMINECTOMY N/A 2024    Procedure: L3-L5 laminectomy with epidural abscess evac;  Surgeon: Sourav Jim DO;  Location: [facility] OR [address] [place] [address];  Service: Neurosurgery;  Laterality: N/A;    MAGNETIC RESONANCE IMAGING N/A 2024    Procedure: MRI (Magnetic Resonance Imagine);  Surgeon: Dolores Storey;  Location: [facility];  Service: Anesthesiology;  Laterality: N/A;  conscious sedaation MRI lumbar spine w/ and without contrast    WASHOUT, WOUND, SPINE  2024    Procedure: WASHOUT, WOUND, SPINE;  Surgeon: Sourav Jim DO;  Location: [address];  Service: Neurosurgery;;     Family History    None       Tobacco Use    Smoking status: Every Day     Current packs/day: 0.50     Types: Cigarettes    Smokeless tobacco: Never   Substance and Sexual Activity    Alcohol use: Not Currently    Drug use: Not Currently    Sexual activity: Not on file     Review of Systems  Objective:     Weight: 65.2 kg (143 lb 11.8 oz)  Body mass index is 26.29 kg/m².  Vital Signs (Most Recent):  Temp: 98.8 °F (37.1 °C) (10/04/24  1232)  Pulse: 76 (10/04/24 1232)  Resp: 15 (10/04/24 1330)  BP: 100/71 (10/04/24 1232)  SpO2: (!) 94 % (10/04/24 1232) Vital Signs (24h Range):  Temp:  [97.6 °F (36.4 °C)-99 °F (37.2 °C)] 98.8 °F (37.1 °C)  Pulse:  [75-93] 76  Resp:  [15-19] 15  SpO2:  [90 %-97 %] 94 %  BP: (100-147)/(71-97) 100/71                              Closed/Suction Drain 09/30/24 1704 Lateral;Left Back 12 Fr. (Active)   Site Description Unable to view 10/04/24 0800   Dressing Type Gauze 10/04/24 0800   Dressing Status Clean;Dry;Intact 10/04/24 0800   Dressing Intervention Integrity maintained 10/04/24 0800   Drainage Serosanguineous 10/04/24 0800   Status To bulb suction 10/04/24 0800   Output (mL) 0 mL 10/04/24 0500       Female External Urinary Catheter w/ Suction 09/24/24 (Active)   Skin reddened 10/03/24 1901   Tolerance no signs/symptoms of discomfort 10/03/24 1901   Suction Continuous suction at 60 mmHg 10/03/24 1901   Date of last wick change 10/03/24 10/03/24 1901   Time of last wick change 2150 10/03/24 1901   Output (mL) 400 mL 10/04/24 0500          Physical Exam     Neurosurgery Physical Exam    General: well developed, well nourished, no distress.   Head: normocephalic, atraumatic  Neurologic: Alert and oriented. Thought content appropriate.  GCS: Motor: 6/Verbal: 5/Eyes: 4 GCS Total: 15  Mental Status: Awake, Alert, Oriented x 4  Language: No aphasia  Speech: No dysarthria  Cranial nerves: face symmetric, tongue midline, CN II-XII grossly intact, EOMI.  Pulmonary: normal respirations, no signs of respiratory distress  Sensory: intact to light touch throughout  Motor Strength: Moves all extremities spontaneously with good tone. No abnormal movements seen. Proximal BLE pain limited weakness.    Strength  Deltoids Triceps Biceps Wrist Extension Wrist Flexion Hand    Upper: R 5/5 5/5 5/5 5/5 5/5 5/5    L 5/5 5/5 5/5 5/5 5/5 5/5     Iliopsoas Quadriceps Knee  Flexion Tibialis  anterior Gastro- cnemius EHL   Lower: R 2/5 4/5  "4/5 5/5 5/5 5/5    L 2/5 4/5 4/5 5/5 5/5 5/5     Skin: Skin is warm, dry and intact.  Lumbar Incision c/d/I with skin edges well approximated with prineo/dermabond. No surrounding erythema or edema. No drainage from incision. L retroperitoneal drain in place.  + erythema/edema and TTP left flank/hip        Significant Labs:  Recent Labs   Lab 10/03/24  0821 10/03/24  1749 10/04/24  0315   * 116* 107   * 137 137   K 3.8 3.7 3.7    103 103   CO2 24 24 25   BUN 3* 3* 3*   CREATININE 0.6 0.6 0.6   CALCIUM 7.8* 7.7* 8.0*     Recent Labs   Lab 10/04/24  0315   WBC 8.71   HGB 8.3*   HCT 27.4*        No results for input(s): "LABPT", "INR", "APTT" in the last 48 hours.  Microbiology Results (last 7 days)       Procedure Component Value Units Date/Time    Aerobic culture [[id number]]  (Abnormal)  (Susceptibility) Collected: 09/30/24 1705    Order Status: Completed Specimen: Abscess from Abdomen Updated: 10/03/24 1252     Aerobic Bacterial Culture STAPHYLOCOCCUS AUREUS  Many      Narrative:      L retroperitoneal fluid collection    Culture, Anaerobe [[id number]] Collected: 09/30/24 1705    Order Status: Completed Specimen: Abscess from Abdomen Updated: 10/03/24 0953     Anaerobic Culture Culture in progress    Narrative:      L retroperitoneal fluid collection    Fungus culture [[id number]] Collected: 09/30/24 1705    Order Status: Completed Specimen: Abscess from Abdomen Updated: 10/02/24 1457     Fungus (Mycology) Culture Culture in progress    Narrative:      L retroperitoneal fluid collection    AFB Culture & Smear [[id number]] Collected: 09/30/24 1705    Order Status: Completed Specimen: Abscess from Abdomen Updated: 10/01/24 2127     AFB Culture & Smear Culture in progress     AFB CULTURE STAIN No acid fast bacilli seen.    Narrative:      L retroperitoneal fluid collection    Gram stain [[id number]] Collected: 09/30/24 1705    Order Status: Completed Specimen: Abscess from Abdomen Updated: " 09/30/24 2326     Gram Stain Result Moderate WBC's      Moderate Gram positive cocci    Narrative:      L retroperitoneal fluid collection    Culture, Body Fluid - Bactec [[id number]]     Order Status: No result Specimen: Body Fluid     Aerobic culture [[id number]]     Order Status: No result Specimen: Pleural Fluid     Culture, Anaerobic [[id number]]     Order Status: No result Specimen: Pleural Fluid     Gram stain [[id number]]     Order Status: No result Specimen: Pleural Fluid     AFB Culture & Smear [[id number]]     Order Status: No result Specimen: Pleural Fluid     Fungus culture [[id number]]     Order Status: No result Specimen: Pleural Fluid     Blood culture [[id number]] Collected: 09/23/24 1127    Order Status: Completed Specimen: Blood from Peripheral, Forearm, Right Updated: 09/28/24 1212     Blood Culture, Routine No growth after 5 days.          Recent Lab Results  (Last 5 results in the past 24 hours)        10/04/24  1234   10/04/24  0903   10/04/24  0315   10/03/24  2117   10/03/24  1749        Albumin     1.2           ALP     114           ALT     9           Anion Gap     9     10       AST     11           Baso #     0.09           Basophil %     1.0           BILIRUBIN TOTAL     0.8  Comment: For infants and newborns, interpretation of results should be based  on gestational age, weight and in agreement with clinical  observations.    Premature Infant recommended reference ranges:  Up to 24 hours.............<8.0 mg/dL  Up to 48 hours............<12.0 mg/dL  3-5 days..................<15.0 mg/dL  6-29 days.................<15.0 mg/dL             BUN     3     3       Calcium     8.0     7.7       Chloride     103     103       CO2     25     24       Creatinine     0.6     0.6       Differential Method     Automated           eGFR     >60.0     >60.0       Eos #     0.2           Eos %     1.8           Glucose     107     116       Gran # (ANC)     5.7           Gran %     64.9            Hematocrit     27.4           Hemoglobin     8.3           Immature Grans (Abs)     0.07  Comment: Mild elevation in immature granulocytes is non specific and   can be seen in a variety of conditions including stress response,   acute inflammation, trauma and pregnancy. Correlation with other   laboratory and clinical findings is essential.             Immature Granulocytes     0.8           Lymph #     2.2           Lymph %     25.3           MCH     26.5           MCHC     30.3           MCV     88           Mono #     0.5           Mono %     6.2           MPV     9.2           nRBC     0           Platelet Count     250           POCT Glucose 166   126     106         Potassium     3.7     3.7       PROTEIN TOTAL     5.7           RBC     3.13           RDW     20.5           Sodium     137     137       WBC     8.71                                All pertinent labs from the last 24 hours have been reviewed.    Significant Diagnostics:  I have reviewed all pertinent imaging results/findings within the past 24 hours.

## 2024-10-04 NOTE — ASSESSMENT & PLAN NOTE
Mari Sloan is a 49 y.o. female who presented with multiple fluid collections and concern for osteomyelitis at her SI joint. She has undergone IR drainage during this admission. Repeat imaging demonstrates persistent fluid at the pubic symphysis, left flank. General surgery consulted.     - patient seen and examined   - imaging reviewed  - would defer the fluid about the pubic symphysis to orthopedic surgery  - the left flank collection has a superficial, organized component that would benefit from drainage  - we will touch base with orthopedics to see if there is a role for operative intervention, as we can drain the left flank at that time   - if no role for OR with ortho, we will perform bedside I&D of the left flank  - please call with questions

## 2024-10-04 NOTE — PT/OT/SLP PROGRESS
Physical Therapy      Patient Name:  Mari Sloan   MRN:  [id number]    Patient not seen today secondary to at 14:11 PM pt reporting she had already worked with therapy today and was tired and would like to rest and eat. Will follow-up at next PT POC date.

## 2024-10-05 LAB
ALBUMIN SERPL BCP-MCNC: 1.3 G/DL (ref 3.5–5.2)
ALBUMIN SERPL BCP-MCNC: 1.3 G/DL (ref 3.5–5.2)
ALP SERPL-CCNC: 122 U/L (ref 55–135)
ALP SERPL-CCNC: 122 U/L (ref 55–135)
ALT SERPL W/O P-5'-P-CCNC: 7 U/L (ref 10–44)
ALT SERPL W/O P-5'-P-CCNC: 7 U/L (ref 10–44)
ANION GAP SERPL CALC-SCNC: 9 MMOL/L (ref 8–16)
ANION GAP SERPL CALC-SCNC: 9 MMOL/L (ref 8–16)
AST SERPL-CCNC: 11 U/L (ref 10–40)
AST SERPL-CCNC: 11 U/L (ref 10–40)
BASOPHILS # BLD AUTO: 0.1 K/UL (ref 0–0.2)
BASOPHILS # BLD AUTO: 0.1 K/UL (ref 0–0.2)
BASOPHILS NFR BLD: 1.1 % (ref 0–1.9)
BASOPHILS NFR BLD: 1.1 % (ref 0–1.9)
BILIRUB SERPL-MCNC: 0.7 MG/DL (ref 0.1–1)
BILIRUB SERPL-MCNC: 0.7 MG/DL (ref 0.1–1)
BUN SERPL-MCNC: 3 MG/DL (ref 6–20)
BUN SERPL-MCNC: 3 MG/DL (ref 6–20)
CALCIUM SERPL-MCNC: 8.2 MG/DL (ref 8.7–10.5)
CALCIUM SERPL-MCNC: 8.2 MG/DL (ref 8.7–10.5)
CHLORIDE SERPL-SCNC: 102 MMOL/L (ref 95–110)
CHLORIDE SERPL-SCNC: 102 MMOL/L (ref 95–110)
CO2 SERPL-SCNC: 27 MMOL/L (ref 23–29)
CO2 SERPL-SCNC: 27 MMOL/L (ref 23–29)
CREAT SERPL-MCNC: 0.6 MG/DL (ref 0.5–1.4)
CREAT SERPL-MCNC: 0.6 MG/DL (ref 0.5–1.4)
DIFFERENTIAL METHOD BLD: ABNORMAL
DIFFERENTIAL METHOD BLD: ABNORMAL
EOSINOPHIL # BLD AUTO: 0.2 K/UL (ref 0–0.5)
EOSINOPHIL # BLD AUTO: 0.2 K/UL (ref 0–0.5)
EOSINOPHIL NFR BLD: 1.9 % (ref 0–8)
EOSINOPHIL NFR BLD: 1.9 % (ref 0–8)
ERYTHROCYTE [DISTWIDTH] IN BLOOD BY AUTOMATED COUNT: 20.3 % (ref 11.5–14.5)
ERYTHROCYTE [DISTWIDTH] IN BLOOD BY AUTOMATED COUNT: 20.3 % (ref 11.5–14.5)
EST. GFR  (NO RACE VARIABLE): >60 ML/MIN/1.73 M^2
EST. GFR  (NO RACE VARIABLE): >60 ML/MIN/1.73 M^2
GLUCOSE SERPL-MCNC: 148 MG/DL (ref 70–110)
GLUCOSE SERPL-MCNC: 148 MG/DL (ref 70–110)
HCT VFR BLD AUTO: 27.9 % (ref 37–48.5)
HCT VFR BLD AUTO: 27.9 % (ref 37–48.5)
HGB BLD-MCNC: 8.5 G/DL (ref 12–16)
HGB BLD-MCNC: 8.5 G/DL (ref 12–16)
IMM GRANULOCYTES # BLD AUTO: 0.06 K/UL (ref 0–0.04)
IMM GRANULOCYTES # BLD AUTO: 0.06 K/UL (ref 0–0.04)
IMM GRANULOCYTES NFR BLD AUTO: 0.7 % (ref 0–0.5)
IMM GRANULOCYTES NFR BLD AUTO: 0.7 % (ref 0–0.5)
LACTATE SERPL-SCNC: 1.9 MMOL/L (ref 0.5–2.2)
LYMPHOCYTES # BLD AUTO: 2.7 K/UL (ref 1–4.8)
LYMPHOCYTES # BLD AUTO: 2.7 K/UL (ref 1–4.8)
LYMPHOCYTES NFR BLD: 29.7 % (ref 18–48)
LYMPHOCYTES NFR BLD: 29.7 % (ref 18–48)
MCH RBC QN AUTO: 26.9 PG (ref 27–31)
MCH RBC QN AUTO: 26.9 PG (ref 27–31)
MCHC RBC AUTO-ENTMCNC: 30.5 G/DL (ref 32–36)
MCHC RBC AUTO-ENTMCNC: 30.5 G/DL (ref 32–36)
MCV RBC AUTO: 88 FL (ref 82–98)
MCV RBC AUTO: 88 FL (ref 82–98)
MONOCYTES # BLD AUTO: 0.5 K/UL (ref 0.3–1)
MONOCYTES # BLD AUTO: 0.5 K/UL (ref 0.3–1)
MONOCYTES NFR BLD: 5.1 % (ref 4–15)
MONOCYTES NFR BLD: 5.1 % (ref 4–15)
NEUTROPHILS # BLD AUTO: 5.6 K/UL (ref 1.8–7.7)
NEUTROPHILS # BLD AUTO: 5.6 K/UL (ref 1.8–7.7)
NEUTROPHILS NFR BLD: 61.5 % (ref 38–73)
NEUTROPHILS NFR BLD: 61.5 % (ref 38–73)
NRBC BLD-RTO: 0 /100 WBC
NRBC BLD-RTO: 0 /100 WBC
PLATELET # BLD AUTO: 243 K/UL (ref 150–450)
PLATELET # BLD AUTO: 243 K/UL (ref 150–450)
PMV BLD AUTO: 9.4 FL (ref 9.2–12.9)
PMV BLD AUTO: 9.4 FL (ref 9.2–12.9)
POCT GLUCOSE: 111 MG/DL (ref 70–110)
POCT GLUCOSE: 126 MG/DL (ref 70–110)
POCT GLUCOSE: 147 MG/DL (ref 70–110)
POTASSIUM SERPL-SCNC: 3.5 MMOL/L (ref 3.5–5.1)
POTASSIUM SERPL-SCNC: 3.5 MMOL/L (ref 3.5–5.1)
PROT SERPL-MCNC: 6 G/DL (ref 6–8.4)
PROT SERPL-MCNC: 6 G/DL (ref 6–8.4)
RBC # BLD AUTO: 3.16 M/UL (ref 4–5.4)
RBC # BLD AUTO: 3.16 M/UL (ref 4–5.4)
SODIUM SERPL-SCNC: 138 MMOL/L (ref 136–145)
SODIUM SERPL-SCNC: 138 MMOL/L (ref 136–145)
WBC # BLD AUTO: 9.15 K/UL (ref 3.9–12.7)
WBC # BLD AUTO: 9.15 K/UL (ref 3.9–12.7)

## 2024-10-05 PROCEDURE — 20600001 HC STEP DOWN PRIVATE ROOM

## 2024-10-05 PROCEDURE — 83605 ASSAY OF LACTIC ACID: CPT | Performed by: HOSPITALIST

## 2024-10-05 PROCEDURE — A4216 STERILE WATER/SALINE, 10 ML: HCPCS | Performed by: STUDENT IN AN ORGANIZED HEALTH CARE EDUCATION/TRAINING PROGRAM

## 2024-10-05 PROCEDURE — 25000003 PHARM REV CODE 250: Performed by: STUDENT IN AN ORGANIZED HEALTH CARE EDUCATION/TRAINING PROGRAM

## 2024-10-05 PROCEDURE — 63600175 PHARM REV CODE 636 W HCPCS: Performed by: HOSPITALIST

## 2024-10-05 PROCEDURE — 25000003 PHARM REV CODE 250: Performed by: INTERNAL MEDICINE

## 2024-10-05 PROCEDURE — 85025 COMPLETE CBC W/AUTO DIFF WBC: CPT | Performed by: HOSPITALIST

## 2024-10-05 PROCEDURE — 25500020 PHARM REV CODE 255: Performed by: HOSPITALIST

## 2024-10-05 PROCEDURE — 25000003 PHARM REV CODE 250: Performed by: HOSPITALIST

## 2024-10-05 PROCEDURE — A9585 GADOBUTROL INJECTION: HCPCS | Performed by: HOSPITALIST

## 2024-10-05 PROCEDURE — 99233 SBSQ HOSP IP/OBS HIGH 50: CPT | Mod: ,,, | Performed by: STUDENT IN AN ORGANIZED HEALTH CARE EDUCATION/TRAINING PROGRAM

## 2024-10-05 PROCEDURE — 63600175 PHARM REV CODE 636 W HCPCS: Performed by: STUDENT IN AN ORGANIZED HEALTH CARE EDUCATION/TRAINING PROGRAM

## 2024-10-05 PROCEDURE — 80053 COMPREHEN METABOLIC PANEL: CPT | Performed by: HOSPITALIST

## 2024-10-05 RX ORDER — ONDANSETRON HYDROCHLORIDE 2 MG/ML
4 INJECTION, SOLUTION INTRAVENOUS EVERY 6 HOURS PRN
Status: DISCONTINUED | OUTPATIENT
Start: 2024-10-05 | End: 2024-10-15

## 2024-10-05 RX ORDER — GADOBUTROL 604.72 MG/ML
7 INJECTION INTRAVENOUS
Status: COMPLETED | OUTPATIENT
Start: 2024-10-05 | End: 2024-10-05

## 2024-10-05 RX ADMIN — ONDANSETRON 4 MG: 2 INJECTION INTRAMUSCULAR; INTRAVENOUS at 03:10

## 2024-10-05 RX ADMIN — OXYCODONE HYDROCHLORIDE 10 MG: 10 TABLET ORAL at 01:10

## 2024-10-05 RX ADMIN — HYDROXYZINE PAMOATE 25 MG: 25 CAPSULE ORAL at 08:10

## 2024-10-05 RX ADMIN — SODIUM CHLORIDE 500 ML: 9 INJECTION, SOLUTION INTRAVENOUS at 01:10

## 2024-10-05 RX ADMIN — Medication 100 MG: at 09:10

## 2024-10-05 RX ADMIN — GABAPENTIN 300 MG: 300 CAPSULE ORAL at 08:10

## 2024-10-05 RX ADMIN — OXYCODONE HYDROCHLORIDE 10 MG: 10 TABLET ORAL at 12:10

## 2024-10-05 RX ADMIN — GABAPENTIN 300 MG: 300 CAPSULE ORAL at 03:10

## 2024-10-05 RX ADMIN — GADOBUTROL 7 ML: 604.72 INJECTION INTRAVENOUS at 04:10

## 2024-10-05 RX ADMIN — OXACILLIN 12 G: 2 INJECTION, POWDER, FOR SOLUTION INTRAMUSCULAR; INTRAVENOUS at 01:10

## 2024-10-05 RX ADMIN — Medication 10 ML: at 06:10

## 2024-10-05 RX ADMIN — OXYCODONE HYDROCHLORIDE 10 MG: 10 TABLET ORAL at 08:10

## 2024-10-05 RX ADMIN — MELATONIN TAB 3 MG 6 MG: 3 TAB at 08:10

## 2024-10-05 RX ADMIN — GABAPENTIN 300 MG: 300 CAPSULE ORAL at 09:10

## 2024-10-05 RX ADMIN — METHADONE HYDROCHLORIDE 70 MG: 10 TABLET ORAL at 09:10

## 2024-10-05 RX ADMIN — FOLIC ACID 1 MG: 1 TABLET ORAL at 09:10

## 2024-10-05 RX ADMIN — Medication 10 ML: at 11:10

## 2024-10-05 NOTE — NURSING
Noted pts MAP dropped below 65 again; maintaining between 61-65. Pt asymptomatic denying dizziness. Mentation intact. Pt is oriented x4.     Pt. Placed in trendelenburg which improved pts MAP [up to 80] and MD on call notified. 1/2 L bolus ordered. Collaborated with Rapid Response RN Mickie who placed additional orders for labs, cxr, and an additional bolus.     Placed pt on 1L o2 due to O2 sat dropping to 88 while asleep.     Addressed pts pain with oral pain medication.

## 2024-10-05 NOTE — NURSING
Pt AAO, slept comfortably most of shift.  VSS, no c/o pain or distress, does c/o pain with movement or ambulation.  Oxycodone 20mg given.  Went down for part 1 of MRI, part 2 scheduled for tomorrow. Spinal drsg c/d/I.      No acute events this shift, bed low and locked, call light in reach.

## 2024-10-05 NOTE — SUBJECTIVE & OBJECTIVE
Interval History: IR pulled drain last night.  Hypotensive overnight needing IVF.  Stopped Clonidine today.  Discussed with NSGY, Ortho and Gen Surg.  Waiting on MRIs to determine further steps.  She refused MRI last night.    Review of Systems   Constitutional:  Negative for chills, fatigue and fever.   Respiratory:  Negative for cough and shortness of breath.    Cardiovascular:  Negative for chest pain, palpitations and leg swelling.   Gastrointestinal:  Positive for abdominal pain. Negative for diarrhea, nausea and vomiting.   Genitourinary:  Negative for dysuria and urgency.   Musculoskeletal:  Positive for back pain.   Neurological:  Negative for dizziness and headaches.   All other systems reviewed and are negative.    Objective:     Vital Signs (Most Recent):  Temp: 98.2 °F (36.8 °C) (10/05/24 1300)  Pulse: 65 (10/05/24 1300)  Resp: 18 (10/05/24 1340)  BP: 114/72 (10/05/24 1300)  SpO2: 95 % (10/05/24 1300) Vital Signs (24h Range):  Temp:  [97.7 °F (36.5 °C)-98.4 °F (36.9 °C)] 98.2 °F (36.8 °C)  Pulse:  [14-77] 65  Resp:  [16-20] 18  SpO2:  [90 %-100 %] 95 %  BP: ()/(51-73) 114/72     Weight: 65.2 kg (143 lb 11.8 oz)  Body mass index is 26.29 kg/m².    Intake/Output Summary (Last 24 hours) at 10/5/2024 1343  Last data filed at 10/4/2024 2118  Gross per 24 hour   Intake 1231.76 ml   Output 650 ml   Net 581.76 ml      Physical Exam  Constitutional:       Appearance: Normal appearance.   HENT:      Head: Normocephalic and atraumatic.   Cardiovascular:      Rate and Rhythm: Normal rate and regular rhythm.      Heart sounds: No murmur heard.  Pulmonary:      Effort: Pulmonary effort is normal. No respiratory distress.      Breath sounds: Normal breath sounds. No wheezing or rales.   Abdominal:      General: There is no distension.      Palpations: Abdomen is soft.      Tenderness: There is no abdominal tenderness.      Comments: Mild swelling to left flank   Musculoskeletal:         General: Tenderness  (Parapsinal, bandages C/D/I) present. No deformity.   Skin:     General: Skin is warm and dry.   Neurological:      General: No focal deficit present.      Mental Status: She is alert and oriented to person, place, and time. Mental status is at baseline.         MELD 3.0: 17 at 9/21/2024  6:13 PM  MELD-Na: 13 at 9/21/2024  6:13 PM  Calculated from:  Serum Creatinine: 0.6 mg/dL (Using min of 1 mg/dL) at 9/21/2024  6:13 PM  Serum Sodium: 138 mmol/L (Using max of 137 mmol/L) at 9/21/2024  6:13 PM  Total Bilirubin: 2.1 mg/dL at 9/21/2024  2:34 AM  Serum Albumin: 1.2 g/dL (Using min of 1.5 g/dL) at 9/21/2024  2:34 AM  INR(ratio): 1.4 at 9/19/2024  3:19 AM  Age at listing (hypothetical): 49 years  Sex: Female at 9/21/2024  6:13 PM      Significant Labs:  CBC:  Recent Labs   Lab 10/04/24  0315 10/05/24  0100   WBC 8.71 9.15  9.15   HGB 8.3* 8.5*  8.5*   HCT 27.4* 27.9*  27.9*    243  243     CMP:  Recent Labs   Lab 10/03/24  1749 10/04/24  0315 10/05/24  0100    137 138  138   K 3.7 3.7 3.5  3.5    103 102  102   CO2 24 25 27  27   * 107 148*  148*   BUN 3* 3* 3*  3*   CREATININE 0.6 0.6 0.6  0.6   CALCIUM 7.7* 8.0* 8.2*  8.2*   PROT  --  5.7* 6.0  6.0   ALBUMIN  --  1.2* 1.3*  1.3*   BILITOT  --  0.8 0.7  0.7   ALKPHOS  --  114 122  122   AST  --  11 11  11   ALT  --  9* 7*  7*   ANIONGAP 10 9 9  9

## 2024-10-05 NOTE — CARE UPDATE
RAPID RESPONSE NURSE FOLLOW-UP NOTE       Followed up with patient for proactive rounding.  BP back down post fluid bolus.   Pressure improves when pt placed in trendelenberg, so additional 500cc NS bolus x2 ordered for a total of 1.5L.   STAT CBC, lactic, CXR ordered as well for septic workup.  Reviewed plan of care with bedside RNSlick, charge RN Mahi .   Team will continue to follow.  Please call Rapid Response RICHARD SHERIDAN RN with any questions or concerns at [phone].

## 2024-10-05 NOTE — PROGRESS NOTES
[facility] ([place] [id number])  [facility]  Progress Note    Patient Name: Mari Sloan  MRN: [id number]  Patient Class: IP- Inpatient   Admission Date: 9/16/2024  Length of Stay: 19 days  Attending Physician: Paty Kerr MD  Primary Care Provider: Mary Fong APRN        Subjective:     Principal Problem:Paraspinal abscess        HPI:  By Dr. Alberta Reese MD    49 y.o.  woman with h/o homelessness (recently was able to obtain living arrangements but states [place] bridge), NIDDM type 2, Essential hypertension, and substance use (denies any IVDU in he past or any illicit drug use recenly, denies ETOH abuse/overuse) presents to [facility] for further evaluation of her back pain.  She apparently presented to the [facility] Emergency Department on September 16 with nausea and vomiting and a mechanical fall 5 days prior. She reported pain worse in her back and on her sides radiating down both legs. She noted muscle spasm in her back and legs. She had no head trauma or loss of consciousness.  W/u in the [facility] ED noted significant hypokalemia, hypomagnesemia, severe anemia, metabolic alkalosis, and hyperglycemia. Vitals signs stable with no sepsis at this time noted. SBP>90, HR normal,afebrile.     Imaging studies of her lumbar spine and pelvis were concerning for osteomyelitis/septic arthritis of the left SI joint and L5-S1 along with an abnormal fluid collection extending from T11 through the left iliacus muscle concerning for abscess. Blood cultures sent.  In the emergency department she is receiving IV fluid, Zofran, Zosyn, vancomycin, potassium, magnesium, pain medication, and nausea medication.   She also received 2 units of PRBC for an H/H of 5.7/18.4,     Case discussed with Neurosurgery and Interventional Radiology. Plan would be for transfer to Hospital Medicine at [facility] for IR evaluation of the fluid collection and  "potential sampling of the joint osteomyelitis.  I reviewed the discussions made with the multiple sub specialists regarding transfer of this patient to [facility]: IR/General surgery/Neurosurgery/ER/Internal Med.     Neurosurgery contacted by the  did not feel surgical intervention was needed at this time but would follow along and requested [facility] for further management and IR backup. IR on call apparently read the CT and at the time felt "while the left retroperitoneal fluid collections do appear organized, percutaneous drainage is not advised given the extensive soft tissue infection superficial to these collections.  In addition, there is extensive evidence of soft tissue infection that does not appear organized or percutaneously drainable."     General surgery was consulted to review the case and felt that there was no immediate surgical intervention at this time to be had. I spoke with the on surgeon contacted regarding the location of the fluid collections and inquired if perhaps orthopedic surgery should be consulted to review given the involvement of bony pelvis.      I spoke orthopedic surgery who will see the patient but recommended re-consulting IR here and Neurosurgery given the diskitis/OM L5-S1. (Please see his consult note in the chart)     Repeat labs here again noted for persistently low mag, K, phos.  H/H stable with improved H/H. Pain control improved with Dilaudid.  I consulted ID for further assistance with ABX adjustments and changed her to Meropenem and doxy as well as continued Vanc. Echo ordred for am.     CT lumbar spine and pelvis had findings most concerning for infectious process. There were findings concerning for osteomyelitis and septic arthritis in the left SI joint. Findings concerning for osteomyelitis/diskitis L5-S1. Possible osteomyelitis and septic joint at the pubic symphysis. Abnormal fluid collection on the left extending from T11 through the left " iliacus muscle along and within the left iliopsoas muscle most concerning for abscess. Multiple additional small abscesses suspected in the pelvis. Multifocal collections of air in the fluid in the subcutaneous tissues of the left flank, incompletely imaged.    Chest x-ray noted a loop in the central venous catheter. Tip currently at the level of the brachiocephalic vein. Lungs are fairly clear.        Overview/Hospital Course:  Ms. Sloan was transferred from [facility] ED to [facility] ICU on 09/16/2024.  She presented to [facility] ED  for back pain, nausea/vomiting.  CT L-spine revealed osteomyelitis/diskitis L5-S1 along with abnormal fluid collection on left extending from T11 through left iliacus muscle and left iliopsoas muscle concerning for abscess.  Multifocal collections of air in fluid in subcutaneous tissues of left flank.  Patient was initiated on empiric vancomycin and meropenem.  Blood cultures with staph aureus.  Obtain echo.  Unable to repeat blood cultures given shortage of cx.  Neurosurgery recommended to obtain MRI L-spine, pending.  General surgery evaluated the patient and recommended urgent transfer to [facility] for surgical evaluation/source control given extent of necrosis.     Ms. Sloan was transferred to [facility] and admitted to the MICU 9/18 with concern for paraspinal abscess. Infectious workup was ordered. Blood cultures were positive for MSSA bacteremia. Neurosurgery, General Surgery and IR were consulted to evaluate the patient's paraspinal abscess. Neurosurgery performed a L3-L4 laminectomy for epidural abscess evacuation on 9/19/24 and the cultures grew MSSA. TTE showed normal EF of 60-65% with diastolic dysfunction, could not exclude mitral vegetation vs redundant chordae. ID was consulted and recommended Oxacillin and repeat blood cultures. With her electrolyte derangement, and a background of appetitive loss in the past 2 months, there was concern for refeeding syndrome.  On 9/21, IR took the patient for abscess drain placement and aspiration of SI joint. SI joint couldn't be aspirated but retroperitoneal abscess was drained of 100cc of purulent fluid. She was successfully extubated 9/22 and was stepped down to Hospital Medicine on 9/24/24. Her drain was removed on 09/27.  Repeat CT abdomen/pelvis showed an ill-defined subcutaneous edema and soft tissue inflammatory change in the surgical bed and subcutaneous soft tissues without discrete organized fluid collection, stable size of 3.6 cm fluid collection along the left posterior pararenal space which appears to communicate with the with the aforementioned collection and fascial thickening.  IR placed drain for retroperitoneal fluid collection on 9/30.  Cultures returned with Staph aureus.  IR suggested to onitor drain output, and consider repeat imaging when output decreases to less than 10 cc in 24 hours to evaluate for possible drainage catheter removal. Repeat CT was ordered on 10/3 as she endorsed worsening pain on her left flank, and minimal drain output which could be removed.  It returned with multiple new and enlargening abscesses.  Discussed with ID, who said to continue Oxacillin.  NSGY, Gen Surg, Ortho and IR were consulted for further recommendations.  MRI complete spine and pelvis were ordered to help guide management.    Interval History: IR pulled drain last night.  Hypotensive overnight needing IVF.  Stopped Clonidine today.  Discussed with NSGY, Ortho and Gen Surg.  Waiting on MRIs to determine further steps.  She refused MRI last night.    Review of Systems   Constitutional:  Negative for chills, fatigue and fever.   Respiratory:  Negative for cough and shortness of breath.    Cardiovascular:  Negative for chest pain, palpitations and leg swelling.   Gastrointestinal:  Positive for abdominal pain. Negative for diarrhea, nausea and vomiting.   Genitourinary:  Negative for dysuria and urgency.   Musculoskeletal:   Positive for back pain.   Neurological:  Negative for dizziness and headaches.   All other systems reviewed and are negative.    Objective:     Vital Signs (Most Recent):  Temp: 98.2 °F (36.8 °C) (10/05/24 1300)  Pulse: 65 (10/05/24 1300)  Resp: 18 (10/05/24 1340)  BP: 114/72 (10/05/24 1300)  SpO2: 95 % (10/05/24 1300) Vital Signs (24h Range):  Temp:  [97.7 °F (36.5 °C)-98.4 °F (36.9 °C)] 98.2 °F (36.8 °C)  Pulse:  [14-77] 65  Resp:  [16-20] 18  SpO2:  [90 %-100 %] 95 %  BP: ()/(51-73) 114/72     Weight: 65.2 kg (143 lb 11.8 oz)  Body mass index is 26.29 kg/m².    Intake/Output Summary (Last 24 hours) at 10/5/2024 1343  Last data filed at 10/4/2024 2118  Gross per 24 hour   Intake 1231.76 ml   Output 650 ml   Net 581.76 ml      Physical Exam  Constitutional:       Appearance: Normal appearance.   HENT:      Head: Normocephalic and atraumatic.   Cardiovascular:      Rate and Rhythm: Normal rate and regular rhythm.      Heart sounds: No murmur heard.  Pulmonary:      Effort: Pulmonary effort is normal. No respiratory distress.      Breath sounds: Normal breath sounds. No wheezing or rales.   Abdominal:      General: There is no distension.      Palpations: Abdomen is soft.      Tenderness: There is no abdominal tenderness.      Comments: Mild swelling to left flank   Musculoskeletal:         General: Tenderness (Parapsinal, bandages C/D/I) present. No deformity.   Skin:     General: Skin is warm and dry.   Neurological:      General: No focal deficit present.      Mental Status: She is alert and oriented to person, place, and time. Mental status is at baseline.         MELD 3.0: 17 at 9/21/2024  6:13 PM  MELD-Na: 13 at 9/21/2024  6:13 PM  Calculated from:  Serum Creatinine: 0.6 mg/dL (Using min of 1 mg/dL) at 9/21/2024  6:13 PM  Serum Sodium: 138 mmol/L (Using max of 137 mmol/L) at 9/21/2024  6:13 PM  Total Bilirubin: 2.1 mg/dL at 9/21/2024  2:34 AM  Serum Albumin: 1.2 g/dL (Using min of 1.5 g/dL) at 9/21/2024   2:34 AM  INR(ratio): 1.4 at 9/19/2024  3:19 AM  Age at listing (hypothetical): 49 years  Sex: Female at 9/21/2024  6:13 PM      Significant Labs:  CBC:  Recent Labs   Lab 10/04/24  0315 10/05/24  0100   WBC 8.71 9.15  9.15   HGB 8.3* 8.5*  8.5*   HCT 27.4* 27.9*  27.9*    243  243     CMP:  Recent Labs   Lab 10/03/24  1749 10/04/24  0315 10/05/24  0100    137 138  138   K 3.7 3.7 3.5  3.5    103 102  102   CO2 24 25 27 27   * 107 148*  148*   BUN 3* 3* 3*  3*   CREATININE 0.6 0.6 0.6  0.6   CALCIUM 7.7* 8.0* 8.2*  8.2*   PROT  --  5.7* 6.0  6.0   ALBUMIN  --  1.2* 1.3*  1.3*   BILITOT  --  0.8 0.7  0.7   ALKPHOS  --  114 122  122   AST  --  11 11  11   ALT  --  9* 7*  7*   ANIONGAP 10 9 9  9         Assessment/Plan:      * Paraspinal abscess  MSSA Endocarditis  MSSA Paraspinal abscess  MSSA Psoas abscess    Blood cx 9/16 with MSSA  2D echo 9/17:  Cannot entirely exclude mitral vegetation vs redundant chordae. If high clinical suspicion for endocarditis, would rx as such (not clear MARICHUY would be able to exclude vegetation based on TTE findings).   Noted to have extensive fluid collection on left extending from T11 through left iliacus muscle and within the left iliopsoas muscle.  Multifocal collections of air including subcutaneous tissues on the left flank noted.  ID/Neurosurgery consulted.   S/p L3-L5 laminectomy with epidural abscess evacuation 9/19   Ortho consulted. Rec continued abx tx and no further interventions  IR SI joint aspiration and abscess drain placement 9/21. Unable to aspirate joint  IR drain removed on 09/27.    Repeat CT abdomen pelvis 9/28 ordered to look for recollection of fluid showed an ill-defined subcutaneous edema and soft tissue inflammatory change in the surgical bed and subcutaneous soft tissues without discrete organized fluid collection,  Stable size of 3.6 cm fluid collection along the left posterior pararenal space which appears to  communicate with the with the aforementioned collection and fascial thickening.   IR placed drain for retroperitoneal fluid collection on 9/30.  Culture with MSSA  ID consulted:  Suggested Oxacillin through 11/18 with repeat imaging prior to completion  Will need LTAC for completion of IV antibiotics given history of IVDA  PICC placed 9/27  Repeat CRP ordered 10/3 given worsening pain and swelling - CRP down to 32  Repeat CT abdomen/pelvis was ordered to assess for drain removal, that showed worsening abscesses.  Discussed with ID who suggested further source control and to continue Oxacillin   Gen Surg, Ortho, NSGY and IR consulted - Greatly appreciate assistance!  MRI complete spine and pelvis ordered to further determine next steps    Other osteomyelitis, multiple sites  As above      Endocarditis  As above      Staphylococcus aureus bacteremia  As above      Sepsis  As above    Retroperitoneal fluid collection  As above    Epidural abscess  As above      Psoas muscle abscess  As above    Moderate malnutrition  Nutrition consulted. Most recent weight and BMI monitored-     Measurements:  Wt Readings from Last 1 Encounters:   09/26/24 65.2 kg (143 lb 11.8 oz)   Body mass index is 26.29 kg/m².    Patient has been screened and assessed by RD.    Malnutrition Type:  Context: social/environmental circumstances  Level: moderate    Malnutrition Characteristic Summary:  Weight Loss (Malnutrition): 10% in 6 months  Energy Intake (Malnutrition): less than 75% for greater than 7 days    Interventions/Recommendations (treatment strategy):  1.      Refeeding syndrome  In ICU      Severe sepsis  See above    Hepatitis C  Hepatitis C antibody positive.    Obtain HCV RNA. Quant negative    Anemia, unspecified  S/p 2u PRBC transfusion on admit   No signs of acute GI bleed on exam at this time. Denies any hematemesis or hemoptysis.   Obtain iron B12/folate/peripheral smear and LDH/hapto/retic  No evidence of active bleed    Stable    Uncontrolled type 2 diabetes mellitus with hyperglycemia  Patient's FSGs are uncontrolled due to hyperglycemia on current medication regimen.  Last A1c reviewed-   Lab Results   Component Value Date    HGBA1C 7.9 (H) 09/17/2024     Most recent fingerstick glucose reviewed-   Recent Labs   Lab 10/04/24  1514 10/04/24  1951 10/05/24  0803   POCTGLUCOSE 166* 174* 147*       Current correctional scale  Medium  Maintain anti-hyperglycemic dose as follows-   Antihyperglycemics (From admission, onward)      Start     Stop Route Frequency Ordered    10/02/24 0006  insulin aspart U-100 pen 0-5 Units         -- SubQ Before meals & nightly PRN 10/01/24 2306          Hold Oral hypoglycemics while patient is in the hospital.    Smoker  Tobacco cessation discussed with patient for greater than 5 minutes.   Offered Nicotine patch while hospitalized  Patient is aware of need to quit tobacco products    History of drug use  On methadone at home, continue      VTE Risk Mitigation (From admission, onward)           Ordered     enoxaparin injection 40 mg  Every 24 hours         09/23/24 1234     IP VTE HIGH RISK PATIENT  Once         09/22/24 1553                    Discharge Planning   LUH: 10/11/2024     Code Status: Full Code   Is the patient medically ready for discharge?:     Reason for patient still in hospital (select all that apply): Patient trending condition, Imaging, and Consult recommendations  Discharge Plan A: Skilled Nursing Facility ([facility] [phone])   Discharge Delays: None known at this time              Paty Kerr MD  Department of Hospital Medicine   [facility] ([place]

## 2024-10-05 NOTE — ASSESSMENT & PLAN NOTE
Patient's FSGs are uncontrolled due to hyperglycemia on current medication regimen.  Last A1c reviewed-   Lab Results   Component Value Date    HGBA1C 7.9 (H) 09/17/2024     Most recent fingerstick glucose reviewed-   Recent Labs   Lab 10/04/24  1514 10/04/24  1951 10/05/24  0803   POCTGLUCOSE 166* 174* 147*       Current correctional scale  Medium  Maintain anti-hyperglycemic dose as follows-   Antihyperglycemics (From admission, onward)    Start     Stop Route Frequency Ordered    10/02/24 0006  insulin aspart U-100 pen 0-5 Units         -- SubQ Before meals & nightly PRN 10/01/24 2306        Hold Oral hypoglycemics while patient is in the hospital.

## 2024-10-05 NOTE — ASSESSMENT & PLAN NOTE
MSSA Endocarditis  MSSA Paraspinal abscess  MSSA Psoas abscess    Blood cx 9/16 with MSSA  2D echo 9/17:  Cannot entirely exclude mitral vegetation vs redundant chordae. If high clinical suspicion for endocarditis, would rx as such (not clear MARICHUY would be able to exclude vegetation based on TTE findings).   Noted to have extensive fluid collection on left extending from T11 through left iliacus muscle and within the left iliopsoas muscle.  Multifocal collections of air including subcutaneous tissues on the left flank noted.  ID/Neurosurgery consulted.   S/p L3-L5 laminectomy with epidural abscess evacuation 9/19   Ortho consulted. Rec continued abx tx and no further interventions  IR SI joint aspiration and abscess drain placement 9/21. Unable to aspirate joint  IR drain removed on 09/27.    Repeat CT abdomen pelvis 9/28 ordered to look for recollection of fluid showed an ill-defined subcutaneous edema and soft tissue inflammatory change in the surgical bed and subcutaneous soft tissues without discrete organized fluid collection,  Stable size of 3.6 cm fluid collection along the left posterior pararenal space which appears to communicate with the with the aforementioned collection and fascial thickening.   IR placed drain for retroperitoneal fluid collection on 9/30.  Culture with MSSA  ID consulted:  Suggested Oxacillin through 11/18 with repeat imaging prior to completion  Will need LTAC for completion of IV antibiotics given history of IVDA  PICC placed 9/27  Repeat CRP ordered 10/3 given worsening pain and swelling - CRP down to 32  Repeat CT abdomen/pelvis was ordered to assess for drain removal, that showed worsening abscesses.  Discussed with ID who suggested further source control and to continue Oxacillin   Gen Surg, Ortho, NSGY and IR consulted - Greatly appreciate assistance!  MRI complete spine and pelvis ordered to further determine next steps   normal LV function

## 2024-10-05 NOTE — PLAN OF CARE
Problem: Skin Injury Risk Increased  Goal: Skin Health and Integrity  Outcome: Progressing     Problem: Adult Inpatient Plan of Care  Goal: Plan of Care Review  Outcome: Progressing  Goal: Patient-Specific Goal (Individualized)  Outcome: Progressing  Goal: Absence of Hospital-Acquired Illness or Injury  Outcome: Progressing  Goal: Optimal Comfort and Wellbeing  Outcome: Progressing  Goal: Readiness for Transition of Care  Outcome: Progressing     Problem: Infection  Goal: Absence of Infection Signs and Symptoms  Outcome: Progressing     Problem: Diabetes Comorbidity  Goal: Blood Glucose Level Within Targeted Range  Outcome: Progressing     Problem: Sepsis/Septic Shock  Goal: Optimal Coping  Outcome: Progressing  Goal: Absence of Bleeding  Outcome: Progressing  Goal: Blood Glucose Level Within Targeted Range  Outcome: Progressing  Goal: Absence of Infection Signs and Symptoms  Outcome: Progressing  Goal: Optimal Nutrition Intake  Outcome: Progressing     Problem: Fall Injury Risk  Goal: Absence of Fall and Fall-Related Injury  Outcome: Progressing     Problem: Wound  Goal: Optimal Coping  Outcome: Progressing  Goal: Optimal Functional Ability  Outcome: Progressing  Goal: Absence of Infection Signs and Symptoms  Outcome: Progressing  Goal: Improved Oral Intake  Outcome: Progressing  Goal: Optimal Pain Control and Function  Outcome: Progressing  Goal: Skin Health and Integrity  Outcome: Progressing  Goal: Optimal Wound Healing  Outcome: Progressing

## 2024-10-05 NOTE — PLAN OF CARE
Problem: Skin Injury Risk Increased  Goal: Skin Health and Integrity  Outcome: Progressing     Problem: Adult Inpatient Plan of Care  Goal: Plan of Care Review  Outcome: Progressing  Goal: Patient-Specific Goal (Individualized)  Outcome: Progressing  Goal: Absence of Hospital-Acquired Illness or Injury  Outcome: Progressing  Goal: Optimal Comfort and Wellbeing  Outcome: Progressing  Goal: Readiness for Transition of Care  Outcome: Progressing     Problem: Infection  Goal: Absence of Infection Signs and Symptoms  Outcome: Progressing     Problem: Diabetes Comorbidity  Goal: Blood Glucose Level Within Targeted Range  Outcome: Progressing     Problem: Sepsis/Septic Shock  Goal: Optimal Coping  Outcome: Progressing  Goal: Absence of Bleeding  Outcome: Progressing  Goal: Blood Glucose Level Within Targeted Range  Outcome: Progressing  Goal: Absence of Infection Signs and Symptoms  Outcome: Progressing  Goal: Optimal Nutrition Intake  Outcome: Progressing     Problem: Fall Injury Risk  Goal: Absence of Fall and Fall-Related Injury  Outcome: Progressing     Problem: Wound  Goal: Optimal Coping  Outcome: Progressing  Goal: Optimal Functional Ability  Outcome: Progressing  Goal: Absence of Infection Signs and Symptoms  Outcome: Progressing  Goal: Improved Oral Intake  Outcome: Progressing  Goal: Optimal Pain Control and Function  Outcome: Progressing  Goal: Skin Health and Integrity  Outcome: Progressing  Goal: Optimal Wound Healing  Outcome: Progressing     Problem: Restraint, Nonviolent  Goal: Absence of Harm or Injury  Outcome: Met     Problem: Mechanical Ventilation Invasive  Goal: Effective Communication  Outcome: Met  Goal: Optimal Device Function  Outcome: Met  Goal: Mechanical Ventilation Liberation  Outcome: Met  Goal: Optimal Nutrition Delivery  Outcome: Met  Goal: Absence of Device-Related Skin and Tissue Injury  Outcome: Met  Goal: Absence of Ventilator-Induced Lung Injury  Outcome: Met

## 2024-10-05 NOTE — ASSESSMENT & PLAN NOTE
Mari Sloan is a 49 y.o. female who presented with multiple fluid collections and concern for osteomyelitis at her SI joint. She has undergone IR drainage during this admission. Repeat imaging demonstrates persistent fluid at the pubic symphysis, left flank. General surgery consulted.     - patient seen and examined   - imaging reviewed  - discussed case with on-call orthopedic resident this morning   - awaiting MRI results to guide management  - if she will require OR with ortho, we will plan to I&D the left flank at that time  - the left flank collection has a superficial, organized component that would benefit from drainage  - please call with questions

## 2024-10-05 NOTE — CARE UPDATE
RAPID RESPONSE NURSE PROACTIVE ROUNDING NOTE       Time of Visit:     Admit Date: 2024  LOS: 19  Code Status: Full Code   Date of Visit: 10/05/2024  : 1975  Age: 49 y.o.  Sex: female  Race: White  Bed: 43027/94593 A:   MRN: [id number]  Was the patient discharged from an ICU this admission? Yes   Was the patient discharged from a PACU within last 24 hours? No   Did the patient receive conscious sedation/general anesthesia in last 24 hours? No  Was the patient in the ED within the past 24 hours? No  Was the patient on NIPPV within the past 24 hours? No   Attending Physician: Paty Kerr MD  Primary Service: [facility]   Time spent at the bedside: < 15 min    SITUATION    Notified by charge RN via phone call.  Reason for alert: hypotension  Called to evaluate the patient for Circulatory.    BACKGROUND     Why is the patient in the hospital?: Paraspinal abscess    Patient has a past medical history of Diabetes mellitus, Hypertension, and Unspecified viral hepatitis C without hepatic coma.    Last Vitals:  Temp: 98.1 °F (36.7 °C) (10/04 2315)  Pulse: 63 (10/05 0010)  Resp: 20 (10/04 2055)  BP: 105/66 (10/05 0010)  SpO2: 94 % (10/05 0010)    24 Hours Vitals Range:  Temp:  [97.6 °F (36.4 °C)-98.9 °F (37.2 °C)]   Pulse:  [59-92]   Resp:  [15-20]   BP: ()/(51-97)   SpO2:  [90 %-97 %]     Labs:  Recent Labs     10/02/24  0448 10/04/24  0315   WBC 7.50 8.71   HGB 7.9* 8.3*   HCT 26.3* 27.4*    250       Recent Labs     10/03/24  0821 10/03/24  1749 10/04/24  0315   * 137 137   K 3.8 3.7 3.7    103 103   CO2 24 24 25   BUN 3* 3* 3*   CREATININE 0.6 0.6 0.6   * 116* 107        ASSESSMENT      Physical Exam  Constitutional:       General: She is sleeping.      Appearance: She is ill-appearing.   Cardiovascular:      Rate and Rhythm: Normal rate.   Pulmonary:      Effort: Pulmonary effort is normal.   Skin:     General: Skin is warm.   Neurological:      Mental Status: She  is lethargic.     HR 62  BP 85/53 (63)  RR 16, SpO2 91% on room air    Pt asleep in bed, wakens to voice but falls quickly back asleep. LR bolus currently infusing    INTERVENTIONS    The patient was seen for Cardiac problem. Staff concerns included hypotension. The following interventions were performed: 500cc LR bolus.    Bedside RN to hold scheduled HS clonidine     RECOMMENDATIONS    Re-assess BP after fluid bolus  Low threshold to initiate septic workup    PROVIDER ESCALATION    Yes/No  No- bedside RN in contact with primary team    Orders received and case discussed with NA.    Disposition: Remain in room 22887.    FOLLOW-UP    Bedside RNSlick, charge RN Mahi  updated on plan of care. Instructed to call the Rapid Response NurseRICHARD, FATIMAH at [phone] for additional questions or concerns.

## 2024-10-05 NOTE — SUBJECTIVE & OBJECTIVE
Interval History: Patient seen and examined this morning. Complaints of not sleeping well last night due to frequent disruption. Left flank with fluctuance. Stable. Discussed case with ortho.     Medications:  Continuous Infusions:  Scheduled Meds:   enoxparin  40 mg Subcutaneous Q24H (prophylaxis, 1700)    folic acid  1 mg Oral Daily    gabapentin  300 mg Oral TID    hydrOXYzine pamoate  25 mg Oral QHS    LIDOcaine  1 patch Transdermal Q24H    methadone  70 mg Oral Daily    mupirocin   Nasal BID    nicotine  1 patch Transdermal Daily    oxacillin 12 g in  mL CONTINUOUS INFUSION  12 g Intravenous Q24H    polyethylene glycol  17 g Oral BID    senna-docusate 8.6-50 mg  1 tablet Oral BID    sodium chloride 0.9%  10 mL Intravenous Q6H    thiamine  100 mg Oral Daily     PRN Meds:  Current Facility-Administered Medications:     acetaminophen, 1,000 mg, Per OG tube, Q6H PRN    albuterol-ipratropium, 3 mL, Nebulization, Q4H PRN    dextrose 10%, 12.5 g, Intravenous, PRN    dextrose 10%, 12.5 g, Intravenous, PRN    dextrose 10%, 25 g, Intravenous, PRN    glucagon (human recombinant), 1 mg, Intramuscular, PRN    glucose, 16 g, Oral, PRN    glucose, 24 g, Oral, PRN    hydrOXYzine pamoate, 50 mg, Oral, Q6H PRN    insulin aspart U-100, 0-5 Units, Subcutaneous, QID (AC + HS) PRN    melatonin, 6 mg, Oral, Nightly PRN    naloxone, 0.4 mg, Intravenous, PRN    oxyCODONE, 10 mg, Oral, Q4H PRN    sodium chloride 0.9%, 10 mL, Intravenous, Q12H PRN    Flushing PICC/Midline Protocol, , , Until Discontinued **AND** sodium chloride 0.9%, 10 mL, Intravenous, Q6H **AND** sodium chloride 0.9%, 10 mL, Intravenous, PRN    sodium chloride 0.9%, 10 mL, Intravenous, PRN     Review of patient's allergies indicates:  No Known Allergies  Objective:     Vital Signs (Most Recent):  Temp: 97.9 °F (36.6 °C) (10/05/24 0807)  Pulse: 65 (10/05/24 0406)  Resp: 18 (10/05/24 0807)  BP: 95/63 (10/05/24 0700)  SpO2: 96 % (10/05/24 0406) Vital Signs (24h  Range):  Temp:  [97.6 °F (36.4 °C)-98.8 °F (37.1 °C)] 97.9 °F (36.6 °C)  Pulse:  [59-86] 65  Resp:  [15-20] 18  SpO2:  [90 %-100 %] 96 %  BP: ()/(51-81) 95/63     Weight: 65.2 kg (143 lb 11.8 oz)  Body mass index is 26.29 kg/m².    Intake/Output - Last 3 Shifts         10/03 0700  10/04 0659 10/04 0700  10/05 0659 10/05 0700  10/06 0659    P.O. 800 840     I.V. (mL/kg)  50 (0.8)     Other 450      IV Piggyback 3100.2 631.8     Total Intake(mL/kg) 4350.2 (66.7) 1521.8 (23.3)     Urine (mL/kg/hr) 1900 (1.2) 1650 (1.1)     Drains 0      Stool 0      Total Output 1900 1650     Net +2450.2 -128.2            Urine Occurrence 1 x      Stool Occurrence 1 x               Physical Exam  Constitutional:       General: She is not in acute distress.     Appearance: Normal appearance.   HENT:      Head: Normocephalic and atraumatic.   Cardiovascular:      Rate and Rhythm: Normal rate and regular rhythm.   Pulmonary:      Effort: Pulmonary effort is normal. No respiratory distress.      Breath sounds: Normal breath sounds.   Abdominal:      General: Abdomen is flat. There is no distension.      Palpations: Abdomen is soft.      Tenderness: There is no abdominal tenderness.   Skin:     Comments: Left flank with fluctuance and erythematous changes to the overlying skin   Neurological:      General: No focal deficit present.      Mental Status: She is alert and oriented to person, place, and time.   Psychiatric:         Behavior: Behavior normal.         Thought Content: Thought content normal.          Significant Labs:  I have reviewed all pertinent lab results within the past 24 hours.  CBC:   Recent Labs   Lab 10/05/24  0100   WBC 9.15  9.15   RBC 3.16*  3.16*   HGB 8.5*  8.5*   HCT 27.9*  27.9*     243   MCV 88  88   MCH 26.9*  26.9*   MCHC 30.5*  30.5*     CMP:   Recent Labs   Lab 10/05/24  0100   *  148*   CALCIUM 8.2*  8.2*   ALBUMIN 1.3*  1.3*   PROT 6.0  6.0     138   K 3.5  3.5   CO2  27  27     102   BUN 3*  3*   CREATININE 0.6  0.6   ALKPHOS 122  122   ALT 7*  7*   AST 11  11   BILITOT 0.7  0.7       Significant Diagnostics:  I have reviewed all pertinent imaging results/findings within the past 24 hours.

## 2024-10-05 NOTE — PROGRESS NOTES
[facility] - Stepdown Flex ([facility]-)  General Surgery  Progress Note    Subjective:     History of Present Illness:  Mari Sloan is a 49 y.o. 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 She has been admitted for 3 weeks and has been on broad spectrum antibiotics, undergone multiple IR drains, and a L3-L4 laminectomy for an epidural abscess. She has been found to have infective endocarditis. Echo on 9/17 does not mention endocarditis, but she is presumed positive and being treated for it. She has been bacteremic with MSSA, now on oxacillin. Her abscess cultures and blood cultures have all been MSSA. She recently developed left flank pain and had a CT that showed pelvic abscesses and a left flank fluid collection. General surgery was consulted to evaluate her left flank fluid collection.      Post-Op Info:  Procedure(s) (LRB):  L3-L5 laminectomy with epidural abscess evac (N/A)  WASHOUT, WOUND, SPINE   16 Days Post-Op     Interval History: Patient seen and examined this morning. Complaints of not sleeping well last night due to frequent disruption. Left flank with fluctuance. Stable. Discussed case with ortho.     Medications:  Continuous Infusions:  Scheduled Meds:   enoxparin  40 mg Subcutaneous Q24H (prophylaxis, 1700)    folic acid  1 mg Oral Daily    gabapentin  300 mg Oral TID    hydrOXYzine pamoate  25 mg Oral QHS    LIDOcaine  1 patch Transdermal Q24H    methadone  70 mg Oral Daily    mupirocin   Nasal BID    nicotine  1 patch Transdermal Daily    oxacillin 12 g in  mL CONTINUOUS INFUSION  12 g Intravenous Q24H    polyethylene glycol  17 g Oral BID    senna-docusate 8.6-50 mg  1 tablet Oral BID    sodium chloride 0.9%  10 mL Intravenous Q6H    thiamine  100 mg Oral Daily     PRN Meds:  Current Facility-Administered  Medications:     acetaminophen, 1,000 mg, Per OG tube, Q6H PRN    albuterol-ipratropium, 3 mL, Nebulization, Q4H PRN    dextrose 10%, 12.5 g, Intravenous, PRN    dextrose 10%, 12.5 g, Intravenous, PRN    dextrose 10%, 25 g, Intravenous, PRN    glucagon (human recombinant), 1 mg, Intramuscular, PRN    glucose, 16 g, Oral, PRN    glucose, 24 g, Oral, PRN    hydrOXYzine pamoate, 50 mg, Oral, Q6H PRN    insulin aspart U-100, 0-5 Units, Subcutaneous, QID (AC + HS) PRN    melatonin, 6 mg, Oral, Nightly PRN    naloxone, 0.4 mg, Intravenous, PRN    oxyCODONE, 10 mg, Oral, Q4H PRN    sodium chloride 0.9%, 10 mL, Intravenous, Q12H PRN    Flushing PICC/Midline Protocol, , , Until Discontinued **AND** sodium chloride 0.9%, 10 mL, Intravenous, Q6H **AND** sodium chloride 0.9%, 10 mL, Intravenous, PRN    sodium chloride 0.9%, 10 mL, Intravenous, PRN     Review of patient's allergies indicates:  No Known Allergies  Objective:     Vital Signs (Most Recent):  Temp: 97.9 °F (36.6 °C) (10/05/24 0807)  Pulse: 65 (10/05/24 0406)  Resp: 18 (10/05/24 0807)  BP: 95/63 (10/05/24 0700)  SpO2: 96 % (10/05/24 0406) Vital Signs (24h Range):  Temp:  [97.6 °F (36.4 °C)-98.8 °F (37.1 °C)] 97.9 °F (36.6 °C)  Pulse:  [59-86] 65  Resp:  [15-20] 18  SpO2:  [90 %-100 %] 96 %  BP: ()/(51-81) 95/63     Weight: 65.2 kg (143 lb 11.8 oz)  Body mass index is 26.29 kg/m².    Intake/Output - Last 3 Shifts         10/03 0700  10/04 0659 10/04 0700  10/05 0659 10/05 0700  10/06 0659    P.O. 800 840     I.V. (mL/kg)  50 (0.8)     Other 450      IV Piggyback 3100.2 631.8     Total Intake(mL/kg) 4350.2 (66.7) 1521.8 (23.3)     Urine (mL/kg/hr) 1900 (1.2) 1650 (1.1)     Drains 0      Stool 0      Total Output 1900 1650     Net +2450.2 -128.2            Urine Occurrence 1 x      Stool Occurrence 1 x               Physical Exam  Constitutional:       General: She is not in acute distress.     Appearance: Normal appearance.   HENT:      Head: Normocephalic and  atraumatic.   Cardiovascular:      Rate and Rhythm: Normal rate and regular rhythm.   Pulmonary:      Effort: Pulmonary effort is normal. No respiratory distress.      Breath sounds: Normal breath sounds.   Abdominal:      General: Abdomen is flat. There is no distension.      Palpations: Abdomen is soft.      Tenderness: There is no abdominal tenderness.   Skin:     Comments: Left flank with fluctuance and erythematous changes to the overlying skin   Neurological:      General: No focal deficit present.      Mental Status: She is alert and oriented to person, place, and time.   Psychiatric:         Behavior: Behavior normal.         Thought Content: Thought content normal.          Significant Labs:  I have reviewed all pertinent lab results within the past 24 hours.  CBC:   Recent Labs   Lab 10/05/24  0100   WBC 9.15  9.15   RBC 3.16*  3.16*   HGB 8.5*  8.5*   HCT 27.9*  27.9*     243   MCV 88  88   MCH 26.9*  26.9*   MCHC 30.5*  30.5*     CMP:   Recent Labs   Lab 10/05/24  0100   *  148*   CALCIUM 8.2*  8.2*   ALBUMIN 1.3*  1.3*   PROT 6.0  6.0     138   K 3.5  3.5   CO2 27  27     102   BUN 3*  3*   CREATININE 0.6  0.6   ALKPHOS 122  122   ALT 7*  7*   AST 11  11   BILITOT 0.7  0.7       Significant Diagnostics:  I have reviewed all pertinent imaging results/findings within the past 24 hours.  Assessment/Plan:     Endocarditis  Mari Sloan is a 49 y.o. lady with infectious endocarditis and multiple abscesses.    Other osteomyelitis, multiple sites  Mari Sloan is a 49 y.o. female who presented with multiple fluid collections and concern for osteomyelitis at her SI joint. She has undergone IR drainage during this admission. Repeat imaging demonstrates persistent fluid at the pubic symphysis, left flank. General surgery consulted.     - patient seen and examined   - imaging reviewed  - discussed case with on-call orthopedic resident this morning   - awaiting  MRI results to guide management  - if she will require OR with ortho, we will plan to I&D the left flank at that time  - the left flank collection has a superficial, organized component that would benefit from drainage  - please call with questions        Jermaine Armstrong MD  General Surgery  [facility] - Stepdown Flex ([place])

## 2024-10-06 ENCOUNTER — ANESTHESIA EVENT (OUTPATIENT)
Dept: SURGERY | Facility: HOSPITAL | Age: 49
End: 2024-10-06
Payer: MEDICAID

## 2024-10-06 LAB
POCT GLUCOSE: 111 MG/DL (ref 70–110)
POCT GLUCOSE: 126 MG/DL (ref 70–110)
POCT GLUCOSE: 151 MG/DL (ref 70–110)
POCT GLUCOSE: 89 MG/DL (ref 70–110)

## 2024-10-06 PROCEDURE — 25000003 PHARM REV CODE 250: Performed by: INTERNAL MEDICINE

## 2024-10-06 PROCEDURE — 63600175 PHARM REV CODE 636 W HCPCS: Performed by: STUDENT IN AN ORGANIZED HEALTH CARE EDUCATION/TRAINING PROGRAM

## 2024-10-06 PROCEDURE — 25000003 PHARM REV CODE 250: Performed by: STUDENT IN AN ORGANIZED HEALTH CARE EDUCATION/TRAINING PROGRAM

## 2024-10-06 PROCEDURE — 25500020 PHARM REV CODE 255: Performed by: HOSPITALIST

## 2024-10-06 PROCEDURE — 20600001 HC STEP DOWN PRIVATE ROOM

## 2024-10-06 PROCEDURE — A4216 STERILE WATER/SALINE, 10 ML: HCPCS | Performed by: STUDENT IN AN ORGANIZED HEALTH CARE EDUCATION/TRAINING PROGRAM

## 2024-10-06 PROCEDURE — 99233 SBSQ HOSP IP/OBS HIGH 50: CPT | Mod: ,,, | Performed by: STUDENT IN AN ORGANIZED HEALTH CARE EDUCATION/TRAINING PROGRAM

## 2024-10-06 PROCEDURE — A9585 GADOBUTROL INJECTION: HCPCS | Performed by: HOSPITALIST

## 2024-10-06 RX ORDER — GADOBUTROL 604.72 MG/ML
7 INJECTION INTRAVENOUS
Status: COMPLETED | OUTPATIENT
Start: 2024-10-06 | End: 2024-10-06

## 2024-10-06 RX ADMIN — METHADONE HYDROCHLORIDE 70 MG: 10 TABLET ORAL at 09:10

## 2024-10-06 RX ADMIN — GABAPENTIN 300 MG: 300 CAPSULE ORAL at 09:10

## 2024-10-06 RX ADMIN — HYDROXYZINE PAMOATE 25 MG: 25 CAPSULE ORAL at 08:10

## 2024-10-06 RX ADMIN — GABAPENTIN 300 MG: 300 CAPSULE ORAL at 08:10

## 2024-10-06 RX ADMIN — Medication 100 MG: at 09:10

## 2024-10-06 RX ADMIN — GABAPENTIN 300 MG: 300 CAPSULE ORAL at 03:10

## 2024-10-06 RX ADMIN — OXACILLIN 12 G: 2 INJECTION, POWDER, FOR SOLUTION INTRAMUSCULAR; INTRAVENOUS at 01:10

## 2024-10-06 RX ADMIN — Medication 10 ML: at 08:10

## 2024-10-06 RX ADMIN — FOLIC ACID 1 MG: 1 TABLET ORAL at 09:10

## 2024-10-06 RX ADMIN — OXYCODONE HYDROCHLORIDE 10 MG: 10 TABLET ORAL at 03:10

## 2024-10-06 RX ADMIN — OXYCODONE HYDROCHLORIDE 10 MG: 10 TABLET ORAL at 08:10

## 2024-10-06 RX ADMIN — GADOBUTROL 7 ML: 604.72 INJECTION INTRAVENOUS at 06:10

## 2024-10-06 RX ADMIN — MELATONIN TAB 3 MG 6 MG: 3 TAB at 08:10

## 2024-10-06 NOTE — SUBJECTIVE & OBJECTIVE
Interval History: MRI pelvis resulted last night.  Gen Surg plans for OR I&D of left flank fluid collection on 10/7.  MRI spine to be performed today.    Review of Systems   Constitutional:  Negative for chills, fatigue and fever.   Respiratory:  Negative for cough and shortness of breath.    Cardiovascular:  Negative for chest pain, palpitations and leg swelling.   Gastrointestinal:  Positive for abdominal pain. Negative for diarrhea, nausea and vomiting.   Genitourinary:  Negative for dysuria and urgency.   Musculoskeletal:  Positive for back pain.   Neurological:  Negative for dizziness and headaches.   All other systems reviewed and are negative.    Objective:     Vital Signs (Most Recent):  Temp: 99.1 °F (37.3 °C) (10/06/24 1150)  Pulse: 75 (10/06/24 1150)  Resp: 19 (10/06/24 1150)  BP: 127/81 (10/06/24 1150)  SpO2: 95 % (10/06/24 1150) Vital Signs (24h Range):  Temp:  [98.3 °F (36.8 °C)-99.2 °F (37.3 °C)] 99.1 °F (37.3 °C)  Pulse:  [72-96] 75  Resp:  [18-20] 19  SpO2:  [93 %-97 %] 95 %  BP: (111-144)/(71-87) 127/81     Weight: 65.2 kg (143 lb 11.8 oz)  Body mass index is 26.29 kg/m².    Intake/Output Summary (Last 24 hours) at 10/6/2024 1324  Last data filed at 10/6/2024 0514  Gross per 24 hour   Intake 260 ml   Output 1200 ml   Net -940 ml      Physical Exam  Constitutional:       Appearance: Normal appearance.   HENT:      Head: Normocephalic and atraumatic.   Cardiovascular:      Rate and Rhythm: Normal rate and regular rhythm.      Heart sounds: No murmur heard.  Pulmonary:      Effort: Pulmonary effort is normal. No respiratory distress.      Breath sounds: Normal breath sounds. No wheezing or rales.   Abdominal:      General: There is no distension.      Palpations: Abdomen is soft.      Tenderness: There is no abdominal tenderness.      Comments: Mild swelling to left flank   Musculoskeletal:         General: Tenderness (Parapsinal, bandages C/D/I) present. No deformity.   Skin:     General: Skin is  warm and dry.   Neurological:      General: No focal deficit present.      Mental Status: She is alert and oriented to person, place, and time. Mental status is at baseline.         MELD 3.0: 17 at 9/21/2024  6:13 PM  MELD-Na: 13 at 9/21/2024  6:13 PM  Calculated from:  Serum Creatinine: 0.6 mg/dL (Using min of 1 mg/dL) at 9/21/2024  6:13 PM  Serum Sodium: 138 mmol/L (Using max of 137 mmol/L) at 9/21/2024  6:13 PM  Total Bilirubin: 2.1 mg/dL at 9/21/2024  2:34 AM  Serum Albumin: 1.2 g/dL (Using min of 1.5 g/dL) at 9/21/2024  2:34 AM  INR(ratio): 1.4 at 9/19/2024  3:19 AM  Age at listing (hypothetical): 49 years  Sex: Female at 9/21/2024  6:13 PM      Significant Labs:  CBC:  Recent Labs   Lab 10/05/24  0100   WBC 9.15  9.15   HGB 8.5*  8.5*   HCT 27.9*  27.9*     243     CMP:  Recent Labs   Lab 10/05/24  0100     138   K 3.5  3.5     102   CO2 27  27   *  148*   BUN 3*  3*   CREATININE 0.6  0.6   CALCIUM 8.2*  8.2*   PROT 6.0  6.0   ALBUMIN 1.3*  1.3*   BILITOT 0.7  0.7   ALKPHOS 122  122   AST 11  11   ALT 7*  7*   ANIONGAP 9  9        Banner Transposition Flap Text: The defect edges were debeveled with a #15 scalpel blade.  Given the location of the defect and the proximity to free margins a Banner transposition flap was deemed most appropriate.  Using a sterile surgical marker, an appropriate flap drawn around the defect. The area thus outlined was incised deep to adipose tissue with a #15 scalpel blade.  The skin margins were undermined to an appropriate distance in all directions utilizing iris scissors.

## 2024-10-06 NOTE — NURSING
Pt AAO, VSS, c/o pain 7/10 with movement in bed.  Pt refusing to sit in chair or ambulate.  Pt currently getting MRI with contrast.  Plan for pt is NPO after MN for I&D of T spine tomorrow.      No acute events this shift, bed low and locked, call light in reach.

## 2024-10-06 NOTE — SUBJECTIVE & OBJECTIVE
Interval History: NAEO. AF, HDS. Pain controlled. Pelvic MRI yesterday. Ortho evaluated and plans pending.     Medications:  Continuous Infusions:  Scheduled Meds:   enoxparin  40 mg Subcutaneous Q24H (prophylaxis, 1700)    folic acid  1 mg Oral Daily    gabapentin  300 mg Oral TID    hydrOXYzine pamoate  25 mg Oral QHS    LIDOcaine  1 patch Transdermal Q24H    methadone  70 mg Oral Daily    mupirocin   Nasal BID    nicotine  1 patch Transdermal Daily    oxacillin 12 g in  mL CONTINUOUS INFUSION  12 g Intravenous Q24H    polyethylene glycol  17 g Oral BID    senna-docusate 8.6-50 mg  1 tablet Oral BID    sodium chloride 0.9%  10 mL Intravenous Q6H    thiamine  100 mg Oral Daily     PRN Meds:  Current Facility-Administered Medications:     acetaminophen, 1,000 mg, Per OG tube, Q6H PRN    albuterol-ipratropium, 3 mL, Nebulization, Q4H PRN    dextrose 10%, 12.5 g, Intravenous, PRN    dextrose 10%, 12.5 g, Intravenous, PRN    dextrose 10%, 25 g, Intravenous, PRN    glucagon (human recombinant), 1 mg, Intramuscular, PRN    glucose, 16 g, Oral, PRN    glucose, 24 g, Oral, PRN    hydrOXYzine pamoate, 50 mg, Oral, Q6H PRN    insulin aspart U-100, 0-5 Units, Subcutaneous, QID (AC + HS) PRN    melatonin, 6 mg, Oral, Nightly PRN    naloxone, 0.4 mg, Intravenous, PRN    ondansetron, 4 mg, Intravenous, Q6H PRN    oxyCODONE, 10 mg, Oral, Q4H PRN    sodium chloride 0.9%, 10 mL, Intravenous, Q12H PRN    Flushing PICC/Midline Protocol, , , Until Discontinued **AND** sodium chloride 0.9%, 10 mL, Intravenous, Q6H **AND** sodium chloride 0.9%, 10 mL, Intravenous, PRN    sodium chloride 0.9%, 10 mL, Intravenous, PRN     Review of patient's allergies indicates:  No Known Allergies  Objective:     Vital Signs (Most Recent):  Temp: 98.7 °F (37.1 °C) (10/06/24 0750)  Pulse: 83 (10/06/24 0750)  Resp: 18 (10/06/24 0750)  BP: 121/87 (10/06/24 0750)  SpO2: 95 % (10/06/24 0750) Vital Signs (24h Range):  Temp:  [98.2 °F (36.8 °C)-99.2 °F  (37.3 °C)] 98.7 °F (37.1 °C)  Pulse:  [14-96] 83  Resp:  [16-20] 18  SpO2:  [92 %-97 %] 95 %  BP: (106-144)/(69-87) 121/87     Weight: 65.2 kg (143 lb 11.8 oz)  Body mass index is 26.29 kg/m².    Intake/Output - Last 3 Shifts         10/04 0700  10/05 0659 10/05 0700  10/06 0659 10/06 0700  10/07 0659    P.O. 840 260     I.V. (mL/kg) 50 (0.8)      Other       IV Piggyback 631.8      Total Intake(mL/kg) 1521.8 (23.3) 260 (4)     Urine (mL/kg/hr) 1650 (1.1) 1200 (0.8)     Drains       Stool       Total Output 1650 1200     Net -128.2 -940                     Physical Exam  Vitals and nursing note reviewed.   Constitutional:       General: She is not in acute distress.     Appearance: Normal appearance.   HENT:      Head: Normocephalic and atraumatic.   Cardiovascular:      Rate and Rhythm: Normal rate.   Pulmonary:      Effort: Pulmonary effort is normal. No respiratory distress.   Abdominal:      General: Abdomen is flat. There is no distension.      Palpations: Abdomen is soft.      Tenderness: There is no abdominal tenderness.   Skin:     Comments: Left flank with fluctuance and erythematous changes to the overlying skin   Neurological:      General: No focal deficit present.      Mental Status: She is alert and oriented to person, place, and time.   Psychiatric:         Behavior: Behavior normal.         Thought Content: Thought content normal.          Significant Labs:  I have reviewed all pertinent lab results within the past 24 hours.  CBC:   Recent Labs   Lab 10/05/24  0100   WBC 9.15  9.15   RBC 3.16*  3.16*   HGB 8.5*  8.5*   HCT 27.9*  27.9*     243   MCV 88  88   MCH 26.9*  26.9*   MCHC 30.5*  30.5*     CMP:   Recent Labs   Lab 10/05/24  0100   *  148*   CALCIUM 8.2*  8.2*   ALBUMIN 1.3*  1.3*   PROT 6.0  6.0     138   K 3.5  3.5   CO2 27  27     102   BUN 3*  3*   CREATININE 0.6  0.6   ALKPHOS 122  122   ALT 7*  7*   AST 11  11   BILITOT 0.7  0.7        Significant Diagnostics:  I have reviewed all pertinent imaging results/findings within the past 24 hours.

## 2024-10-06 NOTE — ASSESSMENT & PLAN NOTE
Mari Sloan is a 49 y.o. female who presented with multiple fluid collections and concern for osteomyelitis at her SI joint. She has undergone IR drainage during this admission. Repeat imaging demonstrates persistent fluid at the pubic symphysis, left flank. General surgery consulted.     - patient seen and examined   - imaging reviewed  - discussed case with orthopedic   - MRI pelvis > Septic arthritis/osteomyelitis involving the left SI joint and pubic symphysis, with adjacent soft tissue abscesses   - ortho plans pending. If she will require OR with ortho, we will plan to I&D the left flank in the OR with them at that time  - the left flank collection has a superficial, organized component that would benefit from drainage  - do not think would be well tolerated at bedside. Will discuss with ortho  - please call with questions

## 2024-10-06 NOTE — ASSESSMENT & PLAN NOTE
Mari Sloan is a 49 y.o. female who presented with multiple fluid collections and concern for osteomyelitis at her SI joint. She has undergone IR drainage during this admission. Repeat imaging demonstrates persistent fluid at the pubic symphysis, left flank. General surgery consulted.     - patient seen and examined   - imaging reviewed  - discussed case with orthopedic   - MRI pelvis > Septic arthritis/osteomyelitis involving the left SI joint and pubic symphysis, with adjacent soft tissue abscesses   - Ortho not planning any surgical intervention. Recommending IR drainage of deep pelvic fluids   - the left flank collection has a superficial, organized component that would benefit from drainage  - do not think would be well tolerated at bedside. I&D in the OR today 10/7/24  - NPO, mIVF  - consented, marked   - please call with questions

## 2024-10-06 NOTE — PROGRESS NOTES
[facility] ([place] [id number])  Orthopedics  Progress Note    Patient Name: Mari Sloan  MRN: [id number]  Admission Date: 9/16/2024  Hospital Length of Stay: 20 days  Attending Provider: Paty Kerr MD  Primary Care Provider: Mary Fong APRN  Follow-up For: Procedure(s) (LRB):  L3-L5 laminectomy with epidural abscess evac (N/A)  WASHOUT, WOUND, SPINE    Post-Operative Day: 17 Days Post-Op  Subjective:     Principal Problem:Paraspinal abscess    Principal Orthopedic Problem: as above    Interval History: Pt seen and examined at bedside. VSSCarlos A ESPINAL. She has a bulging left flank mass that's fluctuant. She continues to experience left hip pain that is about the same severity as during presentation. No fever, chills nausea and vomiting. She walks intermittently with PT. New MRI revealed multiple foci of fluid collection & inflammation around the SI joint.       Review of patient's allergies indicates:  No Known Allergies    Current Facility-Administered Medications   Medication    acetaminophen tablet 1,000 mg    albuterol-ipratropium 2.5 mg-0.5 mg/3 mL nebulizer solution 3 mL    dextrose 10% bolus 125 mL 125 mL    dextrose 10% bolus 125 mL 125 mL    dextrose 10% bolus 250 mL 250 mL    enoxaparin injection 40 mg    folic acid tablet 1 mg    gabapentin capsule 300 mg    glucagon (human recombinant) injection 1 mg    glucose chewable tablet 16 g    glucose chewable tablet 24 g    hydrOXYzine pamoate capsule 25 mg    hydrOXYzine pamoate capsule 50 mg    insulin aspart U-100 pen 0-5 Units    LIDOcaine 5 % patch 1 patch    melatonin tablet 6 mg    methadone tablet 70 mg    mupirocin 2 % ointment    naloxone 0.4 mg/mL injection 0.4 mg    nicotine 21 mg/24 hr 1 patch    ondansetron injection 4 mg    oxacillin 12 g in  mL CONTINUOUS INFUSION    oxyCODONE immediate release tablet Tab 10 mg    polyethylene glycol packet 17 g    senna-docusate 8.6-50 mg per tablet 1 tablet    sodium chloride 0.9%  "flush 10 mL    sodium chloride 0.9% flush 10 mL    And    sodium chloride 0.9% flush 10 mL    sodium chloride 0.9% flush 10 mL    thiamine tablet 100 mg     Objective:     Vital Signs (Most Recent):  Temp: 98.9 °F (37.2 °C) (10/06/24 0004)  Pulse: 80 (10/06/24 0004)  Resp: 20 (10/06/24 0004)  BP: 111/76 (10/06/24 0004)  SpO2: 97 % (10/06/24 0004) Vital Signs (24h Range):  Temp:  [97.9 °F (36.6 °C)-99.2 °F (37.3 °C)] 98.9 °F (37.2 °C)  Pulse:  [14-96] 80  Resp:  [16-20] 20  SpO2:  [92 %-97 %] 97 %  BP: ()/(63-87) 111/76     Weight: 65.2 kg (143 lb 11.8 oz)  Height: 5' 2" (157.5 cm)  Body mass index is 26.29 kg/m².      Intake/Output Summary (Last 24 hours) at 10/6/2024 0114  Last data filed at 10/5/2024 1704  Gross per 24 hour   Intake 260 ml   Output 800 ml   Net -540 ml        Ortho/SPM Exam     AAOx4  NAD  Reg rate  No increased WOB    LLE    Fluctuant mass present to the flank  No open drainage observed  Compartments soft/compressible  SILT throughout  AROM of toes, ankle, and knee intact.  WWP. Brisk cap refill      Significant Labs: CBC:   Recent Labs   Lab 10/04/24  0315 10/05/24  0100   WBC 8.71 9.15  9.15   HGB 8.3* 8.5*  8.5*   HCT 27.4* 27.9*  27.9*    243  243     CMP:   Recent Labs   Lab 10/04/24  0315 10/05/24  0100    138  138   K 3.7 3.5  3.5    102  102   CO2 25 27  27    148*  148*   BUN 3* 3*  3*   CREATININE 0.6 0.6  0.6   CALCIUM 8.0* 8.2*  8.2*   PROT 5.7* 6.0  6.0   ALBUMIN 1.2* 1.3*  1.3*   BILITOT 0.8 0.7  0.7   ALKPHOS 114 122  122   AST 11 11  11   ALT 9* 7*  7*   ANIONGAP 9 9  9     All pertinent labs within the past 24 hours have been reviewed.    Significant Imaging: I have reviewed and interpreted all pertinent imaging results/findings.  Assessment/Plan:     Other osteomyelitis, multiple sites  Mari Sloan is a 49 y.o. female with left SI joint septic arthritis & possible sacral/ilium osteomyelitis. Recent Mri concerning for " multiple foci of fluid collection in the pelvis involving the SI joints and pubic symphysis. She has a PMH significant for HTN, DM, substance use disorder (heroin, fentanyl) and is being treated for sepsis in the setting of epidural and paraspinal abscesses.  She has been treated by neurosurgery for I&D of the paraspinal abscesses. IR attempted drainage of retroperitoneal fluid with collection of about 2cc of fluid. The cultures from both procedures grew S. Aureus. She is been hemodynamically stable. She is being followed by I&D is currently on Oxacillin. General surgery have plans for bedside I&D of left flank fluctuant fluid. Will discuss further management from orthopedic perspective with staff.     - Antibiotics: continue per ID recs  - WBAT LLE  - DVT Prophylaxis: SCDs at all times while in bed. Pt is on lovenox per primary team  - Pain control: multimodal pain management  - PT/OT: evaluate and treat              Aleksandr Shaffer MD  Orthopedics  [facility] ([place])

## 2024-10-06 NOTE — PROGRESS NOTES
[facility] ([facility]-)  General Surgery  Progress Note    Subjective:     History of Present Illness:  Mari Sloan is a 49 y.o. 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 She has been admitted for 3 weeks and has been on broad spectrum antibiotics, undergone multiple IR drains, and a L3-L4 laminectomy for an epidural abscess. She has been found to have infective endocarditis. Echo on 9/17 does not mention endocarditis, but she is presumed positive and being treated for it. She has been bacteremic with MSSA, now on oxacillin. Her abscess cultures and blood cultures have all been MSSA. She recently developed left flank pain and had a CT that showed pelvic abscesses and a left flank fluid collection. General surgery was consulted to evaluate her left flank fluid collection.      Post-Op Info:  Procedure(s) (LRB):  L3-L5 laminectomy with epidural abscess evac (N/A)  WASHOUT, WOUND, SPINE   17 Days Post-Op     Interval History: NAEO. AF, HDS. Pain controlled. Pelvic MRI yesterday. Ortho evaluated and plans pending.     Medications:  Continuous Infusions:  Scheduled Meds:   enoxparin  40 mg Subcutaneous Q24H (prophylaxis, 1700)    folic acid  1 mg Oral Daily    gabapentin  300 mg Oral TID    hydrOXYzine pamoate  25 mg Oral QHS    LIDOcaine  1 patch Transdermal Q24H    methadone  70 mg Oral Daily    mupirocin   Nasal BID    nicotine  1 patch Transdermal Daily    oxacillin 12 g in  mL CONTINUOUS INFUSION  12 g Intravenous Q24H    polyethylene glycol  17 g Oral BID    senna-docusate 8.6-50 mg  1 tablet Oral BID    sodium chloride 0.9%  10 mL Intravenous Q6H    thiamine  100 mg Oral Daily     PRN Meds:  Current Facility-Administered Medications:     acetaminophen, 1,000 mg, Per OG tube, Q6H PRN    albuterol-ipratropium, 3 mL,  Nebulization, Q4H PRN    dextrose 10%, 12.5 g, Intravenous, PRN    dextrose 10%, 12.5 g, Intravenous, PRN    dextrose 10%, 25 g, Intravenous, PRN    glucagon (human recombinant), 1 mg, Intramuscular, PRN    glucose, 16 g, Oral, PRN    glucose, 24 g, Oral, PRN    hydrOXYzine pamoate, 50 mg, Oral, Q6H PRN    insulin aspart U-100, 0-5 Units, Subcutaneous, QID (AC + HS) PRN    melatonin, 6 mg, Oral, Nightly PRN    naloxone, 0.4 mg, Intravenous, PRN    ondansetron, 4 mg, Intravenous, Q6H PRN    oxyCODONE, 10 mg, Oral, Q4H PRN    sodium chloride 0.9%, 10 mL, Intravenous, Q12H PRN    Flushing PICC/Midline Protocol, , , Until Discontinued **AND** sodium chloride 0.9%, 10 mL, Intravenous, Q6H **AND** sodium chloride 0.9%, 10 mL, Intravenous, PRN    sodium chloride 0.9%, 10 mL, Intravenous, PRN     Review of patient's allergies indicates:  No Known Allergies  Objective:     Vital Signs (Most Recent):  Temp: 98.7 °F (37.1 °C) (10/06/24 0750)  Pulse: 83 (10/06/24 0750)  Resp: 18 (10/06/24 0750)  BP: 121/87 (10/06/24 0750)  SpO2: 95 % (10/06/24 0750) Vital Signs (24h Range):  Temp:  [98.2 °F (36.8 °C)-99.2 °F (37.3 °C)] 98.7 °F (37.1 °C)  Pulse:  [14-96] 83  Resp:  [16-20] 18  SpO2:  [92 %-97 %] 95 %  BP: (106-144)/(69-87) 121/87     Weight: 65.2 kg (143 lb 11.8 oz)  Body mass index is 26.29 kg/m².    Intake/Output - Last 3 Shifts         10/04 0700  10/05 0659 10/05 0700  10/06 0659 10/06 0700  10/07 0659    P.O. 840 260     I.V. (mL/kg) 50 (0.8)      Other       IV Piggyback 631.8      Total Intake(mL/kg) 1521.8 (23.3) 260 (4)     Urine (mL/kg/hr) 1650 (1.1) 1200 (0.8)     Drains       Stool       Total Output 1650 1200     Net -128.2 -940                     Physical Exam  Vitals and nursing note reviewed.   Constitutional:       General: She is not in acute distress.     Appearance: Normal appearance.   HENT:      Head: Normocephalic and atraumatic.   Cardiovascular:      Rate and Rhythm: Normal rate.   Pulmonary:       Effort: Pulmonary effort is normal. No respiratory distress.   Abdominal:      General: Abdomen is flat. There is no distension.      Palpations: Abdomen is soft.      Tenderness: There is no abdominal tenderness.   Skin:     Comments: Left flank with fluctuance and erythematous changes to the overlying skin   Neurological:      General: No focal deficit present.      Mental Status: She is alert and oriented to person, place, and time.   Psychiatric:         Behavior: Behavior normal.         Thought Content: Thought content normal.          Significant Labs:  I have reviewed all pertinent lab results within the past 24 hours.  CBC:   Recent Labs   Lab 10/05/24  0100   WBC 9.15  9.15   RBC 3.16*  3.16*   HGB 8.5*  8.5*   HCT 27.9*  27.9*     243   MCV 88  88   MCH 26.9*  26.9*   MCHC 30.5*  30.5*     CMP:   Recent Labs   Lab 10/05/24  0100   *  148*   CALCIUM 8.2*  8.2*   ALBUMIN 1.3*  1.3*   PROT 6.0  6.0     138   K 3.5  3.5   CO2 27  27     102   BUN 3*  3*   CREATININE 0.6  0.6   ALKPHOS 122  122   ALT 7*  7*   AST 11  11   BILITOT 0.7  0.7       Significant Diagnostics:  I have reviewed all pertinent imaging results/findings within the past 24 hours.  Assessment/Plan:     Endocarditis  Mari Sloan is a 49 y.o. lady with infectious endocarditis and multiple abscesses.    Other osteomyelitis, multiple sites  Mari Sloan is a 49 y.o. female who presented with multiple fluid collections and concern for osteomyelitis at her SI joint. She has undergone IR drainage during this admission. Repeat imaging demonstrates persistent fluid at the pubic symphysis, left flank. General surgery consulted.     - patient seen and examined   - imaging reviewed  - discussed case with orthopedic   - MRI pelvis > Septic arthritis/osteomyelitis involving the left SI joint and pubic symphysis, with adjacent soft tissue abscesses    - Ortho not planning any surgical intervention.  Recommending IR drainage of deep pelvic fluids   - the left flank collection has a superficial, organized component that would benefit from drainage  - do not think would be well tolerated at bedside. Will plan for I&D in the OR tomorrow 10/7/24  - NPO at midnight  - consent to be obtained  - please call with questions        Liliana Johnston MD  General Surgery  [facility] ([place])

## 2024-10-06 NOTE — ANESTHESIA PREPROCEDURE EVALUATION
[facility]  Anesthesia Pre-Operative Evaluation         Patient Name: Mari Sloan  YOB: 1975  MRN: [id number]    SUBJECTIVE:     Pre-operative evaluation for Procedure(s) (LRB):  Incision and Drainage (Left)     10/06/2024    Mari Sloan is a 49 y.o. female w/ a significant PMHx of HTN, T2DM, and drug use (on methadone) transferred for MSSA bacteremia complicated by spinal osteomyelitis, septic arthritis of the left SI joint and Lt flank/ retroperitoneal abscess. On broad spectrum antibiotics. S/p L3-5 laminectomy and epidural abscess washout on 9/18.    Patient now presents for the above procedure(s).    Echo 9/2024    Left Ventricle: The left ventricle is normal in size. Normal wall thickness. There is concentric remodeling. There is normal systolic function with a visually estimated ejection fraction of 60 - 65%. There is normal diastolic function.    Right Ventricle: Normal right ventricular cavity size. Systolic function is normal.    Right Atrium: Catheter present in the right atrium.    Mitral Valve: The mitral valve is structurally normal. Redundant chordal structures noted. There is no stenosis. There is mild regurgitation.    Pulmonary Artery: The estimated pulmonary artery systolic pressure is 39 mmHg.    Cannot entirely exclude mitral vegetation vs redundant chordae.  If high clinical suspicion for endocarditis, would rx as such (not clear MARICHUY would be able to exclude vegetation based on TTE findings).      LDA:   PICC Double Lumen 09/27/24 1035 right basilic (Active)   Line Necessity Review Longterm central access required 10/06/24 0800   Verification by X-ray Yes 10/02/24 0900   Site Assessment No redness;No swelling;No drainage 10/06/24 0800   Extremity Assessment Distal to IV No abnormal discoloration 10/06/24 0800   Line Securement Device Secured with sutures 10/05/24 2040   Dressing Type CHG impregnated dressing/sponge;Central line dressing 10/06/24 0800    Dressing Status Clean;Dry;Intact 10/06/24 0800   Dressing Intervention Integrity maintained 10/06/24 0800   Date on Dressing 10/03/24 10/06/24 0800   Dressing Due to be Changed 10/10/24 10/06/24 0800   Distal Patency/Care infusing 10/06/24 0800   Proximal 1 Patency/Care flushed w/o difficulty;blood return present 10/06/24 0800   Current Insertion Depth (cm) 33 cm 09/27/24 1032   Current Exposed Catheter (cm) 0 cm 09/27/24 1032   Extremity Circumference (cm) 25 cm 09/27/24 1032   Number of days: 9       Female External Urinary Catheter w/ Suction 09/24/24 (Active)   Skin reddened;no breakdown;perineum cleansed w/ soap and water 10/06/24 0800   Tolerance no signs/symptoms of discomfort 10/06/24 0800   Suction Continuous suction at 60 mmHg 10/06/24 0800   Date of last wick change 10/06/24 10/06/24 0800   Time of last wick change 1930 10/04/24 2108   Output (mL) 150 mL 10/04/24 2108   Number of days: 12       Prev airway:   Placement Date 09/30/24; Placement Time 1632; Method of Intubation Direct laryngoscopy; Mask Ventilation Easy; Intubated Postinduction; Blade Matos #2; Airway Device Size 7.0; Cuff Inflation Minimal occlusive pressure; Placement Verified by Capnometry, Fiberoptic bronchoscopic inspection; Complicating Factors None; Intubation Findings Bilateral breath sounds, Atraumatic/Condition of teeth unchanged; Securement Lips; Complications None         Patient Active Problem List   Diagnosis    Other osteomyelitis, multiple sites    Psoas muscle abscess    History of drug use    Smoker    Uncontrolled type 2 diabetes mellitus with hyperglycemia    Anemia, unspecified    Paraspinal abscess    Hepatitis C    Severe sepsis    Sepsis    Staphylococcus aureus bacteremia    Endocarditis    Refeeding syndrome    Epidural abscess    Moderate malnutrition    Retroperitoneal fluid collection       Review of patient's allergies indicates:  No Known Allergies    Current Inpatient Medications:   folic acid  1 mg Oral  Daily    gabapentin  300 mg Oral TID    hydrOXYzine pamoate  25 mg Oral QHS    LIDOcaine  1 patch Transdermal Q24H    methadone  70 mg Oral Daily    mupirocin   Nasal BID    nicotine  1 patch Transdermal Daily    oxacillin 12 g in  mL CONTINUOUS INFUSION  12 g Intravenous Q24H    polyethylene glycol  17 g Oral BID    senna-docusate 8.6-50 mg  1 tablet Oral BID    sodium chloride 0.9%  10 mL Intravenous Q6H    thiamine  100 mg Oral Daily       No current facility-administered medications on file prior to encounter.     Current Outpatient Medications on File Prior to Encounter   Medication Sig Dispense Refill    gabapentin (NEURONTIN) 300 MG capsule Take 300 mg by mouth 3 (three) times daily.      hydrOXYzine pamoate (VISTARIL) 25 MG Cap Take 25 mg by mouth 3 (three) times daily.      metFORMIN (GLUCOPHAGE) 500 MG tablet Take 500 mg by mouth 2 (two) times daily with meals.      methadone (DOLOPHINE) 10 MG tablet Take 70 mg by mouth Daily.      cloNIDine (CATAPRES) 0.2 MG tablet Take 0.2 mg by mouth 3 (three) times daily.      glipiZIDE (GLUCOTROL) 5 MG tablet Take 5 mg by mouth 2 (two) times daily.         Past Surgical History:   Procedure Laterality Date     SECTION      LAMINECTOMY N/A 2024    Procedure: L3-L5 laminectomy with epidural abscess evac;  Surgeon: Sourav Jim DO;  Location: [address];  Service: Neurosurgery;  Laterality: N/A;    MAGNETIC RESONANCE IMAGING N/A 2024    Procedure: MRI (Magnetic Resonance Imagine);  Surgeon: Dolores Storey;  Location: [facility];  Service: Anesthesiology;  Laterality: N/A;  conscious sedaation MRI lumbar spine w/ and without contrast    WASHOUT, WOUND, SPINE  2024    Procedure: WASHOUT, WOUND, SPINE;  Surgeon: Sourav Jim DO;  Location: [address];  Service: Neurosurgery;;       Social History     Socioeconomic History    Marital status:    Tobacco Use    Smoking status: Every Day     Current packs/day: 0.50      Types: Cigarettes    Smokeless tobacco: Never   Substance and Sexual Activity    Alcohol use: Not Currently    Drug use: Not Currently     Social Drivers of Health     Financial Resource Strain: Low Risk  (9/18/2024)    Overall Financial Resource Strain (CARDIA)     Difficulty of Paying Living Expenses: Not very hard   Food Insecurity: No Food Insecurity (9/18/2024)    Hunger Vital Sign     Worried About Running Out of Food in the Last Year: Never true     Ran Out of Food in the Last Year: Never true   Transportation Needs: Unmet Transportation Needs (9/18/2024)    TRANSPORTATION NEEDS     Transportation : Yes, it has kept me from medical appointments or from getting my medications.   Physical Activity: Patient Declined (9/18/2024)    Exercise Vital Sign     Days of Exercise per Week: Patient declined     Minutes of Exercise per Session: Patient declined   Stress: Stress Concern Present (9/18/2024)    [employer] [place] [employer] - Occupational Stress Questionnaire     Feeling of Stress : Rather much   Housing Stability: Low Risk  (9/18/2024)    Housing Stability Vital Sign     Unable to Pay for Housing in the Last Year: No     Homeless in the Last Year: No       OBJECTIVE:     Vital Signs Range (Last 24H):  Temp:  [36.9 °C (98.5 °F)-37.3 °C (99.2 °F)]   Pulse:  [73-90]   Resp:  [18-20]   BP: (111-150)/(71-93)   SpO2:  [93 %-97 %]       Significant Labs:  Lab Results   Component Value Date    WBC 9.15 10/05/2024    WBC 9.15 10/05/2024    HGB 8.5 (L) 10/05/2024    HGB 8.5 (L) 10/05/2024    HCT 27.9 (L) 10/05/2024    HCT 27.9 (L) 10/05/2024     10/05/2024     10/05/2024    CHOL 203 (H) 06/02/2023    TRIG 137 09/19/2024    HDL 28 (L) 06/02/2023    ALT 7 (L) 10/05/2024    ALT 7 (L) 10/05/2024    AST 11 10/05/2024    AST 11 10/05/2024     10/05/2024     10/05/2024    K 3.5 10/05/2024    K 3.5 10/05/2024     10/05/2024     10/05/2024    CREATININE 0.6 10/05/2024     CREATININE 0.6 10/05/2024    BUN 3 (L) 10/05/2024    BUN 3 (L) 10/05/2024    CO2 27 10/05/2024    CO2 27 10/05/2024    INR 1.4 (H) 09/19/2024    HGBA1C 7.9 (H) 09/17/2024       Diagnostic Studies: No relevant studies.    EKG:   Results for orders placed or performed during the hospital encounter of 09/16/24   EKG 12-lead    Collection Time: 09/16/24  4:42 PM   Result Value Ref Range    QRS Duration 96 ms    [facility] QTC Calculation 438 ms    Narrative    Test Reason : E87.6,    Vent. Rate : 100 BPM     Atrial Rate : 100 BPM     P-R Int : 138 ms          QRS Dur : 096 ms      QT Int : 340 ms       P-R-T Axes : 060 062 095 degrees     QTc Int : 438 ms    Normal sinus rhythm  Nonspecific ST and T wave abnormality  Abnormal ECG    Confirmed by Ching Vines MD ([id number]) on 9/17/2024 7:20:39 AM    Referred By: [email or web address]   SELF           Confirmed By:Ching Vines MD       2D ECHO:  TTE:  Results for orders placed or performed during the hospital encounter of 09/16/24   Echo Saline Bubble? No   Result Value Ref Range    BSA 1.69 m2    Jarrell's Biplane MOD Ejection Fraction 69 %    A2C EF 72 %    A4C EF 67 %    LVOT stroke volume 75.14 cm3    LVIDd 4.74 3.5 - 6.0 cm    LV Systolic Volume 36.23 mL    LV Systolic Volume Index 21.8 mL/m2    LVIDs 3.04 2.1 - 4.0 cm    LV Diastolic Volume 104.25 mL    LV Diastolic Volume Index 62.80 mL/m2    LV EDV A2C [id number] mL    LV EDV A4C 93.39 mL    Left Ventricular End Systolic Volume by Teichholz Method 36.23 mL    Left Ventricular End Diastolic Volume by Teichholz Method 104.25 mL    IVS 1.08 0.6 - 1.1 cm    LVOT diameter 2.04 cm    LVOT area 3.3 cm2    FS 36 28 - 44 %    Left Ventricle Relative Wall Thickness 0.42 cm    PW 0.99 0.6 - 1.1 cm    LV mass 174.76 g    LV Mass Index 105 g/m2    MV Peak E Maykel 1.11 m/s    TDI LATERAL 0.14 m/s    TDI SEPTAL 0.10 m/s    E/E' ratio 9.25 m/s    MV Peak A Maykel 0.78 m/s    TR Max Maykel 2.98 m/s    E/A ratio 1.42     E wave deceleration time  126.45 msec    LV SEPTAL E/E' RATIO 11.10 m/s    LV LATERAL E/E' RATIO 7.93 m/s    LVOT peak carla 1.38 m/s    Left Ventricular Outflow Tract Mean Velocity 0.90 cm/s    Left Ventricular Outflow Tract Mean Gradient 3.65 mmHg    RV S' 19.60 cm/s    TAPSE 2.05 cm    RV/LV Ratio 0.75 cm    LA size 4.20 cm    Left Atrium Minor Axis 4.51 cm    Left Atrium Major Axis 4.07 cm    RA Major Axis 3.22 cm    AV mean gradient 6 mmHg    AV peak gradient 11 mmHg    Ao peak carla 1.63 m/s    Ao VTI 30.50 cm    LVOT peak VTI 23.00 cm    AV valve area 2.46 cm²    AV Velocity Ratio 0.85     AV index (prosthetic) 0.75     JACOB by Velocity Ratio 2.77 cm²    MV stenosis pressure 1/2 time 36.67 ms    MV valve area p 1/2 method 6.00 cm2    Triscuspid Valve Regurgitation Peak Gradient 36 mmHg    PV PEAK VELOCITY 1.15 m/s    PV peak gradient 5 mmHg    Sinus 3.40 cm    STJ 2.79 cm    Ascending aorta 3.09 cm    IVC diameter 1.29 cm    Mean e' 0.12 m/s    ZLVIDS 0.42     ZLVIDD 0.19     RVDD 3.56 cm    HERNANDEZ 36.8 mL/m2    LA Vol 61.10 cm3    RV- rojas basal diam 3.6 cm    LA WIDTH 4.0 cm    RA Width 2.7 cm    TV resting pulmonary artery pressure 39 mmHg    RV TB RVSP 6 mmHg    Est. RA pres 3 mmHg    Narrative      Left Ventricle: The left ventricle is normal in size. Normal wall   thickness. There is concentric remodeling. There is normal systolic   function with a visually estimated ejection fraction of 60 - 65%. There is   normal diastolic function.    Right Ventricle: Normal right ventricular cavity size. Systolic   function is normal.    Right Atrium: Catheter present in the right atrium.    Mitral Valve: The mitral valve is structurally normal. Redundant   chordal structures noted. There is no stenosis. There is mild   regurgitation.    Pulmonary Artery: The estimated pulmonary artery systolic pressure is   39 mmHg.    Cannot entirely exclude mitral vegetation vs redundant chordae.  If   high clinical suspicion for endocarditis, would rx as such  (not clear MARICHUY   would be able to exclude vegetation based on TTE findings).         MARICHUY:  No results found for this or any previous visit.    ASSESSMENT/PLAN:           Pre-op Assessment    I have reviewed the Patient Summary Reports.     I have reviewed the Nursing Notes. I have reviewed the NPO Status.   I have reviewed the Medications.     Review of Systems  Anesthesia Hx:  No problems with previous Anesthesia   History of prior surgery of interest to airway management or planning:  Previous anesthesia: General        Denies Family Hx of Anesthesia complications.    Denies Personal Hx of Anesthesia complications.                    Social:  Non-Smoker, No Alcohol Use Hx of substance abuse in the past      Hematology/Oncology:       -- Denies Anemia:                  Denies Current/Recent Cancer                Cardiovascular:  Exercise tolerance: poor    Denies Hypertension.    Denies CAD.     Denies Dysrhythmias.    Denies CHF.    no hyperlipidemia                       Hypertension         Pulmonary:    Denies COPD.  Denies Asthma.   Denies Shortness of breath.   Denies Sleep Apnea.                Renal/:   Denies Chronic Renal Disease.                Hepatic/GI:      Denies GERD. Denies Liver Disease. Hepatitis (s/p treatment), C        Liver Disease, Hepatitis        Musculoskeletal:  Denies Arthritis.               Neurological:    Denies CVA. Denies Neuromuscular Disease.   Denies Seizures.          Denies Chronic Pain Syndrome                       Neuromuscular Disease   Endocrine:  Diabetes, type 2   Denies Hyperthyroidism.  Diabetes                    Denies Obesity / BMI > 30  Psych:   denies anxiety denies depression                Physical Exam  General: Alert, Cooperative and Anxious    Airway:  Mallampati: II   Mouth Opening: Small, but > 3cm  TM Distance: Normal  Tongue: Normal  Neck ROM: Normal ROM    Dental:  Periodontal disease        Anesthesia Plan  Type of Anesthesia, risks & benefits  discussed:    Anesthesia Type: Gen ETT  Intra-op Monitoring Plan: Standard ASA Monitors  Post Op Pain Control Plan: multimodal analgesia and IV/PO Opioids PRN  Induction:  IV  Airway Plan: Direct and Video, Post-Induction  Informed Consent: Informed consent signed with the Patient and all parties understand the risks and agree with anesthesia plan.  All questions answered.   ASA Score: 3  Day of Surgery Review of History & Physical: H&P Update referred to the surgeon/provider.    Ready For Surgery From Anesthesia Perspective.     .

## 2024-10-06 NOTE — PT/OT/SLP PROGRESS
Physical Therapy      Patient Name:  Mari Sloan   MRN:  [id number]    Patient not seen today secondary to attempted co-treatment with O.T. but Patient adamantly declined treatment for today. Will follow-up on next scheduled visit.

## 2024-10-06 NOTE — PT/OT/SLP PROGRESS
Occupational Therapy      Patient Name:  Mari Sloan   MRN:  [id number]    Patient not seen today secondary to Pain, Patient fatigue. Will follow-up tomorrow.    10/6/2024

## 2024-10-06 NOTE — ASSESSMENT & PLAN NOTE
MSSA Endocarditis  MSSA Paraspinal abscess  MSSA Psoas abscess    Blood cx 9/16 with MSSA  2D echo 9/17:  Cannot entirely exclude mitral vegetation vs redundant chordae. If high clinical suspicion for endocarditis, would rx as such (not clear MARICHUY would be able to exclude vegetation based on TTE findings).   Noted to have extensive fluid collection on left extending from T11 through left iliacus muscle and within the left iliopsoas muscle.  Multifocal collections of air including subcutaneous tissues on the left flank noted.  ID/Neurosurgery consulted.   S/p L3-L5 laminectomy with epidural abscess evacuation 9/19   Ortho consulted. Rec continued abx tx and no further interventions  IR SI joint aspiration and abscess drain placement 9/21. Unable to aspirate joint  IR drain removed on 09/27.    Repeat CT abdomen pelvis 9/28 ordered to look for recollection of fluid showed an ill-defined subcutaneous edema and soft tissue inflammatory change in the surgical bed and subcutaneous soft tissues without discrete organized fluid collection,  Stable size of 3.6 cm fluid collection along the left posterior pararenal space which appears to communicate with the with the aforementioned collection and fascial thickening.   IR placed drain for retroperitoneal fluid collection on 9/30.  Culture with MSSA  ID consulted:  Suggested Oxacillin through 11/18 with repeat imaging prior to completion  Will need LTAC for completion of IV antibiotics given history of IVDA  PICC placed 9/27  Repeat CRP ordered 10/3 given worsening pain and swelling - CRP down to 32  Repeat CT abdomen/pelvis was ordered to assess for drain removal, that showed worsening abscesses.  Discussed with ID who suggested further source control and to continue Oxacillin   Gen Surg, Ortho, NSGY and IR consulted - Greatly appreciate assistance!  MRI complete spine and pelvis ordered to further determine next steps.  MRI pelvis done 10/5, MRI spine to be done 10/7  Gen Surg  plans for OR I&D of left flank fluid collection on 10/7, NPO at midnight - Hold [facility]

## 2024-10-06 NOTE — SUBJECTIVE & OBJECTIVE
Principal Problem:Paraspinal abscess    Principal Orthopedic Problem: as above    Interval History: Pt seen and examined at bedside. KELLY ESPINAL. She has a bulging left flank mass that's fluctuant. She continues to experience left hip pain that is about the same severity as during presentation. No fever, chills nausea and vomiting. She walks intermittently with PT. New MRI revealed multiple foci of fluid collection & inflammation around the SI joint.       Review of patient's allergies indicates:  No Known Allergies    Current Facility-Administered Medications   Medication    acetaminophen tablet 1,000 mg    albuterol-ipratropium 2.5 mg-0.5 mg/3 mL nebulizer solution 3 mL    dextrose 10% bolus 125 mL 125 mL    dextrose 10% bolus 125 mL 125 mL    dextrose 10% bolus 250 mL 250 mL    enoxaparin injection 40 mg    folic acid tablet 1 mg    gabapentin capsule 300 mg    glucagon (human recombinant) injection 1 mg    glucose chewable tablet 16 g    glucose chewable tablet 24 g    hydrOXYzine pamoate capsule 25 mg    hydrOXYzine pamoate capsule 50 mg    insulin aspart U-100 pen 0-5 Units    LIDOcaine 5 % patch 1 patch    melatonin tablet 6 mg    methadone tablet 70 mg    mupirocin 2 % ointment    naloxone 0.4 mg/mL injection 0.4 mg    nicotine 21 mg/24 hr 1 patch    ondansetron injection 4 mg    oxacillin 12 g in  mL CONTINUOUS INFUSION    oxyCODONE immediate release tablet Tab 10 mg    polyethylene glycol packet 17 g    senna-docusate 8.6-50 mg per tablet 1 tablet    sodium chloride 0.9% flush 10 mL    sodium chloride 0.9% flush 10 mL    And    sodium chloride 0.9% flush 10 mL    sodium chloride 0.9% flush 10 mL    thiamine tablet 100 mg     Objective:     Vital Signs (Most Recent):  Temp: 98.9 °F (37.2 °C) (10/06/24 0004)  Pulse: 80 (10/06/24 0004)  Resp: 20 (10/06/24 0004)  BP: 111/76 (10/06/24 0004)  SpO2: 97 % (10/06/24 0004) Vital Signs (24h Range):  Temp:  [97.9 °F (36.6 °C)-99.2 °F (37.3 °C)] 98.9 °F (37.2  "°C)  Pulse:  [14-96] 80  Resp:  [16-20] 20  SpO2:  [92 %-97 %] 97 %  BP: ()/(63-87) 111/76     Weight: 65.2 kg (143 lb 11.8 oz)  Height: 5' 2" (157.5 cm)  Body mass index is 26.29 kg/m².      Intake/Output Summary (Last 24 hours) at 10/6/2024 0114  Last data filed at 10/5/2024 1704  Gross per 24 hour   Intake 260 ml   Output 800 ml   Net -540 ml        Ortho/SPM Exam     AAOx4  NAD  Reg rate  No increased WOB    LLE    Fluctuant mass present to the flank  No open drainage observed  Compartments soft/compressible  SILT throughout  AROM of toes, ankle, and knee intact.  WWP. Brisk cap refill      Significant Labs: CBC:   Recent Labs   Lab 10/04/24  0315 10/05/24  0100   WBC 8.71 9.15  9.15   HGB 8.3* 8.5*  8.5*   HCT 27.4* 27.9*  27.9*    243  243     CMP:   Recent Labs   Lab 10/04/24  0315 10/05/24  0100    138  138   K 3.7 3.5  3.5    102  102   CO2 25 27  27    148*  148*   BUN 3* 3*  3*   CREATININE 0.6 0.6  0.6   CALCIUM 8.0* 8.2*  8.2*   PROT 5.7* 6.0  6.0   ALBUMIN 1.2* 1.3*  1.3*   BILITOT 0.8 0.7  0.7   ALKPHOS 114 122  122   AST 11 11  11   ALT 9* 7*  7*   ANIONGAP 9 9  9     All pertinent labs within the past 24 hours have been reviewed.    Significant Imaging: I have reviewed and interpreted all pertinent imaging results/findings.  "

## 2024-10-06 NOTE — ASSESSMENT & PLAN NOTE
Mari Sloan is a 49 y.o. female with left SI joint septic arthritis & possible sacral/ilium osteomyelitis. Recent Mri concerning for multiple foci of fluid collection in the pelvis involving the SI joints and pubic symphysis. She has a PMH significant for HTN, DM, substance use disorder (heroin, fentanyl) and is being treated for sepsis in the setting of epidural and paraspinal abscesses.  She has been treated by neurosurgery for I&D of the paraspinal abscesses. IR attempted drainage of retroperitoneal fluid with collection of about 2cc of fluid. The cultures from both procedures grew S. Aureus. She is been hemodynamically stable. She is being followed by I&D is currently on Oxacillin. General surgery have plans for bedside I&D of left flank fluctuant fluid. Will discuss further management from orthopedic perspective with staff.     - Antibiotics: continue per ID recs  - WBAT LLE  - DVT Prophylaxis: SCDs at all times while in bed. Pt is on lovenox per primary team  - Pain control: multimodal pain management  - PT/OT: evaluate and treat

## 2024-10-06 NOTE — ASSESSMENT & PLAN NOTE
Patient's FSGs are uncontrolled due to hyperglycemia on current medication regimen.  Last A1c reviewed-   Lab Results   Component Value Date    HGBA1C 7.9 (H) 09/17/2024     Most recent fingerstick glucose reviewed-   Recent Labs   Lab 10/05/24  1346 10/05/24  1940 10/06/24  0746 10/06/24  1152   POCTGLUCOSE 126* 111* 126* 151*       Current correctional scale  Medium  Maintain anti-hyperglycemic dose as follows-   Antihyperglycemics (From admission, onward)    Start     Stop Route Frequency Ordered    10/02/24 0006  insulin aspart U-100 pen 0-5 Units         -- SubQ Before meals & nightly PRN 10/01/24 2306        Hold Oral hypoglycemics while patient is in the hospital.

## 2024-10-06 NOTE — PROGRESS NOTES
[facility] ([place] [id number])  [facility] Medicine  Progress Note    Patient Name: Mari Sloan  MRN: [id number]  Patient Class: IP- Inpatient   Admission Date: 9/16/2024  Length of Stay: 20 days  Attending Physician: Paty Kerr MD  Primary Care Provider: Mary Fong APRN        Subjective:     Principal Problem:Paraspinal abscess        HPI:  By Dr. Alberta Reese MD    49 y.o.  woman with h/o homelessness (recently was able to obtain living arrangements but states she was living [place] the bridge), NIDDM type 2, Essential hypertension, and substance use (denies any IVDU in he past or any illicit drug use recenly, denies ETOH abuse/overuse) presents to [facility] for further evaluation of her back pain.  She apparently presented to the [facility] Emergency Department on September 16 with nausea and vomiting and a mechanical fall 5 days prior. She reported pain worse in her back and on her sides radiating down both legs. She noted muscle spasm in her back and legs. She had no head trauma or loss of consciousness.  W/u in the [facility] ED noted significant hypokalemia, hypomagnesemia, severe anemia, metabolic alkalosis, and hyperglycemia. Vitals signs stable with no sepsis at this time noted. SBP>90, HR normal,afebrile.     Imaging studies of her lumbar spine and pelvis were concerning for osteomyelitis/septic arthritis of the left SI joint and L5-S1 along with an abnormal fluid collection extending from T11 through the left iliacus muscle concerning for abscess. Blood cultures sent.  In the emergency department she is receiving IV fluid, Zofran, Zosyn, vancomycin, potassium, magnesium, pain medication, and nausea medication.   She also received 2 units of PRBC for an H/H of 5.7/18.4,     Case discussed with Neurosurgery and Interventional Radiology. Plan would be for transfer to Hospital Medicine at [facility] for IR evaluation of the fluid collection and  "potential sampling of the joint osteomyelitis.  I reviewed the discussions made with the multiple sub specialists regarding transfer of this patient to [facility]: IR/General surgery/Neurosurgery/ER/Internal Med.     Neurosurgery contacted by the  did not feel surgical intervention was needed at this time but would follow along and requested [facility] for further management and IR backup. IR on call apparently read the CT and at the time felt "while the left retroperitoneal fluid collections do appear organized, percutaneous drainage is not advised given the extensive soft tissue infection superficial to these collections.  In addition, there is extensive evidence of soft tissue infection that does not appear organized or percutaneously drainable."     General surgery was consulted to review the case and felt that there was no immediate surgical intervention at this time to be had. I spoke with the on surgeon contacted regarding the location of the fluid collections and inquired if perhaps orthopedic surgery should be consulted to review given the involvement of bony pelvis.      I spoke orthopedic surgery who will see the patient but recommended re-consulting IR here and Neurosurgery given the diskitis/OM L5-S1. (Please see his consult note in the chart)     Repeat labs here again noted for persistently low mag, K, phos.  H/H stable with improved H/H. Pain control improved with Dilaudid.  I consulted ID for further assistance with ABX adjustments and changed her to Meropenem and doxy as well as continued Vanc. Echo ordred for am.     CT lumbar spine and pelvis had findings most concerning for infectious process. There were findings concerning for osteomyelitis and septic arthritis in the left SI joint. Findings concerning for osteomyelitis/diskitis L5-S1. Possible osteomyelitis and septic joint at the pubic symphysis. Abnormal fluid collection on the left extending from T11 through the left " iliacus muscle along and within the left iliopsoas muscle most concerning for abscess. Multiple additional small abscesses suspected in the pelvis. Multifocal collections of air in the fluid in the subcutaneous tissues of the left flank, incompletely imaged.    Chest x-ray noted a loop in the central venous catheter. Tip currently at the level of the brachiocephalic vein. Lungs are fairly clear.        Overview/Hospital Course:  Ms. Sloan was transferred from [facility] ED to [facility] ICU on 09/16/2024.  She presented to [facility] ED  for back pain, nausea/vomiting.  CT L-spine revealed osteomyelitis/diskitis L5-S1 along with abnormal fluid collection on left extending from T11 through left iliacus muscle and left iliopsoas muscle concerning for abscess.  Multifocal collections of air in fluid in subcutaneous tissues of left flank.  Patient was initiated on empiric vancomycin and meropenem.  Blood cultures with staph aureus.  Obtain echo.  Unable to repeat blood cultures given shortage of cx.  Neurosurgery recommended to obtain MRI L-spine, pending.  General surgery evaluated the patient and recommended urgent transfer to [facility] for surgical evaluation/source control given extent of necrosis.     Ms. Sloan was transferred to [facility] and admitted to the MICU 9/18 with concern for paraspinal abscess. Infectious workup was ordered. Blood cultures were positive for MSSA bacteremia. Neurosurgery, General Surgery and IR were consulted to evaluate the patient's paraspinal abscess. Neurosurgery performed a L3-L4 laminectomy for epidural abscess evacuation on 9/19/24 and the cultures grew MSSA. TTE showed normal EF of 60-65% with diastolic dysfunction, could not exclude mitral vegetation vs redundant chordae. ID was consulted and recommended Oxacillin and repeat blood cultures. With her electrolyte derangement, and a background of appetitive loss in the past 2 months, there was concern for refeeding syndrome.  On 9/21, IR took the patient for abscess drain placement and aspiration of SI joint. SI joint couldn't be aspirated but retroperitoneal abscess was drained of 100cc of purulent fluid. She was successfully extubated 9/22 and was stepped down to Hospital Medicine on 9/24/24. Her drain was removed on 09/27.  Repeat CT abdomen/pelvis showed an ill-defined subcutaneous edema and soft tissue inflammatory change in the surgical bed and subcutaneous soft tissues without discrete organized fluid collection, stable size of 3.6 cm fluid collection along the left posterior pararenal space which appears to communicate with the with the aforementioned collection and fascial thickening.  IR placed drain for retroperitoneal fluid collection on 9/30.  Cultures returned with Staph aureus.  IR suggested to onitor drain output, and consider repeat imaging when output decreases to less than 10 cc in 24 hours to evaluate for possible drainage catheter removal. Repeat CT was ordered on 10/3 as she endorsed worsening pain on her left flank, and minimal drain output which could be removed.  It returned with multiple new and enlargening abscesses.  Discussed with ID, who said to continue Oxacillin.  NSGY, Gen Surg, Ortho and IR were consulted for further recommendations.  MRI complete spine and pelvis were ordered to help guide management.  Gen Surg plans for OR I&D of left flank fluid collection on 10/7.    Interval History: MRI pelvis resulted last night.  Gen Surg plans for OR I&D of left flank fluid collection on 10/7.  MRI spine to be performed today.    Review of Systems   Constitutional:  Negative for chills, fatigue and fever.   Respiratory:  Negative for cough and shortness of breath.    Cardiovascular:  Negative for chest pain, palpitations and leg swelling.   Gastrointestinal:  Positive for abdominal pain. Negative for diarrhea, nausea and vomiting.   Genitourinary:  Negative for dysuria and urgency.   Musculoskeletal:  Positive  for back pain.   Neurological:  Negative for dizziness and headaches.   All other systems reviewed and are negative.    Objective:     Vital Signs (Most Recent):  Temp: 99.1 °F (37.3 °C) (10/06/24 1150)  Pulse: 75 (10/06/24 1150)  Resp: 19 (10/06/24 1150)  BP: 127/81 (10/06/24 1150)  SpO2: 95 % (10/06/24 1150) Vital Signs (24h Range):  Temp:  [98.3 °F (36.8 °C)-99.2 °F (37.3 °C)] 99.1 °F (37.3 °C)  Pulse:  [72-96] 75  Resp:  [18-20] 19  SpO2:  [93 %-97 %] 95 %  BP: (111-144)/(71-87) 127/81     Weight: 65.2 kg (143 lb 11.8 oz)  Body mass index is 26.29 kg/m².    Intake/Output Summary (Last 24 hours) at 10/6/2024 1324  Last data filed at 10/6/2024 0514  Gross per 24 hour   Intake 260 ml   Output 1200 ml   Net -940 ml      Physical Exam  Constitutional:       Appearance: Normal appearance.   HENT:      Head: Normocephalic and atraumatic.   Cardiovascular:      Rate and Rhythm: Normal rate and regular rhythm.      Heart sounds: No murmur heard.  Pulmonary:      Effort: Pulmonary effort is normal. No respiratory distress.      Breath sounds: Normal breath sounds. No wheezing or rales.   Abdominal:      General: There is no distension.      Palpations: Abdomen is soft.      Tenderness: There is no abdominal tenderness.      Comments: Mild swelling to left flank   Musculoskeletal:         General: Tenderness (Parapsinal, bandages C/D/I) present. No deformity.   Skin:     General: Skin is warm and dry.   Neurological:      General: No focal deficit present.      Mental Status: She is alert and oriented to person, place, and time. Mental status is at baseline.         MELD 3.0: 17 at 9/21/2024  6:13 PM  MELD-Na: 13 at 9/21/2024  6:13 PM  Calculated from:  Serum Creatinine: 0.6 mg/dL (Using min of 1 mg/dL) at 9/21/2024  6:13 PM  Serum Sodium: 138 mmol/L (Using max of 137 mmol/L) at 9/21/2024  6:13 PM  Total Bilirubin: 2.1 mg/dL at 9/21/2024  2:34 AM  Serum Albumin: 1.2 g/dL (Using min of 1.5 g/dL) at 9/21/2024  2:34  AM  INR(ratio): 1.4 at 9/19/2024  3:19 AM  Age at listing (hypothetical): 49 years  Sex: Female at 9/21/2024  6:13 PM      Significant Labs:  CBC:  Recent Labs   Lab 10/05/24  0100   WBC 9.15  9.15   HGB 8.5*  8.5*   HCT 27.9*  27.9*     243     CMP:  Recent Labs   Lab 10/05/24  0100     138   K 3.5  3.5     102   CO2 27  27   *  148*   BUN 3*  3*   CREATININE 0.6  0.6   CALCIUM 8.2*  8.2*   PROT 6.0  6.0   ALBUMIN 1.3*  1.3*   BILITOT 0.7  0.7   ALKPHOS 122  122   AST 11  11   ALT 7*  7*   ANIONGAP 9  9         Assessment/Plan:      * Paraspinal abscess  MSSA Endocarditis  MSSA Paraspinal abscess  MSSA Psoas abscess    Blood cx 9/16 with MSSA  2D echo 9/17:  Cannot entirely exclude mitral vegetation vs redundant chordae. If high clinical suspicion for endocarditis, would rx as such (not clear MARICHUY would be able to exclude vegetation based on TTE findings).   Noted to have extensive fluid collection on left extending from T11 through left iliacus muscle and within the left iliopsoas muscle.  Multifocal collections of air including subcutaneous tissues on the left flank noted.  ID/Neurosurgery consulted.   S/p L3-L5 laminectomy with epidural abscess evacuation 9/19   Ortho consulted. Rec continued abx tx and no further interventions  IR SI joint aspiration and abscess drain placement 9/21. Unable to aspirate joint  IR drain removed on 09/27.    Repeat CT abdomen pelvis 9/28 ordered to look for recollection of fluid showed an ill-defined subcutaneous edema and soft tissue inflammatory change in the surgical bed and subcutaneous soft tissues without discrete organized fluid collection,  Stable size of 3.6 cm fluid collection along the left posterior pararenal space which appears to communicate with the with the aforementioned collection and fascial thickening.   IR placed drain for retroperitoneal fluid collection on 9/30.  Culture with MSSA  ID consulted:  Suggested  Oxacillin through 11/18 with repeat imaging prior to completion  Will need LTAC for completion of IV antibiotics given history of IVDA  PICC placed 9/27  Repeat CRP ordered 10/3 given worsening pain and swelling - CRP down to 32  Repeat CT abdomen/pelvis was ordered to assess for drain removal, that showed worsening abscesses.  Discussed with ID who suggested further source control and to continue Oxacillin   Gen Surg, Ortho, NSGY and IR consulted - Greatly appreciate assistance!  MRI complete spine and pelvis ordered to further determine next steps.  MRI pelvis done 10/5, MRI spine to be done 10/7  Gen Surg plans for OR I&D of left flank fluid collection on 10/7, NPO at midnight - Hold Lovenox    Other osteomyelitis, multiple sites  As above      Endocarditis  As above      Staphylococcus aureus bacteremia  As above      Sepsis  As above    Retroperitoneal fluid collection  As above    Epidural abscess  As above      Psoas muscle abscess  As above    Moderate malnutrition  Nutrition consulted. Most recent weight and BMI monitored-     Measurements:  Wt Readings from Last 1 Encounters:   09/26/24 65.2 kg (143 lb 11.8 oz)   Body mass index is 26.29 kg/m².    Patient has been screened and assessed by RD.    Malnutrition Type:  Context: social/environmental circumstances  Level: moderate    Malnutrition Characteristic Summary:  Weight Loss (Malnutrition): 10% in 6 months  Energy Intake (Malnutrition): less than 75% for greater than 7 days    Interventions/Recommendations (treatment strategy):  1.      Refeeding syndrome  In ICU      Severe sepsis  See above    Hepatitis C  Hepatitis C antibody positive.    Obtain HCV RNA. Quant negative    Anemia, unspecified  S/p 2u PRBC transfusion on admit   No signs of acute GI bleed on exam at this time. Denies any hematemesis or hemoptysis.   Obtain iron B12/folate/peripheral smear and LDH/hapto/retic  No evidence of active bleed   Stable    Uncontrolled type 2 diabetes mellitus  with hyperglycemia  Patient's FSGs are uncontrolled due to hyperglycemia on current medication regimen.  Last A1c reviewed-   Lab Results   Component Value Date    HGBA1C 7.9 (H) 09/17/2024     Most recent fingerstick glucose reviewed-   Recent Labs   Lab 10/05/24  1346 10/05/24  1940 10/06/24  0746 10/06/24  1152   POCTGLUCOSE 126* 111* 126* 151*       Current correctional scale  Medium  Maintain anti-hyperglycemic dose as follows-   Antihyperglycemics (From admission, onward)      Start     Stop Route Frequency Ordered    10/02/24 0006  insulin aspart U-100 pen 0-5 Units         -- SubQ Before meals & nightly PRN 10/01/24 2306          Hold Oral hypoglycemics while patient is in the hospital.    Smoker  Tobacco cessation discussed with patient for greater than 5 minutes.   Offered Nicotine patch while hospitalized  Patient is aware of need to quit tobacco products    History of drug use  On methadone at home, continue      VTE Risk Mitigation (From admission, onward)           Ordered     IP VTE HIGH RISK PATIENT  Once         09/22/24 1553                    Discharge Planning   [facility]: 10/11/2024     Code Status: Full Code   Is the patient medically ready for discharge?:     Reason for patient still in hospital (select all that apply): Patient trending condition and Consult recommendations  Discharge Plan A: Skilled Nursing Facility ([facility] [phone])   Discharge Delays: None known at this time              Paty Kerr MD  Department of Hospital Medicine   [facility] ([place]

## 2024-10-07 ENCOUNTER — ANESTHESIA (OUTPATIENT)
Dept: SURGERY | Facility: HOSPITAL | Age: 49
End: 2024-10-07
Payer: MEDICAID

## 2024-10-07 LAB
BACTERIA SPEC ANAEROBE CULT: NORMAL
GRAM STN SPEC: NORMAL
POCT GLUCOSE: 119 MG/DL (ref 70–110)
POCT GLUCOSE: 133 MG/DL (ref 70–110)
POCT GLUCOSE: 144 MG/DL (ref 70–110)
POCT GLUCOSE: 152 MG/DL (ref 70–110)
POCT GLUCOSE: 185 MG/DL (ref 70–110)

## 2024-10-07 PROCEDURE — 25000003 PHARM REV CODE 250: Performed by: INTERNAL MEDICINE

## 2024-10-07 PROCEDURE — 87077 CULTURE AEROBIC IDENTIFY: CPT | Performed by: STUDENT IN AN ORGANIZED HEALTH CARE EDUCATION/TRAINING PROGRAM

## 2024-10-07 PROCEDURE — 87205 SMEAR GRAM STAIN: CPT | Performed by: STUDENT IN AN ORGANIZED HEALTH CARE EDUCATION/TRAINING PROGRAM

## 2024-10-07 PROCEDURE — 63600175 PHARM REV CODE 636 W HCPCS: Performed by: STUDENT IN AN ORGANIZED HEALTH CARE EDUCATION/TRAINING PROGRAM

## 2024-10-07 PROCEDURE — 87075 CULTR BACTERIA EXCEPT BLOOD: CPT | Performed by: STUDENT IN AN ORGANIZED HEALTH CARE EDUCATION/TRAINING PROGRAM

## 2024-10-07 PROCEDURE — 0W9H0ZZ DRAINAGE OF RETROPERITONEUM, OPEN APPROACH: ICD-10-PCS | Performed by: STUDENT IN AN ORGANIZED HEALTH CARE EDUCATION/TRAINING PROGRAM

## 2024-10-07 PROCEDURE — 63600175 PHARM REV CODE 636 W HCPCS: Performed by: HOSPITALIST

## 2024-10-07 PROCEDURE — A4216 STERILE WATER/SALINE, 10 ML: HCPCS | Performed by: STUDENT IN AN ORGANIZED HEALTH CARE EDUCATION/TRAINING PROGRAM

## 2024-10-07 PROCEDURE — 82962 GLUCOSE BLOOD TEST: CPT | Performed by: STUDENT IN AN ORGANIZED HEALTH CARE EDUCATION/TRAINING PROGRAM

## 2024-10-07 PROCEDURE — 87186 SC STD MICRODIL/AGAR DIL: CPT | Mod: 59 | Performed by: STUDENT IN AN ORGANIZED HEALTH CARE EDUCATION/TRAINING PROGRAM

## 2024-10-07 PROCEDURE — 10061 I&D ABSCESS COMP/MULTIPLE: CPT | Mod: ,,, | Performed by: STUDENT IN AN ORGANIZED HEALTH CARE EDUCATION/TRAINING PROGRAM

## 2024-10-07 PROCEDURE — 63600175 PHARM REV CODE 636 W HCPCS: Performed by: NURSE ANESTHETIST, CERTIFIED REGISTERED

## 2024-10-07 PROCEDURE — 0JBC0ZZ EXCISION OF PELVIC REGION SUBCUTANEOUS TISSUE AND FASCIA, OPEN APPROACH: ICD-10-PCS | Performed by: STUDENT IN AN ORGANIZED HEALTH CARE EDUCATION/TRAINING PROGRAM

## 2024-10-07 PROCEDURE — 27000221 HC OXYGEN, UP TO 24 HOURS

## 2024-10-07 PROCEDURE — D9220A PRA ANESTHESIA: Mod: CRNA,,, | Performed by: NURSE ANESTHETIST, CERTIFIED REGISTERED

## 2024-10-07 PROCEDURE — 25000003 PHARM REV CODE 250: Performed by: STUDENT IN AN ORGANIZED HEALTH CARE EDUCATION/TRAINING PROGRAM

## 2024-10-07 PROCEDURE — 25000003 PHARM REV CODE 250: Performed by: NURSE ANESTHETIST, CERTIFIED REGISTERED

## 2024-10-07 PROCEDURE — 25000003 PHARM REV CODE 250: Performed by: HOSPITALIST

## 2024-10-07 PROCEDURE — 87070 CULTURE OTHR SPECIMN AEROBIC: CPT | Mod: 59 | Performed by: STUDENT IN AN ORGANIZED HEALTH CARE EDUCATION/TRAINING PROGRAM

## 2024-10-07 PROCEDURE — 94761 N-INVAS EAR/PLS OXIMETRY MLT: CPT

## 2024-10-07 PROCEDURE — 37000009 HC ANESTHESIA EA ADD 15 MINS: Performed by: STUDENT IN AN ORGANIZED HEALTH CARE EDUCATION/TRAINING PROGRAM

## 2024-10-07 PROCEDURE — 20600001 HC STEP DOWN PRIVATE ROOM

## 2024-10-07 PROCEDURE — 37000008 HC ANESTHESIA 1ST 15 MINUTES: Performed by: STUDENT IN AN ORGANIZED HEALTH CARE EDUCATION/TRAINING PROGRAM

## 2024-10-07 PROCEDURE — D9220A PRA ANESTHESIA: Mod: ANES,,, | Performed by: STUDENT IN AN ORGANIZED HEALTH CARE EDUCATION/TRAINING PROGRAM

## 2024-10-07 PROCEDURE — 36000704 HC OR TIME LEV I 1ST 15 MIN: Performed by: STUDENT IN AN ORGANIZED HEALTH CARE EDUCATION/TRAINING PROGRAM

## 2024-10-07 PROCEDURE — 36000705 HC OR TIME LEV I EA ADD 15 MIN: Performed by: STUDENT IN AN ORGANIZED HEALTH CARE EDUCATION/TRAINING PROGRAM

## 2024-10-07 PROCEDURE — 71000015 HC POSTOP RECOV 1ST HR: Performed by: STUDENT IN AN ORGANIZED HEALTH CARE EDUCATION/TRAINING PROGRAM

## 2024-10-07 PROCEDURE — 71000033 HC RECOVERY, INTIAL HOUR: Performed by: STUDENT IN AN ORGANIZED HEALTH CARE EDUCATION/TRAINING PROGRAM

## 2024-10-07 PROCEDURE — 99900035 HC TECH TIME PER 15 MIN (STAT)

## 2024-10-07 PROCEDURE — 99233 SBSQ HOSP IP/OBS HIGH 50: CPT | Mod: 25,,, | Performed by: STUDENT IN AN ORGANIZED HEALTH CARE EDUCATION/TRAINING PROGRAM

## 2024-10-07 RX ORDER — DEXMEDETOMIDINE HYDROCHLORIDE 100 UG/ML
INJECTION, SOLUTION INTRAVENOUS
Status: DISCONTINUED | OUTPATIENT
Start: 2024-10-07 | End: 2024-10-07

## 2024-10-07 RX ORDER — ONDANSETRON HYDROCHLORIDE 2 MG/ML
INJECTION, SOLUTION INTRAVENOUS
Status: DISCONTINUED | OUTPATIENT
Start: 2024-10-07 | End: 2024-10-07

## 2024-10-07 RX ORDER — MIDAZOLAM HYDROCHLORIDE 1 MG/ML
INJECTION INTRAMUSCULAR; INTRAVENOUS
Status: DISCONTINUED | OUTPATIENT
Start: 2024-10-07 | End: 2024-10-07

## 2024-10-07 RX ORDER — DEXAMETHASONE SODIUM PHOSPHATE 4 MG/ML
INJECTION, SOLUTION INTRA-ARTICULAR; INTRALESIONAL; INTRAMUSCULAR; INTRAVENOUS; SOFT TISSUE
Status: DISCONTINUED | OUTPATIENT
Start: 2024-10-07 | End: 2024-10-07

## 2024-10-07 RX ORDER — FENTANYL CITRATE 50 UG/ML
INJECTION, SOLUTION INTRAMUSCULAR; INTRAVENOUS
Status: DISCONTINUED | OUTPATIENT
Start: 2024-10-07 | End: 2024-10-07

## 2024-10-07 RX ORDER — CEFAZOLIN 2 G/1
INJECTION, POWDER, FOR SOLUTION INTRAMUSCULAR; INTRAVENOUS
Status: DISCONTINUED | OUTPATIENT
Start: 2024-10-07 | End: 2024-10-07

## 2024-10-07 RX ORDER — GLUCAGON 1 MG
1 KIT INJECTION
Status: DISCONTINUED | OUTPATIENT
Start: 2024-10-07 | End: 2024-10-07 | Stop reason: HOSPADM

## 2024-10-07 RX ORDER — LIDOCAINE HYDROCHLORIDE 20 MG/ML
INJECTION, SOLUTION EPIDURAL; INFILTRATION; INTRACAUDAL; PERINEURAL
Status: DISCONTINUED | OUTPATIENT
Start: 2024-10-07 | End: 2024-10-07

## 2024-10-07 RX ORDER — HYDROMORPHONE HYDROCHLORIDE 1 MG/ML
0.2 INJECTION, SOLUTION INTRAMUSCULAR; INTRAVENOUS; SUBCUTANEOUS EVERY 5 MIN PRN
Status: COMPLETED | OUTPATIENT
Start: 2024-10-07 | End: 2024-10-07

## 2024-10-07 RX ORDER — PHENYLEPHRINE HYDROCHLORIDE 10 MG/ML
INJECTION INTRAVENOUS
Status: DISCONTINUED | OUTPATIENT
Start: 2024-10-07 | End: 2024-10-07

## 2024-10-07 RX ORDER — PROPOFOL 10 MG/ML
VIAL (ML) INTRAVENOUS
Status: DISCONTINUED | OUTPATIENT
Start: 2024-10-07 | End: 2024-10-07

## 2024-10-07 RX ORDER — SODIUM CHLORIDE 0.9 % (FLUSH) 0.9 %
10 SYRINGE (ML) INJECTION
Status: DISCONTINUED | OUTPATIENT
Start: 2024-10-07 | End: 2024-10-07 | Stop reason: HOSPADM

## 2024-10-07 RX ORDER — ROCURONIUM BROMIDE 10 MG/ML
INJECTION, SOLUTION INTRAVENOUS
Status: DISCONTINUED | OUTPATIENT
Start: 2024-10-07 | End: 2024-10-07

## 2024-10-07 RX ORDER — HYDROMORPHONE HYDROCHLORIDE 1 MG/ML
1 INJECTION, SOLUTION INTRAMUSCULAR; INTRAVENOUS; SUBCUTANEOUS EVERY 6 HOURS PRN
Status: DISCONTINUED | OUTPATIENT
Start: 2024-10-07 | End: 2024-10-08

## 2024-10-07 RX ADMIN — HYDROMORPHONE HYDROCHLORIDE 1 MG: 1 INJECTION, SOLUTION INTRAMUSCULAR; INTRAVENOUS; SUBCUTANEOUS at 04:10

## 2024-10-07 RX ADMIN — SUGAMMADEX 200 MG: 100 INJECTION, SOLUTION INTRAVENOUS at 10:10

## 2024-10-07 RX ADMIN — OXYCODONE HYDROCHLORIDE 10 MG: 10 TABLET ORAL at 03:10

## 2024-10-07 RX ADMIN — Medication 10 ML: at 04:10

## 2024-10-07 RX ADMIN — DEXMEDETOMIDINE 12 MCG: 100 INJECTION, SOLUTION, CONCENTRATE INTRAVENOUS at 09:10

## 2024-10-07 RX ADMIN — HYDROMORPHONE HYDROCHLORIDE 0.2 MG: 1 INJECTION, SOLUTION INTRAMUSCULAR; INTRAVENOUS; SUBCUTANEOUS at 11:10

## 2024-10-07 RX ADMIN — PHENYLEPHRINE HYDROCHLORIDE 200 MCG: 10 INJECTION INTRAVENOUS at 09:10

## 2024-10-07 RX ADMIN — OXACILLIN 12 G: 2 INJECTION, POWDER, FOR SOLUTION INTRAMUSCULAR; INTRAVENOUS at 02:10

## 2024-10-07 RX ADMIN — HYDROXYZINE PAMOATE 25 MG: 25 CAPSULE ORAL at 08:10

## 2024-10-07 RX ADMIN — OXYCODONE HYDROCHLORIDE 15 MG: 10 TABLET ORAL at 08:10

## 2024-10-07 RX ADMIN — DEXMEDETOMIDINE 8 MCG: 100 INJECTION, SOLUTION, CONCENTRATE INTRAVENOUS at 10:10

## 2024-10-07 RX ADMIN — Medication 10 ML: at 05:10

## 2024-10-07 RX ADMIN — OXYCODONE HYDROCHLORIDE 10 MG: 10 TABLET ORAL at 11:10

## 2024-10-07 RX ADMIN — SODIUM CHLORIDE: 0.9 INJECTION, SOLUTION INTRAVENOUS at 09:10

## 2024-10-07 RX ADMIN — MIDAZOLAM HYDROCHLORIDE 2 MG: 2 INJECTION, SOLUTION INTRAMUSCULAR; INTRAVENOUS at 09:10

## 2024-10-07 RX ADMIN — ONDANSETRON 4 MG: 2 INJECTION INTRAMUSCULAR; INTRAVENOUS at 10:10

## 2024-10-07 RX ADMIN — PROPOFOL 150 MG: 10 INJECTION, EMULSION INTRAVENOUS at 09:10

## 2024-10-07 RX ADMIN — GABAPENTIN 300 MG: 300 CAPSULE ORAL at 02:10

## 2024-10-07 RX ADMIN — ROCURONIUM BROMIDE 40 MG: 10 INJECTION, SOLUTION INTRAVENOUS at 09:10

## 2024-10-07 RX ADMIN — FENTANYL CITRATE 50 MCG: 50 INJECTION, SOLUTION INTRAMUSCULAR; INTRAVENOUS at 09:10

## 2024-10-07 RX ADMIN — OXYCODONE HYDROCHLORIDE 10 MG: 10 TABLET ORAL at 05:10

## 2024-10-07 RX ADMIN — LIDOCAINE HYDROCHLORIDE 60 MG: 20 INJECTION, SOLUTION EPIDURAL; INFILTRATION; INTRACAUDAL; PERINEURAL at 09:10

## 2024-10-07 RX ADMIN — CEFAZOLIN 2 G: 2 INJECTION, POWDER, FOR SOLUTION INTRAMUSCULAR; INTRAVENOUS at 09:10

## 2024-10-07 RX ADMIN — DEXAMETHASONE SODIUM PHOSPHATE 4 MG: 4 INJECTION, SOLUTION INTRAMUSCULAR; INTRAVENOUS at 09:10

## 2024-10-07 RX ADMIN — GABAPENTIN 300 MG: 300 CAPSULE ORAL at 08:10

## 2024-10-07 NOTE — PROGRESS NOTES
[facility] - Stepdown Flex ([place] [id number])  [facility] Medicine  Progress Note    Patient Name: Mari Sloan  MRN: [id number]  Patient Class: IP- Inpatient   Admission Date: 9/16/2024  Length of Stay: 21 days  Attending Physician: Paty Kerr MD  Primary Care Provider: Mary Fong APRN        Subjective:     Principal Problem:Paraspinal abscess        HPI:  By Dr. Alberta Reese MD    49 y.o.  woman with h/o homelessness (recently was able to obtain living arrangements but states she was living [place] the bridge), NIDDM type 2, Essential hypertension, and substance use (denies any IVDU in he past or any illicit drug use recenly, denies ETOH abuse/overuse) presents to [facility] for further evaluation of her back pain.  She apparently presented to the [facility] Emergency Department on September 16 with nausea and vomiting and a mechanical fall 5 days prior. She reported pain worse in her back and on her sides radiating down both legs. She noted muscle spasm in her back and legs. She had no head trauma or loss of consciousness.  W/u in the [facility] ED noted significant hypokalemia, hypomagnesemia, severe anemia, metabolic alkalosis, and hyperglycemia. Vitals signs stable with no sepsis at this time noted. SBP>90, HR normal,afebrile.     Imaging studies of her lumbar spine and pelvis were concerning for osteomyelitis/septic arthritis of the left SI joint and L5-S1 along with an abnormal fluid collection extending from T11 through the left iliacus muscle concerning for abscess. Blood cultures sent.  In the emergency department she is receiving IV fluid, Zofran, Zosyn, vancomycin, potassium, magnesium, pain medication, and nausea medication.   She also received 2 units of PRBC for an H/H of 5.7/18.4,     Case discussed with Neurosurgery and Interventional Radiology. Plan would be for transfer to Hospital Medicine at [facility] for IR evaluation of the fluid collection and  "potential sampling of the joint osteomyelitis.  I reviewed the discussions made with the multiple sub specialists regarding transfer of this patient to [facility]: IR/General surgery/Neurosurgery/ER/Internal Med.     Neurosurgery contacted by the  did not feel surgical intervention was needed at this time but would follow along and requested [facility] for further management and IR backup. IR on call apparently read the CT and at the time felt "while the left retroperitoneal fluid collections do appear organized, percutaneous drainage is not advised given the extensive soft tissue infection superficial to these collections.  In addition, there is extensive evidence of soft tissue infection that does not appear organized or percutaneously drainable."     General surgery was consulted to review the case and felt that there was no immediate surgical intervention at this time to be had. I spoke with the on surgeon contacted regarding the location of the fluid collections and inquired if perhaps orthopedic surgery should be consulted to review given the involvement of bony pelvis.      I spoke orthopedic surgery who will see the patient but recommended re-consulting IR here and Neurosurgery given the diskitis/OM L5-S1. (Please see his consult note in the chart)     Repeat labs here again noted for persistently low mag, K, phos.  H/H stable with improved H/H. Pain control improved with Dilaudid.  I consulted ID for further assistance with ABX adjustments and changed her to Meropenem and doxy as well as continued Vanc. Echo ordred for am.     CT lumbar spine and pelvis had findings most concerning for infectious process. There were findings concerning for osteomyelitis and septic arthritis in the left SI joint. Findings concerning for osteomyelitis/diskitis L5-S1. Possible osteomyelitis and septic joint at the pubic symphysis. Abnormal fluid collection on the left extending from T11 through the left " iliacus muscle along and within the left iliopsoas muscle most concerning for abscess. Multiple additional small abscesses suspected in the pelvis. Multifocal collections of air in the fluid in the subcutaneous tissues of the left flank, incompletely imaged.    Chest x-ray noted a loop in the central venous catheter. Tip currently at the level of the brachiocephalic vein. Lungs are fairly clear.        Overview/Hospital Course:  Ms. Sloan was transferred from [facility] ED to [facility] ICU on 09/16/2024.  She presented to [facility] ED  for back pain, nausea/vomiting.  CT L-spine revealed osteomyelitis/diskitis L5-S1 along with abnormal fluid collection on left extending from T11 through left iliacus muscle and left iliopsoas muscle concerning for abscess.  Multifocal collections of air in fluid in subcutaneous tissues of left flank.  Patient was initiated on empiric vancomycin and meropenem.  Blood cultures with staph aureus.  Obtain echo.  Unable to repeat blood cultures given shortage of cx.  Neurosurgery recommended to obtain MRI L-spine, pending.  General surgery evaluated the patient and recommended urgent transfer to [facility] for surgical evaluation/source control given extent of necrosis.     Ms. Sloan was transferred to [facility] and admitted to the MICU 9/18 with concern for paraspinal abscess. Infectious workup was ordered. Blood cultures were positive for MSSA bacteremia. Neurosurgery, General Surgery and IR were consulted to evaluate the patient's paraspinal abscess. Neurosurgery performed a L3-L4 laminectomy for epidural abscess evacuation on 9/19/24 and the cultures grew MSSA. TTE showed normal EF of 60-65% with diastolic dysfunction, could not exclude mitral vegetation vs redundant chordae. ID was consulted and recommended Oxacillin and repeat blood cultures. With her electrolyte derangement, and a background of appetitive loss in the past 2 months, there was concern for refeeding syndrome.  On 9/21, IR took the patient for abscess drain placement and aspiration of SI joint. SI joint couldn't be aspirated but retroperitoneal abscess was drained of 100cc of purulent fluid. She was successfully extubated 9/22 and was stepped down to Hospital Medicine on 9/24/24. Her drain was removed on 09/27.  Repeat CT abdomen/pelvis showed an ill-defined subcutaneous edema and soft tissue inflammatory change in the surgical bed and subcutaneous soft tissues without discrete organized fluid collection, stable size of 3.6 cm fluid collection along the left posterior pararenal space which appears to communicate with the with the aforementioned collection and fascial thickening.  IR placed drain for retroperitoneal fluid collection on 9/30.  Cultures returned with Staph aureus.  IR suggested to onitor drain output, and consider repeat imaging when output decreases to less than 10 cc in 24 hours to evaluate for possible drainage catheter removal. Repeat CT was ordered on 10/3 as she endorsed worsening pain on her left flank, and minimal drain output which could be removed.  It returned with multiple new and enlargening abscesses.  Discussed with ID, who said to continue Oxacillin.  NSGY, Gen Surg, Ortho and IR were consulted for further recommendations.  MRI complete spine and pelvis were ordered to help guide management.  Gen Surg took her to the OR for an I&D of a left hip abscess on 10/7, where 300cc of purulent material within the subcutaneous space along the left hip was drained and sent for culture.    Interval History: No acute events overnight.  Gen Surg took her to the OR for an I&D of a left hip abscess on 10/7, where 300cc of purulent material within the subcutaneous space along the left hip was drained and sent for culture.  MRI spine results, waiting on NSGY recs.    Review of Systems   Constitutional:  Negative for chills, fatigue and fever.   Respiratory:  Negative for cough and shortness of breath.     Cardiovascular:  Negative for chest pain, palpitations and leg swelling.   Gastrointestinal:  Positive for abdominal pain. Negative for diarrhea, nausea and vomiting.   Genitourinary:  Negative for dysuria and urgency.   Musculoskeletal:  Positive for back pain.   Neurological:  Negative for dizziness and headaches.   All other systems reviewed and are negative.    Objective:     Vital Signs (Most Recent):  Temp: 98 °F (36.7 °C) (10/07/24 1145)  Pulse: 87 (10/07/24 1215)  Resp: 15 (10/07/24 1200)  BP: 131/77 (10/07/24 1215)  SpO2: 98 % (10/07/24 1215) Vital Signs (24h Range):  Temp:  [97.9 °F (36.6 °C)-99.5 °F (37.5 °C)] 98 °F (36.7 °C)  Pulse:  [] 87  Resp:  [9-23] 15  SpO2:  [91 %-100 %] 98 %  BP: (112-180)/(76-97) 131/77     Weight: 65.2 kg (143 lb 11.8 oz)  Body mass index is 26.29 kg/m².    Intake/Output Summary (Last 24 hours) at 10/7/2024 1306  Last data filed at 10/7/2024 1101  Gross per 24 hour   Intake 1588.91 ml   Output --   Net 1588.91 ml      Physical Exam  Constitutional:       Appearance: Normal appearance.   HENT:      Head: Normocephalic and atraumatic.   Cardiovascular:      Rate and Rhythm: Normal rate and regular rhythm.      Heart sounds: No murmur heard.  Pulmonary:      Effort: Pulmonary effort is normal. No respiratory distress.      Breath sounds: Normal breath sounds. No wheezing or rales.   Abdominal:      General: There is no distension.      Palpations: Abdomen is soft.      Tenderness: There is no abdominal tenderness.      Comments: Mild swelling to left flank   Musculoskeletal:         General: Tenderness (Parapsinal, bandages C/D/I) present. No deformity.   Skin:     General: Skin is warm and dry.   Neurological:      General: No focal deficit present.      Mental Status: She is alert and oriented to person, place, and time. Mental status is at baseline.         MELD 3.0: 17 at 9/21/2024  6:13 PM  MELD-Na: 13 at 9/21/2024  6:13 PM  Calculated from:  Serum Creatinine: 0.6  "mg/dL (Using min of 1 mg/dL) at 9/21/2024  6:13 PM  Serum Sodium: 138 mmol/L (Using max of 137 mmol/L) at 9/21/2024  6:13 PM  Total Bilirubin: 2.1 mg/dL at 9/21/2024  2:34 AM  Serum Albumin: 1.2 g/dL (Using min of 1.5 g/dL) at 9/21/2024  2:34 AM  INR(ratio): 1.4 at 9/19/2024  3:19 AM  Age at listing (hypothetical): 49 years  Sex: Female at 9/21/2024  6:13 PM      Significant Labs:  CBC:  No results for input(s): "WBC", "HGB", "HCT", "PLT" in the last 48 hours.    CMP:  No results for input(s): "NA", "K", "CL", "CO2", "GLU", "BUN", "CREATININE", "CALCIUM", "PROT", "ALBUMIN", "BILITOT", "ALKPHOS", "AST", "ALT", "ANIONGAP", "EGFRNONAA" in the last 48 hours.    Invalid input(s): "ESTGFAFRICA"        Assessment/Plan:      * Paraspinal abscess  MSSA Endocarditis  MSSA Paraspinal abscess  MSSA Psoas abscess    Blood cx 9/16 with MSSA  2D echo 9/17:  Cannot entirely exclude mitral vegetation vs redundant chordae. If high clinical suspicion for endocarditis, would rx as such (not clear MARICHUY would be able to exclude vegetation based on TTE findings).   Noted to have extensive fluid collection on left extending from T11 through left iliacus muscle and within the left iliopsoas muscle.  Multifocal collections of air including subcutaneous tissues on the left flank noted.  ID/Neurosurgery consulted.   S/p L3-L5 laminectomy with epidural abscess evacuation 9/19   Ortho consulted. Rec continued abx tx and no further interventions  IR SI joint aspiration and abscess drain placement 9/21. Unable to aspirate joint  IR drain removed on 09/27.    Repeat CT abdomen pelvis 9/28 ordered to look for recollection of fluid showed an ill-defined subcutaneous edema and soft tissue inflammatory change in the surgical bed and subcutaneous soft tissues without discrete organized fluid collection,  Stable size of 3.6 cm fluid collection along the left posterior pararenal space which appears to communicate with the with the aforementioned collection " and fascial thickening.   IR placed drain for retroperitoneal fluid collection on 9/30.  Culture with MSSA  ID consulted:  Suggested Oxacillin through 11/18 with repeat imaging prior to completion  Will need LTAC for completion of IV antibiotics given history of IVDA  PICC placed 9/27  Repeat CRP ordered 10/3 given worsening pain and swelling - CRP down to 32  Repeat CT abdomen/pelvis was ordered to assess for drain removal, that showed worsening abscesses.  Discussed with ID who suggested further source control and to continue Oxacillin   Gen Surg, Ortho, NSGY and IR consulted - Greatly appreciate assistance!  MRI complete spine and pelvis ordered to further determine next steps with multiple abscesses  Gen Surg took her to the OR for an I&D of a left hip abscess on 10/7, where 300cc of purulent material within the subcutaneous space along the left hip was drained and sent for culture.    Other osteomyelitis, multiple sites  As above      Endocarditis  As above      Staphylococcus aureus bacteremia  As above      Sepsis  As above    Retroperitoneal fluid collection  As above    Epidural abscess  As above      Psoas muscle abscess  As above    Moderate malnutrition  Nutrition consulted. Most recent weight and BMI monitored-     Measurements:  Wt Readings from Last 1 Encounters:   09/26/24 65.2 kg (143 lb 11.8 oz)   Body mass index is 26.29 kg/m².    Patient has been screened and assessed by RD.    Malnutrition Type:  Context: social/environmental circumstances  Level: moderate    Malnutrition Characteristic Summary:  Weight Loss (Malnutrition): 10% in 6 months  Energy Intake (Malnutrition): less than 75% for greater than 7 days    Interventions/Recommendations (treatment strategy):  1.      Refeeding syndrome  In ICU      Severe sepsis  See above    Hepatitis C  Hepatitis C antibody positive.    Obtain HCV RNA. Quant negative    Anemia, unspecified  S/p 2u PRBC transfusion on admit   No signs of acute GI bleed on  exam at this time. Denies any hematemesis or hemoptysis.   Obtain iron B12/folate/peripheral smear and LDH/hapto/retic  No evidence of active bleed   Stable    Uncontrolled type 2 diabetes mellitus with hyperglycemia  Patient's FSGs are uncontrolled due to hyperglycemia on current medication regimen.  Last A1c reviewed-   Lab Results   Component Value Date    HGBA1C 7.9 (H) 09/17/2024     Most recent fingerstick glucose reviewed-   Recent Labs   Lab 10/06/24  1913 10/07/24  0833 10/07/24  0922 10/07/24  1137   POCTGLUCOSE 111* 119* 133* 144*       Current correctional scale  Medium  Maintain anti-hyperglycemic dose as follows-   Antihyperglycemics (From admission, onward)      Start     Stop Route Frequency Ordered    10/02/24 0006  insulin aspart U-100 pen 0-5 Units         -- SubQ Before meals & nightly PRN 10/01/24 2306          Hold Oral hypoglycemics while patient is in the hospital.    Smoker  Tobacco cessation discussed with patient for greater than 5 minutes.   Offered Nicotine patch while hospitalized  Patient is aware of need to quit tobacco products    History of drug use  On methadone at home, continue      VTE Risk Mitigation (From admission, onward)           Ordered     IP VTE HIGH RISK PATIENT  Once         09/22/24 1553                    Discharge Planning   LUH: 10/11/2024     Code Status: Full Code   Is the patient medically ready for discharge?:     Reason for patient still in hospital (select all that apply): Patient trending condition and Consult recommendations  Discharge Plan A: Skilled Nursing Facility   Discharge Delays: None known at this time              Paty Kerr MD  Department of Hospital Medicine   [facility] - Stepdown Flex ([place]

## 2024-10-07 NOTE — SUBJECTIVE & OBJECTIVE
Interval History: NAEO. AF, HDS. OR today for left flank I&D. NPO since midnight     Medications:  Continuous Infusions:  Scheduled Meds:   folic acid  1 mg Oral Daily    gabapentin  300 mg Oral TID    hydrOXYzine pamoate  25 mg Oral QHS    LIDOcaine  1 patch Transdermal Q24H    methadone  70 mg Oral Daily    nicotine  1 patch Transdermal Daily    oxacillin 12 g in  mL CONTINUOUS INFUSION  12 g Intravenous Q24H    polyethylene glycol  17 g Oral BID    senna-docusate 8.6-50 mg  1 tablet Oral BID    sodium chloride 0.9%  10 mL Intravenous Q6H    thiamine  100 mg Oral Daily     PRN Meds:  Current Facility-Administered Medications:     acetaminophen, 1,000 mg, Per OG tube, Q6H PRN    albuterol-ipratropium, 3 mL, Nebulization, Q4H PRN    dextrose 10%, 12.5 g, Intravenous, PRN    dextrose 10%, 12.5 g, Intravenous, PRN    dextrose 10%, 25 g, Intravenous, PRN    glucagon (human recombinant), 1 mg, Intramuscular, PRN    glucose, 16 g, Oral, PRN    glucose, 24 g, Oral, PRN    hydrOXYzine pamoate, 50 mg, Oral, Q6H PRN    insulin aspart U-100, 0-5 Units, Subcutaneous, QID (AC + HS) PRN    melatonin, 6 mg, Oral, Nightly PRN    naloxone, 0.4 mg, Intravenous, PRN    ondansetron, 4 mg, Intravenous, Q6H PRN    oxyCODONE, 10 mg, Oral, Q4H PRN    sodium chloride 0.9%, 10 mL, Intravenous, Q12H PRN    Flushing PICC/Midline Protocol, , , Until Discontinued **AND** sodium chloride 0.9%, 10 mL, Intravenous, Q6H **AND** sodium chloride 0.9%, 10 mL, Intravenous, PRN    sodium chloride 0.9%, 10 mL, Intravenous, PRN     Review of patient's allergies indicates:  No Known Allergies  Objective:     Vital Signs (Most Recent):  Temp: 99.5 °F (37.5 °C) (10/07/24 0327)  Pulse: 84 (10/07/24 0327)  Resp: 18 (10/07/24 0512)  BP: 112/76 (10/07/24 0327)  SpO2: 96 % (10/07/24 0327) Vital Signs (24h Range):  Temp:  [98.3 °F (36.8 °C)-99.5 °F (37.5 °C)] 99.5 °F (37.5 °C)  Pulse:  [73-94] 84  Resp:  [18-20] 18  SpO2:  [91 %-97 %] 96 %  BP:  (112-153)/(76-93) 112/76     Weight: 65.2 kg (143 lb 11.8 oz)  Body mass index is 26.29 kg/m².    Intake/Output - Last 3 Shifts         10/05 0700  10/06 0659 10/06 0700  10/07 0659 10/07 0700  10/08 0659    P.O. 260      I.V. (mL/kg)       IV Piggyback  1088.9     Total Intake(mL/kg) 260 (4) 1088.9 (16.7)     Urine (mL/kg/hr) 1200 (0.8)      Total Output 1200      Net -940 +1088.9            Urine Occurrence  1 x     Stool Occurrence  1 x              Physical Exam  Vitals and nursing note reviewed.   Constitutional:       General: She is not in acute distress.     Appearance: Normal appearance.   HENT:      Head: Normocephalic and atraumatic.   Cardiovascular:      Rate and Rhythm: Normal rate.   Pulmonary:      Effort: Pulmonary effort is normal. No respiratory distress.   Abdominal:      General: Abdomen is flat. There is no distension.      Palpations: Abdomen is soft.      Tenderness: There is no abdominal tenderness.   Skin:     Comments: Left flank with fluctuance and erythematous changes to the overlying skin   Neurological:      General: No focal deficit present.      Mental Status: She is alert and oriented to person, place, and time.   Psychiatric:         Behavior: Behavior normal.         Thought Content: Thought content normal.          Significant Labs:  I have reviewed all pertinent lab results within the past 24 hours.  CBC:   Recent Labs   Lab 10/05/24  0100   WBC 9.15  9.15   RBC 3.16*  3.16*   HGB 8.5*  8.5*   HCT 27.9*  27.9*     243   MCV 88  88   MCH 26.9*  26.9*   MCHC 30.5*  30.5*     CMP:   Recent Labs   Lab 10/05/24  0100   *  148*   CALCIUM 8.2*  8.2*   ALBUMIN 1.3*  1.3*   PROT 6.0  6.0     138   K 3.5  3.5   CO2 27  27     102   BUN 3*  3*   CREATININE 0.6  0.6   ALKPHOS 122  122   ALT 7*  7*   AST 11  11   BILITOT 0.7  0.7       Significant Diagnostics:  I have reviewed all pertinent imaging results/findings within the past 24 hours.

## 2024-10-07 NOTE — PROGRESS NOTES
Pt came from OR with oxaciliin IVBP hung to gravity. IV pupm obtained and med set to infuse at 20.8ml/hr as orders to double lumen PICC line

## 2024-10-07 NOTE — ANESTHESIA PROCEDURE NOTES
Intubation    Date/Time: 10/7/2024 9:49 AM    Performed by: Yanira Duvall CRNA  Authorized by: José Antonio Mcintyre MD    Intubation:     Induction:  Intravenous    Intubated:  Postinduction    Mask Ventilation:  Easy mask    Attempts:  1    Attempted By:  CRNA    Method of Intubation:  Video laryngoscopy    Blade:  Umaña 3    Laryngeal View Grade: Grade I - full view of cords      Difficult Airway Encountered?: No      Complications:  None    Airway Device:  Oral endotracheal tube    Airway Device Size:  7.0    Style/Cuff Inflation:  Cuffed (inflated to minimal occlusive pressure)    Tube secured:  21    Secured at:  The lips    Placement Verified By:  Capnometry    Complicating Factors:  None    Findings Post-Intubation:  BS equal bilateral and atraumatic/condition of teeth unchanged

## 2024-10-07 NOTE — ASSESSMENT & PLAN NOTE
Patient's FSGs are uncontrolled due to hyperglycemia on current medication regimen.  Last A1c reviewed-   Lab Results   Component Value Date    HGBA1C 7.9 (H) 09/17/2024     Most recent fingerstick glucose reviewed-   Recent Labs   Lab 10/06/24  1913 10/07/24  0833 10/07/24  0922 10/07/24  1137   POCTGLUCOSE 111* 119* 133* 144*       Current correctional scale  Medium  Maintain anti-hyperglycemic dose as follows-   Antihyperglycemics (From admission, onward)    Start     Stop Route Frequency Ordered    10/02/24 0006  insulin aspart U-100 pen 0-5 Units         -- SubQ Before meals & nightly PRN 10/01/24 2306        Hold Oral hypoglycemics while patient is in the hospital.

## 2024-10-07 NOTE — SUBJECTIVE & OBJECTIVE
Interval History: No acute events overnight.  Gen Surg took her to the OR for an I&D of a left hip abscess on 10/7, where 300cc of purulent material within the subcutaneous space along the left hip was drained and sent for culture.  MRI spine results, waiting on NSGY recs.    Review of Systems   Constitutional:  Negative for chills, fatigue and fever.   Respiratory:  Negative for cough and shortness of breath.    Cardiovascular:  Negative for chest pain, palpitations and leg swelling.   Gastrointestinal:  Positive for abdominal pain. Negative for diarrhea, nausea and vomiting.   Genitourinary:  Negative for dysuria and urgency.   Musculoskeletal:  Positive for back pain.   Neurological:  Negative for dizziness and headaches.   All other systems reviewed and are negative.    Objective:     Vital Signs (Most Recent):  Temp: 98 °F (36.7 °C) (10/07/24 1145)  Pulse: 87 (10/07/24 1215)  Resp: 15 (10/07/24 1200)  BP: 131/77 (10/07/24 1215)  SpO2: 98 % (10/07/24 1215) Vital Signs (24h Range):  Temp:  [97.9 °F (36.6 °C)-99.5 °F (37.5 °C)] 98 °F (36.7 °C)  Pulse:  [] 87  Resp:  [9-23] 15  SpO2:  [91 %-100 %] 98 %  BP: (112-180)/(76-97) 131/77     Weight: 65.2 kg (143 lb 11.8 oz)  Body mass index is 26.29 kg/m².    Intake/Output Summary (Last 24 hours) at 10/7/2024 1306  Last data filed at 10/7/2024 1101  Gross per 24 hour   Intake 1588.91 ml   Output --   Net 1588.91 ml      Physical Exam  Constitutional:       Appearance: Normal appearance.   HENT:      Head: Normocephalic and atraumatic.   Cardiovascular:      Rate and Rhythm: Normal rate and regular rhythm.      Heart sounds: No murmur heard.  Pulmonary:      Effort: Pulmonary effort is normal. No respiratory distress.      Breath sounds: Normal breath sounds. No wheezing or rales.   Abdominal:      General: There is no distension.      Palpations: Abdomen is soft.      Tenderness: There is no abdominal tenderness.      Comments: Mild swelling to left flank  "  Musculoskeletal:         General: Tenderness (Parapsinal, bandages C/D/I) present. No deformity.   Skin:     General: Skin is warm and dry.   Neurological:      General: No focal deficit present.      Mental Status: She is alert and oriented to person, place, and time. Mental status is at baseline.         MELD 3.0: 17 at 9/21/2024  6:13 PM  MELD-Na: 13 at 9/21/2024  6:13 PM  Calculated from:  Serum Creatinine: 0.6 mg/dL (Using min of 1 mg/dL) at 9/21/2024  6:13 PM  Serum Sodium: 138 mmol/L (Using max of 137 mmol/L) at 9/21/2024  6:13 PM  Total Bilirubin: 2.1 mg/dL at 9/21/2024  2:34 AM  Serum Albumin: 1.2 g/dL (Using min of 1.5 g/dL) at 9/21/2024  2:34 AM  INR(ratio): 1.4 at 9/19/2024  3:19 AM  Age at listing (hypothetical): 49 years  Sex: Female at 9/21/2024  6:13 PM      Significant Labs:  CBC:  No results for input(s): "WBC", "HGB", "HCT", "PLT" in the last 48 hours.    CMP:  No results for input(s): "NA", "K", "CL", "CO2", "GLU", "BUN", "CREATININE", "CALCIUM", "PROT", "ALBUMIN", "BILITOT", "ALKPHOS", "AST", "ALT", "ANIONGAP", "EGFRNONAA" in the last 48 hours.    Invalid input(s): "ESTGF[place]"      "

## 2024-10-07 NOTE — OP NOTE
[facility]  Endocrine Surgery  Operative Report         Date of Procedure: 10/7/2024     Procedure: Procedure(s) (LRB):  Incision drainage complex abscess deep in the left flank  Irrigation excisional debridement of skin, subcutaneous tissue of the left flank measuring 11 cm x 15 cm in size    Indications: This patient presents with multiple subcutaneous soft tissue fluid collections with the largest being along the left hip.      Surgeons and Role:     * Shukri Stanton MD - Primary     * Kimmie Tinoco MD - Resident - Assisting     * Luis Tuttle MD - Resident - Assisting       Pre-Operative Diagnosis: Retroperitoneal fluid collection [R18.8]    Post-Operative Diagnosis: Retroperitoneal fluid collection [R18.8]    Anesthesia: Monitor Anesthesia Care    Procedures:   LEFT hip abscess incision and drainage     Intraoperative Findings:   ~300cc of purulent material within the subcutaneous space along the left hip.   Cavity packed with 2x Kerlix rolls soaked in Dakins     Description of the Procedure:  The patient was seen in the Holding Room. The risks, benefits, complications, treatment options, and expected outcomes were discussed with the patient.  She agreed to proceed. All consents were confirmed before rolling back to the OR.     The patient was brought back to the OR, intubated and positioned in the right lateral position with all pressure points padded. She was prepped and draped in the standard sterile technique. A time out was performed and all in the room were in agreement. A large subcutaneous fluid collection could be palpated overlying the left hip. An oblique, 8cm incision was made over the area of greatest fluctuance and we were immediately met with a large volume of purulent material and necrotic fat. Roughly 300mL of pus was evacuated from the cavity. This cavity was then sharply debrided of all grossly necrotic appearing tissue.     The final dimensions measured 15cm x 11xm.Once all  necrotic tissues was debrided back to grossly normal appearing fat, hemostasis was achieved with monopolar electrosurgical energy. The wound cavity was then copiously irrigated and packed with 2x Kerlix rolls soaked in dakins solution. The wound was then dressed with an ABD and tape. The patient tolerated the procedure well. She was extubated and transported to PACU in stable condition.      Complications: No    Estimated Blood Loss (EBL): minimal            Drains: None    Specimens:   Specimen (24h ago, onward)      None                    Condition: stable    Disposition: PACU - hemodynamically stable.    Attestation: Dr. Stanton was available for all critical portions of the case.

## 2024-10-07 NOTE — CARE UPDATE
Repeat MRI pan spine contrasted reviewed. No worsening lumbar spine infection. No acute neurosurgical intervention indicated at this time. Continue IV antibiotics per ID. Will arrange appropriate follow up. Please contact us with any further questions.    Ligia Almazan PA-C  Neurosurgery   [facility]

## 2024-10-07 NOTE — TRANSFER OF CARE
"Anesthesia Transfer of Care Note    Patient: Mari Sloan    Procedure(s) Performed: Procedure(s) (LRB):  Incision and Drainage (Left)    Patient location: PACU    Anesthesia Type: general    Transport from OR: Transported from OR on 6-10 L/min O2 by face mask with adequate spontaneous ventilation    Post pain: adequate analgesia    Post assessment: no apparent anesthetic complications and tolerated procedure well    Post vital signs: stable    Level of consciousness: awake and alert    Nausea/Vomiting: no nausea/vomiting    Complications: none    Transfer of care protocol was followedComments: Report given to recovery, RN. All questions answered.      Last vitals: Visit Vitals  BP (!) 167/94 (BP Location: Left arm, Patient Position: Lying)   Pulse 90   Temp 36.6 °C (97.9 °F) (Temporal)   Resp 16   Ht 5' 2" (1.575 m)   Wt 65.2 kg (143 lb 11.8 oz)   LMP  (LMP Unknown)   SpO2 99%   Breastfeeding No   BMI 26.29 kg/m²     "

## 2024-10-07 NOTE — PLAN OF CARE
Discharge Plan A and Plan B have been determined by review of patient's clinical status, future medical and therapeutic needs, and coverage/benefits for post-acute care in coordination with multidisciplinary team members.      10/07/24 0851   Discharge Reassessment   Assessment Type Discharge Planning Reassessment   Did the patient's condition or plan change since previous assessment? Yes   Discharge Plan discussed with: Patient;Spouse/sig other   Name(s) and Number(s) Marcelino Sloan (Spouse)  [phone] (Mobile   Communicated [facility] with patient/caregiver Yes   Discharge Plan A Skilled Nursing Facility   DME Needed Upon Discharge  other (see comments)  (TBD determined at next level of care)   Transition of Care Barriers Substance Abuse;Mobility   Why the patient remains in the hospital Requires continued medical care   Post-Acute Status   Post-Acute Authorization Placement   Post-Acute Placement Status Set-up Complete/Auth obtained   Coverage MEDICAID - [facility] ([place] MEDICA[place] -   Hospital Resources/Appts/Education Provided Appointments scheduled and added to AVS   Patient choice form signed by patient/caregiver List from CMS Compare;List with quality metrics by geographic area provided   Discharge Delays None known at this time     CM met with patient and spoke with spouse   to discuss discharge planning.  Patients plan is to  dc to SNF. SNF authorization has been approved.    [facility]:  10/11/24      2:45 pm  CM spoke with Zeb @ MEDICAID - [facility] ([place] MEDICA[place]) -  [phone] and updated that patient [facility] is 10/11/24.       TREATMENT PLANS:      Paraspinal abscess  MSSA Endocarditis  MSSA Paraspinal abscess  MSSA Psoas abscess    Blood cx 9/16 with MSSA   Noted to have extensive fluid collection on left extending from T11 through left iliacus muscle and within the left iliopsoas muscle. Multifocal collections of air including subcutaneous tissues on the left flank noted    IR placed drain for retroperitoneal fluid collection on 9/30. Culture with MSSA   MRI complete spine and pelvis ordered to further determine next steps with multiple abscesses  Gen Surg took her to the OR for an I&D of a left hip abscess on 10/7, where 300cc of purulent material within the subcutaneous space along the left hip was drained and sent for culture.      Follow up: PCP      Referrals: Gen Surg, Mj, PATRICK Elise RN  Case Management  [facility]  [phone]

## 2024-10-07 NOTE — ANESTHESIA POSTPROCEDURE EVALUATION
Anesthesia Post Evaluation    Patient: Mari Sloan    Procedure(s) Performed: Procedure(s) (LRB):  Incision and Drainage (Left)    Final Anesthesia Type: general      Patient location during evaluation: PACU  Patient participation: Yes- Able to Participate  Level of consciousness: awake and alert  Post-procedure vital signs: reviewed and stable  Pain management: adequate  Airway patency: patent    PONV status at discharge: No PONV  Anesthetic complications: no      Cardiovascular status: blood pressure returned to baseline and hemodynamically stable  Respiratory status: spontaneous ventilation  Hydration status: euvolemic  Follow-up not needed.              Vitals Value Taken Time   /95 10/07/24 1236   Temp 36.7 °C (98 °F) 10/07/24 1145   Pulse 85 10/07/24 1307   Resp 11 10/07/24 1201   SpO2 96 % 10/07/24 1307   Vitals shown include unfiled device data.      Event Time   Out of Recovery 10/07/2024 11:30:00         Pain/Jesus Score: Pain Rating Prior to Med Admin: 9 (10/7/2024 11:50 AM)  Pain Rating Post Med Admin: 9 (10/7/2024 11:45 AM)  Jesus Score: 10 (10/7/2024 11:45 AM)

## 2024-10-07 NOTE — PLAN OF CARE
Problem: Skin Injury Risk Increased  Goal: Skin Health and Integrity  Outcome: Progressing     Problem: Adult Inpatient Plan of Care  Goal: Plan of Care Review  Outcome: Progressing  Goal: Patient-Specific Goal (Individualized)  Outcome: Progressing  Goal: Absence of Hospital-Acquired Illness or Injury  Outcome: Progressing  Goal: Optimal Comfort and Wellbeing  Outcome: Progressing  Goal: Readiness for Transition of Care  Outcome: Progressing     Problem: Infection  Goal: Absence of Infection Signs and Symptoms  Outcome: Progressing     Problem: Diabetes Comorbidity  Goal: Blood Glucose Level Within Targeted Range  Outcome: Progressing     Problem: Sepsis/Septic Shock  Goal: Optimal Coping  Outcome: Progressing  Goal: Absence of Bleeding  Outcome: Progressing  Goal: Blood Glucose Level Within Targeted Range  Outcome: Progressing  Goal: Absence of Infection Signs and Symptoms  Outcome: Progressing  Goal: Optimal Nutrition Intake  Outcome: Progressing     Problem: Fall Injury Risk  Goal: Absence of Fall and Fall-Related Injury  Outcome: Progressing     Problem: Wound  Goal: Optimal Coping  Outcome: Progressing  Goal: Optimal Functional Ability  Outcome: Progressing  Goal: Absence of Infection Signs and Symptoms  Outcome: Progressing  Goal: Improved Oral Intake  Outcome: Progressing  Goal: Optimal Pain Control and Function  Outcome: Progressing  Goal: Skin Health and Integrity  Outcome: Progressing  Goal: Optimal Wound Healing  Outcome: Progressing     Amb to RR with walker. Landy well,  at BS, med for pain x 1 ,no injuries this shift will continue to monitor. Srup , clwr

## 2024-10-07 NOTE — ASSESSMENT & PLAN NOTE
MSSA Endocarditis  MSSA Paraspinal abscess  MSSA Psoas abscess    Blood cx 9/16 with MSSA  2D echo 9/17:  Cannot entirely exclude mitral vegetation vs redundant chordae. If high clinical suspicion for endocarditis, would rx as such (not clear MARICHUY would be able to exclude vegetation based on TTE findings).   Noted to have extensive fluid collection on left extending from T11 through left iliacus muscle and within the left iliopsoas muscle.  Multifocal collections of air including subcutaneous tissues on the left flank noted.  ID/Neurosurgery consulted.   S/p L3-L5 laminectomy with epidural abscess evacuation 9/19   Ortho consulted. Rec continued abx tx and no further interventions  IR SI joint aspiration and abscess drain placement 9/21. Unable to aspirate joint  IR drain removed on 09/27.    Repeat CT abdomen pelvis 9/28 ordered to look for recollection of fluid showed an ill-defined subcutaneous edema and soft tissue inflammatory change in the surgical bed and subcutaneous soft tissues without discrete organized fluid collection,  Stable size of 3.6 cm fluid collection along the left posterior pararenal space which appears to communicate with the with the aforementioned collection and fascial thickening.   IR placed drain for retroperitoneal fluid collection on 9/30.  Culture with MSSA  ID consulted:  Suggested Oxacillin through 11/18 with repeat imaging prior to completion  Will need LTAC for completion of IV antibiotics given history of IVDA  PICC placed 9/27  Repeat CRP ordered 10/3 given worsening pain and swelling - CRP down to 32  Repeat CT abdomen/pelvis was ordered to assess for drain removal, that showed worsening abscesses.  Discussed with ID who suggested further source control and to continue Oxacillin   Gen Surg, Ortho, NSGY and IR consulted - Greatly appreciate assistance!  MRI complete spine and pelvis ordered to further determine next steps with multiple abscesses  Gen Surg took her to the OR for an  I&D of a left hip abscess on 10/7, where 300cc of purulent material within the subcutaneous space along the left hip was drained and sent for culture.

## 2024-10-07 NOTE — PT/OT/SLP PROGRESS
Occupational Therapy      Patient Name:  Mari Sloan   MRN:  [id number]    Patient not seen today secondary to hold per nurse due to patient being in a lot of pain from procedure this am. Will follow-up for therapy as scheduled.    10/7/2024

## 2024-10-07 NOTE — NURSING
Pt AAO, VSS.  Pt went for I&D of L flank area, drsg c/d/I c/o pain 10/10 to that area.  Notified Dr. Kerr, orders received for IV Dilaudid 1mg.  Pt able to ambulate to bathroom with walker and min assist.      Bed low and locked, call light in reach.

## 2024-10-07 NOTE — NURSING TRANSFER
Nursing Transfer Note      10/7/2024   12:02 PM    Nurse giving handoff:Greg teresa Rn  Nurse receiving handoff:Arcelia Camilo    Reason patient is being transferred: postop    Transfer To: [facility]    Transfer via bed    Transfer with cardiac monitoring    Transported by pacu transport    Transfer Vital Signs:  Blood Pressure:see flowsheet  Heart Rate:  O2:  Temperature:  Respirations:    Telemetry: yes  Order for Tele Monitor? yes    Additional Lines: n/a    Medicines sent: Antibiotic    Any special needs or follow-up needed:     Patient belongings transferred with patient: No    Chart send with patient: Yes    Notified: n/a    Patient reassessed at: 10/7/24

## 2024-10-07 NOTE — SUBJECTIVE & OBJECTIVE
Interval History/Significant Events: NAEON. To OR for incision and drainage of L hip today.    Follow-up For: Procedure(s) (LRB):  L3-L5 laminectomy with epidural abscess evac (N/A)  WASHOUT, WOUND, SPINE    Post-Operative Day: 18 Days Post-Op    Objective:     Vital Signs (Most Recent):  Temp: 99.5 °F (37.5 °C) (10/07/24 0327)  Pulse: 84 (10/07/24 0327)  Resp: 18 (10/07/24 0512)  BP: 112/76 (10/07/24 0327)  SpO2: 96 % (10/07/24 0327) Vital Signs (24h Range):  Temp:  [98.3 °F (36.8 °C)-99.5 °F (37.5 °C)] 99.5 °F (37.5 °C)  Pulse:  [73-94] 84  Resp:  [18-20] 18  SpO2:  [91 %-97 %] 96 %  BP: (112-153)/(76-93) 112/76     Weight: 65.2 kg (143 lb 11.8 oz)  Body mass index is 26.29 kg/m².      Intake/Output Summary (Last 24 hours) at 10/7/2024 0717  Last data filed at 10/7/2024 0537  Gross per 24 hour   Intake 1088.91 ml   Output --   Net 1088.91 ml          Physical Exam  Vitals and nursing note reviewed.   Constitutional:       General: She is not in acute distress.     Appearance: Normal appearance.   HENT:      Head: Normocephalic and atraumatic.   Cardiovascular:      Rate and Rhythm: Normal rate.   Pulmonary:      Effort: Pulmonary effort is normal. No respiratory distress.   Abdominal:      General: Abdomen is flat. There is no distension.      Palpations: Abdomen is soft.      Tenderness: There is no abdominal tenderness.   Skin:     Comments: Left flank with fluctuance and erythematous changes to the overlying skin   Neurological:      General: No focal deficit present.      Mental Status: She is alert and oriented to person, place, and time.   Psychiatric:         Behavior: Behavior normal.         Thought Content: Thought content normal.            Vents:  Vent Mode: Spont (09/22/24 1025)  Ventilator Initiated: Yes (09/18/24 2201)  Set Rate: 18 BPM (09/22/24 0740)  Vt Set: 390 mL (09/22/24 0740)  Pressure Support: 8 cmH20 (09/22/24 1025)  PEEP/CPAP: 5 cmH20 (09/22/24 1025)  Oxygen Concentration (%): 40  "(09/22/24 1032)  Peak Airway Pressure: 13 cmH20 (09/22/24 1025)  Plateau Pressure: 0 cmH20 (09/22/24 1025)  Total Ve: 7.15 L/m (09/22/24 1025)  Negative Inspiratory Force (cm H2O): 0 (09/22/24 1025)  F/VT Ratio<105 (RSBI): (!) 46.3 (09/22/24 1025)    Lines/Drains/Airways       Peripherally Inserted Central Catheter Line  Duration             PICC Double Lumen 09/27/24 1035 right basilic 9 days              Drain  Duration             Female External Urinary Catheter w/ Suction 09/24/24 13 days                    Significant Labs:    CBC/Anemia Profile:  No results for input(s): "WBC", "HGB", "HCT", "PLT", "MCV", "RDW", "IRON", "FERRITIN", "RETIC", "FOLATE", "[id number]", "OCCULTBLOOD" in the last 48 hours.     Chemistries:  No results for input(s): "NA", "K", "CL", "CO2", "BUN", "CREATININE", "CALCIUM", "ALBUMIN", "PROT", "BILITOT", "ALKPHOS", "ALT", "AST", "GLUCOSE", "MG", "PHOS" in the last 48 hours.    All pertinent labs within the past 24 hours have been reviewed.    Significant Imaging:  I have reviewed all pertinent imaging results/findings within the past 24 hours.  "

## 2024-10-07 NOTE — PROGRESS NOTES
[facility] ([facility]-)  General Surgery  Progress Note    Subjective:     History of Present Illness:  Mari Sloan is a 49 y.o. 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 She has been admitted for 3 weeks and has been on broad spectrum antibiotics, undergone multiple IR drains, and a L3-L4 laminectomy for an epidural abscess. She has been found to have infective endocarditis. Echo on 9/17 does not mention endocarditis, but she is presumed positive and being treated for it. She has been bacteremic with MSSA, now on oxacillin. Her abscess cultures and blood cultures have all been MSSA. She recently developed left flank pain and had a CT that showed pelvic abscesses and a left flank fluid collection. General surgery was consulted to evaluate her left flank fluid collection.      Post-Op Info:  Procedure(s) (LRB):  L3-L5 laminectomy with epidural abscess evac (N/A)  WASHOUT, WOUND, SPINE   18 Days Post-Op     Interval History: NAEO. AF, HDS. OR today for left flank I&D. NPO since midnight     Medications:  Continuous Infusions:  Scheduled Meds:   folic acid  1 mg Oral Daily    gabapentin  300 mg Oral TID    hydrOXYzine pamoate  25 mg Oral QHS    LIDOcaine  1 patch Transdermal Q24H    methadone  70 mg Oral Daily    nicotine  1 patch Transdermal Daily    oxacillin 12 g in  mL CONTINUOUS INFUSION  12 g Intravenous Q24H    polyethylene glycol  17 g Oral BID    senna-docusate 8.6-50 mg  1 tablet Oral BID    sodium chloride 0.9%  10 mL Intravenous Q6H    thiamine  100 mg Oral Daily     PRN Meds:  Current Facility-Administered Medications:     acetaminophen, 1,000 mg, Per OG tube, Q6H PRN    albuterol-ipratropium, 3 mL, Nebulization, Q4H PRN    dextrose 10%, 12.5 g, Intravenous, PRN    dextrose 10%, 12.5 g, Intravenous, PRN     dextrose 10%, 25 g, Intravenous, PRN    glucagon (human recombinant), 1 mg, Intramuscular, PRN    glucose, 16 g, Oral, PRN    glucose, 24 g, Oral, PRN    hydrOXYzine pamoate, 50 mg, Oral, Q6H PRN    insulin aspart U-100, 0-5 Units, Subcutaneous, QID (AC + HS) PRN    melatonin, 6 mg, Oral, Nightly PRN    naloxone, 0.4 mg, Intravenous, PRN    ondansetron, 4 mg, Intravenous, Q6H PRN    oxyCODONE, 10 mg, Oral, Q4H PRN    sodium chloride 0.9%, 10 mL, Intravenous, Q12H PRN    Flushing PICC/Midline Protocol, , , Until Discontinued **AND** sodium chloride 0.9%, 10 mL, Intravenous, Q6H **AND** sodium chloride 0.9%, 10 mL, Intravenous, PRN    sodium chloride 0.9%, 10 mL, Intravenous, PRN     Review of patient's allergies indicates:  No Known Allergies  Objective:     Vital Signs (Most Recent):  Temp: 99.5 °F (37.5 °C) (10/07/24 0327)  Pulse: 84 (10/07/24 0327)  Resp: 18 (10/07/24 0512)  BP: 112/76 (10/07/24 0327)  SpO2: 96 % (10/07/24 0327) Vital Signs (24h Range):  Temp:  [98.3 °F (36.8 °C)-99.5 °F (37.5 °C)] 99.5 °F (37.5 °C)  Pulse:  [73-94] 84  Resp:  [18-20] 18  SpO2:  [91 %-97 %] 96 %  BP: (112-153)/(76-93) 112/76     Weight: 65.2 kg (143 lb 11.8 oz)  Body mass index is 26.29 kg/m².    Intake/Output - Last 3 Shifts         10/05 0700  10/06 0659 10/06 0700  10/07 0659 10/07 0700  10/08 0659    P.O. 260      I.V. (mL/kg)       IV Piggyback  1088.9     Total Intake(mL/kg) 260 (4) 1088.9 (16.7)     Urine (mL/kg/hr) 1200 (0.8)      Total Output 1200      Net -940 +1088.9            Urine Occurrence  1 x     Stool Occurrence  1 x              Physical Exam  Vitals and nursing note reviewed.   Constitutional:       General: She is not in acute distress.     Appearance: Normal appearance.   HENT:      Head: Normocephalic and atraumatic.   Cardiovascular:      Rate and Rhythm: Normal rate.   Pulmonary:      Effort: Pulmonary effort is normal. No respiratory distress.   Abdominal:      General: Abdomen is flat. There is no  distension.      Palpations: Abdomen is soft.      Tenderness: There is no abdominal tenderness.   Skin:     Comments: Left flank with fluctuance and erythematous changes to the overlying skin   Neurological:      General: No focal deficit present.      Mental Status: She is alert and oriented to person, place, and time.   Psychiatric:         Behavior: Behavior normal.         Thought Content: Thought content normal.          Significant Labs:  I have reviewed all pertinent lab results within the past 24 hours.  CBC:   Recent Labs   Lab 10/05/24  0100   WBC 9.15  9.15   RBC 3.16*  3.16*   HGB 8.5*  8.5*   HCT 27.9*  27.9*     243   MCV 88  88   MCH 26.9*  26.9*   MCHC 30.5*  30.5*     CMP:   Recent Labs   Lab 10/05/24  0100   *  148*   CALCIUM 8.2*  8.2*   ALBUMIN 1.3*  1.3*   PROT 6.0  6.0     138   K 3.5  3.5   CO2 27  27     102   BUN 3*  3*   CREATININE 0.6  0.6   ALKPHOS 122  122   ALT 7*  7*   AST 11  11   BILITOT 0.7  0.7       Significant Diagnostics:  I have reviewed all pertinent imaging results/findings within the past 24 hours.  Assessment/Plan:     Endocarditis  Mari Sloan is a 49 y.o. lady with infectious endocarditis and multiple abscesses.    Other osteomyelitis, multiple sites  Mari Sloan is a 49 y.o. female who presented with multiple fluid collections and concern for osteomyelitis at her SI joint. She has undergone IR drainage during this admission. Repeat imaging demonstrates persistent fluid at the pubic symphysis, left flank. General surgery consulted.     - patient seen and examined   - imaging reviewed  - discussed case with orthopedic   - MRI pelvis > Septic arthritis/osteomyelitis involving the left SI joint and pubic symphysis, with adjacent soft tissue abscesses   - Ortho not planning any surgical intervention. Recommending IR drainage of deep pelvic fluids   - the left flank collection has a superficial, organized component that  would benefit from drainage  - do not think would be well tolerated at bedside. I&D in the OR today 10/7/24  - NPO, mIVF  - consented, marked   - please call with questions        Liliana Johnston MD  General Surgery  [facility] ([place])

## 2024-10-07 NOTE — PT/OT/SLP PROGRESS
Physical Therapy      Patient Name:  Mari Sloan   MRN:  [id number]    Patient not seen today secondary to at 10:44 AM pt GLENYS (procedure), second attempt at 14:00 PM pt reporting still in a lot of pain from procedure, and politely requesting for therapy to return tomorrow. Will follow-up at next PT POC date.

## 2024-10-08 LAB
ALBUMIN SERPL BCP-MCNC: 1.3 G/DL (ref 3.5–5.2)
ALP SERPL-CCNC: 111 U/L (ref 55–135)
ALT SERPL W/O P-5'-P-CCNC: 7 U/L (ref 10–44)
ANION GAP SERPL CALC-SCNC: 6 MMOL/L (ref 8–16)
AST SERPL-CCNC: 10 U/L (ref 10–40)
BASOPHILS # BLD AUTO: 0.08 K/UL (ref 0–0.2)
BASOPHILS NFR BLD: 1 % (ref 0–1.9)
BILIRUB SERPL-MCNC: 0.6 MG/DL (ref 0.1–1)
BUN SERPL-MCNC: 3 MG/DL (ref 6–20)
CALCIUM SERPL-MCNC: 8.1 MG/DL (ref 8.7–10.5)
CHLORIDE SERPL-SCNC: 107 MMOL/L (ref 95–110)
CO2 SERPL-SCNC: 26 MMOL/L (ref 23–29)
CREAT SERPL-MCNC: 0.6 MG/DL (ref 0.5–1.4)
CRP SERPL-MCNC: 12.8 MG/L (ref 0–8.2)
DIFFERENTIAL METHOD BLD: ABNORMAL
EOSINOPHIL # BLD AUTO: 0.1 K/UL (ref 0–0.5)
EOSINOPHIL NFR BLD: 1.3 % (ref 0–8)
ERYTHROCYTE [DISTWIDTH] IN BLOOD BY AUTOMATED COUNT: 19.9 % (ref 11.5–14.5)
ERYTHROCYTE [SEDIMENTATION RATE] IN BLOOD BY PHOTOMETRIC METHOD: 47 MM/HR (ref 0–36)
EST. GFR  (NO RACE VARIABLE): >60 ML/MIN/1.73 M^2
GLUCOSE SERPL-MCNC: 92 MG/DL (ref 70–110)
HCT VFR BLD AUTO: 28.6 % (ref 37–48.5)
HGB BLD-MCNC: 8.7 G/DL (ref 12–16)
IMM GRANULOCYTES # BLD AUTO: 0.03 K/UL (ref 0–0.04)
IMM GRANULOCYTES NFR BLD AUTO: 0.4 % (ref 0–0.5)
LYMPHOCYTES # BLD AUTO: 2.5 K/UL (ref 1–4.8)
LYMPHOCYTES NFR BLD: 31.6 % (ref 18–48)
MAGNESIUM SERPL-MCNC: 2 MG/DL (ref 1.6–2.6)
MCH RBC QN AUTO: 26.9 PG (ref 27–31)
MCHC RBC AUTO-ENTMCNC: 30.4 G/DL (ref 32–36)
MCV RBC AUTO: 89 FL (ref 82–98)
MONOCYTES # BLD AUTO: 0.5 K/UL (ref 0.3–1)
MONOCYTES NFR BLD: 6.6 % (ref 4–15)
NEUTROPHILS # BLD AUTO: 4.6 K/UL (ref 1.8–7.7)
NEUTROPHILS NFR BLD: 59.1 % (ref 38–73)
NRBC BLD-RTO: 0 /100 WBC
PHOSPHATE SERPL-MCNC: 4.4 MG/DL (ref 2.7–4.5)
PLATELET # BLD AUTO: 283 K/UL (ref 150–450)
PMV BLD AUTO: 9.4 FL (ref 9.2–12.9)
POCT GLUCOSE: 114 MG/DL (ref 70–110)
POCT GLUCOSE: 138 MG/DL (ref 70–110)
POCT GLUCOSE: 147 MG/DL (ref 70–110)
POCT GLUCOSE: 180 MG/DL (ref 70–110)
POTASSIUM SERPL-SCNC: 2.9 MMOL/L (ref 3.5–5.1)
PROT SERPL-MCNC: 5.6 G/DL (ref 6–8.4)
RBC # BLD AUTO: 3.23 M/UL (ref 4–5.4)
SODIUM SERPL-SCNC: 139 MMOL/L (ref 136–145)
WBC # BLD AUTO: 7.84 K/UL (ref 3.9–12.7)

## 2024-10-08 PROCEDURE — 25000003 PHARM REV CODE 250: Performed by: HOSPITALIST

## 2024-10-08 PROCEDURE — 25000003 PHARM REV CODE 250: Performed by: STUDENT IN AN ORGANIZED HEALTH CARE EDUCATION/TRAINING PROGRAM

## 2024-10-08 PROCEDURE — 63600175 PHARM REV CODE 636 W HCPCS: Performed by: HOSPITALIST

## 2024-10-08 PROCEDURE — 83735 ASSAY OF MAGNESIUM: CPT | Performed by: HOSPITALIST

## 2024-10-08 PROCEDURE — 84100 ASSAY OF PHOSPHORUS: CPT | Performed by: HOSPITALIST

## 2024-10-08 PROCEDURE — 85652 RBC SED RATE AUTOMATED: CPT | Performed by: HOSPITALIST

## 2024-10-08 PROCEDURE — 63600175 PHARM REV CODE 636 W HCPCS: Performed by: STUDENT IN AN ORGANIZED HEALTH CARE EDUCATION/TRAINING PROGRAM

## 2024-10-08 PROCEDURE — 99223 1ST HOSP IP/OBS HIGH 75: CPT | Mod: ,,, | Performed by: STUDENT IN AN ORGANIZED HEALTH CARE EDUCATION/TRAINING PROGRAM

## 2024-10-08 PROCEDURE — S4991 NICOTINE PATCH NONLEGEND: HCPCS

## 2024-10-08 PROCEDURE — 25000003 PHARM REV CODE 250

## 2024-10-08 PROCEDURE — A4216 STERILE WATER/SALINE, 10 ML: HCPCS | Performed by: STUDENT IN AN ORGANIZED HEALTH CARE EDUCATION/TRAINING PROGRAM

## 2024-10-08 PROCEDURE — 20600001 HC STEP DOWN PRIVATE ROOM

## 2024-10-08 PROCEDURE — 85025 COMPLETE CBC W/AUTO DIFF WBC: CPT | Performed by: HOSPITALIST

## 2024-10-08 PROCEDURE — 86140 C-REACTIVE PROTEIN: CPT | Performed by: HOSPITALIST

## 2024-10-08 PROCEDURE — 25000003 PHARM REV CODE 250: Performed by: INTERNAL MEDICINE

## 2024-10-08 PROCEDURE — 80053 COMPREHEN METABOLIC PANEL: CPT | Performed by: HOSPITALIST

## 2024-10-08 RX ORDER — POTASSIUM CHLORIDE 750 MG/1
30 CAPSULE, EXTENDED RELEASE ORAL EVERY 4 HOURS
Status: COMPLETED | OUTPATIENT
Start: 2024-10-08 | End: 2024-10-08

## 2024-10-08 RX ORDER — HYDROMORPHONE HYDROCHLORIDE 1 MG/ML
1 INJECTION, SOLUTION INTRAMUSCULAR; INTRAVENOUS; SUBCUTANEOUS EVERY 4 HOURS PRN
Status: DISCONTINUED | OUTPATIENT
Start: 2024-10-08 | End: 2024-10-09

## 2024-10-08 RX ORDER — AMOXICILLIN 250 MG
1 CAPSULE ORAL DAILY
Status: DISCONTINUED | OUTPATIENT
Start: 2024-10-08 | End: 2024-10-18 | Stop reason: HOSPADM

## 2024-10-08 RX ORDER — POLYETHYLENE GLYCOL 3350 17 G/17G
17 POWDER, FOR SOLUTION ORAL DAILY
Status: DISCONTINUED | OUTPATIENT
Start: 2024-10-08 | End: 2024-10-18 | Stop reason: HOSPADM

## 2024-10-08 RX ADMIN — OXYCODONE HYDROCHLORIDE 15 MG: 10 TABLET ORAL at 01:10

## 2024-10-08 RX ADMIN — Medication 1 PATCH: at 09:10

## 2024-10-08 RX ADMIN — OXYCODONE HYDROCHLORIDE 15 MG: 10 TABLET ORAL at 09:10

## 2024-10-08 RX ADMIN — FOLIC ACID 1 MG: 1 TABLET ORAL at 09:10

## 2024-10-08 RX ADMIN — METHADONE HYDROCHLORIDE 70 MG: 10 TABLET ORAL at 09:10

## 2024-10-08 RX ADMIN — POTASSIUM CHLORIDE 30 MEQ: 750 CAPSULE, EXTENDED RELEASE ORAL at 09:10

## 2024-10-08 RX ADMIN — OXACILLIN 12 G: 2 INJECTION, POWDER, FOR SOLUTION INTRAMUSCULAR; INTRAVENOUS at 02:10

## 2024-10-08 RX ADMIN — GABAPENTIN 300 MG: 300 CAPSULE ORAL at 09:10

## 2024-10-08 RX ADMIN — GABAPENTIN 300 MG: 300 CAPSULE ORAL at 08:10

## 2024-10-08 RX ADMIN — OXYCODONE HYDROCHLORIDE 15 MG: 10 TABLET ORAL at 12:10

## 2024-10-08 RX ADMIN — ONDANSETRON 4 MG: 2 INJECTION INTRAMUSCULAR; INTRAVENOUS at 03:10

## 2024-10-08 RX ADMIN — Medication 10 ML: at 12:10

## 2024-10-08 RX ADMIN — POTASSIUM CHLORIDE 30 MEQ: 750 CAPSULE, EXTENDED RELEASE ORAL at 05:10

## 2024-10-08 RX ADMIN — Medication 100 MG: at 09:10

## 2024-10-08 RX ADMIN — HYDROXYZINE PAMOATE 25 MG: 25 CAPSULE ORAL at 08:10

## 2024-10-08 RX ADMIN — OXYCODONE HYDROCHLORIDE 15 MG: 10 TABLET ORAL at 05:10

## 2024-10-08 RX ADMIN — Medication 10 ML: at 05:10

## 2024-10-08 RX ADMIN — HYDROMORPHONE HYDROCHLORIDE 1 MG: 1 INJECTION, SOLUTION INTRAMUSCULAR; INTRAVENOUS; SUBCUTANEOUS at 03:10

## 2024-10-08 RX ADMIN — HYDROMORPHONE HYDROCHLORIDE 1 MG: 1 INJECTION, SOLUTION INTRAMUSCULAR; INTRAVENOUS; SUBCUTANEOUS at 08:10

## 2024-10-08 RX ADMIN — SENNOSIDES AND DOCUSATE SODIUM 1 TABLET: 50; 8.6 TABLET ORAL at 09:10

## 2024-10-08 RX ADMIN — Medication 10 ML: at 11:10

## 2024-10-08 RX ADMIN — GABAPENTIN 300 MG: 300 CAPSULE ORAL at 02:10

## 2024-10-08 RX ADMIN — POTASSIUM CHLORIDE 30 MEQ: 750 CAPSULE, EXTENDED RELEASE ORAL at 01:10

## 2024-10-08 RX ADMIN — OXYCODONE HYDROCHLORIDE 15 MG: 10 TABLET ORAL at 04:10

## 2024-10-08 RX ADMIN — POLYETHYLENE GLYCOL 3350 17 G: 17 POWDER, FOR SOLUTION ORAL at 09:10

## 2024-10-08 RX ADMIN — HYDROMORPHONE HYDROCHLORIDE 1 MG: 1 INJECTION, SOLUTION INTRAMUSCULAR; INTRAVENOUS; SUBCUTANEOUS at 05:10

## 2024-10-08 RX ADMIN — HYDROMORPHONE HYDROCHLORIDE 1 MG: 1 INJECTION, SOLUTION INTRAMUSCULAR; INTRAVENOUS; SUBCUTANEOUS at 11:10

## 2024-10-08 NOTE — SUBJECTIVE & OBJECTIVE
Interval History: No acute events overnight. Continued to require pain meds.  ID reconsulted and discussed case.  NSGY not draining large spinal abscess on MRI.  Will ask IR to evaluate for drainage.    Review of Systems   Constitutional:  Negative for chills, fatigue and fever.   Respiratory:  Negative for cough and shortness of breath.    Cardiovascular:  Negative for chest pain, palpitations and leg swelling.   Gastrointestinal:  Positive for abdominal pain. Negative for diarrhea, nausea and vomiting.   Genitourinary:  Negative for dysuria and urgency.   Musculoskeletal:  Positive for back pain.   Neurological:  Negative for dizziness and headaches.   All other systems reviewed and are negative.    Objective:     Vital Signs (Most Recent):  Temp: 98.3 °F (36.8 °C) (10/08/24 0902)  Pulse: 77 (10/08/24 1041)  Resp: 18 (10/08/24 0902)  BP: 123/81 (10/08/24 0902)  SpO2: 96 % (10/08/24 1000) Vital Signs (24h Range):  Temp:  [97.9 °F (36.6 °C)-98.5 °F (36.9 °C)] 98.3 °F (36.8 °C)  Pulse:  [73-94] 77  Resp:  [15-23] 18  SpO2:  [80 %-100 %] 96 %  BP: (112-180)/(66-97) 123/81     Weight: 65.2 kg (143 lb 11.8 oz)  Body mass index is 26.29 kg/m².    Intake/Output Summary (Last 24 hours) at 10/8/2024 1115  Last data filed at 10/8/2024 0020  Gross per 24 hour   Intake 240 ml   Output 700 ml   Net -460 ml      Physical Exam  Constitutional:       Appearance: Normal appearance.   HENT:      Head: Normocephalic and atraumatic.   Cardiovascular:      Rate and Rhythm: Normal rate and regular rhythm.      Heart sounds: No murmur heard.  Pulmonary:      Effort: Pulmonary effort is normal. No respiratory distress.      Breath sounds: Normal breath sounds. No wheezing or rales.   Abdominal:      General: There is no distension.      Palpations: Abdomen is soft.      Tenderness: There is no abdominal tenderness.      Comments: Mild swelling to left flank   Musculoskeletal:         General: Tenderness (Parapsinal, bandages C/D/I)  present. No deformity.   Skin:     General: Skin is warm and dry.   Neurological:      General: No focal deficit present.      Mental Status: She is alert and oriented to person, place, and time. Mental status is at baseline.         MELD 3.0: 17 at 9/21/2024  6:13 PM  MELD-Na: 13 at 9/21/2024  6:13 PM  Calculated from:  Serum Creatinine: 0.6 mg/dL (Using min of 1 mg/dL) at 9/21/2024  6:13 PM  Serum Sodium: 138 mmol/L (Using max of 137 mmol/L) at 9/21/2024  6:13 PM  Total Bilirubin: 2.1 mg/dL at 9/21/2024  2:34 AM  Serum Albumin: 1.2 g/dL (Using min of 1.5 g/dL) at 9/21/2024  2:34 AM  INR(ratio): 1.4 at 9/19/2024  3:19 AM  Age at listing (hypothetical): 49 years  Sex: Female at 9/21/2024  6:13 PM      Significant Labs:  CBC:  Recent Labs   Lab 10/08/24  0451   WBC 7.84   HGB 8.7*   HCT 28.6*          CMP:  Recent Labs   Lab 10/08/24  0451      K 2.9*      CO2 26   GLU 92   BUN 3*   CREATININE 0.6   CALCIUM 8.1*   PROT 5.6*   ALBUMIN 1.3*   BILITOT 0.6   ALKPHOS 111   AST 10   ALT 7*   ANIONGAP 6*

## 2024-10-08 NOTE — PT/OT/SLP PROGRESS
Physical Therapy      Patient Name:  Mari Sloan   MRN:  [id number]    Patient not seen today secondary to Pain. Will follow-up tomorrow.    Merlin Valles, PT  10/8/2024

## 2024-10-08 NOTE — ASSESSMENT & PLAN NOTE
MSSA Endocarditis  MSSA Paraspinal abscess  MSSA Psoas abscess    Blood cx 9/16 with MSSA  2D echo 9/17:  Cannot entirely exclude mitral vegetation vs redundant chordae. If high clinical suspicion for endocarditis, would rx as such (not clear MARICHUY would be able to exclude vegetation based on TTE findings).   Noted to have extensive fluid collection on left extending from T11 through left iliacus muscle and within the left iliopsoas muscle.  Multifocal collections of air including subcutaneous tissues on the left flank noted.  ID/Neurosurgery consulted.   S/p L3-L5 laminectomy with epidural abscess evacuation 9/19   Ortho consulted. Rec continued abx tx and no further interventions  IR SI joint aspiration and abscess drain placement 9/21. Unable to aspirate joint  IR drain removed on 09/27.    Repeat CT abdomen pelvis 9/28 ordered to look for recollection of fluid showed an ill-defined subcutaneous edema and soft tissue inflammatory change in the surgical bed and subcutaneous soft tissues without discrete organized fluid collection,  Stable size of 3.6 cm fluid collection along the left posterior pararenal space which appears to communicate with the with the aforementioned collection and fascial thickening.   IR placed drain for retroperitoneal fluid collection on 9/30.  Culture with MSSA  ID consulted:  Suggested Oxacillin through 11/18 with repeat imaging prior to completion  Will need LTAC for completion of IV antibiotics given history of IVDA  PICC placed 9/27  Repeat CRP ordered 10/3 given worsening pain and swelling - CRP down to 32  Repeat CT abdomen/pelvis was ordered to assess for drain removal, that showed worsening abscesses.  Discussed with ID who suggested further source control and to continue Oxacillin   Gen Surg, Ortho, NSGY and IR consulted - Greatly appreciate assistance!  MRI complete spine and pelvis ordered to further determine next steps with multiple abscesses  Gen Surg took her to the OR for an  I&D of a left hip abscess on 10/7, where 300cc of purulent material within the subcutaneous space along the left hip was drained and sent for culture.  Cx NGTD  ID reconsulted  Will ask IR to evaluate for epidural drainage as NSGY said no intervention

## 2024-10-08 NOTE — CONSULTS
[facility] ([place] [address])  Infectious Disease  Consult Note    Patient Name: Mari Sloan  MRN: [id number]  Admission Date: 9/16/2024  Hospital Length of Stay: 22 days  Attending Physician: Paty Kerr MD  Primary Care Provider: Mary Fong APRN     Isolation Status: No active isolations    Patient information was obtained from patient and ER records.      Inpatient consult to Infectious Diseases  Consult performed by: Lashawn Barbosa DO  Consult ordered by: Paty Kerr MD        Assessment/Plan:     ID  Staphylococcus aureus bacteremia  49F admitted with MSSA bacteremia, SI joint pyogenic arthritis with osteomyelitis, L psoas/iliacus abscess s/p IR drain (9/21), T12-L5 epidural abscess s/p L3-5 laminectomy with abscess washout (9/19), and possible MV endocarditis vs redundant chordae on a planned 8 week course of oxacillin. Patient underwent IR drain placement to a left retroperitoneal fluid collection on 9/30 with positive cultures for MSSA, and I&D of L hip abscess with general surgery on 10/7 with rare GPCs on gram stain. Imaging with lumbar epidural phlegmon and subcutaneous fluid collection.     Recommendations:  Follow up 10/7 OR cultures  Appreciate IR evaluation for possible intervention of lumbar epidural phlegmon  Continue oxacillin 12g daily via continuous infusion  Duration still 8 weeks, tentative end date 11/18  Unfortunately she is not an OPAT candidate, which was discussed with patient previously, will need placement to a facility  Favor repeat thoracic/lumbar imaging (MRI w/wo) prior to completion of abx to evaluate for resolution of abscess and reassess need for abx extension              Thank you for your consult. I will follow-up with patient. Please contact us if you have any additional questions.    Lashawn Barbosa DO  Infectious Disease  [facility] ([place])    Subjective:     Principal Problem: Paraspinal abscess    HPI: Ms. Sloan is a  "49F with PMH of DM2, HTN, and substance abuse, homelessness, here with MSSA bacteremia, SI joint pyogenic arthritis with osteomyelitis, L psoas/iliacus abscess s/p IR drain (), T12-L5 epidural abscess s/p L3-5 laminectomy with abscess washout (), and possible MV endocarditis vs redundant chordae on a planned 8 week course of oxacillin. Patient underwent IR drain placement to a left retroperitoneal fluid collection on 24 with positive cultures for MSSA, and I&D of L hip abscess with general surgery on 10/7 with rare GPCs on gram stain. Infectious disease now re-consulted for "Endocarditis and bacteremia - Recent OR I&D, abx recs".     Past Medical History:   Diagnosis Date    Diabetes mellitus     Hypertension     Unspecified viral hepatitis C without hepatic coma        Past Surgical History:   Procedure Laterality Date     SECTION      INCISION AND DRAINAGE Left 10/7/2024    Procedure: Incision and Drainage;  Surgeon: Shukri Stanton MD;  Location: [facility] OR [address] [place] [address];  Service: General;  Laterality: Left;    LAMINECTOMY N/A 2024    Procedure: L3-L5 laminectomy with epidural abscess evac;  Surgeon: Sourav Jim DO;  Location: [facility] OR [address] [place] [address];  Service: Neurosurgery;  Laterality: N/A;    MAGNETIC RESONANCE IMAGING N/A 2024    Procedure: MRI (Magnetic Resonance Imagine);  Surgeon: Kamran Storey;  Location: [facility];  Service: Anesthesiology;  Laterality: N/A;  conscious sedaation MRI lumbar spine w/ and without contrast    WASHOUT, WOUND, SPINE  2024    Procedure: WASHOUT, WOUND, SPINE;  Surgeon: Sourav Jim DO;  Location: [facility] OR [address] [place] [address];  Service: Neurosurgery;;       Review of patient's allergies indicates:  No Known Allergies    Medications:  Medications Prior to Admission   Medication Sig    gabapentin (NEURONTIN) 300 MG capsule Take 300 mg by mouth 3 (three) times daily.    hydrOXYzine pamoate (VISTARIL) 25 MG Cap Take 25 mg by mouth 3 (three) times daily.    " metFORMIN (GLUCOPHAGE) 500 MG tablet Take 500 mg by mouth 2 (two) times daily with meals.    methadone (DOLOPHINE) 10 MG tablet Take 70 mg by mouth Daily.    cloNIDine (CATAPRES) 0.2 MG tablet Take 0.2 mg by mouth 3 (three) times daily.    glipiZIDE (GLUCOTROL) 5 MG tablet Take 5 mg by mouth 2 (two) times daily.     Antibiotics (From admission, onward)      Start     Stop Route Frequency Ordered    10/01/24 2100  mupirocin 2 % ointment  (MRSA Decolonization Orders [facility])         10/06/24 2059 Nasl 2 times daily 10/01/24 1454    09/18/24 1345  oxacillin 12 g in  mL CONTINUOUS INFUSION         -- IV Every 24 hours (non-standard times) 09/18/24 1241          Antifungals (From admission, onward)      None          Antivirals (From admission, onward)      None               There is no immunization history on file for this patient.    Family History    None       Social History     Socioeconomic History    Marital status:    Tobacco Use    Smoking status: Every Day     Current packs/day: 0.50     Types: Cigarettes    Smokeless tobacco: Never   Substance and Sexual Activity    Alcohol use: Not Currently    Drug use: Not Currently     Social Drivers of Health     Financial Resource Strain: Low Risk  (9/18/2024)    Overall Financial Resource Strain (CARDIA)     Difficulty of Paying Living Expenses: Not very hard   Food Insecurity: No Food Insecurity (9/18/2024)    Hunger Vital Sign     Worried About Running Out of Food in the Last Year: Never true     Ran Out of Food in the Last Year: Never true   Transportation Needs: Unmet Transportation Needs (9/18/2024)    TRANSPORTATION NEEDS     Transportation : Yes, it has kept me from medical appointments or from getting my medications.   Physical Activity: Patient Declined (9/18/2024)    Exercise Vital Sign     Days of Exercise per Week: Patient declined     Minutes of Exercise per Session: Patient declined   Stress: Stress Concern Present (9/18/2024)    [place]  [place] [employer] - Occupational Stress Questionnaire     Feeling of Stress : Rather much   Housing Stability: Low Risk  (9/18/2024)    Housing Stability Vital Sign     Unable to Pay for Housing in the Last Year: No     Homeless in the Last Year: No     Review of Systems   Constitutional:  Negative for chills and fever.   Respiratory:  Negative for cough and shortness of breath.    Cardiovascular:  Negative for chest pain.   Gastrointestinal:  Negative for abdominal pain, constipation, diarrhea, nausea and vomiting.   Musculoskeletal:  Positive for arthralgias. Negative for myalgias.   Skin:  Negative for rash.   Neurological:  Negative for headaches.     Objective:     Vital Signs (Most Recent):  Temp: 98.9 °F (37.2 °C) (10/08/24 1135)  Pulse: 82 (10/08/24 1435)  Resp: 16 (10/08/24 1528)  BP: 112/77 (10/08/24 1135)  SpO2: 98 % (10/08/24 1403) Vital Signs (24h Range):  Temp:  [97.9 °F (36.6 °C)-98.9 °F (37.2 °C)] 98.9 °F (37.2 °C)  Pulse:  [73-92] 82  Resp:  [16-20] 16  SpO2:  [80 %-100 %] 98 %  BP: (112-129)/(75-84) 112/77     Weight: 65.2 kg (143 lb 11.8 oz)  Body mass index is 26.29 kg/m².    Estimated Creatinine Clearance: 98.2 mL/min (based on SCr of 0.6 mg/dL).     Physical Exam  Vitals reviewed.   Constitutional:       General: She is not in acute distress.     Appearance: Normal appearance. She is not ill-appearing.   HENT:      Head: Normocephalic and atraumatic.   Eyes:      Extraocular Movements: Extraocular movements intact.      Conjunctiva/sclera: Conjunctivae normal.   Cardiovascular:      Rate and Rhythm: Normal rate and regular rhythm.      Heart sounds: No murmur heard.  Pulmonary:      Effort: Pulmonary effort is normal. No respiratory distress.      Breath sounds: Normal breath sounds. No wheezing.   Abdominal:      General: Abdomen is flat. Bowel sounds are normal.      Palpations: Abdomen is soft.      Tenderness: There is no abdominal tenderness.   Musculoskeletal:      Cervical  back: Normal range of motion.      Comments: L hip with dressing that is clean, dry, intact, does have tenderness to palpation   Skin:     General: Skin is warm and dry.   Neurological:      General: No focal deficit present.      Mental Status: She is alert and oriented to person, place, and time.   Psychiatric:         Mood and Affect: Mood normal.         Behavior: Behavior normal.         Thought Content: Thought content normal.          Significant Labs: All pertinent labs within the past 24 hours have been reviewed.  Recent Lab Results         10/08/24  1134   10/08/24  0900   10/08/24  0451   10/07/24  2005        Albumin     1.3         ALP     111         ALT     7         Anion Gap     6         AST     10         Baso #     0.08         Basophil %     1.0         BILIRUBIN TOTAL     0.6  Comment: For infants and newborns, interpretation of results should be based  on gestational age, weight and in agreement with clinical  observations.    Premature Infant recommended reference ranges:  Up to 24 hours.............<8.0 mg/dL  Up to 48 hours............<12.0 mg/dL  3-5 days..................<15.0 mg/dL  6-29 days.................<15.0 mg/dL           BUN     3         Calcium     8.1         Chloride     107         CO2     26         Creatinine     0.6         CRP     12.8         Differential Method     Automated         eGFR     >60.0         Eos #     0.1         Eos %     1.3         Glucose     92         Gran # (ANC)     4.6         Gran %     59.1         Hematocrit     28.6         Hemoglobin     8.7         Immature Grans (Abs)     0.03  Comment: Mild elevation in immature granulocytes is non specific and   can be seen in a variety of conditions including stress response,   acute inflammation, trauma and pregnancy. Correlation with other   laboratory and clinical findings is essential.           Immature Granulocytes     0.4         Lymph #     2.5         Lymph %     31.6         Magnesium       2.0         MCH     26.9         MCHC     30.4         MCV     89         Mono #     0.5         Mono %     6.6         MPV     9.4         nRBC     0         Phosphorus Level     4.4         Platelet Count     283         POCT Glucose 180   114     152       Potassium     2.9         PROTEIN TOTAL     5.6         RBC     3.23         RDW     19.9         Sed Rate     47         Sodium     139         WBC     7.84                 Significant Imaging: I have reviewed all pertinent imaging results/findings within the past 24 hours.

## 2024-10-08 NOTE — SUBJECTIVE & OBJECTIVE
Interval History/Significant Events: NAEON. S/p incision and drainage of L hip/flank yesterday. Having difficulties with pain control.     Follow-up For: Procedure(s) (LRB):  L3-L5 laminectomy with epidural abscess evac (N/A)  WASHOUT, WOUND, SPINE    Post-Operative Day: 18 Days Post-Op    Objective:     Vital Signs (Most Recent):  Temp: 97.9 °F (36.6 °C) (10/08/24 0445)  Pulse: 87 (10/08/24 0445)  Resp: 18 (10/08/24 0542)  BP: 124/84 (10/08/24 0445)  SpO2: 95 % (10/08/24 0445) Vital Signs (24h Range):  Temp:  [97.9 °F (36.6 °C)-98.5 °F (36.9 °C)] 97.9 °F (36.6 °C)  Pulse:  [] 87  Resp:  [9-23] 18  SpO2:  [94 %-100 %] 95 %  BP: (112-180)/(66-97) 124/84     Weight: 65.2 kg (143 lb 11.8 oz)  Body mass index is 26.29 kg/m².      Intake/Output Summary (Last 24 hours) at 10/8/2024 0726  Last data filed at 10/8/2024 0020  Gross per 24 hour   Intake 740 ml   Output 700 ml   Net 40 ml          Physical Exam  Vitals and nursing note reviewed.   Constitutional:       General: She is not in acute distress.     Appearance: Normal appearance.   HENT:      Head: Normocephalic and atraumatic.   Cardiovascular:      Rate and Rhythm: Normal rate.   Pulmonary:      Effort: Pulmonary effort is normal. No respiratory distress.   Abdominal:      General: Abdomen is flat. There is no distension.      Palpations: Abdomen is soft.      Tenderness: There is no abdominal tenderness.   Skin:     Comments: Left flank incision packing changed. Minimal purulence on kerlix. No active drainage or bleeding. Packing changed    Neurological:      General: No focal deficit present.      Mental Status: She is alert and oriented to person, place, and time.   Psychiatric:         Behavior: Behavior normal.         Thought Content: Thought content normal.         Lines/Drains/Airways       Peripherally Inserted Central Catheter Line  Duration             PICC Double Lumen 09/27/24 1035 right basilic 10 days              Drain  Duration              "Female External Urinary Catheter w/ Suction 09/24/24 14 days                    Significant Labs:    CBC/Anemia Profile:  No results for input(s): "WBC", "HGB", "HCT", "PLT", "MCV", "RDW", "IRON", "FERRITIN", "RETIC", "FOLATE", "[id number]", "OCCULTBLOOD" in the last 48 hours.     Chemistries:  Recent Labs   Lab 10/08/24  0451      K 2.9*      CO2 26   BUN 3*   CREATININE 0.6   CALCIUM 8.1*   ALBUMIN 1.3*   PROT 5.6*   BILITOT 0.6   ALKPHOS 111   ALT 7*   AST 10   MG 2.0   PHOS 4.4       All pertinent labs within the past 24 hours have been reviewed.    Significant Imaging:  I have reviewed all pertinent imaging results/findings within the past 24 hours.  "

## 2024-10-08 NOTE — SUBJECTIVE & OBJECTIVE
Past Medical History:   Diagnosis Date    Diabetes mellitus     Hypertension     Unspecified viral hepatitis C without hepatic coma        Past Surgical History:   Procedure Laterality Date     SECTION      INCISION AND DRAINAGE Left 10/7/2024    Procedure: Incision and Drainage;  Surgeon: Shukri Stanton MD;  Location: [facility] OR [facility];  Service: General;  Laterality: Left;    LAMINECTOMY N/A 2024    Procedure: L3-L5 laminectomy with epidural abscess evac;  Surgeon: Sourav Jim DO;  Location: [facility] OR [facility]LR;  Service: Neurosurgery;  Laterality: N/A;    MAGNETIC RESONANCE IMAGING N/A 2024    Procedure: MRI (Magnetic Resonance Imagine);  Surgeon: Kamran Storey;  Location: [facility];  Service: Anesthesiology;  Laterality: N/A;  conscious sedaation MRI lumbar spine w/ and without contrast    WASHOUT, WOUND, SPINE  2024    Procedure: WASHOUT, WOUND, SPINE;  Surgeon: Sourav Jim DO;  [facility];  Service: Neurosurgery;;       Review of patient's allergies indicates:  No Known Allergies    Medications:  Medications Prior to Admission   Medication Sig    gabapentin (NEURONTIN) 300 MG capsule Take 300 mg by mouth 3 (three) times daily.    hydrOXYzine pamoate (VISTARIL) 25 MG Cap Take 25 mg by mouth 3 (three) times daily.    metFORMIN (GLUCOPHAGE) 500 MG tablet Take 500 mg by mouth 2 (two) times daily with meals.    methadone (DOLOPHINE) 10 MG tablet Take 70 mg by mouth Daily.    cloNIDine (CATAPRES) 0.2 MG tablet Take 0.2 mg by mouth 3 (three) times daily.    glipiZIDE (GLUCOTROL) 5 MG tablet Take 5 mg by mouth 2 (two) times daily.     Antibiotics (From admission, onward)      Start     Stop Route Frequency Ordered    10/01/24 2100  mupirocin 2 % ointment  (MRSA Decolonization Orders [facility])         10/06/24 205 Nasl 2 times daily 10/01/24 1454    24 1345  oxacillin 12 g in  mL CONTINUOUS INFUSION         -- IV Every 24 hours (non-standard times) 24  1241          Antifungals (From admission, onward)      None          Antivirals (From admission, onward)      None               There is no immunization history on file for this patient.    Family History    None       Social History     Socioeconomic History    Marital status:    Tobacco Use    Smoking status: Every Day     Current packs/day: 0.50     Types: Cigarettes    Smokeless tobacco: Never   Substance and Sexual Activity    Alcohol use: Not Currently    Drug use: Not Currently     Social Drivers of Health     Financial Resource Strain: Low Risk  (9/18/2024)    Overall Financial Resource Strain (CARDIA)     Difficulty of Paying Living Expenses: Not very hard   Food Insecurity: No Food Insecurity (9/18/2024)    Hunger Vital Sign     Worried About Running Out of Food in the Last Year: Never true     Ran Out of Food in the Last Year: Never true   Transportation Needs: Unmet Transportation Needs (9/18/2024)    TRANSPORTATION NEEDS     Transportation : Yes, it has kept me from medical appointments or from getting my medications.   Physical Activity: Patient Declined (9/18/2024)    Exercise Vital Sign     Days of Exercise per Week: Patient declined     Minutes of Exercise per Session: Patient declined   Stress: Stress Concern Present (9/18/2024)    [employer] [place] [employer] - Occupational Stress Questionnaire     Feeling of Stress : Rather much   Housing Stability: Low Risk  (9/18/2024)    Housing Stability Vital Sign     Unable to Pay for Housing in the Last Year: No     Homeless in the Last Year: No     Review of Systems   Constitutional:  Negative for chills and fever.   Respiratory:  Negative for cough and shortness of breath.    Cardiovascular:  Negative for chest pain.   Gastrointestinal:  Negative for abdominal pain, constipation, diarrhea, nausea and vomiting.   Musculoskeletal:  Positive for arthralgias. Negative for myalgias.   Skin:  Negative for rash.   Neurological:  Negative  for headaches.     Objective:     Vital Signs (Most Recent):  Temp: 98.9 °F (37.2 °C) (10/08/24 1135)  Pulse: 82 (10/08/24 1435)  Resp: 16 (10/08/24 1528)  BP: 112/77 (10/08/24 1135)  SpO2: 98 % (10/08/24 1403) Vital Signs (24h Range):  Temp:  [97.9 °F (36.6 °C)-98.9 °F (37.2 °C)] 98.9 °F (37.2 °C)  Pulse:  [73-92] 82  Resp:  [16-20] 16  SpO2:  [80 %-100 %] 98 %  BP: (112-129)/(75-84) 112/77     Weight: 65.2 kg (143 lb 11.8 oz)  Body mass index is 26.29 kg/m².    Estimated Creatinine Clearance: 98.2 mL/min (based on SCr of 0.6 mg/dL).     Physical Exam  Vitals reviewed.   Constitutional:       General: She is not in acute distress.     Appearance: Normal appearance. She is not ill-appearing.   HENT:      Head: Normocephalic and atraumatic.   Eyes:      Extraocular Movements: Extraocular movements intact.      Conjunctiva/sclera: Conjunctivae normal.   Cardiovascular:      Rate and Rhythm: Normal rate and regular rhythm.      Heart sounds: No murmur heard.  Pulmonary:      Effort: Pulmonary effort is normal. No respiratory distress.      Breath sounds: Normal breath sounds. No wheezing.   Abdominal:      General: Abdomen is flat. Bowel sounds are normal.      Palpations: Abdomen is soft.      Tenderness: There is no abdominal tenderness.   Musculoskeletal:      Cervical back: Normal range of motion.      Comments: L hip with dressing that is clean, dry, intact, does have tenderness to palpation   Skin:     General: Skin is warm and dry.   Neurological:      General: No focal deficit present.      Mental Status: She is alert and oriented to person, place, and time.   Psychiatric:         Mood and Affect: Mood normal.         Behavior: Behavior normal.         Thought Content: Thought content normal.          Significant Labs: All pertinent labs within the past 24 hours have been reviewed.  Recent Lab Results         10/08/24  1134   10/08/24  0900   10/08/24  0451   10/07/24  2005        Albumin     1.3         ALP      111         ALT     7         Anion Gap     6         AST     10         Baso #     0.08         Basophil %     1.0         BILIRUBIN TOTAL     0.6  Comment: For infants and newborns, interpretation of results should be based  on gestational age, weight and in agreement with clinical  observations.    Premature Infant recommended reference ranges:  Up to 24 hours.............<8.0 mg/dL  Up to 48 hours............<12.0 mg/dL  3-5 days..................<15.0 mg/dL  6-29 days.................<15.0 mg/dL           BUN     3         Calcium     8.1         Chloride     107         CO2     26         Creatinine     0.6         CRP     12.8         Differential Method     Automated         eGFR     >60.0         Eos #     0.1         Eos %     1.3         Glucose     92         Gran # (ANC)     4.6         Gran %     59.1         Hematocrit     28.6         Hemoglobin     8.7         Immature Grans (Abs)     0.03  Comment: Mild elevation in immature granulocytes is non specific and   can be seen in a variety of conditions including stress response,   acute inflammation, trauma and pregnancy. Correlation with other   laboratory and clinical findings is essential.           Immature Granulocytes     0.4         Lymph #     2.5         Lymph %     31.6         Magnesium      2.0         MCH     26.9         MCHC     30.4         MCV     89         Mono #     0.5         Mono %     6.6         MPV     9.4         nRBC     0         Phosphorus Level     4.4         Platelet Count     283         POCT Glucose 180   114     152       Potassium     2.9         PROTEIN TOTAL     5.6         RBC     3.23         RDW     19.9         Sed Rate     47         Sodium     139         WBC     7.84                 Significant Imaging: I have reviewed all pertinent imaging results/findings within the past 24 hours.

## 2024-10-08 NOTE — ASSESSMENT & PLAN NOTE
Mari Sloan is a 49 y.o. female who presented with multiple fluid collections and concern for osteomyelitis at her SI joint. She has undergone IR drainage during this admission. Repeat imaging demonstrates persistent fluid at the pubic symphysis, left flank. General surgery consulted.     - patient seen and examined  - discussed case with orthopedic   - MRI pelvis > Septic arthritis/osteomyelitis involving the left SI joint and pubic symphysis, with adjacent soft tissue abscesses   - Ortho not planning any surgical intervention. Recommending IR drainage of deep pelvic fluids   - s/p I&D of left flank collection 10/7  - packing changed at bedside today. No active drainage or bleeding. Re-packed with vashe soaked kirlex  - recommend wound care consult. Rest of wound care per wound care  - no further surgical intervention needed   - general surgery will sign off. please call with questions

## 2024-10-08 NOTE — NURSING
Pt AAOx4, pt had no events this shift. Pt persist with pain, prn's given as needed. Pt continues on IV ABX. Plan of care continued, pt resting in bed, bed low and locked, call light and belongings in reach.

## 2024-10-08 NOTE — PROGRESS NOTES
[facility] - [facility] ([place] [id number])  [facility] Medicine  Progress Note    Patient Name: Mari Sloan  MRN: [id number]  Patient Class: IP- Inpatient   Admission Date: 9/16/2024  Length of Stay: 22 days  Attending Physician: Paty Kerr MD  Primary Care Provider: Mary Fong APRN        Subjective:     Principal Problem:Paraspinal abscess        HPI:  By Dr. Alberta Reese MD    49 y.o.  woman with h/o homelessness (recently was able to obtain living arrangements but states she was living [place] the bridge), NIDDM type 2, Essential hypertension, and substance use (denies any IVDU in he past or any illicit drug use recenly, denies ETOH abuse/overuse) presents to [facility] for further evaluation of her back pain.  She apparently presented to the [facility] Emergency Department on September 16 with nausea and vomiting and a mechanical fall 5 days prior. She reported pain worse in her back and on her sides radiating down both legs. She noted muscle spasm in her back and legs. She had no head trauma or loss of consciousness.  W/u in the [facility] ED noted significant hypokalemia, hypomagnesemia, severe anemia, metabolic alkalosis, and hyperglycemia. Vitals signs stable with no sepsis at this time noted. SBP>90, HR normal,afebrile.     Imaging studies of her lumbar spine and pelvis were concerning for osteomyelitis/septic arthritis of the left SI joint and L5-S1 along with an abnormal fluid collection extending from T11 through the left iliacus muscle concerning for abscess. Blood cultures sent.  In the emergency department she is receiving IV fluid, Zofran, Zosyn, vancomycin, potassium, magnesium, pain medication, and nausea medication.   She also received 2 units of PRBC for an H/H of 5.7/18.4,     Case discussed with Neurosurgery and Interventional Radiology. Plan would be for transfer to Hospital Medicine at [facility] for IR evaluation of the fluid collection and  "potential sampling of the joint osteomyelitis.  I reviewed the discussions made with the multiple sub specialists regarding transfer of this patient to [facility]: IR/General surgery/Neurosurgery/ER/Internal Med.     Neurosurgery contacted by the  did not feel surgical intervention was needed at this time but would follow along and requested [facility] for further management and IR backup. IR on call apparently read the CT and at the time felt "while the left retroperitoneal fluid collections do appear organized, percutaneous drainage is not advised given the extensive soft tissue infection superficial to these collections.  In addition, there is extensive evidence of soft tissue infection that does not appear organized or percutaneously drainable."     General surgery was consulted to review the case and felt that there was no immediate surgical intervention at this time to be had. I spoke with the on surgeon contacted regarding the location of the fluid collections and inquired if perhaps orthopedic surgery should be consulted to review given the involvement of bony pelvis.      I spoke orthopedic surgery who will see the patient but recommended re-consulting IR here and Neurosurgery given the diskitis/OM L5-S1. (Please see his consult note in the chart)     Repeat labs here again noted for persistently low mag, K, phos.  H/H stable with improved H/H. Pain control improved with Dilaudid.  I consulted ID for further assistance with ABX adjustments and changed her to Meropenem and doxy as well as continued Vanc. Echo ordred for am.     CT lumbar spine and pelvis had findings most concerning for infectious process. There were findings concerning for osteomyelitis and septic arthritis in the left SI joint. Findings concerning for osteomyelitis/diskitis L5-S1. Possible osteomyelitis and septic joint at the pubic symphysis. Abnormal fluid collection on the left extending from T11 through the left " iliacus muscle along and within the left iliopsoas muscle most concerning for abscess. Multiple additional small abscesses suspected in the pelvis. Multifocal collections of air in the fluid in the subcutaneous tissues of the left flank, incompletely imaged.    Chest x-ray noted a loop in the central venous catheter. Tip currently at the level of the brachiocephalic vein. Lungs are fairly clear.        Overview/Hospital Course:  Ms. Sloan was transferred from [facility] ED to [facility] on 09/16/2024.  She presented to [facility] ED  for back pain, nausea/vomiting.  CT L-spine revealed osteomyelitis/diskitis L5-S1 along with abnormal fluid collection on left extending from T11 through left iliacus muscle and left iliopsoas muscle concerning for abscess.  Multifocal collections of air in fluid in subcutaneous tissues of left flank.  Patient was initiated on empiric vancomycin and meropenem.  Blood cultures with staph aureus.  Obtain echo.  Unable to repeat blood cultures given shortage of cx.  Neurosurgery recommended to obtain MRI L-spine, pending.  General surgery evaluated the patient and recommended urgent transfer to [facility] for surgical evaluation/source control given extent of necrosis.     Ms. Sloan was [place] [facility] [place] admitted to the MICU 9/18 with concern for paraspinal abscess. Infectious workup was ordered. Blood cultures were positive for MSSA bacteremia. Neurosurgery, General Surgery and IR were consulted to evaluate the patient's paraspinal abscess. Neurosurgery performed a L3-L4 laminectomy for epidural abscess evacuation on 9/19/24 and the cultures grew MSSA. TTE showed normal EF of 60-65% with diastolic dysfunction, could not exclude mitral vegetation vs redundant chordae. ID was consulted and recommended Oxacillin and repeat blood cultures. With her electrolyte derangement, and a background of appetitive loss in the past 2 months, there was concern for refeeding syndrome.  On 9/21, IR took the patient for abscess drain placement and aspiration of SI joint. SI joint couldn't be aspirated but retroperitoneal abscess was drained of 100cc of purulent fluid. She was successfully extubated 9/22 and was stepped down to Hospital Medicine on 9/24/24. Her drain was removed on 09/27.  Repeat CT abdomen/pelvis showed an ill-defined subcutaneous edema and soft tissue inflammatory change in the surgical bed and subcutaneous soft tissues without discrete organized fluid collection, stable size of 3.6 cm fluid collection along the left posterior pararenal space which appears to communicate with the with the aforementioned collection and fascial thickening.  IR placed drain for retroperitoneal fluid collection on 9/30.  Cultures returned with Staph aureus.  IR suggested to onitor drain output, and consider repeat imaging when output decreases to less than 10 cc in 24 hours to evaluate for possible drainage catheter removal. Repeat CT was ordered on 10/3 as she endorsed worsening pain on her left flank, and minimal drain output which could be removed.  It returned with multiple new and enlargening abscesses.  Discussed with ID, who said to continue Oxacillin.  NSGY, Gen Surg, Ortho and IR were consulted for further recommendations.  MRI complete spine and pelvis were ordered to help guide management.  Gen Surg took her to the OR for an I&D of a left hip abscess on 10/7, where 300cc of purulent material within the subcutaneous space along the left hip was drained and sent for culture.  ID reconsulted.    Interval History: No acute events overnight. Continued to require pain meds.  ID reconsulted and discussed case.  NSGY not draining large spinal abscess on MRI.  Will ask IR to evaluate for drainage.    Review of Systems   Constitutional:  Negative for chills, fatigue and fever.   Respiratory:  Negative for cough and shortness of breath.    Cardiovascular:  Negative for chest pain, palpitations and leg  swelling.   Gastrointestinal:  Positive for abdominal pain. Negative for diarrhea, nausea and vomiting.   Genitourinary:  Negative for dysuria and urgency.   Musculoskeletal:  Positive for back pain.   Neurological:  Negative for dizziness and headaches.   All other systems reviewed and are negative.    Objective:     Vital Signs (Most Recent):  Temp: 98.3 °F (36.8 °C) (10/08/24 0902)  Pulse: 77 (10/08/24 1041)  Resp: 18 (10/08/24 0902)  BP: 123/81 (10/08/24 0902)  SpO2: 96 % (10/08/24 1000) Vital Signs (24h Range):  Temp:  [97.9 °F (36.6 °C)-98.5 °F (36.9 °C)] 98.3 °F (36.8 °C)  Pulse:  [73-94] 77  Resp:  [15-23] 18  SpO2:  [80 %-100 %] 96 %  BP: (112-180)/(66-97) 123/81     Weight: 65.2 kg (143 lb 11.8 oz)  Body mass index is 26.29 kg/m².    Intake/Output Summary (Last 24 hours) at 10/8/2024 1115  Last data filed at 10/8/2024 0020  Gross per 24 hour   Intake 240 ml   Output 700 ml   Net -460 ml      Physical Exam  Constitutional:       Appearance: Normal appearance.   HENT:      Head: Normocephalic and atraumatic.   Cardiovascular:      Rate and Rhythm: Normal rate and regular rhythm.      Heart sounds: No murmur heard.  Pulmonary:      Effort: Pulmonary effort is normal. No respiratory distress.      Breath sounds: Normal breath sounds. No wheezing or rales.   Abdominal:      General: There is no distension.      Palpations: Abdomen is soft.      Tenderness: There is no abdominal tenderness.      Comments: Mild swelling to left flank   Musculoskeletal:         General: Tenderness (Parapsinal, bandages C/D/I) present. No deformity.   Skin:     General: Skin is warm and dry.   Neurological:      General: No focal deficit present.      Mental Status: She is alert and oriented to person, place, and time. Mental status is at baseline.         MELD 3.0: 17 at 9/21/2024  6:13 PM  MELD-Na: 13 at 9/21/2024  6:13 PM  Calculated from:  Serum Creatinine: 0.6 mg/dL (Using min of 1 mg/dL) at 9/21/2024  6:13 PM  Serum Sodium:  138 mmol/L (Using max of 137 mmol/L) at 9/21/2024  6:13 PM  Total Bilirubin: 2.1 mg/dL at 9/21/2024  2:34 AM  Serum Albumin: 1.2 g/dL (Using min of 1.5 g/dL) at 9/21/2024  2:34 AM  INR(ratio): 1.4 at 9/19/2024  3:19 AM  Age at listing (hypothetical): 49 years  Sex: Female at 9/21/2024  6:13 PM      Significant Labs:  CBC:  Recent Labs   Lab 10/08/24  0451   WBC 7.84   HGB 8.7*   HCT 28.6*          CMP:  Recent Labs   Lab 10/08/24  0451      K 2.9*      CO2 26   GLU 92   BUN 3*   CREATININE 0.6   CALCIUM 8.1*   PROT 5.6*   ALBUMIN 1.3*   BILITOT 0.6   ALKPHOS 111   AST 10   ALT 7*   ANIONGAP 6*           Assessment/Plan:      * Paraspinal abscess  MSSA Endocarditis  MSSA Paraspinal abscess  MSSA Psoas abscess    Blood cx 9/16 with MSSA  2D echo 9/17:  Cannot entirely exclude mitral vegetation vs redundant chordae. If high clinical suspicion for endocarditis, would rx as such (not clear MARICHUY would be able to exclude vegetation based on TTE findings).   Noted to have extensive fluid collection on left extending from T11 through left iliacus muscle and within the left iliopsoas muscle.  Multifocal collections of air including subcutaneous tissues on the left flank noted.  ID/Neurosurgery consulted.   S/p L3-L5 laminectomy with epidural abscess evacuation 9/19   Ortho consulted. Rec continued abx tx and no further interventions  IR SI joint aspiration and abscess drain placement 9/21. Unable to aspirate joint  IR drain removed on 09/27.    Repeat CT abdomen pelvis 9/28 ordered to look for recollection of fluid showed an ill-defined subcutaneous edema and soft tissue inflammatory change in the surgical bed and subcutaneous soft tissues without discrete organized fluid collection,  Stable size of 3.6 cm fluid collection along the left posterior pararenal space which appears to communicate with the with the aforementioned collection and fascial thickening.   IR placed drain for retroperitoneal fluid  collection on 9/30.  Culture with MSSA  ID consulted:  Suggested Oxacillin through 11/18 with repeat imaging prior to completion  Will need LTAC for completion of IV antibiotics given history of IVDA  PICC placed 9/27  Repeat CRP ordered 10/3 given worsening pain and swelling - CRP down to 32  Repeat CT abdomen/pelvis was ordered to assess for drain removal, that showed worsening abscesses.  Discussed with ID who suggested further source control and to continue Oxacillin   Gen Surg, Ortho, NSGY and IR consulted - Greatly appreciate assistance!  MRI complete spine and pelvis ordered to further determine next steps with multiple abscesses  Gen Surg took her to the OR for an I&D of a left hip abscess on 10/7, where 300cc of purulent material within the subcutaneous space along the left hip was drained and sent for culture.  Cx NGTD  ID reconsulted  Will ask IR to evaluate for epidural drainage as NSJONNA said no intervention    Other osteomyelitis, multiple sites  As above      Endocarditis  As above      Staphylococcus aureus bacteremia  As above      Sepsis  As above    Retroperitoneal fluid collection  As above    Epidural abscess  As above      Psoas muscle abscess  As above    Moderate malnutrition  Nutrition consulted. Most recent weight and BMI monitored-     Measurements:  Wt Readings from Last 1 Encounters:   09/26/24 65.2 kg (143 lb 11.8 oz)   Body mass index is 26.29 kg/m².    Patient has been screened and assessed by RD.    Malnutrition Type:  Context: social/environmental circumstances  Level: moderate    Malnutrition Characteristic Summary:  Weight Loss (Malnutrition): 10% in 6 months  Energy Intake (Malnutrition): less than 75% for greater than 7 days    Interventions/Recommendations (treatment strategy):  1.      Refeeding syndrome  In ICU      Severe sepsis  See above    Hepatitis C  Hepatitis C antibody positive.    Obtain HCV RNA. Quant negative    Anemia, unspecified  S/p 2u PRBC transfusion on admit    No signs of acute GI bleed on exam at this time. Denies any hematemesis or hemoptysis.   Obtain iron B12/folate/peripheral smear and LDH/hapto/retic  No evidence of active bleed   Stable    Uncontrolled type 2 diabetes mellitus with hyperglycemia  Patient's FSGs are uncontrolled due to hyperglycemia on current medication regimen.  Last A1c reviewed-   Lab Results   Component Value Date    HGBA1C 7.9 (H) 09/17/2024     Most recent fingerstick glucose reviewed-   Recent Labs   Lab 10/07/24  1137 10/07/24  1526 10/07/24  2005 10/08/24  0900   POCTGLUCOSE 144* 185* 152* 114*       Current correctional scale  Medium  Maintain anti-hyperglycemic dose as follows-   Antihyperglycemics (From admission, onward)      Start     Stop Route Frequency Ordered    10/02/24 0006  insulin aspart U-100 pen 0-5 Units         -- SubQ Before meals & nightly PRN 10/01/24 2306          Hold Oral hypoglycemics while patient is in the hospital.    Smoker  Tobacco cessation discussed with patient for greater than 5 minutes.   Offered Nicotine patch while hospitalized  Patient is aware of need to quit tobacco products    History of drug use  On methadone at home, continue    Hypokalemia  K 2.9  Replace aggressively    VTE Risk Mitigation (From admission, onward)           Ordered     IP VTE HIGH RISK PATIENT  Once         09/22/24 1553                    Discharge Planning   LHU: 10/11/2024     Code Status: Full Code   Is the patient medically ready for discharge?:     Reason for patient still in hospital (select all that apply): Patient trending condition  Discharge Plan A: Skilled Nursing Facility   Discharge Delays: None known at this time      High Risk Conditions:   Patient is currently receiving parenteral controlled substances: Oscar Kerr MD  Department of Hospital Medicine   [facility] - Stepdown Flex ([place]-[facility])

## 2024-10-08 NOTE — PT/OT/SLP PROGRESS
Occupational Therapy      Patient Name:  Mari Sloan   MRN:  [id number]    Patient not seen today secondary to when attempted to see patient at 1422, patient politely decline tx session due to pain despite being pre-medicated prior session. Will follow-up on the next schedule visit accordingly to OT POC.    10/8/2024

## 2024-10-08 NOTE — ASSESSMENT & PLAN NOTE
Patient's FSGs are uncontrolled due to hyperglycemia on current medication regimen.  Last A1c reviewed-   Lab Results   Component Value Date    HGBA1C 7.9 (H) 09/17/2024     Most recent fingerstick glucose reviewed-   Recent Labs   Lab 10/07/24  1137 10/07/24  1526 10/07/24  2005 10/08/24  0900   POCTGLUCOSE 144* 185* 152* 114*       Current correctional scale  Medium  Maintain anti-hyperglycemic dose as follows-   Antihyperglycemics (From admission, onward)    Start     Stop Route Frequency Ordered    10/02/24 0006  insulin aspart U-100 pen 0-5 Units         -- SubQ Before meals & nightly PRN 10/01/24 2306        Hold Oral hypoglycemics while patient is in the hospital.

## 2024-10-08 NOTE — PROGRESS NOTES
[facility] - Stepdown Flex ([facility]  General Surgery  Progress Note    Subjective:     History of Present Illness:  Mari Sloan is a 49 y.o. 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 She has been admitted for 3 weeks and has been on broad spectrum antibiotics, undergone multiple IR drains, and a L3-L4 laminectomy for an epidural abscess. She has been found to have infective endocarditis. Echo on 9/17 does not mention endocarditis, but she is presumed positive and being treated for it. She has been bacteremic with MSSA, now on oxacillin. Her abscess cultures and blood cultures have all been MSSA. She recently developed left flank pain and had a CT that showed pelvic abscesses and a left flank fluid collection. General surgery was consulted to evaluate her left flank fluid collection.      Post-Op Info:  Procedure(s) (LRB):  Incision and Drainage (Left)   1 Day Post-Op     Interval History/Significant Events: NAEON. S/p incision and drainage of L hip/flank yesterday. Having difficulties with pain control.     Follow-up For: Procedure(s) (LRB):  L3-L5 laminectomy with epidural abscess evac (N/A)  WASHOUT, WOUND, SPINE    Post-Operative Day: 18 Days Post-Op    Objective:     Vital Signs (Most Recent):  Temp: 97.9 °F (36.6 °C) (10/08/24 0445)  Pulse: 87 (10/08/24 0445)  Resp: 18 (10/08/24 0542)  BP: 124/84 (10/08/24 0445)  SpO2: 95 % (10/08/24 0445) Vital Signs (24h Range):  Temp:  [97.9 °F (36.6 °C)-98.5 °F (36.9 °C)] 97.9 °F (36.6 °C)  Pulse:  [] 87  Resp:  [9-23] 18  SpO2:  [94 %-100 %] 95 %  BP: (112-180)/(66-97) 124/84     Weight: 65.2 kg (143 lb 11.8 oz)  Body mass index is 26.29 kg/m².      Intake/Output Summary (Last 24 hours) at 10/8/2024 0726  Last data filed at 10/8/2024 0020  Gross per 24 hour   Intake 740 ml   Output 700  "ml   Net 40 ml          Physical Exam  Vitals and nursing note reviewed.   Constitutional:       General: She is not in acute distress.     Appearance: Normal appearance.   HENT:      Head: Normocephalic and atraumatic.   Cardiovascular:      Rate and Rhythm: Normal rate.   Pulmonary:      Effort: Pulmonary effort is normal. No respiratory distress.   Abdominal:      General: Abdomen is flat. There is no distension.      Palpations: Abdomen is soft.      Tenderness: There is no abdominal tenderness.   Skin:     Comments: Left flank incision packing changed. Minimal purulence on kerlix. No active drainage or bleeding. Packing changed    Neurological:      General: No focal deficit present.      Mental Status: She is alert and oriented to person, place, and time.   Psychiatric:         Behavior: Behavior normal.         Thought Content: Thought content normal.         Lines/Drains/Airways       Peripherally Inserted Central Catheter Line  Duration             PICC Double Lumen 09/27/24 1035 right basilic 10 days              Drain  Duration             Female External Urinary Catheter w/ Suction 09/24/24 14 days                    Significant Labs:    CBC/Anemia Profile:  No results for input(s): "WBC", "HGB", "HCT", "PLT", "MCV", "RDW", "IRON", "FERRITIN", "RETIC", "FOLATE", "[id number]", "OCCULTBLOOD" in the last 48 hours.     Chemistries:  Recent Labs   Lab 10/08/24  0451      K 2.9*      CO2 26   BUN 3*   CREATININE 0.6   CALCIUM 8.1*   ALBUMIN 1.3*   PROT 5.6*   BILITOT 0.6   ALKPHOS 111   ALT 7*   AST 10   MG 2.0   PHOS 4.4       All pertinent labs within the past 24 hours have been reviewed.    Significant Imaging:  I have reviewed all pertinent imaging results/findings within the past 24 hours.  Assessment/Plan:     Endocarditis  Mari Sloan is a 49 y.o. lady with infectious endocarditis and multiple abscesses.    Other osteomyelitis, multiple sites  Mari Sloan is a 49 y.o. female who " presented with multiple fluid collections and concern for osteomyelitis at her SI joint. She has undergone IR drainage during this admission. Repeat imaging demonstrates persistent fluid at the pubic symphysis, left flank. General surgery consulted.     - patient seen and examined  - discussed case with orthopedic   - MRI pelvis > Septic arthritis/osteomyelitis involving the left SI joint and pubic symphysis, with adjacent soft tissue abscesses   - Ortho not planning any surgical intervention. Recommending IR drainage of deep pelvic fluids   - s/p I&D of left flank collection 10/7  - packing changed at bedside today. No active drainage or bleeding. Re-packed with vashe soaked kirlex  - recommend wound care consult. Rest of wound care per wound care  - no further surgical intervention needed   - general surgery will sign off. please call with questions        Liliana Johnston MD  General Surgery  [facility] - Stepdown Flex ([place])

## 2024-10-08 NOTE — PROGRESS NOTES
Attempted to see patient for wound care consultation for the left flank/hip wound. Pt declined due to pain. Offered to notify her bedside RN to provide pain medication; however, the patient indicated that she had received pain medication one hour prior. Wound care will follow up with the patient tomorrow.

## 2024-10-08 NOTE — ASSESSMENT & PLAN NOTE
49F admitted with MSSA bacteremia, SI joint pyogenic arthritis with osteomyelitis, L psoas/iliacus abscess s/p IR drain (9/21), T12-L5 epidural abscess s/p L3-5 laminectomy with abscess washout (9/19), and possible MV endocarditis vs redundant chordae on a planned 8 week course of oxacillin. Patient underwent IR drain placement to a left retroperitoneal fluid collection on 9/30 with positive cultures for MSSA, and I&D of L hip abscess with general surgery on 10/7 with rare GPCs on gram stain. Imaging with lumbar epidural phlegmon and subcutaneous fluid collection.     Recommendations:  Follow up 10/7 OR cultures  Appreciate IR evaluation for possible intervention of lumbar epidural phlegmon  Continue oxacillin 12g daily via continuous infusion  Duration still 8 weeks, tentative end date 11/18  Unfortunately she is not an OPAT candidate, which was discussed with patient previously, will need placement to a facility  Favor repeat thoracic/lumbar imaging (MRI w/wo) prior to completion of abx to evaluate for resolution of abscess and reassess need for abx extension

## 2024-10-09 LAB
ALBUMIN SERPL BCP-MCNC: 1.3 G/DL (ref 3.5–5.2)
ALP SERPL-CCNC: 114 U/L (ref 55–135)
ALT SERPL W/O P-5'-P-CCNC: <5 U/L (ref 10–44)
ANION GAP SERPL CALC-SCNC: 8 MMOL/L (ref 8–16)
AST SERPL-CCNC: 10 U/L (ref 10–40)
BACTERIA SPEC AEROBE CULT: ABNORMAL
BASOPHILS # BLD AUTO: 0.08 K/UL (ref 0–0.2)
BASOPHILS NFR BLD: 1 % (ref 0–1.9)
BILIRUB SERPL-MCNC: 0.6 MG/DL (ref 0.1–1)
BUN SERPL-MCNC: <3 MG/DL (ref 6–20)
CALCIUM SERPL-MCNC: 8.3 MG/DL (ref 8.7–10.5)
CHLORIDE SERPL-SCNC: 107 MMOL/L (ref 95–110)
CO2 SERPL-SCNC: 23 MMOL/L (ref 23–29)
CREAT SERPL-MCNC: 0.6 MG/DL (ref 0.5–1.4)
DIFFERENTIAL METHOD BLD: ABNORMAL
EOSINOPHIL # BLD AUTO: 0 K/UL (ref 0–0.5)
EOSINOPHIL NFR BLD: 0.5 % (ref 0–8)
ERYTHROCYTE [DISTWIDTH] IN BLOOD BY AUTOMATED COUNT: 19.6 % (ref 11.5–14.5)
EST. GFR  (NO RACE VARIABLE): >60 ML/MIN/1.73 M^2
GLUCOSE SERPL-MCNC: 96 MG/DL (ref 70–110)
HCT VFR BLD AUTO: 28.6 % (ref 37–48.5)
HGB BLD-MCNC: 8.6 G/DL (ref 12–16)
IMM GRANULOCYTES # BLD AUTO: 0.04 K/UL (ref 0–0.04)
IMM GRANULOCYTES NFR BLD AUTO: 0.5 % (ref 0–0.5)
LYMPHOCYTES # BLD AUTO: 2.3 K/UL (ref 1–4.8)
LYMPHOCYTES NFR BLD: 29.7 % (ref 18–48)
MAGNESIUM SERPL-MCNC: 1.9 MG/DL (ref 1.6–2.6)
MCH RBC QN AUTO: 26.7 PG (ref 27–31)
MCHC RBC AUTO-ENTMCNC: 30.1 G/DL (ref 32–36)
MCV RBC AUTO: 89 FL (ref 82–98)
MONOCYTES # BLD AUTO: 0.5 K/UL (ref 0.3–1)
MONOCYTES NFR BLD: 6.8 % (ref 4–15)
NEUTROPHILS # BLD AUTO: 4.8 K/UL (ref 1.8–7.7)
NEUTROPHILS NFR BLD: 61.5 % (ref 38–73)
NRBC BLD-RTO: 0 /100 WBC
PHOSPHATE SERPL-MCNC: 4.3 MG/DL (ref 2.7–4.5)
PLATELET # BLD AUTO: 265 K/UL (ref 150–450)
PMV BLD AUTO: 9.5 FL (ref 9.2–12.9)
POCT GLUCOSE: 125 MG/DL (ref 70–110)
POCT GLUCOSE: 139 MG/DL (ref 70–110)
POCT GLUCOSE: 157 MG/DL (ref 70–110)
POTASSIUM SERPL-SCNC: 4 MMOL/L (ref 3.5–5.1)
PROT SERPL-MCNC: 5.7 G/DL (ref 6–8.4)
RBC # BLD AUTO: 3.22 M/UL (ref 4–5.4)
SODIUM SERPL-SCNC: 138 MMOL/L (ref 136–145)
WBC # BLD AUTO: 7.75 K/UL (ref 3.9–12.7)

## 2024-10-09 PROCEDURE — 25000003 PHARM REV CODE 250: Performed by: HOSPITALIST

## 2024-10-09 PROCEDURE — 20600001 HC STEP DOWN PRIVATE ROOM

## 2024-10-09 PROCEDURE — 85025 COMPLETE CBC W/AUTO DIFF WBC: CPT | Performed by: HOSPITALIST

## 2024-10-09 PROCEDURE — 99233 SBSQ HOSP IP/OBS HIGH 50: CPT | Mod: ,,, | Performed by: STUDENT IN AN ORGANIZED HEALTH CARE EDUCATION/TRAINING PROGRAM

## 2024-10-09 PROCEDURE — 80053 COMPREHEN METABOLIC PANEL: CPT | Performed by: HOSPITALIST

## 2024-10-09 PROCEDURE — 25000003 PHARM REV CODE 250: Performed by: STUDENT IN AN ORGANIZED HEALTH CARE EDUCATION/TRAINING PROGRAM

## 2024-10-09 PROCEDURE — 63600175 PHARM REV CODE 636 W HCPCS: Performed by: STUDENT IN AN ORGANIZED HEALTH CARE EDUCATION/TRAINING PROGRAM

## 2024-10-09 PROCEDURE — 63600175 PHARM REV CODE 636 W HCPCS: Performed by: HOSPITALIST

## 2024-10-09 PROCEDURE — 25000003 PHARM REV CODE 250: Performed by: INTERNAL MEDICINE

## 2024-10-09 PROCEDURE — 84100 ASSAY OF PHOSPHORUS: CPT | Performed by: HOSPITALIST

## 2024-10-09 PROCEDURE — 83735 ASSAY OF MAGNESIUM: CPT | Performed by: HOSPITALIST

## 2024-10-09 PROCEDURE — 99499 UNLISTED E&M SERVICE: CPT | Mod: ,,, | Performed by: PSYCHIATRY & NEUROLOGY

## 2024-10-09 PROCEDURE — A4216 STERILE WATER/SALINE, 10 ML: HCPCS | Performed by: STUDENT IN AN ORGANIZED HEALTH CARE EDUCATION/TRAINING PROGRAM

## 2024-10-09 RX ORDER — OXYCODONE HYDROCHLORIDE 10 MG/1
20 TABLET ORAL EVERY 4 HOURS PRN
Status: DISCONTINUED | OUTPATIENT
Start: 2024-10-09 | End: 2024-10-18 | Stop reason: HOSPADM

## 2024-10-09 RX ADMIN — VANCOMYCIN HYDROCHLORIDE 1750 MG: 10 INJECTION, POWDER, LYOPHILIZED, FOR SOLUTION INTRAVENOUS at 09:10

## 2024-10-09 RX ADMIN — METHADONE HYDROCHLORIDE 70 MG: 10 TABLET ORAL at 09:10

## 2024-10-09 RX ADMIN — OXYCODONE HYDROCHLORIDE 15 MG: 10 TABLET ORAL at 09:10

## 2024-10-09 RX ADMIN — GABAPENTIN 300 MG: 300 CAPSULE ORAL at 03:10

## 2024-10-09 RX ADMIN — Medication 10 ML: at 12:10

## 2024-10-09 RX ADMIN — HYDROXYZINE PAMOATE 25 MG: 25 CAPSULE ORAL at 08:10

## 2024-10-09 RX ADMIN — GABAPENTIN 300 MG: 300 CAPSULE ORAL at 08:10

## 2024-10-09 RX ADMIN — OXYCODONE HYDROCHLORIDE 15 MG: 10 TABLET ORAL at 04:10

## 2024-10-09 RX ADMIN — Medication 10 ML: at 06:10

## 2024-10-09 RX ADMIN — HYDROMORPHONE HYDROCHLORIDE 1 MG: 1 INJECTION, SOLUTION INTRAMUSCULAR; INTRAVENOUS; SUBCUTANEOUS at 07:10

## 2024-10-09 RX ADMIN — OXYCODONE HYDROCHLORIDE 20 MG: 10 TABLET ORAL at 03:10

## 2024-10-09 RX ADMIN — OXACILLIN 2 G: 2 INJECTION, POWDER, FOR SOLUTION INTRAMUSCULAR; INTRAVENOUS at 06:10

## 2024-10-09 RX ADMIN — FOLIC ACID 1 MG: 1 TABLET ORAL at 09:10

## 2024-10-09 RX ADMIN — OXACILLIN 2 G: 2 INJECTION, POWDER, FOR SOLUTION INTRAMUSCULAR; INTRAVENOUS at 08:10

## 2024-10-09 RX ADMIN — OXYCODONE HYDROCHLORIDE 20 MG: 10 TABLET ORAL at 08:10

## 2024-10-09 RX ADMIN — OXACILLIN 12 G: 2 INJECTION, POWDER, FOR SOLUTION INTRAMUSCULAR; INTRAVENOUS at 01:10

## 2024-10-09 RX ADMIN — GABAPENTIN 300 MG: 300 CAPSULE ORAL at 09:10

## 2024-10-09 RX ADMIN — Medication 100 MG: at 09:10

## 2024-10-09 NOTE — PT/OT/SLP PROGRESS
Occupational Therapy      Patient Name:  Mari Sloan   MRN:  [id number]    Patient not seen today secondary to Pain, Patient unwilling to participate, Increased agitation. Attempt made at 9:54 am - pt stating she is in too much pain to participate with therapy, requesting OT come back tomorrow. Extensive education provided on the negative effects of prolonged bedrest, benefits of participating in therapeutic activities, and anticipation of pain with transitional movements - pt still adamantly declining. Pt demonstrating increased agitation with continued encouragement to participate - writing OT deferring to tomorrow. Nursing notified. Will follow-up as appropriate.    10/9/2024

## 2024-10-09 NOTE — SUBJECTIVE & OBJECTIVE
Interval History: feeling ok, still has pain at L hip/back surgical site    Review of Systems   Constitutional:  Negative for chills and fever.   Respiratory:  Negative for cough and shortness of breath.    Cardiovascular:  Negative for chest pain.   Gastrointestinal:  Negative for abdominal pain, constipation, diarrhea, nausea and vomiting.   Musculoskeletal:  Positive for arthralgias and back pain. Negative for myalgias.   Neurological:  Negative for headaches.     Objective:     Vital Signs (Most Recent):  Temp: 98.9 °F (37.2 °C) (10/09/24 1236)  Pulse: 94 (10/09/24 1236)  Resp: 17 (10/09/24 1505)  BP: 134/89 (10/09/24 1236)  SpO2: 97 % (10/09/24 1400) Vital Signs (24h Range):  Temp:  [98.3 °F (36.8 °C)-99.1 °F (37.3 °C)] 98.9 °F (37.2 °C)  Pulse:  [] 94  Resp:  [14-18] 17  SpO2:  [95 %-100 %] 97 %  BP: (112-138)/(76-90) 134/89     Weight: 65.2 kg (143 lb 11.8 oz)  Body mass index is 26.29 kg/m².    Estimated Creatinine Clearance: 98.2 mL/min (based on SCr of 0.6 mg/dL).     Physical Exam  Vitals reviewed.   Constitutional:       General: She is not in acute distress.     Appearance: Normal appearance. She is not ill-appearing.   HENT:      Head: Normocephalic and atraumatic.   Eyes:      Extraocular Movements: Extraocular movements intact.      Conjunctiva/sclera: Conjunctivae normal.   Cardiovascular:      Rate and Rhythm: Normal rate and regular rhythm.      Heart sounds: No murmur heard.  Pulmonary:      Effort: Pulmonary effort is normal. No respiratory distress.      Breath sounds: Normal breath sounds. No wheezing.   Abdominal:      General: Abdomen is flat. Bowel sounds are normal.      Palpations: Abdomen is soft.      Tenderness: There is no abdominal tenderness.   Musculoskeletal:      Cervical back: Normal range of motion.   Skin:     General: Skin is warm and dry.   Neurological:      General: No focal deficit present.      Mental Status: She is alert and oriented to person, place, and time.    Psychiatric:         Mood and Affect: Mood normal.         Behavior: Behavior normal.         Thought Content: Thought content normal.          Significant Labs: All pertinent labs within the past 24 hours have been reviewed.  Recent Lab Results         10/09/24  0839   10/09/24  0442   10/08/24  1944   10/08/24  1709        Albumin   1.3           ALP   114           ALT   <5           Anion Gap   8           AST   10           Baso #   0.08           Basophil %   1.0           BILIRUBIN TOTAL   0.6  Comment: For infants and newborns, interpretation of results should be based  on gestational age, weight and in agreement with clinical  observations.    Premature Infant recommended reference ranges:  Up to 24 hours.............<8.0 mg/dL  Up to 48 hours............<12.0 mg/dL  3-5 days..................<15.0 mg/dL  6-29 days.................<15.0 mg/dL             BUN   <3           Calcium   8.3           Chloride   107           CO2   23           Creatinine   0.6           Differential Method   Automated           eGFR   >60.0           Eos #   0.0           Eos %   0.5           Glucose   96           Gran # (ANC)   4.8           Gran %   61.5           Hematocrit   28.6           Hemoglobin   8.6           Immature Grans (Abs)   0.04  Comment: Mild elevation in immature granulocytes is non specific and   can be seen in a variety of conditions including stress response,   acute inflammation, trauma and pregnancy. Correlation with other   laboratory and clinical findings is essential.             Immature Granulocytes   0.5           Lymph #   2.3           Lymph %   29.7           Magnesium    1.9           MCH   26.7           MCHC   30.1           MCV   89           Mono #   0.5           Mono %   6.8           MPV   9.5           nRBC   0           Phosphorus Level   4.3           Platelet Count   265           POCT Glucose 139     147   138       Potassium   4.0           PROTEIN TOTAL   5.7           RBC    3.22           RDW   19.6           Sodium   138           WBC   7.75                   Significant Imaging: I have reviewed all pertinent imaging results/findings within the past 24 hours.

## 2024-10-09 NOTE — PROGRESS NOTES
"[facility] - Stepdown Flex ([facility]-)  Adult Nutrition  Progress Note    SUMMARY       Recommendations    Continue 2000 kcal ADA diet.  Boost Glucose Control ONS added BID.  RD to monitor & follow-up.    Goals: Meet % EEN/EPN by RD follow up.  Nutrition Goal Status: progressing towards goal  Communication of RD Recs: other (comment) (POC)    Assessment and Plan    Moderate malnutrition    Malnutrition Type:  Context: social/environmental circumstances  Level: moderate    Related to (etiology):   Inability to consume sufficient energy     Signs and Symptoms (as evidenced by):   Weight loss, Inadequate energy intake    Malnutrition Characteristic Summary:  Weight Loss (Malnutrition): 10% in 6 months  Energy Intake (Malnutrition): less than 75% for greater than 7 days    Interventions/Recommendations (treatment strategy):  Collaboration of nutrition care w/ other providers     Nutrition Diagnosis Status:   Continues    Malnutrition Assessment    Malnutrition Context: social/environmental circumstances  Malnutrition Level: moderate    Weight Loss (Malnutrition): 10% in 6 months  Energy Intake (Malnutrition): less than 75% for greater than 7 days     Reason for Assessment    Reason For Assessment: RD follow-up  Diagnosis: other (see comments) (Paraspinal abscess)    General Information Comments: Pt tolerating diet w/ 25-50% PO intake. Moderate malnutrition diagnosis continues - please see PES statement for details.  Nutrition Discharge Planning: Adequate PO intake    Nutrition/Diet History    Spiritual, Cultural Beliefs, Muslim Practices, Values that Affect Care: no  Food Allergies: NKFA  Factors Affecting Nutritional Intake: decreased appetite    Anthropometrics    Temp: 98.7 °F (37.1 °C)  Height Method: Stated  Height: 5' 2" (157.5 cm)  Height (inches): 62 in  Weight Method: Bed Scale  Weight: 65.2 kg (143 lb 11.8 oz)  Weight (lb): 143.74 lb  Ideal Body Weight (IBW), Female: 110 lb  % Ideal Body Weight, " Female (lb): 130.67 %  BMI (Calculated): 26.3  BMI Grade: 25 - 29.9 - overweight  Usual Body Weight (UBW), k.7 kg  % Usual Body Weight: 89.87  % Weight Change From Usual Weight: -10.32 %    Lab/Procedures/Meds    Pertinent Labs Reviewed: reviewed  Pertinent Labs Comments: A1C 7.9  Pertinent Medications Reviewed: reviewed  Pertinent Medications Comments: Folic acid, Thiamine    Estimated/Assessed Needs    Weight Used For Calorie Calculations: 65.2 kg (143 lb 11.8 oz)    Energy Calorie Requirements (kcal): 1586 kcal/d  Energy Need Method: [place]-St Jeor (1.3 PAL)    Protein Requirements: 78 g/d (1.2 g/kg)  Weight Used For Protein Calculations: 65.2 kg (143 lb 11.8 oz)    Estimated Fluid Requirement Method: other (see comments) (Per MD)  RDA Method (mL): 1586    Nutrition Prescription Ordered    Current Diet Order: 2000 kcal ADA    Evaluation of Received Nutrient/Fluid Intake    I/O: -3.2L since     Comments: LBM: 10/8    Tolerance: tolerating    Nutrition Risk    Level of Risk/Frequency of Follow-up:  (1x/week)     Monitor and Evaluation    Food and Nutrient Intake: energy intake, food and beverage intake  Food and Nutrient Adminstration: diet order  Physical Activity and Function: nutrition-related ADLs and IADLs  Anthropometric Measurements: weight, weight change  Biochemical Data, Medical Tests and Procedures: glucose/endocrine profile, lipid profile, inflammatory profile, gastrointestinal profile, electrolyte and renal panel  Nutrition-Focused Physical Findings: overall appearance, skin     Nutrition Follow-Up    RD Follow-up?: Yes

## 2024-10-09 NOTE — PT/OT/SLP PROGRESS
Physical Therapy      Patient Name:  Mari Sloan   MRN:  [id number]    Patient not seen today secondary to Patient unwilling to participate. Will follow-up tomorrow.    Merlin Valles, PT  10/9/2024

## 2024-10-09 NOTE — CONSULTS
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 Patient underwent IR drain placement to a left retroperitoneal fluid collection on  with positive cultures for MSSA, and I&D of L hip abscess with general surgery on 10/7 with rare GPCs on gram stain. Imaging with lumbar epidural phlegmon and subcutaneous fluid collection. "   Past Medical History:   Diagnosis Date    Diabetes mellitus     Hypertension     Unspecified viral hepatitis C without hepatic coma      Past Surgical History:   Procedure Laterality Date     SECTION      INCISION AND DRAINAGE Left 10/7/2024    Procedure: Incision and Drainage;  Surgeon: Shukri Stanton MD;  Location: [facility] OR [address] [place] [address];  Service: General;  Laterality: Left;    LAMINECTOMY N/A 2024    Procedure: L3-L5 laminectomy with epidural abscess evac;  Surgeon: Sourav Jim DO;  Location: [facility] OR [address] [place] [address];  Service: Neurosurgery;  Laterality: N/A;    MAGNETIC RESONANCE IMAGING N/A 2024    Procedure: MRI (Magnetic Resonance Imagine);  Surgeon: Kamran Storey;  Location: [facility];  Service: Anesthesiology;  Laterality: N/A;  conscious sedaation MRI lumbar spine w/ and without contrast    WASHOUT, WOUND, SPINE  2024    Procedure: WASHOUT, WOUND, SPINE;  Surgeon: Sourav Jim DO;  Location: [facility] OR [address] [place] [address];  Service: Neurosurgery;;       Review of Systems:   As documented in primary team H&P    Home Meds:   Prior to Admission medications    Medication Sig Start Date End Date Taking? Authorizing Provider   gabapentin (NEURONTIN) 300 MG capsule Take 300 mg by mouth 3 (three) times daily.   Yes Provider, Historical   hydrOXYzine pamoate " (VISTARIL) 25 MG Cap Take 25 mg by mouth 3 (three) times daily.   Yes Provider, Historical   metFORMIN (GLUCOPHAGE) 500 MG tablet Take 500 mg by mouth 2 (two) times daily with meals. 7/1/24 7/1/25 Yes Provider, Historical   methadone (DOLOPHINE) 10 MG tablet Take 70 mg by mouth Daily.   Yes Provider, Historical   cloNIDine (CATAPRES) 0.2 MG tablet Take 0.2 mg by mouth 3 (three) times daily.    Provider, Historical   glipiZIDE (GLUCOTROL) 5 MG tablet Take 5 mg by mouth 2 (two) times daily.    Provider, Historical     Scheduled Meds:    folic acid  1 mg Oral Daily    gabapentin  300 mg Oral TID    hydrOXYzine pamoate  25 mg Oral QHS    methadone  70 mg Oral Daily    nicotine  1 patch Transdermal Daily    oxacillin 12 g in  mL CONTINUOUS INFUSION  12 g Intravenous Q24H    polyethylene glycol  17 g Oral Daily    senna-docusate 8.6-50 mg  1 tablet Oral Daily    sodium chloride 0.9%  10 mL Intravenous Q6H    thiamine  100 mg Oral Daily     Continuous Infusions:   PRN Meds:  Current Facility-Administered Medications:     acetaminophen, 1,000 mg, Per OG tube, Q6H PRN    albuterol-ipratropium, 3 mL, Nebulization, Q4H PRN    dextrose 10%, 12.5 g, Intravenous, PRN    dextrose 10%, 25 g, Intravenous, PRN    glucagon (human recombinant), 1 mg, Intramuscular, PRN    glucose, 16 g, Oral, PRN    glucose, 24 g, Oral, PRN    hydrOXYzine pamoate, 50 mg, Oral, Q6H PRN    insulin aspart U-100, 0-5 Units, Subcutaneous, QID (AC + HS) PRN    melatonin, 6 mg, Oral, Nightly PRN    naloxone, 0.4 mg, Intravenous, PRN    ondansetron, 4 mg, Intravenous, Q6H PRN    oxyCODONE, 15 mg, Oral, Q4H PRN    oxyCODONE, 20 mg, Oral, Q4H PRN    sodium chloride 0.9%, 10 mL, Intravenous, Q12H PRN    Flushing PICC/Midline Protocol, , , Until Discontinued **AND** sodium chloride 0.9%, 10 mL, Intravenous, Q6H **AND** sodium chloride 0.9%, 10 mL, Intravenous, PRN  Anticoagulants/Antiplatelets: no anticoagulation    Allergies: Review of patient's allergies  "indicates:  No Known Allergies  Sedation Hx: have not been any systemic reactions    Labs:  No results for input(s): "INR", "PT", "PTT" in the last 168 hours.    Recent Labs   Lab 10/09/24  0442   WBC 7.75   HGB 8.6*   HCT 28.6*   MCV 89         Recent Labs   Lab 10/09/24  0442   GLU 96      K 4.0      CO2 23   BUN <3*   CREATININE 0.6   CALCIUM 8.3*   MG 1.9   ALT <5*   AST 10   ALBUMIN 1.3*   BILITOT 0.6         Vitals:  Temp: 98.7 °F (37.1 °C) (10/09/24 0838)  Pulse: 97 (10/09/24 1107)  Resp: 17 (10/09/24 0905)  BP: (!) 138/90 (10/09/24 0838)  SpO2: 95 % (10/09/24 1100)     Physical Exam:  ASA: III  Mallampati: II    General: no acute distress  Mental Status: alert and oriented to person, place and time  HEENT: normocephalic, atraumatic  Chest: unlabored breathing  Heart: regular heart rate  Abdomen: nondistended  Extremity: moves all extremities, anti-gravity in both arms and right leg, left leg- horizontal      Plan:    Neuro-IR consulted for drainage of epidural abscess.   Sedation Plan: Up to moderate  anesthesia  Fluoroscopy  guided aspiration of epidural abscess. Informed consent obtained. Risk vs benefits explained. Patient agrees with procedure.           Angélica Carroll MD  Fellow, NeuroEndovascular Surgery, [facility]  Board certified Vascular Neurologist  [place], [place]    "

## 2024-10-09 NOTE — NURSING
Pt refused therapy, pt educated on importance of therapy, pt still refused. MD notified. Monitoring.

## 2024-10-09 NOTE — NURSING
Pt had no acute events this shift. Pt VSS, plan of care continued, family at bedside, call light and belongings in reach.

## 2024-10-09 NOTE — PROGRESS NOTES
[facility] ([place] [id number])  [facility] Medicine  Progress Note    Patient Name: Mari Sloan  MRN: [id number]  Patient Class: IP- Inpatient   Admission Date: 9/16/2024  Length of Stay: 23 days  Attending Physician: Paty eKrr MD  Primary Care Provider: Mary Fong APRN        Subjective:     Principal Problem:Paraspinal abscess        HPI:  By Dr. Alberta Reese MD    49 y.o.  woman with h/o homelessness (recently was able to obtain living arrangements but states she was living [place] the bridge), NIDDM type 2, Essential hypertension, and substance use (denies any IVDU in he past or any illicit drug use recenly, denies ETOH abuse/overuse) presents to [facility] for further evaluation of her back pain.  She apparently presented to the [facility] Emergency Department on September 16 with nausea and vomiting and a mechanical fall 5 days prior. She reported pain worse in her back and on her sides radiating down both legs. She noted muscle spasm in her back and legs. She had no head trauma or loss of consciousness.  W/u in the [facility] ED noted significant hypokalemia, hypomagnesemia, severe anemia, metabolic alkalosis, and hyperglycemia. Vitals signs stable with no sepsis at this time noted. SBP>90, HR normal,afebrile.     Imaging studies of her lumbar spine and pelvis were concerning for osteomyelitis/septic arthritis of the left SI joint and L5-S1 along with an abnormal fluid collection extending from T11 through the left iliacus muscle concerning for abscess. Blood cultures sent.  In the emergency department she is receiving IV fluid, Zofran, Zosyn, vancomycin, potassium, magnesium, pain medication, and nausea medication.   She also received 2 units of PRBC for an H/H of 5.7/18.4,     Case discussed with Neurosurgery and Interventional Radiology. Plan would be for transfer to Hospital Medicine at [facility] for IR evaluation of the fluid collection and  "potential sampling of the joint osteomyelitis.  I reviewed the discussions made with the multiple sub specialists regarding transfer of this patient to [facility]: IR/General surgery/Neurosurgery/ER/Internal Med.     Neurosurgery contacted by the  did not feel surgical intervention was needed at this time but would follow along and requested [facility] for further management and IR backup. IR on call apparently read the CT and at the time felt "while the left retroperitoneal fluid collections do appear organized, percutaneous drainage is not advised given the extensive soft tissue infection superficial to these collections.  In addition, there is extensive evidence of soft tissue infection that does not appear organized or percutaneously drainable."     General surgery was consulted to review the case and felt that there was no immediate surgical intervention at this time to be had. I spoke with the on surgeon contacted regarding the location of the fluid collections and inquired if perhaps orthopedic surgery should be consulted to review given the involvement of bony pelvis.      I spoke orthopedic surgery who will see the patient but recommended re-consulting IR here and Neurosurgery given the diskitis/OM L5-S1. (Please see his consult note in the chart)     Repeat labs here again noted for persistently low mag, K, phos.  H/H stable with improved H/H. Pain control improved with Dilaudid.  I consulted ID for further assistance with ABX adjustments and changed her to Meropenem and doxy as well as continued Vanc. Echo ordred for am.     CT lumbar spine and pelvis had findings most concerning for infectious process. There were findings concerning for osteomyelitis and septic arthritis in the left SI joint. Findings concerning for osteomyelitis/diskitis L5-S1. Possible osteomyelitis and septic joint at the pubic symphysis. Abnormal fluid collection on the left extending from T11 through the left " iliacus muscle along and within the left iliopsoas muscle most concerning for abscess. Multiple additional small abscesses suspected in the pelvis. Multifocal collections of air in the fluid in the subcutaneous tissues of the left flank, incompletely imaged.    Chest x-ray noted a loop in the central venous catheter. Tip currently at the level of the brachiocephalic vein. Lungs are fairly clear.        Overview/Hospital Course:  Ms. Sloan was transferred from [facility] ED to [facility] ICU on 09/16/2024.  She presented to [facility] ED  for back pain, nausea/vomiting.  CT L-spine revealed osteomyelitis/diskitis L5-S1 along with abnormal fluid collection on left extending from T11 through left iliacus muscle and left iliopsoas muscle concerning for abscess.  Multifocal collections of air in fluid in subcutaneous tissues of left flank.  Patient was initiated on empiric vancomycin and meropenem.  Blood cultures with staph aureus.  Obtain echo.  Unable to repeat blood cultures given shortage of cx.  Neurosurgery recommended to obtain MRI L-spine, pending.  General surgery evaluated the patient and recommended urgent transfer to [facility] for surgical evaluation/source control given extent of necrosis.     Ms. Sloan was [place] [facility] and [place] to the MICU 9/18 with concern for paraspinal abscess. Infectious workup was ordered. Blood cultures were positive for MSSA bacteremia. Neurosurgery, General Surgery and IR were consulted to evaluate the patient's paraspinal abscess. Neurosurgery performed a L3-L4 laminectomy for epidural abscess evacuation on 9/19/24 and the cultures grew MSSA. TTE showed normal EF of 60-65% with diastolic dysfunction, could not exclude mitral vegetation vs redundant chordae. ID was consulted and recommended Oxacillin and repeat blood cultures. With her electrolyte derangement, and a background of appetitive loss in the past 2 months, there was concern for refeeding syndrome.  On 9/21, IR took the patient for abscess drain placement and aspiration of SI joint. SI joint couldn't be aspirated but retroperitoneal abscess was drained of 100cc of purulent fluid. She was successfully extubated 9/22 and was stepped down to Hospital Medicine on 9/24/24. Her drain was removed on 09/27.  Repeat CT abdomen/pelvis showed an ill-defined subcutaneous edema and soft tissue inflammatory change in the surgical bed and subcutaneous soft tissues without discrete organized fluid collection, stable size of 3.6 cm fluid collection along the left posterior pararenal space which appears to communicate with the with the aforementioned collection and fascial thickening.  IR placed drain for retroperitoneal fluid collection on 9/30.  Cultures returned with Staph aureus.  IR suggested to onitor drain output, and consider repeat imaging when output decreases to less than 10 cc in 24 hours to evaluate for possible drainage catheter removal. Repeat CT was ordered on 10/3 as she endorsed worsening pain on her left flank, and minimal drain output which could be removed.  It returned with multiple new and enlargening abscesses.  Discussed with ID, who said to continue Oxacillin.  NSGY, Gen Surg, Ortho and IR were consulted for further recommendations.  MRI complete spine and pelvis were ordered to help guide management.  Gen Surg took her to the OR for an I&D of a left hip abscess on 10/7, where 300cc of purulent material within the subcutaneous space along the left hip was drained and sent for culture.  ID reconsulted.  NeuroIR considering spinal abscess drainage.    Interval History: No acute events overnight. Continues to require pain meds.  Still sleeps all day long and refusing to work with therapy.  Discussed with ID and NeuroIR about spinal abscess drainage.  Will reach out to Ortho about SI joint.    Review of Systems   Constitutional:  Negative for chills, fatigue and fever.   Respiratory:  Negative for cough and  shortness of breath.    Cardiovascular:  Negative for chest pain, palpitations and leg swelling.   Gastrointestinal:  Positive for abdominal pain. Negative for diarrhea, nausea and vomiting.   Genitourinary:  Negative for dysuria and urgency.   Musculoskeletal:  Positive for back pain.   Neurological:  Negative for dizziness and headaches.   All other systems reviewed and are negative.    Objective:     Vital Signs (Most Recent):  Temp: 98.7 °F (37.1 °C) (10/09/24 0838)  Pulse: 97 (10/09/24 1107)  Resp: 17 (10/09/24 0905)  BP: (!) 138/90 (10/09/24 0838)  SpO2: 95 % (10/09/24 1100) Vital Signs (24h Range):  Temp:  [98.3 °F (36.8 °C)-99.1 °F (37.3 °C)] 98.7 °F (37.1 °C)  Pulse:  [] 97  Resp:  [16-18] 17  SpO2:  [95 %-100 %] 95 %  BP: (112-138)/(76-90) 138/90     Weight: 65.2 kg (143 lb 11.8 oz)  Body mass index is 26.29 kg/m².    Intake/Output Summary (Last 24 hours) at 10/9/2024 1110  Last data filed at 10/9/2024 0647  Gross per 24 hour   Intake 1010.36 ml   Output 2100 ml   Net -1089.64 ml      Physical Exam  Constitutional:       Appearance: Normal appearance.   HENT:      Head: Normocephalic and atraumatic.   Cardiovascular:      Rate and Rhythm: Normal rate and regular rhythm.      Heart sounds: No murmur heard.  Pulmonary:      Effort: Pulmonary effort is normal. No respiratory distress.      Breath sounds: Normal breath sounds. No wheezing or rales.   Abdominal:      General: There is no distension.      Palpations: Abdomen is soft.      Tenderness: There is no abdominal tenderness.      Comments: Mild swelling to left flank   Musculoskeletal:         General: Tenderness (Parapsinal, bandages C/D/I) present. No deformity.   Skin:     General: Skin is warm and dry.   Neurological:      General: No focal deficit present.      Mental Status: She is alert and oriented to person, place, and time. Mental status is at baseline.         MELD 3.0: 17 at 9/21/2024  6:13 PM  MELD-Na: 13 at 9/21/2024  6:13  PM  Calculated from:  Serum Creatinine: 0.6 mg/dL (Using min of 1 mg/dL) at 9/21/2024  6:13 PM  Serum Sodium: 138 mmol/L (Using max of 137 mmol/L) at 9/21/2024  6:13 PM  Total Bilirubin: 2.1 mg/dL at 9/21/2024  2:34 AM  Serum Albumin: 1.2 g/dL (Using min of 1.5 g/dL) at 9/21/2024  2:34 AM  INR(ratio): 1.4 at 9/19/2024  3:19 AM  Age at listing (hypothetical): 49 years  Sex: Female at 9/21/2024  6:13 PM      Significant Labs:  CBC:  Recent Labs   Lab 10/08/24  0451 10/09/24  0442   WBC 7.84 7.75   HGB 8.7* 8.6*   HCT 28.6* 28.6*    265       CMP:  Recent Labs   Lab 10/08/24  0451 10/09/24  0442    138   K 2.9* 4.0    107   CO2 26 23   GLU 92 96   BUN 3* <3*   CREATININE 0.6 0.6   CALCIUM 8.1* 8.3*   PROT 5.6* 5.7*   ALBUMIN 1.3* 1.3*   BILITOT 0.6 0.6   ALKPHOS 111 114   AST 10 10   ALT 7* <5*   ANIONGAP 6* 8           Assessment/Plan:      * Paraspinal abscess  MSSA Endocarditis  MSSA Paraspinal abscess  MSSA Psoas abscess    Blood cx 9/16 with MSSA  2D echo 9/17:  Cannot entirely exclude mitral vegetation vs redundant chordae. If high clinical suspicion for endocarditis, would rx as such (not clear MARICHUY would be able to exclude vegetation based on TTE findings).   Noted to have extensive fluid collection on left extending from T11 through left iliacus muscle and within the left iliopsoas muscle.  Multifocal collections of air including subcutaneous tissues on the left flank noted.  ID/Neurosurgery consulted.   S/p L3-L5 laminectomy with epidural abscess evacuation 9/19   Ortho consulted. Rec continued abx tx and no further interventions  IR SI joint aspiration and abscess drain placement 9/21. Unable to aspirate joint  IR drain removed on 09/27.    Repeat CT abdomen pelvis 9/28 ordered to look for recollection of fluid showed an ill-defined subcutaneous edema and soft tissue inflammatory change in the surgical bed and subcutaneous soft tissues without discrete organized fluid collection,  Stable  size of 3.6 cm fluid collection along the left posterior pararenal space which appears to communicate with the with the aforementioned collection and fascial thickening.   IR placed drain for retroperitoneal fluid collection on 9/30.  Culture with MSSA  ID consulted:  Suggested Oxacillin through 11/18 with repeat imaging prior to completion  Will need LTAC for completion of IV antibiotics given history of IVDA  PICC placed 9/27  Repeat CRP ordered 10/3 given worsening pain and swelling - CRP down to 32  Repeat CT abdomen/pelvis was ordered to assess for drain removal, that showed worsening abscesses.  Discussed with ID who suggested further source control and to continue Oxacillin   Gen Surg, Ortho, NSGY and IR consulted - Greatly appreciate assistance!  MRI complete spine and pelvis ordered to further determine next steps with multiple abscesses  Gen Surg took her to the OR for an I&D of a left hip abscess on 10/7, where 300cc of purulent material within the subcutaneous space along the left hip was drained and sent for culture.  Cx with Staph aureus  ID reconsulted  NeuroIR considering epidural drainage as NSGY said no intervention and ID feels like she could use more intervention to decrease her infectious burden    Other osteomyelitis, multiple sites  As above      Endocarditis  As above      Staphylococcus aureus bacteremia  As above      Sepsis  As above    Retroperitoneal fluid collection  As above    Epidural abscess  As above      Psoas muscle abscess  As above    Moderate malnutrition  Nutrition consulted. Most recent weight and BMI monitored-     Measurements:  Wt Readings from Last 1 Encounters:   09/26/24 65.2 kg (143 lb 11.8 oz)   Body mass index is 26.29 kg/m².    Patient has been screened and assessed by RD.    Malnutrition Type:  Context: social/environmental circumstances  Level: moderate    Malnutrition Characteristic Summary:  Weight Loss (Malnutrition): 10% in 6 months  Energy Intake  (Malnutrition): less than 75% for greater than 7 days    Interventions/Recommendations (treatment strategy):  1.      Refeeding syndrome  In ICU      Severe sepsis  See above    Hepatitis C  Hepatitis C antibody positive.    Obtain HCV RNA. Quant negative    Anemia, unspecified  S/p 2u PRBC transfusion on admit   No signs of acute GI bleed on exam at this time. Denies any hematemesis or hemoptysis.   Obtain iron B12/folate/peripheral smear and LDH/hapto/retic  No evidence of active bleed   Stable    Uncontrolled type 2 diabetes mellitus with hyperglycemia  Patient's FSGs are uncontrolled due to hyperglycemia on current medication regimen.  Last A1c reviewed-   Lab Results   Component Value Date    HGBA1C 7.9 (H) 09/17/2024     Most recent fingerstick glucose reviewed-   Recent Labs   Lab 10/08/24  1134 10/08/24  1709 10/08/24  1944 10/09/24  0839   POCTGLUCOSE 180* 138* 147* 139*       Current correctional scale  Medium  Maintain anti-hyperglycemic dose as follows-   Antihyperglycemics (From admission, onward)      Start     Stop Route Frequency Ordered    10/02/24 0006  insulin aspart U-100 pen 0-5 Units         -- SubQ Before meals & nightly PRN 10/01/24 2306          Hold Oral hypoglycemics while patient is in the hospital.    Smoker  Tobacco cessation discussed with patient for greater than 5 minutes.   Offered Nicotine patch while hospitalized  Patient is aware of need to quit tobacco products    History of drug use  On methadone at home, continue    Hypokalemia  Monitor on tele. Replace mg, phos, and K. F/u on repeat labs.       VTE Risk Mitigation (From admission, onward)           Ordered     IP VTE HIGH RISK PATIENT  Once         09/22/24 1553                    Discharge Planning   LUH: 10/11/2024     Code Status: Full Code   Is the patient medically ready for discharge?:     Reason for patient still in hospital (select all that apply): Patient trending condition, Consult recommendations, and Pending  disposition  Discharge Plan A: Skilled Nursing Facility   Discharge Delays: None known at this time        High Risk Conditions:   Patient is currently receiving parenteral controlled substances: Luisaaudid         Paty Kerr MD  Department of Hospital Medicine   [facility] ([place])

## 2024-10-09 NOTE — PROGRESS NOTES
[facility] [facility] ([place])  Infectious Disease  Progress Note    Patient Name: Mari Sloan  MRN: [id number]  Admission Date: 9/16/2024  Length of Stay: 23 days  Attending Physician: Paty Kerr MD  Primary Care Provider: Mary Fong APRN    Isolation Status: No active isolations  Assessment/Plan:      Cardiac/Vascular  Endocarditis  See below    ID  Epidural abscess  See below    Staphylococcus aureus bacteremia  49F admitted with MSSA bacteremia, SI joint pyogenic arthritis with osteomyelitis, L psoas/iliacus abscess s/p IR drain (9/21), T12-L5 epidural abscess s/p L3-5 laminectomy with abscess washout (9/19), and possible MV endocarditis vs redundant chordae on a planned 8 week course of oxacillin. Patient underwent IR drain placement to a left retroperitoneal fluid collection on 9/30 with positive cultures for MSSA, and I&D of L hip abscess with general surgery on 10/7 with staph aureus, susceptibilities pending. Imaging with lumbar epidural phlegmon and subcutaneous fluid collection, pending IR aspiration tomorrow.     Recommendations:  Follow up 10/7 OR cultures  Switch oxacillin from continuous infusion to q4h due to nationwide IV fluid shortage  Duration still 8 weeks, tentative end date 11/18  Unfortunately she is not an OPAT candidate, which was discussed with patient previously, will need placement to a facility  Favor repeat thoracic/lumbar imaging (MRI w/wo) prior to completion of abx to evaluate for resolution of abscess and reassess need for abx extension      The above plan was discussed with the primary team.       Other osteomyelitis, multiple sites  See above        Anticipated Disposition: TBD    Thank you for your consult. I will follow-up with patient. Please contact us if you have any additional questions.    Lashawn Barbosa, DO  Infectious Disease  [facility] ([place])    Subjective:     Principal Problem:Paraspinal abscess    HPI: Ms. Sloan is  "a 49F with PMH of DM2, HTN, and substance abuse, homelessness, here with MSSA bacteremia, SI joint pyogenic arthritis with osteomyelitis, L psoas/iliacus abscess s/p IR drain (9/21), T12-L5 epidural abscess s/p L3-5 laminectomy with abscess washout (9/19), and possible MV endocarditis vs redundant chordae on a planned 8 week course of oxacillin. Patient underwent IR drain placement to a left retroperitoneal fluid collection on 9/30/24 with positive cultures for MSSA, and I&D of L hip abscess with general surgery on 10/7 with rare GPCs on gram stain. Infectious disease now re-consulted for "Endocarditis and bacteremia - Recent OR I&D, abx recs".   Interval History: feeling ok, still has pain at L hip/back surgical site    Review of Systems   Constitutional:  Negative for chills and fever.   Respiratory:  Negative for cough and shortness of breath.    Cardiovascular:  Negative for chest pain.   Gastrointestinal:  Negative for abdominal pain, constipation, diarrhea, nausea and vomiting.   Musculoskeletal:  Positive for arthralgias and back pain. Negative for myalgias.   Neurological:  Negative for headaches.     Objective:     Vital Signs (Most Recent):  Temp: 98.9 °F (37.2 °C) (10/09/24 1236)  Pulse: 94 (10/09/24 1236)  Resp: 17 (10/09/24 1505)  BP: 134/89 (10/09/24 1236)  SpO2: 97 % (10/09/24 1400) Vital Signs (24h Range):  Temp:  [98.3 °F (36.8 °C)-99.1 °F (37.3 °C)] 98.9 °F (37.2 °C)  Pulse:  [] 94  Resp:  [14-18] 17  SpO2:  [95 %-100 %] 97 %  BP: (112-138)/(76-90) 134/89     Weight: 65.2 kg (143 lb 11.8 oz)  Body mass index is 26.29 kg/m².    Estimated Creatinine Clearance: 98.2 mL/min (based on SCr of 0.6 mg/dL).     Physical Exam  Vitals reviewed.   Constitutional:       General: She is not in acute distress.     Appearance: Normal appearance. She is not ill-appearing.   HENT:      Head: Normocephalic and atraumatic.   Eyes:      Extraocular Movements: Extraocular movements intact.      Conjunctiva/sclera: " Conjunctivae normal.   Cardiovascular:      Rate and Rhythm: Normal rate and regular rhythm.      Heart sounds: No murmur heard.  Pulmonary:      Effort: Pulmonary effort is normal. No respiratory distress.      Breath sounds: Normal breath sounds. No wheezing.   Abdominal:      General: Abdomen is flat. Bowel sounds are normal.      Palpations: Abdomen is soft.      Tenderness: There is no abdominal tenderness.   Musculoskeletal:      Cervical back: Normal range of motion.   Skin:     General: Skin is warm and dry.   Neurological:      General: No focal deficit present.      Mental Status: She is alert and oriented to person, place, and time.   Psychiatric:         Mood and Affect: Mood normal.         Behavior: Behavior normal.         Thought Content: Thought content normal.          Significant Labs: All pertinent labs within the past 24 hours have been reviewed.  Recent Lab Results         10/09/24  0839   10/09/24  0442   10/08/24  1944   10/08/24  1709        Albumin   1.3           ALP   114           ALT   <5           Anion Gap   8           AST   10           Baso #   0.08           Basophil %   1.0           BILIRUBIN TOTAL   0.6  Comment: For infants and newborns, interpretation of results should be based  on gestational age, weight and in agreement with clinical  observations.    Premature Infant recommended reference ranges:  Up to 24 hours.............<8.0 mg/dL  Up to 48 hours............<12.0 mg/dL  3-5 days..................<15.0 mg/dL  6-29 days.................<15.0 mg/dL             BUN   <3           Calcium   8.3           Chloride   107           CO2   23           Creatinine   0.6           Differential Method   Automated           eGFR   >60.0           Eos #   0.0           Eos %   0.5           Glucose   96           Gran # (ANC)   4.8           Gran %   61.5           Hematocrit   28.6           Hemoglobin   8.6           Immature Grans (Abs)   0.04  Comment: Mild elevation in immature  granulocytes is non specific and   can be seen in a variety of conditions including stress response,   acute inflammation, trauma and pregnancy. Correlation with other   laboratory and clinical findings is essential.             Immature Granulocytes   0.5           Lymph #   2.3           Lymph %   29.7           Magnesium    1.9           MCH   26.7           MCHC   30.1           MCV   89           Mono #   0.5           Mono %   6.8           MPV   9.5           nRBC   0           Phosphorus Level   4.3           Platelet Count   265           POCT Glucose 139     147   138       Potassium   4.0           PROTEIN TOTAL   5.7           RBC   3.22           RDW   19.6           Sodium   138           WBC   7.75                   Significant Imaging: I have reviewed all pertinent imaging results/findings within the past 24 hours.

## 2024-10-09 NOTE — ASSESSMENT & PLAN NOTE
Patient's FSGs are uncontrolled due to hyperglycemia on current medication regimen.  Last A1c reviewed-   Lab Results   Component Value Date    HGBA1C 7.9 (H) 09/17/2024     Most recent fingerstick glucose reviewed-   Recent Labs   Lab 10/08/24  1134 10/08/24  1709 10/08/24  1944 10/09/24  0839   POCTGLUCOSE 180* 138* 147* 139*       Current correctional scale  Medium  Maintain anti-hyperglycemic dose as follows-   Antihyperglycemics (From admission, onward)    Start     Stop Route Frequency Ordered    10/02/24 0006  insulin aspart U-100 pen 0-5 Units         -- SubQ Before meals & nightly PRN 10/01/24 2306        Hold Oral hypoglycemics while patient is in the hospital.   M/ PUI M/ tele [id number]

## 2024-10-09 NOTE — ASSESSMENT & PLAN NOTE
49F admitted with MSSA bacteremia, SI joint pyogenic arthritis with osteomyelitis, L psoas/iliacus abscess s/p IR drain (9/21), T12-L5 epidural abscess s/p L3-5 laminectomy with abscess washout (9/19), and possible MV endocarditis vs redundant chordae on a planned 8 week course of oxacillin. Patient underwent IR drain placement to a left retroperitoneal fluid collection on 9/30 with positive cultures for MSSA, and I&D of L hip abscess with general surgery on 10/7 with staph aureus, susceptibilities pending. Imaging with lumbar epidural phlegmon and subcutaneous fluid collection, pending IR aspiration tomorrow.     Recommendations:  Follow up 10/7 OR cultures  Switch oxacillin from continuous infusion to q4h due to nationwide IV fluid shortage  Duration still 8 weeks, tentative end date 11/18  Unfortunately she is not an OPAT candidate, which was discussed with patient previously, will need placement to a facility  Favor repeat thoracic/lumbar imaging (MRI w/wo) prior to completion of abx to evaluate for resolution of abscess and reassess need for abx extension      The above plan was discussed with the primary team.

## 2024-10-09 NOTE — ASSESSMENT & PLAN NOTE
MSSA Endocarditis  MSSA Paraspinal abscess  MSSA Psoas abscess    Blood cx 9/16 with MSSA  2D echo 9/17:  Cannot entirely exclude mitral vegetation vs redundant chordae. If high clinical suspicion for endocarditis, would rx as such (not clear MARICHUY would be able to exclude vegetation based on TTE findings).   Noted to have extensive fluid collection on left extending from T11 through left iliacus muscle and within the left iliopsoas muscle.  Multifocal collections of air including subcutaneous tissues on the left flank noted.  ID/Neurosurgery consulted.   S/p L3-L5 laminectomy with epidural abscess evacuation 9/19   Ortho consulted. Rec continued abx tx and no further interventions  IR SI joint aspiration and abscess drain placement 9/21. Unable to aspirate joint  IR drain removed on 09/27.    Repeat CT abdomen pelvis 9/28 ordered to look for recollection of fluid showed an ill-defined subcutaneous edema and soft tissue inflammatory change in the surgical bed and subcutaneous soft tissues without discrete organized fluid collection,  Stable size of 3.6 cm fluid collection along the left posterior pararenal space which appears to communicate with the with the aforementioned collection and fascial thickening.   IR placed drain for retroperitoneal fluid collection on 9/30.  Culture with MSSA  ID consulted:  Suggested Oxacillin through 11/18 with repeat imaging prior to completion  Will need LTAC for completion of IV antibiotics given history of IVDA  PICC placed 9/27  Repeat CRP ordered 10/3 given worsening pain and swelling - CRP down to 32  Repeat CT abdomen/pelvis was ordered to assess for drain removal, that showed worsening abscesses.  Discussed with ID who suggested further source control and to continue Oxacillin   Gen Surg, Ortho, NSGY and IR consulted - Greatly appreciate assistance!  MRI complete spine and pelvis ordered to further determine next steps with multiple abscesses  Gen Surg took her to the OR for an  I&D of a left hip abscess on 10/7, where 300cc of purulent material within the subcutaneous space along the left hip was drained and sent for culture.  Cx with Staph aureus  ID reconsulted  NeuroIR considering epidural drainage as NSGY said no intervention and ID feels like she could use more intervention to decrease her infectious burden

## 2024-10-09 NOTE — SUBJECTIVE & OBJECTIVE
Interval History: No acute events overnight. Continues to require pain meds.  Still sleeps all day long and refusing to work with therapy.  Discussed with ID and NeuroIR about spinal abscess drainage.  Will reach out to Ortho about SI joint.    Review of Systems   Constitutional:  Negative for chills, fatigue and fever.   Respiratory:  Negative for cough and shortness of breath.    Cardiovascular:  Negative for chest pain, palpitations and leg swelling.   Gastrointestinal:  Positive for abdominal pain. Negative for diarrhea, nausea and vomiting.   Genitourinary:  Negative for dysuria and urgency.   Musculoskeletal:  Positive for back pain.   Neurological:  Negative for dizziness and headaches.   All other systems reviewed and are negative.    Objective:     Vital Signs (Most Recent):  Temp: 98.7 °F (37.1 °C) (10/09/24 0838)  Pulse: 97 (10/09/24 1107)  Resp: 17 (10/09/24 0905)  BP: (!) 138/90 (10/09/24 0838)  SpO2: 95 % (10/09/24 1100) Vital Signs (24h Range):  Temp:  [98.3 °F (36.8 °C)-99.1 °F (37.3 °C)] 98.7 °F (37.1 °C)  Pulse:  [] 97  Resp:  [16-18] 17  SpO2:  [95 %-100 %] 95 %  BP: (112-138)/(76-90) 138/90     Weight: 65.2 kg (143 lb 11.8 oz)  Body mass index is 26.29 kg/m².    Intake/Output Summary (Last 24 hours) at 10/9/2024 1110  Last data filed at 10/9/2024 0647  Gross per 24 hour   Intake 1010.36 ml   Output 2100 ml   Net -1089.64 ml      Physical Exam  Constitutional:       Appearance: Normal appearance.   HENT:      Head: Normocephalic and atraumatic.   Cardiovascular:      Rate and Rhythm: Normal rate and regular rhythm.      Heart sounds: No murmur heard.  Pulmonary:      Effort: Pulmonary effort is normal. No respiratory distress.      Breath sounds: Normal breath sounds. No wheezing or rales.   Abdominal:      General: There is no distension.      Palpations: Abdomen is soft.      Tenderness: There is no abdominal tenderness.      Comments: Mild swelling to left flank   Musculoskeletal:          General: Tenderness (Parapsinal, bandages C/D/I) present. No deformity.   Skin:     General: Skin is warm and dry.   Neurological:      General: No focal deficit present.      Mental Status: She is alert and oriented to person, place, and time. Mental status is at baseline.         MELD 3.0: 17 at 9/21/2024  6:13 PM  MELD-Na: 13 at 9/21/2024  6:13 PM  Calculated from:  Serum Creatinine: 0.6 mg/dL (Using min of 1 mg/dL) at 9/21/2024  6:13 PM  Serum Sodium: 138 mmol/L (Using max of 137 mmol/L) at 9/21/2024  6:13 PM  Total Bilirubin: 2.1 mg/dL at 9/21/2024  2:34 AM  Serum Albumin: 1.2 g/dL (Using min of 1.5 g/dL) at 9/21/2024  2:34 AM  INR(ratio): 1.4 at 9/19/2024  3:19 AM  Age at listing (hypothetical): 49 years  Sex: Female at 9/21/2024  6:13 PM      Significant Labs:  CBC:  Recent Labs   Lab 10/08/24  0451 10/09/24  0442   WBC 7.84 7.75   HGB 8.7* 8.6*   HCT 28.6* 28.6*    265       CMP:  Recent Labs   Lab 10/08/24  0451 10/09/24  0442    138   K 2.9* 4.0    107   CO2 26 23   GLU 92 96   BUN 3* <3*   CREATININE 0.6 0.6   CALCIUM 8.1* 8.3*   PROT 5.6* 5.7*   ALBUMIN 1.3* 1.3*   BILITOT 0.6 0.6   ALKPHOS 111 114   AST 10 10   ALT 7* <5*   ANIONGAP 6* 8

## 2024-10-09 NOTE — CONSULTS
[facility] - Stepdown Flex ([place] [id number])    Wound Care     Patient Name:  Mari Sloan  MRN:  [id number]  Date: 10/9/2024  Diagnosis: Paraspinal abscess     History:  Past Medical History:   Diagnosis Date    Diabetes mellitus     Hypertension     Unspecified viral hepatitis C without hepatic coma      Social History     Socioeconomic History    Marital status:    Tobacco Use    Smoking status: Every Day     Current packs/day: 0.50     Types: Cigarettes    Smokeless tobacco: Never   Substance and Sexual Activity    Alcohol use: Not Currently    Drug use: Not Currently     Social Drivers of Health     Financial Resource Strain: Low Risk  (9/18/2024)    Overall Financial Resource Strain (CARDIA)     Difficulty of Paying Living Expenses: Not very hard   Food Insecurity: No Food Insecurity (9/18/2024)    Hunger Vital Sign     Worried About Running Out of Food in the Last Year: Never true     Ran Out of Food in the Last Year: Never true   Transportation Needs: Unmet Transportation Needs (9/18/2024)    TRANSPORTATION NEEDS     Transportation : Yes, it has kept me from medical appointments or from getting my medications.   Physical Activity: Patient Declined (9/18/2024)    Exercise Vital Sign     Days of Exercise per Week: Patient declined     Minutes of Exercise per Session: Patient declined   Stress: Stress Concern Present (9/18/2024)    [employer] - Occupational Stress Questionnaire     Feeling of Stress : Rather much   Housing Stability: Low Risk  (9/18/2024)    Housing Stability Vital Sign     Unable to Pay for Housing in the Last Year: No     Homeless in the Last Year: No     Precautions:  Allergies as of 09/16/2024    (No Known Allergies)       WOC Assessment Details / Treatment:    Patient seen for wound care: New Consult   Chart reviewed for this encounter.   Labs:   WBC (K/uL)   Date Value   10/09/2024 7.75   10/08/2024 7.84     Glucose (mg/dL)   Date Value   10/09/2024 96    10/08/2024 92     Albumin (g/dL)   Date Value   10/09/2024 1.3 (L)   10/08/2024 1.3 (L)     Clint Score: 20  Nutrition sub-score: 3    Narrative:  Pt seen for WC consultation / follow-up and agreed to assessment  Chart reviewed for this encounter.   See Flow Sheet for additional documentation and media.    Pt laying on Tyler bed, PureWick in use with briefs on, noted pt had urine and drainage from left flank wound on manjula pads, pt agreed to be cleansed and remove wet pads.   Pt agreed to WC assessment, removed paper tape, ABD pad and kerlix with ss drainage revealing moist areas, MD states adipose is visualized. Measurements 1.5 x 7.5 x 3.2 tunnel @ 3'o'clock minimal 10 cm, pt could not tolerate measurement d/t pain. Gently packed Vashe moistened kerlix (wrung out for excess moisture) to wound bed, covered with ABD pad, secure with Medipore tape. Supplies left at bedside. Discussed NPWT w/pt, she wants to think about it and speak to  about it and will advise.   PT agreed for WC to remove foam border from midline back incision revealing island border, this removed revealing moist dark well adherent eschar to proximal and distal aspect of the wound bed, yellow well adherent slough to middle of incision line with pink / red periwound, like d/t pt laying directly on this area.   Left buttock appears pale pink granulation tissue with yellow moist hue to wound bed, cleansed with purple bath wipes, applied Triad.   BLE scattered areas of intact scabs.   Noted pt had several foam borders on bilateral heels, these removed revealing dry flake intact skin, no open wounds  appears to have several fissures, d/t excessive thickened skin. noted. Pt refused cleansing / moisturizing to bilateral feet/heels, allowed WC nurse to apply bilateral heel foam borders.   Pt agreed to have Prateek BID.   MD advised, orders placed.     RECOMMENDATIONS:  Bedside nurse assess for acute changes (purulence, increased redness/swelling,  increased drainage, malodor, increased pain, pallor, necrosis) please contact physician on any acute changes.    Nutrition consult   Prateek BID   Immerse ordered.   Triad to left buttock     Discussed POC with patient and primary nurse.   See EMR for orders & patient education.     Discussed nutrition and the role of protein in wound healing with the patient. Instructed patient to optimize protein for wound healing.     Bedside nursing to continue care & monitoring.  Bedside nursing to maintain pressure injury prevention interventions    Thank you for the consult. Wound Care will continue to follow.     10/09/24 1000   WOCN Assessment   WOCN Total Time (mins) 45   Visit Date 10/09/24   Visit Time 1000   Consult Type New   WOCN Speciality Wound   Wound surgical;moisture;At risk for pressure Injury   Intervention chart review;assessed;changed;applied;orders   Teaching on-going        Wound 09/19/24 1549 Incision Lumbar spine midline   Date First Assessed/Time First Assessed: 09/19/24 1549   Present on Original Admission: No  Primary Wound Type: Incision  Location: Lumbar spine  Incision Type: midline  Closure Method: Sutures;Other (see comments)  Additional Comments: eo, island...   Wound Image    Dressing Appearance Clean;Dry;Intact   Drainage Amount Scant   Drainage Characteristics/Odor Serosanguineous;No odor   Appearance Intact   Periwound Area Dry;Intact   Wound Edges Approximated   Care Cleansed with:;Soap and water   Dressing Changed;Island/border   Off Loading Other (see comments)  (Immerse ordered)   Dressing Change Due 10/12/24        Wound 10/07/24 1027 Incision Left posterior Hip   Date First Assessed/Time First Assessed: 10/07/24 1027   Present on Original Admission: No  Primary Wound Type: Incision  Side: Left  Orientation: posterior  Location: Hip  Additional Comments: packed with 2 kerlex soaked in dakins solution, dressing ...   Wound Image     Dressing Appearance Moist drainage   Drainage Amount  Moderate   Drainage Characteristics/Odor Serosanguineous;Yellow;No odor   Appearance Red;Pink;Moist   Periwound Area Intact;Dry   Wound Edges Defined   Dressing Changed;Other (comment)  (Vashe moistened Kerlix to wound bed, covered w/ ABD pad, Medipore tape)   Dressing Change Due 10/09/24        Wound 10/09/24 Moisture associated dermatitis Left Buttocks   Date First Assessed: 10/09/24   Present on Original Admission: No  Primary Wound Type: Moisture associated dermatitis  Side: Left  Location: Buttocks   Wound Image    Dressing Appearance Open to air   Drainage Amount None   Drainage Characteristics/Odor No odor   Appearance Intact;Pink   Tissue loss description Partial thickness   Red (%), Wound Tissue Color 100 %   Periwound Area Intact;Dry   Wound Edges Irregular   Care Cleansed with:;Sterile normal saline   Dressing Applied;Other (comment)  (Triad)   Off Loading Other (see comments)  (Immerse ordered)   Dressing Change Due 10/09/24       Right heel     Right lateral    Right medial     Left heel     Left lateral     Left medial

## 2024-10-10 LAB
POCT GLUCOSE: 122 MG/DL (ref 70–110)
POCT GLUCOSE: 124 MG/DL (ref 70–110)
POCT GLUCOSE: 162 MG/DL (ref 70–110)
POCT GLUCOSE: 165 MG/DL (ref 70–110)

## 2024-10-10 PROCEDURE — A4216 STERILE WATER/SALINE, 10 ML: HCPCS | Performed by: STUDENT IN AN ORGANIZED HEALTH CARE EDUCATION/TRAINING PROGRAM

## 2024-10-10 PROCEDURE — 87205 SMEAR GRAM STAIN: CPT | Performed by: HOSPITALIST

## 2024-10-10 PROCEDURE — 25000003 PHARM REV CODE 250: Performed by: STUDENT IN AN ORGANIZED HEALTH CARE EDUCATION/TRAINING PROGRAM

## 2024-10-10 PROCEDURE — 87075 CULTR BACTERIA EXCEPT BLOOD: CPT | Performed by: HOSPITALIST

## 2024-10-10 PROCEDURE — 63600175 PHARM REV CODE 636 W HCPCS: Performed by: RADIOLOGY

## 2024-10-10 PROCEDURE — 63600175 PHARM REV CODE 636 W HCPCS: Performed by: STUDENT IN AN ORGANIZED HEALTH CARE EDUCATION/TRAINING PROGRAM

## 2024-10-10 PROCEDURE — 87070 CULTURE OTHR SPECIMN AEROBIC: CPT | Mod: 59 | Performed by: HOSPITALIST

## 2024-10-10 PROCEDURE — 25000003 PHARM REV CODE 250: Performed by: HOSPITALIST

## 2024-10-10 PROCEDURE — 25000003 PHARM REV CODE 250: Performed by: PHYSICIAN ASSISTANT

## 2024-10-10 PROCEDURE — 25000003 PHARM REV CODE 250: Performed by: INTERNAL MEDICINE

## 2024-10-10 PROCEDURE — 20600001 HC STEP DOWN PRIVATE ROOM

## 2024-10-10 PROCEDURE — 63600175 PHARM REV CODE 636 W HCPCS: Performed by: HOSPITALIST

## 2024-10-10 PROCEDURE — 87070 CULTURE OTHR SPECIMN AEROBIC: CPT | Performed by: HOSPITALIST

## 2024-10-10 RX ORDER — MUPIROCIN 20 MG/G
OINTMENT TOPICAL 2 TIMES DAILY
Status: DISCONTINUED | OUTPATIENT
Start: 2024-10-10 | End: 2024-10-15

## 2024-10-10 RX ORDER — MIDAZOLAM HYDROCHLORIDE 1 MG/ML
INJECTION, SOLUTION INTRAMUSCULAR; INTRAVENOUS
Status: COMPLETED | OUTPATIENT
Start: 2024-10-10 | End: 2024-10-10

## 2024-10-10 RX ORDER — FENTANYL CITRATE 50 UG/ML
INJECTION, SOLUTION INTRAMUSCULAR; INTRAVENOUS
Status: COMPLETED | OUTPATIENT
Start: 2024-10-10 | End: 2024-10-10

## 2024-10-10 RX ORDER — LIDOCAINE HYDROCHLORIDE 10 MG/ML
INJECTION, SOLUTION INFILTRATION; PERINEURAL
Status: COMPLETED | OUTPATIENT
Start: 2024-10-10 | End: 2024-10-10

## 2024-10-10 RX ORDER — HYDROMORPHONE HYDROCHLORIDE 1 MG/ML
0.2 INJECTION, SOLUTION INTRAMUSCULAR; INTRAVENOUS; SUBCUTANEOUS EVERY 6 HOURS PRN
Status: DISCONTINUED | OUTPATIENT
Start: 2024-10-10 | End: 2024-10-18 | Stop reason: HOSPADM

## 2024-10-10 RX ADMIN — FOLIC ACID 1 MG: 1 TABLET ORAL at 08:10

## 2024-10-10 RX ADMIN — OXYCODONE HYDROCHLORIDE 20 MG: 10 TABLET ORAL at 05:10

## 2024-10-10 RX ADMIN — OXACILLIN 2 G: 2 INJECTION, POWDER, FOR SOLUTION INTRAMUSCULAR; INTRAVENOUS at 01:10

## 2024-10-10 RX ADMIN — Medication 100 MG: at 08:10

## 2024-10-10 RX ADMIN — OXACILLIN 2 G: 2 INJECTION, POWDER, FOR SOLUTION INTRAMUSCULAR; INTRAVENOUS at 04:10

## 2024-10-10 RX ADMIN — GABAPENTIN 300 MG: 300 CAPSULE ORAL at 08:10

## 2024-10-10 RX ADMIN — SENNOSIDES AND DOCUSATE SODIUM 1 TABLET: 50; 8.6 TABLET ORAL at 08:10

## 2024-10-10 RX ADMIN — GABAPENTIN 300 MG: 300 CAPSULE ORAL at 04:10

## 2024-10-10 RX ADMIN — OXYCODONE HYDROCHLORIDE 20 MG: 10 TABLET ORAL at 04:10

## 2024-10-10 RX ADMIN — Medication 10 ML: at 12:10

## 2024-10-10 RX ADMIN — MUPIROCIN: 20 OINTMENT TOPICAL at 08:10

## 2024-10-10 RX ADMIN — OXYCODONE HYDROCHLORIDE 15 MG: 10 TABLET ORAL at 01:10

## 2024-10-10 RX ADMIN — VANCOMYCIN HYDROCHLORIDE 1000 MG: 1 INJECTION, POWDER, LYOPHILIZED, FOR SOLUTION INTRAVENOUS at 09:10

## 2024-10-10 RX ADMIN — OXYCODONE HYDROCHLORIDE 20 MG: 10 TABLET ORAL at 12:10

## 2024-10-10 RX ADMIN — OXACILLIN 2 G: 2 INJECTION, POWDER, FOR SOLUTION INTRAMUSCULAR; INTRAVENOUS at 11:10

## 2024-10-10 RX ADMIN — Medication 10 ML: at 05:10

## 2024-10-10 RX ADMIN — Medication 10 ML: at 04:10

## 2024-10-10 RX ADMIN — LIDOCAINE HYDROCHLORIDE 2 ML: 10 INJECTION, SOLUTION INFILTRATION; PERINEURAL at 10:10

## 2024-10-10 RX ADMIN — MELATONIN TAB 3 MG 6 MG: 3 TAB at 08:10

## 2024-10-10 RX ADMIN — FENTANYL CITRATE 100 MCG: 50 INJECTION, SOLUTION INTRAMUSCULAR; INTRAVENOUS at 10:10

## 2024-10-10 RX ADMIN — HYDROXYZINE PAMOATE 25 MG: 25 CAPSULE ORAL at 08:10

## 2024-10-10 RX ADMIN — OXACILLIN 2 G: 2 INJECTION, POWDER, FOR SOLUTION INTRAMUSCULAR; INTRAVENOUS at 12:10

## 2024-10-10 RX ADMIN — METHADONE HYDROCHLORIDE 70 MG: 10 TABLET ORAL at 08:10

## 2024-10-10 RX ADMIN — OXACILLIN 2 G: 2 INJECTION, POWDER, FOR SOLUTION INTRAMUSCULAR; INTRAVENOUS at 08:10

## 2024-10-10 RX ADMIN — ONDANSETRON 4 MG: 2 INJECTION INTRAMUSCULAR; INTRAVENOUS at 05:10

## 2024-10-10 RX ADMIN — MIDAZOLAM 2 MG: 1 INJECTION INTRAMUSCULAR; INTRAVENOUS at 10:10

## 2024-10-10 RX ADMIN — OXYCODONE HYDROCHLORIDE 20 MG: 10 TABLET ORAL at 08:10

## 2024-10-10 RX ADMIN — VANCOMYCIN HYDROCHLORIDE 1000 MG: 1 INJECTION, POWDER, LYOPHILIZED, FOR SOLUTION INTRAVENOUS at 08:10

## 2024-10-10 NOTE — ASSESSMENT & PLAN NOTE
MSSA Endocarditis  MSSA Paraspinal abscess  MSSA Psoas abscess    Blood cx 9/16 with MSSA  2D echo 9/17:  Cannot entirely exclude mitral vegetation vs redundant chordae. If high clinical suspicion for endocarditis, would rx as such (not clear MARICHUY would be able to exclude vegetation based on TTE findings).   Noted to have extensive fluid collection on left extending from T11 through left iliacus muscle and within the left iliopsoas muscle.  Multifocal collections of air including subcutaneous tissues on the left flank noted.  ID/Neurosurgery consulted.   S/p L3-L5 laminectomy with epidural abscess evacuation 9/19   Ortho consulted. Rec continued abx tx and no further interventions  IR SI joint aspiration and abscess drain placement 9/21. Unable to aspirate joint  IR drain removed on 09/27.    Repeat CT abdomen pelvis 9/28 ordered to look for recollection of fluid showed an ill-defined subcutaneous edema and soft tissue inflammatory change in the surgical bed and subcutaneous soft tissues without discrete organized fluid collection,  Stable size of 3.6 cm fluid collection along the left posterior pararenal space which appears to communicate with the with the aforementioned collection and fascial thickening.   IR placed drain for retroperitoneal fluid collection on 9/30.  Culture with MSSA  ID consulted:  Suggested Oxacillin through 11/18 with repeat imaging prior to completion  Will need LTAC for completion of IV antibiotics given history of IVDA  PICC placed 9/27  Repeat CRP ordered 10/3 given worsening pain and swelling - CRP down to 32  Repeat CT abdomen/pelvis was ordered to assess for drain removal, that showed worsening abscesses.  Discussed with ID who suggested further source control and to continue Oxacillin   Gen Surg, Ortho, NSGY and IR consulted - Greatly appreciate assistance!  MRI complete spine and pelvis ordered to further determine next steps with multiple abscesses  Gen Surg took her to the OR for an  I&D of a left hip abscess on 10/7, where 300cc of purulent material within the subcutaneous space along the left hip was drained and sent for culture.  Cx with Staph aureus  ID reconsulted  NeuroIR considering epidural drainage as NSGY said no intervention and ID feels like she could use more intervention to decrease her infectious burden  10/10- continue oxacillin and vanc, drainage. Lumbar abscess aspiration completed per IR today.  ID following.

## 2024-10-10 NOTE — PROGRESS NOTES
[facility] - Stepdown Flex ([place] [id number])  [facility]  Progress Note    Patient Name: Mari Sloan  MRN: [id number]  Patient Class: IP- Inpatient   Admission Date: 9/16/2024  Length of Stay: 24 days  Attending Physician: Jocelyn Hernandez MD  Primary Care Provider: Mary Fong APRN        Subjective:     Principal Problem:Paraspinal abscess        HPI:  By Dr. Alberta Reese MD    49 y.o.  woman with h/o homelessness (recently was able to obtain living arrangements but states she was living [place] the bridge), NIDDM type 2, Essential hypertension, and substance use (denies any IVDU in he past or any illicit drug use recenly, denies ETOH abuse/overuse) presents to [facility] for further evaluation of her back pain.  She apparently presented to the [facility] Emergency Department on September 16 with nausea and vomiting and a mechanical fall 5 days prior. She reported pain worse in her back and on her sides radiating down both legs. She noted muscle spasm in her back and legs. She had no head trauma or loss of consciousness.  W/u in the [facility] ED noted significant hypokalemia, hypomagnesemia, severe anemia, metabolic alkalosis, and hyperglycemia. Vitals signs stable with no sepsis at this time noted. SBP>90, HR normal,afebrile.     Imaging studies of her lumbar spine and pelvis were concerning for osteomyelitis/septic arthritis of the left SI joint and L5-S1 along with an abnormal fluid collection extending from T11 through the left iliacus muscle concerning for abscess. Blood cultures sent.  In the emergency department she is receiving IV fluid, Zofran, Zosyn, vancomycin, potassium, magnesium, pain medication, and nausea medication.   She also received 2 units of PRBC for an H/H of 5.7/18.4,     Case discussed with Neurosurgery and Interventional Radiology. Plan would be for transfer to Hospital Medicine at [facility] for IR evaluation of the fluid collection and  "potential sampling of the joint osteomyelitis.  I reviewed the discussions made with the multiple sub specialists regarding transfer of this patient to [facility]: IR/General surgery/Neurosurgery/ER/Internal Med.     Neurosurgery contacted by the  did not feel surgical intervention was needed at this time but would follow along and requested [facility] for further management and IR backup. IR on call apparently read the CT and at the time felt "while the left retroperitoneal fluid collections do appear organized, percutaneous drainage is not advised given the extensive soft tissue infection superficial to these collections.  In addition, there is extensive evidence of soft tissue infection that does not appear organized or percutaneously drainable."     General surgery was consulted to review the case and felt that there was no immediate surgical intervention at this time to be had. I spoke with the on surgeon contacted regarding the location of the fluid collections and inquired if perhaps orthopedic surgery should be consulted to review given the involvement of bony pelvis.      I spoke orthopedic surgery who will see the patient but recommended re-consulting IR here and Neurosurgery given the diskitis/OM L5-S1. (Please see his consult note in the chart)     Repeat labs here again noted for persistently low mag, K, phos.  H/H stable with improved H/H. Pain control improved with Dilaudid.  I consulted ID for further assistance with ABX adjustments and changed her to Meropenem and doxy as well as continued Vanc. Echo ordred for am.     CT lumbar spine and pelvis had findings most concerning for infectious process. There were findings concerning for osteomyelitis and septic arthritis in the left SI joint. Findings concerning for osteomyelitis/diskitis L5-S1. Possible osteomyelitis and septic joint at the pubic symphysis. Abnormal fluid collection on the left extending from T11 through the left " iliacus muscle along and within the left iliopsoas muscle most concerning for abscess. Multiple additional small abscesses suspected in the pelvis. Multifocal collections of air in the fluid in the subcutaneous tissues of the left flank, incompletely imaged.    Chest x-ray noted a loop in the central venous catheter. Tip currently at the level of the brachiocephalic vein. Lungs are fairly clear.        Overview/Hospital Course:  Ms. Sloan was transferred from [facility] ED to [facility] ICU on 09/16/2024.  She presented to [facility] ED  for back pain, nausea/vomiting.  CT L-spine revealed osteomyelitis/diskitis L5-S1 along with abnormal fluid collection on left extending from T11 through left iliacus muscle and left iliopsoas muscle concerning for abscess.  Multifocal collections of air in fluid in subcutaneous tissues of left flank.  Patient was initiated on empiric vancomycin and meropenem.  Blood cultures with staph aureus.  Obtain echo.  Unable to repeat blood cultures given shortage of cx.  Neurosurgery recommended to obtain MRI L-spine, pending.  General surgery evaluated the patient and recommended urgent transfer to [facility] for surgical evaluation/source control given extent of necrosis.     Ms. Sloan was transferred to [facility] and admitted to the MICU 9/18 with concern for paraspinal abscess. Infectious workup was ordered. Blood cultures were positive for MSSA bacteremia. Neurosurgery, General Surgery and IR were consulted to evaluate the patient's paraspinal abscess. Neurosurgery performed a L3-L4 laminectomy for epidural abscess evacuation on 9/19/24 and the cultures grew MSSA. TTE showed normal EF of 60-65% with diastolic dysfunction, could not exclude mitral vegetation vs redundant chordae. ID was consulted and recommended Oxacillin and repeat blood cultures. With her electrolyte derangement, and a background of appetitive loss in the past 2 months, there was concern for refeeding syndrome.  On 9/21, IR took the patient for abscess drain placement and aspiration of SI joint. SI joint couldn't be aspirated but retroperitoneal abscess was drained of 100cc of purulent fluid. She was successfully extubated 9/22 and was stepped down to Hospital Medicine on 9/24/24. Her drain was removed on 09/27.  Repeat CT abdomen/pelvis showed an ill-defined subcutaneous edema and soft tissue inflammatory change in the surgical bed and subcutaneous soft tissues without discrete organized fluid collection, stable size of 3.6 cm fluid collection along the left posterior pararenal space which appears to communicate with the with the aforementioned collection and fascial thickening.  IR placed drain for retroperitoneal fluid collection on 9/30.  Cultures returned with Staph aureus.  IR suggested to monitor drain output, and consider repeat imaging when output decreases to less than 10 cc in 24 hours to evaluate for possible drainage catheter removal. Repeat CT was ordered on 10/3 as she endorsed worsening pain on her left flank, and minimal drain output which could be removed.  It returned with multiple new and enlargening abscesses.  Discussed with ID, who said to continue Oxacillin.  NSGY, Gen Surg, Ortho and IR were consulted for further recommendations.  MRI complete spine and pelvis were ordered to help guide management.  Gen Surg took her to the OR for an I&D of a left hip abscess on 10/7, where 300cc of purulent material within the subcutaneous space along the left hip was drained and sent for culture.  ID reconsulted.  NeuroIR considering spinal abscess drainage.    10/10-  lumbar abscess aspiration done per IR 10/9. Pt on oxacillin and vanc. ID following. Need f/I on IR  aspiration. Lumbar abscess aspiration completed today.    SNF auth has been approved and the auth expires at midnight 10/11/24.     Interval History: see above    Review of Systems   Pt away at procedure.     Objective:     Vital Signs (Most  Recent):  Temp: 99.3 °F (37.4 °C) (10/10/24 0741)  Pulse: 98 (10/10/24 1005)  Resp: 11 (10/10/24 1005)  BP: (!) 166/88 (10/10/24 1005)  SpO2: 100 % (10/10/24 1005) Vital Signs (24h Range):  Temp:  [97.2 °F (36.2 °C)-99.5 °F (37.5 °C)] 99.3 °F (37.4 °C)  Pulse:  [] 98  Resp:  [11-20] 11  SpO2:  [93 %-100 %] 100 %  BP: (134-166)/() 166/88     Weight: 65.2 kg (143 lb 11.8 oz)  Body mass index is 26.29 kg/m².    Intake/Output Summary (Last 24 hours) at 10/10/2024 1022  Last data filed at 10/10/2024 0830  Gross per 24 hour   Intake --   Output 1400 ml   Net -1400 ml      Physical Exam  Pt is away at procedure.       MELD 3.0: 17 at 9/21/2024  6:13 PM  MELD-Na: 13 at 9/21/2024  6:13 PM  Calculated from:  Serum Creatinine: 0.6 mg/dL (Using min of 1 mg/dL) at 9/21/2024  6:13 PM  Serum Sodium: 138 mmol/L (Using max of 137 mmol/L) at 9/21/2024  6:13 PM  Total Bilirubin: 2.1 mg/dL at 9/21/2024  2:34 AM  Serum Albumin: 1.2 g/dL (Using min of 1.5 g/dL) at 9/21/2024  2:34 AM  INR(ratio): 1.4 at 9/19/2024  3:19 AM  Age at listing (hypothetical): 49 years  Sex: Female at 9/21/2024  6:13 PM      Significant Labs:  CBC:  Recent Labs   Lab 10/09/24  0442   WBC 7.75   HGB 8.6*   HCT 28.6*        CMP:  Recent Labs   Lab 10/09/24  0442      K 4.0      CO2 23   GLU 96   BUN <3*   CREATININE 0.6   CALCIUM 8.3*   PROT 5.7*   ALBUMIN 1.3*   BILITOT 0.6   ALKPHOS 114   AST 10   ALT <5*   ANIONGAP 8       Assessment/Plan:      * Paraspinal abscess  MSSA Endocarditis  MSSA Paraspinal abscess  MSSA Psoas abscess    Blood cx 9/16 with MSSA  2D echo 9/17:  Cannot entirely exclude mitral vegetation vs redundant chordae. If high clinical suspicion for endocarditis, would rx as such (not clear MARICHUY would be able to exclude vegetation based on TTE findings).   Noted to have extensive fluid collection on left extending from T11 through left iliacus muscle and within the left iliopsoas muscle.  Multifocal collections of air  including subcutaneous tissues on the left flank noted.  ID/Neurosurgery consulted.   S/p L3-L5 laminectomy with epidural abscess evacuation 9/19   Ortho consulted. Rec continued abx tx and no further interventions  IR SI joint aspiration and abscess drain placement 9/21. Unable to aspirate joint  IR drain removed on 09/27.    Repeat CT abdomen pelvis 9/28 ordered to look for recollection of fluid showed an ill-defined subcutaneous edema and soft tissue inflammatory change in the surgical bed and subcutaneous soft tissues without discrete organized fluid collection,  Stable size of 3.6 cm fluid collection along the left posterior pararenal space which appears to communicate with the with the aforementioned collection and fascial thickening.   IR placed drain for retroperitoneal fluid collection on 9/30.  Culture with MSSA  ID consulted:  Suggested Oxacillin through 11/18 with repeat imaging prior to completion  Will need LTAC for completion of IV antibiotics given history of IVDA  PICC placed 9/27  Repeat CRP ordered 10/3 given worsening pain and swelling - CRP down to 32  Repeat CT abdomen/pelvis was ordered to assess for drain removal, that showed worsening abscesses.  Discussed with ID who suggested further source control and to continue Oxacillin   Gen Surg, Ortho, NSGY and IR consulted - Greatly appreciate assistance!  MRI complete spine and pelvis ordered to further determine next steps with multiple abscesses  Gen Surg took her to the OR for an I&D of a left hip abscess on 10/7, where 300cc of purulent material within the subcutaneous space along the left hip was drained and sent for culture.  Cx with Staph aureus  ID reconsulted  NeuroIR considering epidural drainage as NSGY said no intervention and ID feels like she could use more intervention to decrease her infectious burden  10/10- continue oxacillin and vanc, drainage. Lumbar abscess aspiration completed per IR today.  ID following.     Uncontrolled  type 2 diabetes mellitus with hyperglycemia  Patient's FSGs are uncontrolled due to hyperglycemia on current medication regimen.  Last A1c reviewed-   Lab Results   Component Value Date    HGBA1C 7.9 (H) 09/17/2024     Most recent fingerstick glucose reviewed-   Recent Labs   Lab 10/09/24  1237 10/09/24  1644 10/10/24  0438 10/10/24  0743   POCTGLUCOSE 157* 125* 122* 124*       Current correctional scale  Medium  Maintain anti-hyperglycemic dose as follows-   Antihyperglycemics (From admission, onward)      Start     Stop Route Frequency Ordered    10/02/24 0006  insulin aspart U-100 pen 0-5 Units         -- SubQ Before meals & nightly PRN 10/01/24 2306          Hold Oral hypoglycemics while patient is in the hospital.    Retroperitoneal fluid collection  As above    Moderate malnutrition  Nutrition consulted. Most recent weight and BMI monitored-     Measurements:  Wt Readings from Last 1 Encounters:   10/09/24 65.2 kg (143 lb 11.8 oz)   Body mass index is 26.29 kg/m².    Patient has been screened and assessed by RD.    Malnutrition Type:  Context: social/environmental circumstances  Level: moderate    Malnutrition Characteristic Summary:  Weight Loss (Malnutrition): 10% in 6 months  Energy Intake (Malnutrition): less than 75% for greater than 7 days    Interventions/Recommendations (treatment strategy):  1.      Epidural abscess  As above      Refeeding syndrome  In ICU      Endocarditis  As above      Staphylococcus aureus bacteremia  As above      Sepsis  As above    Severe sepsis  See above    Hepatitis C  Hepatitis C antibody positive.    Obtain HCV RNA. Quant negative    Anemia, unspecified  S/p 2u PRBC transfusion on admit   No signs of acute GI bleed on exam at this time. Denies any hematemesis or hemoptysis.   Obtain iron B12/folate/peripheral smear and LDH/hapto/retic  No evidence of active bleed   Stable    Smoker  Tobacco cessation discussed with patient for greater than 5 minutes.   Offered Nicotine  patch while hospitalized  Patient is aware of need to quit tobacco products    History of drug use  On methadone at home, continue    Hypokalemia  Monitor on tele. Replace mg, phos, and K. F/u on repeat labs.     Psoas muscle abscess  As above    Other osteomyelitis, multiple sites  As above        VTE Risk Mitigation (From admission, onward)           Ordered     IP VTE HIGH RISK PATIENT  Once         09/22/24 1553                    Discharge Planning   LUH: 10/11/2024     Code Status: Full Code   Is the patient medically ready for discharge?:     Reason for patient still in hospital (select all that apply): Patient trending condition  Discharge Plan A: Skilled Nursing Facility ([facility] [phone])   Discharge Delays: None known at this time      Jocelyn Hernandez MD  Department of Hospital Medicine   [facility] - Stepdown Flex ([place]-[facility])

## 2024-10-10 NOTE — PLAN OF CARE
Discharge Plan A and Plan B have been determined by review of patient's clinical status, future medical and therapeutic needs, and coverage/benefits for post-acute care in coordination with multidisciplinary team members.      10/10/24 0937   Discharge Reassessment   Assessment Type Discharge Planning Reassessment   Did the patient's condition or plan change since previous assessment? No   Discharge Plan discussed with: Spouse/sig other;Patient   Name(s) and Number(s) Marcelino Sloan (Spouse)  [phone] (Mobile)   Communicated [facility] with patient/caregiver Yes   Discharge Plan A Skilled Nursing Facility  ([facility] [phone])   DME Needed Upon Discharge  other (see comments)  (TBD at next level of care)   Transition of Care Barriers Substance Abuse;Mobility   Why the patient remains in the hospital Requires continued medical care  (IR draining of spinal abcesses)   Post-Acute Status   Post-Acute Authorization Placement   Post-Acute Placement Status Set-up Complete/Auth obtained   Coverage MEDICAID - [facility] ([place] MEDICAID[place] -   Hospital Resources/Appts/Education Provided Appointments scheduled and added to AVS   Patient choice form signed by patient/caregiver List from CMS Compare;List with quality metrics by geographic area provided   Discharge Delays None known at this time     CM met with patient and spoke with spouse Marcelino Sloan via phone #    [phone] to discuss discharge planning.  Patients plan is to dc to SNF once medically ready. Patient will need transportation.     LUH:  10/11/24    10/9/24 0949 am  CM spoke with Zeb FAJARDO discharge planner @ [employer]ICA[employer] - [facility] ([place] MEDICA[place])  [phone] and SNF auth is good until 10/11/24.    10/9/24  2:45 pm  CM called and spoke with Mehreen @ [facility] [phone] and updated on patient dc date    4:19 pm  Patient will need a ppd placement and COVID is testing for placement. Tests ordered for 10/11/24 @ 0500  am      Discharge Recommendations:  moderate intensity     Discharge Equipment Recommendations: to be determined by next level of care       Barriers to discharge: None             TREATMENT PLANS:     Lumbar abscess aspiration complete 10/9/24    Staphylococcus aureus bacteremia     Recommendations:  Follow up 10/7 OR cultures  Switch oxacillin from continuous infusion to q4h due to nationwide IV fluid shortage  Duration still 8 weeks, tentative end date 11/18  Unfortunately she is not an OPAT candidate, which was discussed with patient previously, will need placement to a facility  Favor repeat thoracic/lumbar imaging (MRI w/wo) prior to completion of abx to evaluate for resolution of abscess and reassess need for abx extension     Follow up: PCP      Referrals:  BETH Elise RN  Case Management  [facility]  [phone]

## 2024-10-10 NOTE — NURSING
Pt AAOx4, no acute events this shift, VSS, plan of care continued, pt resting in bed, bed low and locked, call light and belongings in reach.

## 2024-10-10 NOTE — PT/OT/SLP PROGRESS
Physical Therapy      Patient Name:  Mari Sloan   MRN:  [id number]    Patient not seen today secondary to Off the floor for procedure/surgery. Will follow-up 10/11.

## 2024-10-10 NOTE — PLAN OF CARE
Infectious Disease Note      Chart was reviewed today.         Ms. Sloan is a 49F admitted with MSSA bacteremia, SI joint pyogenic arthritis with osteomyelitis, L psoas/iliacus abscess s/p IR drain (9/21), T12-L5 epidural abscess s/p L3-5 laminectomy with abscess washout (9/19), L retroperitoneal abscess s/p drain placement 9/30, s/p I&D of L hip abscess with general surgery on 10/7, and 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 Imaging also with lumbar epidural phlegmon and subcutaneous fluid collection, pending IR aspiration today.     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      Recommendations:  Continue oxacillin 2g q4h, duration 8 weeks, tentative end date 11/18  Continue vancomycin, pharmacy to dose  Unfortunately she is not an OPAT candidate, which was discussed with patient previously, will need placement to a facility  Favor repeat thoracic/lumbar imaging (MRI w/wo) prior to completion of abx to evaluate for resolution of abscess and reassess need for abx extension        The above plan was discussed with the primary team.         Infectious disease team will continue to follow.         Lashawn Barbosa  Infectious Disease Fellow PGY5  10/10/2024

## 2024-10-10 NOTE — NURSING
Pt arrived to 173 for lumbar abscess aspiration. Pt oriented to unit and staff. Plan of care reviewed with patient, patient verbalizes understanding. Comfort measures utilized. Pt safely transferred from stretcher to procedural table. Fall risk reviewed with patient, fall risk interventions maintained. Safety strap applied, positioner pillows utilized to minimize pressure points. Blankets applied. Pt prepped and draped utilizing standard sterile technique. Patient placed on continuous monitoring, as required by sedation policy. Timeouts completed utilizing standard universal time-out, per department and facility policy. RN to remain at bedside, continuous monitoring maintained. Pt resting comfortably. Denies pain/discomfort. Will continue to monitor. See flow sheets for monitoring, medication administration, and updates.

## 2024-10-10 NOTE — ASSESSMENT & PLAN NOTE
Nutrition consulted. Most recent weight and BMI monitored-     Measurements:  Wt Readings from Last 1 Encounters:   10/09/24 65.2 kg (143 lb 11.8 oz)   Body mass index is 26.29 kg/m².    Patient has been screened and assessed by RD.    Malnutrition Type:  Context: social/environmental circumstances  Level: moderate    Malnutrition Characteristic Summary:  Weight Loss (Malnutrition): 10% in 6 months  Energy Intake (Malnutrition): less than 75% for greater than 7 days    Interventions/Recommendations (treatment strategy):  1.

## 2024-10-10 NOTE — NURSING
Patient resting in bed ,Aox4, does complain of some pain at this time. Plan of care to be NPO at midnight for possible abscess draining in the AM discussed with patient. Call light within reach, will continue to monitor.

## 2024-10-10 NOTE — PT/OT/SLP PROGRESS
Occupational Therapy      Patient Name:  Mari Sloan   MRN:  [id number]    Patient not seen today secondary to patient is off floor for procedure. Will follow-up on the next schedule visit accordingly to OT POC.    10/10/2024

## 2024-10-10 NOTE — NURSING
Pt returned from procedure, tolerated well, pain meds given as requested. No complaints at this time. Monitoring.

## 2024-10-10 NOTE — SUBJECTIVE & OBJECTIVE
Interval History: see above    Review of Systems   HENT:  Negative for trouble swallowing.    Respiratory:  Negative for shortness of breath.    Cardiovascular:  Negative for chest pain and leg swelling.   Genitourinary:  Negative for difficulty urinating.   Neurological:  Negative for numbness.   Psychiatric/Behavioral:  Negative for behavioral problems.      Objective:     Vital Signs (Most Recent):  Temp: 99.3 °F (37.4 °C) (10/10/24 0741)  Pulse: 98 (10/10/24 1005)  Resp: 11 (10/10/24 1005)  BP: (!) 166/88 (10/10/24 1005)  SpO2: 100 % (10/10/24 1005) Vital Signs (24h Range):  Temp:  [97.2 °F (36.2 °C)-99.5 °F (37.5 °C)] 99.3 °F (37.4 °C)  Pulse:  [] 98  Resp:  [11-20] 11  SpO2:  [93 %-100 %] 100 %  BP: (134-166)/() 166/88     Weight: 65.2 kg (143 lb 11.8 oz)  Body mass index is 26.29 kg/m².    Intake/Output Summary (Last 24 hours) at 10/10/2024 1022  Last data filed at 10/10/2024 0830  Gross per 24 hour   Intake --   Output 1400 ml   Net -1400 ml      Physical Exam  Constitutional:       General: She is not in acute distress.     Appearance: Normal appearance.   HENT:      Head: Normocephalic and atraumatic.      Nose: Nose normal.      Mouth/Throat:      Mouth: Mucous membranes are moist.   Eyes:      General: No scleral icterus.     Extraocular Movements: Extraocular movements intact.      Pupils: Pupils are equal, round, and reactive to light.   Cardiovascular:      Rate and Rhythm: Normal rate and regular rhythm.      Pulses: Normal pulses.      Heart sounds: Normal heart sounds.   Pulmonary:      Effort: Pulmonary effort is normal.      Breath sounds: Normal breath sounds. No wheezing or rhonchi.   Chest:      Chest wall: No tenderness.   Abdominal:      General: Abdomen is flat. Bowel sounds are normal. There is no distension.      Palpations: Abdomen is soft.      Tenderness: There is no abdominal tenderness. There is no right CVA tenderness, left CVA tenderness, guarding or rebound.    Musculoskeletal:         General: No swelling, tenderness, deformity or signs of injury. Normal range of motion.      Cervical back: Normal range of motion and neck supple. No rigidity or tenderness.   Skin:     General: Skin is warm and dry.      Coloration: Skin is not jaundiced or pale.      Findings: No erythema or rash.   Neurological:      General: No focal deficit present.      Mental Status: She is alert and oriented to person, place, and time. Mental status is at baseline.      Cranial Nerves: No cranial nerve deficit.      Motor: No weakness.   Psychiatric:         Mood and Affect: Mood normal.         Behavior: Behavior normal.         Thought Content: Thought content normal.         Judgment: Judgment normal.         MELD 3.0: 17 at 9/21/2024  6:13 PM  MELD-Na: 13 at 9/21/2024  6:13 PM  Calculated from:  Serum Creatinine: 0.6 mg/dL (Using min of 1 mg/dL) at 9/21/2024  6:13 PM  Serum Sodium: 138 mmol/L (Using max of 137 mmol/L) at 9/21/2024  6:13 PM  Total Bilirubin: 2.1 mg/dL at 9/21/2024  2:34 AM  Serum Albumin: 1.2 g/dL (Using min of 1.5 g/dL) at 9/21/2024  2:34 AM  INR(ratio): 1.4 at 9/19/2024  3:19 AM  Age at listing (hypothetical): 49 years  Sex: Female at 9/21/2024  6:13 PM      Significant Labs:  CBC:  Recent Labs   Lab 10/09/24  0442   WBC 7.75   HGB 8.6*   HCT 28.6*        CMP:  Recent Labs   Lab 10/09/24  0442      K 4.0      CO2 23   GLU 96   BUN <3*   CREATININE 0.6   CALCIUM 8.3*   PROT 5.7*   ALBUMIN 1.3*   BILITOT 0.6   ALKPHOS 114   AST 10   ALT <5*   ANIONGAP 8

## 2024-10-10 NOTE — ASSESSMENT & PLAN NOTE
Patient's FSGs are uncontrolled due to hyperglycemia on current medication regimen.  Last A1c reviewed-   Lab Results   Component Value Date    HGBA1C 7.9 (H) 09/17/2024     Most recent fingerstick glucose reviewed-   Recent Labs   Lab 10/09/24  1237 10/09/24  1644 10/10/24  0438 10/10/24  0743   POCTGLUCOSE 157* 125* 122* 124*       Current correctional scale  Medium  Maintain anti-hyperglycemic dose as follows-   Antihyperglycemics (From admission, onward)    Start     Stop Route Frequency Ordered    10/02/24 0006  insulin aspart U-100 pen 0-5 Units         -- SubQ Before meals & nightly PRN 10/01/24 2306        Hold Oral hypoglycemics while patient is in the hospital.

## 2024-10-10 NOTE — CONSULTS
Pharmacokinetic Initial Assessment: IV Vancomycin    Assessment/Plan:  Initiate intravenous vancomycin with a loading dose of 1750 mg once followed by a maintenance dose of vancomycin 1000 mg IV every 12 hours  Desired empiric serum trough concentration is 15 to 20 mcg/mL  Draw vancomycin trough level 60 min prior to fourth dose on 10/11 at approximately 0830  Pharmacy will continue to follow and monitor vancomycin.      Please contact pharmacy with any questions regarding this assessment.     Thank you for the consult,   Katherine McArdle, Pharm.D. 10/9/2024 8:59 PM        Patient brief summary:  Mari Sloan is a 49 y.o. female initiated on antimicrobial therapy with IV Vancomycin for treatment of suspected endocarditis, osteomyelitis     Drug Allergies:   Review of patient's allergies indicates:  No Known Allergies    Actual Body Weight:   65.2 kg    Renal Function:   Estimated Creatinine Clearance: 98.2 mL/min (based on SCr of 0.6 mg/dL). -- stable      CBC (last 72 hours):  Recent Labs   Lab Result Units 10/08/24  0451 10/09/24  0442   WBC K/uL 7.84 7.75   Hemoglobin g/dL 8.7* 8.6*   Hematocrit % 28.6* 28.6*   Platelets K/uL 283 265   Gran % % 59.1 61.5   Lymph % % 31.6 29.7   Mono % % 6.6 6.8   Eosinophil % % 1.3 0.5   Basophil % % 1.0 1.0   Differential Method  Automated Automated       Metabolic Panel (last 72 hours):  Recent Labs   Lab Result Units 10/08/24  0451 10/09/24  0442   Sodium mmol/L 139 138   Potassium mmol/L 2.9* 4.0   Chloride mmol/L 107 107   CO2 mmol/L 26 23   Glucose mg/dL 92 96   BUN mg/dL 3* <3*   Creatinine mg/dL 0.6 0.6   Albumin g/dL 1.3* 1.3*   Total Bilirubin mg/dL 0.6 0.6   Alkaline Phosphatase U/L 111 114   AST U/L 10 10   ALT U/L 7* <5*   Magnesium mg/dL 2.0 1.9   Phosphorus mg/dL 4.4 4.3       Microbiologic Results:  Microbiology Results (last 7 days)       Procedure Component Value Units Date/Time    Culture, Body Fluid (Aerobic) w/ GS [[id number]]     Order Status: No  result Specimen: Body Fluid     Aerobic culture [[id number]]  (Abnormal)  (Susceptibility) Collected: 10/07/24 1025    Order Status: Completed Specimen: Abscess from Abdomen Updated: 10/09/24 1502     Aerobic Bacterial Culture STAPHYLOCOCCUS AUREUS  Rare        BACILLUS SPECIES  Rare  Further identified as Bacillus licheniformis      Aerobic culture [[id number]]  (Abnormal)  (Susceptibility) Collected: 10/07/24 1007    Order Status: Completed Specimen: Abscess from Abdomen Updated: 10/09/24 1150     Aerobic Bacterial Culture STAPHYLOCOCCUS AUREUS  Rare      Culture, Anaerobe [[id number]] Collected: 10/07/24 1025    Order Status: Completed Specimen: Abscess from Abdomen Updated: 10/09/24 0731     Anaerobic Culture Culture in progress    Culture, Anaerobe [[id number]] Collected: 10/07/24 1007    Order Status: Completed Specimen: Abscess from Abdomen Updated: 10/09/24 0730     Anaerobic Culture Culture in progress    Gram stain [[id number]] Collected: 10/07/24 1007    Order Status: Completed Specimen: Abscess from Abdomen Updated: 10/07/24 1251     Gram Stain Result Moderate  WBC's      Rare Gram positive cocci    Gram stain [[id number]] Collected: 10/07/24 1025    Order Status: Completed Specimen: Abscess from Abdomen Updated: 10/07/24 1248     Gram Stain Result Moderate WBC's      Rare Gram positive cocci    Culture, Anaerobe [[id number]] Collected: 09/30/24 1705    Order Status: Completed Specimen: Abscess from Abdomen Updated: 10/07/24 1000     Anaerobic Culture No anaerobes isolated    Narrative:      L retroperitoneal fluid collection    Fungus culture [[id number]] Collected: 09/21/24 1502    Order Status: Completed Specimen: Body Fluid from Pelvis Updated: 10/07/24 0916     Fungus (Mycology) Culture Culture in progress      No fungus isolated after 2 weeks    Fungus culture [[id number]] Collected: 09/19/24 1515    Order Status: Completed Specimen: Abscess from Back Updated: 10/07/24 0915     Fungus (Mycology)  Culture Culture in progress      No fungus isolated after 2 weeks    Narrative:      Subfascial abscess    Fungus culture [[id number]] Collected: 09/19/24 1527    Order Status: Completed Specimen: Abscess from Back Updated: 10/07/24 0915     Fungus (Mycology) Culture Culture in progress      No fungus isolated after 2 weeks    Narrative:      Epidural abscess    Aerobic culture [[id number]]  (Abnormal)  (Susceptibility) Collected: 09/30/24 1705    Order Status: Completed Specimen: Abscess from Abdomen Updated: 10/03/24 1252     Aerobic Bacterial Culture STAPHYLOCOCCUS AUREUS  Many      Narrative:      L retroperitoneal fluid collection

## 2024-10-11 DIAGNOSIS — B95.61 STAPHYLOCOCCUS AUREUS BACTEREMIA: Primary | ICD-10-CM

## 2024-10-11 DIAGNOSIS — R78.81 STAPHYLOCOCCUS AUREUS BACTEREMIA: Primary | ICD-10-CM

## 2024-10-11 LAB
BACTERIA SPEC ANAEROBE CULT: NORMAL
BACTERIA SPEC ANAEROBE CULT: NORMAL
POCT GLUCOSE: 102 MG/DL (ref 70–110)
POCT GLUCOSE: 155 MG/DL (ref 70–110)
POCT GLUCOSE: 167 MG/DL (ref 70–110)
SARS-COV-2 RNA RESP QL NAA+PROBE: NOT DETECTED
VANCOMYCIN TROUGH SERPL-MCNC: 14.8 UG/ML (ref 10–22)

## 2024-10-11 PROCEDURE — 86580 TB INTRADERMAL TEST: CPT | Performed by: HOSPITALIST

## 2024-10-11 PROCEDURE — 25000003 PHARM REV CODE 250: Performed by: PHYSICIAN ASSISTANT

## 2024-10-11 PROCEDURE — 63600175 PHARM REV CODE 636 W HCPCS: Performed by: HOSPITALIST

## 2024-10-11 PROCEDURE — A4216 STERILE WATER/SALINE, 10 ML: HCPCS | Performed by: STUDENT IN AN ORGANIZED HEALTH CARE EDUCATION/TRAINING PROGRAM

## 2024-10-11 PROCEDURE — 30200315 PPD INTRADERMAL TEST REV CODE 302: Performed by: HOSPITALIST

## 2024-10-11 PROCEDURE — 25000003 PHARM REV CODE 250: Performed by: STUDENT IN AN ORGANIZED HEALTH CARE EDUCATION/TRAINING PROGRAM

## 2024-10-11 PROCEDURE — 25000003 PHARM REV CODE 250: Performed by: HOSPITALIST

## 2024-10-11 PROCEDURE — 80202 ASSAY OF VANCOMYCIN: CPT | Performed by: HOSPITALIST

## 2024-10-11 PROCEDURE — 87635 SARS-COV-2 COVID-19 AMP PRB: CPT | Performed by: HOSPITALIST

## 2024-10-11 PROCEDURE — 63600175 PHARM REV CODE 636 W HCPCS: Performed by: STUDENT IN AN ORGANIZED HEALTH CARE EDUCATION/TRAINING PROGRAM

## 2024-10-11 PROCEDURE — 99233 SBSQ HOSP IP/OBS HIGH 50: CPT | Mod: ,,, | Performed by: STUDENT IN AN ORGANIZED HEALTH CARE EDUCATION/TRAINING PROGRAM

## 2024-10-11 PROCEDURE — 25000003 PHARM REV CODE 250: Performed by: INTERNAL MEDICINE

## 2024-10-11 PROCEDURE — 20600001 HC STEP DOWN PRIVATE ROOM

## 2024-10-11 RX ORDER — AMOXICILLIN 250 MG
1 CAPSULE ORAL DAILY
Qty: 30 TABLET | Refills: 11 | Status: SHIPPED | OUTPATIENT
Start: 2024-10-12

## 2024-10-11 RX ORDER — LANOLIN ALCOHOL/MO/W.PET/CERES
100 CREAM (GRAM) TOPICAL DAILY
Qty: 30 TABLET | Refills: 11 | Status: SHIPPED | OUTPATIENT
Start: 2024-10-12

## 2024-10-11 RX ORDER — ONDANSETRON HYDROCHLORIDE 2 MG/ML
4 INJECTION, SOLUTION INTRAVENOUS EVERY 6 HOURS PRN
Start: 2024-10-11 | End: 2024-10-18 | Stop reason: HOSPADM

## 2024-10-11 RX ORDER — METHADONE HYDROCHLORIDE 10 MG/1
70 TABLET ORAL DAILY
Qty: 49 TABLET | Refills: 0 | Status: SHIPPED | OUTPATIENT
Start: 2024-10-11 | End: 2024-10-18

## 2024-10-11 RX ORDER — POLYETHYLENE GLYCOL 3350 17 G/17G
17 POWDER, FOR SOLUTION ORAL DAILY
Qty: 238 G | Refills: 0 | Status: SHIPPED | OUTPATIENT
Start: 2024-10-12

## 2024-10-11 RX ORDER — FOLIC ACID 1 MG/1
1 TABLET ORAL DAILY
Qty: 30 TABLET | Refills: 11 | Status: SHIPPED | OUTPATIENT
Start: 2024-10-12 | End: 2025-10-12

## 2024-10-11 RX ORDER — IBUPROFEN 200 MG
1 TABLET ORAL DAILY
Qty: 7 PATCH | Refills: 0 | Status: SHIPPED | OUTPATIENT
Start: 2024-10-12

## 2024-10-11 RX ORDER — OXYCODONE HYDROCHLORIDE 15 MG/1
15 TABLET ORAL EVERY 4 HOURS PRN
Qty: 30 TABLET | Refills: 0 | Status: SHIPPED | OUTPATIENT
Start: 2024-10-11 | End: 2024-10-11

## 2024-10-11 RX ORDER — OXYCODONE HYDROCHLORIDE 15 MG/1
15 TABLET ORAL EVERY 4 HOURS PRN
Qty: 30 TABLET | Refills: 0 | Status: SHIPPED | OUTPATIENT
Start: 2024-10-11 | End: 2024-10-18

## 2024-10-11 RX ADMIN — SENNOSIDES AND DOCUSATE SODIUM 1 TABLET: 50; 8.6 TABLET ORAL at 08:10

## 2024-10-11 RX ADMIN — OXACILLIN 2 G: 2 INJECTION, POWDER, FOR SOLUTION INTRAMUSCULAR; INTRAVENOUS at 08:10

## 2024-10-11 RX ADMIN — GABAPENTIN 300 MG: 300 CAPSULE ORAL at 08:10

## 2024-10-11 RX ADMIN — VANCOMYCIN HYDROCHLORIDE 1000 MG: 1 INJECTION, POWDER, LYOPHILIZED, FOR SOLUTION INTRAVENOUS at 08:10

## 2024-10-11 RX ADMIN — OXACILLIN 2 G: 2 INJECTION, POWDER, FOR SOLUTION INTRAMUSCULAR; INTRAVENOUS at 12:10

## 2024-10-11 RX ADMIN — Medication 10 ML: at 04:10

## 2024-10-11 RX ADMIN — FOLIC ACID 1 MG: 1 TABLET ORAL at 08:10

## 2024-10-11 RX ADMIN — METHADONE HYDROCHLORIDE 70 MG: 10 TABLET ORAL at 08:10

## 2024-10-11 RX ADMIN — VANCOMYCIN HYDROCHLORIDE 1000 MG: 1 INJECTION, POWDER, LYOPHILIZED, FOR SOLUTION INTRAVENOUS at 09:10

## 2024-10-11 RX ADMIN — TUBERCULIN PURIFIED PROTEIN DERIVATIVE 5 UNITS: 5 INJECTION, SOLUTION INTRADERMAL at 04:10

## 2024-10-11 RX ADMIN — GABAPENTIN 300 MG: 300 CAPSULE ORAL at 03:10

## 2024-10-11 RX ADMIN — Medication 100 MG: at 08:10

## 2024-10-11 RX ADMIN — MUPIROCIN: 20 OINTMENT TOPICAL at 09:10

## 2024-10-11 RX ADMIN — OXACILLIN 2 G: 2 INJECTION, POWDER, FOR SOLUTION INTRAMUSCULAR; INTRAVENOUS at 04:10

## 2024-10-11 RX ADMIN — Medication 10 ML: at 03:10

## 2024-10-11 RX ADMIN — Medication 10 ML: at 12:10

## 2024-10-11 RX ADMIN — MELATONIN TAB 3 MG 6 MG: 3 TAB at 08:10

## 2024-10-11 RX ADMIN — HYDROXYZINE PAMOATE 25 MG: 25 CAPSULE ORAL at 08:10

## 2024-10-11 RX ADMIN — MUPIROCIN: 20 OINTMENT TOPICAL at 08:10

## 2024-10-11 RX ADMIN — Medication 10 ML: at 06:10

## 2024-10-11 RX ADMIN — OXYCODONE HYDROCHLORIDE 20 MG: 10 TABLET ORAL at 04:10

## 2024-10-11 RX ADMIN — OXYCODONE HYDROCHLORIDE 20 MG: 10 TABLET ORAL at 08:10

## 2024-10-11 NOTE — PLAN OF CARE
Discharge Plan A and Plan B have been determined by review of patient's clinical status, future medical and therapeutic needs, and coverage/benefits for post-acute care in coordination with multidisciplinary team members.      10/11/24 0900   Post-Acute Status   Post-Acute Authorization Placement   Post-Acute Placement Status Set-up Complete/Auth obtained   Coverage [employer] - [employer] [facility] [place]   Patient choice form signed by patient/caregiver List from CMS Compare;List with quality metrics by geographic area provided   Discharge Delays None known at this time   Discharge Plan   Discharge Plan A Skilled Nursing Facility  ([facility] [phone])     CM met with patient and left VM for patients spouse   to discuss discharge planning.  Patients plan is to   dc to [facility].   [facility]:  10/11/24    0915 am  TANA spoke with Mehreen and updated will send SNF orders and hard scripts via secure email    1110 am  CM sent SNF orders and hard copy via secure email    12:15 pm  CM attempted to speak with patient and patient refused to talk due to c/o pain. TANA informed the patient that her spouse has signed consents and is in agreement with her receiving skill services.  CM will follow up with patient    1:39 pm  CM received a call from Mehreen @ [facility] and [facility] needed clarification regarding if the patient will receive oxacillin and vancomycin.  [facility] can not accommodate the needed vanc trough levels in a timely manner to ensure the patient is receiving  the correct vanc  dose    1:47 pm  TANA left a VM with Zeb FAJARDO DC planner at [employer] [facility] [place] [employer] -  [phone] and updated that [facility] is unable to accommodate the required  vanc  trough levels.   There are no other accepting facilities. CM will follow up on 10/14/24.    The end date for oxacillin 2g q4h,  and vancomycin duration 8 weeks, tentative end date 11/18/24       Follow up:  PCP      Referrals: Outpatient Hepatology follow up     Nabila Elise RN  Case Management  [facility]  [phone]

## 2024-10-11 NOTE — ASSESSMENT & PLAN NOTE
MSSA Endocarditis  MSSA Paraspinal abscess  MSSA Psoas abscess    Blood cx 9/16 with MSSA  2D echo 9/17:  Cannot entirely exclude mitral vegetation vs redundant chordae. If high clinical suspicion for endocarditis, would rx as such (not clear MARICHUY would be able to exclude vegetation based on TTE findings).   Noted to have extensive fluid collection on left extending from T11 through left iliacus muscle and within the left iliopsoas muscle.  Multifocal collections of air including subcutaneous tissues on the left flank noted.  ID/Neurosurgery consulted.   S/p L3-L5 laminectomy with epidural abscess evacuation 9/19   Ortho consulted. Rec continued abx tx and no further interventions  IR SI joint aspiration and abscess drain placement 9/21. Unable to aspirate joint  IR drain removed on 09/27.    Repeat CT abdomen pelvis 9/28 ordered to look for recollection of fluid showed an ill-defined subcutaneous edema and soft tissue inflammatory change in the surgical bed and subcutaneous soft tissues without discrete organized fluid collection,  Stable size of 3.6 cm fluid collection along the left posterior pararenal space which appears to communicate with the with the aforementioned collection and fascial thickening.   IR placed drain for retroperitoneal fluid collection on 9/30.  Culture with MSSA  ID consulted:  Suggested Oxacillin through 11/18 with repeat imaging prior to completion  Will need LTAC for completion of IV antibiotics given history of IVDA  PICC placed 9/27  Repeat CRP ordered 10/3 given worsening pain and swelling - CRP down to 32  Repeat CT abdomen/pelvis was ordered to assess for drain removal, that showed worsening abscesses.  Discussed with ID who suggested further source control and to continue Oxacillin   Gen Surg, Ortho, NSGY and IR consulted - Greatly appreciate assistance!  MRI complete spine and pelvis ordered to further determine next steps with multiple abscesses  Gen Surg took her to the OR for an  I&D of a left hip abscess on 10/7, where 300cc of purulent material within the subcutaneous space along the left hip was drained and sent for culture.  Cx with Staph aureus  ID reconsulted  NeuroIR considering epidural drainage as NSGY said no intervention and ID feels like she could use more intervention to decrease her infectious burden  10/10- continue oxacillin and vanc, drainage. Lumbar abscess aspiration completed per IR today.  ID following.   10/12- oxacillin 2g q4h, duration 8 weeks, tentative end date 11/18.  vancomycin, goal trough 10-15, pharmacy to dose, tentative end date 11/4/24, Continue wound care.

## 2024-10-11 NOTE — PT/OT/SLP PROGRESS
"Occupational Therapy      Patient Name:  Mari Sloan   MRN:  [id number]    Patient not seen today secondary to Patient unwilling to participate, patient stated " I do not feel like doing no therapy nothing today, I am thinking of a lot of things now. I am not sure why I need to do therapy, I can move around better, my  helps me when he comes."  Patient was educated on the importance of participate with therapy to progress with mobility to reduce debility and have a quicker recovery.  Will follow-up on the next schedule visit accordingly to OT POc.    10/11/2024  "

## 2024-10-11 NOTE — ASSESSMENT & PLAN NOTE
Problem: Fatigue  Goal: Improved Activity Tolerance  Outcome: Progressing      As above     Visit Information Date & Time Provider Department Dept. Phone Encounter #  
 5/25/2017 10:30 AM Nora Santos  [facility] [phone] [id number] Your Appointments 9/20/2017  8:20 AM  
ROUTINE CARE with Eyal Brown MD  
[facility]) Appt Note: Routine f/u/ Hypertension-5 month f/u-l  
 [address] [address]  
[place] [address] [address] Upcoming Health Maintenance Date Due  
 EYE EXAM RETINAL OR DILATED Q1 8/14/2015 GLAUCOMA SCREENING Q2Y 2/18/2016 FOOT EXAM Q1 6/17/2017 INFLUENZA AGE 9 TO ADULT 8/1/2017 HEMOGLOBIN A1C Q6M 9/16/2017 Pneumococcal 65+ High/Highest Risk (2 of 2 - PPSV23) 9/21/2017 MICROALBUMIN Q1 11/23/2017 LIPID PANEL Q1 3/16/2018 MEDICARE YEARLY EXAM 3/28/2018 DTaP/Tdap/Td series (2 - Td) 6/23/2021 COLONOSCOPY 3/3/2026 Allergies as of 5/25/2017  Review Complete On: 5/25/2017 By: Yaneli Beaver LPN Severity Noted Reaction Type Reactions Bee Sting [Sting, Bee] High 05/22/2010    Anaphylaxis Vytorin 10-10 [Ezetimibe-simvastatin] Medium 05/22/2010    Myalgia Amlodipine  11/23/2016    Other (comments) Creepy crawly sensation, twitches Lipitor [Atorvastatin]  05/22/2010    Myalgia Current Immunizations  Reviewed on 4/1/2014 Name Date Influenza Vaccine 11/9/2015, 10/13/2014 Influenza Vaccine (Quad) PF 11/23/2016 Influenza Vaccine Split 12/5/2012, 12/5/2011, 10/4/2010 Influenza Vaccine Whole 9/15/2009 Pneumococcal Vaccine (Unspecified Type) 9/21/2012 TD Vaccine 8/22/2000 TDAP Vaccine 6/23/2011 Not reviewed this visit You Were Diagnosed With   
  
 Codes Comments Controlled type 2 diabetes mellitus without complication, without long-term current use of insulin ([facility]    -  Primary ICD-10-CM: E11.9 ICD-9-CM: 250.00 Bony abnormality     ICD-10-CM: Q79.9 ICD-9-CM: 756.9 Vitals BP Pulse Temp Height(growth percentile) Weight(growth percentile) BMI  
 146/76 (BP 1 Location: Left arm, BP Patient Position: At rest) (!) 49 97.7 °F (36.5 °C) (Oral) 5' 11\" (1.803 m) 208 lb (94.3 kg) 29.01 kg/m2 Smoking Status Former Smoker Vitals History BMI and BSA Data Body Mass Index Body Surface Area  
 29.01 kg/m 2 2.17 m 2 Preferred Pharmacy Pharmacy Name Phone [facility] [address] [phone] Your Updated Medication List  
  
   
This list is accurate as of: 5/25/17 11:56 AM.  Always use your most recent med list.  
  
  
  
  
 albuterol 2.5 mg /3 mL (0.083 %) nebulizer solution Commonly known as:  PROVENTIL VENTOLIN  
3 mL by Nebulization route every six (6) hours as needed for Wheezing or Shortness of Breath. aspirin delayed-release 81 mg tablet Take 1 Tab by mouth daily. atenolol 50 mg tablet Commonly known as:  TENORMIN  
TAKE ONE TABLET BY MOUTH ONCE DAILY  
  
 CO Q-10 100 mg capsule Generic drug:  co-enzyme Q-10 Take 100 mg by mouth daily. diclofenac 1 % Gel Commonly known as:  VOLTAREN Apply 4 g to affected area four (4) times daily. Painful shoulder. EPINEPHrine 0.3 mg/0.3 mL injection Commonly known as:  EPIPEN 2-KARL  
0.3 mL by IntraMUSCular route once as needed for up to 1 dose. Take with 50 mg Benadryl and call 911.  
  
 felodipine 10 mg 24 hr tablet Commonly known as:  PLENDIL SR  
TAKE ONE TABLET BY MOUTH EVERY DAY FOR HYPERTENSION  
  
 FISH OIL 1,000 mg Cap Generic drug:  omega-3 fatty acids-vitamin e Take 1 Cap by mouth. furosemide 20 mg tablet Commonly known as:  LASIX TAKE ONE TABLET BY MOUTH EVERY DAY  
  
 hydrALAZINE 50 mg tablet Commonly known as:  APRESOLINE Take 1 Tab by mouth three (3) times daily. Indications: hypertension lisinopril 40 mg tablet Commonly known as:  PRINIVIL, ZESTRIL  
TAKE ONE TABLET BY MOUTH TWICE DAILY  
  
 metFORMIN  mg tablet Commonly known as:  GLUCOPHAGE XR  
TAKE ONE TABLET BY MOUTH THREE TIMES DAILY AFTER MEALS  
  
 pantoprazole 40 mg tablet Commonly known as:  PROTONIX  
TAKE ONE TABLET BY MOUTH EVERY DAY FOR: GASTROESOPHAGEAL REFLUX] Peak Flow Meter Paul[place] Silence Use prior and post nebulizer treatment  
  
 raNITIdine 300 mg tablet Commonly known as:  ZANTAC TAKE ONE TABLET BY MOUTH EVERY DAY  
  
 rosuvastatin 20 mg tablet Commonly known as:  CRESTOR Take 1 Tab by mouth nightly. traMADol 50 mg tablet Commonly known as:  ULTRAM  
TAKE ONE TABLET BY MOUTH EVERY 6 HOURS AS NEEDED FOR PAIN. MAX OF FOUR PER DAY. We Performed the Following AMB POC FUNDUS PHOTOGRAPHY WITH INTERP AND REPORT [22994 CPT(R)] To-Do List   
 05/25/2017 Imaging:  XR CHEST PA LAT Patient Instructions Learning About High Blood Pressure What is high blood pressure? Blood pressure is a measure of how hard the blood pushes against the walls of your arteries. It's normal for blood pressure to go up and down throughout the day, but if it stays up, you have high blood pressure. Another name for high blood pressure is hypertension. Two numbers tell you your blood pressure. The first number is the systolic pressure. It shows how hard the blood pushes when your heart is pumping. The second number is the diastolic pressure. It shows how hard the blood pushes between heartbeats, when your heart is relaxed and filling with blood. A blood pressure of less than 120/80 (say \"120 over 80\") is ideal for an adult. High blood pressure is 140/90 or higher. You have high blood pressure if your top number is 140 or higher or your bottom number is 90 or higher, or both.  Many people fall into the category in between, called prehypertension. People with prehypertension need to make lifestyle changes to bring their blood pressure down and help prevent or delay high blood pressure. What happens when you have high blood pressure? · Blood flows through your arteries with too much force. Over time, this damages the walls of your arteries. But you can't feel it. High blood pressure usually doesn't cause symptoms. · Fat and calcium start to build up in your arteries. This buildup is called plaque. Plaque makes your arteries narrower and stiffer. Blood can't flow through them as easily. · This lack of good blood flow starts to damage some of the organs in your body. This can lead to problems such as coronary artery disease and heart attack, heart failure, stroke, kidney failure, and eye damage. How can you prevent high blood pressure? · Stay at a healthy weight. · Try to limit how much sodium you eat to less than 2,300 milligrams (mg) a day. If you limit your sodium to 1,500 mg a day, you can lower your blood pressure even more. ¨ Buy foods that are labeled \"unsalted,\" \"sodium-free,\" or \"low-sodium. \" Foods labeled \"reduced-sodium\" and \"light sodium\" may still have too much sodium. ¨ Flavor your food with garlic, lemon juice, onion, vinegar, herbs, and spices instead of salt. Do not use soy sauce, steak sauce, onion salt, garlic salt, mustard, or ketchup on your food. ¨ Use less salt (or none) when recipes call for it. You can often use half the salt a recipe calls for without losing flavor. · Be physically active. Get at least 30 minutes of exercise on most days of the week. Walking is a good choice. You also may want to do other activities, such as running, swimming, cycling, or playing tennis or team sports. · Limit alcohol to 2 drinks a day for men and 1 drink a day for women. · Eat plenty of fruits, vegetables, and low-fat dairy products. Eat less saturated and total fats. How is high blood pressure treated? · Your doctor will suggest making lifestyle changes. For example, your doctor may ask you to eat healthy foods, quit smoking, lose extra weight, and be more active. · If lifestyle changes don't help enough or your blood pressure is very high, you will have to take medicine every day. Follow-up care is a key part of your treatment and safety. Be sure to make and go to all appointments, and call your doctor if you are having problems. It's also a good idea to know your test results and keep a list of the medicines you take. Where can you learn more? Go to http://elizabeth-antonieta.info/. Enter P501 in the search box to learn more about \"Learning About High Blood Pressure. \" Current as of: 2016 Content Version: 11.2 © 1460-2769 [employer]. Care instructions adapted under license by [employer] (which disclaims liability or warranty for this information). If you have questions about a medical condition or this instruction, always ask your healthcare professional. [facility] any warranty or liability for your use of this information. Kim [address] Affiliated with [address] [place] [address], [place] [address]., [facility], [address] [place] [address] 
[phone] Monitor blood pressure outside the office several times weekly at different times during the day and evening. Bring the record to me in 3 weeks for review. Blood Pressure Record Patient Name:  ______________________ :  ______________________ Date/Time BP Reading Pulse Introducing [facility]! Dear Emmy Tran: Thank you for requesting a [employer] account. Our records indicate that you already have an active [employer] account.   You can access your account anytime at [email or web address] Did you know that you can access your hospital and ER discharge instructions at any time in [employer]? You can also review all of your test results from your hospital stay or ER visit. Additional Information If you have questions, please visit the Frequently Asked Questions section of the [employer] website at [email or web address] Remember, [employer] is NOT to be used for urgent needs. For medical emergencies, dial 911. Now available from your iPhone and Android! Please provide this summary of care documentation to your next provider. Your primary care clinician is listed as [address]. If you have any questions after today's visit, please call [phone].

## 2024-10-11 NOTE — PLAN OF CARE
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     Allergies: Review of patient's allergies indicates:  No Known Allergies    Code Status: FULL    Vitals: Routine       Diet: diabetic diet: 2000 calorie    Activity: Activity as tolerated    Nursing Precautions: Aspiration , Fall, and Seizure    Bed/Surface: Low Air Loss    Consults: PT to evaluate and treat- 3 times a week and OT to evaluate and treat- 3 times a week        Dialysis: Patient is not on dialysis.     INFUSION:   oxacillin 2g q4h, duration 8 weeks, tentative end date 11/18    Vancomycin until 11/4       Labs: First blood draw on Monday 10/14.  . . These results to onfectious disease. .          CBL and BMP weekly   ESR, CRP weekly    Pending Diagnostic Studies:       Procedure Component Value Units Date/Time    Basic metabolic panel [[id number]] Collected: 09/19/24 0026    Order Status: Sent Lab Status: No result     Specimen: Blood     Basic metabolic panel [[id number]] Collected: 09/17/24 2227    Order Status: Sent Lab Status: No result     Specimen: Blood     CBC auto differential [[id number]] Collected: 10/03/24 0340    Order  Status: Sent Lab Status: In process Updated: 10/03/24 0341    Specimen: Blood     Comprehensive metabolic panel [[id number]] Collected: 10/03/24 0340    Order Status: Sent Lab Status: In process Updated: 10/03/24 0341    Specimen: Blood     EKG 12-lead [[id number]]     Order Status: Sent Lab Status: No result     Vancomycin, random [[id number]] Collected: 10/16/24 0925    Order Status: Sent Lab Status: In process Updated: 10/16/24 0947    Specimen: Blood               Miscellaneous Care:   Routine Skin for Bedridden Patients:  Apply moisture barrier cream to all  Diabetes Care: Diabetes: Check blood sugar. Fingerstick blood sugar AC and HS  Sliding Scale/Hypoglycemia Protocol: For FSG<80, give 1 amp D50 or 1 glucose tablet. For FSG , do nothing. For -200, give 1 unit of novolog in addition to pre-meal insulin. For -250, give 2 units of novolog in addition to pre-meal insulin. For -300, give 3 units of novolog in addition to pre-meal insulin. For 301-350, give 4 units of novolog in addition to pre-meal insulin. For 351-400, give 5 units of novolog in addition to pre-meal insulin. For FSG >400, give 5 units of novolog in addition to pre-meal insulin and please call MD      Wound care:     Left Posterior Flank: BID and PRN   Gently pack kerlix moistened Vashe(wrung out for excess moisture) to wound bed, cover with ABD pad, secure with medipore tape. Supplies at bedside.    Left buttock: BID / PRN   Apply Triad correctly:      Always cleanse wound before applying Triad.  To remove Triad:   Use pH-balanced wound cleanser to soften Triad  Gently wipe without scrubbing  For complete removal, repeat as needed.   Gently spread Triad evenly over the area of application to the thickness of a dime.      Spinal incision - q3d   Cleanse w/Vashe and 4x4 gauze, apply Aquacel AG, cover with foam/island border.      IV Access: PICC     Medications: Discontinue all previous medication orders, if any. See new  list below.  Current Discharge Medication List        START taking these medications    Details   0.9% NaCl PgBk 100 mL with oxacillin 2 gram SolR 2 g Inject 2 g into the vein every 4 (four) hours.    Comments: tentative end date 11/18      D5W SolP 250 mL with vancomycin 1,000 mg SolR 1,000 mg Inject 1,000 mg into the vein every 12 (twelve) hours.    Comments: Continue vancomycin, goal trough 10-15, pharmacy to dose, tentative end date 11/4/24  ·      folic acid (FOLVITE) 1 MG tablet Take 1 tablet (1 mg total) by mouth once daily.  Qty: 30 tablet, Refills: 11      nicotine (NICODERM CQ) 21 mg/24 hr Place 1 patch onto the skin once daily.  Qty: 7 patch, Refills: 0      ondansetron 4 mg/2 mL Soln Inject 4 mg into the vein every 6 (six) hours as needed.      oxyCODONE (ROXICODONE) 15 MG Tab Take 1 tablet (15 mg total) by mouth every 4 (four) hours as needed for Pain.  Qty: 30 tablet, Refills: 0    Comments: Quantity prescribed more than 7 day supply? No      polyethylene glycol (GLYCOLAX) 17 gram/dose powder Mix 1 capful (17 g) with liquid and take by mouth once daily.  Qty: 238 g, Refills: 0      senna-docusate 8.6-50 mg (PERICOLACE) 8.6-50 mg per tablet Take 1 tablet by mouth once daily.  Qty: 30 tablet, Refills: 11      thiamine 100 MG tablet Take 1 tablet (100 mg total) by mouth once daily.  Qty: 30 tablet, Refills: 11           CONTINUE these medications which have CHANGED    Details   methadone (DOLOPHINE) 10 MG tablet Take 7 tablets (70 mg total) by mouth Daily. for 7 days  Qty: 49 tablet, Refills: 0    Comments: Quantity prescribed more than 7 day supply? No           CONTINUE these medications which have NOT CHANGED    Details   gabapentin (NEURONTIN) 300 MG capsule Take 300 mg by mouth 3 (three) times daily.      hydrOXYzine pamoate (VISTARIL) 25 MG Cap Take 25 mg by mouth 3 (three) times daily.           STOP taking these medications       metFORMIN (GLUCOPHAGE) 500 MG tablet Comments:   Reason for  Stopping:         cloNIDine (CATAPRES) 0.2 MG tablet Comments:   Reason for Stopping:         glipiZIDE (GLUCOTROL) 5 MG tablet Comments:   Reason for Stopping:             Follow up:    Follow-up Information       [facility] Follow up.    Contact information:  [address] [facility]. [place], [place] [id number] [phone]                             Immunizations Administered as of 10/16/2024       No immunizations on file.            Jocelyn Hernandez MD

## 2024-10-11 NOTE — SUBJECTIVE & OBJECTIVE
Interval History: feeling well today, underwent aspiration with IR yesterday    Review of Systems   Constitutional:  Negative for chills and fever.   Respiratory:  Negative for cough and shortness of breath.    Cardiovascular:  Negative for chest pain.   Gastrointestinal:  Negative for abdominal pain, constipation, diarrhea, nausea and vomiting.   Musculoskeletal:  Positive for back pain. Negative for arthralgias and myalgias.   Skin:  Negative for rash.   Neurological:  Negative for headaches.     Objective:     Vital Signs (Most Recent):  Temp: 97.9 °F (36.6 °C) (10/11/24 0808)  Pulse: 93 (10/11/24 0808)  Resp: 16 (10/11/24 0808)  BP: 122/81 (10/11/24 0808)  SpO2: 96 % (10/11/24 1100) Vital Signs (24h Range):  Temp:  [97.7 °F (36.5 °C)-98.7 °F (37.1 °C)] 97.9 °F (36.6 °C)  Pulse:  [] 93  Resp:  [16-19] 16  SpO2:  [94 %-98 %] 96 %  BP: (122-157)/(79-91) 122/81     Weight: 65.2 kg (143 lb 11.8 oz)  Body mass index is 26.29 kg/m².    Estimated Creatinine Clearance: 98.2 mL/min (based on SCr of 0.6 mg/dL).     Physical Exam  Vitals reviewed.   Constitutional:       General: She is not in acute distress.     Appearance: Normal appearance. She is not ill-appearing.   HENT:      Head: Normocephalic and atraumatic.   Eyes:      Extraocular Movements: Extraocular movements intact.      Conjunctiva/sclera: Conjunctivae normal.   Pulmonary:      Effort: Pulmonary effort is normal. No respiratory distress.   Abdominal:      General: Abdomen is flat.      Palpations: Abdomen is soft.   Musculoskeletal:      Cervical back: Normal range of motion.   Skin:     General: Skin is warm and dry.   Neurological:      General: No focal deficit present.      Mental Status: She is alert and oriented to person, place, and time.   Psychiatric:         Mood and Affect: Mood normal.         Behavior: Behavior normal.         Thought Content: Thought content normal.          Significant Labs: All pertinent labs within the past 24 hours  have been reviewed.  Recent Lab Results  (Last 5 results in the past 24 hours)        10/11/24  0824   10/11/24  0814   10/11/24  0416   10/10/24  2021   10/10/24  1716        POCT Glucose   102     165   162       SARS-CoV2 (COVID-19) Qualitative PCR     Not Detected  Comment: This test utilizes a real-time reverse transcription  polymerase chain reaction procedure to amplify and  detect the SARS-CoV-2 and detect the SARS-CoV-2 N2 and E nucleic  acid targets. The analytical sensitivity (limit of detection) of  this assay is 250 copies/mL.    A Detected result implies that the patient is infected with the  SARS-CoV-2 virus and is presumed to be contagious.  A Not Detected result implies that the SARS-CoV-2 target nucleic  acids are not present above the limit of detection. It does not  rule out the possibility of COVID-19 and should not be the sole  basis for treatment decisions. If COVID-19 is strongly suspected  based on clinical and epidemiological history, re-testing should  be considered.    This test is Food and Drug Administration (FDA) approved. Performance   characteristics of this has been independently verified by [facility] Department of Pathology and Laboratory Medicine.             Vancomycin-Trough 14.8                                      Significant Imaging: I have reviewed all pertinent imaging results/findings within the past 24 hours.

## 2024-10-11 NOTE — PROGRESS NOTES
Pharmacokinetic Assessment Follow Up: IV Vancomycin    Vancomycin serum concentration assessment(s)/plan:    - The trough level was drawn correctly and can be used to guide therapy at this time.   - The measurement is just below the desired definitive target range of 15 to 20 mcg/mL.  - Expect further accumulation as level was drawn prior to the third maintenance dose  - Continue vancomycin 1000 mg IV every 12 hours with   - Next serum trough concentration measured at 0830 on 10/13    Drug levels (last 3 results):  Recent Labs   Lab Result Units 10/11/24  0824   Vancomycin-Trough ug/mL 14.8       Pharmacy will continue to follow and monitor vancomycin.    Please contact pharmacy at extension [phone] for questions regarding this assessment.    Thank you for the consult,   Mg Valadez       Patient brief summary:  Mari Sloan is a 49 y.o. female initiated on antimicrobial therapy with IV Vancomycin for treatment of OM, epidural abscess    Drug Allergies:   Review of patient's allergies indicates:  No Known Allergies    Actual Body Weight:   65.2 kg    Renal Function:   Estimated Creatinine Clearance: 98.2 mL/min (based on SCr of 0.6 mg/dL).,     Dialysis Method (if applicable):  N/A

## 2024-10-11 NOTE — PROGRESS NOTES
[facility] ([place] [id number])  [facility]  Progress Note    Patient Name: Mari Sloan  MRN: [id number]  Patient Class: IP- Inpatient   Admission Date: 9/16/2024  Length of Stay: 25 days  Attending Physician: Jocelyn Hernandez MD  Primary Care Provider: Mary Fong APRN        Subjective:     Principal Problem:Paraspinal abscess        HPI:  By Dr. Alberta Reese MD    49 y.o.  woman with h/o homelessness (recently was able to obtain living arrangements but states [place] bridge), NIDDM type 2, Essential hypertension, and substance use (denies any IVDU in he past or any illicit drug use recenly, denies ETOH abuse/overuse) presents to [facility] for further evaluation of her back pain.  She apparently presented to the [facility] Emergency Department on September 16 with nausea and vomiting and a mechanical fall 5 days prior. She reported pain worse in her back and on her sides radiating down both legs. She noted muscle spasm in her back and legs. She had no head trauma or loss of consciousness.  W/u in the [facility], [place], operated by [facility] ED noted significant hypokalemia, hypomagnesemia, severe anemia, metabolic alkalosis, and hyperglycemia. Vitals signs stable with no sepsis at this time noted. SBP>90, HR normal,afebrile.     Imaging studies of her lumbar spine and pelvis were concerning for osteomyelitis/septic arthritis of the left SI joint and L5-S1 along with an abnormal fluid collection extending from T11 through the left iliacus muscle concerning for abscess. Blood cultures sent.  In the emergency department she is receiving IV fluid, Zofran, Zosyn, vancomycin, potassium, magnesium, pain medication, and nausea medication.   She also received 2 units of PRBC for an H/H of 5.7/18.4,     Case discussed with Neurosurgery and Interventional Radiology. Plan would be for transfer to Hospital Medicine at [facility] for IR evaluation of the fluid collection and  "potential sampling of the joint osteomyelitis.  I reviewed the discussions made with the multiple sub specialists regarding transfer of this patient to [facility]: IR/General surgery/Neurosurgery/ER/Internal Med.     Neurosurgery contacted by the  did not feel surgical intervention was needed at this time but would follow along and requested [facility] for further management and IR backup. IR on call apparently read the CT and at the time felt "while the left retroperitoneal fluid collections do appear organized, percutaneous drainage is not advised given the extensive soft tissue infection superficial to these collections.  In addition, there is extensive evidence of soft tissue infection that does not appear organized or percutaneously drainable."     General surgery was consulted to review the case and felt that there was no immediate surgical intervention at this time to be had. I spoke with the on surgeon contacted regarding the location of the fluid collections and inquired if perhaps orthopedic surgery should be consulted to review given the involvement of bony pelvis.      I spoke orthopedic surgery who will see the patient but recommended re-consulting IR here and Neurosurgery given the diskitis/OM L5-S1. (Please see his consult note in the chart)     Repeat labs here again noted for persistently low mag, K, phos.  H/H stable with improved H/H. Pain control improved with Dilaudid.  I consulted ID for further assistance with ABX adjustments and changed her to Meropenem and doxy as well as continued Vanc. Echo ordred for am.     CT lumbar spine and pelvis had findings most concerning for infectious process. There were findings concerning for osteomyelitis and septic arthritis in the left SI joint. Findings concerning for osteomyelitis/diskitis L5-S1. Possible osteomyelitis and septic joint at the pubic symphysis. Abnormal fluid collection on the left extending from T11 through the left " iliacus muscle along and within the left iliopsoas muscle most concerning for abscess. Multiple additional small abscesses suspected in the pelvis. Multifocal collections of air in the fluid in the subcutaneous tissues of the left flank, incompletely imaged.    Chest x-ray noted a loop in the central venous catheter. Tip currently at the level of the brachiocephalic vein. Lungs are fairly clear.        Overview/Hospital Course:  Ms. Sloan was transferred from [facility] ED to [facility] ICU on 09/16/2024.  She presented to [facility] ED  for back pain, nausea/vomiting.  CT L-spine revealed osteomyelitis/diskitis L5-S1 along with abnormal fluid collection on left extending from T11 through left iliacus muscle and left iliopsoas muscle concerning for abscess.  Multifocal collections of air in fluid in subcutaneous tissues of left flank.  Patient was initiated on empiric vancomycin and meropenem.  Blood cultures with staph aureus.  Obtain echo.  Unable to repeat blood cultures given shortage of cx.  Neurosurgery recommended to obtain MRI L-spine, pending.  General surgery evaluated the patient and recommended urgent transfer to [facility] for surgical evaluation/source control given extent of necrosis.     Ms. Sloan was transferred to [facility] and admitted to the MICU 9/18 with concern for paraspinal abscess. Infectious workup was ordered. Blood cultures were positive for MSSA bacteremia. Neurosurgery, General Surgery and IR were consulted to evaluate the patient's paraspinal abscess. Neurosurgery performed a L3-L4 laminectomy for epidural abscess evacuation on 9/19/24 and the cultures grew MSSA. TTE showed normal EF of 60-65% with diastolic dysfunction, could not exclude mitral vegetation vs redundant chordae. ID was consulted and recommended Oxacillin and repeat blood cultures. With her electrolyte derangement, and a background of appetitive loss in the past 2 months, there was concern for refeeding syndrome.  On 9/21, IR took the patient for abscess drain placement and aspiration of SI joint. SI joint couldn't be aspirated but retroperitoneal abscess was drained of 100cc of purulent fluid. She was successfully extubated 9/22 and was stepped down to Hospital Medicine on 9/24/24. Her drain was removed on 09/27.  Repeat CT abdomen/pelvis showed an ill-defined subcutaneous edema and soft tissue inflammatory change in the surgical bed and subcutaneous soft tissues without discrete organized fluid collection, stable size of 3.6 cm fluid collection along the left posterior pararenal space which appears to communicate with the with the aforementioned collection and fascial thickening.  IR placed drain for retroperitoneal fluid collection on 9/30.  Cultures returned with Staph aureus.  IR suggested to monitor drain output, and consider repeat imaging when output decreases to less than 10 cc in 24 hours to evaluate for possible drainage catheter removal. Repeat CT was ordered on 10/3 as she endorsed worsening pain on her left flank, and minimal drain output which could be removed.  It returned with multiple new and enlargening abscesses.  Discussed with ID, who said to continue Oxacillin.  NSGY, Gen Surg, Ortho and IR were consulted for further recommendations.  MRI complete spine and pelvis were ordered to help guide management.  Gen Surg took her to the OR for an I&D of a left hip abscess on 10/7, where 300cc of purulent material within the subcutaneous space along the left hip was drained and sent for culture.  ID reconsulted.  NeuroIR considering spinal abscess drainage.    10/10-  lumbar abscess aspiration done per IR 10/9. Pt on oxacillin and vanc. ID following. Need f/I on IR  aspiration. Lumbar abscess aspiration completed today.    SNF auth has been approved and the auth expires at midnight 10/11/24.     10/11- need final ID recs. New cultures form 10/10 still in progress. On vanc and oxacillin. oxacillin 2g q4h, duration  8 weeks, tentative end date 11/18  Continue vancomycin, pharmacy to dose        Interval History: see above    Review of Systems   Constitutional:  Positive for activity change and appetite change.   HENT:  Negative for trouble swallowing.    Respiratory:  Negative for cough and shortness of breath.    Cardiovascular:  Negative for chest pain and leg swelling.   Gastrointestinal:  Negative for constipation and diarrhea.   Genitourinary:  Negative for difficulty urinating.   Neurological:  Negative for numbness.   Psychiatric/Behavioral:  Negative for behavioral problems.      Objective:     Vital Signs (Most Recent):  Temp: 97.9 °F (36.6 °C) (10/11/24 0808)  Pulse: 93 (10/11/24 0808)  Resp: 16 (10/11/24 0808)  BP: 122/81 (10/11/24 0808)  SpO2: 96 % (10/11/24 0808) Vital Signs (24h Range):  Temp:  [97.7 °F (36.5 °C)-98.7 °F (37.1 °C)] 97.9 °F (36.6 °C)  Pulse:  [] 93  Resp:  [16-19] 16  SpO2:  [94 %-98 %] 96 %  BP: (122-157)/(76-91) 122/81     Weight: 65.2 kg (143 lb 11.8 oz)  Body mass index is 26.29 kg/m².    Intake/Output Summary (Last 24 hours) at 10/11/2024 1038  Last data filed at 10/10/2024 2347  Gross per 24 hour   Intake 1066.59 ml   Output --   Net 1066.59 ml      Physical Exam  Constitutional:       General: She is not in acute distress.     Appearance: Normal appearance.   HENT:      Head: Normocephalic and atraumatic.      Nose: Nose normal.      Mouth/Throat:      Mouth: Mucous membranes are moist.   Eyes:      General: No scleral icterus.     Extraocular Movements: Extraocular movements intact.      Pupils: Pupils are equal, round, and reactive to light.   Cardiovascular:      Rate and Rhythm: Normal rate and regular rhythm.      Pulses: Normal pulses.      Heart sounds: Normal heart sounds.   Pulmonary:      Effort: Pulmonary effort is normal.      Breath sounds: Normal breath sounds. No wheezing or rhonchi.   Chest:      Chest wall: No tenderness.   Abdominal:      General: Abdomen is flat.  "Bowel sounds are normal. There is no distension.      Palpations: Abdomen is soft.      Tenderness: There is no abdominal tenderness. There is no right CVA tenderness, left CVA tenderness, guarding or rebound.   Musculoskeletal:         General: No swelling, tenderness, deformity or signs of injury. Normal range of motion.      Cervical back: Normal range of motion and neck supple. No rigidity or tenderness.   Skin:     General: Skin is warm and dry.      Coloration: Skin is not jaundiced or pale.      Findings: No erythema or rash.   Neurological:      General: No focal deficit present.      Mental Status: She is alert and oriented to person, place, and time. Mental status is at baseline.      Cranial Nerves: No cranial nerve deficit.      Motor: No weakness.   Psychiatric:         Mood and Affect: Mood normal.         Behavior: Behavior normal.         Thought Content: Thought content normal.         Judgment: Judgment normal.         MELD 3.0: 17 at 9/21/2024  6:13 PM  MELD-Na: 13 at 9/21/2024  6:13 PM  Calculated from:  Serum Creatinine: 0.6 mg/dL (Using min of 1 mg/dL) at 9/21/2024  6:13 PM  Serum Sodium: 138 mmol/L (Using max of 137 mmol/L) at 9/21/2024  6:13 PM  Total Bilirubin: 2.1 mg/dL at 9/21/2024  2:34 AM  Serum Albumin: 1.2 g/dL (Using min of 1.5 g/dL) at 9/21/2024  2:34 AM  INR(ratio): 1.4 at 9/19/2024  3:19 AM  Age at listing (hypothetical): 49 years  Sex: Female at 9/21/2024  6:13 PM      Significant Labs:  CBC:  No results for input(s): "WBC", "HGB", "HCT", "PLT" in the last 48 hours.    CMP:  No results for input(s): "NA", "K", "CL", "CO2", "GLU", "BUN", "CREATININE", "CALCIUM", "PROT", "ALBUMIN", "BILITOT", "ALKPHOS", "AST", "ALT", "ANIONGAP", "EGFRNONAA" in the last 48 hours.    Invalid input(s): "ESTGFAFRICA"      Assessment/Plan:      * Paraspinal abscess  MSSA Endocarditis  MSSA Paraspinal abscess  MSSA Psoas abscess    Blood cx 9/16 with MSSA  2D echo 9/17:  Cannot entirely exclude mitral " vegetation vs redundant chordae. If high clinical suspicion for endocarditis, would rx as such (not clear MARICHUY would be able to exclude vegetation based on TTE findings).   Noted to have extensive fluid collection on left extending from T11 through left iliacus muscle and within the left iliopsoas muscle.  Multifocal collections of air including subcutaneous tissues on the left flank noted.  ID/Neurosurgery consulted.   S/p L3-L5 laminectomy with epidural abscess evacuation 9/19   Ortho consulted. Rec continued abx tx and no further interventions  IR SI joint aspiration and abscess drain placement 9/21. Unable to aspirate joint  IR drain removed on 09/27.    Repeat CT abdomen pelvis 9/28 ordered to look for recollection of fluid showed an ill-defined subcutaneous edema and soft tissue inflammatory change in the surgical bed and subcutaneous soft tissues without discrete organized fluid collection,  Stable size of 3.6 cm fluid collection along the left posterior pararenal space which appears to communicate with the with the aforementioned collection and fascial thickening.   IR placed drain for retroperitoneal fluid collection on 9/30.  Culture with MSSA  ID consulted:  Suggested Oxacillin through 11/18 with repeat imaging prior to completion  Will need LTAC for completion of IV antibiotics given history of IVDA  PICC placed 9/27  Repeat CRP ordered 10/3 given worsening pain and swelling - CRP down to 32  Repeat CT abdomen/pelvis was ordered to assess for drain removal, that showed worsening abscesses.  Discussed with ID who suggested further source control and to continue Oxacillin   Gen Surg, Ortho, NSGY and IR consulted - Greatly appreciate assistance!  MRI complete spine and pelvis ordered to further determine next steps with multiple abscesses  Gen Surg took her to the OR for an I&D of a left hip abscess on 10/7, where 300cc of purulent material within the subcutaneous space along the left hip was drained and  sent for culture.  Cx with Staph aureus  ID reconsulted  NeuroIR considering epidural drainage as NSGY said no intervention and ID feels like she could use more intervention to decrease her infectious burden  10/10- continue oxacillin and vanc, drainage. Lumbar abscess aspiration completed per IR today.  ID following.     Uncontrolled type 2 diabetes mellitus with hyperglycemia  Patient's FSGs are uncontrolled due to hyperglycemia on current medication regimen.  Last A1c reviewed-   Lab Results   Component Value Date    HGBA1C 7.9 (H) 09/17/2024     Most recent fingerstick glucose reviewed-   Recent Labs   Lab 10/10/24  1716 10/10/24  2021 10/11/24  0814   POCTGLUCOSE 162* 165* 102     Current correctional scale  Medium  Maintain anti-hyperglycemic dose as follows-   Antihyperglycemics (From admission, onward)      Start     Stop Route Frequency Ordered    10/02/24 0006  insulin aspart U-100 pen 0-5 Units         -- SubQ Before meals & nightly PRN 10/01/24 2306          Hold Oral hypoglycemics while patient is in the hospital.    Retroperitoneal fluid collection  As above    Moderate malnutrition  Nutrition consulted. Most recent weight and BMI monitored-     Measurements:  Wt Readings from Last 1 Encounters:   10/09/24 65.2 kg (143 lb 11.8 oz)   Body mass index is 26.29 kg/m².    Patient has been screened and assessed by RD.    Malnutrition Type:  Context: social/environmental circumstances  Level: moderate    Malnutrition Characteristic Summary:  Weight Loss (Malnutrition): 10% in 6 months  Energy Intake (Malnutrition): less than 75% for greater than 7 days    Interventions/Recommendations (treatment strategy):  1.      Epidural abscess  As above      Refeeding syndrome  In ICU      Endocarditis  As above      Staphylococcus aureus bacteremia  As above      Sepsis  As above    Severe sepsis  See above    Hepatitis C  Hepatitis C antibody positive.    Obtain HCV RNA. Quant negative    Anemia, unspecified  S/p 2u  PRBC transfusion on admit   No signs of acute GI bleed on exam at this time. Denies any hematemesis or hemoptysis.   Obtain iron B12/folate/peripheral smear and LDH/hapto/retic  No evidence of active bleed   Stable    Smoker  Tobacco cessation discussed with patient for greater than 5 minutes.   Offered Nicotine patch while hospitalized  Patient is aware of need to quit tobacco products    History of drug use  On methadone at home, continue    Hypokalemia  Monitor on tele. Replace mg, phos, and K. F/u on repeat labs.     Psoas muscle abscess  As above    Other osteomyelitis, multiple sites  As above        VTE Risk Mitigation (From admission, onward)           Ordered     IP VTE HIGH RISK PATIENT  Once         09/22/24 1553                    Discharge Planning   LU[facility]: 10/11/2024     Code Status: Full Code   Is the patient medically ready for discharge?:     Reason for patient still in hospital (select all that apply): Patient trending condition  Discharge Plan A: Skilled Nursing Facility ([facility] [phone])   Discharge Delays: None known at this time              Jocelyn Hernandez MD  Department of Hospital Medicine   [facility] ([place]

## 2024-10-11 NOTE — ASSESSMENT & PLAN NOTE
Patient's FSGs are uncontrolled due to hyperglycemia on current medication regimen.  Last A1c reviewed-   Lab Results   Component Value Date    HGBA1C 7.9 (H) 09/17/2024     Most recent fingerstick glucose reviewed-   Recent Labs   Lab 10/10/24  1716 10/10/24  2021 10/11/24  0814   POCTGLUCOSE 162* 165* 102     Current correctional scale  Medium  Maintain anti-hyperglycemic dose as follows-   Antihyperglycemics (From admission, onward)      Start     Stop Route Frequency Ordered    10/02/24 0006  insulin aspart U-100 pen 0-5 Units         -- SubQ Before meals & nightly PRN 10/01/24 2306          Hold Oral hypoglycemics while patient is in the hospital.

## 2024-10-11 NOTE — ASSESSMENT & PLAN NOTE
49F admitted with MSSA bacteremia, SI joint pyogenic arthritis with osteomyelitis, L psoas/iliacus abscess s/p IR drain (9/21), T12-L5 epidural abscess s/p L3-5 laminectomy with abscess washout (9/19), L retroperitoneal abscess s/p drain placement 9/30, s/p I&D of L hip abscess with general surgery on 10/7, and 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 Imaging also with lumbar epidural phlegmon and subcutaneous fluid collection, s/p IR aspiration yesterday.      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      Recommendations:  Continue oxacillin 2g q4h, duration 8 weeks, tentative end date 11/18/24  Continue vancomycin, goal trough 10-15, pharmacy to dose, tentative end date 11/4/24  Needs weekly labs: CBC, CMP, CRP, and twice weekly vanc troughs, while on IV abx  Favor repeat thoracic/lumbar imaging (MRI w/wo) prior to completion of abx (~ last week of October) to evaluate for resolution of abscess and reassess need for abx extension      The above plan was discussed with the primary team.

## 2024-10-11 NOTE — MEDICAL/APP STUDENT
[facility] - Stepdown Flex ([place] [id number])  [facility]  Progress Note     Patient Name: Mari Sloan  MRN: [id number]  Patient Class: IP- Inpatient     Admission Date: 9/16/2024  Length of Stay: 24 days  Attending Physician: Jocelyn Hernandez MD  Primary Care Provider: Mary Fong APRN           Subjective:      Principal Problem:Paraspinal abscess           HPI:  By Dr. Alberta Reese MD     49 y.o.  woman with h/o homelessness (recently was able to obtain living arrangements but states [place]), NIDDM type 2, Essential hypertension, and substance use (denies any IVDU in he past or any illicit drug use recenly, denies ETOH abuse/overuse) presents to [facility] for further evaluation of her back pain.  She apparently presented to the [facility] Emergency Department on September 16 with nausea and vomiting and a mechanical fall 5 days prior. She reported pain worse in her back and on her sides radiating down both legs. She noted muscle spasm in her back and legs. She had no head trauma or loss of consciousness.  W/u in the [facility] ED noted significant hypokalemia, hypomagnesemia, severe anemia, metabolic alkalosis, and hyperglycemia. Vitals signs stable with no sepsis at this time noted. SBP>90, HR normal,afebrile.      Imaging studies of her lumbar spine and pelvis were concerning for osteomyelitis/septic arthritis of the left SI joint and L5-S1 along with an abnormal fluid collection extending from T11 through the left iliacus muscle concerning for abscess. Blood cultures sent.  In the emergency department she is receiving IV fluid, Zofran, Zosyn, vancomycin, potassium, magnesium, pain medication, and nausea medication.   She also received 2 units of PRBC for an H/H of 5.7/18.4,      Case discussed with Neurosurgery and Interventional Radiology. Plan would be for transfer to Hospital Medicine at [facility] for IR evaluation of the fluid collection  "and potential sampling of the joint osteomyelitis.  I reviewed the discussions made with the multiple sub specialists regarding transfer of this patient to [facility]: IR/General surgery/Neurosurgery/ER/Internal Med.      Neurosurgery contacted by the  did not feel surgical intervention was needed at this time but would follow along and requested [facility] for further management and IR backup. IR on call apparently read the CT and at the time felt "while the left retroperitoneal fluid collections do appear organized, percutaneous drainage is not advised given the extensive soft tissue infection superficial to these collections.  In addition, there is extensive evidence of soft tissue infection that does not appear organized or percutaneously drainable."      General surgery was consulted to review the case and felt that there was no immediate surgical intervention at this time to be had. I spoke with the on surgeon contacted regarding the location of the fluid collections and inquired if perhaps orthopedic surgery should be consulted to review given the involvement of bony pelvis.       I spoke orthopedic surgery who will see the patient but recommended re-consulting IR here and Neurosurgery given the diskitis/OM L5-S1. (Please see his consult note in the chart)      Repeat labs here again noted for persistently low mag, K, phos.  H/H stable with improved H/H. Pain control improved with Dilaudid.  I consulted ID for further assistance with ABX adjustments and changed her to Meropenem and doxy as well as continued Vanc. Echo ordred for am.     CT lumbar spine and pelvis had findings most concerning for infectious process. There were findings concerning for osteomyelitis and septic arthritis in the left SI joint. Findings concerning for osteomyelitis/diskitis L5-S1. Possible osteomyelitis and septic joint at the pubic symphysis. Abnormal fluid collection on the left extending from T11 through the " left iliacus muscle along and within the left iliopsoas muscle most concerning for abscess. Multiple additional small abscesses suspected in the pelvis. Multifocal collections of air in the fluid in the subcutaneous tissues of the left flank, incompletely imaged.    Chest x-ray noted a loop in the central venous catheter. Tip currently at the level of the brachiocephalic vein. Lungs are fairly clear.         Overview/Hospital Course:  Ms. Sloan was transferred from [facility] ED to [facility] ICU on 09/16/2024.  She presented to [facility] ED  for back pain, nausea/vomiting.  CT L-spine revealed osteomyelitis/diskitis L5-S1 along with abnormal fluid collection on left extending from T11 through left iliacus muscle and left iliopsoas muscle concerning for abscess.  Multifocal collections of air in fluid in subcutaneous tissues of left flank.  Patient was initiated on empiric vancomycin and meropenem.  Blood cultures with staph aureus.  Obtain echo.  Unable to repeat blood cultures given shortage of cx.  Neurosurgery recommended to obtain MRI L-spine, pending.  General surgery evaluated the patient and recommended urgent transfer to [facility] for surgical evaluation/source control given extent of necrosis.      Ms. Sloan was transferred to [facility] and admitted to the MICU 9/18 with concern for paraspinal abscess. Infectious workup was ordered. Blood cultures were positive for MSSA bacteremia. Neurosurgery, General Surgery and IR were consulted to evaluate the patient's paraspinal abscess. Neurosurgery performed a L3-L4 laminectomy for epidural abscess evacuation on 9/19/24 and the cultures grew MSSA. TTE showed normal EF of 60-65% with diastolic dysfunction, could not exclude mitral vegetation vs redundant chordae. ID was consulted and recommended Oxacillin and repeat blood cultures. With her electrolyte derangement, and a background of appetitive loss in the past 2 months, there was concern for refeeding  syndrome. On 9/21, IR took the patient for abscess drain placement and aspiration of SI joint. SI joint couldn't be aspirated but retroperitoneal abscess was drained of 100cc of purulent fluid. She was successfully extubated 9/22 and was stepped down to Hospital Medicine on 9/24/24. Her drain was removed on 09/27.  Repeat CT abdomen/pelvis showed an ill-defined subcutaneous edema and soft tissue inflammatory change in the surgical bed and subcutaneous soft tissues without discrete organized fluid collection, stable size of 3.6 cm fluid collection along the left posterior pararenal space which appears to communicate with the with the aforementioned collection and fascial thickening.  IR placed drain for retroperitoneal fluid collection on 9/30.  Cultures returned with Staph aureus.  IR suggested to monitor drain output, and consider repeat imaging when output decreases to less than 10 cc in 24 hours to evaluate for possible drainage catheter removal. Repeat CT was ordered on 10/3 as she endorsed worsening pain on her left flank, and minimal drain output which could be removed.  It returned with multiple new and enlargening abscesses.  Discussed with ID, who said to continue Oxacillin.  NSGY, Gen Surg, Ortho and IR were consulted for further recommendations.  MRI complete spine and pelvis were ordered to help guide management.  Gen Surg took her to the OR for an I&D of a left hip abscess on 10/7, where 300cc of purulent material within the subcutaneous space along the left hip was drained and sent for culture.  ID reconsulted.  NeuroIR considering spinal abscess drainage.     10/10-  lumbar abscess aspiration done per IR 10/9. Pt on oxacillin and vanc. ID following. Need f/I on IR  aspiration. Lumbar abscess aspiration completed today.    SNF auth has been approved and the auth expires at midnight 10/11/24.     Today, patient complains of 8/10 back pain and psoas muscle pain. She describest the pain as striking pain  that does not radiate anywhere. The pain medication given helps while moving her back makes it worse. Since the procedure, she reports having a dull headache on both sides and eyes. She reports that her headache is sensitive to light and loud noises.      Interval History: see above     Review of Systems   Nausea  Abdominal Pain  Dizziness  Headache    Objective:      Vital Signs (Most Recent):  Temp: 99.3 °F (37.4 °C) (10/10/24 0741)  Pulse: 98 (10/10/24 1005)  Resp: 11 (10/10/24 1005)  BP: (!) 166/88 (10/10/24 1005)  SpO2: 100 % (10/10/24 1005) Vital Signs (24h Range):  Temp:  [97.2 °F (36.2 °C)-99.5 °F (37.5 °C)] 99.3 °F (37.4 °C)  Pulse:  [] 98  Resp:  [11-20] 11  SpO2:  [94 %-100 %] 100 %  BP: (134-166)/() 166/88      Weight: 65.2 kg (143 lb 11.8 oz)  Body mass index is 26.29 kg/m².     Intake/Output Summary (Last 24 hours) at 10/10/2024 1022  Last data filed at 10/10/2024 0830      Gross per 24 hour   Intake 1066.6   Output 600 ml   Net 466 ml      Physical Exam  Abdominal tenderness on left flank        Assessment/Plan:      * Paraspinal abscess  MSSA Endocarditis  MSSA Paraspinal abscess  MSSA Psoas abscess     Blood cx 9/16 with MSSA  2D echo 9/17:  Cannot entirely exclude mitral vegetation vs redundant chordae. If high clinical suspicion for endocarditis, would rx as such (not clear MARICHUY would be able to exclude vegetation based on TTE findings).   Noted to have extensive fluid collection on left extending from T11 through left iliacus muscle and within the left iliopsoas muscle.  Multifocal collections of air including subcutaneous tissues on the left flank noted.  ID/Neurosurgery consulted.   S/p L3-L5 laminectomy with epidural abscess evacuation 9/19   Ortho consulted. Rec continued abx tx and no further interventions  IR SI joint aspiration and abscess drain placement 9/21. Unable to aspirate joint  IR drain removed on 09/27.    Repeat CT abdomen pelvis 9/28 ordered to look for recollection  of fluid showed an ill-defined subcutaneous edema and soft tissue inflammatory change in the surgical bed and subcutaneous soft tissues without discrete organized fluid collection,  Stable size of 3.6 cm fluid collection along the left posterior pararenal space which appears to communicate with the with the aforementioned collection and fascial thickening.   IR placed drain for retroperitoneal fluid collection on 9/30.  Culture with MSSA  ID consulted:  Suggested Oxacillin through 11/18 with repeat imaging prior to completion  Will need LTAC for completion of IV antibiotics given history of IVDA  PICC placed 9/27  Repeat CRP ordered 10/3 given worsening pain and swelling - CRP down to 32  Repeat CT abdomen/pelvis was ordered to assess for drain removal, that showed worsening abscesses.  Discussed with ID who suggested further source control and to continue Oxacillin   Gen Surg, Ortho, NSGY and IR consulted - Greatly appreciate assistance!  MRI complete spine and pelvis ordered to further determine next steps with multiple abscesses  Gen Surg took her to the OR for an I&D of a left hip abscess on 10/7, where 300cc of purulent material within the subcutaneous space along the left hip was drained and sent for culture.  Cx with Staph aureus  ID reconsulted  NeuroIR considering epidural drainage as NSGY said no intervention and ID feels like she could use more intervention to decrease her infectious burden  10/10- continue oxacillin and vanc, drainage. Lumbar abscess aspiration completed per IR today.  ID following.   10/11 Continue Oxacilin antibiotic and Vancomycin   Repeat Thoracic imaging for resolution of abscess  Imaging of head to investigate MRI symptoms     Uncontrolled type 2 diabetes mellitus with hyperglycemia  Patient's FSGs are uncontrolled due to hyperglycemia on current medication regimen.  Last A1c reviewed-         Lab Results   Component Value Date     HGBA1C 7.9 (H) 09/17/2024      Most recent  fingerstick glucose reviewed-          Recent Labs   Lab 10/09/24  1237 10/09/24  1644 10/10/24  0438 10/10/24  0743   POCTGLUCOSE 157* 125* 122* 124*         Current correctional scale  Medium  Maintain anti-hyperglycemic dose as follows-   Antihyperglycemics (From admission, onward)        Start     Stop Route Frequency Ordered     10/02/24 0006   insulin aspart U-100 pen 0-5 Units         -- SubQ Before meals & nightly PRN 10/01/24 2306             Hold Oral hypoglycemics while patient is in the hospital.     Retroperitoneal fluid collection  As above     Moderate malnutrition  Nutrition consulted. Most recent weight and BMI monitored-      Measurements:      Wt Readings from Last 1 Encounters:   10/09/24 65.2 kg (143 lb 11.8 oz)   Body mass index is 26.29 kg/m².     Patient has been screened and assessed by RD.     Malnutrition Type:  Context: social/environmental circumstances  Level: moderate     Malnutrition Characteristic Summary:  Weight Loss (Malnutrition): 10% in 6 months  Energy Intake (Malnutrition): less than 75% for greater than 7 days     Interventions/Recommendations (treatment strategy):  1.        Epidural abscess  As above        Refeeding syndrome  In ICU        Endocarditis  As above        Staphylococcus aureus bacteremia  As above        Sepsis  As above     Severe sepsis  See above     Hepatitis C  Hepatitis C antibody positive.    Obtain HCV RNA. Quant negative     Anemia, unspecified  S/p 2u PRBC transfusion on admit   No signs of acute GI bleed on exam at this time. Denies any hematemesis or hemoptysis.   Obtain iron B12/folate/peripheral smear and LDH/hapto/retic  No evidence of active bleed   Stable     Smoker  Tobacco cessation discussed with patient for greater than 5 minutes.   Offered Nicotine patch while hospitalized  Patient is aware of need to quit tobacco products     History of drug use  On methadone at home, continue     Hypokalemia  Monitor on tele. Replace mg, phos, and K.  F/u on repeat labs.      Psoas muscle abscess  As above     Other osteomyelitis, multiple sites  As above           VTE Risk Mitigation (From admission, onward)              Ordered       IP VTE HIGH RISK PATIENT  Once         09/22/24 1553                          Discharge Planning   [facility]: 10/11/2024     Code Status: Full Code   Is the patient medically ready for discharge?:     Reason for patient still in hospital (select all that apply): Patient trending condition  Discharge Plan A: Skilled Nursing Facility ([facility] [phone])   Discharge Delays: None known at this time       [facility]

## 2024-10-11 NOTE — PLAN OF CARE
Problem: Skin Injury Risk Increased  Goal: Skin Health and Integrity  Outcome: Progressing     Problem: Adult Inpatient Plan of Care  Goal: Plan of Care Review  Outcome: Progressing  Goal: Patient-Specific Goal (Individualized)  Outcome: Progressing  Goal: Absence of Hospital-Acquired Illness or Injury  Outcome: Progressing  Goal: Optimal Comfort and Wellbeing  Outcome: Progressing  Goal: Readiness for Transition of Care  Outcome: Progressing     Problem: Infection  Goal: Absence of Infection Signs and Symptoms  Outcome: Progressing     Problem: Diabetes Comorbidity  Goal: Blood Glucose Level Within Targeted Range  Outcome: Progressing     Problem: Sepsis/Septic Shock  Goal: Optimal Coping  Outcome: Progressing  Goal: Absence of Bleeding  Outcome: Progressing  Goal: Blood Glucose Level Within Targeted Range  Outcome: Progressing  Goal: Absence of Infection Signs and Symptoms  Outcome: Progressing  Goal: Optimal Nutrition Intake  Outcome: Progressing     Problem: Fall Injury Risk  Goal: Absence of Fall and Fall-Related Injury  Outcome: Progressing     Problem: Wound  Goal: Optimal Coping  Outcome: Progressing  Goal: Optimal Functional Ability  Outcome: Progressing  Goal: Absence of Infection Signs and Symptoms  Outcome: Progressing  Goal: Improved Oral Intake  Outcome: Progressing  Goal: Optimal Pain Control and Function  Outcome: Progressing  Goal: Skin Health and Integrity  Outcome: Progressing  Goal: Optimal Wound Healing  Outcome: Progressing    Patient alert and oriented. Cooperative with care. Medicated as ordered. Continues on iv abx. Tolerated well. Picc line patent and flushing well. Patient resting comfortably at this time

## 2024-10-11 NOTE — DISCHARGE SUMMARY
[facility] ([place] [id number])  [facility] Medicine  Discharge Summary      Patient Name: Mari Sloan  MRN: [id number]  VICENTE: [id number]  Patient Class: IP- Inpatient  Admission Date: 9/16/2024  Hospital Length of Stay: 25 days  Discharge Date and Time: No discharge date for patient encounter.  Attending Physician: Jocelyn Hernandez MD   Discharging Provider: Jocelyn Hernandez MD  Primary Care Provider: Mary Fong APRN  [facility] Medicine Team: [facility] MED A Jocelyn Hernandez MD  Primary Care Team: [facility] A    HPI:   By Dr. Alberta Reese MD    49 y.o.  woman with h/o homelessness (recently was able to obtain living arrangements but states she was living [place] the bridge), NIDDM type 2, Essential hypertension, and substance use (denies any IVDU in he past or any illicit drug use recenly, denies ETOH abuse/overuse) presents to [facility] for further evaluation of her back pain.  She apparently presented to the [facility] Emergency Department on September 16 with nausea and vomiting and a mechanical fall 5 days prior. She reported pain worse in her back and on her sides radiating down both legs. She noted muscle spasm in her back and legs. She had no head trauma or loss of consciousness.  W/u in the [facility] ED noted significant hypokalemia, hypomagnesemia, severe anemia, metabolic alkalosis, and hyperglycemia. Vitals signs stable with no sepsis at this time noted. SBP>90, HR normal,afebrile.     Imaging studies of her lumbar spine and pelvis were concerning for osteomyelitis/septic arthritis of the left SI joint and L5-S1 along with an abnormal fluid collection extending from T11 through the left iliacus muscle concerning for abscess. Blood cultures sent.  In the emergency department she is receiving IV fluid, Zofran, Zosyn, vancomycin, potassium, magnesium, pain medication, and nausea medication.   She also received 2 units of PRBC for an H/H of 5.7/18.4,     Case  "discussed with Neurosurgery and Interventional Radiology. Plan would be for transfer to Hospital Medicine at [facility] for IR evaluation of the fluid collection and potential sampling of the joint osteomyelitis.  I reviewed the discussions made with the multiple sub specialists regarding transfer of this patient to [facility]: IR/General surgery/Neurosurgery/ER/Internal Med.     Neurosurgery contacted by the  did not feel surgical intervention was needed at this time but would follow along and requested [facility] for further management and IR backup. IR on call apparently read the CT and at the time felt "while the left retroperitoneal fluid collections do appear organized, percutaneous drainage is not advised given the extensive soft tissue infection superficial to these collections.  In addition, there is extensive evidence of soft tissue infection that does not appear organized or percutaneously drainable."     General surgery was consulted to review the case and felt that there was no immediate surgical intervention at this time to be had. I spoke with the on surgeon contacted regarding the location of the fluid collections and inquired if perhaps orthopedic surgery should be consulted to review given the involvement of bony pelvis.      I spoke orthopedic surgery who will see the patient but recommended re-consulting IR here and Neurosurgery given the diskitis/OM L5-S1. (Please see his consult note in the chart)     Repeat labs here again noted for persistently low mag, K, phos.  H/H stable with improved H/H. Pain control improved with Dilaudid.  I consulted ID for further assistance with ABX adjustments and changed her to Meropenem and doxy as well as continued Vanc. Echo ordred for am.     CT lumbar spine and pelvis had findings most concerning for infectious process. There were findings concerning for osteomyelitis and septic arthritis in the left SI joint. Findings concerning for " osteomyelitis/diskitis L5-S1. Possible osteomyelitis and septic joint at the pubic symphysis. Abnormal fluid collection on the left extending from T11 through the left iliacus muscle along and within the left iliopsoas muscle most concerning for abscess. Multiple additional small abscesses suspected in the pelvis. Multifocal collections of air in the fluid in the subcutaneous tissues of the left flank, incompletely imaged.    Chest x-ray noted a loop in the central venous catheter. Tip currently at the level of the brachiocephalic vein. Lungs are fairly clear.        Procedure(s) (LRB):  Incision and Drainage (Left)      Hospital Course:   Ms. Sloan was transferred from [facility] ED to [facility] ICU on 09/16/2024.  She presented to [facility] ED  for back pain, nausea/vomiting.  CT L-spine revealed osteomyelitis/diskitis L5-S1 along with abnormal fluid collection on left extending from T11 through left iliacus muscle and left iliopsoas muscle concerning for abscess.  Multifocal collections of air in fluid in subcutaneous tissues of left flank.  Patient was initiated on empiric vancomycin and meropenem.  Blood cultures with staph aureus.  Obtain echo.  Unable to repeat blood cultures given shortage of cx.  Neurosurgery recommended to obtain MRI L-spine, pending.  General surgery evaluated the patient and recommended urgent transfer to [facility] for surgical evaluation/source control given extent of necrosis.     Ms. Sloan was transferred to [facility] and admitted to the MICU 9/18 with concern for paraspinal abscess. Infectious workup was ordered. Blood cultures were positive for MSSA bacteremia. Neurosurgery, General Surgery and IR were consulted to evaluate the patient's paraspinal abscess. Neurosurgery performed a L3-L4 laminectomy for epidural abscess evacuation on 9/19/24 and the cultures grew MSSA. TTE showed normal EF of 60-65% with diastolic dysfunction, could not exclude mitral vegetation vs  redundant chordae. ID was consulted and recommended Oxacillin and repeat blood cultures. With her electrolyte derangement, and a background of appetitive loss in the past 2 months, there was concern for refeeding syndrome. On 9/21, IR took the patient for abscess drain placement and aspiration of SI joint. SI joint couldn't be aspirated but retroperitoneal abscess was drained of 100cc of purulent fluid. She was successfully extubated 9/22 and was stepped down to Hospital Medicine on 9/24/24. Her drain was removed on 09/27.  Repeat CT abdomen/pelvis showed an ill-defined subcutaneous edema and soft tissue inflammatory change in the surgical bed and subcutaneous soft tissues without discrete organized fluid collection, stable size of 3.6 cm fluid collection along the left posterior pararenal space which appears to communicate with the with the aforementioned collection and fascial thickening.  IR placed drain for retroperitoneal fluid collection on 9/30.  Cultures returned with Staph aureus.  IR suggested to monitor drain output, and consider repeat imaging when output decreases to less than 10 cc in 24 hours to evaluate for possible drainage catheter removal. Repeat CT was ordered on 10/3 as she endorsed worsening pain on her left flank, and minimal drain output which could be removed.  It returned with multiple new and enlargening abscesses.  Discussed with ID, who said to continue Oxacillin.  NSGY, Gen Surg, Ortho and IR were consulted for further recommendations.  MRI complete spine and pelvis were ordered to help guide management.  Gen Surg took her to the OR for an I&D of a left hip abscess on 10/7, where 300cc of purulent material within the subcutaneous space along the left hip was drained and sent for culture.  ID reconsulted.  NeuroIR considering spinal abscess drainage.    10/10-  lumbar abscess aspiration done per IR 10/9. Pt on oxacillin and vanc. ID following. Need f/I on IR  aspiration. Lumbar abscess  "aspiration completed today.    SNF auth has been approved and the auth expires at midnight 10/11/24.     10/11- need final ID recs. New cultures form 10/10 still in progress. On vanc and oxacillin. oxacillin 2g q4h, duration 8 weeks, tentative end date 11/18.   Continue vancomycin, goal trough 10-15, pharmacy to dose, tentative end date 11/4/24         Goals of Care Treatment Preferences:  Code Status: Full Code      SDOH Screening:  The patient was screened for food insecurity, housing instability, transportation needs, utility difficulties, and interpersonal safety. The social determinant(s) of health identified as a concern this admission are:  Transportation difficulties    The plan to address these concerns is: going to SNF    Social Drivers of Health with Concerns     Transportation Needs: Unmet Transportation Needs (9/18/2024)        Consults:   Consults (From admission, onward)          Status Ordering Provider     Pharmacy to dose Vancomycin consult  Once        Provider:  (Not yet assigned)   Placed in "And" Linked Group    Acknowledged AHSAN DOAN P.     Inpatient consult to Interventional Radiology  Once        Provider:  (Not yet assigned)    Completed AHSAN DOAN P.     Inpatient consult to Infectious Diseases  Once        Provider:  (Not yet assigned)    Completed AHSAN DOAN P.     Inpatient consult to General Surgery  Once        Provider:  (Not yet assigned)    Completed AHSAN DOAN P.     Inpatient consult to Neurosurgery  Once        Provider:  (Not yet assigned)    Completed AHSAN DOAN P.     Inpatient consult to Interventional Radiology  Once        Provider:  (Not yet assigned)    Completed AHSAN DOAN P.     Inpatient consult to Interventional Radiology  Once        Provider:  (Not yet assigned)    Completed KEITH BATRES     Inpatient consult to PICC team ([facility])  Once        Provider:  (Not yet assigned)    Completed KEITH BATRES     Inpatient consult to PICC " team ([facility])  Once        Provider:  (Not yet assigned)    Completed SEBASTIAN JOHN     Inpatient consult to Physical Medicine Rehab  Once        Provider:  (Not yet assigned)    Completed UZAIR MCCORMICK     Inpatient consult to Orthopedics  Once        Provider:  (Not yet assigned)    Completed SONALI BENTLEY     Inpatient consult to Registered Dietitian/Nutritionist  Once        Provider:  (Not yet assigned)    Completed WU SEGUNDO     Inpatient consult to Interventional Radiology  Once        Provider:  (Not yet assigned)    Completed WU SEGUNDO     Inpatient consult to Neurosurgery  Once        Provider:  (Not yet assigned)    Completed WU SEGUNDO     Inpatient consult to Infectious Diseases  Once        Provider:  (Not yet assigned)    Completed SEAMUS MESSER     Inpatient consult to General Surgery  Once        Provider:  (Not yet assigned)    Completed SONALI BENTLEY     Inpatient consult to General Surgery  Once        Provider:  Presley Jaeger MD    Completed ELMO HEARN     Inpatient consult to Social Work  Once        Provider:  (Not yet assigned)    Completed SONALI BENTLEY     Inpatient consult to Interventional Radiology  Once        Provider:  Promise Wadsworth NP    Completed PETERSON DAVIS     Inpatient consult to Orthopedic Surgery  Once        Provider:  John Castro MD    Completed PETERSON DAVIS     Inpatient consult to Neurosurgery  Once        Provider:  Lea Fernandez PA-C    Completed PETERSON DAVIS     Inpatient consult to Infectious Diseases  Once        Provider:  Hannah Summers AU.D    Completed PETERSON DAVIS            Psychiatric  History of drug use  On methadone at home, continue    Cardiac/Vascular  Endocarditis  As above      Renal/  Hypokalemia  Monitor on tele. Replace mg, phos, and K. F/u on repeat labs.     ID  * Paraspinal abscess  MSSA Endocarditis  MSSA  Paraspinal abscess  MSSA Psoas abscess    Blood cx 9/16 with MSSA  2D echo 9/17:  Cannot entirely exclude mitral vegetation vs redundant chordae. If high clinical suspicion for endocarditis, would rx as such (not clear MARICHUY would be able to exclude vegetation based on TTE findings).   Noted to have extensive fluid collection on left extending from T11 through left iliacus muscle and within the left iliopsoas muscle.  Multifocal collections of air including subcutaneous tissues on the left flank noted.  ID/Neurosurgery consulted.   S/p L3-L5 laminectomy with epidural abscess evacuation 9/19   Ortho consulted. Rec continued abx tx and no further interventions  IR SI joint aspiration and abscess drain placement 9/21. Unable to aspirate joint  IR drain removed on 09/27.    Repeat CT abdomen pelvis 9/28 ordered to look for recollection of fluid showed an ill-defined subcutaneous edema and soft tissue inflammatory change in the surgical bed and subcutaneous soft tissues without discrete organized fluid collection,  Stable size of 3.6 cm fluid collection along the left posterior pararenal space which appears to communicate with the with the aforementioned collection and fascial thickening.   IR placed drain for retroperitoneal fluid collection on 9/30.  Culture with MSSA  ID consulted:  Suggested Oxacillin through 11/18 with repeat imaging prior to completion  Will need LTAC for completion of IV antibiotics given history of IVDA  PICC placed 9/27  Repeat CRP ordered 10/3 given worsening pain and swelling - CRP down to 32  Repeat CT abdomen/pelvis was ordered to assess for drain removal, that showed worsening abscesses.  Discussed with ID who suggested further source control and to continue Oxacillin   Gen Surg, Ortho, NSGY and IR consulted - Greatly appreciate assistance!  MRI complete spine and pelvis ordered to further determine next steps with multiple abscesses  Gen Surg took her to the OR for an I&D of a left hip  abscess on 10/7, where 300cc of purulent material within the subcutaneous space along the left hip was drained and sent for culture.  Cx with Staph aureus  ID reconsulted  NeuroIR considering epidural drainage as NSGY said no intervention and ID feels like she could use more intervention to decrease her infectious burden  10/10- continue oxacillin and vanc, drainage. Lumbar abscess aspiration completed per IR today.  ID following.     oxacillin 2g q4h, duration 8 weeks, tentative end date 11/18  Continue vancomycin, goal trough 10-15, pharmacy to dose, tentative end date 11/4/24,     Epidural abscess  As above      Staphylococcus aureus bacteremia  As above      Sepsis  As above    Other osteomyelitis, multiple sites  As above      Oncology  Anemia, unspecified  S/p 2u PRBC transfusion on admit   No signs of acute GI bleed on exam at this time. Denies any hematemesis or hemoptysis.   Obtain iron B12/folate/peripheral smear and LDH/hapto/retic  No evidence of active bleed   Stable    Endocrine  Moderate malnutrition  Nutrition consulted. Most recent weight and BMI monitored-     Measurements:  Wt Readings from Last 1 Encounters:   10/09/24 65.2 kg (143 lb 11.8 oz)   Body mass index is 26.29 kg/m².    Patient has been screened and assessed by RD.    Malnutrition Type:  Context: social/environmental circumstances  Level: moderate    Malnutrition Characteristic Summary:  Weight Loss (Malnutrition): 10% in 6 months  Energy Intake (Malnutrition): less than 75% for greater than 7 days    Interventions/Recommendations (treatment strategy):  1.      Refeeding syndrome  In ICU      Uncontrolled type 2 diabetes mellitus with hyperglycemia  Patient's FSGs are uncontrolled due to hyperglycemia on current medication regimen.  Last A1c reviewed-   Lab Results   Component Value Date    HGBA1C 7.9 (H) 09/17/2024     Most recent fingerstick glucose reviewed-   Recent Labs   Lab 10/10/24  1716 10/10/24  2021 10/11/24  0814    POCTGLUCOSE 162* 165* 102     Current correctional scale  Medium  Maintain anti-hyperglycemic dose as follows-   Antihyperglycemics (From admission, onward)      Start     Stop Route Frequency Ordered    10/02/24 0006  insulin aspart U-100 pen 0-5 Units         -- SubQ Before meals & nightly PRN 10/01/24 2306          Hold Oral hypoglycemics while patient is in the hospital.    GI  Retroperitoneal fluid collection  As above    Hepatitis C  Hepatitis C antibody positive.    Obtain HCV RNA. Quant negative    Psoas muscle abscess  As above    Other  Smoker  Tobacco cessation discussed with patient for greater than 5 minutes.   Offered Nicotine patch while hospitalized  Patient is aware of need to quit tobacco products      Final Active Diagnoses:    Diagnosis Date Noted POA    PRINCIPAL PROBLEM:  Paraspinal abscess [M46.20] 09/17/2024 Yes    Uncontrolled type 2 diabetes mellitus with hyperglycemia [E11.65] 09/17/2024 Yes    Retroperitoneal fluid collection [R18.8] 09/30/2024 Yes    Moderate malnutrition [E44.0] 09/26/2024 Yes    Epidural abscess [G06.2] 09/19/2024 Yes    Endocarditis [I38] 09/18/2024 Yes    Refeeding syndrome [E87.8] 09/18/2024 Yes    Other osteomyelitis, multiple sites [M86.8X0] 09/17/2024 Yes    History of drug use [F19.91] 09/17/2024 Yes    Smoker [F17.200] 09/17/2024 Yes    Anemia, unspecified [D64.9] 09/17/2024 Yes    Hepatitis C [B19.20] 09/17/2024 Yes    Sepsis [A41.9] 09/17/2024 Yes    Staphylococcus aureus bacteremia [R78.81, B95.61] 09/17/2024 Yes    Psoas muscle abscess [K68.12] 09/17/2024 Yes    Hypokalemia [E87.6] 09/17/2024 Yes      Problems Resolved During this Admission:       Discharged Condition: good    Disposition: Skilled Nursing Facility    Follow Up:   Follow-up Information       [facility] Follow up.    Contact information:  [id number] [place] [address] [phone]                         Patient Instructions:      Ambulatory referral/consult to  "Smoking Cessation Program   Standing Status: Future   Referral Priority: Routine Referral Type: Consultation   Referral Reason: Specialty Services Required   Requested Specialty: CTTS   Number of Visits Requested: 1     Reason for not Prescribing Nicotine Replacement     Order Specific Question Answer Comments   Reason for not Prescribing: Not medically appropriate at this time        Significant Diagnostic Studies: Labs: CMP No results for input(s): "NA", "K", "CL", "CO2", "GLU", "BUN", "CREATININE", "CALCIUM", "PROT", "ALBUMIN", "BILITOT", "ALKPHOS", "AST", "ALT", "ANIONGAP", "ESTGFRAFRICA", "EGFRNONAA" in the last 48 hours. and CBC No results for input(s): "WBC", "HGB", "HCT", "PLT" in the last 48 hours.    Pending Diagnostic Studies:       Procedure Component Value Units Date/Time    Basic metabolic panel [[id number]] Collected: 09/19/24 0026    Order Status: Sent Lab Status: No result     Specimen: Blood     Basic metabolic panel [[id number]] Collected: 09/17/24 2227    Order Status: Sent Lab Status: No result     Specimen: Blood     CBC auto differential [[id number]] Collected: 10/03/24 0340    Order Status: Sent Lab Status: In process Updated: 10/03/24 0341    Specimen: Blood     Comprehensive metabolic panel [[id number]] Collected: 10/03/24 0340    Order Status: Sent Lab Status: In process Updated: 10/03/24 0341    Specimen: Blood     EKG 12-lead [[id number]]     Order Status: Sent Lab Status: No result     IR Biopsy Bone deep [[id number]] Resulted: 10/10/24 1002    Order Status: Sent Lab Status: In process Updated: 10/10/24 1047           Medications:  Reconciled Home Medications:      Medication List        START taking these medications      0.9% NaCl PgBk 100 mL with oxacillin 2 gram SolR 2 g  Inject 2 g into the vein every 4 (four) hours.     D5W SolP 250 mL with vancomycin 1,000 mg SolR 1,000 mg  Inject 1,000 mg into the vein every 12 (twelve) hours.     folic acid 1 MG tablet  Commonly known as: " FOLVITE  Take 1 tablet (1 mg total) by mouth once daily.  Start taking on: October 12, 2024     nicotine 21 mg/24 hr  Commonly known as: NICODERM CQ  Place 1 patch onto the skin once daily.  Start taking on: October 12, 2024     ondansetron 4 mg/2 mL Soln  Inject 4 mg into the vein every 6 (six) hours as needed.     oxyCODONE 15 MG Tab  Commonly known as: ROXICODONE  Take 1 tablet (15 mg total) by mouth every 4 (four) hours as needed for Pain.     polyethylene glycol 17 gram/dose powder  Commonly known as: GLYCOLAX  Mix 1 capful (17 g) with liquid and take by mouth once daily.  Start taking on: October 12, 2024     senna-docusate 8.6-50 mg 8.6-50 mg per tablet  Commonly known as: PERICOLACE  Take 1 tablet by mouth once daily.  Start taking on: October 12, 2024     thiamine 100 MG tablet  Take 1 tablet (100 mg total) by mouth once daily.  Start taking on: October 12, 2024            CONTINUE taking these medications      gabapentin 300 MG capsule  Commonly known as: NEURONTIN  Take 300 mg by mouth 3 (three) times daily.     hydrOXYzine pamoate 25 MG Cap  Commonly known as: VISTARIL  Take 25 mg by mouth 3 (three) times daily.     methadone 10 MG tablet  Commonly known as: DOLOPHINE  Take 7 tablets (70 mg total) by mouth Daily. for 7 days            STOP taking these medications      cloNIDine 0.2 MG tablet  Commonly known as: CATAPRES     glipiZIDE 5 MG tablet  Commonly known as: GLUCOTROL     metFORMIN 500 MG tablet  Commonly known as: GLUCOPHAGE              Indwelling Lines/Drains at time of discharge:   Lines/Drains/Airways       Peripherally Inserted Central Catheter Line  Duration             PICC Double Lumen 09/27/24 1035 right basilic 14 days                    Time spent on the discharge of patient: 35 minutes         Jocelyn Hernandez MD  Department of Hospital Medicine  [facility] ([place]

## 2024-10-11 NOTE — SUBJECTIVE & OBJECTIVE
Interval History: see above    Review of Systems   Constitutional:  Positive for activity change and appetite change.   HENT:  Negative for trouble swallowing.    Respiratory:  Negative for cough and shortness of breath.    Cardiovascular:  Negative for chest pain and leg swelling.   Gastrointestinal:  Negative for abdominal pain, anal bleeding, constipation and diarrhea.   Genitourinary:  Negative for difficulty urinating.   Neurological:  Negative for numbness.   Psychiatric/Behavioral:  Negative for behavioral problems.      Objective:     Vital Signs (Most Recent):  Temp: 97.9 °F (36.6 °C) (10/11/24 0808)  Pulse: 93 (10/11/24 0808)  Resp: 16 (10/11/24 0808)  BP: 122/81 (10/11/24 0808)  SpO2: 96 % (10/11/24 0808) Vital Signs (24h Range):  Temp:  [97.7 °F (36.5 °C)-98.7 °F (37.1 °C)] 97.9 °F (36.6 °C)  Pulse:  [] 93  Resp:  [16-19] 16  SpO2:  [94 %-98 %] 96 %  BP: (122-157)/(76-91) 122/81     Weight: 65.2 kg (143 lb 11.8 oz)  Body mass index is 26.29 kg/m².    Intake/Output Summary (Last 24 hours) at 10/11/2024 1038  Last data filed at 10/10/2024 2347  Gross per 24 hour   Intake 1066.59 ml   Output --   Net 1066.59 ml      Physical Exam  Constitutional:       General: She is not in acute distress.     Appearance: Normal appearance. She is obese.   HENT:      Head: Normocephalic and atraumatic.      Nose: Nose normal.      Mouth/Throat:      Mouth: Mucous membranes are moist.   Eyes:      General: No scleral icterus.     Extraocular Movements: Extraocular movements intact.      Pupils: Pupils are equal, round, and reactive to light.   Cardiovascular:      Rate and Rhythm: Normal rate and regular rhythm.      Pulses: Normal pulses.      Heart sounds: Normal heart sounds. No murmur heard.  Pulmonary:      Effort: Pulmonary effort is normal.      Breath sounds: Normal breath sounds. No wheezing or rhonchi.   Chest:      Chest wall: No tenderness.   Abdominal:      General: Abdomen is flat. Bowel sounds are  "normal. There is no distension.      Palpations: Abdomen is soft.      Tenderness: There is no abdominal tenderness. There is no right CVA tenderness, left CVA tenderness, guarding or rebound.   Musculoskeletal:         General: No swelling, tenderness, deformity or signs of injury. Normal range of motion.      Cervical back: Normal range of motion and neck supple. No rigidity or tenderness.      Right lower leg: No edema.      Left lower leg: No edema.   Skin:     General: Skin is warm and dry.      Coloration: Skin is not jaundiced or pale.      Findings: No erythema or rash.   Neurological:      General: No focal deficit present.      Mental Status: She is alert and oriented to person, place, and time. Mental status is at baseline.      Cranial Nerves: No cranial nerve deficit.      Motor: No weakness.   Psychiatric:         Mood and Affect: Mood normal.         Behavior: Behavior normal.         Thought Content: Thought content normal.         MELD 3.0: 17 at 9/21/2024  6:13 PM  MELD-Na: 13 at 9/21/2024  6:13 PM  Calculated from:  Serum Creatinine: 0.6 mg/dL (Using min of 1 mg/dL) at 9/21/2024  6:13 PM  Serum Sodium: 138 mmol/L (Using max of 137 mmol/L) at 9/21/2024  6:13 PM  Total Bilirubin: 2.1 mg/dL at 9/21/2024  2:34 AM  Serum Albumin: 1.2 g/dL (Using min of 1.5 g/dL) at 9/21/2024  2:34 AM  INR(ratio): 1.4 at 9/19/2024  3:19 AM  Age at listing (hypothetical): 49 years  Sex: Female at 9/21/2024  6:13 PM      Significant Labs:  CBC:  No results for input(s): "WBC", "HGB", "HCT", "PLT" in the last 48 hours.    CMP:  No results for input(s): "NA", "K", "CL", "CO2", "GLU", "BUN", "CREATININE", "CALCIUM", "PROT", "ALBUMIN", "BILITOT", "ALKPHOS", "AST", "ALT", "ANIONGAP", "EGFRNONAA" in the last 48 hours.    Invalid input(s): "[place]"    "

## 2024-10-11 NOTE — PROGRESS NOTES
[facility] - [facility]down [facility] ([place] [facility])  Infectious Disease  Progress Note    Patient Name: Mari Sloan  MRN: [id number]  Admission Date: 9/16/2024  Length of Stay: 25 days  Attending Physician: Jocelyn Hernandez MD  Primary Care Provider: Mary Fong APRN    Isolation Status: No active isolations  Assessment/Plan:      ID  Staphylococcus aureus bacteremia  49F admitted with MSSA bacteremia, SI joint pyogenic arthritis with osteomyelitis, L psoas/iliacus abscess s/p IR drain (9/21), T12-L5 epidural abscess s/p L3-5 laminectomy with abscess washout (9/19), L retroperitoneal abscess s/p drain placement 9/30, s/p I&D of L hip abscess with general surgery on 10/7, and 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 Imaging also with lumbar epidural phlegmon and subcutaneous fluid collection, s/p IR aspiration yesterday.      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      Recommendations:  Continue oxacillin 2g q4h, duration 8 weeks, tentative end date 11/18/24  Continue vancomycin, goal trough 10-15, pharmacy to dose, tentative end date 11/4/24  Needs weekly labs: CBC, CMP, CRP, and twice weekly vanc troughs, while on IV abx  Favor repeat thoracic/lumbar imaging (MRI w/wo) prior to completion of abx (~ last week of October) to evaluate for resolution of abscess and reassess need for abx extension      The above plan was discussed with the primary team.     Other osteomyelitis, multiple sites  See above    GI  Hepatitis C  Hep C ab positive. Hepatitis C RNA not detected. Possible cleared infection if not previously treated.    Recommendations   Outpatient Hepatology follow up    Psoas muscle abscess  See above        Anticipated Disposition: TBD    Thank you for your consult. I will sign off. Please contact us if you have any additional questions.    Lashawn Barbosa, DO  Infectious Disease  [facility] ([place])    Subjective:  "    Principal Problem:Paraspinal abscess    HPI: Ms. 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 Patient underwent IR drain placement to a left retroperitoneal fluid collection on 9/30/24 with positive cultures for MSSA, and I&D of L hip abscess with general surgery on 10/7 with rare GPCs on gram stain. Infectious disease now re-consulted for "Endocarditis and bacteremia - Recent OR I&D, abx recs".   Interval History: feeling well today, underwent aspiration with IR yesterday    Review of Systems   Constitutional:  Negative for chills and fever.   Respiratory:  Negative for cough and shortness of breath.    Cardiovascular:  Negative for chest pain.   Gastrointestinal:  Negative for abdominal pain, constipation, diarrhea, nausea and vomiting.   Musculoskeletal:  Positive for back pain. Negative for arthralgias and myalgias.   Skin:  Negative for rash.   Neurological:  Negative for headaches.     Objective:     Vital Signs (Most Recent):  Temp: 97.9 °F (36.6 °C) (10/11/24 0808)  Pulse: 93 (10/11/24 0808)  Resp: 16 (10/11/24 0808)  BP: 122/81 (10/11/24 0808)  SpO2: 96 % (10/11/24 1100) Vital Signs (24h Range):  Temp:  [97.7 °F (36.5 °C)-98.7 °F (37.1 °C)] 97.9 °F (36.6 °C)  Pulse:  [] 93  Resp:  [16-19] 16  SpO2:  [94 %-98 %] 96 %  BP: (122-157)/(79-91) 122/81     Weight: 65.2 kg (143 lb 11.8 oz)  Body mass index is 26.29 kg/m².    Estimated Creatinine Clearance: 98.2 mL/min (based on SCr of 0.6 mg/dL).     Physical Exam  Vitals reviewed.   Constitutional:       General: She is not in acute distress.     Appearance: Normal appearance. She is not ill-appearing.   HENT:      Head: Normocephalic and " atraumatic.   Eyes:      Extraocular Movements: Extraocular movements intact.      Conjunctiva/sclera: Conjunctivae normal.   Pulmonary:      Effort: Pulmonary effort is normal. No respiratory distress.   Abdominal:      General: Abdomen is flat.      Palpations: Abdomen is soft.   Musculoskeletal:      Cervical back: Normal range of motion.   Skin:     General: Skin is warm and dry.   Neurological:      General: No focal deficit present.      Mental Status: She is alert and oriented to person, place, and time.   Psychiatric:         Mood and Affect: Mood normal.         Behavior: Behavior normal.         Thought Content: Thought content normal.          Significant Labs: All pertinent labs within the past 24 hours have been reviewed.  Recent Lab Results  (Last 5 results in the past 24 hours)        10/11/24  0824   10/11/24  0814   10/11/24  0416   10/10/24  2021   10/10/24  1716        POCT Glucose   102     165   162       SARS-CoV2 (COVID-19) Qualitative PCR     Not Detected  Comment: This test utilizes a real-time reverse transcription  polymerase chain reaction procedure to amplify and  detect the SARS-CoV-2 and detect the SARS-CoV-2 N2 and E nucleic  acid targets. The analytical sensitivity (limit of detection) of  this assay is 250 copies/mL.    A Detected result implies that the patient is infected with the  SARS-CoV-2 virus and is presumed to be contagious.  A Not Detected result implies that the SARS-CoV-2 target nucleic  acids are not present above the limit of detection. It does not  rule out the possibility of COVID-19 and should not be the sole  basis for treatment decisions. If COVID-19 is strongly suspected  based on clinical and epidemiological history, re-testing should  be considered.    This test is Food and Drug Administration (FDA) approved. Performance   characteristics of this has been independently verified by [facility] Department of Pathology and Laboratory Medicine.              Vancomycin-Trough 14.8                                      Significant Imaging: I have reviewed all pertinent imaging results/findings within the past 24 hours.

## 2024-10-12 LAB
POCT GLUCOSE: 118 MG/DL (ref 70–110)
POCT GLUCOSE: 127 MG/DL (ref 70–110)

## 2024-10-12 PROCEDURE — A4216 STERILE WATER/SALINE, 10 ML: HCPCS | Performed by: STUDENT IN AN ORGANIZED HEALTH CARE EDUCATION/TRAINING PROGRAM

## 2024-10-12 PROCEDURE — 25000003 PHARM REV CODE 250: Performed by: STUDENT IN AN ORGANIZED HEALTH CARE EDUCATION/TRAINING PROGRAM

## 2024-10-12 PROCEDURE — 63600175 PHARM REV CODE 636 W HCPCS: Performed by: STUDENT IN AN ORGANIZED HEALTH CARE EDUCATION/TRAINING PROGRAM

## 2024-10-12 PROCEDURE — 25000003 PHARM REV CODE 250: Performed by: INTERNAL MEDICINE

## 2024-10-12 PROCEDURE — 25000003 PHARM REV CODE 250: Performed by: HOSPITALIST

## 2024-10-12 PROCEDURE — 20600001 HC STEP DOWN PRIVATE ROOM

## 2024-10-12 PROCEDURE — 63600175 PHARM REV CODE 636 W HCPCS: Performed by: HOSPITALIST

## 2024-10-12 RX ADMIN — FOLIC ACID 1 MG: 1 TABLET ORAL at 08:10

## 2024-10-12 RX ADMIN — OXYCODONE HYDROCHLORIDE 15 MG: 10 TABLET ORAL at 05:10

## 2024-10-12 RX ADMIN — Medication 10 ML: at 12:10

## 2024-10-12 RX ADMIN — GABAPENTIN 300 MG: 300 CAPSULE ORAL at 08:10

## 2024-10-12 RX ADMIN — VANCOMYCIN HYDROCHLORIDE 1000 MG: 1 INJECTION, POWDER, LYOPHILIZED, FOR SOLUTION INTRAVENOUS at 10:10

## 2024-10-12 RX ADMIN — ONDANSETRON 4 MG: 2 INJECTION INTRAMUSCULAR; INTRAVENOUS at 08:10

## 2024-10-12 RX ADMIN — OXACILLIN 2 G: 2 INJECTION, POWDER, FOR SOLUTION INTRAMUSCULAR; INTRAVENOUS at 12:10

## 2024-10-12 RX ADMIN — OXACILLIN 2 G: 2 INJECTION, POWDER, FOR SOLUTION INTRAMUSCULAR; INTRAVENOUS at 08:10

## 2024-10-12 RX ADMIN — OXACILLIN 2 G: 2 INJECTION, POWDER, FOR SOLUTION INTRAMUSCULAR; INTRAVENOUS at 05:10

## 2024-10-12 RX ADMIN — Medication 100 MG: at 08:10

## 2024-10-12 RX ADMIN — Medication 10 ML: at 05:10

## 2024-10-12 RX ADMIN — GABAPENTIN 300 MG: 300 CAPSULE ORAL at 05:10

## 2024-10-12 RX ADMIN — VANCOMYCIN HYDROCHLORIDE 1000 MG: 1 INJECTION, POWDER, LYOPHILIZED, FOR SOLUTION INTRAVENOUS at 08:10

## 2024-10-12 RX ADMIN — Medication 10 ML: at 04:10

## 2024-10-12 RX ADMIN — HYDROXYZINE PAMOATE 25 MG: 25 CAPSULE ORAL at 08:10

## 2024-10-12 RX ADMIN — Medication 10 ML: at 11:10

## 2024-10-12 RX ADMIN — METHADONE HYDROCHLORIDE 70 MG: 10 TABLET ORAL at 08:10

## 2024-10-12 RX ADMIN — OXACILLIN 2 G: 2 INJECTION, POWDER, FOR SOLUTION INTRAMUSCULAR; INTRAVENOUS at 11:10

## 2024-10-12 RX ADMIN — MELATONIN TAB 3 MG 6 MG: 3 TAB at 08:10

## 2024-10-12 RX ADMIN — OXACILLIN 2 G: 2 INJECTION, POWDER, FOR SOLUTION INTRAMUSCULAR; INTRAVENOUS at 04:10

## 2024-10-12 RX ADMIN — SENNOSIDES AND DOCUSATE SODIUM 1 TABLET: 50; 8.6 TABLET ORAL at 09:10

## 2024-10-12 NOTE — PLAN OF CARE
Problem: Skin Injury Risk Increased  Goal: Skin Health and Integrity  Outcome: Progressing     Problem: Adult Inpatient Plan of Care  Goal: Plan of Care Review  Outcome: Progressing  Goal: Patient-Specific Goal (Individualized)  Outcome: Progressing  Goal: Absence of Hospital-Acquired Illness or Injury  Outcome: Progressing  Goal: Optimal Comfort and Wellbeing  Outcome: Progressing  Goal: Readiness for Transition of Care  Outcome: Progressing     Problem: Infection  Goal: Absence of Infection Signs and Symptoms  Outcome: Progressing     Problem: Diabetes Comorbidity  Goal: Blood Glucose Level Within Targeted Range  Outcome: Progressing     Problem: Sepsis/Septic Shock  Goal: Optimal Coping  Outcome: Progressing  Goal: Absence of Bleeding  Outcome: Progressing  Goal: Blood Glucose Level Within Targeted Range  Outcome: Progressing  Goal: Absence of Infection Signs and Symptoms  Outcome: Progressing  Goal: Optimal Nutrition Intake  Outcome: Progressing     Problem: Fall Injury Risk  Goal: Absence of Fall and Fall-Related Injury  Outcome: Progressing     Problem: Wound  Goal: Optimal Coping  Outcome: Progressing  Goal: Optimal Functional Ability  Outcome: Progressing  Goal: Absence of Infection Signs and Symptoms  Outcome: Progressing  Goal: Improved Oral Intake  Outcome: Progressing  Goal: Optimal Pain Control and Function  Outcome: Progressing  Goal: Skin Health and Integrity  Outcome: Progressing  Goal: Optimal Wound Healing  Outcome: Progressing     Patient alert and oriented. Cooperative with care. Medicated for pain as ordered. Iv dilaudid x2 this shift. Worked with pt/ot this shift. Up to bedside commode. Medicated for constipation this shift as well. Continues on iv abx. Tolerating well. Family remains at bedside. Patient resting comfortably at this time with call light in reach.

## 2024-10-12 NOTE — PT/OT/SLP PROGRESS
Occupational Therapy      Patient Name:  Mari Sloan   MRN:  [id number]    Patient not seen today secondary to pt. Reporting adamantly that she did not want to participate in therapy session on this date despite max encouragement. Pt. Reported she had people to assist her on d/c.  10/12/2024

## 2024-10-12 NOTE — NURSING
Patient's methadone 70mg delivered by pharmacy after primary nurse already pulled medication.   Pharmacy states unable to take back medication but advises placing in lockbox until next dose.   Medication placed in lockbox #2.

## 2024-10-12 NOTE — PROGRESS NOTES
[facility] ([place] [id number])  [facility]  Progress Note    Patient Name: Mari Sloan  MRN: [id number]  Patient Class: IP- Inpatient   Admission Date: 9/16/2024  Length of Stay: 26 days  Attending Physician: Jocelyn Hernandez MD  Primary Care Provider: Mary Fong APRN        Subjective:     Principal Problem:Paraspinal abscess        HPI:  By Dr. Alberta Reese MD    49 y.o.  woman with h/o homelessness (recently was able to obtain living arrangements but states she was living [place] the bridge), NIDDM type 2, Essential hypertension, and substance use (denies any IVDU in he past or any illicit drug use recenly, denies ETOH abuse/overuse) presents to [facility] for further evaluation of her back pain.  She apparently presented to the [facility] Emergency Department on September 16 with nausea and vomiting and a mechanical fall 5 days prior. She reported pain worse in her back and on her sides radiating down both legs. She noted muscle spasm in her back and legs. She had no head trauma or loss of consciousness.  W/u in the [facility] ED noted significant hypokalemia, hypomagnesemia, severe anemia, metabolic alkalosis, and hyperglycemia. Vitals signs stable with no sepsis at this time noted. SBP>90, HR normal,afebrile.     Imaging studies of her lumbar spine and pelvis were concerning for osteomyelitis/septic arthritis of the left SI joint and L5-S1 along with an abnormal fluid collection extending from T11 through the left iliacus muscle concerning for abscess. Blood cultures sent.  In the emergency department she is receiving IV fluid, Zofran, Zosyn, vancomycin, potassium, magnesium, pain medication, and nausea medication.   She also received 2 units of PRBC for an H/H of 5.7/18.4,     Case discussed with Neurosurgery and Interventional Radiology. Plan would be for transfer to Hospital Medicine at [facility] for IR evaluation of the fluid collection and  "potential sampling of the joint osteomyelitis.  I reviewed the discussions made with the multiple sub specialists regarding transfer of this patient to [facility]: IR/General surgery/Neurosurgery/ER/Internal Med.     Neurosurgery contacted by the  did not feel surgical intervention was needed at this time but would follow along and requested [facility] for further management and IR backup. IR on call apparently read the CT and at the time felt "while the left retroperitoneal fluid collections do appear organized, percutaneous drainage is not advised given the extensive soft tissue infection superficial to these collections.  In addition, there is extensive evidence of soft tissue infection that does not appear organized or percutaneously drainable."     General surgery was consulted to review the case and felt that there was no immediate surgical intervention at this time to be had. I spoke with the on surgeon contacted regarding the location of the fluid collections and inquired if perhaps orthopedic surgery should be consulted to review given the involvement of bony pelvis.      I spoke orthopedic surgery who will see the patient but recommended re-consulting IR here and Neurosurgery given the diskitis/OM L5-S1. (Please see his consult note in the chart)     Repeat labs here again noted for persistently low mag, K, phos.  H/H stable with improved H/H. Pain control improved with Dilaudid.  I consulted ID for further assistance with ABX adjustments and changed her to Meropenem and doxy as well as continued Vanc. Echo ordred for am.     CT lumbar spine and pelvis had findings most concerning for infectious process. There were findings concerning for osteomyelitis and septic arthritis in the left SI joint. Findings concerning for osteomyelitis/diskitis L5-S1. Possible osteomyelitis and septic joint at the pubic symphysis. Abnormal fluid collection on the left extending from T11 through the left " iliacus muscle along and within the left iliopsoas muscle most concerning for abscess. Multiple additional small abscesses suspected in the pelvis. Multifocal collections of air in the fluid in the subcutaneous tissues of the left flank, incompletely imaged.    Chest x-ray noted a loop in the central venous catheter. Tip currently at the level of the brachiocephalic vein. Lungs are fairly clear.        Overview/Hospital Course:  Ms. Sloan was transferred from [facility] ED to [facility] ICU on 09/16/2024.  She presented to [facility] ED  for back pain, nausea/vomiting.  CT L-spine revealed osteomyelitis/diskitis L5-S1 along with abnormal fluid collection on left extending from T11 through left iliacus muscle and left iliopsoas muscle concerning for abscess.  Multifocal collections of air in fluid in subcutaneous tissues of left flank.  Patient was initiated on empiric vancomycin and meropenem.  Blood cultures with staph aureus.  Obtain echo.  Unable to repeat blood cultures given shortage of cx.  Neurosurgery recommended to obtain MRI L-spine, pending.  General surgery evaluated the patient and recommended urgent transfer to [facility] for surgical evaluation/source control given extent of necrosis.     Ms. Sloan was transferred to [facility] and admitted to the MICU 9/18 with concern for paraspinal abscess. Infectious workup was ordered. Blood cultures were positive for MSSA bacteremia. Neurosurgery, General Surgery and IR were consulted to evaluate the patient's paraspinal abscess. Neurosurgery performed a L3-L4 laminectomy for epidural abscess evacuation on 9/19/24 and the cultures grew MSSA. TTE showed normal EF of 60-65% with diastolic dysfunction, could not exclude mitral vegetation vs redundant chordae. ID was consulted and recommended Oxacillin and repeat blood cultures. With her electrolyte derangement, and a background of appetitive loss in the past 2 months, there was concern for refeeding syndrome.  On 9/21, IR took the patient for abscess drain placement and aspiration of SI joint. SI joint couldn't be aspirated but retroperitoneal abscess was drained of 100cc of purulent fluid. She was successfully extubated 9/22 and was stepped down to Hospital Medicine on 9/24/24. Her drain was removed on 09/27.  Repeat CT abdomen/pelvis showed an ill-defined subcutaneous edema and soft tissue inflammatory change in the surgical bed and subcutaneous soft tissues without discrete organized fluid collection, stable size of 3.6 cm fluid collection along the left posterior pararenal space which appears to communicate with the with the aforementioned collection and fascial thickening.  IR placed drain for retroperitoneal fluid collection on 9/30.  Cultures returned with Staph aureus.  IR suggested to monitor drain output, and consider repeat imaging when output decreases to less than 10 cc in 24 hours to evaluate for possible drainage catheter removal. Repeat CT was ordered on 10/3 as she endorsed worsening pain on her left flank, and minimal drain output which could be removed.  It returned with multiple new and enlargening abscesses.  Discussed with ID, who said to continue Oxacillin.  NSGY, Gen Surg, Ortho and IR were consulted for further recommendations.  MRI complete spine and pelvis were ordered to help guide management.  Gen Surg took her to the OR for an I&D of a left hip abscess on 10/7, where 300cc of purulent material within the subcutaneous space along the left hip was drained and sent for culture.  ID reconsulted.  NeuroIR considering spinal abscess drainage.    10/10-  lumbar abscess aspiration done per IR 10/9. Pt on oxacillin and vanc. ID following. Need f/I on IR  aspiration. Lumbar abscess aspiration completed today.    SNF auth has been approved and the auth expires at midnight 10/11/24.     10/11- need final ID recs. New cultures form 10/10 still in progress. On vanc and oxacillin. oxacillin 2g q4h, duration  8 weeks, tentative end date 11/18.   Continue vancomycin, goal trough 10-15, pharmacy to dose, tentative end date 11/4/24. Pt has capacity. She suggest that she might leave AMA.   10/12- SNF unable to do both antibiotics. Pt here until completion or we find another place. Yesterday she was declining therapies.  VSS.  Lab tomorrow, q 72 hours      Interval History: see above    Review of Systems   Constitutional:  Positive for activity change and appetite change.   HENT:  Negative for trouble swallowing.    Respiratory:  Negative for cough and shortness of breath.    Cardiovascular:  Negative for chest pain and leg swelling.   Gastrointestinal:  Negative for abdominal pain, anal bleeding, constipation and diarrhea.   Genitourinary:  Negative for difficulty urinating.   Neurological:  Negative for numbness.   Psychiatric/Behavioral:  Negative for behavioral problems.      Objective:     Vital Signs (Most Recent):  Temp: 97.9 °F (36.6 °C) (10/11/24 0808)  Pulse: 93 (10/11/24 0808)  Resp: 16 (10/11/24 0808)  BP: 122/81 (10/11/24 0808)  SpO2: 96 % (10/11/24 0808) Vital Signs (24h Range):  Temp:  [97.7 °F (36.5 °C)-98.7 °F (37.1 °C)] 97.9 °F (36.6 °C)  Pulse:  [] 93  Resp:  [16-19] 16  SpO2:  [94 %-98 %] 96 %  BP: (122-157)/(76-91) 122/81     Weight: 65.2 kg (143 lb 11.8 oz)  Body mass index is 26.29 kg/m².    Intake/Output Summary (Last 24 hours) at 10/11/2024 1038  Last data filed at 10/10/2024 2347  Gross per 24 hour   Intake 1066.59 ml   Output --   Net 1066.59 ml      Physical Exam  Constitutional:       General: She is not in acute distress.     Appearance: Normal appearance. She is obese.   HENT:      Head: Normocephalic and atraumatic.      Nose: Nose normal.      Mouth/Throat:      Mouth: Mucous membranes are moist.   Eyes:      General: No scleral icterus.     Extraocular Movements: Extraocular movements intact.      Pupils: Pupils are equal, round, and reactive to light.   Cardiovascular:      Rate and  "Rhythm: Normal rate and regular rhythm.      Pulses: Normal pulses.      Heart sounds: Normal heart sounds. No murmur heard.  Pulmonary:      Effort: Pulmonary effort is normal.      Breath sounds: Normal breath sounds. No wheezing or rhonchi.   Chest:      Chest wall: No tenderness.   Abdominal:      General: Abdomen is flat. Bowel sounds are normal. There is no distension.      Palpations: Abdomen is soft.      Tenderness: There is no abdominal tenderness. There is no right CVA tenderness, left CVA tenderness, guarding or rebound.   Musculoskeletal:         General: No swelling, tenderness, deformity or signs of injury. Normal range of motion.      Cervical back: Normal range of motion and neck supple. No rigidity or tenderness.      Right lower leg: No edema.      Left lower leg: No edema.   Skin:     General: Skin is warm and dry.      Coloration: Skin is not jaundiced or pale.      Findings: No erythema or rash.   Neurological:      General: No focal deficit present.      Mental Status: She is alert and oriented to person, place, and time. Mental status is at baseline.      Cranial Nerves: No cranial nerve deficit.      Motor: No weakness.   Psychiatric:         Mood and Affect: Mood normal.         Behavior: Behavior normal.         Thought Content: Thought content normal.         MELD 3.0: 17 at 9/21/2024  6:13 PM  MELD-Na: 13 at 9/21/2024  6:13 PM  Calculated from:  Serum Creatinine: 0.6 mg/dL (Using min of 1 mg/dL) at 9/21/2024  6:13 PM  Serum Sodium: 138 mmol/L (Using max of 137 mmol/L) at 9/21/2024  6:13 PM  Total Bilirubin: 2.1 mg/dL at 9/21/2024  2:34 AM  Serum Albumin: 1.2 g/dL (Using min of 1.5 g/dL) at 9/21/2024  2:34 AM  INR(ratio): 1.4 at 9/19/2024  3:19 AM  Age at listing (hypothetical): 49 years  Sex: Female at 9/21/2024  6:13 PM      Significant Labs:  CBC:  No results for input(s): "WBC", "HGB", "HCT", "PLT" in the last 48 hours.    CMP:  No results for input(s): "NA", "K", "CL", "CO2", "GLU", " ""BUN", "CREATININE", "CALCIUM", "PROT", "ALBUMIN", "BILITOT", "ALKPHOS", "AST", "ALT", "ANIONGAP", "EGFRNONAA" in the last 48 hours.    Invalid input(s): "ESTGFAFRICA"      Assessment/Plan:      * Paraspinal abscess  MSSA Endocarditis  MSSA Paraspinal abscess  MSSA Psoas abscess    Blood cx 9/16 with MSSA  2D echo 9/17:  Cannot entirely exclude mitral vegetation vs redundant chordae. If high clinical suspicion for endocarditis, would rx as such (not clear MARICHUY would be able to exclude vegetation based on TTE findings).   Noted to have extensive fluid collection on left extending from T11 through left iliacus muscle and within the left iliopsoas muscle.  Multifocal collections of air including subcutaneous tissues on the left flank noted.  ID/Neurosurgery consulted.   S/p L3-L5 laminectomy with epidural abscess evacuation 9/19   Ortho consulted. Rec continued abx tx and no further interventions  IR SI joint aspiration and abscess drain placement 9/21. Unable to aspirate joint  IR drain removed on 09/27.    Repeat CT abdomen pelvis 9/28 ordered to look for recollection of fluid showed an ill-defined subcutaneous edema and soft tissue inflammatory change in the surgical bed and subcutaneous soft tissues without discrete organized fluid collection,  Stable size of 3.6 cm fluid collection along the left posterior pararenal space which appears to communicate with the with the aforementioned collection and fascial thickening.   IR placed drain for retroperitoneal fluid collection on 9/30.  Culture with MSSA  ID consulted:  Suggested Oxacillin through 11/18 with repeat imaging prior to completion  Will need LTAC for completion of IV antibiotics given history of IVDA  PICC placed 9/27  Repeat CRP ordered 10/3 given worsening pain and swelling - CRP down to 32  Repeat CT abdomen/pelvis was ordered to assess for drain removal, that showed worsening abscesses.  Discussed with ID who suggested further source control and to " continue Oxacillin   Gen Surg, Ortho, NSGY and IR consulted - Greatly appreciate assistance!  MRI complete spine and pelvis ordered to further determine next steps with multiple abscesses  Gen Surg took her to the OR for an I&D of a left hip abscess on 10/7, where 300cc of purulent material within the subcutaneous space along the left hip was drained and sent for culture.  Cx with Staph aureus  ID reconsulted  NeuroIR considering epidural drainage as NSGY said no intervention and ID feels like she could use more intervention to decrease her infectious burden  10/10- continue oxacillin and vanc, drainage. Lumbar abscess aspiration completed per IR today.  ID following.   10/12- oxacillin 2g q4h, duration 8 weeks, tentative end date 11/18.  vancomycin, goal trough 10-15, pharmacy to dose, tentative end date 11/4/24, Continue wound care.     Uncontrolled type 2 diabetes mellitus with hyperglycemia  Patient's FSGs are uncontrolled due to hyperglycemia on current medication regimen.  Last A1c reviewed-   Lab Results   Component Value Date    HGBA1C 7.9 (H) 09/17/2024     Most recent fingerstick glucose reviewed-   Recent Labs   Lab 10/10/24  1716 10/10/24  2021 10/11/24  0814   POCTGLUCOSE 162* 165* 102     Current correctional scale  Medium  Maintain anti-hyperglycemic dose as follows-   Antihyperglycemics (From admission, onward)      Start     Stop Route Frequency Ordered    10/02/24 0006  insulin aspart U-100 pen 0-5 Units         -- SubQ Before meals & nightly PRN 10/01/24 2306          Hold Oral hypoglycemics while patient is in the hospital.    Retroperitoneal fluid collection  As above    Moderate malnutrition  Nutrition consulted. Most recent weight and BMI monitored-     Measurements:  Wt Readings from Last 1 Encounters:   10/09/24 65.2 kg (143 lb 11.8 oz)   Body mass index is 26.29 kg/m².    Patient has been screened and assessed by RD.    Malnutrition Type:  Context: social/environmental circumstances  Level:  moderate    Malnutrition Characteristic Summary:  Weight Loss (Malnutrition): 10% in 6 months  Energy Intake (Malnutrition): less than 75% for greater than 7 days    Interventions/Recommendations (treatment strategy):  1.      Epidural abscess  As above      Refeeding syndrome  In ICU      Endocarditis  As above      Staphylococcus aureus bacteremia  As above      Sepsis  As above    Severe sepsis  See above    Hepatitis C  Hepatitis C antibody positive.    Obtain HCV RNA. Quant negative    Anemia, unspecified  S/p 2u PRBC transfusion on admit   No signs of acute GI bleed on exam at this time. Denies any hematemesis or hemoptysis.   Obtain iron B12/folate/peripheral smear and LDH/hapto/retic  No evidence of active bleed   Stable    Smoker  Tobacco cessation discussed with patient for greater than 5 minutes.   Offered Nicotine patch while hospitalized  Patient is aware of need to quit tobacco products    History of drug use  On methadone at home, continue    Hypokalemia  Monitor on tele. Replace mg, phos, and K. F/u on repeat labs.     Psoas muscle abscess  As above    Other osteomyelitis, multiple sites  As above        VTE Risk Mitigation (From admission, onward)           Ordered     IP VTE HIGH RISK PATIENT  Once         09/22/24 1553                    Discharge Planning   LUH: 10/14/2024     Code Status: Full Code   Is the patient medically ready for discharge?:     Reason for patient still in hospital (select all that apply): Patient trending condition  Discharge Plan A: Skilled Nursing Facility ([facility] [phone])   Discharge Delays: None known at this time              Jocelyn Hernandez MD  Department of Hospital Medicine   [facility] ([place]

## 2024-10-12 NOTE — PT/OT/SLP PROGRESS
"Physical Therapy      Patient Name:  Mari Sloan   MRN:  [id number]    Patient not seen today secondary to Patient unwilling to participate and stated "I'm not getting up today". Will follow-up as appropriate.    "

## 2024-10-12 NOTE — PLAN OF CARE
Problem: Skin Injury Risk Increased  Goal: Skin Health and Integrity  10/12/2024 1755 by Casey Ragland LPN  Outcome: Progressing  10/12/2024 1751 by Casey Ragland LPN  Outcome: Progressing     Problem: Adult Inpatient Plan of Care  Goal: Plan of Care Review  10/12/2024 1755 by Casey Ragland LPN  Outcome: Progressing  10/12/2024 1751 by Casey Ragland LPN  Outcome: Progressing  Goal: Patient-Specific Goal (Individualized)  10/12/2024 1755 by Casey Ragland LPN  Outcome: Progressing  10/12/2024 1751 by Casey Ragland LPN  Outcome: Progressing  Goal: Absence of Hospital-Acquired Illness or Injury  10/12/2024 1755 by Casey Ragland LPN  Outcome: Progressing  10/12/2024 1751 by Casey Ragland LPN  Outcome: Progressing  Goal: Optimal Comfort and Wellbeing  10/12/2024 1755 by Casey Ragland LPN  Outcome: Progressing  10/12/2024 1751 by Casey Ragland LPN  Outcome: Progressing  Goal: Readiness for Transition of Care  10/12/2024 1755 by Casey Ragland LPN  Outcome: Progressing  10/12/2024 1751 by Casey Ragland LPN  Outcome: Progressing     Problem: Infection  Goal: Absence of Infection Signs and Symptoms  10/12/2024 1755 by Casey Ragland LPN  Outcome: Progressing  10/12/2024 1751 by Casey Ragland LPN  Outcome: Progressing     Problem: Diabetes Comorbidity  Goal: Blood Glucose Level Within Targeted Range  10/12/2024 1755 by Casey Ragland LPN  Outcome: Progressing  10/12/2024 1751 by Casey Ragland LPN  Outcome: Progressing     Problem: Sepsis/Septic Shock  Goal: Optimal Coping  10/12/2024 1755 by Casey Ragland LPN  Outcome: Progressing  10/12/2024 1751 by Casey Ragland LPN  Outcome: Progressing  Goal: Absence of Bleeding  10/12/2024 1755 by Casey Ragland LPN  Outcome: Progressing  10/12/2024 1751 by Casey Ragland LPN  Outcome: Progressing  Goal: Blood Glucose Level Within Targeted Range  10/12/2024 1755 by Ellyn,  Casey LARA, LPN  Outcome: Progressing  10/12/2024 1751 by Casey Ragland, LPN  Outcome: Progressing  Goal: Absence of Infection Signs and Symptoms  10/12/2024 1755 by Casey Ragland, LPN  Outcome: Progressing  10/12/2024 1751 by Casey Ragland, LPN  Outcome: Progressing  Goal: Optimal Nutrition Intake  10/12/2024 1755 by Casey Ragland, LPN  Outcome: Progressing  10/12/2024 1751 by Casey Ragland LPN  Outcome: Progressing     Problem: Fall Injury Risk  Goal: Absence of Fall and Fall-Related Injury  10/12/2024 1755 by Casey Ragland, LPN  Outcome: Progressing  10/12/2024 1751 by Casey Ragland LPN  Outcome: Progressing     Problem: Wound  Goal: Optimal Coping  10/12/2024 1755 by Casey Ragland, LPN  Outcome: Progressing  10/12/2024 1751 by Casey Ragland LPN  Outcome: Progressing  Goal: Optimal Functional Ability  10/12/2024 1755 by Casey Ragland, LPN  Outcome: Progressing  10/12/2024 1751 by Casey Ragland, LPN  Outcome: Progressing  Goal: Absence of Infection Signs and Symptoms  10/12/2024 1755 by Casey Ragland, LPN  Outcome: Progressing  10/12/2024 1751 by Casey Ragland, LPN  Outcome: Progressing  Goal: Improved Oral Intake  10/12/2024 1755 by Casey Ragland, LPN  Outcome: Progressing  10/12/2024 1751 by Casey Ragland, LPN  Outcome: Progressing  Goal: Optimal Pain Control and Function  10/12/2024 1755 by Casey Ragland, LPN  Outcome: Progressing  10/12/2024 1751 by Casey Ragland LPN  Outcome: Progressing  Goal: Skin Health and Integrity  10/12/2024 1755 by Casey Ragland, LPN  Outcome: Progressing  10/12/2024 1751 by Casey Ragland LPN  Outcome: Progressing  Goal: Optimal Wound Healing  10/12/2024 1755 by Casey Ragland LPN  Outcome: Progressing  10/12/2024 1751 by Casey Ragland LPN  Outcome: Progressing     Patient alert and oriented. Cooperative with care. Medicated as ordered. Continues on iv abx. Tolerating well.  Medicated for pain this evening. Patient oob to bedside commode. Resting comfortably at this time with call light in reach

## 2024-10-13 LAB
ALBUMIN SERPL BCP-MCNC: 1.3 G/DL (ref 3.5–5.2)
ALP SERPL-CCNC: 108 U/L (ref 55–135)
ALT SERPL W/O P-5'-P-CCNC: 8 U/L (ref 10–44)
ANION GAP SERPL CALC-SCNC: 9 MMOL/L (ref 8–16)
AST SERPL-CCNC: 12 U/L (ref 10–40)
BASOPHILS # BLD AUTO: 0.08 K/UL (ref 0–0.2)
BASOPHILS NFR BLD: 1.1 % (ref 0–1.9)
BILIRUB SERPL-MCNC: 0.5 MG/DL (ref 0.1–1)
BUN SERPL-MCNC: <3 MG/DL (ref 6–20)
CALCIUM SERPL-MCNC: 8.1 MG/DL (ref 8.7–10.5)
CHLORIDE SERPL-SCNC: 106 MMOL/L (ref 95–110)
CO2 SERPL-SCNC: 24 MMOL/L (ref 23–29)
CREAT SERPL-MCNC: 0.5 MG/DL (ref 0.5–1.4)
DIFFERENTIAL METHOD BLD: ABNORMAL
EOSINOPHIL # BLD AUTO: 0.3 K/UL (ref 0–0.5)
EOSINOPHIL NFR BLD: 4.4 % (ref 0–8)
ERYTHROCYTE [DISTWIDTH] IN BLOOD BY AUTOMATED COUNT: 18.6 % (ref 11.5–14.5)
EST. GFR  (NO RACE VARIABLE): >60 ML/MIN/1.73 M^2
GLUCOSE SERPL-MCNC: 79 MG/DL (ref 70–110)
HCT VFR BLD AUTO: 27.8 % (ref 37–48.5)
HGB BLD-MCNC: 8.5 G/DL (ref 12–16)
IMM GRANULOCYTES # BLD AUTO: 0.02 K/UL (ref 0–0.04)
IMM GRANULOCYTES NFR BLD AUTO: 0.3 % (ref 0–0.5)
LYMPHOCYTES # BLD AUTO: 1.9 K/UL (ref 1–4.8)
LYMPHOCYTES NFR BLD: 26.3 % (ref 18–48)
MCH RBC QN AUTO: 27.2 PG (ref 27–31)
MCHC RBC AUTO-ENTMCNC: 30.6 G/DL (ref 32–36)
MCV RBC AUTO: 89 FL (ref 82–98)
MONOCYTES # BLD AUTO: 0.4 K/UL (ref 0.3–1)
MONOCYTES NFR BLD: 6.1 % (ref 4–15)
NEUTROPHILS # BLD AUTO: 4.5 K/UL (ref 1.8–7.7)
NEUTROPHILS NFR BLD: 61.8 % (ref 38–73)
NRBC BLD-RTO: 0 /100 WBC
PLATELET # BLD AUTO: 242 K/UL (ref 150–450)
PMV BLD AUTO: 9.8 FL (ref 9.2–12.9)
POCT GLUCOSE: 109 MG/DL (ref 70–110)
POCT GLUCOSE: 110 MG/DL (ref 70–110)
POCT GLUCOSE: 244 MG/DL (ref 70–110)
POTASSIUM SERPL-SCNC: 2.8 MMOL/L (ref 3.5–5.1)
PROT SERPL-MCNC: 5.4 G/DL (ref 6–8.4)
RBC # BLD AUTO: 3.12 M/UL (ref 4–5.4)
SODIUM SERPL-SCNC: 139 MMOL/L (ref 136–145)
TB INDURATION 48 - 72 HR READ: NORMAL
TB SKIN TEST 48 - 72 HR READ: NEGATIVE
VANCOMYCIN TROUGH SERPL-MCNC: 14.6 UG/ML (ref 10–22)
WBC # BLD AUTO: 7.2 K/UL (ref 3.9–12.7)

## 2024-10-13 PROCEDURE — 25000003 PHARM REV CODE 250: Performed by: HOSPITALIST

## 2024-10-13 PROCEDURE — 80053 COMPREHEN METABOLIC PANEL: CPT | Performed by: HOSPITALIST

## 2024-10-13 PROCEDURE — 85025 COMPLETE CBC W/AUTO DIFF WBC: CPT | Performed by: HOSPITALIST

## 2024-10-13 PROCEDURE — 25000003 PHARM REV CODE 250: Performed by: STUDENT IN AN ORGANIZED HEALTH CARE EDUCATION/TRAINING PROGRAM

## 2024-10-13 PROCEDURE — 63600175 PHARM REV CODE 636 W HCPCS: Performed by: HOSPITALIST

## 2024-10-13 PROCEDURE — 63600175 PHARM REV CODE 636 W HCPCS: Performed by: STUDENT IN AN ORGANIZED HEALTH CARE EDUCATION/TRAINING PROGRAM

## 2024-10-13 PROCEDURE — 25000003 PHARM REV CODE 250: Performed by: INTERNAL MEDICINE

## 2024-10-13 PROCEDURE — 20600001 HC STEP DOWN PRIVATE ROOM

## 2024-10-13 PROCEDURE — 80202 ASSAY OF VANCOMYCIN: CPT | Performed by: HOSPITALIST

## 2024-10-13 PROCEDURE — A4216 STERILE WATER/SALINE, 10 ML: HCPCS | Performed by: STUDENT IN AN ORGANIZED HEALTH CARE EDUCATION/TRAINING PROGRAM

## 2024-10-13 RX ADMIN — OXACILLIN 2 G: 2 INJECTION, POWDER, FOR SOLUTION INTRAMUSCULAR; INTRAVENOUS at 01:10

## 2024-10-13 RX ADMIN — POTASSIUM BICARBONATE 50 MEQ: 978 TABLET, EFFERVESCENT ORAL at 06:10

## 2024-10-13 RX ADMIN — FOLIC ACID 1 MG: 1 TABLET ORAL at 09:10

## 2024-10-13 RX ADMIN — OXACILLIN 2 G: 2 INJECTION, POWDER, FOR SOLUTION INTRAMUSCULAR; INTRAVENOUS at 04:10

## 2024-10-13 RX ADMIN — OXACILLIN 2 G: 2 INJECTION, POWDER, FOR SOLUTION INTRAMUSCULAR; INTRAVENOUS at 08:10

## 2024-10-13 RX ADMIN — Medication 10 ML: at 11:10

## 2024-10-13 RX ADMIN — VANCOMYCIN HYDROCHLORIDE 1000 MG: 1 INJECTION, POWDER, LYOPHILIZED, FOR SOLUTION INTRAVENOUS at 11:10

## 2024-10-13 RX ADMIN — METHADONE HYDROCHLORIDE 70 MG: 10 TABLET ORAL at 09:10

## 2024-10-13 RX ADMIN — GABAPENTIN 300 MG: 300 CAPSULE ORAL at 08:10

## 2024-10-13 RX ADMIN — Medication 10 ML: at 04:10

## 2024-10-13 RX ADMIN — ONDANSETRON 4 MG: 2 INJECTION INTRAMUSCULAR; INTRAVENOUS at 08:10

## 2024-10-13 RX ADMIN — Medication 10 ML: at 06:10

## 2024-10-13 RX ADMIN — MELATONIN TAB 3 MG 6 MG: 3 TAB at 08:10

## 2024-10-13 RX ADMIN — GABAPENTIN 300 MG: 300 CAPSULE ORAL at 03:10

## 2024-10-13 RX ADMIN — HYDROXYZINE PAMOATE 25 MG: 25 CAPSULE ORAL at 08:10

## 2024-10-13 RX ADMIN — VANCOMYCIN HYDROCHLORIDE 1000 MG: 1 INJECTION, POWDER, LYOPHILIZED, FOR SOLUTION INTRAVENOUS at 08:10

## 2024-10-13 RX ADMIN — OXACILLIN 2 G: 2 INJECTION, POWDER, FOR SOLUTION INTRAMUSCULAR; INTRAVENOUS at 09:10

## 2024-10-13 RX ADMIN — Medication 100 MG: at 09:10

## 2024-10-13 RX ADMIN — GABAPENTIN 300 MG: 300 CAPSULE ORAL at 09:10

## 2024-10-13 RX ADMIN — POTASSIUM BICARBONATE 50 MEQ: 978 TABLET, EFFERVESCENT ORAL at 09:10

## 2024-10-13 RX ADMIN — MUPIROCIN: 20 OINTMENT TOPICAL at 08:10

## 2024-10-13 RX ADMIN — OXACILLIN 2 G: 2 INJECTION, POWDER, FOR SOLUTION INTRAMUSCULAR; INTRAVENOUS at 11:10

## 2024-10-13 NOTE — SUBJECTIVE & OBJECTIVE
Interval History: see above    Review of Systems   Constitutional:  Positive for activity change, appetite change and fatigue.   HENT:  Negative for trouble swallowing.    Respiratory:  Negative for cough and shortness of breath.    Cardiovascular:  Negative for chest pain and leg swelling.   Gastrointestinal:  Negative for abdominal pain, anal bleeding, constipation, diarrhea and nausea.   Genitourinary:  Negative for difficulty urinating.   Musculoskeletal:  Positive for arthralgias and back pain.   Neurological:  Negative for numbness and headaches.   Psychiatric/Behavioral:  Negative for behavioral problems.      Objective:     Vital Signs (Most Recent):  Temp: 98.1 °F (36.7 °C) (10/13/24 0730)  Pulse: 86 (10/13/24 0730)  Resp: 18 (10/13/24 0730)  BP: (!) 164/90 (10/13/24 0730)  SpO2: 95 % (10/13/24 0421) Vital Signs (24h Range):  Temp:  [98.1 °F (36.7 °C)-99 °F (37.2 °C)] 98.1 °F (36.7 °C)  Pulse:  [86-88] 86  Resp:  [18] 18  SpO2:  [94 %-99 %] 95 %  BP: (128-164)/(80-90) 164/90     Weight: 65.2 kg (143 lb 11.8 oz)  Body mass index is 26.29 kg/m².    Intake/Output Summary (Last 24 hours) at 10/13/2024 0820  Last data filed at 10/13/2024 0522  Gross per 24 hour   Intake 2302.57 ml   Output --   Net 2302.57 ml      Physical Exam  Constitutional:       General: She is not in acute distress.     Appearance: Normal appearance. She is obese.   HENT:      Head: Normocephalic and atraumatic.      Nose: Nose normal.      Mouth/Throat:      Mouth: Mucous membranes are moist.   Eyes:      General: No scleral icterus.     Extraocular Movements: Extraocular movements intact.      Pupils: Pupils are equal, round, and reactive to light.   Cardiovascular:      Rate and Rhythm: Normal rate and regular rhythm.      Pulses: Normal pulses.      Heart sounds: Normal heart sounds. No murmur heard.  Pulmonary:      Effort: Pulmonary effort is normal.      Breath sounds: Normal breath sounds. No wheezing or rhonchi.   Chest:       Chest wall: No tenderness.   Abdominal:      General: Abdomen is flat. Bowel sounds are normal. There is no distension.      Palpations: Abdomen is soft.      Tenderness: There is no abdominal tenderness. There is no right CVA tenderness, left CVA tenderness, guarding or rebound.   Musculoskeletal:         General: No swelling, tenderness, deformity or signs of injury. Normal range of motion.      Cervical back: Normal range of motion and neck supple. No rigidity or tenderness.      Right lower leg: No edema.      Left lower leg: No edema.   Skin:     General: Skin is warm and dry.      Coloration: Skin is not jaundiced or pale.      Findings: No erythema or rash.   Neurological:      General: No focal deficit present.      Mental Status: She is alert and oriented to person, place, and time. Mental status is at baseline.      Cranial Nerves: No cranial nerve deficit.      Motor: No weakness.   Psychiatric:         Mood and Affect: Mood normal.         Behavior: Behavior normal.         Thought Content: Thought content normal.         MELD 3.0: 17 at 9/21/2024  6:13 PM  MELD-Na: 13 at 9/21/2024  6:13 PM  Calculated from:  Serum Creatinine: 0.6 mg/dL (Using min of 1 mg/dL) at 9/21/2024  6:13 PM  Serum Sodium: 138 mmol/L (Using max of 137 mmol/L) at 9/21/2024  6:13 PM  Total Bilirubin: 2.1 mg/dL at 9/21/2024  2:34 AM  Serum Albumin: 1.2 g/dL (Using min of 1.5 g/dL) at 9/21/2024  2:34 AM  INR(ratio): 1.4 at 9/19/2024  3:19 AM  Age at listing (hypothetical): 49 years  Sex: Female at 9/21/2024  6:13 PM      Significant Labs:  CBC:  Recent Labs   Lab 10/13/24  0436   WBC 7.20   HGB 8.5*   HCT 27.8*          CMP:  Recent Labs   Lab 10/13/24  0436      K 2.8*      CO2 24   GLU 79   BUN <3*   CREATININE 0.5   CALCIUM 8.1*   PROT 5.4*   ALBUMIN 1.3*   BILITOT 0.5   ALKPHOS 108   AST 12   ALT 8*   ANIONGAP 9

## 2024-10-13 NOTE — PROGRESS NOTES
Pharmacokinetic Assessment Follow Up: IV Vancomycin    Indication: OM, epidural abscess  Maintenance Dose of 1000 mg IV every 12 hours  Trough Goal: 10-15   Current Level: 14.6 mcg/mL (Trough drawn 12 hours after the completion of last infusion)  Renal Function: Stable and at baseline  Draw vancomycin Random / Trough level prior to third / fourth dose on 20:30 at approximately 10/14     Vancomycin serum concentration assessment(s):    The trough level was drawn correctly and can be used to guide therapy at this time. The measurement is within the desired definitive target range of 10 to 15 mcg/mL.    Vancomycin Regimen Plan:    Continue regimen to Vancomycin 1000 mg IV every 12 hours with next serum trough concentration measured at 2030 prior to fourth dose on 10/14    Drug levels (last 3 results):  Recent Labs   Lab Result Units 10/11/24  0824 10/13/24  0940   Vancomycin-Trough ug/mL 14.8 14.6       Pharmacy will continue to follow and monitor vancomycin.    Please contact pharmacy at extension [phone] for questions regarding this assessment.    Thank you for the consult,   Truman Holloway       Patient brief summary:  Mari Sloan is a 49 y.o. female initiated on antimicrobial therapy with IV Vancomycin for treatment of  OM, epidural abscess    Drug Allergies:   Review of patient's allergies indicates:  No Known Allergies    Actual Body Weight:   65.2    Renal Function:   Estimated Creatinine Clearance: 117.9 mL/min (based on SCr of 0.5 mg/dL).,     Dialysis Method (if applicable):  N/A    CBC (last 72 hours):  Recent Labs   Lab Result Units 10/13/24  0436   WBC K/uL 7.20   Hemoglobin g/dL 8.5*   Hematocrit % 27.8*   Platelets K/uL 242   Gran % % 61.8   Lymph % % 26.3   Mono % % 6.1   Eosinophil % % 4.4   Basophil % % 1.1   Differential Method  Automated       Metabolic Panel (last 72 hours):  Recent Labs   Lab Result Units 10/13/24  0436   Sodium mmol/L 139   Potassium mmol/L 2.8*   Chloride mmol/L 106   CO2  mmol/L 24   Glucose mg/dL 79   BUN mg/dL <3*   Creatinine mg/dL 0.5   Albumin g/dL 1.3*   Total Bilirubin mg/dL 0.5   Alkaline Phosphatase U/L 108   AST U/L 12   ALT U/L 8*       Vancomycin Administrations:  vancomycin given in the last 96 hours                     vancomycin (VANCOCIN) 1,000 mg in D5W 250 mL IVPB (admixture device) (mg) 1,000 mg New Bag 10/12/24 2025     1,000 mg New Bag  1002     1,000 mg New Bag 10/11/24 2052     1,000 mg New Bag  0943     1,000 mg New Bag 10/10/24 2047      Restarted  1041     1,000 mg New Bag  0918    vancomycin 1.75 g in 5 % dextrose 500 mL IVPB (mg) 1,750 mg New Bag 10/09/24 2120                    Microbiologic Results:  Microbiology Results (last 7 days)       Procedure Component Value Units Date/Time    Culture, Anaerobe [[id number]] Collected: 10/07/24 1025    Order Status: Completed Specimen: Abscess from Abdomen Updated: 10/11/24 1304     Anaerobic Culture No anaerobes isolated    Culture, Anaerobe [[id number]] Collected: 10/07/24 1007    Order Status: Completed Specimen: Abscess from Abdomen Updated: 10/11/24 1304     Anaerobic Culture No anaerobes isolated    Culture, Body Fluid (Aerobic) w/ GS [[id number]] Collected: 10/10/24 1032    Order Status: Completed Specimen: Body Fluid from Back Updated: 10/11/24 0752     AEROBIC CULTURE - FLUID No growth     Gram Stain Result Rare WBC's      No organisms seen    Narrative:      IR aspiration--superficial site    Aerobic culture [[id number]] Collected: 10/10/24 1032    Order Status: Completed Specimen: Abscess from Back Updated: 10/11/24 0752     Aerobic Bacterial Culture No growth    Narrative:      Deep site    Culture, Anaerobe [[id number]] Collected: 10/10/24 1032    Order Status: Completed Specimen: Abscess from Back Updated: 10/11/24 0613     Anaerobic Culture Culture in progress    Narrative:      Deep site    Culture, Body Fluid (Aerobic) w/ GS [[id number]]     Order Status: Canceled Specimen: Body Fluid     Aerobic  culture [[id number]]  (Abnormal)  (Susceptibility) Collected: 10/07/24 1025    Order Status: Completed Specimen: Abscess from Abdomen Updated: 10/09/24 1502     Aerobic Bacterial Culture STAPHYLOCOCCUS AUREUS  Rare        BACILLUS SPECIES  Rare  Further identified as Bacillus licheniformis      Aerobic culture [[id number]]  (Abnormal)  (Susceptibility) Collected: 10/07/24 1007    Order Status: Completed Specimen: Abscess from Abdomen Updated: 10/09/24 1150     Aerobic Bacterial Culture STAPHYLOCOCCUS AUREUS  Rare      Gram stain [[id number]] Collected: 10/07/24 1007    Order Status: Completed Specimen: Abscess from Abdomen Updated: 10/07/24 1251     Gram Stain Result Moderate  WBC's      Rare Gram positive cocci    Gram stain [[id number]] Collected: 10/07/24 1025    Order Status: Completed Specimen: Abscess from Abdomen Updated: 10/07/24 1248     Gram Stain Result Moderate WBC's      Rare Gram positive cocci    Culture, Anaerobe [[id number]] Collected: 09/30/24 1705    Order Status: Completed Specimen: Abscess from Abdomen Updated: 10/07/24 1000     Anaerobic Culture No anaerobes isolated    Narrative:      L retroperitoneal fluid collection    Fungus culture [[id number]] Collected: 09/21/24 1502    Order Status: Completed Specimen: Body Fluid from Pelvis Updated: 10/07/24 0916     Fungus (Mycology) Culture Culture in progress      No fungus isolated after 2 weeks    Fungus culture [[id number]] Collected: 09/19/24 1515    Order Status: Completed Specimen: Abscess from Back Updated: 10/07/24 0915     Fungus (Mycology) Culture Culture in progress      No fungus isolated after 2 weeks    Narrative:      Subfascial abscess    Fungus culture [[id number]] Collected: 09/19/24 1527    Order Status: Completed Specimen: Abscess from Back Updated: 10/07/24 0915     Fungus (Mycology) Culture Culture in progress      No fungus isolated after 2 weeks    Narrative:      Epidural abscess

## 2024-10-13 NOTE — PLAN OF CARE
Patient aaox4. Patient complains of no pain. Patient placed in potassium once. Patient antibiotics and other due medication administered and patient tolerating well. Patient next vanco trough will be due on 10/14 at 2030. Plan of care reviewed with patient and patient verbalized understanding. Safety precaution observed, call light at reach, bedside commode provided and bed linen changed. Patient made comfortable in bed. No other concerns at this time.         Problem: Skin Injury Risk Increased  Goal: Skin Health and Integrity  Outcome: Progressing     Problem: Adult Inpatient Plan of Care  Goal: Plan of Care Review  Outcome: Progressing  Goal: Patient-Specific Goal (Individualized)  Outcome: Progressing  Goal: Absence of Hospital-Acquired Illness or Injury  Outcome: Progressing  Goal: Optimal Comfort and Wellbeing  Outcome: Progressing  Goal: Readiness for Transition of Care  Outcome: Progressing     Problem: Infection  Goal: Absence of Infection Signs and Symptoms  Outcome: Progressing     Problem: Diabetes Comorbidity  Goal: Blood Glucose Level Within Targeted Range  Outcome: Progressing     Problem: Sepsis/Septic Shock  Goal: Optimal Coping  Outcome: Progressing  Goal: Absence of Bleeding  Outcome: Progressing  Goal: Blood Glucose Level Within Targeted Range  Outcome: Progressing  Goal: Absence of Infection Signs and Symptoms  Outcome: Progressing  Goal: Optimal Nutrition Intake  Outcome: Progressing     Problem: Fall Injury Risk  Goal: Absence of Fall and Fall-Related Injury  Outcome: Progressing     Problem: Wound  Goal: Optimal Coping  Outcome: Progressing  Goal: Optimal Functional Ability  Outcome: Progressing  Goal: Absence of Infection Signs and Symptoms  Outcome: Progressing  Goal: Improved Oral Intake  Outcome: Progressing  Goal: Optimal Pain Control and Function  Outcome: Progressing  Goal: Skin Health and Integrity  Outcome: Progressing  Goal: Optimal Wound Healing  Outcome: Progressing

## 2024-10-13 NOTE — ASSESSMENT & PLAN NOTE
Patient's FSGs are uncontrolled due to hyperglycemia on current medication regimen.  Last A1c reviewed-   Lab Results   Component Value Date    HGBA1C 7.9 (H) 09/17/2024     Most recent fingerstick glucose reviewed-   Recent Labs   Lab 10/12/24  1556 10/13/24  0728   POCTGLUCOSE 127* 110       Current correctional scale  Medium  Maintain anti-hyperglycemic dose as follows-   Antihyperglycemics (From admission, onward)    Start     Stop Route Frequency Ordered    10/02/24 0006  insulin aspart U-100 pen 0-5 Units         -- SubQ Before meals & nightly PRN 10/01/24 2306        Hold Oral hypoglycemics while patient is in the hospital.

## 2024-10-13 NOTE — PLAN OF CARE
Problem: Skin Injury Risk Increased  Goal: Skin Health and Integrity  Outcome: Progressing     Problem: Adult Inpatient Plan of Care  Goal: Plan of Care Review  Outcome: Progressing  Goal: Patient-Specific Goal (Individualized)  Outcome: Progressing  Goal: Absence of Hospital-Acquired Illness or Injury  Outcome: Progressing  Goal: Optimal Comfort and Wellbeing  Outcome: Progressing  Goal: Readiness for Transition of Care  Outcome: Progressing     Problem: Infection  Goal: Absence of Infection Signs and Symptoms  Outcome: Progressing     Problem: Diabetes Comorbidity  Goal: Blood Glucose Level Within Targeted Range  Outcome: Progressing     Problem: Sepsis/Septic Shock  Goal: Optimal Coping  Outcome: Progressing  Goal: Absence of Bleeding  Outcome: Progressing  Goal: Blood Glucose Level Within Targeted Range  Outcome: Progressing  Goal: Absence of Infection Signs and Symptoms  Outcome: Progressing  Goal: Optimal Nutrition Intake  Outcome: Progressing     Problem: Fall Injury Risk  Goal: Absence of Fall and Fall-Related Injury  Outcome: Progressing     Problem: Wound  Goal: Optimal Coping  Outcome: Progressing  Goal: Optimal Functional Ability  Outcome: Progressing  Goal: Absence of Infection Signs and Symptoms  Outcome: Progressing  Goal: Improved Oral Intake  Outcome: Progressing  Goal: Optimal Pain Control and Function  Outcome: Progressing  Goal: Skin Health and Integrity  Outcome: Progressing  Goal: Optimal Wound Healing  Outcome: Progressing      generally intact

## 2024-10-13 NOTE — PROGRESS NOTES
[facility] - Stepdown Flex ([place] [address])  [facility] Medicine  Progress Note    Patient Name: Mari Sloan  MRN: [id number]  Patient Class: IP- Inpatient   Admission Date: 9/16/2024  Length of Stay: 27 days  Attending Physician: Jocelyn Hernandez MD  Primary Care Provider: Mary Fong APRN        Subjective:     Principal Problem:Paraspinal abscess        HPI:  By Dr. Alberta Reese MD    49 y.o.  woman with h/o homelessness (recently was able to obtain living arrangements but states she was living [place] the bridge), NIDDM type 2, Essential hypertension, and substance use (denies any IVDU in he past or any illicit drug use recenly, denies ETOH abuse/overuse) presents to [facility] for further evaluation of her back pain.  She apparently presented to the [facility] Emergency Department on September 16 with nausea and vomiting and a mechanical fall 5 days prior. She reported pain worse in her back and on her sides radiating down both legs. She noted muscle spasm in her back and legs. She had no head trauma or loss of consciousness.  W/u in the [facility] ED noted significant hypokalemia, hypomagnesemia, severe anemia, metabolic alkalosis, and hyperglycemia. Vitals signs stable with no sepsis at this time noted. SBP>90, HR normal,afebrile.     Imaging studies of her lumbar spine and pelvis were concerning for osteomyelitis/septic arthritis of the left SI joint and L5-S1 along with an abnormal fluid collection extending from T11 through the left iliacus muscle concerning for abscess. Blood cultures sent.  In the emergency department she is receiving IV fluid, Zofran, Zosyn, vancomycin, potassium, magnesium, pain medication, and nausea medication.   She also received 2 units of PRBC for an H/H of 5.7/18.4,     Case discussed with Neurosurgery and Interventional Radiology. Plan would be for transfer to Hospital Medicine at [facility] for IR evaluation of the fluid collection and  "potential sampling of the joint osteomyelitis.  I reviewed the discussions made with the multiple sub specialists regarding transfer of this patient to [facility]: IR/General surgery/Neurosurgery/ER/Internal Med.     Neurosurgery contacted by the  did not feel surgical intervention was needed at this time but would follow along and requested [facility] for further management and IR backup. IR on call apparently read the CT and at the time felt "while the left retroperitoneal fluid collections do appear organized, percutaneous drainage is not advised given the extensive soft tissue infection superficial to these collections.  In addition, there is extensive evidence of soft tissue infection that does not appear organized or percutaneously drainable."     General surgery was consulted to review the case and felt that there was no immediate surgical intervention at this time to be had. I spoke with the on surgeon contacted regarding the location of the fluid collections and inquired if perhaps orthopedic surgery should be consulted to review given the involvement of bony pelvis.      I spoke orthopedic surgery who will see the patient but recommended re-consulting IR here and Neurosurgery given the diskitis/OM L5-S1. (Please see his consult note in the chart)     Repeat labs here again noted for persistently low mag, K, phos.  H/H stable with improved H/H. Pain control improved with Dilaudid.  I consulted ID for further assistance with ABX adjustments and changed her to Meropenem and doxy as well as continued Vanc. Echo ordred for am.     CT lumbar spine and pelvis had findings most concerning for infectious process. There were findings concerning for osteomyelitis and septic arthritis in the left SI joint. Findings concerning for osteomyelitis/diskitis L5-S1. Possible osteomyelitis and septic joint at the pubic symphysis. Abnormal fluid collection on the left extending from T11 through the left " iliacus muscle along and within the left iliopsoas muscle most concerning for abscess. Multiple additional small abscesses suspected in the pelvis. Multifocal collections of air in the fluid in the subcutaneous tissues of the left flank, incompletely imaged.    Chest x-ray noted a loop in the central venous catheter. Tip currently at the level of the brachiocephalic vein. Lungs are fairly clear.        Overview/Hospital Course:  Ms. Sloan was transferred from [facility] ED to [facility] ICU on 09/16/2024.  She presented to [facility] ED  for back pain, nausea/vomiting.  CT L-spine revealed osteomyelitis/diskitis L5-S1 along with abnormal fluid collection on left extending from T11 through left iliacus muscle and left iliopsoas muscle concerning for abscess.  Multifocal collections of air in fluid in subcutaneous tissues of left flank.  Patient was initiated on empiric vancomycin and meropenem.  Blood cultures with staph aureus.  Obtain echo.  Unable to repeat blood cultures given shortage of cx.  Neurosurgery recommended to obtain MRI L-spine, pending.  General surgery evaluated the patient and recommended urgent transfer to [facility] for surgical evaluation/source control given extent of necrosis.     Ms. Sloan was transferred to [facility] and admitted to the MICU 9/18 with concern for paraspinal abscess. Infectious workup was ordered. Blood cultures were positive for MSSA bacteremia. Neurosurgery, General Surgery and IR were consulted to evaluate the patient's paraspinal abscess. Neurosurgery performed a L3-L4 laminectomy for epidural abscess evacuation on 9/19/24 and the cultures grew MSSA. TTE showed normal EF of 60-65% with diastolic dysfunction, could not exclude mitral vegetation vs redundant chordae. ID was consulted and recommended Oxacillin and repeat blood cultures. With her electrolyte derangement, and a background of appetitive loss in the past 2 months, there was concern for refeeding syndrome.  On 9/21, IR took the patient for abscess drain placement and aspiration of SI joint. SI joint couldn't be aspirated but retroperitoneal abscess was drained of 100cc of purulent fluid. She was successfully extubated 9/22 and was stepped down to Hospital Medicine on 9/24/24. Her drain was removed on 09/27.  Repeat CT abdomen/pelvis showed an ill-defined subcutaneous edema and soft tissue inflammatory change in the surgical bed and subcutaneous soft tissues without discrete organized fluid collection, stable size of 3.6 cm fluid collection along the left posterior pararenal space which appears to communicate with the with the aforementioned collection and fascial thickening.  IR placed drain for retroperitoneal fluid collection on 9/30.  Cultures returned with Staph aureus.  IR suggested to monitor drain output, and consider repeat imaging when output decreases to less than 10 cc in 24 hours to evaluate for possible drainage catheter removal. Repeat CT was ordered on 10/3 as she endorsed worsening pain on her left flank, and minimal drain output which could be removed.  It returned with multiple new and enlargening abscesses.  Discussed with ID, who said to continue Oxacillin.  NSGY, Gen Surg, Ortho and IR were consulted for further recommendations.  MRI complete spine and pelvis were ordered to help guide management.  Gen Surg took her to the OR for an I&D of a left hip abscess on 10/7, where 300cc of purulent material within the subcutaneous space along the left hip was drained and sent for culture.  ID reconsulted.  NeuroIR considering spinal abscess drainage.    10/10-  lumbar abscess aspiration done per IR 10/9. Pt on oxacillin and vanc. ID following. Need f/I on IR  aspiration. Lumbar abscess aspiration completed today.    SNF auth has been approved and the auth expires at midnight 10/11/24.     10/11- need final ID recs. New cultures form 10/10 still in progress. On vanc and oxacillin. oxacillin 2g q4h, duration  8 weeks, tentative end date 11/18.   Continue vancomycin, goal trough 10-15, pharmacy to dose, tentative end date 11/4/24. Pt has capacity. She suggest that she might leave AMA.   10/12- SNF unable to do both antibiotics. Pt here until completion or we find another place. Yesterday she was declining therapies.  VSS.  Lab tomorrow, q 72 hours. K low- will replete. Last mag 1.9.     1/13- pt is refusing therapies. Eating. Reports getting up with her  in the evenings. On vanc until 11/4 and oxacillin until 11/18.       Interval History: see above    Review of Systems   Constitutional:  Positive for activity change, appetite change and fatigue.   HENT:  Negative for trouble swallowing.    Respiratory:  Negative for cough and shortness of breath.    Cardiovascular:  Negative for chest pain and leg swelling.   Gastrointestinal:  Negative for abdominal pain, anal bleeding, constipation, diarrhea and nausea.   Genitourinary:  Negative for difficulty urinating.   Musculoskeletal:  Positive for arthralgias and back pain.   Neurological:  Negative for numbness and headaches.   Psychiatric/Behavioral:  Negative for behavioral problems.      Objective:     Vital Signs (Most Recent):  Temp: 98.1 °F (36.7 °C) (10/13/24 0730)  Pulse: 86 (10/13/24 0730)  Resp: 18 (10/13/24 0730)  BP: (!) 164/90 (10/13/24 0730)  SpO2: 95 % (10/13/24 0421) Vital Signs (24h Range):  Temp:  [98.1 °F (36.7 °C)-99 °F (37.2 °C)] 98.1 °F (36.7 °C)  Pulse:  [86-88] 86  Resp:  [18] 18  SpO2:  [94 %-99 %] 95 %  BP: (128-164)/(80-90) 164/90     Weight: 65.2 kg (143 lb 11.8 oz)  Body mass index is 26.29 kg/m².    Intake/Output Summary (Last 24 hours) at 10/13/2024 0820  Last data filed at 10/13/2024 0522  Gross per 24 hour   Intake 2302.57 ml   Output --   Net 2302.57 ml      Physical Exam  Constitutional:       General: She is not in acute distress.     Appearance: Normal appearance. She is obese.   HENT:      Head: Normocephalic and atraumatic.       Nose: Nose normal.      Mouth/Throat:      Mouth: Mucous membranes are moist.   Eyes:      General: No scleral icterus.     Extraocular Movements: Extraocular movements intact.      Pupils: Pupils are equal, round, and reactive to light.   Cardiovascular:      Rate and Rhythm: Normal rate and regular rhythm.      Pulses: Normal pulses.      Heart sounds: Normal heart sounds. No murmur heard.  Pulmonary:      Effort: Pulmonary effort is normal.      Breath sounds: Normal breath sounds. No wheezing or rhonchi.   Chest:      Chest wall: No tenderness.   Abdominal:      General: Abdomen is flat. Bowel sounds are normal. There is no distension.      Palpations: Abdomen is soft.      Tenderness: There is no abdominal tenderness. There is no right CVA tenderness, left CVA tenderness, guarding or rebound.   Musculoskeletal:         General: No swelling, tenderness, deformity or signs of injury. Normal range of motion.      Cervical back: Normal range of motion and neck supple. No rigidity or tenderness.      Right lower leg: No edema.      Left lower leg: No edema.   Skin:     General: Skin is warm and dry.      Coloration: Skin is not jaundiced or pale.      Findings: No erythema or rash.   Neurological:      General: No focal deficit present.      Mental Status: She is alert and oriented to person, place, and time. Mental status is at baseline.      Cranial Nerves: No cranial nerve deficit.      Motor: No weakness.   Psychiatric:         Mood and Affect: Mood normal.         Behavior: Behavior normal.         Thought Content: Thought content normal.         MELD 3.0: 17 at 9/21/2024  6:13 PM  MELD-Na: 13 at 9/21/2024  6:13 PM  Calculated from:  Serum Creatinine: 0.6 mg/dL (Using min of 1 mg/dL) at 9/21/2024  6:13 PM  Serum Sodium: 138 mmol/L (Using max of 137 mmol/L) at 9/21/2024  6:13 PM  Total Bilirubin: 2.1 mg/dL at 9/21/2024  2:34 AM  Serum Albumin: 1.2 g/dL (Using min of 1.5 g/dL) at 9/21/2024  2:34 AM  INR(ratio):  1.4 at 9/19/2024  3:19 AM  Age at listing (hypothetical): 49 years  Sex: Female at 9/21/2024  6:13 PM      Significant Labs:  CBC:  Recent Labs   Lab 10/13/24  0436   WBC 7.20   HGB 8.5*   HCT 27.8*          CMP:  Recent Labs   Lab 10/13/24  0436      K 2.8*      CO2 24   GLU 79   BUN <3*   CREATININE 0.5   CALCIUM 8.1*   PROT 5.4*   ALBUMIN 1.3*   BILITOT 0.5   ALKPHOS 108   AST 12   ALT 8*   ANIONGAP 9         Assessment/Plan:      * Paraspinal abscess  MSSA Endocarditis  MSSA Paraspinal abscess  MSSA Psoas abscess    Blood cx 9/16 with MSSA  2D echo 9/17:  Cannot entirely exclude mitral vegetation vs redundant chordae. If high clinical suspicion for endocarditis, would rx as such (not clear MARICHUY would be able to exclude vegetation based on TTE findings).   Noted to have extensive fluid collection on left extending from T11 through left iliacus muscle and within the left iliopsoas muscle.  Multifocal collections of air including subcutaneous tissues on the left flank noted.  ID/Neurosurgery consulted.   S/p L3-L5 laminectomy with epidural abscess evacuation 9/19   Ortho consulted. Rec continued abx tx and no further interventions  IR SI joint aspiration and abscess drain placement 9/21. Unable to aspirate joint  IR drain removed on 09/27.    Repeat CT abdomen pelvis 9/28 ordered to look for recollection of fluid showed an ill-defined subcutaneous edema and soft tissue inflammatory change in the surgical bed and subcutaneous soft tissues without discrete organized fluid collection,  Stable size of 3.6 cm fluid collection along the left posterior pararenal space which appears to communicate with the with the aforementioned collection and fascial thickening.   IR placed drain for retroperitoneal fluid collection on 9/30.  Culture with MSSA  ID consulted:  Suggested Oxacillin through 11/18 with repeat imaging prior to completion  Will need LTAC for completion of IV antibiotics given history of  IVDA  PICC placed 9/27  Repeat CRP ordered 10/3 given worsening pain and swelling - CRP down to 32  Repeat CT abdomen/pelvis was ordered to assess for drain removal, that showed worsening abscesses.  Discussed with ID who suggested further source control and to continue Oxacillin   Gen Surg, Ortho, NSGY and IR consulted - Greatly appreciate assistance!  MRI complete spine and pelvis ordered to further determine next steps with multiple abscesses  Gen Surg took her to the OR for an I&D of a left hip abscess on 10/7, where 300cc of purulent material within the subcutaneous space along the left hip was drained and sent for culture.  Cx with Staph aureus  ID reconsulted  NeuroIR considering epidural drainage as NSGY said no intervention and ID feels like she could use more intervention to decrease her infectious burden  10/10- continue oxacillin and vanc, drainage. Lumbar abscess aspiration completed per IR today.  ID following.   10/13- oxacillin 2g q4h, duration 8 weeks, tentative end date 11/18.  vancomycin, goal trough 10-15, pharmacy to dose, tentative end date 11/4/24, Continue wound care.     Uncontrolled type 2 diabetes mellitus with hyperglycemia  Patient's FSGs are uncontrolled due to hyperglycemia on current medication regimen.  Last A1c reviewed-   Lab Results   Component Value Date    HGBA1C 7.9 (H) 09/17/2024     Most recent fingerstick glucose reviewed-   Recent Labs   Lab 10/12/24  1556 10/13/24  0728   POCTGLUCOSE 127* 110       Current correctional scale  Medium  Maintain anti-hyperglycemic dose as follows-   Antihyperglycemics (From admission, onward)      Start     Stop Route Frequency Ordered    10/02/24 0006  insulin aspart U-100 pen 0-5 Units         -- SubQ Before meals & nightly PRN 10/01/24 2306          Hold Oral hypoglycemics while patient is in the hospital.    Retroperitoneal fluid collection  As above    Moderate malnutrition  Nutrition consulted. Most recent weight and BMI monitored-      Measurements:  Wt Readings from Last 1 Encounters:   10/09/24 65.2 kg (143 lb 11.8 oz)   Body mass index is 26.29 kg/m².    Patient has been screened and assessed by RD.    Malnutrition Type:  Context: social/environmental circumstances  Level: moderate    Malnutrition Characteristic Summary:  Weight Loss (Malnutrition): 10% in 6 months  Energy Intake (Malnutrition): less than 75% for greater than 7 days    Interventions/Recommendations (treatment strategy):  1.      Epidural abscess  As above      Refeeding syndrome  In ICU, RESOLVED      Endocarditis  As above      Staphylococcus aureus bacteremia  As above      Sepsis  As above    Severe sepsis  See above    Hepatitis C  Hepatitis C antibody positive.    Obtain HCV RNA. Quant negative    Anemia, unspecified  S/p 2u PRBC transfusion on admit   No signs of acute GI bleed on exam at this time. Denies any hematemesis or hemoptysis.   Obtain iron B12/folate/peripheral smear and LDH/hapto/retic  No evidence of active bleed   Stable    Smoker  Tobacco cessation discussed with patient for greater than 5 minutes.   Offered Nicotine patch while hospitalized  Patient is aware of need to quit tobacco products    History of drug use  On methadone at home, continue    Hypokalemia  Monitor on tele. Replace mg, phos, and K. F/u on repeat labs.   Replete 10/13    Psoas muscle abscess  As above    Other osteomyelitis, multiple sites  As above        VTE Risk Mitigation (From admission, onward)           Ordered     IP VTE HIGH RISK PATIENT  Once         09/22/24 1553                    Discharge Planning   LUH: 10/14/2024     Code Status: Full Code   Is the patient medically ready for discharge?:     Reason for patient still in hospital (select all that apply): Patient trending condition  Discharge Plan A: Skilled Nursing Facility ([facility] [phone])   Discharge Delays: None known at this time      Jocelyn Hernandez MD  Department of Hospital Medicine   [facility]  Stepdown Flex ([place]-14)

## 2024-10-13 NOTE — ASSESSMENT & PLAN NOTE
MSSA Endocarditis  MSSA Paraspinal abscess  MSSA Psoas abscess    Blood cx 9/16 with MSSA  2D echo 9/17:  Cannot entirely exclude mitral vegetation vs redundant chordae. If high clinical suspicion for endocarditis, would rx as such (not clear MARICHUY would be able to exclude vegetation based on TTE findings).   Noted to have extensive fluid collection on left extending from T11 through left iliacus muscle and within the left iliopsoas muscle.  Multifocal collections of air including subcutaneous tissues on the left flank noted.  ID/Neurosurgery consulted.   S/p L3-L5 laminectomy with epidural abscess evacuation 9/19   Ortho consulted. Rec continued abx tx and no further interventions  IR SI joint aspiration and abscess drain placement 9/21. Unable to aspirate joint  IR drain removed on 09/27.    Repeat CT abdomen pelvis 9/28 ordered to look for recollection of fluid showed an ill-defined subcutaneous edema and soft tissue inflammatory change in the surgical bed and subcutaneous soft tissues without discrete organized fluid collection,  Stable size of 3.6 cm fluid collection along the left posterior pararenal space which appears to communicate with the with the aforementioned collection and fascial thickening.   IR placed drain for retroperitoneal fluid collection on 9/30.  Culture with MSSA  ID consulted:  Suggested Oxacillin through 11/18 with repeat imaging prior to completion  Will need LTAC for completion of IV antibiotics given history of IVDA  PICC placed 9/27  Repeat CRP ordered 10/3 given worsening pain and swelling - CRP down to 32  Repeat CT abdomen/pelvis was ordered to assess for drain removal, that showed worsening abscesses.  Discussed with ID who suggested further source control and to continue Oxacillin   Gen Surg, Ortho, NSGY and IR consulted - Greatly appreciate assistance!  MRI complete spine and pelvis ordered to further determine next steps with multiple abscesses  Gen Surg took her to the OR for an  I&D of a left hip abscess on 10/7, where 300cc of purulent material within the subcutaneous space along the left hip was drained and sent for culture.  Cx with Staph aureus  ID reconsulted  NeuroIR considering epidural drainage as NSGY said no intervention and ID feels like she could use more intervention to decrease her infectious burden  10/10- continue oxacillin and vanc, drainage. Lumbar abscess aspiration completed per IR today.  ID following.   10/13- oxacillin 2g q4h, duration 8 weeks, tentative end date 11/18.  vancomycin, goal trough 10-15, pharmacy to dose, tentative end date 11/4/24, Continue wound care.

## 2024-10-14 LAB
ANION GAP SERPL CALC-SCNC: 9 MMOL/L (ref 8–16)
BACTERIA FLD AEROBE CULT: NO GROWTH
BACTERIA SPEC AEROBE CULT: NO GROWTH
BACTERIA SPEC ANAEROBE CULT: NORMAL
BUN SERPL-MCNC: 4 MG/DL (ref 6–20)
CALCIUM SERPL-MCNC: 7.7 MG/DL (ref 8.7–10.5)
CHLORIDE SERPL-SCNC: 106 MMOL/L (ref 95–110)
CO2 SERPL-SCNC: 24 MMOL/L (ref 23–29)
CREAT SERPL-MCNC: 0.8 MG/DL (ref 0.5–1.4)
EST. GFR  (NO RACE VARIABLE): >60 ML/MIN/1.73 M^2
FUNGUS SPEC CULT: NORMAL
FUNGUS SPEC CULT: NORMAL
GLUCOSE SERPL-MCNC: 192 MG/DL (ref 70–110)
GRAM STN SPEC: NORMAL
GRAM STN SPEC: NORMAL
MAGNESIUM SERPL-MCNC: 1.8 MG/DL (ref 1.6–2.6)
POCT GLUCOSE: 169 MG/DL (ref 70–110)
POCT GLUCOSE: 189 MG/DL (ref 70–110)
POCT GLUCOSE: 192 MG/DL (ref 70–110)
POCT GLUCOSE: 98 MG/DL (ref 70–110)
POTASSIUM SERPL-SCNC: 2.8 MMOL/L (ref 3.5–5.1)
SODIUM SERPL-SCNC: 139 MMOL/L (ref 136–145)
VANCOMYCIN TROUGH SERPL-MCNC: 26.5 UG/ML (ref 10–22)

## 2024-10-14 PROCEDURE — 97112 NEUROMUSCULAR REEDUCATION: CPT

## 2024-10-14 PROCEDURE — 20600001 HC STEP DOWN PRIVATE ROOM

## 2024-10-14 PROCEDURE — 83735 ASSAY OF MAGNESIUM: CPT | Performed by: HOSPITALIST

## 2024-10-14 PROCEDURE — 25000003 PHARM REV CODE 250: Performed by: STUDENT IN AN ORGANIZED HEALTH CARE EDUCATION/TRAINING PROGRAM

## 2024-10-14 PROCEDURE — 25000003 PHARM REV CODE 250: Performed by: INTERNAL MEDICINE

## 2024-10-14 PROCEDURE — 63600175 PHARM REV CODE 636 W HCPCS: Performed by: HOSPITALIST

## 2024-10-14 PROCEDURE — 25000003 PHARM REV CODE 250: Performed by: HOSPITALIST

## 2024-10-14 PROCEDURE — 97116 GAIT TRAINING THERAPY: CPT

## 2024-10-14 PROCEDURE — A4216 STERILE WATER/SALINE, 10 ML: HCPCS | Performed by: STUDENT IN AN ORGANIZED HEALTH CARE EDUCATION/TRAINING PROGRAM

## 2024-10-14 PROCEDURE — 63600175 PHARM REV CODE 636 W HCPCS: Performed by: STUDENT IN AN ORGANIZED HEALTH CARE EDUCATION/TRAINING PROGRAM

## 2024-10-14 PROCEDURE — 80048 BASIC METABOLIC PNL TOTAL CA: CPT | Performed by: HOSPITALIST

## 2024-10-14 PROCEDURE — 97535 SELF CARE MNGMENT TRAINING: CPT

## 2024-10-14 PROCEDURE — 80202 ASSAY OF VANCOMYCIN: CPT | Performed by: HOSPITALIST

## 2024-10-14 RX ADMIN — HYDROXYZINE PAMOATE 25 MG: 25 CAPSULE ORAL at 09:10

## 2024-10-14 RX ADMIN — OXACILLIN 2 G: 2 INJECTION, POWDER, FOR SOLUTION INTRAMUSCULAR; INTRAVENOUS at 04:10

## 2024-10-14 RX ADMIN — OXYCODONE HYDROCHLORIDE 20 MG: 10 TABLET ORAL at 09:10

## 2024-10-14 RX ADMIN — ONDANSETRON 4 MG: 2 INJECTION INTRAMUSCULAR; INTRAVENOUS at 07:10

## 2024-10-14 RX ADMIN — GABAPENTIN 300 MG: 300 CAPSULE ORAL at 08:10

## 2024-10-14 RX ADMIN — FOLIC ACID 1 MG: 1 TABLET ORAL at 08:10

## 2024-10-14 RX ADMIN — OXACILLIN 2 G: 2 INJECTION, POWDER, FOR SOLUTION INTRAMUSCULAR; INTRAVENOUS at 12:10

## 2024-10-14 RX ADMIN — OXYCODONE HYDROCHLORIDE 20 MG: 10 TABLET ORAL at 12:10

## 2024-10-14 RX ADMIN — OXACILLIN 2 G: 2 INJECTION, POWDER, FOR SOLUTION INTRAMUSCULAR; INTRAVENOUS at 06:10

## 2024-10-14 RX ADMIN — Medication 100 MG: at 08:10

## 2024-10-14 RX ADMIN — Medication 10 ML: at 12:10

## 2024-10-14 RX ADMIN — GABAPENTIN 300 MG: 300 CAPSULE ORAL at 02:10

## 2024-10-14 RX ADMIN — METHADONE HYDROCHLORIDE 70 MG: 10 TABLET ORAL at 08:10

## 2024-10-14 RX ADMIN — GABAPENTIN 300 MG: 300 CAPSULE ORAL at 09:10

## 2024-10-14 RX ADMIN — Medication 10 ML: at 06:10

## 2024-10-14 RX ADMIN — VANCOMYCIN HYDROCHLORIDE 1000 MG: 1 INJECTION, POWDER, LYOPHILIZED, FOR SOLUTION INTRAVENOUS at 09:10

## 2024-10-14 RX ADMIN — MELATONIN TAB 3 MG 6 MG: 3 TAB at 09:10

## 2024-10-14 RX ADMIN — OXYCODONE HYDROCHLORIDE 20 MG: 10 TABLET ORAL at 06:10

## 2024-10-14 RX ADMIN — Medication 10 ML: at 04:10

## 2024-10-14 RX ADMIN — MUPIROCIN: 20 OINTMENT TOPICAL at 09:10

## 2024-10-14 RX ADMIN — OXACILLIN 2 G: 2 INJECTION, POWDER, FOR SOLUTION INTRAMUSCULAR; INTRAVENOUS at 07:10

## 2024-10-14 RX ADMIN — OXACILLIN 2 G: 2 INJECTION, POWDER, FOR SOLUTION INTRAMUSCULAR; INTRAVENOUS at 09:10

## 2024-10-14 NOTE — PLAN OF CARE
Discharge Plan A and Plan B have been determined by review of patient's clinical status, future medical and therapeutic needs, and coverage/benefits for post-acute care in coordination with multidisciplinary team members.      10/14/24 0818   Discharge Reassessment   Assessment Type Discharge Planning Reassessment   Did the patient's condition or plan change since previous assessment? No   Discharge Plan discussed with: Spouse/sig other;Patient   Name(s) and Number(s) Marcelino Sloan (Spouse)  [phone] (Mobile)   Communicated [facility] with patient/caregiver Yes   Discharge Plan A Skilled Nursing Facility  ([facility]  [phone])   DME Needed Upon Discharge  other (see comments)   Transition of Care Barriers Mobility;Substance Abuse   Why the patient remains in the hospital Other (see comment)  ([facility] can not accomodate van trough monitoring)   Post-Acute Status   Post-Acute Authorization Placement   Post-Acute Placement Status Set-up Complete/Auth obtained   Coverage MEDICAID - [facility] COMMUNITY PLAN [facility] ([place] MEDICAID) -   Hospital Resources/Appts/Education Provided Appointments scheduled and added to AVS   Patient choice form signed by patient/caregiver List from CMS Compare;List with quality metrics by geographic area provided   Discharge Delays None known at this time     CM spoke  with patient and left VM on patents spouse cell phone Marcelino Sloan  to discuss discharge planning.  [facility] an not accommodate vanc trough monitoring and patient can not ds home home due due IVDU. Patient will need to remain until completion of IV abx treatment . End date: 11/18/24  Patients plan is to  TBD.   LUH:  11/18/24      Discharge Recommendations: moderate intensity     Discharge Equipment Recommendations:walker, rolling, shower chair     Barriers to discharge:  Decreased caregiver support, Other (Comment) (increased (A) with ADLs and mobility)              TREATMENT PLANS:     Paraspinal abscess  MSSA  Endocarditis  MSSA Paraspinal abscess  MSSA Psoas abscess    Blood cx 9/16 with MSSA   ID/Neurosurgery consulted.   S/p L3-L5 laminectomy with epidural abscess evacuation 9/19   IR placed drain for retroperitoneal fluid collection on 9/30.  Culture with MSSA  ID consulted:  Suggested Oxacillin through 11/18 with repeat imaging prior to completion  Will need LTAC for completion of IV antibiotics given history of IVDA  PICC placed 9/27    Follow up: PCP      Referrals: BETH Elise RN  Case Management  [facility]  [phone]

## 2024-10-14 NOTE — ASSESSMENT & PLAN NOTE
Patient's FSGs are uncontrolled due to hyperglycemia on current medication regimen.  Last A1c reviewed-   Lab Results   Component Value Date    HGBA1C 7.9 (H) 09/17/2024     Most recent fingerstick glucose reviewed-   Recent Labs   Lab 10/13/24  1150 10/13/24  1715 10/14/24  0852   POCTGLUCOSE 244* 109 98       Current correctional scale  Medium  Maintain anti-hyperglycemic dose as follows-   Antihyperglycemics (From admission, onward)    Start     Stop Route Frequency Ordered    10/02/24 0006  insulin aspart U-100 pen 0-5 Units         -- SubQ Before meals & nightly PRN 10/01/24 2306        Hold Oral hypoglycemics while patient is in the hospital.

## 2024-10-14 NOTE — SUBJECTIVE & OBJECTIVE
Interval History: see above    Review of Systems   Constitutional:  Positive for activity change and fatigue.   HENT:  Negative for trouble swallowing.    Respiratory:  Negative for cough and shortness of breath.    Cardiovascular:  Negative for chest pain and leg swelling.   Gastrointestinal:  Negative for abdominal pain, anal bleeding, constipation, diarrhea and nausea.   Genitourinary:  Negative for difficulty urinating.   Musculoskeletal:  Positive for arthralgias and back pain.   Neurological:  Negative for numbness and headaches.   Psychiatric/Behavioral:  Negative for behavioral problems.      Objective:     Vital Signs (Most Recent):  Temp: 98.2 °F (36.8 °C) (10/14/24 0845)  Pulse: 83 (10/14/24 0845)  Resp: 19 (10/14/24 0845)  BP: 137/81 (10/14/24 0845)  SpO2: 98 % (10/14/24 0845) Vital Signs (24h Range):  Temp:  [98.1 °F (36.7 °C)-99.1 °F (37.3 °C)] 98.2 °F (36.8 °C)  Pulse:  [61-87] 83  Resp:  [18-19] 19  SpO2:  [97 %-98 %] 98 %  BP: ()/(54-85) 137/81     Weight: 65.2 kg (143 lb 11.8 oz)  Body mass index is 26.29 kg/m².    Intake/Output Summary (Last 24 hours) at 10/14/2024 0920  Last data filed at 10/14/2024 0056  Gross per 24 hour   Intake 958.02 ml   Output --   Net 958.02 ml      Physical Exam  Constitutional:       General: She is not in acute distress.     Appearance: Normal appearance. She is obese.   HENT:      Head: Normocephalic and atraumatic.      Nose: Nose normal.      Mouth/Throat:      Mouth: Mucous membranes are moist.   Eyes:      General: No scleral icterus.     Extraocular Movements: Extraocular movements intact.      Pupils: Pupils are equal, round, and reactive to light.   Cardiovascular:      Rate and Rhythm: Normal rate and regular rhythm.      Pulses: Normal pulses.      Heart sounds: Normal heart sounds. No murmur heard.  Pulmonary:      Effort: Pulmonary effort is normal.      Breath sounds: Normal breath sounds. No wheezing or rhonchi.   Chest:      Chest wall: No  tenderness.   Abdominal:      General: Abdomen is flat. Bowel sounds are normal. There is no distension.      Palpations: Abdomen is soft.      Tenderness: There is no abdominal tenderness. There is no right CVA tenderness, left CVA tenderness, guarding or rebound.   Musculoskeletal:         General: No swelling, tenderness, deformity or signs of injury. Normal range of motion.      Cervical back: Normal range of motion and neck supple. No rigidity or tenderness.      Right lower leg: No edema.      Left lower leg: No edema.   Skin:     General: Skin is warm and dry.      Coloration: Skin is not jaundiced or pale.      Findings: No erythema or rash.   Neurological:      General: No focal deficit present.      Mental Status: She is alert and oriented to person, place, and time. Mental status is at baseline.      Cranial Nerves: No cranial nerve deficit.      Motor: No weakness.   Psychiatric:         Mood and Affect: Mood normal.         Behavior: Behavior normal.         Thought Content: Thought content normal.         MELD 3.0: 17 at 9/21/2024  6:13 PM  MELD-Na: 13 at 9/21/2024  6:13 PM  Calculated from:  Serum Creatinine: 0.6 mg/dL (Using min of 1 mg/dL) at 9/21/2024  6:13 PM  Serum Sodium: 138 mmol/L (Using max of 137 mmol/L) at 9/21/2024  6:13 PM  Total Bilirubin: 2.1 mg/dL at 9/21/2024  2:34 AM  Serum Albumin: 1.2 g/dL (Using min of 1.5 g/dL) at 9/21/2024  2:34 AM  INR(ratio): 1.4 at 9/19/2024  3:19 AM  Age at listing (hypothetical): 49 years  Sex: Female at 9/21/2024  6:13 PM      Significant Labs:  CBC:  Recent Labs   Lab 10/13/24  0436   WBC 7.20   HGB 8.5*   HCT 27.8*          CMP:  Recent Labs   Lab 10/13/24  0436      K 2.8*      CO2 24   GLU 79   BUN <3*   CREATININE 0.5   CALCIUM 8.1*   PROT 5.4*   ALBUMIN 1.3*   BILITOT 0.5   ALKPHOS 108   AST 12   ALT 8*   ANIONGAP 9

## 2024-10-14 NOTE — PT/OT/SLP PROGRESS
"Occupational Therapy   Co-Treatment    Name: Mari Sloan  MRN: [id number]  Admitting Diagnosis:  Paraspinal abscess  7 Days Post-Op  Pt was co-treated with Physical Therapy to assess abilities and/or deficits for appropriate skilled interventions due to medical complexity, hx of multiple refusals, low endurance, and for increased safety.   Recommendations:     Discharge Recommendations: Low Intensity Therapy   *Updated discharge recommendations to Low 10/14/2024 from Mod Intensity due to improving function and activity tolerance for OOB ADLs. She reports having good social support from home.  Discharge Equipment Recommendations:  walker, rolling, shower chair  Barriers to discharge:   Will need to manage 4 steps to get into the house.   Nursing staff Recommendations: A x1 with RW to the bathroom.    Assessment:     Mari Sloan is a 49 y.o. female with a medical diagnosis of Paraspinal abscess.  She presents with improvements in functional transfers and self initiation with ADLs in acute setting since last successful OT tx session 10 days ago. Performance deficits affecting function are weakness, impaired endurance, impaired self care skills, impaired functional mobility, impaired balance, decreased coordination, decreased upper extremity function, decreased lower extremity function, decreased safety awareness, impaired cognition, gait instability, pain.     Rehab Prognosis:  Good; patient would benefit from acute skilled OT services to address these deficits and reach maximum level of function.       Plan:     Patient to be seen 4 x/week to address the above listed problems via self-care/home management, therapeutic activities, therapeutic exercises, neuromuscular re-education, cognitive retraining, community/work re-entry  Plan of Care Expires: 10/21/24  Plan of Care Reviewed with: patient    Subjective     Chief Complaint: "I don't want to walk in the hallway"  Patient/Family Comments/goals: To return home. "   Pain/Comfort: RN stated that Pt was given pain meds before tx session attempt about 45 minutes ago.   Pain Rating 1: other (see comments) (reported chronic pain but did not quantify.)  Pain Addressed 1: Reposition, Distraction    Objective:     Communicated with: RN prior to session.  Patient found  long sitting in the bed  with telemetry upon OT entry to room.    General Precautions: Standard, fall    Orthopedic Precautions:spinal precautions  Braces: N/A  Respiratory Status: Room air     Occupational Performance:     Bed Mobility:    Patient completed Scooting/Bridging with stand by assistance . SPV transfer from long sitting to EOB sitting.     Functional Mobility/Transfers:  Patient completed Sit <> Stand Transfer with CGA from EOB but required Mod A from the w/c surface on the second trial  with  rolling walker   Patient completed Bed <> Chair Transfer using Step Transfer technique with stand by assistance with rolling walker  Patient completed  Shower Transfer Step Transfer technique with minimum assistance with hand-held assist, rolling walker, and grab bars. Pt required to step up over threshold and needed max cues for sequencing and wide base of support.   Functional Mobility: SBA transfer with RW in the room from bed<>bathroom.     Activities of Daily Living:  Toileting: minimum assistance and min cues while using wet wipes in stance.     Therex:   Pt instructed in 10-20 reps of UE therex with alternating elbow curls, shoulder flexion, and alternating chest presses.      [facility] 6 Click ADL: 21    Treatment & Education:  Pt educated on the following topics:  OT 's plan of care and purpose of visit, ADLs, importance of increased activity in hospital setting, transfer training, bed mobility, body mechanics, modifications/compensatory strategies, sequencing, safety precautions, fall prevention, equipment recommendations, home safety, energy conservation techniques, and to call for assistance with call  button  Understanding was verbalized, however additional teaching warranted.     Patient left sitting edge of bed with all lines intact and call button in reach    GOALS:   Multidisciplinary Problems       Occupational Therapy Goals          Problem: Occupational Therapy    Goal Priority Disciplines Outcome Interventions   Occupational Therapy Goal     OT, PT/OT Progressing    Description: Goals to be met by: 10/21/2024     Patient will increase functional independence with ADLs by performing:    UE Dressing with Set-up Assistance.  LE Dressing with Stand-by Assistance.  Grooming while standing at sink with Supervision.  Toileting from toilet with Supervision for hygiene and clothing management.   Supine to sit with Contact Guard Assistance.  Stand pivot transfers with Supervision.  Toilet transfer to toilet with Supervision.    DME Justifications:    Rolling Walker- Patient demonstrates a mobility limitation that significantly impairs their ability to participate in one or more mobility related activities of daily living. Patient's mobility limitation cannot be sufficiently resolved with the use of a cane, but can be sufficiently resolved with the use of a rolling walker.The use of a rolling walker will considerably improve their ability to participate in MRADLs. Patient will use the walker on a regular basis at home.                           Time Tracking:     OT Date of Treatment: 10/14/24  OT Start Time: 1304  OT Stop Time: 1328  OT Total Time (min): 24 min    Billable Minutes:Self Care/Home Management 24    OT/ANABEL: OT     Number of ANABEL visits since last OT visit: 1    10/14/2024

## 2024-10-14 NOTE — ASSESSMENT & PLAN NOTE
MSSA Endocarditis  MSSA Paraspinal abscess  MSSA Psoas abscess    Blood cx 9/16 with MSSA  2D echo 9/17:  Cannot entirely exclude mitral vegetation vs redundant chordae. If high clinical suspicion for endocarditis, would rx as such (not clear MARICHUY would be able to exclude vegetation based on TTE findings).   Noted to have extensive fluid collection on left extending from T11 through left iliacus muscle and within the left iliopsoas muscle.  Multifocal collections of air including subcutaneous tissues on the left flank noted.  ID/Neurosurgery consulted.   S/p L3-L5 laminectomy with epidural abscess evacuation 9/19   Ortho consulted. Rec continued abx tx and no further interventions  IR SI joint aspiration and abscess drain placement 9/21. Unable to aspirate joint  IR drain removed on 09/27.    Repeat CT abdomen pelvis 9/28 ordered to look for recollection of fluid showed an ill-defined subcutaneous edema and soft tissue inflammatory change in the surgical bed and subcutaneous soft tissues without discrete organized fluid collection,  Stable size of 3.6 cm fluid collection along the left posterior pararenal space which appears to communicate with the with the aforementioned collection and fascial thickening.   IR placed drain for retroperitoneal fluid collection on 9/30.  Culture with MSSA  ID consulted:  Suggested Oxacillin through 11/18 with repeat imaging prior to completion  Will need LTAC for completion of IV antibiotics given history of IVDA  PICC placed 9/27  Repeat CRP ordered 10/3 given worsening pain and swelling - CRP down to 32  Repeat CT abdomen/pelvis was ordered to assess for drain removal, that showed worsening abscesses.  Discussed with ID who suggested further source control and to continue Oxacillin   Gen Surg, Ortho, NSGY and IR consulted - Greatly appreciate assistance!  MRI complete spine and pelvis ordered to further determine next steps with multiple abscesses  Gen Surg took her to the OR for an  I&D of a left hip abscess on 10/7, where 300cc of purulent material within the subcutaneous space along the left hip was drained and sent for culture.  Cx with Staph aureus  ID reconsulted  NeuroIR considering epidural drainage as NSGY said no intervention and ID feels like she could use more intervention to decrease her infectious burden  10/10- continue oxacillin and vanc, drainage. Lumbar abscess aspiration completed per IR today.  ID following.   10/14- oxacillin 2g q4h, duration 8 weeks, tentative end date 11/18.  vancomycin, goal trough 10-15, pharmacy to dose, tentative end date 11/4/24, Continue wound care.

## 2024-10-14 NOTE — PROGRESS NOTES
[facility] ([place] [id number])  [facility] Medicine  Progress Note    Patient Name: Mari Sloan  MRN: [id number]  Patient Class: IP- Inpatient   Admission Date: 9/16/2024  Length of Stay: 28 days  Attending Physician: Jocelyn Hernandez MD  Primary Care Provider: Mary Fong APRN        Subjective:     Principal Problem:Paraspinal abscess        HPI:  By Dr. Alberta Reese MD    49 y.o.  woman with h/o homelessness (recently was able to obtain living arrangements but states she was living [place] the bridge), NIDDM type 2, Essential hypertension, and substance use (denies any IVDU in he past or any illicit drug use recenly, denies ETOH abuse/overuse) presents to [facility] for further evaluation of her back pain.  She apparently presented to the [facility] Emergency Department on September 16 with nausea and vomiting and a mechanical fall 5 days prior. She reported pain worse in her back and on her sides radiating down both legs. She noted muscle spasm in her back and legs. She had no head trauma or loss of consciousness.  W/u in the [facility] ED noted significant hypokalemia, hypomagnesemia, severe anemia, metabolic alkalosis, and hyperglycemia. Vitals signs stable with no sepsis at this time noted. SBP>90, HR normal,afebrile.     Imaging studies of her lumbar spine and pelvis were concerning for osteomyelitis/septic arthritis of the left SI joint and L5-S1 along with an abnormal fluid collection extending from T11 through the left iliacus muscle concerning for abscess. Blood cultures sent.  In the emergency department she is receiving IV fluid, Zofran, Zosyn, vancomycin, potassium, magnesium, pain medication, and nausea medication.   She also received 2 units of PRBC for an H/H of 5.7/18.4,     Case discussed with Neurosurgery and Interventional Radiology. Plan would be for transfer to Hospital Medicine at [facility] for IR evaluation of the fluid collection and  "potential sampling of the joint osteomyelitis.  I reviewed the discussions made with the multiple sub specialists regarding transfer of this patient to [facility]: IR/General surgery/Neurosurgery/ER/Internal Med.     Neurosurgery contacted by the  did not feel surgical intervention was needed at this time but would follow along and requested [facility] for further management and IR backup. IR on call apparently read the CT and at the time felt "while the left retroperitoneal fluid collections do appear organized, percutaneous drainage is not advised given the extensive soft tissue infection superficial to these collections.  In addition, there is extensive evidence of soft tissue infection that does not appear organized or percutaneously drainable."     General surgery was consulted to review the case and felt that there was no immediate surgical intervention at this time to be had. I spoke with the on surgeon contacted regarding the location of the fluid collections and inquired if perhaps orthopedic surgery should be consulted to review given the involvement of bony pelvis.      I spoke orthopedic surgery who will see the patient but recommended re-consulting IR here and Neurosurgery given the diskitis/OM L5-S1. (Please see his consult note in the chart)     Repeat labs here again noted for persistently low mag, K, phos.  H/H stable with improved H/H. Pain control improved with Dilaudid.  I consulted ID for further assistance with ABX adjustments and changed her to Meropenem and doxy as well as continued Vanc. Echo ordred for am.     CT lumbar spine and pelvis had findings most concerning for infectious process. There were findings concerning for osteomyelitis and septic arthritis in the left SI joint. Findings concerning for osteomyelitis/diskitis L5-S1. Possible osteomyelitis and septic joint at the pubic symphysis. Abnormal fluid collection on the left extending from T11 through the left " iliacus muscle along and within the left iliopsoas muscle most concerning for abscess. Multiple additional small abscesses suspected in the pelvis. Multifocal collections of air in the fluid in the subcutaneous tissues of the left flank, incompletely imaged.    Chest x-ray noted a loop in the central venous catheter. Tip currently at the level of the brachiocephalic vein. Lungs are fairly clear.        Overview/Hospital Course:  Ms. Sloan was transferred from [facility] ED to [facility] ICU on 09/16/2024.  She presented to [facility] ED  for back pain, nausea/vomiting.  CT L-spine revealed osteomyelitis/diskitis L5-S1 along with abnormal fluid collection on left extending from T11 through left iliacus muscle and left iliopsoas muscle concerning for abscess.  Multifocal collections of air in fluid in subcutaneous tissues of left flank.  Patient was initiated on empiric vancomycin and meropenem.  Blood cultures with staph aureus.  Obtain echo.  Unable to repeat blood cultures given shortage of cx.  Neurosurgery recommended to obtain MRI L-spine, pending.  General surgery evaluated the patient and recommended urgent transfer to [facility] for surgical evaluation/source control given extent of necrosis.     Ms. Sloan was transferred to [facility] and admitted to the MICU 9/18 with concern for paraspinal abscess. Infectious workup was ordered. Blood cultures were positive for MSSA bacteremia. Neurosurgery, General Surgery and IR were consulted to evaluate the patient's paraspinal abscess. Neurosurgery performed a L3-L4 laminectomy for epidural abscess evacuation on 9/19/24 and the cultures grew MSSA. TTE showed normal EF of 60-65% with diastolic dysfunction, could not exclude mitral vegetation vs redundant chordae. ID was consulted and recommended Oxacillin and repeat blood cultures. With her electrolyte derangement, and a background of appetitive loss in the past 2 months, there was concern for refeeding syndrome.  On 9/21, IR took the patient for abscess drain placement and aspiration of SI joint. SI joint couldn't be aspirated but retroperitoneal abscess was drained of 100cc of purulent fluid. She was successfully extubated 9/22 and was stepped down to Hospital Medicine on 9/24/24. Her drain was removed on 09/27.  Repeat CT abdomen/pelvis showed an ill-defined subcutaneous edema and soft tissue inflammatory change in the surgical bed and subcutaneous soft tissues without discrete organized fluid collection, stable size of 3.6 cm fluid collection along the left posterior pararenal space which appears to communicate with the with the aforementioned collection and fascial thickening.  IR placed drain for retroperitoneal fluid collection on 9/30.  Cultures returned with Staph aureus.  IR suggested to monitor drain output, and consider repeat imaging when output decreases to less than 10 cc in 24 hours to evaluate for possible drainage catheter removal. Repeat CT was ordered on 10/3 as she endorsed worsening pain on her left flank, and minimal drain output which could be removed.  It returned with multiple new and enlargening abscesses.  Discussed with ID, who said to continue Oxacillin.  NSGY, Gen Surg, Ortho and IR were consulted for further recommendations.  MRI complete spine and pelvis were ordered to help guide management.  Gen Surg took her to the OR for an I&D of a left hip abscess on 10/7, where 300cc of purulent material within the subcutaneous space along the left hip was drained and sent for culture.  ID reconsulted.  NeuroIR considering spinal abscess drainage.    10/10-  lumbar abscess aspiration done per IR 10/9. Pt on oxacillin and vanc. ID following. Need f/I on IR  aspiration. Lumbar abscess aspiration completed today.    SNF auth has been approved and the auth expires at midnight 10/11/24.   10/11- need final ID recs. New cultures form 10/10 still in progress. On vanc and oxacillin. oxacillin 2g q4h, duration 8  weeks, tentative end date 11/18.   Continue vancomycin, goal trough 10-15, pharmacy to dose, tentative end date 11/4/24. Pt has capacity. She suggest that she might leave AMA.   10/12- SNF unable to do both antibiotics. Pt here until completion or we find another place. Yesterday she was declining therapies.  VSS.  Lab tomorrow, q 72 hours. K low- will replete. Last mag 1.9.   10/14- pt is refusing therapies. Eating. Reports getting up with her .  On vanc until 11/4 and oxacillin until 11/18.  Bmp, mag to f/u on hypokalemia      Interval History: see above    Review of Systems   Constitutional:  Positive for activity change and fatigue.   HENT:  Negative for trouble swallowing.    Respiratory:  Negative for cough and shortness of breath.    Cardiovascular:  Negative for chest pain and leg swelling.   Gastrointestinal:  Negative for abdominal pain, anal bleeding, constipation, diarrhea and nausea.   Genitourinary:  Negative for difficulty urinating.   Musculoskeletal:  Positive for arthralgias and back pain.   Neurological:  Negative for numbness and headaches.   Psychiatric/Behavioral:  Negative for behavioral problems.      Objective:     Vital Signs (Most Recent):  Temp: 98.2 °F (36.8 °C) (10/14/24 0845)  Pulse: 83 (10/14/24 0845)  Resp: 19 (10/14/24 0845)  BP: 137/81 (10/14/24 0845)  SpO2: 98 % (10/14/24 0845) Vital Signs (24h Range):  Temp:  [98.1 °F (36.7 °C)-99.1 °F (37.3 °C)] 98.2 °F (36.8 °C)  Pulse:  [61-87] 83  Resp:  [18-19] 19  SpO2:  [97 %-98 %] 98 %  BP: ()/(54-85) 137/81     Weight: 65.2 kg (143 lb 11.8 oz)  Body mass index is 26.29 kg/m².    Intake/Output Summary (Last 24 hours) at 10/14/2024 0920  Last data filed at 10/14/2024 0056  Gross per 24 hour   Intake 958.02 ml   Output --   Net 958.02 ml      Physical Exam  Constitutional:       General: She is not in acute distress.     Appearance: Normal appearance. She is obese.   HENT:      Head: Normocephalic and atraumatic.      Nose:  Nose normal.      Mouth/Throat:      Mouth: Mucous membranes are moist.   Eyes:      General: No scleral icterus.     Extraocular Movements: Extraocular movements intact.      Pupils: Pupils are equal, round, and reactive to light.   Cardiovascular:      Rate and Rhythm: Normal rate and regular rhythm.      Pulses: Normal pulses.      Heart sounds: Normal heart sounds. No murmur heard.  Pulmonary:      Effort: Pulmonary effort is normal.      Breath sounds: Normal breath sounds. No wheezing or rhonchi.   Chest:      Chest wall: No tenderness.   Abdominal:      General: Abdomen is flat. Bowel sounds are normal. There is no distension.      Palpations: Abdomen is soft.      Tenderness: There is no abdominal tenderness. There is no right CVA tenderness, left CVA tenderness, guarding or rebound.   Musculoskeletal:         General: No swelling, tenderness, deformity or signs of injury. Normal range of motion.      Cervical back: Normal range of motion and neck supple. No rigidity or tenderness.      Right lower leg: No edema.      Left lower leg: No edema.   Skin:     General: Skin is warm and dry.      Coloration: Skin is not jaundiced or pale.      Findings: No erythema or rash.   Neurological:      General: No focal deficit present.      Mental Status: She is alert and oriented to person, place, and time. Mental status is at baseline.      Cranial Nerves: No cranial nerve deficit.      Motor: No weakness.   Psychiatric:         Mood and Affect: Mood normal.         Behavior: Behavior normal.         Thought Content: Thought content normal.         MELD 3.0: 17 at 9/21/2024  6:13 PM  MELD-Na: 13 at 9/21/2024  6:13 PM  Calculated from:  Serum Creatinine: 0.6 mg/dL (Using min of 1 mg/dL) at 9/21/2024  6:13 PM  Serum Sodium: 138 mmol/L (Using max of 137 mmol/L) at 9/21/2024  6:13 PM  Total Bilirubin: 2.1 mg/dL at 9/21/2024  2:34 AM  Serum Albumin: 1.2 g/dL (Using min of 1.5 g/dL) at 9/21/2024  2:34 AM  INR(ratio): 1.4 at  9/19/2024  3:19 AM  Age at listing (hypothetical): 49 years  Sex: Female at 9/21/2024  6:13 PM      Significant Labs:  CBC:  Recent Labs   Lab 10/13/24  0436   WBC 7.20   HGB 8.5*   HCT 27.8*          CMP:  Recent Labs   Lab 10/13/24  0436      K 2.8*      CO2 24   GLU 79   BUN <3*   CREATININE 0.5   CALCIUM 8.1*   PROT 5.4*   ALBUMIN 1.3*   BILITOT 0.5   ALKPHOS 108   AST 12   ALT 8*   ANIONGAP 9         Assessment/Plan:      * Paraspinal abscess  MSSA Endocarditis  MSSA Paraspinal abscess  MSSA Psoas abscess    Blood cx 9/16 with MSSA  2D echo 9/17:  Cannot entirely exclude mitral vegetation vs redundant chordae. If high clinical suspicion for endocarditis, would rx as such (not clear MARICHUY would be able to exclude vegetation based on TTE findings).   Noted to have extensive fluid collection on left extending from T11 through left iliacus muscle and within the left iliopsoas muscle.  Multifocal collections of air including subcutaneous tissues on the left flank noted.  ID/Neurosurgery consulted.   S/p L3-L5 laminectomy with epidural abscess evacuation 9/19   Ortho consulted. Rec continued abx tx and no further interventions  IR SI joint aspiration and abscess drain placement 9/21. Unable to aspirate joint  IR drain removed on 09/27.    Repeat CT abdomen pelvis 9/28 ordered to look for recollection of fluid showed an ill-defined subcutaneous edema and soft tissue inflammatory change in the surgical bed and subcutaneous soft tissues without discrete organized fluid collection,  Stable size of 3.6 cm fluid collection along the left posterior pararenal space which appears to communicate with the with the aforementioned collection and fascial thickening.   IR placed drain for retroperitoneal fluid collection on 9/30.  Culture with MSSA  ID consulted:  Suggested Oxacillin through 11/18 with repeat imaging prior to completion  Will need LTAC for completion of IV antibiotics given history of IVDA  PICC  placed 9/27  Repeat CRP ordered 10/3 given worsening pain and swelling - CRP down to 32  Repeat CT abdomen/pelvis was ordered to assess for drain removal, that showed worsening abscesses.  Discussed with ID who suggested further source control and to continue Oxacillin   Gen Surg, Ortho, NSGY and IR consulted - Greatly appreciate assistance!  MRI complete spine and pelvis ordered to further determine next steps with multiple abscesses  Gen Surg took her to the OR for an I&D of a left hip abscess on 10/7, where 300cc of purulent material within the subcutaneous space along the left hip was drained and sent for culture.  Cx with Staph aureus  ID reconsulted  NeuroIR considering epidural drainage as NSGY said no intervention and ID feels like she could use more intervention to decrease her infectious burden  10/10- continue oxacillin and vanc, drainage. Lumbar abscess aspiration completed per IR today.  ID following.   10/14- oxacillin 2g q4h, duration 8 weeks, tentative end date 11/18.  vancomycin, goal trough 10-15, pharmacy to dose, tentative end date 11/4/24, Continue wound care.     Uncontrolled type 2 diabetes mellitus with hyperglycemia  Patient's FSGs are uncontrolled due to hyperglycemia on current medication regimen.  Last A1c reviewed-   Lab Results   Component Value Date    HGBA1C 7.9 (H) 09/17/2024     Most recent fingerstick glucose reviewed-   Recent Labs   Lab 10/13/24  1150 10/13/24  1715 10/14/24  0852   POCTGLUCOSE 244* 109 98       Current correctional scale  Medium  Maintain anti-hyperglycemic dose as follows-   Antihyperglycemics (From admission, onward)      Start     Stop Route Frequency Ordered    10/02/24 0006  insulin aspart U-100 pen 0-5 Units         -- SubQ Before meals & nightly PRN 10/01/24 2306          Hold Oral hypoglycemics while patient is in the hospital.    Retroperitoneal fluid collection  As above    Moderate malnutrition  Nutrition consulted. Most recent weight and BMI  monitored-     Measurements:  Wt Readings from Last 1 Encounters:   10/09/24 65.2 kg (143 lb 11.8 oz)   Body mass index is 26.29 kg/m².    Patient has been screened and assessed by RD.    Malnutrition Type:  Context: social/environmental circumstances  Level: moderate    Malnutrition Characteristic Summary:  Weight Loss (Malnutrition): 10% in 6 months  Energy Intake (Malnutrition): less than 75% for greater than 7 days    Interventions/Recommendations (treatment strategy):  1.      Epidural abscess  As above      Refeeding syndrome  In ICU, RESOLVED      Endocarditis  As above      Staphylococcus aureus bacteremia  As above      Sepsis  As above    Severe sepsis  See above    Hepatitis C  Hepatitis C antibody positive.    Obtain HCV RNA. Quant negative    Anemia, unspecified  S/p 2u PRBC transfusion on admit   No signs of acute GI bleed on exam at this time. Denies any hematemesis or hemoptysis.   Obtain iron B12/folate/peripheral smear and LDH/hapto/retic  No evidence of active bleed   Stable    Smoker  Tobacco cessation discussed with patient for greater than 5 minutes.   Offered Nicotine patch while hospitalized  Patient is aware of need to quit tobacco products    History of drug use  On methadone at home, continue    Hypokalemia  Monitor on tele. Replace mg, phos, and K. F/u on repeat labs.   Replete 10/13    Psoas muscle abscess  As above    Other osteomyelitis, multiple sites  As above        VTE Risk Mitigation (From admission, onward)           Ordered     IP VTE HIGH RISK PATIENT  Once         09/22/24 1553                    Discharge Planning   [facility]: 11/18/2024     Code Status: Full Code   Is the patient medically ready for discharge?:     Reason for patient still in hospital (select all that apply): Patient trending condition  Discharge Plan A: Skilled Nursing Facility ([facility]  [phone])   Discharge Delays: None known at this time        Jocelyn Hernadnez MD  Department of Hospital  Medicine   [facility] ([place])

## 2024-10-14 NOTE — PLAN OF CARE
PT Evaluation completed and PT POC established.    Problem: Physical Therapy  Goal: Physical Therapy Goal  Description: Goals to be met by: 10/18/24     Patient will increase functional independence with mobility by performin. Supine to sit with MInimal Assistance MET  1a. Supine <> sit mod I  2. Sit to stand transfer with Minimal Assistance with RW if needed. MET  2a. STS LRAD supvn  3. Bed to chair transfer with Minimal Assistance using Rolling Walker MET  3a. Bed <> chair LRAD supvn  4. Gait  x 30 feet with Minimal Assistance using Rolling Walker. MET   4a. Gait x 100' LRAD supvn  5. Lower extremity exercise program x10 reps per handout, with assistance as needed to increase strength for increased mobility.     DME Justifications (see above for complete DME recommendations)      Rolling Walker- Patient demonstrates a mobility limitation that significantly impairs their ability to participate in one or more mobility related activities of daily living. Patient's mobility limitation cannot be sufficiently resolved with the use of a cane, but can be sufficiently resolved with the use of a rolling walker.The use of a rolling walker will considerably improve their ability to participate in MRADLs. Patient will use the walker on a regular basis at home.        Outcome: Progressing

## 2024-10-14 NOTE — PT/OT/SLP PROGRESS
"Physical Therapy Co-Treatment    Patient Name:  Mari Sloan   MRN:  [id number]    Recommendations:     Discharge Recommendations: Low Intensity Therapy  Discharge Equipment Recommendations: walker, rolling, shower chair  Barriers to discharge: Inaccessible home    Assessment:     Mari Sloan is a 49 y.o. female admitted with a medical diagnosis of Paraspinal abscess.  She presents with the following impairments/functional limitations: weakness, impaired endurance, impaired functional mobility, gait instability, impaired balance, decreased upper extremity function, decreased lower extremity function, pain.    Cooperative, agreeable to PT. Pt hasn't been seen by PT services since 10/03/2024. Pt demonstrated improved activity tolerance and amb distance of 4' + 20' + 20' RW with 1 person for safety. Pt presented with mild antalgic gait however no overt LOB. Unable to recall any spinal precautions. Pt reported 2 sisters will be able to provide assist at home at all times while  works. Pt continues to benefit from skilled PT services while in house in order to address the aforementioned deficits.      Rehab Prognosis: Good; patient would benefit from acute skilled PT services to address these deficits and reach maximum level of function.    Recent Surgery: Procedure(s) (LRB):  Incision and Drainage (Left) 7 Days Post-Op    Plan:     During this hospitalization, patient to be seen 4 x/week to address the identified rehab impairments via gait training, therapeutic activities, therapeutic exercises, neuromuscular re-education and progress toward the following goals:    Plan of Care Expires:  10/18/24    Subjective     "I've been getting up and moving"    Pain/Comfort:  Pain Rating 1:  (not rated)  Location - Orientation 1: generalized  Location 1: back  Pain Addressed 1: Distraction, Reposition, Nurse notified      Objective:     Communicated with RN prior to session.  Patient found  long sitting in bed  with " telemetry upon PT entry to room.     General Precautions: Standard, fall  Orthopedic Precautions: spinal precautions  Braces: N/A  Respiratory Status: Room air     Functional Mobility:  Bed Mobility:     Scooting: supervision  Long sitting to Sit EOB: supervision  Transfers:     Sit to Stand:    EOB: contact guard assistance with rolling walker  Wheelchair: moderate assistance with rolling walker  Bed to Chair: stand by assistance with  rolling walker  using  Step Transfer  Gait: pt amb 4' + 20' + 20' RW SBA and presented with mild antalgic gait, head down, decreased bryce  Step in/out walk-in shower with B UE support Martin for steadying  Balance:   Good sitting balance  Good standing balance    Declined stair training at this time    AM-PAC 6 CLICK MOBILITY  Turning over in bed (including adjusting bedclothes, sheets and blankets)?: 3  Sitting down on and standing up from a chair with arms (e.g., wheelchair, bedside commode, etc.): 3  Moving from lying on back to sitting on the side of the bed?: 3  Moving to and from a bed to a chair (including a wheelchair)?: 3  Need to walk in hospital room?: 3  Climbing 3-5 steps with a railing?: 3  Basic Mobility Total Score: 18       Treatment & Education:  Linens changed  Discussed RW management, fall prevention, and safety  Discussed spinal precautions  Discussed sitting up EOB and/or UIC with RW and RN/staff outside of therapy services    Educated pt on PT role/POC  Educated pt on importance of OOB activity and daily ambulation   Pt educated on proper body mechanics, safety techniques, and energy conservation with PT facilitation and cueing throughout session   Pt verbalized understanding      Patient left sitting edge of bed with all lines intact, call button in reach, RN notified, and OT present..    GOALS:   Multidisciplinary Problems       Physical Therapy Goals          Problem: Physical Therapy    Goal Priority Disciplines Outcome Interventions   Physical Therapy  Goal     PT, PT/OT Progressing    Description: Goals to be met by: 10/18/24     Patient will increase functional independence with mobility by performin. Supine to sit with MInimal Assistance MET  1a. Supine <> sit mod I  2. Sit to stand transfer with Minimal Assistance with RW if needed. MET  2a. STS LRAD supvn  3. Bed to chair transfer with Minimal Assistance using Rolling Walker MET  3a. Bed <> chair LRAD supvn  4. Gait  x 30 feet with Minimal Assistance using Rolling Walker. MET   4a. Gait x 100' LRAD supvn  5. Lower extremity exercise program x10 reps per handout, with assistance as needed to increase strength for increased mobility.     DME Justifications (see above for complete DME recommendations)      Rolling Walker- Patient demonstrates a mobility limitation that significantly impairs their ability to participate in one or more mobility related activities of daily living. Patient's mobility limitation cannot be sufficiently resolved with the use of a cane, but can be sufficiently resolved with the use of a rolling walker.The use of a rolling walker will considerably improve their ability to participate in MRADLs. Patient will use the walker on a regular basis at home.                             Time Tracking:     PT Received On: 10/14/24  PT Start Time: 1305     PT Stop Time: 1329  PT Total Time (min): 24 min     Billable Minutes: Gait Training 14 and Neuromuscular Re-education 10    Co-treatment performed due to patient's multiple deficits requiring two skilled therapists to appropriately and safely assess patient's strength and endurance while facilitating functional tasks in addition to accommodating for patient's activity tolerance.       Treatment Type: Treatment  PT/PTA: PT     Number of PTA visits since last PT visit: 0     10/14/2024

## 2024-10-15 LAB
CREAT SERPL-MCNC: 0.8 MG/DL (ref 0.5–1.4)
EST. GFR  (NO RACE VARIABLE): >60 ML/MIN/1.73 M^2
POCT GLUCOSE: 121 MG/DL (ref 70–110)
POCT GLUCOSE: 149 MG/DL (ref 70–110)
POCT GLUCOSE: 159 MG/DL (ref 70–110)
VANCOMYCIN SERPL-MCNC: 19.4 UG/ML

## 2024-10-15 PROCEDURE — 97116 GAIT TRAINING THERAPY: CPT | Mod: CQ

## 2024-10-15 PROCEDURE — 63600175 PHARM REV CODE 636 W HCPCS: Performed by: HOSPITALIST

## 2024-10-15 PROCEDURE — 25000003 PHARM REV CODE 250: Performed by: HOSPITALIST

## 2024-10-15 PROCEDURE — A4216 STERILE WATER/SALINE, 10 ML: HCPCS | Performed by: STUDENT IN AN ORGANIZED HEALTH CARE EDUCATION/TRAINING PROGRAM

## 2024-10-15 PROCEDURE — 63600175 PHARM REV CODE 636 W HCPCS: Performed by: STUDENT IN AN ORGANIZED HEALTH CARE EDUCATION/TRAINING PROGRAM

## 2024-10-15 PROCEDURE — 25000003 PHARM REV CODE 250: Performed by: INTERNAL MEDICINE

## 2024-10-15 PROCEDURE — 80202 ASSAY OF VANCOMYCIN: CPT | Performed by: HOSPITALIST

## 2024-10-15 PROCEDURE — 20600001 HC STEP DOWN PRIVATE ROOM

## 2024-10-15 PROCEDURE — 25000003 PHARM REV CODE 250: Performed by: STUDENT IN AN ORGANIZED HEALTH CARE EDUCATION/TRAINING PROGRAM

## 2024-10-15 PROCEDURE — 82565 ASSAY OF CREATININE: CPT | Performed by: HOSPITALIST

## 2024-10-15 RX ORDER — MAGNESIUM SULFATE HEPTAHYDRATE 40 MG/ML
2 INJECTION, SOLUTION INTRAVENOUS ONCE
Status: COMPLETED | OUTPATIENT
Start: 2024-10-15 | End: 2024-10-15

## 2024-10-15 RX ADMIN — Medication 100 MG: at 08:10

## 2024-10-15 RX ADMIN — GABAPENTIN 300 MG: 300 CAPSULE ORAL at 08:10

## 2024-10-15 RX ADMIN — OXACILLIN 2 G: 2 INJECTION, POWDER, FOR SOLUTION INTRAMUSCULAR; INTRAVENOUS at 09:10

## 2024-10-15 RX ADMIN — OXACILLIN 2 G: 2 INJECTION, POWDER, FOR SOLUTION INTRAMUSCULAR; INTRAVENOUS at 05:10

## 2024-10-15 RX ADMIN — GABAPENTIN 300 MG: 300 CAPSULE ORAL at 02:10

## 2024-10-15 RX ADMIN — Medication 10 ML: at 12:10

## 2024-10-15 RX ADMIN — FOLIC ACID 1 MG: 1 TABLET ORAL at 08:10

## 2024-10-15 RX ADMIN — MAGNESIUM SULFATE HEPTAHYDRATE 2 G: 40 INJECTION, SOLUTION INTRAVENOUS at 10:10

## 2024-10-15 RX ADMIN — HYDROXYZINE PAMOATE 25 MG: 25 CAPSULE ORAL at 09:10

## 2024-10-15 RX ADMIN — OXYCODONE HYDROCHLORIDE 20 MG: 10 TABLET ORAL at 09:10

## 2024-10-15 RX ADMIN — Medication 10 ML: at 05:10

## 2024-10-15 RX ADMIN — OXACILLIN 2 G: 2 INJECTION, POWDER, FOR SOLUTION INTRAMUSCULAR; INTRAVENOUS at 12:10

## 2024-10-15 RX ADMIN — ONDANSETRON 4 MG: 2 INJECTION INTRAMUSCULAR; INTRAVENOUS at 02:10

## 2024-10-15 RX ADMIN — POTASSIUM BICARBONATE 50 MEQ: 978 TABLET, EFFERVESCENT ORAL at 12:10

## 2024-10-15 RX ADMIN — METHADONE HYDROCHLORIDE 70 MG: 10 TABLET ORAL at 08:10

## 2024-10-15 RX ADMIN — OXACILLIN 2 G: 2 INJECTION, POWDER, FOR SOLUTION INTRAMUSCULAR; INTRAVENOUS at 02:10

## 2024-10-15 RX ADMIN — GABAPENTIN 300 MG: 300 CAPSULE ORAL at 09:10

## 2024-10-15 RX ADMIN — POTASSIUM BICARBONATE 50 MEQ: 978 TABLET, EFFERVESCENT ORAL at 05:10

## 2024-10-15 NOTE — PROGRESS NOTES
[facility] ([place] [id number])  [facility] Medicine  Progress Note    Patient Name: Mari Sloan  MRN: [id number]  Patient Class: IP- Inpatient   Admission Date: 9/16/2024  Length of Stay: 29 days  Attending Physician: Jocelyn Hernandez MD  Primary Care Provider: Mary Fong APRN        Subjective:     Principal Problem:Paraspinal abscess        HPI:  By Dr. Alberta Reese MD    49 y.o.  woman with h/o homelessness (recently was able to obtain living arrangements but states [place] bridge), NIDDM type 2, Essential hypertension, and substance use (denies any IVDU in he past or any illicit drug use recenly, denies ETOH abuse/overuse) presents to [facility] for further evaluation of her back pain.  She apparently presented to the [facility] Emergency Department on September 16 with nausea and vomiting and a mechanical fall 5 days prior. She reported pain worse in her back and on her sides radiating down both legs. She noted muscle spasm in her back and legs. She had no head trauma or loss of consciousness.  W/u in the [facility] ED noted significant hypokalemia, hypomagnesemia, severe anemia, metabolic alkalosis, and hyperglycemia. Vitals signs stable with no sepsis at this time noted. SBP>90, HR normal,afebrile.     Imaging studies of her lumbar spine and pelvis were concerning for osteomyelitis/septic arthritis of the left SI joint and L5-S1 along with an abnormal fluid collection extending from T11 through the left iliacus muscle concerning for abscess. Blood cultures sent.  In the emergency department she is receiving IV fluid, Zofran, Zosyn, vancomycin, potassium, magnesium, pain medication, and nausea medication.   She also received 2 units of PRBC for an H/H of 5.7/18.4,     Case discussed with Neurosurgery and Interventional Radiology. Plan would be for transfer to Hospital Medicine at [facility] for IR evaluation of the fluid collection and  "potential sampling of the joint osteomyelitis.  I reviewed the discussions made with the multiple sub specialists regarding transfer of this patient to [facility]: IR/General surgery/Neurosurgery/ER/Internal Med.     Neurosurgery contacted by the  did not feel surgical intervention was needed at this time but would follow along and requested [facility] for further management and IR backup. IR on call apparently read the CT and at the time felt "while the left retroperitoneal fluid collections do appear organized, percutaneous drainage is not advised given the extensive soft tissue infection superficial to these collections.  In addition, there is extensive evidence of soft tissue infection that does not appear organized or percutaneously drainable."     General surgery was consulted to review the case and felt that there was no immediate surgical intervention at this time to be had. I spoke with the on surgeon contacted regarding the location of the fluid collections and inquired if perhaps orthopedic surgery should be consulted to review given the involvement of bony pelvis.      I spoke orthopedic surgery who will see the patient but recommended re-consulting IR here and Neurosurgery given the diskitis/OM L5-S1. (Please see his consult note in the chart)     Repeat labs here again noted for persistently low mag, K, phos.  H/H stable with improved H/H. Pain control improved with Dilaudid.  I consulted ID for further assistance with ABX adjustments and changed her to Meropenem and doxy as well as continued Vanc. Echo ordred for am.     CT lumbar spine and pelvis had findings most concerning for infectious process. There were findings concerning for osteomyelitis and septic arthritis in the left SI joint. Findings concerning for osteomyelitis/diskitis L5-S1. Possible osteomyelitis and septic joint at the pubic symphysis. Abnormal fluid collection on the left extending from T11 through the left " iliacus muscle along and within the left iliopsoas muscle most concerning for abscess. Multiple additional small abscesses suspected in the pelvis. Multifocal collections of air in the fluid in the subcutaneous tissues of the left flank, incompletely imaged.    Chest x-ray noted a loop in the central venous catheter. Tip currently at the level of the brachiocephalic vein. Lungs are fairly clear.        Overview/Hospital Course:  Ms. Sloan was transferred from [facility] ED to [facility] ICU on 09/16/2024.  She presented to [facility] ED  for back pain, nausea/vomiting.  CT L-spine revealed osteomyelitis/diskitis L5-S1 along with abnormal fluid collection on left extending from T11 through left iliacus muscle and left iliopsoas muscle concerning for abscess.  Multifocal collections of air in fluid in subcutaneous tissues of left flank.  Patient was initiated on empiric vancomycin and meropenem.  Blood cultures with staph aureus.  Obtain echo.  Unable to repeat blood cultures given shortage of cx.  Neurosurgery recommended to obtain MRI L-spine, pending.  General surgery evaluated the patient and recommended urgent transfer to [facility] for surgical evaluation/source control given extent of necrosis.     Ms. Sloan was transferred to [facility] and admitted to the MICU 9/18 with concern for paraspinal abscess. Infectious workup was ordered. Blood cultures were positive for MSSA bacteremia. Neurosurgery, General Surgery and IR were consulted to evaluate the patient's paraspinal abscess. Neurosurgery performed a L3-L4 laminectomy for epidural abscess evacuation on 9/19/24 and the cultures grew MSSA. TTE showed normal EF of 60-65% with diastolic dysfunction, could not exclude mitral vegetation vs redundant chordae. ID was consulted and recommended Oxacillin and repeat blood cultures. With her electrolyte derangement, and a background of appetitive loss in the past 2 months, there was concern for refeeding syndrome.  On 9/21, IR took the patient for abscess drain placement and aspiration of SI joint. SI joint couldn't be aspirated but retroperitoneal abscess was drained of 100cc of purulent fluid. She was successfully extubated 9/22 and was stepped down to Hospital Medicine on 9/24/24. Her drain was removed on 09/27.  Repeat CT abdomen/pelvis showed an ill-defined subcutaneous edema and soft tissue inflammatory change in the surgical bed and subcutaneous soft tissues without discrete organized fluid collection, stable size of 3.6 cm fluid collection along the left posterior pararenal space which appears to communicate with the with the aforementioned collection and fascial thickening.  IR placed drain for retroperitoneal fluid collection on 9/30.  Cultures returned with Staph aureus.  IR suggested to monitor drain output, and consider repeat imaging when output decreases to less than 10 cc in 24 hours to evaluate for possible drainage catheter removal. Repeat CT was ordered on 10/3 as she endorsed worsening pain on her left flank, and minimal drain output which could be removed.  It returned with multiple new and enlargening abscesses.  Discussed with ID, who said to continue Oxacillin.  NSGY, Gen Surg, Ortho and IR were consulted for further recommendations.  MRI complete spine and pelvis were ordered to help guide management.  Gen Surg took her to the OR for an I&D of a left hip abscess on 10/7, where 300cc of purulent material within the subcutaneous space along the left hip was drained and sent for culture.  ID reconsulted.  NeuroIR considering spinal abscess drainage.    10/10-  lumbar abscess aspiration done per IR 10/9. Pt on oxacillin and vanc. ID following. Need f/I on IR  aspiration. Lumbar abscess aspiration completed today.    SNF auth has been approved and the auth expires at midnight 10/11/24.   10/11- need final ID recs. New cultures form 10/10 still in progress. On vanc and oxacillin. oxacillin 2g q4h, duration 8  weeks, tentative end date 11/18.   Continue vancomycin, goal trough 10-15, pharmacy to dose, tentative end date 11/4/24. Pt has capacity. She suggest that she might leave AMA.   10/12- SNF unable to do both antibiotics. Pt here until completion or we find another place. Yesterday she was declining therapies.  VSS.  Lab tomorrow, q 72 hours. K low- will replete. Last mag 1.9.   10/15- pt is refusing therapies. Eating. Reports getting up with her .  On vanc until 11/4 and oxacillin until 11/18.  Bmp, mag to f/u on hypokalemia. Repleting.       Interval History: see above    Review of Systems   Constitutional:  Positive for activity change and fatigue.   HENT:  Negative for trouble swallowing.    Respiratory:  Negative for cough and shortness of breath.    Cardiovascular:  Negative for chest pain and leg swelling.   Gastrointestinal:  Negative for abdominal pain, anal bleeding, constipation, diarrhea and nausea.   Genitourinary:  Negative for difficulty urinating.   Musculoskeletal:  Positive for arthralgias and back pain.   Neurological:  Negative for numbness and headaches.   Psychiatric/Behavioral:  Negative for behavioral problems.      Objective:     Vital Signs (Most Recent):  Temp: 98.5 °F (36.9 °C) (10/15/24 0816)  Pulse: 69 (10/15/24 0816)  Resp: 18 (10/15/24 0841)  BP: 94/60 (10/15/24 0816)  SpO2: (!) 94 % (10/15/24 0816) Vital Signs (24h Range):  Temp:  [97.9 °F (36.6 °C)-98.5 °F (36.9 °C)] 98.5 °F (36.9 °C)  Pulse:  [58-87] 69  Resp:  [17-19] 18  SpO2:  [94 %-100 %] 94 %  BP: ()/(58-92) 94/60     Weight: 65.2 kg (143 lb 11.8 oz)  Body mass index is 26.29 kg/m².    Intake/Output Summary (Last 24 hours) at 10/15/2024 1011  Last data filed at 10/15/2024 0602  Gross per 24 hour   Intake 1596.88 ml   Output 800 ml   Net 796.88 ml      Physical Exam  Constitutional:       General: She is not in acute distress.     Appearance: Normal appearance. She is obese.   HENT:      Head: Normocephalic and  atraumatic.      Nose: Nose normal.      Mouth/Throat:      Mouth: Mucous membranes are moist.   Eyes:      General: No scleral icterus.     Extraocular Movements: Extraocular movements intact.      Pupils: Pupils are equal, round, and reactive to light.   Cardiovascular:      Rate and Rhythm: Normal rate and regular rhythm.      Pulses: Normal pulses.      Heart sounds: Normal heart sounds. No murmur heard.  Pulmonary:      Effort: Pulmonary effort is normal.      Breath sounds: Normal breath sounds. No wheezing or rhonchi.   Chest:      Chest wall: No tenderness.   Abdominal:      General: Abdomen is flat. Bowel sounds are normal. There is no distension.      Palpations: Abdomen is soft.      Tenderness: There is no abdominal tenderness. There is no right CVA tenderness, left CVA tenderness, guarding or rebound.   Musculoskeletal:         General: No swelling, tenderness, deformity or signs of injury. Normal range of motion.      Cervical back: Normal range of motion and neck supple. No rigidity or tenderness.      Right lower leg: No edema.      Left lower leg: No edema.   Skin:     General: Skin is warm and dry.      Coloration: Skin is not jaundiced or pale.      Findings: No erythema or rash.   Neurological:      General: No focal deficit present.      Mental Status: She is alert and oriented to person, place, and time. Mental status is at baseline.      Cranial Nerves: No cranial nerve deficit.      Motor: No weakness.   Psychiatric:         Mood and Affect: Mood normal.         Behavior: Behavior normal.         Thought Content: Thought content normal.         MELD 3.0: 17 at 9/21/2024  6:13 PM  MELD-Na: 13 at 9/21/2024  6:13 PM  Calculated from:  Serum Creatinine: 0.6 mg/dL (Using min of 1 mg/dL) at 9/21/2024  6:13 PM  Serum Sodium: 138 mmol/L (Using max of 137 mmol/L) at 9/21/2024  6:13 PM  Total Bilirubin: 2.1 mg/dL at 9/21/2024  2:34 AM  Serum Albumin: 1.2 g/dL (Using min of 1.5 g/dL) at 9/21/2024  2:34  "AM  INR(ratio): 1.4 at 9/19/2024  3:19 AM  Age at listing (hypothetical): 49 years  Sex: Female at 9/21/2024  6:13 PM      Significant Labs:  CBC:  No results for input(s): "WBC", "HGB", "HCT", "PLT" in the last 48 hours.      CMP:  Recent Labs   Lab 10/14/24  2101      K 2.8*      CO2 24   *   BUN 4*   CREATININE 0.8   CALCIUM 7.7*   ANIONGAP 9         Assessment/Plan:      * Paraspinal abscess  MSSA Endocarditis  MSSA Paraspinal abscess  MSSA Psoas abscess    Blood cx 9/16 with MSSA  2D echo 9/17:  Cannot entirely exclude mitral vegetation vs redundant chordae. If high clinical suspicion for endocarditis, would rx as such (not clear MARICHUY would be able to exclude vegetation based on TTE findings).   Noted to have extensive fluid collection on left extending from T11 through left iliacus muscle and within the left iliopsoas muscle.  Multifocal collections of air including subcutaneous tissues on the left flank noted.  ID/Neurosurgery consulted.   S/p L3-L5 laminectomy with epidural abscess evacuation 9/19   Ortho consulted. Rec continued abx tx and no further interventions  IR SI joint aspiration and abscess drain placement 9/21. Unable to aspirate joint  IR drain removed on 09/27.    Repeat CT abdomen pelvis 9/28 ordered to look for recollection of fluid showed an ill-defined subcutaneous edema and soft tissue inflammatory change in the surgical bed and subcutaneous soft tissues without discrete organized fluid collection,  Stable size of 3.6 cm fluid collection along the left posterior pararenal space which appears to communicate with the with the aforementioned collection and fascial thickening.   IR placed drain for retroperitoneal fluid collection on 9/30.  Culture with MSSA  ID consulted:  Suggested Oxacillin through 11/18 with repeat imaging prior to completion  Will need LTAC for completion of IV antibiotics given history of IVDA  PICC placed 9/27  Repeat CRP ordered 10/3 given worsening " pain and swelling - CRP down to 32  Repeat CT abdomen/pelvis was ordered to assess for drain removal, that showed worsening abscesses.  Discussed with ID who suggested further source control and to continue Oxacillin   Gen Surg, Ortho, NSGY and IR consulted - Greatly appreciate assistance!  MRI complete spine and pelvis ordered to further determine next steps with multiple abscesses  Gen Surg took her to the OR for an I&D of a left hip abscess on 10/7, where 300cc of purulent material within the subcutaneous space along the left hip was drained and sent for culture.  Cx with Staph aureus  ID reconsulted  NeuroIR considering epidural drainage as NSGY said no intervention and ID feels like she could use more intervention to decrease her infectious burden  10/10-  Lumbar abscess aspiration completed per IR today.    10/15- oxacillin 2g q4h, duration 8 weeks, tentative end date 11/18.  vancomycin, goal trough 10-15, pharmacy to dose, tentative end date 11/4/24, Continue wound care.     Uncontrolled type 2 diabetes mellitus with hyperglycemia  Patient's FSGs are uncontrolled due to hyperglycemia on current medication regimen.  Last A1c reviewed-   Lab Results   Component Value Date    HGBA1C 7.9 (H) 09/17/2024     Most recent fingerstick glucose reviewed-   Recent Labs   Lab 10/14/24  1146 10/14/24  1532 10/14/24  2016 10/15/24  0811   POCTGLUCOSE 189* 169* 192* 121*       Current correctional scale  Medium  Maintain anti-hyperglycemic dose as follows-   Antihyperglycemics (From admission, onward)      Start     Stop Route Frequency Ordered    10/02/24 0006  insulin aspart U-100 pen 0-5 Units         -- SubQ Before meals & nightly PRN 10/01/24 2306          Hold Oral hypoglycemics while patient is in the hospital.    Retroperitoneal fluid collection  As above    Moderate malnutrition  Nutrition consulted. Most recent weight and BMI monitored-     Measurements:  Wt Readings from Last 1 Encounters:   10/09/24 65.2 kg (143  lb 11.8 oz)   Body mass index is 26.29 kg/m².    Patient has been screened and assessed by RD.    Malnutrition Type:  Context: social/environmental circumstances  Level: moderate    Malnutrition Characteristic Summary:  Weight Loss (Malnutrition): 10% in 6 months  Energy Intake (Malnutrition): less than 75% for greater than 7 days    Interventions/Recommendations (treatment strategy):  1.      Epidural abscess  As above      Refeeding syndrome  In ICU, RESOLVED      Endocarditis  As above      Staphylococcus aureus bacteremia  As above      Sepsis  As above    Severe sepsis  See above    Hepatitis C  Hepatitis C antibody positive.    Obtain HCV RNA. Quant negative    Anemia, unspecified  S/p 2u PRBC transfusion on admit   No signs of acute GI bleed on exam at this time. Denies any hematemesis or hemoptysis.   Obtain iron B12/folate/peripheral smear and LDH/hapto/retic  No evidence of active bleed   Stable    Smoker  Tobacco cessation discussed with patient for greater than 5 minutes.   Offered Nicotine patch while hospitalized  Patient is aware of need to quit tobacco products    History of drug use  On methadone 70 mg at home, continue    Hypokalemia  Monitor on tele. Replace mg, phos, and K. F/u on repeat labs.   Replete 10/13, 10/15    Psoas muscle abscess  As above    Other osteomyelitis, multiple sites  As above        VTE Risk Mitigation (From admission, onward)           Ordered     IP VTE HIGH RISK PATIENT  Once         09/22/24 1553                    Discharge Planning   [facility]: 11/18/2024     Code Status: Full Code   Is the patient medically ready for discharge?:     Reason for patient still in hospital (select all that apply): Patient trending condition  Discharge Plan A: Skilled Nursing Facility ([facility]  [phone])   Discharge Delays: None known at this time              Jocelyn Hernandez MD  Department of Hospital Medicine   [facility] ([place]

## 2024-10-15 NOTE — ASSESSMENT & PLAN NOTE
MSSA Endocarditis  MSSA Paraspinal abscess  MSSA Psoas abscess    Blood cx 9/16 with MSSA  2D echo 9/17:  Cannot entirely exclude mitral vegetation vs redundant chordae. If high clinical suspicion for endocarditis, would rx as such (not clear MARICHUY would be able to exclude vegetation based on TTE findings).   Noted to have extensive fluid collection on left extending from T11 through left iliacus muscle and within the left iliopsoas muscle.  Multifocal collections of air including subcutaneous tissues on the left flank noted.  ID/Neurosurgery consulted.   S/p L3-L5 laminectomy with epidural abscess evacuation 9/19   Ortho consulted. Rec continued abx tx and no further interventions  IR SI joint aspiration and abscess drain placement 9/21. Unable to aspirate joint  IR drain removed on 09/27.    Repeat CT abdomen pelvis 9/28 ordered to look for recollection of fluid showed an ill-defined subcutaneous edema and soft tissue inflammatory change in the surgical bed and subcutaneous soft tissues without discrete organized fluid collection,  Stable size of 3.6 cm fluid collection along the left posterior pararenal space which appears to communicate with the with the aforementioned collection and fascial thickening.   IR placed drain for retroperitoneal fluid collection on 9/30.  Culture with MSSA  ID consulted:  Suggested Oxacillin through 11/18 with repeat imaging prior to completion  Will need LTAC for completion of IV antibiotics given history of IVDA  PICC placed 9/27  Repeat CRP ordered 10/3 given worsening pain and swelling - CRP down to 32  Repeat CT abdomen/pelvis was ordered to assess for drain removal, that showed worsening abscesses.  Discussed with ID who suggested further source control and to continue Oxacillin   Gen Surg, Ortho, NSGY and IR consulted - Greatly appreciate assistance!  MRI complete spine and pelvis ordered to further determine next steps with multiple abscesses  Gen Surg took her to the OR for an  I&D of a left hip abscess on 10/7, where 300cc of purulent material within the subcutaneous space along the left hip was drained and sent for culture.  Cx with Staph aureus  ID reconsulted  NeuroIR considering epidural drainage as NSGY said no intervention and ID feels like she could use more intervention to decrease her infectious burden  10/10-  Lumbar abscess aspiration completed per IR today.    10/15- oxacillin 2g q4h, duration 8 weeks, tentative end date 11/18.  vancomycin, goal trough 10-15, pharmacy to dose, tentative end date 11/4/24, Continue wound care.

## 2024-10-15 NOTE — PT/OT/SLP PROGRESS
"Occupational Therapy   Treatment    Name: Mari Sloan  MRN: [id number]  Admitting Diagnosis:  Paraspinal abscess  8 Days Post-Op    Recommendations:     Discharge Recommendations: Low Intensity Therapy  Discharge Equipment Recommendations:  walker, rolling, shower chair  Barriers to discharge:  Decreased caregiver support, Other (Comment) (patient requires increased level of assistance)    Assessment:     Mari Sloan is a 49 y.o. female with a medical diagnosis of Paraspinal abscess.  She presents with the fallowing performance deficits affecting function are weakness, impaired endurance, impaired self care skills, impaired functional mobility, gait instability, impaired balance, pain, decreased safety awareness, decreased lower extremity function, decreased upper extremity function. Multiple attempts to see patient today, on first attempt 10:26 am, patient sleeping and stated that she did not want to participate with therapy due to pain and trying to sleep, despite nurse giving pain medicine earlier in am, patient could have a 2nd dosage but refused. 2nd attempt at 11:57am, patient stated that "you guys aggravate me", explained patient the reason why she is getting therapy services, patient stated that she feels like she no longer needs therapy intervention, that she moves around well more so when her spouse comes during the night to help her transfer to w/c and go outside. Provided education to patient on the importance to continue to participate with therapy in order to progress with functional mobility and with ADLs to gain (I) and return to PLOF. However; patient stated that ounce she is dc from the hospital she will be able to get around better. After multiple encourage patient agreeable to demonstrate transfers OOB and ambulated in hospital room with CGA to SBA with RW. Patient refused to performed functional transfer to toilet or BSC and self-care task. Patient continues to exhibit poor safety " awareness and would benefit from the Low intensity Therapy post acute setting in order to address functional decline with mobility and ADLs in order to return to PLOF.    Rehab Prognosis:  Good; patient would benefit from acute skilled OT services to address these deficits and reach maximum level of function.       Plan:     Patient to be seen 4 x/week to address the above listed problems via self-care/home management, therapeutic activities, therapeutic exercises, neuromuscular re-education  Plan of Care Expires: 10/21/24  Plan of Care Reviewed with: patient    Subjective     Chief Complaint: pain  Patient/Family Comments/goals: to return to PLOF  Pain/Comfort:  Pain Rating 1:  (patient did not rate)  Location - Orientation 1: generalized  Location 1: back  Pain Addressed 1: Reposition, Distraction  Pain Rating Post-Intervention 1:  (patient did rate)    Objective:     Communicated with: Nurse prior to session.  Patient found with bed in chair position with telemetry upon OT entry to room.  A client care conference was completed by the OTR and the MEMBRENO prior to treatment by the MEMBRENO to discuss the patient's POC and current status.   Co-treated with PT due to patient complexity deficits requiring two skilled therapist to appropriately and safely mobilized patient while facilitating functional task in addition to accommodating for patient's activity tolerance.    General Precautions: Standard, fall    Orthopedic Precautions:spinal precautions  Braces: N/A  Respiratory Status: Room air     Occupational Performance:     Bed Mobility:    Patient completed Scooting to EOB with stand by assistance  Patient completed Supine to Sit with SBA assistance  Patient completed Sit to Supine with stand by assistance     Functional Mobility/Transfers:  Patient completed Sit <> Stand Transfer from EOB with stand by assistance  with  rolling walker   Functional Mobility: Patient engaged in functional mobility task throughout hospital  room with the assistance from PT with SBA with RW to maximize functional endurance and standing balance required for home mobility and occupational engagement. Patient required verbal cues to manage walker properly. Patient noticed with  impulsivity and poor safety techs.    Activities of Daily Living:  Decline ADLs    [facility] 6 Click ADL: 21    Treatment & Education:  Discussed OT POC and progress  Educated patient on the importance to continue to perform exercises in order to reduce stiffness and promote joint mobility and blood flow  Addressed patient's questions and concerns within MEMBRENO scope of practice      Patient left HOB elevated with all lines intact and call button in reach    GOALS:   Multidisciplinary Problems       Occupational Therapy Goals          Problem: Occupational Therapy    Goal Priority Disciplines Outcome Interventions   Occupational Therapy Goal     OT, PT/OT Progressing    Description: Goals to be met by: 10/21/2024     Patient will increase functional independence with ADLs by performing:    UE Dressing with Set-up Assistance.  LE Dressing with Stand-by Assistance.  Grooming while standing at sink with Supervision.  Toileting from toilet with Supervision for hygiene and clothing management.   Supine to sit with Contact Guard Assistance.  Stand pivot transfers with Supervision.  Toilet transfer to toilet with Supervision.    DME Justifications:    Rolling Walker- Patient demonstrates a mobility limitation that significantly impairs their ability to participate in one or more mobility related activities of daily living. Patient's mobility limitation cannot be sufficiently resolved with the use of a cane, but can be sufficiently resolved with the use of a rolling walker.The use of a rolling walker will considerably improve their ability to participate in MRADLs. Patient will use the walker on a regular basis at home.                           Time Tracking:     OT Date of Treatment:  10/15/24  OT Start Time: 1157  OT Stop Time: 1209  OT Total Time (min): 12 min    Billable Minutes:Therapeutic Activity 12    OT/ANABEL: ANABEL     Number of ANABEL visits since last OT visit: 1    10/15/2024

## 2024-10-15 NOTE — PLAN OF CARE
Patient aaox4. Patient complains pain once and her prn pain med given. Patient antibiotics and other due medication administered and patient tolerating well. Patient next vanco trough will be due on 10/14 at 2030. Plan of care reviewed with patient and patient verbalized understanding. Safety precaution observed, call light at reach, bedside commode provided and bed linen changed. Patient made comfortable in bed. No other concerns at this time.                     Problem: Skin Injury Risk Increased  Goal: Skin Health and Integrity  Outcome: Progressing     Problem: Adult Inpatient Plan of Care  Goal: Plan of Care Review  Outcome: Progressing  Goal: Patient-Specific Goal (Individualized)  Outcome: Progressing  Goal: Absence of Hospital-Acquired Illness or Injury  Outcome: Progressing  Goal: Optimal Comfort and Wellbeing  Outcome: Progressing  Goal: Readiness for Transition of Care  Outcome: Progressing     Problem: Infection  Goal: Absence of Infection Signs and Symptoms  Outcome: Progressing     Problem: Diabetes Comorbidity  Goal: Blood Glucose Level Within Targeted Range  Outcome: Progressing     Problem: Sepsis/Septic Shock  Goal: Optimal Coping  Outcome: Progressing  Goal: Absence of Bleeding  Outcome: Progressing  Goal: Blood Glucose Level Within Targeted Range  Outcome: Progressing  Goal: Absence of Infection Signs and Symptoms  Outcome: Progressing  Goal: Optimal Nutrition Intake  Outcome: Progressing     Problem: Fall Injury Risk  Goal: Absence of Fall and Fall-Related Injury  Outcome: Progressing     Problem: Wound  Goal: Optimal Coping  Outcome: Progressing  Goal: Optimal Functional Ability  Outcome: Progressing  Goal: Absence of Infection Signs and Symptoms  Outcome: Progressing  Goal: Improved Oral Intake  Outcome: Progressing  Goal: Optimal Pain Control and Function  Outcome: Progressing  Goal: Skin Health and Integrity  Outcome: Progressing  Goal: Optimal Wound Healing  Outcome: Progressing

## 2024-10-15 NOTE — PLAN OF CARE
Discharge Plan A and Plan B have been determined by review of patient's clinical status, future medical and therapeutic needs, and coverage/benefits for post-acute care in coordination with multidisciplinary team members.        10/15/24 1301   Post-Acute Status   Post-Acute Authorization Placement   Post-Acute Placement Status Referrals Sent   Coverage : MEDICAID - [facility] ([place]ICA[place] -   Patient choice form signed by patient/caregiver List from CMS Compare;List with quality metrics by geographic area provided   Discharge Delays None known at this time   Discharge Plan   Discharge Plan A Long-term acute care facility (LTAC)     [id number] pm  CM spoke with Zeb CHIN  with and updated that [facility] not able to accommodate the vanc trough monitoring and provided a list of in network  LTAC facilities. Patient meets criteria with continuous IV abx along with vancomycin IV administration and trough monitoring.  There is no alternative for recommended treatment for bacillus and vancomycin is the drug of choice.     Met with patient  to review discharge recommendation of  LTAC and is agreeable to plan    Patient/family provided list of facilities in-network with patient's payor plan. Providers that are owned, operated, or affiliated with [facility] are included on the list.     Notified that referral sent to below listed facilities from in-network list based on proximity to home/family support:   1.[facility] Phone: [phone]       2. [facility] Phone: [phone]       Patient/family instructed to identify preference.    Preferred Facility: (if more than 1, listed in order of descending preference)  No verbalized preference     If an additional preferred facility not listed above is identified, additional referral to be sent. If above facilities unable to accept, will send additional referrals to in-network  providers.        Nabila Elise RN  Case Management  [facility]  [phone]

## 2024-10-15 NOTE — PT/OT/SLP PROGRESS
Physical Therapy Treatment  Co-treatment with OT due to acuity of condition, level of skilled assist needed for assessment of safety with mobility and potential of not tolerating a second treatment session.     Patient Name:  Mari Sloan   MRN:  [id number]    Recommendations:     Discharge Recommendations: Low Intensity Therapy  Discharge Equipment Recommendations: walker, rolling, shower chair  Barriers to discharge: None    Assessment:     Mari Sloan is a 49 y.o. female admitted with a medical diagnosis of Paraspinal abscess.  She presents with the following impairments/functional limitations: weakness, impaired endurance, impaired self care skills, impaired functional mobility, pain, decreased safety awareness, decreased lower extremity function, decreased upper extremity function, impaired cardiopulmonary response to activity, orthopedic precautions Pt tolerated treatment session fairly well today. Pt requiring max encouragement to participate with therapy today. Pt stating she moves around with her  every night, and that she feels like she doesn't want therapy anymore.  Pt remains appropriate for continued skilled services within the acute environment and goals remain appropriate.   .    Rehab Prognosis: Good; patient would benefit from acute skilled PT services to address these deficits and reach maximum level of function.    Recent Surgery: Procedure(s) (LRB):  Incision and Drainage (Left) 8 Days Post-Op    Plan:     During this hospitalization, patient to be seen 4 x/week to address the identified rehab impairments via gait training, therapeutic activities, therapeutic exercises, neuromuscular re-education and progress toward the following goals:    Plan of Care Expires:  10/18/24    Subjective     Chief Complaint: Back pain   Patient/Family Comments/goals: Pt agreeable to PT with max encouragement   Pain/Comfort:  Pain Rating 1:  (not rated)  Location - Orientation 1: generalized  Location 1:  back  Pain Addressed 1: Reposition, Distraction  Pain Rating Post-Intervention 1:  (not rated)      Objective:     Communicated with Rn prior to session.  Patient found supine with telemetry upon PT entry to room.     General Precautions: Standard, fall  Orthopedic Precautions: spinal precautions  Braces: N/A  Respiratory Status: Room air     Functional Mobility:  Bed Mobility:     Supine to Sit: stand by assistance  Transfers:     Sit to Stand:  stand by assistance with rolling walker  Gait: 30 ft SBA with RW       AM-PAC 6 CLICK MOBILITY  Turning over in bed (including adjusting bedclothes, sheets and blankets)?: 3  Sitting down on and standing up from a chair with arms (e.g., wheelchair, bedside commode, etc.): 3  Moving from lying on back to sitting on the side of the bed?: 3  Moving to and from a bed to a chair (including a wheelchair)?: 3  Need to walk in hospital room?: 3  Climbing 3-5 steps with a railing?: 2  Basic Mobility Total Score: 17       Treatment & Education:  Therapist provided instruction and educated for safety during transfers and gait training. As well as proper body mechanics, energy conservation, and fall prevention strategies during tasks listed above, and the effects of prolonged immobility and the importance of performing EOB/OOB activity and exercises to promote healing and reduce recovery time.       Patient left sitting edge of bed with all lines intact, call button in reach, and RN notified..    GOALS:   Multidisciplinary Problems       Physical Therapy Goals          Problem: Physical Therapy    Goal Priority Disciplines Outcome Interventions   Physical Therapy Goal     PT, PT/OT Progressing    Description: Goals to be met by: 10/18/24     Patient will increase functional independence with mobility by performin. Supine to sit with MInimal Assistance MET  1a. Supine <> sit mod I  2. Sit to stand transfer with Minimal Assistance with RW if needed. MET  2a. STS LRAD supvn  3. Bed  to chair transfer with Minimal Assistance using Rolling Walker MET  3a. Bed <> chair LRAD supvn  4. Gait  x 30 feet with Minimal Assistance using Rolling Walker. MET   4a. Gait x 100' LRAD supvn  5. Lower extremity exercise program x10 reps per handout, with assistance as needed to increase strength for increased mobility.     DME Justifications (see above for complete DME recommendations)      Rolling Walker- Patient demonstrates a mobility limitation that significantly impairs their ability to participate in one or more mobility related activities of daily living. Patient's mobility limitation cannot be sufficiently resolved with the use of a cane, but can be sufficiently resolved with the use of a rolling walker.The use of a rolling walker will considerably improve their ability to participate in MRADLs. Patient will use the walker on a regular basis at home.                             Time Tracking:     PT Received On: 10/15/24  PT Start Time: 1157     PT Stop Time: 1209  PT Total Time (min): 12 min     Billable Minutes: Gait Training 12    Treatment Type: Treatment  PT/PTA: PTA     Number of PTA visits since last PT visit: 1     10/15/2024

## 2024-10-15 NOTE — PROGRESS NOTES
Pharmacokinetic Assessment Follow Up: IV Vancomycin    Vancomycin serum concentration assessment(s):    The trough level was drawn correctly and can be used to guide therapy at this time. The measurement is above the desired definitive target range of 10 to 15 mcg/mL.    Vancomycin Regimen Plan:    Discontinue the scheduled vancomycin regimen and re-dose when the random level is less than 15 mcg/mL, next level to be drawn at 2100 on 10/15.    Drug levels (last 3 results):  Recent Labs   Lab Result Units 10/13/24  0940 10/14/24  2101   Vancomycin-Trough ug/mL 14.6 26.5*       Pharmacy will continue to follow and monitor vancomycin.    Please contact pharmacy or questions regarding this assessment.    Thank you for the consult,   Taya Celis       Patient brief summary:  Mari Sloan is a 49 y.o. female initiated on antimicrobial therapy with IV Vancomycin for treatment of bone/joint infection    The patient's current regimen is 1000 mg q12h.    Drug Allergies:   Review of patient's allergies indicates:  No Known Allergies    Actual Body Weight:   65.2 kg    Renal Function:   Estimated Creatinine Clearance: 73.7 mL/min (based on SCr of 0.8 mg/dL).,       CBC (last 72 hours):  Recent Labs   Lab Result Units 10/13/24  0436   WBC K/uL 7.20   Hemoglobin g/dL 8.5*   Hematocrit % 27.8*   Platelets K/uL 242   Gran % % 61.8   Lymph % % 26.3   Mono % % 6.1   Eosinophil % % 4.4   Basophil % % 1.1   Differential Method  Automated       Metabolic Panel (last 72 hours):  Recent Labs   Lab Result Units 10/13/24  0436 10/14/24  2101   Sodium mmol/L 139 139   Potassium mmol/L 2.8* 2.8*   Chloride mmol/L 106 106   CO2 mmol/L 24 24   Glucose mg/dL 79 192*   BUN mg/dL <3* 4*   Creatinine mg/dL 0.5 0.8   Albumin g/dL 1.3*  --    Total Bilirubin mg/dL 0.5  --    Alkaline Phosphatase U/L 108  --    AST U/L 12  --    ALT U/L 8*  --    Magnesium mg/dL  --  1.8       Vancomycin Administrations:  vancomycin given in the last 96 hours                      vancomycin (VANCOCIN) 1,000 mg in D5W 250 mL IVPB (admixture device) (mg) 1,000 mg New Bag 10/14/24 2102     1,000 mg New Bag  0959     1,000 mg New Bag 10/13/24 2038     1,000 mg New Bag  1129     1,000 mg New Bag 10/12/24 2025     1,000 mg New Bag  1002     1,000 mg New Bag 10/11/24 2052     1,000 mg New Bag  0943                    Microbiologic Results:  Microbiology Results (last 7 days)       Procedure Component Value Units Date/Time    Culture, Anaerobe [[id number]] Collected: 10/10/24 1032    Order Status: Completed Specimen: Abscess from Back Updated: 10/14/24 1059     Anaerobic Culture No anaerobes isolated    Narrative:      Deep site    Fungus culture [[id number]] Collected: 09/30/24 1705    Order Status: Completed Specimen: Abscess from Abdomen Updated: 10/14/24 1022     Fungus (Mycology) Culture Culture in progress      No fungus isolated after 2 weeks    Narrative:      L retroperitoneal fluid collection    Culture, Body Fluid (Aerobic) w/ GS [[id number]] Collected: 10/10/24 1032    Order Status: Completed Specimen: Body Fluid from Back Updated: 10/14/24 0908     AEROBIC CULTURE - FLUID No growth     Gram Stain Result Rare WBC's      No organisms seen    Narrative:      IR aspiration--superficial site    Aerobic culture [[id number]] Collected: 10/10/24 1032    Order Status: Completed Specimen: Abscess from Back Updated: 10/14/24 0900     Aerobic Bacterial Culture No growth    Narrative:      Deep site    Culture, Anaerobe [[id number]] Collected: 10/07/24 1025    Order Status: Completed Specimen: Abscess from Abdomen Updated: 10/11/24 1304     Anaerobic Culture No anaerobes isolated    Culture, Anaerobe [[id number]] Collected: 10/07/24 1007    Order Status: Completed Specimen: Abscess from Abdomen Updated: 10/11/24 1304     Anaerobic Culture No anaerobes isolated    Culture, Body Fluid (Aerobic) w/ GS [[id number]]     Order Status: Canceled Specimen: Body Fluid     Aerobic culture  [[id number]]  (Abnormal)  (Susceptibility) Collected: 10/07/24 1025    Order Status: Completed Specimen: Abscess from Abdomen Updated: 10/09/24 1502     Aerobic Bacterial Culture STAPHYLOCOCCUS AUREUS  Rare        BACILLUS SPECIES  Rare  Further identified as Bacillus licheniformis      Aerobic culture [[id number]]  (Abnormal)  (Susceptibility) Collected: 10/07/24 1007    Order Status: Completed Specimen: Abscess from Abdomen Updated: 10/09/24 1150     Aerobic Bacterial Culture STAPHYLOCOCCUS AUREUS  Rare

## 2024-10-15 NOTE — ASSESSMENT & PLAN NOTE
Patient's FSGs are uncontrolled due to hyperglycemia on current medication regimen.  Last A1c reviewed-   Lab Results   Component Value Date    HGBA1C 7.9 (H) 09/17/2024     Most recent fingerstick glucose reviewed-   Recent Labs   Lab 10/14/24  1146 10/14/24  1532 10/14/24  2016 10/15/24  0811   POCTGLUCOSE 189* 169* 192* 121*       Current correctional scale  Medium  Maintain anti-hyperglycemic dose as follows-   Antihyperglycemics (From admission, onward)    Start     Stop Route Frequency Ordered    10/02/24 0006  insulin aspart U-100 pen 0-5 Units         -- SubQ Before meals & nightly PRN 10/01/24 2306        Hold Oral hypoglycemics while patient is in the hospital.

## 2024-10-15 NOTE — SUBJECTIVE & OBJECTIVE
Interval History: see above    Review of Systems   Constitutional:  Positive for activity change and fatigue.   HENT:  Negative for trouble swallowing.    Respiratory:  Negative for cough and shortness of breath.    Cardiovascular:  Negative for chest pain and leg swelling.   Gastrointestinal:  Negative for abdominal pain, anal bleeding, constipation, diarrhea and nausea.   Genitourinary:  Negative for difficulty urinating.   Musculoskeletal:  Positive for arthralgias and back pain.   Neurological:  Negative for numbness and headaches.   Psychiatric/Behavioral:  Negative for behavioral problems.      Objective:     Vital Signs (Most Recent):  Temp: 98.5 °F (36.9 °C) (10/15/24 0816)  Pulse: 69 (10/15/24 0816)  Resp: 18 (10/15/24 0841)  BP: 94/60 (10/15/24 0816)  SpO2: (!) 94 % (10/15/24 0816) Vital Signs (24h Range):  Temp:  [97.9 °F (36.6 °C)-98.5 °F (36.9 °C)] 98.5 °F (36.9 °C)  Pulse:  [58-87] 69  Resp:  [17-19] 18  SpO2:  [94 %-100 %] 94 %  BP: ()/(58-92) 94/60     Weight: 65.2 kg (143 lb 11.8 oz)  Body mass index is 26.29 kg/m².    Intake/Output Summary (Last 24 hours) at 10/15/2024 1011  Last data filed at 10/15/2024 0602  Gross per 24 hour   Intake 1596.88 ml   Output 800 ml   Net 796.88 ml      Physical Exam  Constitutional:       General: She is not in acute distress.     Appearance: Normal appearance. She is obese.   HENT:      Head: Normocephalic and atraumatic.      Nose: Nose normal.      Mouth/Throat:      Mouth: Mucous membranes are moist.   Eyes:      General: No scleral icterus.     Extraocular Movements: Extraocular movements intact.      Pupils: Pupils are equal, round, and reactive to light.   Cardiovascular:      Rate and Rhythm: Normal rate and regular rhythm.      Pulses: Normal pulses.      Heart sounds: Normal heart sounds. No murmur heard.  Pulmonary:      Effort: Pulmonary effort is normal.      Breath sounds: Normal breath sounds. No wheezing or rhonchi.   Chest:      Chest wall: No  "tenderness.   Abdominal:      General: Abdomen is flat. Bowel sounds are normal. There is no distension.      Palpations: Abdomen is soft.      Tenderness: There is no abdominal tenderness. There is no right CVA tenderness, left CVA tenderness, guarding or rebound.   Musculoskeletal:         General: No swelling, tenderness, deformity or signs of injury. Normal range of motion.      Cervical back: Normal range of motion and neck supple. No rigidity or tenderness.      Right lower leg: No edema.      Left lower leg: No edema.   Skin:     General: Skin is warm and dry.      Coloration: Skin is not jaundiced or pale.      Findings: No erythema or rash.   Neurological:      General: No focal deficit present.      Mental Status: She is alert and oriented to person, place, and time. Mental status is at baseline.      Cranial Nerves: No cranial nerve deficit.      Motor: No weakness.   Psychiatric:         Mood and Affect: Mood normal.         Behavior: Behavior normal.         Thought Content: Thought content normal.         MELD 3.0: 17 at 9/21/2024  6:13 PM  MELD-Na: 13 at 9/21/2024  6:13 PM  Calculated from:  Serum Creatinine: 0.6 mg/dL (Using min of 1 mg/dL) at 9/21/2024  6:13 PM  Serum Sodium: 138 mmol/L (Using max of 137 mmol/L) at 9/21/2024  6:13 PM  Total Bilirubin: 2.1 mg/dL at 9/21/2024  2:34 AM  Serum Albumin: 1.2 g/dL (Using min of 1.5 g/dL) at 9/21/2024  2:34 AM  INR(ratio): 1.4 at 9/19/2024  3:19 AM  Age at listing (hypothetical): 49 years  Sex: Female at 9/21/2024  6:13 PM      Significant Labs:  CBC:  No results for input(s): "WBC", "HGB", "HCT", "PLT" in the last 48 hours.      CMP:  Recent Labs   Lab 10/14/24  2101      K 2.8*      CO2 24   *   BUN 4*   CREATININE 0.8   CALCIUM 7.7*   ANIONGAP 9       "

## 2024-10-15 NOTE — PLAN OF CARE
Problem: Skin Injury Risk Increased  Goal: Skin Health and Integrity  Outcome: Progressing     Problem: Adult Inpatient Plan of Care  Goal: Plan of Care Review  Outcome: Progressing  Goal: Patient-Specific Goal (Individualized)  Outcome: Progressing  Goal: Absence of Hospital-Acquired Illness or Injury  Outcome: Progressing  Goal: Optimal Comfort and Wellbeing  Outcome: Progressing  Goal: Readiness for Transition of Care  Outcome: Progressing     Problem: Infection  Goal: Absence of Infection Signs and Symptoms  Outcome: Progressing     Problem: Diabetes Comorbidity  Goal: Blood Glucose Level Within Targeted Range  Outcome: Progressing     Problem: Sepsis/Septic Shock  Goal: Optimal Coping  Outcome: Progressing  Goal: Absence of Bleeding  Outcome: Progressing  Goal: Blood Glucose Level Within Targeted Range  Outcome: Progressing  Goal: Absence of Infection Signs and Symptoms  Outcome: Progressing  Goal: Optimal Nutrition Intake  Outcome: Progressing     Problem: Fall Injury Risk  Goal: Absence of Fall and Fall-Related Injury  Outcome: Progressing     Problem: Wound  Goal: Optimal Coping  Outcome: Progressing  Goal: Optimal Functional Ability  Outcome: Progressing  Goal: Absence of Infection Signs and Symptoms  Outcome: Progressing  Goal: Improved Oral Intake  Outcome: Progressing  Goal: Optimal Pain Control and Function  Outcome: Progressing  Goal: Skin Health and Integrity  Outcome: Progressing  Goal: Optimal Wound Healing  Outcome: Progressing      Abscess of L. leg few enterobacter cloacae complex Culture showing growth in fluid media only, entorobactor cloacae josef lactate 4.2 left Knee Aspiration- rare enterobacter cloacae complex. troponin-457 Lactate 3.6

## 2024-10-15 NOTE — PLAN OF CARE
Pt had uneventful shift. AAOx4, VSS. Pt with c/o of nausea; PRNs administered. Pt K and Mg low, replacements given. Call light in reach, bed in lowest position.     Problem: Skin Injury Risk Increased  Goal: Skin Health and Integrity  Outcome: Progressing     Problem: Adult Inpatient Plan of Care  Goal: Plan of Care Review  Outcome: Progressing  Goal: Patient-Specific Goal (Individualized)  Outcome: Progressing  Goal: Absence of Hospital-Acquired Illness or Injury  Outcome: Progressing  Goal: Optimal Comfort and Wellbeing  Outcome: Progressing  Goal: Readiness for Transition of Care  Outcome: Progressing     Problem: Infection  Goal: Absence of Infection Signs and Symptoms  Outcome: Progressing     Problem: Diabetes Comorbidity  Goal: Blood Glucose Level Within Targeted Range  Outcome: Progressing     Problem: Sepsis/Septic Shock  Goal: Optimal Coping  Outcome: Progressing  Goal: Absence of Bleeding  Outcome: Progressing  Goal: Blood Glucose Level Within Targeted Range  Outcome: Progressing  Goal: Absence of Infection Signs and Symptoms  Outcome: Progressing  Goal: Optimal Nutrition Intake  Outcome: Progressing     Problem: Fall Injury Risk  Goal: Absence of Fall and Fall-Related Injury  Outcome: Progressing     Problem: Wound  Goal: Optimal Coping  Outcome: Progressing  Goal: Optimal Functional Ability  Outcome: Progressing  Goal: Absence of Infection Signs and Symptoms  Outcome: Progressing  Goal: Improved Oral Intake  Outcome: Progressing  Goal: Optimal Pain Control and Function  Outcome: Progressing  Goal: Skin Health and Integrity  Outcome: Progressing  Goal: Optimal Wound Healing  Outcome: Progressing

## 2024-10-16 LAB
ALBUMIN SERPL BCP-MCNC: 1.4 G/DL (ref 3.5–5.2)
ALP SERPL-CCNC: 135 U/L (ref 55–135)
ALT SERPL W/O P-5'-P-CCNC: 11 U/L (ref 10–44)
ANION GAP SERPL CALC-SCNC: 10 MMOL/L (ref 8–16)
AST SERPL-CCNC: 16 U/L (ref 10–40)
BASOPHILS # BLD AUTO: 0.11 K/UL (ref 0–0.2)
BASOPHILS NFR BLD: 1 % (ref 0–1.9)
BILIRUB SERPL-MCNC: 0.6 MG/DL (ref 0.1–1)
BUN SERPL-MCNC: 5 MG/DL (ref 6–20)
CALCIUM SERPL-MCNC: 8.3 MG/DL (ref 8.7–10.5)
CHLORIDE SERPL-SCNC: 106 MMOL/L (ref 95–110)
CO2 SERPL-SCNC: 24 MMOL/L (ref 23–29)
CREAT SERPL-MCNC: 0.8 MG/DL (ref 0.5–1.4)
DIFFERENTIAL METHOD BLD: ABNORMAL
EOSINOPHIL # BLD AUTO: 0.4 K/UL (ref 0–0.5)
EOSINOPHIL NFR BLD: 3.3 % (ref 0–8)
ERYTHROCYTE [DISTWIDTH] IN BLOOD BY AUTOMATED COUNT: 17.8 % (ref 11.5–14.5)
EST. GFR  (NO RACE VARIABLE): >60 ML/MIN/1.73 M^2
GLUCOSE SERPL-MCNC: 103 MG/DL (ref 70–110)
HCT VFR BLD AUTO: 32.8 % (ref 37–48.5)
HGB BLD-MCNC: 9.9 G/DL (ref 12–16)
IMM GRANULOCYTES # BLD AUTO: 0.05 K/UL (ref 0–0.04)
IMM GRANULOCYTES NFR BLD AUTO: 0.5 % (ref 0–0.5)
LYMPHOCYTES # BLD AUTO: 2.5 K/UL (ref 1–4.8)
LYMPHOCYTES NFR BLD: 22.9 % (ref 18–48)
MCH RBC QN AUTO: 27.5 PG (ref 27–31)
MCHC RBC AUTO-ENTMCNC: 30.2 G/DL (ref 32–36)
MCV RBC AUTO: 91 FL (ref 82–98)
MONOCYTES # BLD AUTO: 0.6 K/UL (ref 0.3–1)
MONOCYTES NFR BLD: 5 % (ref 4–15)
NEUTROPHILS # BLD AUTO: 7.4 K/UL (ref 1.8–7.7)
NEUTROPHILS NFR BLD: 67.3 % (ref 38–73)
NRBC BLD-RTO: 0 /100 WBC
PLATELET # BLD AUTO: 322 K/UL (ref 150–450)
PMV BLD AUTO: 9.8 FL (ref 9.2–12.9)
POCT GLUCOSE: 150 MG/DL (ref 70–110)
POCT GLUCOSE: 155 MG/DL (ref 70–110)
POCT GLUCOSE: 223 MG/DL (ref 70–110)
POTASSIUM SERPL-SCNC: 3.8 MMOL/L (ref 3.5–5.1)
PROT SERPL-MCNC: 6.2 G/DL (ref 6–8.4)
RBC # BLD AUTO: 3.6 M/UL (ref 4–5.4)
SODIUM SERPL-SCNC: 140 MMOL/L (ref 136–145)
VANCOMYCIN SERPL-MCNC: 14 UG/ML
WBC # BLD AUTO: 10.95 K/UL (ref 3.9–12.7)

## 2024-10-16 PROCEDURE — 20600001 HC STEP DOWN PRIVATE ROOM

## 2024-10-16 PROCEDURE — 85025 COMPLETE CBC W/AUTO DIFF WBC: CPT | Performed by: HOSPITALIST

## 2024-10-16 PROCEDURE — 25000003 PHARM REV CODE 250: Performed by: STUDENT IN AN ORGANIZED HEALTH CARE EDUCATION/TRAINING PROGRAM

## 2024-10-16 PROCEDURE — 25000003 PHARM REV CODE 250: Performed by: INTERNAL MEDICINE

## 2024-10-16 PROCEDURE — 80202 ASSAY OF VANCOMYCIN: CPT | Performed by: HOSPITALIST

## 2024-10-16 PROCEDURE — 63600175 PHARM REV CODE 636 W HCPCS: Performed by: STUDENT IN AN ORGANIZED HEALTH CARE EDUCATION/TRAINING PROGRAM

## 2024-10-16 PROCEDURE — A4216 STERILE WATER/SALINE, 10 ML: HCPCS | Performed by: STUDENT IN AN ORGANIZED HEALTH CARE EDUCATION/TRAINING PROGRAM

## 2024-10-16 PROCEDURE — 63600175 PHARM REV CODE 636 W HCPCS: Performed by: HOSPITALIST

## 2024-10-16 PROCEDURE — 25000003 PHARM REV CODE 250: Performed by: HOSPITALIST

## 2024-10-16 PROCEDURE — 80053 COMPREHEN METABOLIC PANEL: CPT | Performed by: HOSPITALIST

## 2024-10-16 RX ADMIN — POTASSIUM BICARBONATE 50 MEQ: 978 TABLET, EFFERVESCENT ORAL at 12:10

## 2024-10-16 RX ADMIN — Medication 100 MG: at 08:10

## 2024-10-16 RX ADMIN — Medication 10 ML: at 12:10

## 2024-10-16 RX ADMIN — OXACILLIN 2 G: 2 INJECTION, POWDER, FOR SOLUTION INTRAMUSCULAR; INTRAVENOUS at 11:10

## 2024-10-16 RX ADMIN — OXYCODONE HYDROCHLORIDE 20 MG: 10 TABLET ORAL at 06:10

## 2024-10-16 RX ADMIN — METHADONE HYDROCHLORIDE 70 MG: 10 TABLET ORAL at 08:10

## 2024-10-16 RX ADMIN — OXACILLIN 2 G: 2 INJECTION, POWDER, FOR SOLUTION INTRAMUSCULAR; INTRAVENOUS at 12:10

## 2024-10-16 RX ADMIN — FOLIC ACID 1 MG: 1 TABLET ORAL at 08:10

## 2024-10-16 RX ADMIN — PROMETHAZINE HYDROCHLORIDE 12.5 MG: 25 INJECTION INTRAMUSCULAR; INTRAVENOUS at 08:10

## 2024-10-16 RX ADMIN — OXACILLIN 2 G: 2 INJECTION, POWDER, FOR SOLUTION INTRAMUSCULAR; INTRAVENOUS at 08:10

## 2024-10-16 RX ADMIN — GABAPENTIN 300 MG: 300 CAPSULE ORAL at 08:10

## 2024-10-16 RX ADMIN — GABAPENTIN 300 MG: 300 CAPSULE ORAL at 03:10

## 2024-10-16 RX ADMIN — OXACILLIN 2 G: 2 INJECTION, POWDER, FOR SOLUTION INTRAMUSCULAR; INTRAVENOUS at 03:10

## 2024-10-16 RX ADMIN — PROMETHAZINE HYDROCHLORIDE 12.5 MG: 25 INJECTION INTRAMUSCULAR; INTRAVENOUS at 09:10

## 2024-10-16 RX ADMIN — HYDROXYZINE PAMOATE 25 MG: 25 CAPSULE ORAL at 08:10

## 2024-10-16 RX ADMIN — Medication 10 ML: at 06:10

## 2024-10-16 RX ADMIN — VANCOMYCIN HYDROCHLORIDE 750 MG: 750 INJECTION, POWDER, LYOPHILIZED, FOR SOLUTION INTRAVENOUS at 12:10

## 2024-10-16 NOTE — PT/OT/SLP DISCHARGE
Occupational Therapy Discharge Summary    Mari Sloan  MRN: [id number]   Principal Problem: Paraspinal abscess      Patient Discharged from acute Occupational Therapy on 10/16/2024.  Please refer to prior OT note dated 10/17/24 for functional status.    Assessment:      Goals partially met. Patient is no longer making progress. Pt has hx of consistent and multiple refusals due to low mood or pain despite encouragement to redirect. When Pt did agree to participate, the tx sessions are limited by low cooperation and poor effort. Her self-limiting behavior is impeding therapy efforts to address residual deficits in acute setting for optimal safety, return to PLOF, and quality of life. Noted performance deficits includes decreased safety awareness, low initiative, decreased balance, gait instability, and impaired self skills. Pt stated herself that she no longer needs skilled therapy services despite educated for its benefits, and is currently satisfied with her now baseline requiring one person assist in the hospital setting.       Objective:     GOALS:   Multidisciplinary Problems       Occupational Therapy Goals       Not on file              Multidisciplinary Problems (Resolved)          Problem: Occupational Therapy    Goal Priority Disciplines Outcome Interventions   Occupational Therapy Goal   (Resolved)     OT, PT/OT  Error    Description: Goals to be met by: 10/21/2024     Patient will increase functional independence with ADLs by performing:    UE Dressing with Set-up Assistance.  LE Dressing with Stand-by Assistance.  Grooming while standing at sink with Supervision.  Toileting from toilet with Supervision for hygiene and clothing management.   Supine to sit with Contact Guard Assistance.  Stand pivot transfers with Supervision.  Toilet transfer to toilet with Supervision.    DME Justifications:    Rolling Walker- Patient demonstrates a mobility limitation that significantly impairs their ability  to participate in one or more mobility related activities of daily living. Patient's mobility limitation cannot be sufficiently resolved with the use of a cane, but can be sufficiently resolved with the use of a rolling walker.The use of a rolling walker will considerably improve their ability to participate in MRADLs. Patient will use the walker on a regular basis at home.                           Reasons for Discontinuation of Therapy Services  Patient declines to continue care. and Patient is unable to continue work toward goals because of medical or psychosocial complications.      Plan:     Recommend discharge with Home with Home Health Service and 24/7 family support.     10/16/2024

## 2024-10-16 NOTE — ASSESSMENT & PLAN NOTE
MSSA Endocarditis  MSSA Paraspinal abscess  MSSA Psoas abscess    Blood cx 9/16 with MSSA  2D echo 9/17:  Cannot entirely exclude mitral vegetation vs redundant chordae. If high clinical suspicion for endocarditis, would rx as such (not clear MARICHUY would be able to exclude vegetation based on TTE findings).   Noted to have extensive fluid collection on left extending from T11 through left iliacus muscle and within the left iliopsoas muscle.  Multifocal collections of air including subcutaneous tissues on the left flank noted.  ID/Neurosurgery consulted.   S/p L3-L5 laminectomy with epidural abscess evacuation 9/19   Ortho consulted. Rec continued abx tx and no further interventions  IR SI joint aspiration and abscess drain placement 9/21. Unable to aspirate joint  IR drain removed on 09/27.    Repeat CT abdomen pelvis 9/28 ordered to look for recollection of fluid showed an ill-defined subcutaneous edema and soft tissue inflammatory change in the surgical bed and subcutaneous soft tissues without discrete organized fluid collection,  Stable size of 3.6 cm fluid collection along the left posterior pararenal space which appears to communicate with the with the aforementioned collection and fascial thickening.   IR placed drain for retroperitoneal fluid collection on 9/30.  Culture with MSSA  ID consulted:  Suggested Oxacillin through 11/18 with repeat imaging prior to completion  Will need LTAC for completion of IV antibiotics given history of IVDA  PICC placed 9/27  Repeat CRP ordered 10/3 given worsening pain and swelling - CRP down to 32  Repeat CT abdomen/pelvis was ordered to assess for drain removal, that showed worsening abscesses.  Discussed with ID who suggested further source control and to continue Oxacillin   Gen Surg, Ortho, NSGY and IR consulted - Greatly appreciate assistance!  MRI complete spine and pelvis ordered to further determine next steps with multiple abscesses  Gen Surg took her to the OR for an  I&D of a left hip abscess on 10/7, where 300cc of purulent material within the subcutaneous space along the left hip was drained and sent for culture.  Cx with Staph aureus  ID reconsulted  NeuroIR considering epidural drainage as NSGY said no intervention and ID feels like she could use more intervention to decrease her infectious burden  10/10-  Lumbar abscess aspiration completed per IR today.    10/16- oxacillin 2g q4h, duration 8 weeks, tentative end date 11/18.  vancomycin, goal trough 10-15, pharmacy to dose, tentative end date 11/4/24, Continue wound care.

## 2024-10-16 NOTE — SUBJECTIVE & OBJECTIVE
Interval History: see above    Review of Systems   Constitutional:  Positive for activity change and fatigue.   HENT:  Negative for trouble swallowing.    Respiratory:  Negative for cough and shortness of breath.    Cardiovascular:  Negative for chest pain and leg swelling.   Gastrointestinal:  Negative for abdominal pain, anal bleeding, constipation, diarrhea and nausea.   Genitourinary:  Negative for difficulty urinating.   Musculoskeletal:  Positive for arthralgias and back pain.   Neurological:  Negative for numbness and headaches.   Psychiatric/Behavioral:  Negative for behavioral problems.      Objective:     Vital Signs (Most Recent):  Temp: 98.5 °F (36.9 °C) (10/16/24 0255)  Pulse: 85 (10/16/24 0255)  Resp: 16 (10/16/24 0611)  BP: 116/75 (10/16/24 0255)  SpO2: 97 % (10/16/24 0255) Vital Signs (24h Range):  Temp:  [98.5 °F (36.9 °C)-99.1 °F (37.3 °C)] 98.5 °F (36.9 °C)  Pulse:  [] 85  Resp:  [16-18] 16  SpO2:  [94 %-99 %] 97 %  BP: ()/(60-89) 116/75     Weight: 65.2 kg (143 lb 11.8 oz)  Body mass index is 26.29 kg/m².    Intake/Output Summary (Last 24 hours) at 10/16/2024 0744  Last data filed at 10/16/2024 0603  Gross per 24 hour   Intake 6366 ml   Output --   Net 6366 ml      Physical Exam  Constitutional:       General: She is not in acute distress.     Appearance: Normal appearance. She is obese.   HENT:      Head: Normocephalic and atraumatic.      Nose: Nose normal.      Mouth/Throat:      Mouth: Mucous membranes are moist.   Eyes:      General: No scleral icterus.     Extraocular Movements: Extraocular movements intact.      Pupils: Pupils are equal, round, and reactive to light.   Cardiovascular:      Rate and Rhythm: Normal rate and regular rhythm.      Pulses: Normal pulses.      Heart sounds: Normal heart sounds. No murmur heard.  Pulmonary:      Effort: Pulmonary effort is normal.      Breath sounds: Normal breath sounds. No wheezing or rhonchi.   Chest:      Chest wall: No tenderness.    Abdominal:      General: Abdomen is flat. Bowel sounds are normal. There is no distension.      Palpations: Abdomen is soft.      Tenderness: There is no abdominal tenderness. There is no right CVA tenderness, left CVA tenderness, guarding or rebound.   Musculoskeletal:         General: No swelling, tenderness, deformity or signs of injury. Normal range of motion.      Cervical back: Normal range of motion and neck supple. No rigidity or tenderness.      Right lower leg: No edema.      Left lower leg: No edema.   Skin:     General: Skin is warm and dry.      Coloration: Skin is not jaundiced or pale.      Findings: No erythema or rash.   Neurological:      General: No focal deficit present.      Mental Status: She is alert and oriented to person, place, and time. Mental status is at baseline.      Cranial Nerves: No cranial nerve deficit.      Motor: No weakness.   Psychiatric:         Mood and Affect: Mood normal.         Behavior: Behavior normal.         Thought Content: Thought content normal.         MELD 3.0: 17 at 9/21/2024  6:13 PM  MELD-Na: 13 at 9/21/2024  6:13 PM  Calculated from:  Serum Creatinine: 0.6 mg/dL (Using min of 1 mg/dL) at 9/21/2024  6:13 PM  Serum Sodium: 138 mmol/L (Using max of 137 mmol/L) at 9/21/2024  6:13 PM  Total Bilirubin: 2.1 mg/dL at 9/21/2024  2:34 AM  Serum Albumin: 1.2 g/dL (Using min of 1.5 g/dL) at 9/21/2024  2:34 AM  INR(ratio): 1.4 at 9/19/2024  3:19 AM  Age at listing (hypothetical): 49 years  Sex: Female at 9/21/2024  6:13 PM      Significant Labs:  CBC:  Recent Labs   Lab 10/16/24  0559   WBC 10.95   HGB 9.9*   HCT 32.8*            CMP:  Recent Labs   Lab 10/14/24  2101 10/15/24  2123 10/16/24  0559     --  140   K 2.8*  --  3.8     --  106   CO2 24  --  24   *  --  103   BUN 4*  --  5*   CREATININE 0.8 0.8 0.8   CALCIUM 7.7*  --  8.3*   PROT  --   --  6.2   ALBUMIN  --   --  1.4*   BILITOT  --   --  0.6   ALKPHOS  --   --  135   AST  --   --   16   ALT  --   --  11   ANIONGAP 9  --  10

## 2024-10-16 NOTE — PLAN OF CARE
PRN pain admin x2; Refused PM wound care; Tmax 99.1F; no further changes     Problem: Skin Injury Risk Increased  Goal: Skin Health and Integrity  Outcome: Progressing     Problem: Adult Inpatient Plan of Care  Goal: Plan of Care Review  Outcome: Progressing  Goal: Patient-Specific Goal (Individualized)  Outcome: Progressing  Goal: Absence of Hospital-Acquired Illness or Injury  Outcome: Progressing  Goal: Optimal Comfort and Wellbeing  Outcome: Progressing  Goal: Readiness for Transition of Care  Outcome: Progressing     Problem: Infection  Goal: Absence of Infection Signs and Symptoms  Outcome: Progressing     Problem: Diabetes Comorbidity  Goal: Blood Glucose Level Within Targeted Range  Outcome: Progressing     Problem: Sepsis/Septic Shock  Goal: Optimal Coping  Outcome: Progressing  Goal: Absence of Bleeding  Outcome: Progressing  Goal: Blood Glucose Level Within Targeted Range  Outcome: Progressing  Goal: Absence of Infection Signs and Symptoms  Outcome: Progressing  Goal: Optimal Nutrition Intake  Outcome: Progressing     Problem: Fall Injury Risk  Goal: Absence of Fall and Fall-Related Injury  Outcome: Progressing     Problem: Wound  Goal: Optimal Coping  Outcome: Progressing  Goal: Optimal Functional Ability  Outcome: Progressing  Goal: Absence of Infection Signs and Symptoms  Outcome: Progressing  Goal: Improved Oral Intake  Outcome: Progressing  Goal: Optimal Pain Control and Function  Outcome: Progressing  Goal: Skin Health and Integrity  Outcome: Progressing  Goal: Optimal Wound Healing  Outcome: Progressing      Pre-operative Diagnosis:  Right SI joint pain     Post-operative Diagnosis: Right SI joint pain     Procedure: Right SI joint injection     Procedure Description:  After having signed the informed consent, the patient was placed in the prone position. The patient's back was prepped with chloraprep solution, and draped in a sterile fashion. A total of 2 ml of 1% lidocaine were used to anesthetize the skin and underlying tissues. Under fluoroscopic guidance a single 22-gauge, 3.5 inch spinal needle was advanced to lie within the inferior pole of the right sacroiliac joint. There were no paresthesias or heme aspiration. Needle placement was confirmed in the AP view. After negative aspiration, 0.5 ml of Omnipaque 300 contrast was injected with appropriate spread observed. A total of 4 ml of 0.5% bupivicaine mixed with 40 mg depo-medrol were injected into the sacroiliac joint. The needle was withdrawn without any complications. The patient tolerated the procedure well, was transported to the recovery room and observed for 15 minutes and discharged in an ambulatory fashion. No immediate reported complications.     Procedural Complications: None      Derik Cherre Phalen, DO  Interventional Pain Management/PM&R   [place]

## 2024-10-16 NOTE — PROGRESS NOTES
Pharmacokinetic Assessment Follow Up: IV Vancomycin    Vancomycin serum concentration assessment(s):    Vancomycin random was drawn correctly and is above goal of 10 - 15.    Vancomycin Regimen Plan:    Re-dose when the random level is less than 15 mcg/mL, next level to be drawn at 0930 on 10/16.    Drug levels (last 3 results):  Recent Labs   Lab Result Units 10/13/24  0940 10/14/24  2101 10/15/24  2123   Vancomycin, Random ug/mL  --   --  19.4   Vancomycin-Trough ug/mL 14.6 26.5*  --        Pharmacy will continue to follow and monitor vancomycin.    Please contact pharmacy at extension [phone] for questions regarding this assessment.    Thank you for the consult,   Corie Nava       Patient brief summary:  Mari Sloan is a 49 y.o. female initiated on antimicrobial therapy with IV Vancomycin for treatment of bone/joint infection    The patient's current regimen is pulse dosing.     Drug Allergies:   Review of patient's allergies indicates:  No Known Allergies    Actual Body Weight:   65.2 kg     Renal Function:   Estimated Creatinine Clearance: 73.7 mL/min (based on SCr of 0.8 mg/dL).,     Dialysis Method (if applicable):  N/A    CBC (last 72 hours):  Recent Labs   Lab Result Units 10/13/24  0436   WBC K/uL 7.20   Hemoglobin g/dL 8.5*   Hematocrit % 27.8*   Platelets K/uL 242   Gran % % 61.8   Lymph % % 26.3   Mono % % 6.1   Eosinophil % % 4.4   Basophil % % 1.1   Differential Method  Automated       Metabolic Panel (last 72 hours):  Recent Labs   Lab Result Units 10/13/24  0436 10/14/24  2101 10/15/24  2123   Sodium mmol/L 139 139  --    Potassium mmol/L 2.8* 2.8*  --    Chloride mmol/L 106 106  --    CO2 mmol/L 24 24  --    Glucose mg/dL 79 192*  --    BUN mg/dL <3* 4*  --    Creatinine mg/dL 0.5 0.8 0.8   Albumin g/dL 1.3*  --   --    Total Bilirubin mg/dL 0.5  --   --    Alkaline Phosphatase U/L 108  --   --    AST U/L 12  --   --    ALT U/L 8*  --   --    Magnesium mg/dL  --  1.8  --        Vancomycin  Administrations:  vancomycin given in the last 96 hours                     vancomycin (VANCOCIN) 1,000 mg in D5W 250 mL IVPB (admixture device) (mg) 1,000 mg New Bag 10/14/24 2102     1,000 mg New Bag  0959     1,000 mg New Bag 10/13/24 2038     1,000 mg New Bag  1129     1,000 mg New Bag 10/12/24 2025     1,000 mg New Bag  1002                    Microbiologic Results:  Microbiology Results (last 7 days)       Procedure Component Value Units Date/Time    Culture, Anaerobe [[id number]] Collected: 10/10/24 1032    Order Status: Completed Specimen: Abscess from Back Updated: 10/14/24 1059     Anaerobic Culture No anaerobes isolated    Narrative:      Deep site    Fungus culture [[id number]] Collected: 09/30/24 1705    Order Status: Completed Specimen: Abscess from Abdomen Updated: 10/14/24 1022     Fungus (Mycology) Culture Culture in progress      No fungus isolated after 2 weeks    Narrative:      L retroperitoneal fluid collection    Culture, Body Fluid (Aerobic) w/ GS [[id number]] Collected: 10/10/24 1032    Order Status: Completed Specimen: Body Fluid from Back Updated: 10/14/24 0908     AEROBIC CULTURE - FLUID No growth     Gram Stain Result Rare WBC's      No organisms seen    Narrative:      IR aspiration--superficial site    Aerobic culture [[id number]] Collected: 10/10/24 1032    Order Status: Completed Specimen: Abscess from Back Updated: 10/14/24 0900     Aerobic Bacterial Culture No growth    Narrative:      Deep site    Culture, Anaerobe [[id number]] Collected: 10/07/24 1025    Order Status: Completed Specimen: Abscess from Abdomen Updated: 10/11/24 1304     Anaerobic Culture No anaerobes isolated    Culture, Anaerobe [[id number]] Collected: 10/07/24 1007    Order Status: Completed Specimen: Abscess from Abdomen Updated: 10/11/24 1304     Anaerobic Culture No anaerobes isolated    Culture, Body Fluid (Aerobic) w/ GS [[id number]]     Order Status: Canceled Specimen: Body Fluid     Aerobic culture  [[id number]]  (Abnormal)  (Susceptibility) Collected: 10/07/24 1025    Order Status: Completed Specimen: Abscess from Abdomen Updated: 10/09/24 1502     Aerobic Bacterial Culture STAPHYLOCOCCUS AUREUS  Rare        BACILLUS SPECIES  Rare  Further identified as Bacillus licheniformis      Aerobic culture [[id number]]  (Abnormal)  (Susceptibility) Collected: 10/07/24 1007    Order Status: Completed Specimen: Abscess from Abdomen Updated: 10/09/24 1150     Aerobic Bacterial Culture STAPHYLOCOCCUS AUREUS  Rare

## 2024-10-16 NOTE — DISCHARGE SUMMARY
[facility] ([place] [address])  [facility] Medicine  Discharge Summary      Patient Name: Mari Sloan  MRN: [id number]  VICENTE: [id number]  Patient Class: IP- Inpatient  Admission Date: 9/16/2024  Hospital Length of Stay: 30 days  Discharge Date and Time: {IP DISCHARGE DATE:39709869}  Attending Physician: Jocelyn Hernandez MD   Discharging Provider: Jocelyn Hernandez MD  Primary Care Provider: Mray Fong APRGREGORIO  [facility] Medicine Team: [facility] HOSP MED A Jocelyn Hernandez MD  Primary Care Team: [facility] HOSP MED A    HPI:   By Dr. Alberta Reese MD    49 y.o.  woman with h/o homelessness (recently was able to obtain living arrangements but states she was living [place] the bridge), NIDDM type 2, Essential hypertension, and substance use (denies any IVDU in he past or any illicit drug use recenly, denies ETOH abuse/overuse) presents to [facility] for further evaluation of her back pain.  She apparently presented to the [facility] Emergency Department on September 16 with nausea and vomiting and a mechanical fall 5 days prior. She reported pain worse in her back and on her sides radiating down both legs. She noted muscle spasm in her back and legs. She had no head trauma or loss of consciousness.  W/u in the [facility] ED noted significant hypokalemia, hypomagnesemia, severe anemia, metabolic alkalosis, and hyperglycemia. Vitals signs stable with no sepsis at this time noted. SBP>90, HR normal,afebrile.     Imaging studies of her lumbar spine and pelvis were concerning for osteomyelitis/septic arthritis of the left SI joint and L5-S1 along with an abnormal fluid collection extending from T11 through the left iliacus muscle concerning for abscess. Blood cultures sent.  In the emergency department she is receiving IV fluid, Zofran, Zosyn, vancomycin, potassium, magnesium, pain medication, and nausea medication.   She also received 2 units of PRBC for an H/H of 5.7/18.4,     Case discussed with  "Neurosurgery and Interventional Radiology. Plan would be for transfer to Hospital Medicine at [facility] for IR evaluation of the fluid collection and potential sampling of the joint osteomyelitis.  I reviewed the discussions made with the multiple sub specialists regarding transfer of this patient to [facility]: IR/General surgery/Neurosurgery/ER/Internal Med.     Neurosurgery contacted by the  did not feel surgical intervention was needed at this time but would follow along and requested [facility] for further management and IR backup. IR on call apparently read the CT and at the time felt "while the left retroperitoneal fluid collections do appear organized, percutaneous drainage is not advised given the extensive soft tissue infection superficial to these collections.  In addition, there is extensive evidence of soft tissue infection that does not appear organized or percutaneously drainable."     General surgery was consulted to review the case and felt that there was no immediate surgical intervention at this time to be had. I spoke with the on surgeon contacted regarding the location of the fluid collections and inquired if perhaps orthopedic surgery should be consulted to review given the involvement of bony pelvis.      I spoke orthopedic surgery who will see the patient but recommended re-consulting IR here and Neurosurgery given the diskitis/OM L5-S1. (Please see his consult note in the chart)     Repeat labs here again noted for persistently low mag, K, phos.  H/H stable with improved H/H. Pain control improved with Dilaudid.  I consulted ID for further assistance with ABX adjustments and changed her to Meropenem and doxy as well as continued Vanc. Echo ordred for am.     CT lumbar spine and pelvis had findings most concerning for infectious process. There were findings concerning for osteomyelitis and septic arthritis in the left SI joint. Findings concerning for " osteomyelitis/diskitis L5-S1. Possible osteomyelitis and septic joint at the pubic symphysis. Abnormal fluid collection on the left extending from T11 through the left iliacus muscle along and within the left iliopsoas muscle most concerning for abscess. Multiple additional small abscesses suspected in the pelvis. Multifocal collections of air in the fluid in the subcutaneous tissues of the left flank, incompletely imaged.    Chest x-ray noted a loop in the central venous catheter. Tip currently at the level of the brachiocephalic vein. Lungs are fairly clear.        Procedure(s) (LRB):  Incision and Drainage (Left)      Hospital Course:   Ms. Sloan was transferred from [facility] ED to [facility] ICU on 09/16/2024.  She presented to [facility] ED  for back pain, nausea/vomiting.  CT L-spine revealed osteomyelitis/diskitis L5-S1 along with abnormal fluid collection on left extending from T11 through left iliacus muscle and left iliopsoas muscle concerning for abscess.  Multifocal collections of air in fluid in subcutaneous tissues of left flank.  Patient was initiated on empiric vancomycin and meropenem.  Blood cultures with staph aureus.  Obtain echo.  Unable to repeat blood cultures given shortage of cx.  Neurosurgery recommended to obtain MRI L-spine, pending.  General surgery evaluated the patient and recommended urgent transfer to [facility] for surgical evaluation/source control given extent of necrosis.     Ms. Sloan was transferred to [facility] and admitted to the MICU 9/18 with concern for paraspinal abscess. Infectious workup was ordered. Blood cultures were positive for MSSA bacteremia. Neurosurgery, General Surgery and IR were consulted to evaluate the patient's paraspinal abscess. Neurosurgery performed a L3-L4 laminectomy for epidural abscess evacuation on 9/19/24 and the cultures grew MSSA. TTE showed normal EF of 60-65% with diastolic dysfunction, could not exclude mitral vegetation vs  redundant chordae. ID was consulted and recommended Oxacillin and repeat blood cultures. With her electrolyte derangement, and a background of appetitive loss in the past 2 months, there was concern for refeeding syndrome. On 9/21, IR took the patient for abscess drain placement and aspiration of SI joint. SI joint couldn't be aspirated but retroperitoneal abscess was drained of 100cc of purulent fluid. She was successfully extubated 9/22 and was stepped down to Hospital Medicine on 9/24/24. Her drain was removed on 09/27.  Repeat CT abdomen/pelvis showed an ill-defined subcutaneous edema and soft tissue inflammatory change in the surgical bed and subcutaneous soft tissues without discrete organized fluid collection, stable size of 3.6 cm fluid collection along the left posterior pararenal space which appears to communicate with the with the aforementioned collection and fascial thickening.  IR placed drain for retroperitoneal fluid collection on 9/30.  Cultures returned with Staph aureus.  IR suggested to monitor drain output, and consider repeat imaging when output decreases to less than 10 cc in 24 hours to evaluate for possible drainage catheter removal. Repeat CT was ordered on 10/3 as she endorsed worsening pain on her left flank, and minimal drain output which could be removed.  It returned with multiple new and enlargening abscesses.  Discussed with ID, who said to continue Oxacillin.  NSGY, Gen Surg, Ortho and IR were consulted for further recommendations.  MRI complete spine and pelvis were ordered to help guide management.  Gen Surg took her to the OR for an I&D of a left hip abscess on 10/7, where 300cc of purulent material within the subcutaneous space along the left hip was drained and sent for culture.  ID reconsulted.  NeuroIR considering spinal abscess drainage.    10/10-  lumbar abscess aspiration done per IR 10/9. Pt on oxacillin and vanc. ID following. Need f/I on IR  aspiration. Lumbar abscess  "aspiration completed today.    SNF auth has been approved and the auth expires at midnight 10/11/24.   10/11- need final ID recs. New cultures form 10/10 still in progress. On vanc and oxacillin. oxacillin 2g q4h, duration 8 weeks, tentative end date 11/18.   Continue vancomycin, goal trough 10-15, pharmacy to dose, tentative end date 11/4/24. Pt has capacity. She suggest that she might leave AMA.   10/12- SNF unable to do both antibiotics. Pt here until completion or we find another place. Yesterday she was declining therapies.  VSS.  Lab tomorrow, q 72 hours. K low- will replete. Last mag 1.9.   10/15- pt is refusing therapies. Eating. Reports getting up with her .  On vanc until 11/4 and oxacillin until 11/18.  Bmp, mag to f/u on hypokalemia. Repleting.   10/16- on isolated increase in BP. Reporting pain. Alb 1.4, K 3.8. She is being discharged from therapy for lack of cooperation.  Reporting abd pain, exam is unchanged. Need to monitor for new symptoms. She also was accepted to LTAC. She is med ready for LTAC.      Goals of Care Treatment Preferences:  Code Status: Full Code      SDOH Screening:  The patient was screened for food insecurity, housing instability, transportation needs, utility difficulties, and interpersonal safety. The social determinant(s) of health identified as a concern this admission are:  Transportation difficulties    The plan to address these concerns is: ***    Social Drivers of Health with Concerns     Transportation Needs: Unmet Transportation Needs (9/18/2024)        Consults:   Consults (From admission, onward)          Status Ordering Provider     Pharmacy to dose Vancomycin consult  Once        Provider:  (Not yet assigned)   Placed in "And" Linked Group    Acknowledged AHSAN DOAN     Inpatient consult to Interventional Radiology  Once        Provider:  (Not yet assigned)    Completed AHSAN DOAN     Inpatient consult to Infectious Diseases  Once        Provider:  " (Not yet assigned)    Completed AHSAN DOAN P.     Inpatient consult to General Surgery  Once        Provider:  (Not yet assigned)    Completed AHSAN DOAN P.     Inpatient consult to Neurosurgery  Once        Provider:  (Not yet assigned)    Completed AHSAN DOAN P.     Inpatient consult to Interventional Radiology  Once        Provider:  (Not yet assigned)    Completed AHSAN DOAN P.     Inpatient consult to Interventional Radiology  Once        Provider:  (Not yet assigned)    Completed GANDICHERUVU, KEITH     [facility]  Once        Provider:  (Not yet assigned)    Completed GANDICHERUVU, KEITH     [facility]  Once        Provider:  (Not yet assigned)    Completed SEBASTIAN JOHN     Inpatient consult to Physical Medicine Rehab  Once        Provider:  (Not yet assigned)    Completed UZAIR MCCORMICK     Inpatient consult to Orthopedics  Once        Provider:  (Not yet assigned)    Completed SONALI BENTLEY     Inpatient consult to Registered Dietitian/Nutritionist  Once        Provider:  (Not yet assigned)    Completed ANALORY-AMDEONTEEREALKWDANITA     Inpatient consult to Interventional Radiology  Once        Provider:  (Not yet assigned)    Completed ANALORY-REAL ALANKTAYLOR     Inpatient consult to Neurosurgery  Once        Provider:  (Not yet assigned)    Completed ANALORY-AMTA CHUKWUDI     Inpatient consult to Infectious Diseases  Once        Provider:  (Not yet assigned)    Completed SEAMUS MESSER     Inpatient consult to General Surgery  Once        Provider:  (Not yet assigned)    Completed SONALI BENTLEY     Inpatient consult to General Surgery  Once        Provider:  Presley Jaeger MD    Completed ELMO HEARN     Inpatient consult to Social Work  Once        Provider:  (Not yet assigned)    Completed SONALI BENTLEY     Inpatient consult to Interventional Radiology  Once        Provider:  Promise Wadsworth, SONIDO     Completed PETERSON DAVIS.     Inpatient consult to Orthopedic Surgery  Once        Provider:  John Castro MD    Completed PETERSON DAVIS.     Inpatient consult to Neurosurgery  Once        Provider:  Lea Fernandez PA-C    Completed PETERSON DAVIS.     Inpatient consult to Infectious Diseases  Once        Provider:  Hannah Summers AU.D    Completed PETERSON DAVIS.            Psychiatric  History of drug use  On methadone 70 mg at home, continue    Cardiac/Vascular  Endocarditis  As above      Renal/  Hypokalemia  Monitor on tele. Replace mg, phos, and K. F/u on repeat labs.   Replete 10/13, 10/15    ID  * Paraspinal abscess  MSSA Endocarditis  MSSA Paraspinal abscess  MSSA Psoas abscess    Blood cx 9/16 with MSSA  2D echo 9/17:  Cannot entirely exclude mitral vegetation vs redundant chordae. If high clinical suspicion for endocarditis, would rx as such (not clear MARICHUY would be able to exclude vegetation based on TTE findings).   Noted to have extensive fluid collection on left extending from T11 through left iliacus muscle and within the left iliopsoas muscle.  Multifocal collections of air including subcutaneous tissues on the left flank noted.  ID/Neurosurgery consulted.   S/p L3-L5 laminectomy with epidural abscess evacuation 9/19   Ortho consulted. Rec continued abx tx and no further interventions  IR SI joint aspiration and abscess drain placement 9/21. Unable to aspirate joint  IR drain removed on 09/27.    Repeat CT abdomen pelvis 9/28 ordered to look for recollection of fluid showed an ill-defined subcutaneous edema and soft tissue inflammatory change in the surgical bed and subcutaneous soft tissues without discrete organized fluid collection,  Stable size of 3.6 cm fluid collection along the left posterior pararenal space which appears to communicate with the with the aforementioned collection and fascial thickening.   IR placed drain for retroperitoneal fluid  collection on 9/30.  Culture with MSSA  ID consulted:  Suggested Oxacillin through 11/18 with repeat imaging prior to completion  Will need LTAC for completion of IV antibiotics given history of IVDA  PICC placed 9/27  Repeat CRP ordered 10/3 given worsening pain and swelling - CRP down to 32  Repeat CT abdomen/pelvis was ordered to assess for drain removal, that showed worsening abscesses.  Discussed with ID who suggested further source control and to continue Oxacillin   Gen Surg, Ortho, NSGY and IR consulted - Greatly appreciate assistance!  MRI complete spine and pelvis ordered to further determine next steps with multiple abscesses  Gen Surg took her to the OR for an I&D of a left hip abscess on 10/7, where 300cc of purulent material within the subcutaneous space along the left hip was drained and sent for culture.  Cx with Staph aureus  ID reconsulted  NeuroIR considering epidural drainage as NSGY said no intervention and ID feels like she could use more intervention to decrease her infectious burden  10/10-  Lumbar abscess aspiration completed per IR today.    10/16- oxacillin 2g q4h, duration 8 weeks, tentative end date 11/18.  vancomycin, goal trough 10-15, pharmacy to dose, tentative end date 11/4/24, Continue wound care.     Epidural abscess  As above      Staphylococcus aureus bacteremia  As above      Sepsis  As above    Other osteomyelitis, multiple sites  As above      Oncology  Anemia, unspecified  S/p 2u PRBC transfusion on admit   No signs of acute GI bleed on exam at this time. Denies any hematemesis or hemoptysis.   Obtain iron B12/folate/peripheral smear and LDH/hapto/retic  No evidence of active bleed   Stable    Endocrine  Moderate malnutrition  Nutrition consulted. Most recent weight and BMI monitored-     Measurements:  Wt Readings from Last 1 Encounters:   10/09/24 65.2 kg (143 lb 11.8 oz)   Body mass index is 26.29 kg/m².    Patient has been screened and assessed by RD.    Malnutrition  Type:  Context: social/environmental circumstances  Level: moderate    Malnutrition Characteristic Summary:  Weight Loss (Malnutrition): 10% in 6 months  Energy Intake (Malnutrition): less than 75% for greater than 7 days    Interventions/Recommendations (treatment strategy):  1.      Refeeding syndrome  In ICU, RESOLVED      Uncontrolled type 2 diabetes mellitus with hyperglycemia  Patient's FSGs are uncontrolled due to hyperglycemia on current medication regimen.  Last A1c reviewed-   Lab Results   Component Value Date    HGBA1C 7.9 (H) 09/17/2024     Most recent fingerstick glucose reviewed-   Recent Labs   Lab 10/15/24  0811 10/15/24  1142 10/15/24  1549 10/16/24  0731   POCTGLUCOSE 121* 149* 159* 223*       Current correctional scale  Medium  Maintain anti-hyperglycemic dose as follows-   Antihyperglycemics (From admission, onward)      Start     Stop Route Frequency Ordered    10/02/24 0006  insulin aspart U-100 pen 0-5 Units         -- SubQ Before meals & nightly PRN 10/01/24 2306          Hold Oral hypoglycemics while patient is in the hospital.    GI  Retroperitoneal fluid collection  As above    Hepatitis C  Hepatitis C antibody positive.    Obtain HCV RNA. Quant negative    Psoas muscle abscess  As above    Other  Smoker  Tobacco cessation discussed with patient for greater than 5 minutes.   Offered Nicotine patch while hospitalized  Patient is aware of need to quit tobacco products      Final Active Diagnoses:    Diagnosis Date Noted POA    PRINCIPAL PROBLEM:  Paraspinal abscess [M46.20] 09/17/2024 Yes    Uncontrolled type 2 diabetes mellitus with hyperglycemia [E11.65] 09/17/2024 Yes    Retroperitoneal fluid collection [R18.8] 09/30/2024 Yes    Moderate malnutrition [E44.0] 09/26/2024 Yes    Epidural abscess [G06.2] 09/19/2024 Yes    Endocarditis [I38] 09/18/2024 Yes    Refeeding syndrome [E87.8] 09/18/2024 Yes    Other osteomyelitis, multiple sites [M86.8X0] 09/17/2024 Yes    History of drug use  [F19.91] 09/17/2024 Yes    Smoker [F17.200] 09/17/2024 Yes    Anemia, unspecified [D64.9] 09/17/2024 Yes    Hepatitis C [B19.20] 09/17/2024 Yes    Sepsis [A41.9] 09/17/2024 Yes    Staphylococcus aureus bacteremia [R78.81, B95.61] 09/17/2024 Yes    Psoas muscle abscess [K68.12] 09/17/2024 Yes    Hypokalemia [E87.6] 09/17/2024 Yes      Problems Resolved During this Admission:       Discharged Condition: {condition:42415}    Disposition: Another Health Care Inst*    Follow Up:   Follow-up Information       [facility] Follow up.    Contact information:  [address] [place] [address] [phone]                         Patient Instructions:      Ambulatory referral/consult to Smoking Cessation Program   Standing Status: Future   Referral Priority: Routine Referral Type: Consultation   Referral Reason: Specialty Services Required   Requested Specialty: CTTS   Number of Visits Requested: 1     Reason for not Prescribing Nicotine Replacement     Order Specific Question Answer Comments   Reason for not Prescribing: Not medically appropriate at this time        Significant Diagnostic Studies: {diagnostics:07375}    Pending Diagnostic Studies:       Procedure Component Value Units Date/Time    Basic metabolic panel [[id number]] Collected: 09/19/24 0026    Order Status: Sent Lab Status: No result     Specimen: Blood     Basic metabolic panel [[id number]] Collected: 09/17/24 2227    Order Status: Sent Lab Status: No result     Specimen: Blood     CBC auto differential [[id number]] Collected: 10/03/24 0340    Order Status: Sent Lab Status: In process Updated: 10/03/24 0341    Specimen: Blood     Comprehensive metabolic panel [[id number]] Collected: 10/03/24 0340    Order Status: Sent Lab Status: In process Updated: 10/03/24 0341    Specimen: Blood     EKG 12-lead [[id number]]     Order Status: Sent Lab Status: No result            Medications:  {DISCHARGE MEDS  OHS:04088}    Indwelling Lines/Drains at time of discharge:   Lines/Drains/Airways       Peripherally Inserted Central Catheter Line  Duration             PICC Double Lumen 09/27/24 1035 right basilic 19 days                    Time spent on the discharge of patient: *** minutes         Jocelyn Hernandez MD  Department of Hospital Medicine  [facility] ([place]

## 2024-10-16 NOTE — PROGRESS NOTES
[facility] - [facility]down [facility] ([place] [address])  [facility] Medicine  Progress Note    Patient Name: Mari Sloan  MRN: [id number]  Patient Class: IP- Inpatient   Admission Date: 9/16/2024  Length of Stay: 30 days  Attending Physician: Jocelyn Hernandez MD  Primary Care Provider: Mary Fong APRN        Subjective:     Principal Problem:Paraspinal abscess        HPI:  By Dr. Alberta Reese MD    49 y.o.  woman with h/o homelessness (recently was able to obtain living arrangements but states she was living [place] the bridge), NIDDM type 2, Essential hypertension, and substance use (denies any IVDU in he past or any illicit drug use recenly, denies ETOH abuse/overuse) presents to [facility] for further evaluation of her back pain.  She apparently presented to the [facility] Emergency Department on September 16 with nausea and vomiting and a mechanical fall 5 days prior. She reported pain worse in her back and on her sides radiating down both legs. She noted muscle spasm in her back and legs. She had no head trauma or loss of consciousness.  W/u in the [facility] ED noted significant hypokalemia, hypomagnesemia, severe anemia, metabolic alkalosis, and hyperglycemia. Vitals signs stable with no sepsis at this time noted. SBP>90, HR normal,afebrile.     Imaging studies of her lumbar spine and pelvis were concerning for osteomyelitis/septic arthritis of the left SI joint and L5-S1 along with an abnormal fluid collection extending from T11 through the left iliacus muscle concerning for abscess. Blood cultures sent.  In the emergency department she is receiving IV fluid, Zofran, Zosyn, vancomycin, potassium, magnesium, pain medication, and nausea medication.   She also received 2 units of PRBC for an H/H of 5.7/18.4,     Case discussed with Neurosurgery and Interventional Radiology. Plan would be for transfer to Hospital Medicine at [facility] for IR evaluation of the fluid collection and  "potential sampling of the joint osteomyelitis.  I reviewed the discussions made with the multiple sub specialists regarding transfer of this patient to [facility]: IR/General surgery/Neurosurgery/ER/Internal Med.     Neurosurgery contacted by the  did not feel surgical intervention was needed at this time but would follow along and requested [facility] for further management and IR backup. IR on call apparently read the CT and at the time felt "while the left retroperitoneal fluid collections do appear organized, percutaneous drainage is not advised given the extensive soft tissue infection superficial to these collections.  In addition, there is extensive evidence of soft tissue infection that does not appear organized or percutaneously drainable."     General surgery was consulted to review the case and felt that there was no immediate surgical intervention at this time to be had. I spoke with the on surgeon contacted regarding the location of the fluid collections and inquired if perhaps orthopedic surgery should be consulted to review given the involvement of bony pelvis.      I spoke orthopedic surgery who will see the patient but recommended re-consulting IR here and Neurosurgery given the diskitis/OM L5-S1. (Please see his consult note in the chart)     Repeat labs here again noted for persistently low mag, K, phos.  H/H stable with improved H/H. Pain control improved with Dilaudid.  I consulted ID for further assistance with ABX adjustments and changed her to Meropenem and doxy as well as continued Vanc. Echo ordred for am.     CT lumbar spine and pelvis had findings most concerning for infectious process. There were findings concerning for osteomyelitis and septic arthritis in the left SI joint. Findings concerning for osteomyelitis/diskitis L5-S1. Possible osteomyelitis and septic joint at the pubic symphysis. Abnormal fluid collection on the left extending from T11 through the left " iliacus muscle along and within the left iliopsoas muscle most concerning for abscess. Multiple additional small abscesses suspected in the pelvis. Multifocal collections of air in the fluid in the subcutaneous tissues of the left flank, incompletely imaged.    Chest x-ray noted a loop in the central venous catheter. Tip currently at the level of the brachiocephalic vein. Lungs are fairly clear.        Overview/Hospital Course:  Ms. Sloan was transferred from [facility] ED to [facility] ICU on 09/16/2024.  She presented to [facility] ED  for back pain, nausea/vomiting.  CT L-spine revealed osteomyelitis/diskitis L5-S1 along with abnormal fluid collection on left extending from T11 through left iliacus muscle and left iliopsoas muscle concerning for abscess.  Multifocal collections of air in fluid in subcutaneous tissues of left flank.  Patient was initiated on empiric vancomycin and meropenem.  Blood cultures with staph aureus.  Obtain echo.  Unable to repeat blood cultures given shortage of cx.  Neurosurgery recommended to obtain MRI L-spine, pending.  General surgery evaluated the patient and recommended urgent transfer to [facility] for surgical evaluation/source control given extent of necrosis.     Ms. Sloan was transferred to [facility] and admitted to the MICU 9/18 with concern for paraspinal abscess. Infectious workup was ordered. Blood cultures were positive for MSSA bacteremia. Neurosurgery, General Surgery and IR were consulted to evaluate the patient's paraspinal abscess. Neurosurgery performed a L3-L4 laminectomy for epidural abscess evacuation on 9/19/24 and the cultures grew MSSA. TTE showed normal EF of 60-65% with diastolic dysfunction, could not exclude mitral vegetation vs redundant chordae. ID was consulted and recommended Oxacillin and repeat blood cultures. With her electrolyte derangement, and a background of appetitive loss in the past 2 months, there was concern for refeeding syndrome.  On 9/21, IR took the patient for abscess drain placement and aspiration of SI joint. SI joint couldn't be aspirated but retroperitoneal abscess was drained of 100cc of purulent fluid. She was successfully extubated 9/22 and was stepped down to Hospital Medicine on 9/24/24. Her drain was removed on 09/27.  Repeat CT abdomen/pelvis showed an ill-defined subcutaneous edema and soft tissue inflammatory change in the surgical bed and subcutaneous soft tissues without discrete organized fluid collection, stable size of 3.6 cm fluid collection along the left posterior pararenal space which appears to communicate with the with the aforementioned collection and fascial thickening.  IR placed drain for retroperitoneal fluid collection on 9/30.  Cultures returned with Staph aureus.  IR suggested to monitor drain output, and consider repeat imaging when output decreases to less than 10 cc in 24 hours to evaluate for possible drainage catheter removal. Repeat CT was ordered on 10/3 as she endorsed worsening pain on her left flank, and minimal drain output which could be removed.  It returned with multiple new and enlargening abscesses.  Discussed with ID, who said to continue Oxacillin.  NSGY, Gen Surg, Ortho and IR were consulted for further recommendations.  MRI complete spine and pelvis were ordered to help guide management.  Gen Surg took her to the OR for an I&D of a left hip abscess on 10/7, where 300cc of purulent material within the subcutaneous space along the left hip was drained and sent for culture.  ID reconsulted.  NeuroIR considering spinal abscess drainage.    10/10-  lumbar abscess aspiration done per IR 10/9. Pt on oxacillin and vanc. ID following. Need f/I on IR  aspiration. Lumbar abscess aspiration completed today.    SNF auth has been approved and the auth expires at midnight 10/11/24.   10/11- need final ID recs. New cultures form 10/10 still in progress. On vanc and oxacillin. oxacillin 2g q4h, duration 8  weeks, tentative end date 11/18.   Continue vancomycin, goal trough 10-15, pharmacy to dose, tentative end date 11/4/24. Pt has capacity. She suggest that she might leave AMA.   10/12- SNF unable to do both antibiotics. Pt here until completion or we find another place. Yesterday she was declining therapies.  VSS.  Lab tomorrow, q 72 hours. K low- will replete. Last mag 1.9.   10/15- pt is refusing therapies. Eating. Reports getting up with her .  On vanc until 11/4 and oxacillin until 11/18.  Bmp, mag to f/u on hypokalemia. Repleting.   10/16- on isolated increase in BP. Reporting pain. Alb 1.4, K 3.8. She is being discharged from therapy for lack of cooperation.  Reporting abd pain, exam is unchanged. Need to monitor for new symptoms. She also was accepted to LTAC. She is med ready for LTAC.     Interval History: see above    Review of Systems   Constitutional:  Positive for activity change and fatigue.   HENT:  Negative for trouble swallowing.    Respiratory:  Negative for cough and shortness of breath.    Cardiovascular:  Negative for chest pain and leg swelling.   Gastrointestinal:  Negative for abdominal pain, anal bleeding, constipation, diarrhea and nausea.   Genitourinary:  Negative for difficulty urinating.   Musculoskeletal:  Positive for arthralgias and back pain.   Neurological:  Negative for numbness and headaches.   Psychiatric/Behavioral:  Negative for behavioral problems.      Objective:     Vital Signs (Most Recent):  Temp: 98.5 °F (36.9 °C) (10/16/24 0255)  Pulse: 85 (10/16/24 0255)  Resp: 16 (10/16/24 0611)  BP: 116/75 (10/16/24 0255)  SpO2: 97 % (10/16/24 0255) Vital Signs (24h Range):  Temp:  [98.5 °F (36.9 °C)-99.1 °F (37.3 °C)] 98.5 °F (36.9 °C)  Pulse:  [] 85  Resp:  [16-18] 16  SpO2:  [94 %-99 %] 97 %  BP: ()/(60-89) 116/75     Weight: 65.2 kg (143 lb 11.8 oz)  Body mass index is 26.29 kg/m².    Intake/Output Summary (Last 24 hours) at 10/16/2024 8410  Last data filed at  10/16/2024 0603  Gross per 24 hour   Intake 6366 ml   Output --   Net 6366 ml      Physical Exam  Constitutional:       General: She is not in acute distress.     Appearance: Normal appearance. She is obese.   HENT:      Head: Normocephalic and atraumatic.      Nose: Nose normal.      Mouth/Throat:      Mouth: Mucous membranes are moist.   Eyes:      General: No scleral icterus.     Extraocular Movements: Extraocular movements intact.      Pupils: Pupils are equal, round, and reactive to light.   Cardiovascular:      Rate and Rhythm: Normal rate and regular rhythm.      Pulses: Normal pulses.      Heart sounds: Normal heart sounds. No murmur heard.  Pulmonary:      Effort: Pulmonary effort is normal.      Breath sounds: Normal breath sounds. No wheezing or rhonchi.   Chest:      Chest wall: No tenderness.   Abdominal:      General: Abdomen is flat. Bowel sounds are normal. There is no distension.      Palpations: Abdomen is soft.      Tenderness: There is no abdominal tenderness. There is no right CVA tenderness, left CVA tenderness, guarding or rebound.   Musculoskeletal:         General: No swelling, tenderness, deformity or signs of injury. Normal range of motion.      Cervical back: Normal range of motion and neck supple. No rigidity or tenderness.      Right lower leg: No edema.      Left lower leg: No edema.   Skin:     General: Skin is warm and dry.      Coloration: Skin is not jaundiced or pale.      Findings: No erythema or rash.   Neurological:      General: No focal deficit present.      Mental Status: She is alert and oriented to person, place, and time. Mental status is at baseline.      Cranial Nerves: No cranial nerve deficit.      Motor: No weakness.   Psychiatric:         Mood and Affect: Mood normal.         Behavior: Behavior normal.         Thought Content: Thought content normal.         MELD 3.0: 17 at 9/21/2024  6:13 PM  MELD-Na: 13 at 9/21/2024  6:13 PM  Calculated from:  Serum Creatinine:  0.6 mg/dL (Using min of 1 mg/dL) at 9/21/2024  6:13 PM  Serum Sodium: 138 mmol/L (Using max of 137 mmol/L) at 9/21/2024  6:13 PM  Total Bilirubin: 2.1 mg/dL at 9/21/2024  2:34 AM  Serum Albumin: 1.2 g/dL (Using min of 1.5 g/dL) at 9/21/2024  2:34 AM  INR(ratio): 1.4 at 9/19/2024  3:19 AM  Age at listing (hypothetical): 49 years  Sex: Female at 9/21/2024  6:13 PM      Significant Labs:  CBC:  Recent Labs   Lab 10/16/24  0559   WBC 10.95   HGB 9.9*   HCT 32.8*            CMP:  Recent Labs   Lab 10/14/24  2101 10/15/24  2123 10/16/24  0559     --  140   K 2.8*  --  3.8     --  106   CO2 24  --  24   *  --  103   BUN 4*  --  5*   CREATININE 0.8 0.8 0.8   CALCIUM 7.7*  --  8.3*   PROT  --   --  6.2   ALBUMIN  --   --  1.4*   BILITOT  --   --  0.6   ALKPHOS  --   --  135   AST  --   --  16   ALT  --   --  11   ANIONGAP 9  --  10         Assessment/Plan:      * Paraspinal abscess  MSSA Endocarditis  MSSA Paraspinal abscess  MSSA Psoas abscess    Blood cx 9/16 with MSSA  2D echo 9/17:  Cannot entirely exclude mitral vegetation vs redundant chordae. If high clinical suspicion for endocarditis, would rx as such (not clear MARICHUY would be able to exclude vegetation based on TTE findings).   Noted to have extensive fluid collection on left extending from T11 through left iliacus muscle and within the left iliopsoas muscle.  Multifocal collections of air including subcutaneous tissues on the left flank noted.  ID/Neurosurgery consulted.   S/p L3-L5 laminectomy with epidural abscess evacuation 9/19   Ortho consulted. Rec continued abx tx and no further interventions  IR SI joint aspiration and abscess drain placement 9/21. Unable to aspirate joint  IR drain removed on 09/27.    Repeat CT abdomen pelvis 9/28 ordered to look for recollection of fluid showed an ill-defined subcutaneous edema and soft tissue inflammatory change in the surgical bed and subcutaneous soft tissues without discrete organized fluid  collection,  Stable size of 3.6 cm fluid collection along the left posterior pararenal space which appears to communicate with the with the aforementioned collection and fascial thickening.   IR placed drain for retroperitoneal fluid collection on 9/30.  Culture with MSSA  ID consulted:  Suggested Oxacillin through 11/18 with repeat imaging prior to completion  Will need LTAC for completion of IV antibiotics given history of IVDA  PICC placed 9/27  Repeat CRP ordered 10/3 given worsening pain and swelling - CRP down to 32  Repeat CT abdomen/pelvis was ordered to assess for drain removal, that showed worsening abscesses.  Discussed with ID who suggested further source control and to continue Oxacillin   Gen Surg, Ortho, NSGY and IR consulted - Greatly appreciate assistance!  MRI complete spine and pelvis ordered to further determine next steps with multiple abscesses  Gen Surg took her to the OR for an I&D of a left hip abscess on 10/7, where 300cc of purulent material within the subcutaneous space along the left hip was drained and sent for culture.  Cx with Staph aureus  ID reconsulted  NeuroIR considering epidural drainage as NSGY said no intervention and ID feels like she could use more intervention to decrease her infectious burden  10/10-  Lumbar abscess aspiration completed per IR today.    10/16- oxacillin 2g q4h, duration 8 weeks, tentative end date 11/18.  vancomycin, goal trough 10-15, pharmacy to dose, tentative end date 11/4/24, Continue wound care.     Uncontrolled type 2 diabetes mellitus with hyperglycemia  Patient's FSGs are uncontrolled due to hyperglycemia on current medication regimen.  Last A1c reviewed-   Lab Results   Component Value Date    HGBA1C 7.9 (H) 09/17/2024     Most recent fingerstick glucose reviewed-   Recent Labs   Lab 10/15/24  0811 10/15/24  1142 10/15/24  1549 10/16/24  0731   POCTGLUCOSE 121* 149* 159* 223*       Current correctional scale  Medium  Maintain anti-hyperglycemic  dose as follows-   Antihyperglycemics (From admission, onward)      Start     Stop Route Frequency Ordered    10/02/24 0006  insulin aspart U-100 pen 0-5 Units         -- SubQ Before meals & nightly PRN 10/01/24 2306          Hold Oral hypoglycemics while patient is in the hospital.    Retroperitoneal fluid collection  As above    Moderate malnutrition  Nutrition consulted. Most recent weight and BMI monitored-     Measurements:  Wt Readings from Last 1 Encounters:   10/09/24 65.2 kg (143 lb 11.8 oz)   Body mass index is 26.29 kg/m².    Patient has been screened and assessed by RD.    Malnutrition Type:  Context: social/environmental circumstances  Level: moderate    Malnutrition Characteristic Summary:  Weight Loss (Malnutrition): 10% in 6 months  Energy Intake (Malnutrition): less than 75% for greater than 7 days    Interventions/Recommendations (treatment strategy):  1.      Epidural abscess  As above      Refeeding syndrome  In ICU, RESOLVED      Endocarditis  As above      Staphylococcus aureus bacteremia  As above      Sepsis  As above    Severe sepsis  See above    Hepatitis C  Hepatitis C antibody positive.    Obtain HCV RNA. Quant negative    Anemia, unspecified  S/p 2u PRBC transfusion on admit   No signs of acute GI bleed on exam at this time. Denies any hematemesis or hemoptysis.   Obtain iron B12/folate/peripheral smear and LDH/hapto/retic  No evidence of active bleed   Stable    Smoker  Tobacco cessation discussed with patient for greater than 5 minutes.   Offered Nicotine patch while hospitalized  Patient is aware of need to quit tobacco products    History of drug use  On methadone 70 mg at home, continue    Hypokalemia  Monitor on tele. Replace mg, phos, and K. F/u on repeat labs.   Replete 10/13, 10/15    Psoas muscle abscess  As above    Other osteomyelitis, multiple sites  As above        VTE Risk Mitigation (From admission, onward)           Ordered     IP VTE HIGH RISK PATIENT  Once          09/22/24 1553                    Discharge Planning   LUH: 10/16/2024     Code Status: Full Code   Is the patient medically ready for discharge?:     Reason for patient still in hospital (select all that apply): Patient trending condition  Discharge Plan A: Long-term acute care facility (LTAC) (Pending sale to [facility] Phone: [phone])   Discharge Delays: None known at this time      Jocelyn Hernandez MD  Department of Hospital Medicine   [facility] - Stepdown Flex ([place] [address])

## 2024-10-16 NOTE — NURSING
Pt AAOx4, VSS. BP elevated, MD aware and no new orders received. Pt receiving IV antibiotics through PICC. Wound care performed to L Hip. Pt up to BSC, voiding with no issues. Pt with c/o abdominal pain, MD aware. PRNs administered for nausea as needed. Case management informed pt that she was to be discharged to LTAC. Pt is currently refusing, stating she wants to talk to her .  Educated pt on the importance of receiving her IV antibiotics and wound care. Pt stated she would think about it and let me know what she decides when her  gets off of work. Call light in reach, bed in lowest position. No other needs stated at this time.

## 2024-10-16 NOTE — PROGRESS NOTES
[facility] - Stepdown Flex ([facility])  Adult Nutrition  Progress Note    SUMMARY       Recommendations    Continue Diabetic diet + Boost Glucose Control/Prateek ONS BID.    Goals: Meet % EEN/EPN by RD follow up.  Nutrition Goal Status: progressing towards goal  Communication of RD Recs: other (comment) (POC)    Reason for Assessment    Reason For Assessment: RD follow-up  Diagnosis: other (see comments) (Paraspinal abscess)    General Information Comments: Pt scheduled to discharge today. Per RN documentation, pt tolerating diet + ONS w/ 75% PO intake.    Nutrition Follow-Up    RD Follow-up?: Yes

## 2024-10-16 NOTE — PLAN OF CARE
Discharge Plan A and Plan B have been determined by review of patient's clinical status, future medical and therapeutic needs, and coverage/benefits for post-acute care in coordination with multidisciplinary team members.      10/16/24 1148   Post-Acute Status   Post-Acute Authorization Placement   Post-Acute Placement Status Pending payor review/awaiting authorization (if required)   Coverage : MEDICAID - [facility] [place]   Patient choice form signed by patient/caregiver List from CMS Compare;List with quality metrics by geographic area provided   Discharge Delays None known at this time   Discharge Plan   Discharge Plan A Long-term acute care facility (LTAC)  (Critical access hospital Phone: [phone])     CM spoke  with patient and patients spouse Marcelino Sloan # [phone]  to discuss discharge planning. Patients spouse is amendable to pursuing LTAC services.   Patients plan is to  dc to LTAC   LUH:  10/17/24    0900 am  CM received notification from Critical access hospital Phone: [phone]   and is willing to accept the patient    0915 am  CM called and left A VM with the patients DC  with patients insurance - [facility] [place] -     10:54 am   CM received a VM form Zeb CHIN   at - [facility] ([place] -  and confirmed receipt of LTAC authorization and has escalated LTAC authorization. CM will follow determination     1145 am  CM went into the patients room and the patient was sleeping       1:10 pm  CM sent a secure chat to the  patients nurse and the patients nurse Rufus spoke  with the patient and the patient was unaware that she was being discharged.    1:15 pm  CM finally spoke with the patient at the bedside and informed her that CM has spoken to her spouse Marcelino Sloan and he provided approval for acceptance at [facility] in [place].  Patient stated,  "" I am not going anywhere and I will leave this damn place if I have to and that's it."    1:22 pm  CM received a phone call form Zeb @ - [facility] ([place] -  # [phone] and confirmed - ) - patient was approved for LTAC stay and has an accepting facility that - [facility] ([place] - will not pay for patient to remain in the hospital.    1:33 pm   CM discussed with patient and provided Notice of Potential Patient Liability  letter and explained that - [facility] ([place] - will not continue to cover inpatient stay beyond the discharge date of 10/16/24 and patient advised to identify a plan of care at home with her spouse. Patient stated, " I called my  and he told me to not to leave and he will be here to pick me up."  CM discussed the risks of leaving AMA and the high potential for a readmit     .         Nabila Elise RN  Case Management  [facility]  [phone]   "

## 2024-10-16 NOTE — CONSULTS
Pharmacokinetic Assessment Follow Up: IV Vancomycin    Vancomycin Regimen Assessment/ Plan:    Random level resulted as 14.0 mcg/mL (~ 36 hours post dose)  Goal level 10-15 mcg/mL per ID  Stable SCr ~ 0.8  Will dose Vancomycin 750 mg IV x 1 today. Draw a random level on 10/17 at 1200.  Pharmacy will continue to follow and monitor vancomycin.      Drug levels (last 3 results):  Recent Labs   Lab Result Units 10/14/24  2101 10/15/24  2123 10/16/24  0925   Vancomycin, Random ug/mL  --  19.4 14.0   Vancomycin-Trough ug/mL 26.5*  --   --        Please contact pharmacy at extension [phone] for questions regarding this assessment.    Thank you for the consult,   Arianna Vazquez, PharmD       Patient brief summary:  Mari Sloan is a 49 y.o. female initiated on antimicrobial therapy with IV Vancomycin for treatment of bone/joint infection    Drug Allergies:   Review of patient's allergies indicates:  No Known Allergies    Actual Body Weight:   65.2 kg    Renal Function:   Estimated Creatinine Clearance: 73.7 mL/min (based on SCr of 0.8 mg/dL).,     Dialysis Method (if applicable):  N/A    CBC (last 72 hours):  Recent Labs   Lab Result Units 10/16/24  0559   WBC K/uL 10.95   Hemoglobin g/dL 9.9*   Hematocrit % 32.8*   Platelets K/uL 322   Gran % % 67.3   Lymph % % 22.9   Mono % % 5.0   Eosinophil % % 3.3   Basophil % % 1.0   Differential Method  Automated       Metabolic Panel (last 72 hours):  Recent Labs   Lab Result Units 10/14/24  2101 10/15/24  2123 10/16/24  0559   Sodium mmol/L 139  --  140   Potassium mmol/L 2.8*  --  3.8   Chloride mmol/L 106  --  106   CO2 mmol/L 24  --  24   Glucose mg/dL 192*  --  103   BUN mg/dL 4*  --  5*   Creatinine mg/dL 0.8 0.8 0.8   Albumin g/dL  --   --  1.4*   Total Bilirubin mg/dL  --   --  0.6   Alkaline Phosphatase U/L  --   --  135   AST U/L  --   --  16   ALT U/L  --   --  11   Magnesium mg/dL 1.8  --   --        Vancomycin Administrations:  vancomycin given in the last 96 hours                      vancomycin (VANCOCIN) 1,000 mg in D5W 250 mL IVPB (admixture device) (mg) 1,000 mg New Bag 10/14/24 2102     1,000 mg New Bag  0959     1,000 mg New Bag 10/13/24 2038     1,000 mg New Bag  1129     1,000 mg New Bag 10/12/24 2025                    Microbiologic Results:  Microbiology Results (last 7 days)       Procedure Component Value Units Date/Time    Culture, Anaerobe [[id number]] Collected: 10/10/24 1032    Order Status: Completed Specimen: Abscess from Back Updated: 10/14/24 1059     Anaerobic Culture No anaerobes isolated    Narrative:      Deep site    Fungus culture [[id number]] Collected: 09/30/24 1705    Order Status: Completed Specimen: Abscess from Abdomen Updated: 10/14/24 1022     Fungus (Mycology) Culture Culture in progress      No fungus isolated after 2 weeks    Narrative:      L retroperitoneal fluid collection    Culture, Body Fluid (Aerobic) w/ GS [[id number]] Collected: 10/10/24 1032    Order Status: Completed Specimen: Body Fluid from Back Updated: 10/14/24 0908     AEROBIC CULTURE - FLUID No growth     Gram Stain Result Rare WBC's      No organisms seen    Narrative:      IR aspiration--superficial site    Aerobic culture [[id number]] Collected: 10/10/24 1032    Order Status: Completed Specimen: Abscess from Back Updated: 10/14/24 0900     Aerobic Bacterial Culture No growth    Narrative:      Deep site    Culture, Anaerobe [[id number]] Collected: 10/07/24 1025    Order Status: Completed Specimen: Abscess from Abdomen Updated: 10/11/24 1304     Anaerobic Culture No anaerobes isolated    Culture, Anaerobe [[id number]] Collected: 10/07/24 1007    Order Status: Completed Specimen: Abscess from Abdomen Updated: 10/11/24 1304     Anaerobic Culture No anaerobes isolated    Culture, Body Fluid (Aerobic) w/ GS [[id number]]     Order Status: Canceled Specimen: Body Fluid     Aerobic culture [[id number]]  (Abnormal)  (Susceptibility) Collected: 10/07/24 1025    Order Status: Completed  Specimen: Abscess from Abdomen Updated: 10/09/24 1502     Aerobic Bacterial Culture STAPHYLOCOCCUS AUREUS  Rare        BACILLUS SPECIES  Rare  Further identified as Bacillus licheniformis      Aerobic culture [[id number]]  (Abnormal)  (Susceptibility) Collected: 10/07/24 1007    Order Status: Completed Specimen: Abscess from Abdomen Updated: 10/09/24 1150     Aerobic Bacterial Culture STAPHYLOCOCCUS AUREUS  Rare

## 2024-10-16 NOTE — ASSESSMENT & PLAN NOTE
Patient's FSGs are uncontrolled due to hyperglycemia on current medication regimen.  Last A1c reviewed-   Lab Results   Component Value Date    HGBA1C 7.9 (H) 09/17/2024     Most recent fingerstick glucose reviewed-   Recent Labs   Lab 10/15/24  0811 10/15/24  1142 10/15/24  1549 10/16/24  0731   POCTGLUCOSE 121* 149* 159* 223*       Current correctional scale  Medium  Maintain anti-hyperglycemic dose as follows-   Antihyperglycemics (From admission, onward)    Start     Stop Route Frequency Ordered    10/02/24 0006  insulin aspart U-100 pen 0-5 Units         -- SubQ Before meals & nightly PRN 10/01/24 2306        Hold Oral hypoglycemics while patient is in the hospital.

## 2024-10-17 LAB
POCT GLUCOSE: 101 MG/DL (ref 70–110)
POCT GLUCOSE: 121 MG/DL (ref 70–110)
POCT GLUCOSE: 173 MG/DL (ref 70–110)
POCT GLUCOSE: 99 MG/DL (ref 70–110)
VANCOMYCIN SERPL-MCNC: 11.6 UG/ML

## 2024-10-17 PROCEDURE — 63600175 PHARM REV CODE 636 W HCPCS: Performed by: HOSPITALIST

## 2024-10-17 PROCEDURE — A4216 STERILE WATER/SALINE, 10 ML: HCPCS | Performed by: STUDENT IN AN ORGANIZED HEALTH CARE EDUCATION/TRAINING PROGRAM

## 2024-10-17 PROCEDURE — 25000003 PHARM REV CODE 250: Performed by: STUDENT IN AN ORGANIZED HEALTH CARE EDUCATION/TRAINING PROGRAM

## 2024-10-17 PROCEDURE — 63600175 PHARM REV CODE 636 W HCPCS: Performed by: STUDENT IN AN ORGANIZED HEALTH CARE EDUCATION/TRAINING PROGRAM

## 2024-10-17 PROCEDURE — 25000003 PHARM REV CODE 250: Performed by: HOSPITALIST

## 2024-10-17 PROCEDURE — 25000003 PHARM REV CODE 250: Performed by: INTERNAL MEDICINE

## 2024-10-17 PROCEDURE — 20600001 HC STEP DOWN PRIVATE ROOM

## 2024-10-17 PROCEDURE — 80202 ASSAY OF VANCOMYCIN: CPT | Performed by: HOSPITALIST

## 2024-10-17 RX ORDER — ONDANSETRON 8 MG/1
8 TABLET, ORALLY DISINTEGRATING ORAL EVERY 6 HOURS PRN
Status: DISCONTINUED | OUTPATIENT
Start: 2024-10-17 | End: 2024-10-18 | Stop reason: HOSPADM

## 2024-10-17 RX ORDER — METOCLOPRAMIDE 5 MG/1
10 TABLET ORAL EVERY 6 HOURS PRN
Status: DISCONTINUED | OUTPATIENT
Start: 2024-10-17 | End: 2024-10-18 | Stop reason: HOSPADM

## 2024-10-17 RX ADMIN — OXYCODONE HYDROCHLORIDE 20 MG: 10 TABLET ORAL at 09:10

## 2024-10-17 RX ADMIN — POLYETHYLENE GLYCOL 3350 17 G: 17 POWDER, FOR SOLUTION ORAL at 09:10

## 2024-10-17 RX ADMIN — OXACILLIN 2 G: 2 INJECTION, POWDER, FOR SOLUTION INTRAMUSCULAR; INTRAVENOUS at 12:10

## 2024-10-17 RX ADMIN — Medication 10 ML: at 11:10

## 2024-10-17 RX ADMIN — OXYCODONE HYDROCHLORIDE 20 MG: 10 TABLET ORAL at 04:10

## 2024-10-17 RX ADMIN — MELATONIN TAB 3 MG 6 MG: 3 TAB at 09:10

## 2024-10-17 RX ADMIN — ONDANSETRON 8 MG: 8 TABLET, ORALLY DISINTEGRATING ORAL at 07:10

## 2024-10-17 RX ADMIN — Medication 100 MG: at 09:10

## 2024-10-17 RX ADMIN — VANCOMYCIN HYDROCHLORIDE 750 MG: 750 INJECTION, POWDER, LYOPHILIZED, FOR SOLUTION INTRAVENOUS at 02:10

## 2024-10-17 RX ADMIN — Medication 10 ML: at 06:10

## 2024-10-17 RX ADMIN — OXYCODONE HYDROCHLORIDE 20 MG: 10 TABLET ORAL at 12:10

## 2024-10-17 RX ADMIN — METHADONE HYDROCHLORIDE 70 MG: 10 TABLET ORAL at 09:10

## 2024-10-17 RX ADMIN — Medication 10 ML: at 12:10

## 2024-10-17 RX ADMIN — HYDROXYZINE PAMOATE 25 MG: 25 CAPSULE ORAL at 08:10

## 2024-10-17 RX ADMIN — PROMETHAZINE HYDROCHLORIDE 12.5 MG: 25 INJECTION INTRAMUSCULAR; INTRAVENOUS at 06:10

## 2024-10-17 RX ADMIN — OXACILLIN 2 G: 2 INJECTION, POWDER, FOR SOLUTION INTRAMUSCULAR; INTRAVENOUS at 03:10

## 2024-10-17 RX ADMIN — SENNOSIDES AND DOCUSATE SODIUM 1 TABLET: 50; 8.6 TABLET ORAL at 09:10

## 2024-10-17 RX ADMIN — OXACILLIN 2 G: 2 INJECTION, POWDER, FOR SOLUTION INTRAMUSCULAR; INTRAVENOUS at 11:10

## 2024-10-17 RX ADMIN — FOLIC ACID 1 MG: 1 TABLET ORAL at 09:10

## 2024-10-17 RX ADMIN — GABAPENTIN 300 MG: 300 CAPSULE ORAL at 04:10

## 2024-10-17 RX ADMIN — GABAPENTIN 300 MG: 300 CAPSULE ORAL at 08:10

## 2024-10-17 RX ADMIN — OXACILLIN 2 G: 2 INJECTION, POWDER, FOR SOLUTION INTRAMUSCULAR; INTRAVENOUS at 07:10

## 2024-10-17 RX ADMIN — ONDANSETRON 8 MG: 8 TABLET, ORALLY DISINTEGRATING ORAL at 08:10

## 2024-10-17 RX ADMIN — Medication 10 ML: at 02:10

## 2024-10-17 RX ADMIN — OXACILLIN 2 G: 2 INJECTION, POWDER, FOR SOLUTION INTRAMUSCULAR; INTRAVENOUS at 09:10

## 2024-10-17 RX ADMIN — GABAPENTIN 300 MG: 300 CAPSULE ORAL at 09:10

## 2024-10-17 NOTE — SUBJECTIVE & OBJECTIVE
Interval History: see above    Review of Systems   Constitutional:  Positive for activity change and fatigue.   HENT:  Negative for trouble swallowing.    Respiratory:  Negative for cough and shortness of breath.    Cardiovascular:  Negative for chest pain and leg swelling.   Gastrointestinal:  Positive for nausea. Negative for abdominal pain, anal bleeding, constipation and diarrhea.   Genitourinary:  Negative for difficulty urinating.   Musculoskeletal:  Positive for arthralgias and back pain.   Neurological:  Negative for numbness and headaches.   Psychiatric/Behavioral:  Negative for behavioral problems.      Objective:     Vital Signs (Most Recent):  Temp: 98.9 °F (37.2 °C) (10/17/24 0812)  Pulse: 92 (10/17/24 0812)  Resp: 18 (10/17/24 0907)  BP: (!) 153/95 (10/17/24 0812)  SpO2: 100 % (10/17/24 0812) Vital Signs (24h Range):  Temp:  [97.9 °F (36.6 °C)-99.5 °F (37.5 °C)] 98.9 °F (37.2 °C)  Pulse:  [86-93] 92  Resp:  [16-20] 18  SpO2:  [97 %-100 %] 100 %  BP: (116-153)/(56-95) 153/95     Weight: 65.2 kg (143 lb 11.8 oz)  Body mass index is 26.29 kg/m².    Intake/Output Summary (Last 24 hours) at 10/17/2024 0952  Last data filed at 10/16/2024 1712  Gross per 24 hour   Intake --   Output 300 ml   Net -300 ml      Physical Exam  Constitutional:       General: She is not in acute distress.     Appearance: Normal appearance. She is obese.   HENT:      Head: Normocephalic and atraumatic.      Nose: Nose normal.      Mouth/Throat:      Mouth: Mucous membranes are moist.   Eyes:      General: No scleral icterus.     Extraocular Movements: Extraocular movements intact.      Pupils: Pupils are equal, round, and reactive to light.   Cardiovascular:      Rate and Rhythm: Normal rate and regular rhythm.      Pulses: Normal pulses.      Heart sounds: Normal heart sounds. No murmur heard.  Pulmonary:      Effort: Pulmonary effort is normal.      Breath sounds: Normal breath sounds. No wheezing or rhonchi.   Chest:      Chest  wall: No tenderness.   Abdominal:      General: Abdomen is flat. Bowel sounds are normal. There is no distension.      Palpations: Abdomen is soft.      Tenderness: There is no abdominal tenderness. There is no right CVA tenderness, left CVA tenderness, guarding or rebound.   Musculoskeletal:         General: No swelling, tenderness, deformity or signs of injury. Normal range of motion.      Cervical back: Normal range of motion and neck supple. No rigidity or tenderness.      Right lower leg: No edema.      Left lower leg: No edema.   Skin:     General: Skin is warm and dry.      Coloration: Skin is not jaundiced or pale.      Findings: No erythema or rash.   Neurological:      General: No focal deficit present.      Mental Status: She is alert and oriented to person, place, and time. Mental status is at baseline.      Cranial Nerves: No cranial nerve deficit.      Motor: No weakness.   Psychiatric:         Mood and Affect: Mood normal.         Behavior: Behavior normal.         Thought Content: Thought content normal.         MELD 3.0: 17 at 9/21/2024  6:13 PM  MELD-Na: 13 at 9/21/2024  6:13 PM  Calculated from:  Serum Creatinine: 0.6 mg/dL (Using min of 1 mg/dL) at 9/21/2024  6:13 PM  Serum Sodium: 138 mmol/L (Using max of 137 mmol/L) at 9/21/2024  6:13 PM  Total Bilirubin: 2.1 mg/dL at 9/21/2024  2:34 AM  Serum Albumin: 1.2 g/dL (Using min of 1.5 g/dL) at 9/21/2024  2:34 AM  INR(ratio): 1.4 at 9/19/2024  3:19 AM  Age at listing (hypothetical): 49 years  Sex: Female at 9/21/2024  6:13 PM      Significant Labs:  CBC:  Recent Labs   Lab 10/16/24  0559   WBC 10.95   HGB 9.9*   HCT 32.8*            CMP:  Recent Labs   Lab 10/15/24  2123 10/16/24  0559   NA  --  140   K  --  3.8   CL  --  106   CO2  --  24   GLU  --  103   BUN  --  5*   CREATININE 0.8 0.8   CALCIUM  --  8.3*   PROT  --  6.2   ALBUMIN  --  1.4*   BILITOT  --  0.6   ALKPHOS  --  135   AST  --  16   ALT  --  11   ANIONGAP  --  10

## 2024-10-17 NOTE — CONSULTS
Pharmacokinetic Assessment Follow Up: IV Vancomycin    Vancomycin Regimen Assessment/ Plan:     Random level resulted as 11.6 mcg/mL (~ 24 hours post dose)  Goal level 10-15 mcg/mL per ID  Stable SCr ~ 0.8  Will schedule Vancomycin 750 mg IV every 24 hours  Trough level due 10/19 at 1230 or earlier if clinically indicated  Pharmacy will continue to follow and monitor vancomycin.    Drug levels (last 3 results):  Recent Labs   Lab Result Units 10/14/24  2101 10/15/24  2123 10/16/24  0925 10/17/24  1210   Vancomycin, Random ug/mL  --  19.4 14.0 11.6   Vancomycin-Trough ug/mL 26.5*  --   --   --        Please contact pharmacy at extension [phone] for questions regarding this assessment.    Thank you for the consult,   Arianna Vazquez, PharmD       Patient brief summary:  Mari Sloan is a 49 y.o. female initiated on antimicrobial therapy with IV Vancomycin for treatment of bone/joint infection    Drug Allergies:   Review of patient's allergies indicates:  No Known Allergies    Actual Body Weight:   65.2 kg    Renal Function:   Estimated Creatinine Clearance: 73.7 mL/min (based on SCr of 0.8 mg/dL).,     Dialysis Method (if applicable):  N/A    CBC (last 72 hours):  Recent Labs   Lab Result Units 10/16/24  0559   WBC K/uL 10.95   Hemoglobin g/dL 9.9*   Hematocrit % 32.8*   Platelets K/uL 322   Gran % % 67.3   Lymph % % 22.9   Mono % % 5.0   Eosinophil % % 3.3   Basophil % % 1.0   Differential Method  Automated       Metabolic Panel (last 72 hours):  Recent Labs   Lab Result Units 10/14/24  2101 10/15/24  2123 10/16/24  0559   Sodium mmol/L 139  --  140   Potassium mmol/L 2.8*  --  3.8   Chloride mmol/L 106  --  106   CO2 mmol/L 24  --  24   Glucose mg/dL 192*  --  103   BUN mg/dL 4*  --  5*   Creatinine mg/dL 0.8 0.8 0.8   Albumin g/dL  --   --  1.4*   Total Bilirubin mg/dL  --   --  0.6   Alkaline Phosphatase U/L  --   --  135   AST U/L  --   --  16   ALT U/L  --   --  11   Magnesium mg/dL 1.8  --   --        Vancomycin  Administrations:  vancomycin given in the last 96 hours                     vancomycin 750 mg in D5W 250 mL IVPB (admixture device) (mg) 750 mg New Bag 10/16/24 1214    vancomycin (VANCOCIN) 1,000 mg in D5W 250 mL IVPB (admixture device) (mg) 1,000 mg New Bag 10/14/24 2102     1,000 mg New Bag  0959     1,000 mg New Bag 10/13/24 2038                    Microbiologic Results:  Microbiology Results (last 7 days)       Procedure Component Value Units Date/Time    Culture, Anaerobe [[id number]] Collected: 10/10/24 1032    Order Status: Completed Specimen: Abscess from Back Updated: 10/14/24 1059     Anaerobic Culture No anaerobes isolated    Narrative:      Deep site    Fungus culture [[id number]] Collected: 09/30/24 1705    Order Status: Completed Specimen: Abscess from Abdomen Updated: 10/14/24 1022     Fungus (Mycology) Culture Culture in progress      No fungus isolated after 2 weeks    Narrative:      L retroperitoneal fluid collection    Culture, Body Fluid (Aerobic) w/ GS [[id number]] Collected: 10/10/24 1032    Order Status: Completed Specimen: Body Fluid from Back Updated: 10/14/24 0908     AEROBIC CULTURE - FLUID No growth     Gram Stain Result Rare WBC's      No organisms seen    Narrative:      IR aspiration--superficial site    Aerobic culture [[id number]] Collected: 10/10/24 1032    Order Status: Completed Specimen: Abscess from Back Updated: 10/14/24 0900     Aerobic Bacterial Culture No growth    Narrative:      Deep site    Culture, Anaerobe [[id number]] Collected: 10/07/24 1025    Order Status: Completed Specimen: Abscess from Abdomen Updated: 10/11/24 1304     Anaerobic Culture No anaerobes isolated    Culture, Anaerobe [[id number]] Collected: 10/07/24 1007    Order Status: Completed Specimen: Abscess from Abdomen Updated: 10/11/24 1304     Anaerobic Culture No anaerobes isolated

## 2024-10-17 NOTE — PT/OT/SLP PROGRESS
"Physical Therapy      Patient Name:  Mari Sloan   MRN:  [id number]    Patient not seen today secondary to at 9:23 AM pt reporting "I just got back into bed, I'm okay right now." Second attempt at 13:44 PM pt reporting "I'm good." When asked to participate with therapy. Patient ultimately unwilling to participate at this time.     "

## 2024-10-17 NOTE — PROGRESS NOTES
[facility] ([place] [id number])  [facility]  Progress Note    Patient Name: Mari Sloan  MRN: [id number]  Patient Class: IP- Inpatient   Admission Date: 9/16/2024  Length of Stay: 31 days  Attending Physician: Dharmesh Peoples MD  Primary Care Provider: Mary Fong APRN        Subjective:     Principal Problem:Paraspinal abscess        HPI:  By Dr. Alberta Reese MD    49 y.o.  woman with h/o homelessness (recently was able to obtain living arrangements but states she was living [place] the bridge), NIDDM type 2, Essential hypertension, and substance use (denies any IVDU in he past or any illicit drug use recenly, denies ETOH abuse/overuse) presents to [facility] for further evaluation of her back pain.  She apparently presented to the [facility] Emergency Department on September 16 with nausea and vomiting and a mechanical fall 5 days prior. She reported pain worse in her back and on her sides radiating down both legs. She noted muscle spasm in her back and legs. She had no head trauma or loss of consciousness.  W/u in the [facility] ED noted significant hypokalemia, hypomagnesemia, severe anemia, metabolic alkalosis, and hyperglycemia. Vitals signs stable with no sepsis at this time noted. SBP>90, HR normal,afebrile.     Imaging studies of her lumbar spine and pelvis were concerning for osteomyelitis/septic arthritis of the left SI joint and L5-S1 along with an abnormal fluid collection extending from T11 through the left iliacus muscle concerning for abscess. Blood cultures sent.  In the emergency department she is receiving IV fluid, Zofran, Zosyn, vancomycin, potassium, magnesium, pain medication, and nausea medication.   She also received 2 units of PRBC for an H/H of 5.7/18.4,     Case discussed with Neurosurgery and Interventional Radiology. Plan would be for transfer to Hospital Medicine at [facility] for IR evaluation of the fluid collection and potential  "sampling of the joint osteomyelitis.  I reviewed the discussions made with the multiple sub specialists regarding transfer of this patient to [facility]: IR/General surgery/Neurosurgery/ER/Internal Med.     Neurosurgery contacted by the  did not feel surgical intervention was needed at this time but would follow along and requested [facility] for further management and IR backup. IR on call apparently read the CT and at the time felt "while the left retroperitoneal fluid collections do appear organized, percutaneous drainage is not advised given the extensive soft tissue infection superficial to these collections.  In addition, there is extensive evidence of soft tissue infection that does not appear organized or percutaneously drainable."     General surgery was consulted to review the case and felt that there was no immediate surgical intervention at this time to be had. I spoke with the on surgeon contacted regarding the location of the fluid collections and inquired if perhaps orthopedic surgery should be consulted to review given the involvement of bony pelvis.      I spoke orthopedic surgery who will see the patient but recommended re-consulting IR here and Neurosurgery given the diskitis/OM L5-S1. (Please see his consult note in the chart)     Repeat labs here again noted for persistently low mag, K, phos.  H/H stable with improved H/H. Pain control improved with Dilaudid.  I consulted ID for further assistance with ABX adjustments and changed her to Meropenem and doxy as well as continued Vanc. Echo ordred for am.     CT lumbar spine and pelvis had findings most concerning for infectious process. There were findings concerning for osteomyelitis and septic arthritis in the left SI joint. Findings concerning for osteomyelitis/diskitis L5-S1. Possible osteomyelitis and septic joint at the pubic symphysis. Abnormal fluid collection on the left extending from T11 through the left iliacus muscle " along and within the left iliopsoas muscle most concerning for abscess. Multiple additional small abscesses suspected in the pelvis. Multifocal collections of air in the fluid in the subcutaneous tissues of the left flank, incompletely imaged.    Chest x-ray noted a loop in the central venous catheter. Tip currently at the level of the brachiocephalic vein. Lungs are fairly clear.        Overview/Hospital Course:  Ms. Sloan was transferred from [facility] ED to [facility] ICU on 09/16/2024.  She presented to [facility] ED  for back pain, nausea/vomiting.  CT L-spine revealed osteomyelitis/diskitis L5-S1 along with abnormal fluid collection on left extending from T11 through left iliacus muscle and left iliopsoas muscle concerning for abscess.  Multifocal collections of air in fluid in subcutaneous tissues of left flank.  Patient was initiated on empiric vancomycin and meropenem.  Blood cultures with staph aureus.  Obtain echo.  Unable to repeat blood cultures given shortage of cx.  Neurosurgery recommended to obtain MRI L-spine, pending.  General surgery evaluated the patient and recommended urgent transfer to [facility] for surgical evaluation/source control given extent of necrosis.     Ms. Sloan was transferred to [facility] and admitted to the MICU 9/18 with concern for paraspinal abscess. Infectious workup was ordered. Blood cultures were positive for MSSA bacteremia. Neurosurgery, General Surgery and IR were consulted to evaluate the patient's paraspinal abscess. Neurosurgery performed a L3-L4 laminectomy for epidural abscess evacuation on 9/19/24 and the cultures grew MSSA. TTE showed normal EF of 60-65% with diastolic dysfunction, could not exclude mitral vegetation vs redundant chordae. ID was consulted and recommended Oxacillin and repeat blood cultures. With her electrolyte derangement, and a background of appetitive loss in the past 2 months, there was concern for refeeding syndrome. On 9/21, IR  took the patient for abscess drain placement and aspiration of SI joint. SI joint couldn't be aspirated but retroperitoneal abscess was drained of 100cc of purulent fluid. She was successfully extubated 9/22 and was stepped down to Hospital Medicine on 9/24/24. Her drain was removed on 09/27.  Repeat CT abdomen/pelvis showed an ill-defined subcutaneous edema and soft tissue inflammatory change in the surgical bed and subcutaneous soft tissues without discrete organized fluid collection, stable size of 3.6 cm fluid collection along the left posterior pararenal space which appears to communicate with the with the aforementioned collection and fascial thickening.  IR placed drain for retroperitoneal fluid collection on 9/30.  Cultures returned with Staph aureus.  IR suggested to monitor drain output, and consider repeat imaging when output decreases to less than 10 cc in 24 hours to evaluate for possible drainage catheter removal. Repeat CT was ordered on 10/3 as she endorsed worsening pain on her left flank, and minimal drain output which could be removed.  It returned with multiple new and enlargening abscesses.  Discussed with ID, who said to continue Oxacillin.  NSGY, Gen Surg, Ortho and IR were consulted for further recommendations.  MRI complete spine and pelvis were ordered to help guide management.  Gen Surg took her to the OR for an I&D of a left hip abscess on 10/7, where 300cc of purulent material within the subcutaneous space along the left hip was drained and sent for culture.  ID reconsulted.  NeuroIR considering spinal abscess drainage.    10/10-  lumbar abscess aspiration done per IR 10/9. Pt on oxacillin and vanc. ID following. Need f/I on IR  aspiration. Lumbar abscess aspiration completed today.    [facility] auth has been approved and the auth expires at midnight 10/11/24.   10/11- need final ID recs. New cultures form 10/10 still in progress. On vanc and oxacillin. oxacillin 2g q4h, duration 8 weeks,  tentative end date 11/18.   Continue vancomycin, goal trough 10-15, pharmacy to dose, tentative end date 11/4/24. Pt has capacity. She suggest that she might leave AMA.   10/12- SNF unable to do both antibiotics. Pt here until completion or we find another place. Yesterday she was declining therapies.  VSS.  Lab tomorrow, q 72 hours. K low- will replete. Last mag 1.9.   10/15- pt is refusing therapies. Eating. Reports getting up with her .  On vanc until 11/4 and oxacillin until 11/18.  Bmp, mag to f/u on hypokalemia. Repleting.   10/16- on isolated increase in BP. Reporting pain. Alb 1.4, K 3.8. She is being discharged from therapy for lack of cooperation.  Reporting abd pain, exam is unchanged. Need to monitor for new symptoms. She also was accepted to LTAC. She is med ready for LTAC.   10/17- having significant nausea, providing additional PRNs. Is now amenable to LTAC and tentatively planning for DC tomorrow    Interval History: see above    Review of Systems   Constitutional:  Positive for activity change and fatigue.   HENT:  Negative for trouble swallowing.    Respiratory:  Negative for cough and shortness of breath.    Cardiovascular:  Negative for chest pain and leg swelling.   Gastrointestinal:  Positive for nausea. Negative for abdominal pain, anal bleeding, constipation and diarrhea.   Genitourinary:  Negative for difficulty urinating.   Musculoskeletal:  Positive for arthralgias and back pain.   Neurological:  Negative for numbness and headaches.   Psychiatric/Behavioral:  Negative for behavioral problems.      Objective:     Vital Signs (Most Recent):  Temp: 98.9 °F (37.2 °C) (10/17/24 0812)  Pulse: 92 (10/17/24 0812)  Resp: 18 (10/17/24 0907)  BP: (!) 153/95 (10/17/24 0812)  SpO2: 100 % (10/17/24 0812) Vital Signs (24h Range):  Temp:  [97.9 °F (36.6 °C)-99.5 °F (37.5 °C)] 98.9 °F (37.2 °C)  Pulse:  [86-93] 92  Resp:  [16-20] 18  SpO2:  [97 %-100 %] 100 %  BP: (116-153)/(56-95) 153/95      Weight: 65.2 kg (143 lb 11.8 oz)  Body mass index is 26.29 kg/m².    Intake/Output Summary (Last 24 hours) at 10/17/2024 0952  Last data filed at 10/16/2024 1712  Gross per 24 hour   Intake --   Output 300 ml   Net -300 ml      Physical Exam  Constitutional:       General: She is not in acute distress.     Appearance: Normal appearance. She is obese.   HENT:      Head: Normocephalic and atraumatic.      Nose: Nose normal.      Mouth/Throat:      Mouth: Mucous membranes are moist.   Eyes:      General: No scleral icterus.     Extraocular Movements: Extraocular movements intact.      Pupils: Pupils are equal, round, and reactive to light.   Cardiovascular:      Rate and Rhythm: Normal rate and regular rhythm.      Pulses: Normal pulses.      Heart sounds: Normal heart sounds. No murmur heard.  Pulmonary:      Effort: Pulmonary effort is normal.      Breath sounds: Normal breath sounds. No wheezing or rhonchi.   Chest:      Chest wall: No tenderness.   Abdominal:      General: Abdomen is flat. Bowel sounds are normal. There is no distension.      Palpations: Abdomen is soft.      Tenderness: There is no abdominal tenderness. There is no right CVA tenderness, left CVA tenderness, guarding or rebound.   Musculoskeletal:         General: No swelling, tenderness, deformity or signs of injury. Normal range of motion.      Cervical back: Normal range of motion and neck supple. No rigidity or tenderness.      Right lower leg: No edema.      Left lower leg: No edema.   Skin:     General: Skin is warm and dry.      Coloration: Skin is not jaundiced or pale.      Findings: No erythema or rash.   Neurological:      General: No focal deficit present.      Mental Status: She is alert and oriented to person, place, and time. Mental status is at baseline.      Cranial Nerves: No cranial nerve deficit.      Motor: No weakness.   Psychiatric:         Mood and Affect: Mood normal.         Behavior: Behavior normal.         Thought  Content: Thought content normal.         MELD 3.0: 17 at 9/21/2024  6:13 PM  MELD-Na: 13 at 9/21/2024  6:13 PM  Calculated from:  Serum Creatinine: 0.6 mg/dL (Using min of 1 mg/dL) at 9/21/2024  6:13 PM  Serum Sodium: 138 mmol/L (Using max of 137 mmol/L) at 9/21/2024  6:13 PM  Total Bilirubin: 2.1 mg/dL at 9/21/2024  2:34 AM  Serum Albumin: 1.2 g/dL (Using min of 1.5 g/dL) at 9/21/2024  2:34 AM  INR(ratio): 1.4 at 9/19/2024  3:19 AM  Age at listing (hypothetical): 49 years  Sex: Female at 9/21/2024  6:13 PM      Significant Labs:  CBC:  Recent Labs   Lab 10/16/24  0559   WBC 10.95   HGB 9.9*   HCT 32.8*            CMP:  Recent Labs   Lab 10/15/24  2123 10/16/24  0559   NA  --  140   K  --  3.8   CL  --  106   CO2  --  24   GLU  --  103   BUN  --  5*   CREATININE 0.8 0.8   CALCIUM  --  8.3*   PROT  --  6.2   ALBUMIN  --  1.4*   BILITOT  --  0.6   ALKPHOS  --  135   AST  --  16   ALT  --  11   ANIONGAP  --  10         Assessment/Plan:      * Paraspinal abscess  MSSA Endocarditis  MSSA Paraspinal abscess  MSSA Psoas abscess    Blood cx 9/16 with MSSA  2D echo 9/17:  Cannot entirely exclude mitral vegetation vs redundant chordae. If high clinical suspicion for endocarditis, would rx as such (not clear MARICHUY would be able to exclude vegetation based on TTE findings).   Noted to have extensive fluid collection on left extending from T11 through left iliacus muscle and within the left iliopsoas muscle.  Multifocal collections of air including subcutaneous tissues on the left flank noted.  ID/Neurosurgery consulted.   S/p L3-L5 laminectomy with epidural abscess evacuation 9/19   Ortho consulted. Rec continued abx tx and no further interventions  IR SI joint aspiration and abscess drain placement 9/21. Unable to aspirate joint  IR drain removed on 09/27.    Repeat CT abdomen pelvis 9/28 ordered to look for recollection of fluid showed an ill-defined subcutaneous edema and soft tissue inflammatory change in the surgical  bed and subcutaneous soft tissues without discrete organized fluid collection,  Stable size of 3.6 cm fluid collection along the left posterior pararenal space which appears to communicate with the with the aforementioned collection and fascial thickening.   IR placed drain for retroperitoneal fluid collection on 9/30.  Culture with MSSA  ID consulted:  Suggested Oxacillin through 11/18 with repeat imaging prior to completion  Will need LTAC for completion of IV antibiotics given history of IVDA  PICC placed 9/27  Repeat CRP ordered 10/3 given worsening pain and swelling - CRP down to 32  Repeat CT abdomen/pelvis was ordered to assess for drain removal, that showed worsening abscesses.  Discussed with ID who suggested further source control and to continue Oxacillin   Gen Surg, Ortho, NSGY and IR consulted - Greatly appreciate assistance!  MRI complete spine and pelvis ordered to further determine next steps with multiple abscesses  Gen Surg took her to the OR for an I&D of a left hip abscess on 10/7, where 300cc of purulent material within the subcutaneous space along the left hip was drained and sent for culture.  Cx with Staph aureus  ID reconsulted  NeuroIR considering epidural drainage as NSGY said no intervention and ID feels like she could use more intervention to decrease her infectious burden  10/10-  Lumbar abscess aspiration completed per IR today.    10/16- oxacillin 2g q4h, duration 8 weeks, tentative end date 11/18.  vancomycin, goal trough 10-15, pharmacy to dose, tentative end date 11/4/24, Continue wound care.   10/17- no changes to plan    Retroperitoneal fluid collection  As above    Moderate malnutrition  Nutrition consulted. Most recent weight and BMI monitored-     Measurements:  Wt Readings from Last 1 Encounters:   10/09/24 65.2 kg (143 lb 11.8 oz)   Body mass index is 26.29 kg/m².    Patient has been screened and assessed by RD.    Malnutrition Type:  Context: social/environmental  circumstances  Level: moderate    Malnutrition Characteristic Summary:  Weight Loss (Malnutrition): 10% in 6 months  Energy Intake (Malnutrition): less than 75% for greater than 7 days    Interventions/Recommendations (treatment strategy):  1.      Epidural abscess  As above      Refeeding syndrome  In ICU, RESOLVED      Endocarditis  As above      Staphylococcus aureus bacteremia  As above      Sepsis  As above    Severe sepsis  See above    Hepatitis C  Hepatitis C antibody positive.    Obtain HCV RNA. Quant negative    Anemia, unspecified  S/p 2u PRBC transfusion on admit   No signs of acute GI bleed on exam at this time. Denies any hematemesis or hemoptysis.   Obtain iron B12/folate/peripheral smear and LDH/hapto/retic  No evidence of active bleed   Stable    Uncontrolled type 2 diabetes mellitus with hyperglycemia  Patient's FSGs are uncontrolled due to hyperglycemia on current medication regimen.  Last A1c reviewed-   Lab Results   Component Value Date    HGBA1C 7.9 (H) 09/17/2024     Most recent fingerstick glucose reviewed-   Recent Labs   Lab 10/15/24  0811 10/15/24  1142 10/15/24  1549 10/16/24  0731   POCTGLUCOSE 121* 149* 159* 223*       Current correctional scale  Medium  Maintain anti-hyperglycemic dose as follows-   Antihyperglycemics (From admission, onward)      Start     Stop Route Frequency Ordered    10/02/24 0006  insulin aspart U-100 pen 0-5 Units         -- SubQ Before meals & nightly PRN 10/01/24 2306          Hold Oral hypoglycemics while patient is in the hospital.    Smoker  Tobacco cessation discussed with patient for greater than 5 minutes.   Offered Nicotine patch while hospitalized  Patient is aware of need to quit tobacco products    History of drug use  On methadone 70 mg at home, continue    Hypokalemia  Monitor on tele. Replace mg, phos, and K. F/u on repeat labs.   Replete 10/13, 10/15    Psoas muscle abscess  As above    Other osteomyelitis, multiple sites  As above        VTE  Risk Mitigation (From admission, onward)           Ordered     IP VTE HIGH RISK PATIENT  Once         09/22/24 1553                    Discharge Planning   LUH: 10/18/2024     Code Status: Full Code   Is the patient medically ready for discharge?:     Reason for patient still in hospital (select all that apply): Patient trending condition and Pending disposition  Discharge Plan A: Long-term acute care facility (LTAC) ([facility] Phone: [phone])   Discharge Delays: None known at this time              Dharmesh Peoples MD  Department of Hospital Medicine   [facility] ([place])

## 2024-10-17 NOTE — PROGRESS NOTES
Attempted to see pt for wound care follow-up for multiple wounds.  Chart reviewed for this encounter.    WC presented to pts room this AM for routine follow-up assessment. Pt politely refused, requested for wound care to return in a couple minutes so that she could, 'wake up more'.   WC returned to pts room, pt was sleeping. When woken up, pt refused again stating she was in too much pain, states she has recently received pain medication. Pt went back to sleeping comfortably.   Will attempt follow-up assessment at later date.

## 2024-10-17 NOTE — ASSESSMENT & PLAN NOTE
MSSA Endocarditis  MSSA Paraspinal abscess  MSSA Psoas abscess    Blood cx 9/16 with MSSA  2D echo 9/17:  Cannot entirely exclude mitral vegetation vs redundant chordae. If high clinical suspicion for endocarditis, would rx as such (not clear MARICHUY would be able to exclude vegetation based on TTE findings).   Noted to have extensive fluid collection on left extending from T11 through left iliacus muscle and within the left iliopsoas muscle.  Multifocal collections of air including subcutaneous tissues on the left flank noted.  ID/Neurosurgery consulted.   S/p L3-L5 laminectomy with epidural abscess evacuation 9/19   Ortho consulted. Rec continued abx tx and no further interventions  IR SI joint aspiration and abscess drain placement 9/21. Unable to aspirate joint  IR drain removed on 09/27.    Repeat CT abdomen pelvis 9/28 ordered to look for recollection of fluid showed an ill-defined subcutaneous edema and soft tissue inflammatory change in the surgical bed and subcutaneous soft tissues without discrete organized fluid collection,  Stable size of 3.6 cm fluid collection along the left posterior pararenal space which appears to communicate with the with the aforementioned collection and fascial thickening.   IR placed drain for retroperitoneal fluid collection on 9/30.  Culture with MSSA  ID consulted:  Suggested Oxacillin through 11/18 with repeat imaging prior to completion  Will need LTAC for completion of IV antibiotics given history of IVDA  PICC placed 9/27  Repeat CRP ordered 10/3 given worsening pain and swelling - CRP down to 32  Repeat CT abdomen/pelvis was ordered to assess for drain removal, that showed worsening abscesses.  Discussed with ID who suggested further source control and to continue Oxacillin   Gen Surg, Ortho, NSGY and IR consulted - Greatly appreciate assistance!  MRI complete spine and pelvis ordered to further determine next steps with multiple abscesses  Gen Surg took her to the OR for an  I&D of a left hip abscess on 10/7, where 300cc of purulent material within the subcutaneous space along the left hip was drained and sent for culture.  Cx with Staph aureus  ID reconsulted  NeuroIR considering epidural drainage as NSGY said no intervention and ID feels like she could use more intervention to decrease her infectious burden  10/10-  Lumbar abscess aspiration completed per IR today.    10/16- oxacillin 2g q4h, duration 8 weeks, tentative end date 11/18.  vancomycin, goal trough 10-15, pharmacy to dose, tentative end date 11/4/24, Continue wound care.   10/17- no changes to plan

## 2024-10-17 NOTE — PLAN OF CARE
Discharge Plan A and Plan B have been determined by review of patient's clinical status, future medical and therapeutic needs, and coverage/benefits for post-acute care in coordination with multidisciplinary team members.        10/17/24 0938   Post-Acute Status   Post-Acute Authorization Placement   Post-Acute Placement Status Set-up Complete/Auth obtained   Coverage MEDICAID - [facility] ([place] MEDICAID) -   Hospital Resources/Appts/Education Provided Appointments scheduled and added to AVS   Patient choice form signed by patient/caregiver List from CMS Compare;List with quality metrics by geographic area provided   Discharge Delays None known at this time   Discharge Plan   Discharge Plan A Long-term acute care facility (LTAC)  ([facility] Phone: [phone])     CM met with patient and spouse at the bedside  to discuss discharge planning. Patient is now amendable with LTAC and patient asked if she could discharge tomorrow, her spouse will bring her a change of clothing. Patients plan is to dc to LTAC.    LUH:  10/18/24    0923 am  CM sent a secure chat to patients medical team and updating that patient has agreed with her discharge plan to [facility] and requesting to discharge 10/18/24.  CM requested attending to remove discharge order until 10/18/24.    0930 am  CM spoke with Marianela Ponce at [facility] and updated that patient has agreed to discharge to [facility] and patient will dc tomorrow 10/18/24    0940 am  CM left a VM with patients DC  with patients insurance and updated that patient has agreed with [facility] and will be discharging tomorrow.            Nabila Elise RN  Case Management  [facility]  [phone]

## 2024-10-17 NOTE — PLAN OF CARE
Problem: Skin Injury Risk Increased  Goal: Skin Health and Integrity  Outcome: Progressing     Problem: Adult Inpatient Plan of Care  Goal: Plan of Care Review  Outcome: Progressing  Goal: Patient-Specific Goal (Individualized)  Outcome: Progressing  Goal: Absence of Hospital-Acquired Illness or Injury  Outcome: Progressing  Goal: Optimal Comfort and Wellbeing  Outcome: Progressing  Goal: Readiness for Transition of Care  Outcome: Progressing     Problem: Infection  Goal: Absence of Infection Signs and Symptoms  Outcome: Progressing     Problem: Diabetes Comorbidity  Goal: Blood Glucose Level Within Targeted Range  Outcome: Progressing     Problem: Sepsis/Septic Shock  Goal: Optimal Coping  Outcome: Progressing  Goal: Absence of Bleeding  Outcome: Progressing  Goal: Blood Glucose Level Within Targeted Range  Outcome: Progressing  Goal: Absence of Infection Signs and Symptoms  Outcome: Progressing  Goal: Optimal Nutrition Intake  Outcome: Progressing     Problem: Fall Injury Risk  Goal: Absence of Fall and Fall-Related Injury  Outcome: Progressing     Problem: Wound  Goal: Optimal Coping  Outcome: Progressing  Goal: Optimal Functional Ability  Outcome: Progressing  Goal: Absence of Infection Signs and Symptoms  Outcome: Progressing  Goal: Improved Oral Intake  Outcome: Progressing  Goal: Optimal Pain Control and Function  Outcome: Progressing  Goal: Skin Health and Integrity  Outcome: Progressing  Goal: Optimal Wound Healing  Outcome: Progressing     Patient alert and oriented. Cooperative with care. Continues on iv abx. Tolerating well. Zofran sublingual given this am. Tolerated well. Patient resting most of day. Comfortably with call bell in reach

## 2024-10-18 VITALS
BODY MASS INDEX: 26.45 KG/M2 | HEIGHT: 62 IN | TEMPERATURE: 99 F | OXYGEN SATURATION: 95 % | SYSTOLIC BLOOD PRESSURE: 139 MMHG | WEIGHT: 143.75 LBS | HEART RATE: 83 BPM | RESPIRATION RATE: 16 BRPM | DIASTOLIC BLOOD PRESSURE: 94 MMHG

## 2024-10-18 LAB
ANION GAP SERPL CALC-SCNC: 11 MMOL/L (ref 8–16)
BUN SERPL-MCNC: 5 MG/DL (ref 6–20)
CALCIUM SERPL-MCNC: 8.2 MG/DL (ref 8.7–10.5)
CHLORIDE SERPL-SCNC: 103 MMOL/L (ref 95–110)
CO2 SERPL-SCNC: 23 MMOL/L (ref 23–29)
CREAT SERPL-MCNC: 0.8 MG/DL (ref 0.5–1.4)
EST. GFR  (NO RACE VARIABLE): >60 ML/MIN/1.73 M^2
GLUCOSE SERPL-MCNC: 149 MG/DL (ref 70–110)
POCT GLUCOSE: 150 MG/DL (ref 70–110)
POCT GLUCOSE: 197 MG/DL (ref 70–110)
POTASSIUM SERPL-SCNC: 2.9 MMOL/L (ref 3.5–5.1)
SODIUM SERPL-SCNC: 137 MMOL/L (ref 136–145)

## 2024-10-18 PROCEDURE — 63600175 PHARM REV CODE 636 W HCPCS: Performed by: STUDENT IN AN ORGANIZED HEALTH CARE EDUCATION/TRAINING PROGRAM

## 2024-10-18 PROCEDURE — A4216 STERILE WATER/SALINE, 10 ML: HCPCS | Performed by: STUDENT IN AN ORGANIZED HEALTH CARE EDUCATION/TRAINING PROGRAM

## 2024-10-18 PROCEDURE — 80048 BASIC METABOLIC PNL TOTAL CA: CPT | Performed by: STUDENT IN AN ORGANIZED HEALTH CARE EDUCATION/TRAINING PROGRAM

## 2024-10-18 PROCEDURE — 25000003 PHARM REV CODE 250: Performed by: HOSPITALIST

## 2024-10-18 PROCEDURE — 25000003 PHARM REV CODE 250: Performed by: STUDENT IN AN ORGANIZED HEALTH CARE EDUCATION/TRAINING PROGRAM

## 2024-10-18 PROCEDURE — 25000003 PHARM REV CODE 250: Performed by: INTERNAL MEDICINE

## 2024-10-18 RX ORDER — OXYCODONE HYDROCHLORIDE 15 MG/1
15 TABLET ORAL EVERY 4 HOURS PRN
Qty: 30 TABLET | Refills: 0 | Status: SHIPPED | OUTPATIENT
Start: 2024-10-18

## 2024-10-18 RX ORDER — POTASSIUM CHLORIDE 20 MEQ/1
40 TABLET, EXTENDED RELEASE ORAL ONCE
Status: COMPLETED | OUTPATIENT
Start: 2024-10-18 | End: 2024-10-18

## 2024-10-18 RX ORDER — METHADONE HYDROCHLORIDE 10 MG/1
70 TABLET ORAL DAILY
Qty: 49 TABLET | Refills: 0 | Status: SHIPPED | OUTPATIENT
Start: 2024-10-18 | End: 2025-10-18

## 2024-10-18 RX ORDER — ONDANSETRON 8 MG/1
8 TABLET, ORALLY DISINTEGRATING ORAL EVERY 6 HOURS PRN
Start: 2024-10-18

## 2024-10-18 RX ADMIN — POTASSIUM CHLORIDE 40 MEQ: 1500 TABLET, EXTENDED RELEASE ORAL at 08:10

## 2024-10-18 RX ADMIN — Medication 100 MG: at 08:10

## 2024-10-18 RX ADMIN — OXACILLIN 2 G: 2 INJECTION, POWDER, FOR SOLUTION INTRAMUSCULAR; INTRAVENOUS at 12:10

## 2024-10-18 RX ADMIN — ONDANSETRON 8 MG: 8 TABLET, ORALLY DISINTEGRATING ORAL at 09:10

## 2024-10-18 RX ADMIN — METHADONE HYDROCHLORIDE 70 MG: 10 TABLET ORAL at 08:10

## 2024-10-18 RX ADMIN — Medication 10 ML: at 12:10

## 2024-10-18 RX ADMIN — Medication 10 ML: at 06:10

## 2024-10-18 RX ADMIN — OXACILLIN 2 G: 2 INJECTION, POWDER, FOR SOLUTION INTRAMUSCULAR; INTRAVENOUS at 08:10

## 2024-10-18 RX ADMIN — SENNOSIDES AND DOCUSATE SODIUM 1 TABLET: 50; 8.6 TABLET ORAL at 08:10

## 2024-10-18 RX ADMIN — OXACILLIN 2 G: 2 INJECTION, POWDER, FOR SOLUTION INTRAMUSCULAR; INTRAVENOUS at 03:10

## 2024-10-18 RX ADMIN — ONDANSETRON 8 MG: 8 TABLET, ORALLY DISINTEGRATING ORAL at 04:10

## 2024-10-18 RX ADMIN — METOCLOPRAMIDE 10 MG: 5 TABLET ORAL at 08:10

## 2024-10-18 RX ADMIN — GABAPENTIN 300 MG: 300 CAPSULE ORAL at 08:10

## 2024-10-18 RX ADMIN — OXYCODONE HYDROCHLORIDE 15 MG: 10 TABLET ORAL at 06:10

## 2024-10-18 RX ADMIN — FOLIC ACID 1 MG: 1 TABLET ORAL at 08:10

## 2024-10-18 NOTE — PLAN OF CARE
IV Abx. Wound care done. Medicated for pain with effect. Fall precautions reinforced.      Problem: Skin Injury Risk Increased  Goal: Skin Health and Integrity  Outcome: Progressing     Problem: Adult Inpatient Plan of Care  Goal: Plan of Care Review  Outcome: Progressing  Goal: Patient-Specific Goal (Individualized)  Outcome: Progressing  Goal: Absence of Hospital-Acquired Illness or Injury  Outcome: Progressing  Goal: Optimal Comfort and Wellbeing  Outcome: Progressing  Goal: Readiness for Transition of Care  Outcome: Progressing     Problem: Infection  Goal: Absence of Infection Signs and Symptoms  Outcome: Progressing     Problem: Diabetes Comorbidity  Goal: Blood Glucose Level Within Targeted Range  Outcome: Progressing     Problem: Sepsis/Septic Shock  Goal: Optimal Coping  Outcome: Progressing  Goal: Absence of Bleeding  Outcome: Progressing  Goal: Blood Glucose Level Within Targeted Range  Outcome: Progressing  Goal: Absence of Infection Signs and Symptoms  Outcome: Progressing  Goal: Optimal Nutrition Intake  Outcome: Progressing     Problem: Fall Injury Risk  Goal: Absence of Fall and Fall-Related Injury  Outcome: Progressing     Problem: Wound  Goal: Optimal Coping  Outcome: Progressing  Goal: Optimal Functional Ability  Outcome: Progressing  Goal: Absence of Infection Signs and Symptoms  Outcome: Progressing  Goal: Improved Oral Intake  Outcome: Progressing  Goal: Optimal Pain Control and Function  Outcome: Progressing  Goal: Skin Health and Integrity  Outcome: Progressing  Goal: Optimal Wound Healing  Outcome: Progressing     Problem: Pain Acute  Goal: Optimal Pain Control and Function  Outcome: Progressing

## 2024-10-18 NOTE — PLAN OF CARE
Problem: Skin Injury Risk Increased  Goal: Skin Health and Integrity  Outcome: Adequate for Care Transition     Problem: Adult Inpatient Plan of Care  Goal: Plan of Care Review  Outcome: Adequate for Care Transition  Goal: Patient-Specific Goal (Individualized)  Outcome: Adequate for Care Transition  Goal: Absence of Hospital-Acquired Illness or Injury  Outcome: Adequate for Care Transition  Goal: Optimal Comfort and Wellbeing  Outcome: Adequate for Care Transition  Goal: Readiness for Transition of Care  Outcome: Adequate for Care Transition     Problem: Infection  Goal: Absence of Infection Signs and Symptoms  Outcome: Adequate for Care Transition     Problem: Sepsis/Septic Shock  Goal: Optimal Coping  Outcome: Adequate for Care Transition  Goal: Absence of Bleeding  Outcome: Adequate for Care Transition  Goal: Blood Glucose Level Within Targeted Range  Outcome: Adequate for Care Transition  Goal: Absence of Infection Signs and Symptoms  Outcome: Adequate for Care Transition  Goal: Optimal Nutrition Intake  Outcome: Adequate for Care Transition     Problem: Fall Injury Risk  Goal: Absence of Fall and Fall-Related Injury  Outcome: Adequate for Care Transition     Problem: Wound  Goal: Optimal Coping  Outcome: Adequate for Care Transition  Goal: Optimal Functional Ability  Outcome: Adequate for Care Transition  Goal: Absence of Infection Signs and Symptoms  Outcome: Adequate for Care Transition  Goal: Improved Oral Intake  Outcome: Adequate for Care Transition  Goal: Optimal Pain Control and Function  Outcome: Adequate for Care Transition  Goal: Skin Health and Integrity  Outcome: Adequate for Care Transition  Goal: Optimal Wound Healing  Outcome: Adequate for Care Transition     Problem: Pain Acute  Goal: Optimal Pain Control and Function  Outcome: Adequate for Care Transition     Patient alert and oriented. Cooperative with care. Patient discharged to LTAC for longterm use of iv abx. Escorted by [facility] ems.  Belongings with patient. Dressing to picc line dry clean and intact. Changed dressing yesterday.

## 2024-10-18 NOTE — DISCHARGE SUMMARY
[facility] ([place] [address])  [facility] Medicine  Discharge Summary      Patient Name: Mari Sloan  MRN: [id number]  VICENTE: [id number]  Patient Class: IP- Inpatient  Admission Date: 9/16/2024  Hospital Length of Stay: 32 days  Discharge Date and Time:  10/18/2024 12:44 PM  Attending Physician: Dharmesh Peoples MD   Discharging Provider: Dharmesh Peoples MD  Primary Care Provider: Mary Fong APRGREGORIO  [facility] Medicine Team: [facility] HOSP MED A Dharmesh Peoples MD  Primary Care Team: [facility] HOSP MED A    HPI:   By Dr. Alberta Reese MD    49 y.o.  woman with h/o homelessness (recently was able to obtain living arrangements but states she was living [place] the bridge), NIDDM type 2, Essential hypertension, and substance use (denies any IVDU in he past or any illicit drug use recenly, denies ETOH abuse/overuse) presents to [facility] for further evaluation of her back pain.  She apparently presented to the [facility] Emergency Department on September 16 with nausea and vomiting and a mechanical fall 5 days prior. She reported pain worse in her back and on her sides radiating down both legs. She noted muscle spasm in her back and legs. She had no head trauma or loss of consciousness.  W/u in the [facility] ED noted significant hypokalemia, hypomagnesemia, severe anemia, metabolic alkalosis, and hyperglycemia. Vitals signs stable with no sepsis at this time noted. SBP>90, HR normal,afebrile.     Imaging studies of her lumbar spine and pelvis were concerning for osteomyelitis/septic arthritis of the left SI joint and L5-S1 along with an abnormal fluid collection extending from T11 through the left iliacus muscle concerning for abscess. Blood cultures sent.  In the emergency department she is receiving IV fluid, Zofran, Zosyn, vancomycin, potassium, magnesium, pain medication, and nausea medication.   She also received 2 units of PRBC for an H/H of 5.7/18.4,     Case discussed with Neurosurgery and  "Interventional Radiology. Plan would be for transfer to Hospital Medicine at [facility] for IR evaluation of the fluid collection and potential sampling of the joint osteomyelitis.  I reviewed the discussions made with the multiple sub specialists regarding transfer of this patient to [facility]: IR/General surgery/Neurosurgery/ER/Internal Med.     Neurosurgery contacted by the  did not feel surgical intervention was needed at this time but would follow along and requested [facility] for further management and IR backup. IR on call apparently read the CT and at the time felt "while the left retroperitoneal fluid collections do appear organized, percutaneous drainage is not advised given the extensive soft tissue infection superficial to these collections.  In addition, there is extensive evidence of soft tissue infection that does not appear organized or percutaneously drainable."     General surgery was consulted to review the case and felt that there was no immediate surgical intervention at this time to be had. I spoke with the on surgeon contacted regarding the location of the fluid collections and inquired if perhaps orthopedic surgery should be consulted to review given the involvement of bony pelvis.      I spoke orthopedic surgery who will see the patient but recommended re-consulting IR here and Neurosurgery given the diskitis/OM L5-S1. (Please see his consult note in the chart)     Repeat labs here again noted for persistently low mag, K, phos.  H/H stable with improved H/H. Pain control improved with Dilaudid.  I consulted ID for further assistance with ABX adjustments and changed her to Meropenem and doxy as well as continued Vanc. Echo ordred for am.     CT lumbar spine and pelvis had findings most concerning for infectious process. There were findings concerning for osteomyelitis and septic arthritis in the left SI joint. Findings concerning for osteomyelitis/diskitis L5-S1. " Possible osteomyelitis and septic joint at the pubic symphysis. Abnormal fluid collection on the left extending from T11 through the left iliacus muscle along and within the left iliopsoas muscle most concerning for abscess. Multiple additional small abscesses suspected in the pelvis. Multifocal collections of air in the fluid in the subcutaneous tissues of the left flank, incompletely imaged.    Chest x-ray noted a loop in the central venous catheter. Tip currently at the level of the brachiocephalic vein. Lungs are fairly clear.        Procedure(s) (LRB):  Incision and Drainage (Left)      Hospital Course:   Ms. Sloan was transferred from [facility] ED to [facility] ICU on 09/16/2024.  She presented to [facility] ED  for back pain, nausea/vomiting.  CT L-spine revealed osteomyelitis/diskitis L5-S1 along with abnormal fluid collection on left extending from T11 through left iliacus muscle and left iliopsoas muscle concerning for abscess.  Multifocal collections of air in fluid in subcutaneous tissues of left flank.  Patient was initiated on empiric vancomycin and meropenem.  Blood cultures with staph aureus.  Obtain echo.  Unable to repeat blood cultures given shortage of cx.  Neurosurgery recommended to obtain MRI L-spine, pending.  General surgery evaluated the patient and recommended urgent transfer to [facility] for surgical evaluation/source control given extent of necrosis.     Ms. Sloan was transferred to [facility] and admitted to the MICU 9/18 with concern for paraspinal abscess. Infectious workup was ordered. Blood cultures were positive for MSSA bacteremia. Neurosurgery, General Surgery and IR were consulted to evaluate the patient's paraspinal abscess. Neurosurgery performed a L3-L4 laminectomy for epidural abscess evacuation on 9/19/24 and the cultures grew MSSA. TTE showed normal EF of 60-65% with diastolic dysfunction, could not exclude mitral vegetation vs redundant chordae. ID was consulted  and recommended Oxacillin and repeat blood cultures. With her electrolyte derangement, and a background of appetitive loss in the past 2 months, there was concern for refeeding syndrome. On 9/21, IR took the patient for abscess drain placement and aspiration of SI joint. SI joint couldn't be aspirated but retroperitoneal abscess was drained of 100cc of purulent fluid. She was successfully extubated 9/22 and was stepped down to Hospital Medicine on 9/24/24. Her drain was removed on 09/27.  Repeat CT abdomen/pelvis showed an ill-defined subcutaneous edema and soft tissue inflammatory change in the surgical bed and subcutaneous soft tissues without discrete organized fluid collection, stable size of 3.6 cm fluid collection along the left posterior pararenal space which appears to communicate with the with the aforementioned collection and fascial thickening.  IR placed drain for retroperitoneal fluid collection on 9/30.  Cultures returned with Staph aureus.  IR suggested to monitor drain output, and consider repeat imaging when output decreases to less than 10 cc in 24 hours to evaluate for possible drainage catheter removal. Repeat CT was ordered on 10/3 as she endorsed worsening pain on her left flank, and minimal drain output which could be removed.  It returned with multiple new and enlargening abscesses.  Discussed with ID, who said to continue Oxacillin.  NSGY, Gen Surg, Ortho and IR were consulted for further recommendations.  MRI complete spine and pelvis were ordered to help guide management.  Gen Surg took her to the OR for an I&D of a left hip abscess on 10/7, where 300cc of purulent material within the subcutaneous space along the left hip was drained and sent for culture.  ID reconsulted.  NeuroIR considering spinal abscess drainage.    10/10-  lumbar abscess aspiration done per IR 10/9. Pt on oxacillin and vanc. ID following. Need f/I on IR  aspiration. Lumbar abscess aspiration completed today.    SNF  auth has been approved and the auth expires at midnight 10/11/24.   10/11- need final ID recs. New cultures form 10/10 still in progress. On vanc and oxacillin. oxacillin 2g q4h, duration 8 weeks, tentative end date 11/18.   Continue vancomycin, goal trough 10-15, pharmacy to dose, tentative end date 11/4/24. Pt has capacity. She suggest that she might leave AMA.   10/12- SNF unable to do both antibiotics. Pt here until completion or we find another place. Yesterday she was declining therapies.  VSS.  Lab tomorrow, q 72 hours. K low- will replete. Last mag 1.9.   10/15- pt is refusing therapies. Eating. Reports getting up with her .  On vanc until 11/4 and oxacillin until 11/18.  Bmp, mag to f/u on hypokalemia. Repleting.   10/16- on isolated increase in BP. Reporting pain. Alb 1.4, K 3.8. She is being discharged from therapy for lack of cooperation.  Reporting abd pain, exam is unchanged. Need to monitor for new symptoms. She also was accepted to LTAC. She is med ready for LTAC.   10/17- having significant nausea, providing additional PRNs. Is now amenable to LTAC and tentatively planning for DC tomorrow  10/18- nausea much improved with SL ondansetron. Discussed with pharmacy, plan for vanco 750mg daily until at least Monday at which time another vanco trough can be checked at LTAC. Wound care orders updated. Patient ready to leave. Does not need follow up per general surgery regarding left hip, just dressing changed. Placed referral to ID for follow up in two weeks. Of note she remains on her baseline 70mg methadone daily in addition to PRN oxycodone and is doing well     Goals of Care Treatment Preferences:  Code Status: Full Code      SDOH Screening:  The patient was screened for food insecurity, housing instability, transportation needs, utility difficulties, and interpersonal safety. The social determinant(s) of health identified as a concern this admission are:  Transportation difficulties    The plan  "to address these concerns is: Referral to SW at LT as she will remain at LTAC for approximately the next month    Social Drivers of Health with Concerns     Transportation Needs: Unmet Transportation Needs (9/18/2024)        Consults:   Consults (From admission, onward)          Status Ordering Provider     Pharmacy to dose Vancomycin consult  Once        Provider:  (Not yet assigned)   Placed in "And" Linked Group    Acknowledged AHSAN DOAN P.     Inpatient consult to Interventional Radiology  Once        Provider:  (Not yet assigned)    Completed AHSAN DOAN P.     Inpatient consult to Infectious Diseases  Once        Provider:  (Not yet assigned)    Completed AHSAN DOAN P.     Inpatient consult to General Surgery  Once        Provider:  (Not yet assigned)    Completed AHSAN DOAN P.     Inpatient consult to Neurosurgery  Once        Provider:  (Not yet assigned)    Completed AHSAN DOAN PCarlos A     Inpatient consult to Interventional Radiology  Once        Provider:  (Not yet assigned)    Completed AHSAN DOAN P.     Inpatient consult to Interventional Radiology  Once        Provider:  (Not yet assigned)    Completed KEITH BATRES     [facility]  Once        Provider:  (Not yet assigned)    Completed KEITH BATRES     Inpatient consult to [facility]ICC team ([facility])  Once        Provider:  (Not yet assigned)    Completed SEBASTIAN JOHN     Inpatient consult to Physical Medicine Rehab  Once        Provider:  (Not yet assigned)    Completed UZAIR MCCORMICK     Inpatient consult to Orthopedics  Once        Provider:  (Not yet assigned)    Completed SONALI BENTLEY     Inpatient consult to Registered Dietitian/Nutritionist  Once        Provider:  (Not yet assigned)    Completed WU SEGUNDO     Inpatient consult to Interventional Radiology  Once        Provider:  (Not yet assigned)    Completed WU SEGUNDO     Inpatient consult to Neurosurgery "  Once        Provider:  (Not yet assigned)    Completed WU SEGUNDO     Inpatient consult to Infectious Diseases  Once        Provider:  (Not yet assigned)    Completed SEAMUS MESSER     Inpatient consult to General Surgery  Once        Provider:  (Not yet assigned)    Completed SONALI BENTLEY     Inpatient consult to General Surgery  Once        Provider:  Presley Jaeger MD    Completed ELMO HEARN     Inpatient consult to Social Work  Once        Provider:  (Not yet assigned)    Completed SONALI BENTLEY     Inpatient consult to Interventional Radiology  Once        Provider:  Promise Wadsworth NP    Completed PETERSON DAVIS LCarlos A     Inpatient consult to Orthopedic Surgery  Once        Provider:  John Castro MD    Completed PETERSON DAVIS L.     Inpatient consult to Neurosurgery  Once        Provider:  Lea Fernandez PA-C    Completed PETERSON DAVIS L.     Inpatient consult to Infectious Diseases  Once        Provider:  Hannah Summers AU.D    Completed PETERSON DAVIS.            ID  Paraspinal abscess  MSSA Endocarditis  MSSA Paraspinal abscess  MSSA Psoas abscess    Blood cx 9/16 with MSSA  2D echo 9/17:  Cannot entirely exclude mitral vegetation vs redundant chordae. If high clinical suspicion for endocarditis, would rx as such (not clear MARICHUY would be able to exclude vegetation based on TTE findings).   Noted to have extensive fluid collection on left extending from T11 through left iliacus muscle and within the left iliopsoas muscle.  Multifocal collections of air including subcutaneous tissues on the left flank noted.  ID/Neurosurgery consulted.   S/p L3-L5 laminectomy with epidural abscess evacuation 9/19   Ortho consulted. Rec continued abx tx and no further interventions  IR SI joint aspiration and abscess drain placement 9/21. Unable to aspirate joint  IR drain removed on 09/27.    Repeat CT abdomen pelvis 9/28 ordered to look for recollection  of fluid showed an ill-defined subcutaneous edema and soft tissue inflammatory change in the surgical bed and subcutaneous soft tissues without discrete organized fluid collection,  Stable size of 3.6 cm fluid collection along the left posterior pararenal space which appears to communicate with the with the aforementioned collection and fascial thickening.   IR placed drain for retroperitoneal fluid collection on 9/30.  Culture with MSSA  ID consulted:  Suggested Oxacillin through 11/18 with repeat imaging prior to completion  Will need LTAC for completion of IV antibiotics given history of IVDA  PICC placed 9/27  Repeat CRP ordered 10/3 given worsening pain and swelling - CRP down to 32  Repeat CT abdomen/pelvis was ordered to assess for drain removal, that showed worsening abscesses.  Discussed with ID who suggested further source control and to continue Oxacillin   Gen Surg, Ortho, NSGY and IR consulted - Greatly appreciate assistance!  MRI complete spine and pelvis ordered to further determine next steps with multiple abscesses  Gen Surg took her to the OR for an I&D of a left hip abscess on 10/7, where 300cc of purulent material within the subcutaneous space along the left hip was drained and sent for culture.  Cx with Staph aureus  ID reconsulted  NeuroIR considering epidural drainage as NSGY said no intervention and ID feels like she could use more intervention to decrease her infectious burden  10/10-  Lumbar abscess aspiration completed per IR today.    10/16- oxacillin 2g q4h, duration 8 weeks, tentative end date 11/18.  vancomycin, goal trough 10-15, pharmacy to dose, tentative end date 11/4/24, Continue wound care.   10/17- no changes to plan  10/18- doing well. On vanco 750mg daily, plan for next trough Monday 10/21. Nausea significantly improved with the SL ondansetron. 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] 09/18/2024 Yes    Other osteomyelitis, multiple sites [M86.8X0] 09/17/2024 Yes    History of drug use [F19.91] 09/17/2024 Yes    Smoker [F17.200] 09/17/2024 Yes    Uncontrolled type 2 diabetes mellitus with hyperglycemia [E11.65] 09/17/2024 Yes    Anemia, unspecified [D64.9] 09/17/2024 Yes    Paraspinal abscess [M46.20] 09/17/2024 Yes    Hepatitis C [B19.20] 09/17/2024 Yes    Staphylococcus aureus bacteremia [R78.81, B95.61] 09/17/2024 Yes    Psoas muscle abscess [K68.12] 09/17/2024 Yes    Hypokalemia [E87.6] 09/17/2024 Yes      Problems Resolved During this Admission:       Discharged Condition: fair    Disposition: Another Health Care Inst*    Follow Up:   Follow-up Information       [facility] Follow up.    Contact information:  [address] [place] [address] [phone]             Lashawn Barbosa DO Follow up in 2 week(s).    Specialty: Infectious Diseases  Contact information:  [address] [id number] [phone]               Angélica Carroll MD Follow up in 2 week(s).    Specialties: Neurology, Interventional Radiology  Contact information:  [address] [facility] [id number] [phone]                           Patient Instructions:      Ambulatory referral/consult to Smoking Cessation Program   Standing Status: Future   Referral Priority: Routine Referral Type: Consultation   Referral Reason: Specialty Services Required   Requested Specialty: CTTS   Number of Visits Requested: 1     Ambulatory referral/consult to Infectious Disease   Standing Status: Future   Referral Priority: Routine Referral Type: Consultation   Referral Reason: Specialty Services Required   Requested Specialty: Infectious Diseases   Number of  Visits Requested: 1     Reason for not Prescribing Nicotine Replacement     Order Specific Question Answer Comments   Reason for not Prescribing: Not medically appropriate at this time        Significant Diagnostic Studies:     10/6 MRI spine:   1. Enhancement and edema of the endplates and discs at L5-S1, concerning for discitis/osteomyelitis.  2. Enhancement edema at the bilateral L2-L3 and L4-L5 facet joints, concerning for septic arthritis.  3. Postop change of L3-L5 laminectomy and epidural abscess evacuation with fluid collection within the laminectomy bed and additional fluid collection in the subcutaneous fat overlying the laminectomy bed, possibly representing abscess, hematoma, or seroma.  4. Thin peripherally enhancing fluid collection in the epidural space extending from the L3-S1 level with additional epidural enhancement extending from the T12-S2 level, likely representing epidural phlegmon.  5. Enhancement edema with cortical erosion of the left SI joint consistent with septic arthritis and multiple abscesses within the left retroperitoneal space and left psoas/iliopsoas musculature.  Overall decreased size of fluid collections within retroperitoneal space and psoas muscle when compared to the most recent MRI of the lumbar spine.  6. Partially visualized fluid collection within the subcutaneous fat overlying the left paraspinal musculature and left gluteal musculature, possibly representing abscess.    From Abd abscess:      Staphylococcus aureus     CULTURE, AEROBIC  (SPECIFY SOURCE)     Clindamycin <=0.5 mcg/mL Sensitive     Erythromycin <=0.5 mcg/mL Sensitive     Oxacillin <=0.25 mcg/mL Sensitive     Penicillin 8 mcg/mL Resistant     Tetracycline <=4 mcg/mL Sensitive     Trimeth/Sulfa <=0.5/9.5 m... Sensitive          Pending Diagnostic Studies:       Procedure Component Value Units Date/Time    Basic metabolic panel [[id number]] Collected: 09/19/24 0026    Order Status: Sent Lab Status: No result      Specimen: Blood     Basic metabolic panel [[id number]] Collected: 09/17/24 2227    Order Status: Sent Lab Status: No result     Specimen: Blood     CBC auto differential [[id number]] Collected: 10/03/24 0340    Order Status: Sent Lab Status: In process Updated: 10/03/24 0341    Specimen: Blood     Comprehensive metabolic panel [[id number]] Collected: 10/03/24 0340    Order Status: Sent Lab Status: In process Updated: 10/03/24 0341    Specimen: Blood     EKG 12-lead [[id number]]     Order Status: Sent Lab Status: No result            Medications:  Reconciled Home Medications:      Medication List        START taking these medications      0.9% NaCl PgBk 100 mL with oxacillin 2 gram SolR 2 g  Inject 2 g into the vein every 4 (four) hours.     D5W SolP 250 mL with vancomycin 750 mg SolR 750 mg  Inject 750 mg into the vein once daily. for 17 days     folic acid 1 MG tablet  Commonly known as: FOLVITE  Take 1 tablet (1 mg total) by mouth once daily.     nicotine 21 mg/24 hr  Commonly known as: NICODERM CQ  Place 1 patch onto the skin once daily.     ondansetron 8 MG Tbdl  Commonly known as: ZOFRAN-ODT  Take 1 tablet (8 mg total) by mouth every 6 (six) hours as needed.     oxyCODONE 15 MG Tab  Commonly known as: ROXICODONE  Take 1 tablet (15 mg total) by mouth every 4 (four) hours as needed for Pain.     polyethylene glycol 17 gram/dose powder  Commonly known as: GLYCOLAX  Mix 1 capful (17 g) with liquid and take by mouth once daily.     senna-docusate 8.6-50 mg 8.6-50 mg per tablet  Commonly known as: PERICOLACE  Take 1 tablet by mouth once daily.     thiamine 100 MG tablet  Take 1 tablet (100 mg total) by mouth once daily.            CONTINUE taking these medications      gabapentin 300 MG capsule  Commonly known as: NEURONTIN  Take 300 mg by mouth 3 (three) times daily.     hydrOXYzine pamoate 25 MG Cap  Commonly known as: VISTARIL  Take 25 mg by mouth 3 (three) times daily.     methadone 10 MG tablet  Commonly  known as: DOLOPHINE  Take 7 tablets (70 mg total) by mouth Daily.            STOP taking these medications      cloNIDine 0.2 MG tablet  Commonly known as: CATAPRES     glipiZIDE 5 MG tablet  Commonly known as: GLUCOTROL     metFORMIN 500 MG tablet  Commonly known as: GLUCOPHAGE              Indwelling Lines/Drains at time of discharge:   Lines/Drains/Airways       Peripherally Inserted Central Catheter Line  Duration             PICC Double Lumen 09/27/24 1035 right basilic 21 days                    Time spent on the discharge of patient: 75 minutes         Dharmesh Peoples MD  Department of Hospital Medicine  [facility] ([place]

## 2024-10-18 NOTE — ASSESSMENT & PLAN NOTE
MSSA Endocarditis  MSSA Paraspinal abscess  MSSA Psoas abscess    Blood cx 9/16 with MSSA  2D echo 9/17:  Cannot entirely exclude mitral vegetation vs redundant chordae. If high clinical suspicion for endocarditis, would rx as such (not clear MARICHUY would be able to exclude vegetation based on TTE findings).   Noted to have extensive fluid collection on left extending from T11 through left iliacus muscle and within the left iliopsoas muscle.  Multifocal collections of air including subcutaneous tissues on the left flank noted.  ID/Neurosurgery consulted.   S/p L3-L5 laminectomy with epidural abscess evacuation 9/19   Ortho consulted. Rec continued abx tx and no further interventions  IR SI joint aspiration and abscess drain placement 9/21. Unable to aspirate joint  IR drain removed on 09/27.    Repeat CT abdomen pelvis 9/28 ordered to look for recollection of fluid showed an ill-defined subcutaneous edema and soft tissue inflammatory change in the surgical bed and subcutaneous soft tissues without discrete organized fluid collection,  Stable size of 3.6 cm fluid collection along the left posterior pararenal space which appears to communicate with the with the aforementioned collection and fascial thickening.   IR placed drain for retroperitoneal fluid collection on 9/30.  Culture with MSSA  ID consulted:  Suggested Oxacillin through 11/18 with repeat imaging prior to completion  Will need LTAC for completion of IV antibiotics given history of IVDA  PICC placed 9/27  Repeat CRP ordered 10/3 given worsening pain and swelling - CRP down to 32  Repeat CT abdomen/pelvis was ordered to assess for drain removal, that showed worsening abscesses.  Discussed with ID who suggested further source control and to continue Oxacillin   Gen Surg, Ortho, NSGY and IR consulted - Greatly appreciate assistance!  MRI complete spine and pelvis ordered to further determine next steps with multiple abscesses  Gen Surg took her to the OR for an  I&D of a left hip abscess on 10/7, where 300cc of purulent material within the subcutaneous space along the left hip was drained and sent for culture.  Cx with Staph aureus  ID reconsulted  NeuroIR considering epidural drainage as NSGY said no intervention and ID feels like she could use more intervention to decrease her infectious burden  10/10-  Lumbar abscess aspiration completed per IR today.    10/16- oxacillin 2g q4h, duration 8 weeks, tentative end date 11/18.  vancomycin, goal trough 10-15, pharmacy to dose, tentative end date 11/4/24, Continue wound care.   10/17- no changes to plan  10/18- doing well. On vanco 750mg daily, plan for next trough Monday 10/21. Nausea significantly improved with the SL ondansetron. No need for gen surg f/u

## 2024-10-18 NOTE — PLAN OF CARE
Discharge Plan A and Plan B have been determined by review of patient's clinical status, future medical and therapeutic needs, and coverage/benefits for post-acute care in coordination with multidisciplinary team members.      10/18/24 1040   Final Note   Assessment Type Final Discharge Note   What phone number can be called within the next 1-3 days to see how you are doing after discharge? [phone]  ([facility])   Hospital Resources/Appts/Education Provided Post-Acute resouces added to AVS  (PCP, ID, gen surg, NSGY.)   Post-Acute Status   Post-Acute Authorization Placement   Post-Acute Placement Status Set-up Complete/Auth obtained   Coverage [facility]   Patient choice form signed by patient/caregiver List with quality metrics by geographic area provided   Discharge Delays None known at this time     CM met with patient and spoke with Patients spouse Marcelino Sloan via phone # [phone]   to discuss discharge planning.  Patient is in agreement with [facility] in [place]  Patients plan is to dc to LTAC. Patient will need transportation    [facility]:  10/18/24    0945 am  CM met with Jenna Ponce with [facility] in [place] and updated that patient will need the following:  ID and PCP follow ups    1045 am  CM left a VM with patients CM with her insurance and will email DC Summary and hospital follow ups    1253 pm  Patient transportation scheduled for 1:45 pm and ETA pending     ETA: 4:00 pm    Follow up: PCP      Referrals: ID, smoking Cessation  --referrals added to medical transport packet    Nabila Elise RN  Case Management  [facility]  [phone]

## 2024-10-18 NOTE — PLAN OF CARE
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       Allergies:Review of patient's allergies indicates:  No Known Allergies    Diet: diabetic diet: 2200 calorie    Activities: Activity as tolerated    Goals of Care Treatment Preferences:  Code Status: Full Code      Nursing: Wound care     Labs: CBC, BMP, ESR, CRP, and Vanco trough   Weekly for 1 month    ** per inpatient pharmacist, please day vanco trough as well on 10/21 (Monday) to assist with adjusting vanco dosing. Please discuss with pharmacist and adjust vancomycin dosing accordingly (currently 750mg Q24 hours)    CONSULTS:    Physical Therapy to evaluate and treat. , Occupational Therapy to evaluate and treat., and  to evaluate for community resources/long-range planning.    SW - patient will need follow up with ID and likely will need MRI in approximately 2 weeks. May need to see Interventional Neurology pending ID  reccomendations      WOUND CARE ORDERS  Left Posterior Flank: BID and PRN   Gently pack kerlix moistened Vashe(wrung out for excess moisture) to wound bed, cover with ABD pad, secure with medipore tape. Supplies at bedside.    Left buttock: BID / PRN   Apply Triad correctly:      Always cleanse wound before applying Triad.  To remove Triad:   Use pH-balanced wound cleanser to soften Triad  Gently wipe without scrubbing  For complete removal, repeat as needed.     Gently spread Triad evenly over the area of application to the thickness of a dime.    Spinal incision - q3d   Cleanse w/Vashe and 4x4 gauze, apply Aquacel AG, cover with foam/island border.    Medications: Review discharge medications with patient and family and provide education.         Medication List        START taking these medications      0.9% NaCl PgBk 100 mL with oxacillin 2 gram SolR 2 g  Inject 2 g into the vein every 4 (four) hours.     D5W SolP 250 mL with vancomycin 750 mg SolR 750 mg  Inject 750 mg into the vein once daily. for 17 days     folic acid 1 MG tablet  Commonly known as: FOLVITE  Take 1 tablet (1 mg total) by mouth once daily.     nicotine 21 mg/24 hr  Commonly known as: NICODERM CQ  Place 1 patch onto the skin once daily.     ondansetron 8 MG Tbdl  Commonly known as: ZOFRAN-ODT  Take 1 tablet (8 mg total) by mouth every 6 (six) hours as needed.     oxyCODONE 15 MG Tab  Commonly known as: ROXICODONE  Take 1 tablet (15 mg total) by mouth every 4 (four) hours as needed for Pain.     polyethylene glycol 17 gram/dose powder  Commonly known as: GLYCOLAX  Mix 1 capful (17 g) with liquid and take by mouth once daily.     senna-docusate 8.6-50 mg 8.6-50 mg per tablet  Commonly known as: PERICOLACE  Take 1 tablet by mouth once daily.     thiamine 100 MG tablet  Take 1 tablet (100 mg total) by mouth once daily.            CONTINUE taking these medications      gabapentin 300 MG capsule  Commonly known as: NEURONTIN  Take 300 mg by mouth 3  (three) times daily.     hydrOXYzine pamoate 25 MG Cap  Commonly known as: VISTARIL  Take 25 mg by mouth 3 (three) times daily.     methadone 10 MG tablet  Commonly known as: DOLOPHINE  Take 7 tablets (70 mg total) by mouth Daily.            STOP taking these medications      cloNIDine 0.2 MG tablet  Commonly known as: CATAPRES     glipiZIDE 5 MG tablet  Commonly known as: GLUCOTROL     metFORMIN 500 MG tablet  Commonly known as: GLUCOPHAGE                Immunizations Administered as of 10/18/2024       No immunizations on file.            This patient has had both covid vaccinations    Some patients may experience side effects after vaccination.  These may include fever, headache, muscle or joint aches.  Most symptoms resolve with 24-48 hours and do not require urgent medical evaluation unless they persist for more than 72 hours or symptoms are concerning for an unrelated medical condition.          _________________________________  Dharmesh Peoples MD  10/18/2024

## 2024-10-18 NOTE — PLAN OF CARE
IV Abx.      Problem: Skin Injury Risk Increased  Goal: Skin Health and Integrity  10/18/2024 0601 by Michaela Young RN  Outcome: Progressing  10/18/2024 0309 by Michaela Young RN  Outcome: Progressing     Problem: Adult Inpatient Plan of Care  Goal: Plan of Care Review  10/18/2024 0601 by Michaela Young RN  Outcome: Progressing  10/18/2024 0309 by Michaela Young RN  Outcome: Progressing  Goal: Patient-Specific Goal (Individualized)  10/18/2024 0601 by Michaela Young RN  Outcome: Progressing  10/18/2024 0309 by Michaela Young RN  Outcome: Progressing  Goal: Absence of Hospital-Acquired Illness or Injury  10/18/2024 0601 by Michaela Young RN  Outcome: Progressing  10/18/2024 0309 by Michaela Young RN  Outcome: Progressing  Goal: Optimal Comfort and Wellbeing  10/18/2024 0601 by Michaela Young RN  Outcome: Progressing  10/18/2024 0309 by Michaela Young RN  Outcome: Progressing  Goal: Readiness for Transition of Care  10/18/2024 0601 by Michaela Young RN  Outcome: Progressing  10/18/2024 0309 by Michaela Young RN  Outcome: Progressing     Problem: Infection  Goal: Absence of Infection Signs and Symptoms  10/18/2024 0601 by Michaela Young RN  Outcome: Progressing  10/18/2024 0309 by Michaela Young RN  Outcome: Progressing     Problem: Diabetes Comorbidity  Goal: Blood Glucose Level Within Targeted Range  10/18/2024 0601 by Michaela Young RN  Outcome: Progressing  10/18/2024 0309 by Michaela Young RN  Outcome: Progressing     Problem: Sepsis/Septic Shock  Goal: Optimal Coping  10/18/2024 0601 by Michaela Young RN  Outcome: Progressing  10/18/2024 0309 by Michaela Young RN  Outcome: Progressing  Goal: Absence of Bleeding  10/18/2024 0601 by Michaela Young RN  Outcome: Progressing  10/18/2024 0309 by Michaela Young RN  Outcome: Progressing  Goal: Blood Glucose Level Within Targeted Range  10/18/2024 0601 by Michaela Young RN  Outcome: Progressing  10/18/2024 0309 by Michaela Young RN  Outcome: Progressing  Goal: Absence of  Infection Signs and Symptoms  10/18/2024 0601 by Michaela Young RN  Outcome: Progressing  10/18/2024 0309 by Michaela Young RN  Outcome: Progressing  Goal: Optimal Nutrition Intake  10/18/2024 0601 by Michaela Young RN  Outcome: Progressing  10/18/2024 0309 by Michaela Young RN  Outcome: Progressing     Problem: Fall Injury Risk  Goal: Absence of Fall and Fall-Related Injury  10/18/2024 0601 by Michaela Young RN  Outcome: Progressing  10/18/2024 0309 by Michaela Young RN  Outcome: Progressing     Problem: Wound  Goal: Optimal Coping  10/18/2024 0601 by Michaela Young RN  Outcome: Progressing  10/18/2024 0309 by Michaela Young RN  Outcome: Progressing  Goal: Optimal Functional Ability  10/18/2024 0601 by Michaela Young RN  Outcome: Progressing  10/18/2024 0309 by Michaela Young RN  Outcome: Progressing  Goal: Absence of Infection Signs and Symptoms  10/18/2024 0601 by Michaela Young RN  Outcome: Progressing  10/18/2024 0309 by Michaela Young RN  Outcome: Progressing  Goal: Improved Oral Intake  10/18/2024 0601 by Michaela Young RN  Outcome: Progressing  10/18/2024 0309 by Michaela Young RN  Outcome: Progressing  Goal: Optimal Pain Control and Function  10/18/2024 0601 by Michaela Young RN  Outcome: Progressing  10/18/2024 0309 by Michaela Young RN  Outcome: Progressing  Goal: Skin Health and Integrity  10/18/2024 0601 by Michaela Young RN  Outcome: Progressing  10/18/2024 0309 by Michaela Young RN  Outcome: Progressing  Goal: Optimal Wound Healing  10/18/2024 0601 by Michaela Young RN  Outcome: Progressing  10/18/2024 0309 by Michaela Young RN  Outcome: Progressing     Problem: Pain Acute  Goal: Optimal Pain Control and Function  10/18/2024 0601 by Michaela Young, RN  Outcome: Progressing  10/18/2024 0309 by Michaela Young, RN  Outcome: Progressing

## 2024-10-21 LAB
FUNGUS SPEC CULT: NORMAL

## 2024-11-27 ENCOUNTER — TELEPHONE (OUTPATIENT)
Dept: NEUROSURGERY | Facility: CLINIC | Age: 49
End: 2024-11-27
Payer: MEDICAID

## 2024-11-27 NOTE — TELEPHONE ENCOUNTER
Called patient to schedule an upcoming appointment with SACHIN Garcia. No response, LVM and provided next appointment date and time.  Future Appointments   Date Time Provider Department Center   12/15/2024  2:00 PM [facility]   12/15/2024  2:30 PM [facility]   12/16/2024  1:00 PM Zelda Hopkins, NP [facility] NEUROS[address] [place], [place] [address]

## 2024-12-03 ENCOUNTER — TELEPHONE (OUTPATIENT)
Dept: INFECTIOUS DISEASES | Facility: CLINIC | Age: 49
End: 2024-12-03
Payer: MEDICAID

## 2025-03-14 NOTE — ANESTHESIA POSTPROCEDURE EVALUATION
Anesthesia Post Evaluation    Patient: Mari Sloan    Procedure(s) Performed: * No procedures listed *    Final Anesthesia Type: general      Patient location during evaluation: PACU  Patient participation: Yes- Able to Participate  Level of consciousness: awake and alert  Post-procedure vital signs: reviewed and stable  Pain management: adequate  Airway patency: patent    PONV status at discharge: No PONV  Anesthetic complications: no      Cardiovascular status: blood pressure returned to baseline  Respiratory status: unassisted  Hydration status: euvolemic  Follow-up not needed.              Vitals Value Taken Time   /67 09/21/24 1539   Temp 36.8 °C (98.2 °F) 09/21/24 1200   Pulse 73 09/21/24 1603   Resp 15 09/21/24 1603   SpO2 91 % 09/21/24 1603   Vitals shown include unfiled device data.      No case tracking events are documented in the log.      Pain/Jesus Score: Pain Rating Prior to Med Admin: 2 (9/21/2024  7:58 AM)  Pain Rating Post Med Admin: 5 (9/20/2024  1:46 PM)           Patient/Caregiver provided printed discharge information.

## (undated) DEVICE — DRAPE C-ARM ELAS CLIP 42X120IN

## (undated) DEVICE — SEALER AQUAMANTYS 2.3 BIPOLAR

## (undated) DEVICE — DRAPE LAP T SHT W/ INSTR PAD

## (undated) DEVICE — DRESSING TRANS 4X4 TEGADERM

## (undated) DEVICE — DRAPE STERI-DRAPE 1000 17X11IN

## (undated) DEVICE — DRAPE ABDOMINAL TIBURON 14X11

## (undated) DEVICE — TUBE FRAZIER 5MM 2FT SOFT TIP

## (undated) DEVICE — BANDAGE ROLL COTTN 4.5INX4.1YD

## (undated) DEVICE — NDL SPINAL 18GX3.5 SPINOCAN

## (undated) DEVICE — KIT SURGIFLO HEMOSTATIC MATRIX

## (undated) DEVICE — APPLICATOR CHLORAPREP CLR 10.5

## (undated) DEVICE — BUR BONE CUT MICRO TPS 3X3.8MM

## (undated) DEVICE — APPLICATOR CHLORAPREP ORN 26ML

## (undated) DEVICE — SUT 0 8-27IN VICRYL PL CT-1

## (undated) DEVICE — TRAY MINOR GEN SURG OMC

## (undated) DEVICE — CORD BIPOLAR 12 FOOT

## (undated) DEVICE — DRESSING AQUACEL FOAM 5 X 5

## (undated) DEVICE — ELECTRODE REM PLYHSV RETURN 9

## (undated) DEVICE — DRAPE STERI INSTRUMENT 1018

## (undated) DEVICE — BLADE SURG CARBON STEEL SZ11

## (undated) DEVICE — DRAPE C-ARMOR EQUIPMENT COVER

## (undated) DEVICE — SUT CTD VICRYL 2-0 CR/CT-2

## (undated) DEVICE — COVER PROXIMA MAYO STAND

## (undated) DEVICE — DRAPE TOP 53X102IN

## (undated) DEVICE — DRESSING ABSRBNT ISLAND 3.6X8

## (undated) DEVICE — SYS CLSR DERMABOND PRINEO 22CM

## (undated) DEVICE — MARKER SKIN RULER STERILE

## (undated) DEVICE — TRAY NEURO OMC

## (undated) DEVICE — DRESSING MEPILEX BORDER 4 X 4

## (undated) DEVICE — GOWN POLY REINF BRTH SLV XL

## (undated) DEVICE — ADHESIVE MASTISOL VIAL 48/BX

## (undated) DEVICE — DRESSING MEPILEX BORDR AG 4X10

## (undated) DEVICE — TRAY CATH 1-LYR URIMTR 16FR

## (undated) DEVICE — KIT EVACUATOR 3-SPRING 1/8 DRN

## (undated) DEVICE — DRESSING SURGICAL 1/2X1/2